# Patient Record
Sex: FEMALE | Race: WHITE | NOT HISPANIC OR LATINO | Employment: PART TIME | URBAN - METROPOLITAN AREA
[De-identification: names, ages, dates, MRNs, and addresses within clinical notes are randomized per-mention and may not be internally consistent; named-entity substitution may affect disease eponyms.]

---

## 2017-02-03 DIAGNOSIS — G47.00 INSOMNIA, UNSPECIFIED: Primary | ICD-10-CM

## 2017-02-03 DIAGNOSIS — E78.5 HYPERLIPIDEMIA LDL GOAL <100: ICD-10-CM

## 2017-02-03 NOTE — TELEPHONE ENCOUNTER
Simvastatin        New Pharmacy     Please transfer Synthroid Rx, also  Last Written Prescription Date: 06/13/16  Last Fill Quantity: 90, # refills: 2  [place] [facility] or [facility] prescribing provider: 10/20/16       CHOL      174   5/16/2016  HDL       96   5/16/2016  LDL       61   5/16/2016  TRIG       86   5/16/2016  CHOLHDLRATIO      2.0   3/10/2015  Trazodone      Last Written Prescription Date: 05/20/16  Last Fill Quantity: 90,  # refills: 2   Last Office Visit with [facility] or [facility] prescribing provider: 10/20/16

## 2017-02-07 ENCOUNTER — MYC MEDICAL ADVICE (OUTPATIENT)
Dept: ONCOLOGY | Facility: CLINIC | Age: 72
End: 2017-02-07

## 2017-02-09 RX ORDER — TRAZODONE HYDROCHLORIDE 100 MG/1
TABLET ORAL
Qty: 30 TABLET | Refills: 0 | Status: SHIPPED | OUTPATIENT
Start: 2017-02-09 | End: 2017-03-10

## 2017-02-09 RX ORDER — SIMVASTATIN 20 MG
20 TABLET ORAL AT BEDTIME
Qty: 30 TABLET | Refills: 0 | Status: SHIPPED | OUTPATIENT
Start: 2017-02-09 | End: 2017-03-10

## 2017-02-16 ENCOUNTER — OFFICE VISIT (OUTPATIENT)
Dept: PODIATRY | Facility: CLINIC | Age: 72
End: 2017-02-16
Payer: COMMERCIAL

## 2017-02-16 VITALS — BODY MASS INDEX: 32.78 KG/M2 | HEIGHT: 66 IN | WEIGHT: 204 LBS

## 2017-02-16 DIAGNOSIS — M76.61 ACHILLES TENDINITIS OF RIGHT LOWER EXTREMITY: Primary | ICD-10-CM

## 2017-02-16 PROCEDURE — 99213 OFFICE O/P EST LOW 20 MIN: CPT | Performed by: PODIATRIST

## 2017-02-16 NOTE — PATIENT INSTRUCTIONS
Initial musculoskeletal treatment recommendation:    1.  Stretch the calf muscles as instructed once an hour.  2.  Ice the injured area in the evening; 20 min on/off.  3.  Take antiinflammatory medication as directed.  4.  Massage the soft tissues around the injured area in the morning to loosen the tissue  5.  Use the knee walker at all times.    If no improvement in symptoms within four to six weeks, return to clinic for reevaluation.

## 2017-02-16 NOTE — PROGRESS NOTES
"Briana returns to the office for reevaluation of the right foot.  The patient relates following the instructions given at the last visit with noted more pain.  The patient relates overall less  improvement in pain and function of the right foot.  The patient relates no other problems.    PAST MEDICAL HISTORY:   Past Medical History   Diagnosis Date     Allergies      Breast cancer (H)      Esophageal reflux      Other and unspecified hyperlipidemia      Other chronic bronchitis      Personal history of pneumonia (recurrent)      Hx-Pneumonia, \" Chronic Bronchitis\"     Personal history of tobacco use, presenting hazards to health      Unspecified hypothyroidism        BMI= Body mass index is 32.93 kg/(m^2).    Weight management plan: Patient was referred to their PCP to discuss a diet and exercise plan.    Physical Exam:    General: The patient appears to have a pleasant mental affect.    Lower extremity physical exam:  Neurovascular status is intact with palpable pedal pulses and intact epicritic sensations.  Muscular exam is within normal limits to major muscle groups.  Integument is intact.      One notes decreased edema.  One notes pain on palpation at the insertion point of the achilles tendon on the right.  No surrounding erythema noted.    Radiograph review including weightbearing AP, lateral and medial oblique views of the right foot reveals small islands of ossification of the distal achilles tendon.    Assessment:      ICD-10-CM    1. Achilles tendinitis of right lower extremity M76.61        Plan:  I have explained to Briana about the conditions.  At this time, the patient was instructed on icing, stretching, tissue massage and support.  The patient was prescribed a CAM boot that will aid in offloading the tension forces to the soft tissues and prevent further inflammation.   The patient will return in four weeks for reevaluation if the symptoms do not resolve.        Disclaimer: This note consists " of symbols derived from keyboarding, dictation and/or voice recognition software. As a result, there may be errors in the script that have gone undetected. Please consider this when interpreting information found in this chart.       LUCAS Goldstein D.P.M., [email or web address].

## 2017-02-16 NOTE — MR AVS SNAPSHOT
After Visit Summary   2/16/2017    Briana Mcgee    MRN: [id number]           Patient Information     Date Of Birth          1945        Visit Information        Provider Department      2/16/2017 8:30 AM Antwan Goldstein DPM [facility] [place]        Today's Diagnoses     Achilles tendinitis of right lower extremity    -  1      Care Instructions    Initial musculoskeletal treatment recommendation:    1.  Stretch the calf muscles as instructed once an hour.  2.  Ice the injured area in the evening; 20 min on/off.  3.  Take antiinflammatory medication as directed.  4.  Massage the soft tissues around the injured area in the morning to loosen the tissue  5.  Use the knee walker at all times.    If no improvement in symptoms within four to six weeks, return to clinic for reevaluation.          Follow-ups after your visit        Follow-up notes from your care team     Return in about 4 weeks (around 3/16/2017).      Your next 10 appointments already scheduled     Mar 10, 2017 11:00 AM CST   [facility]    [address] [address]   [phone]            Oct 19, 2017 10:00 AM CDT   LAB with [facility] ([facility])    [address] [address]   [phone]           Patient must bring picture ID.  Patient should be prepared to give a urine specimen  Please do not eat 10-12 hours before your appointment if you are coming in fasting for labs on lipids, cholesterol, or glucose (sugar).  Pregnant women should follow their Care Team instructions. Water with medications is okay. Do not drink coffee or other fluids.   If you have concerns about taking  your medications, please ask at office or if scheduling via [employer], send a message by clicking on Secure Messaging, Message Your Care Team.            Oct 19, 2017  10:30 AM CDT   MA SCREENING DIGITAL BILATERAL with [facility]   [facility] ([facility])    [address]  [facility] [id number] [phone]           Do not use any powder, lotion or deodorant under your arms or on your breast. If you do, we will ask you to remove it before your exam.  Wear comfortable, two-piece clothing.  If you have any allergies, tell your care team.  Bring any previous mammograms from other facilities or have them mailed to the breast center.            Oct 23, 2017  9:30 AM CDT   Return Visit with Merari Duarte MD   [facility] ([facility])    [facility]  [address]  [facility] [id number] [phone]              Who to contact     If you have questions or need follow up information about today's clinic visit or your schedule please contact [facility] [place] directly at [phone].  Normal or non-critical lab and imaging results will be communicated to you by [employer], letter or phone within 4 business days after the clinic has received the results. If you do not hear from us within 7 days, please contact the clinic through [employer] or phone. If you have a critical or abnormal lab result, we will notify you by phone as soon as possible.  Submit refill requests through [employer] or call your pharmacy and they will forward the refill request to us. Please allow 3 business days for your refill to be completed.          Additional Information About Your Visit        [employer] Information     [employer] gives you secure access to your electronic health record. If you see a primary care provider, you can also send messages to your care team and make appointments. If you have questions, please call your primary care clinic.  If you do not have a primary care provider, please call [phone] and they will assist you.        Care EveryWhere ID     This is your Care EveryWhere ID. This  "could be used by other organizations to access your [place] medical records  [id number]        Your Vitals Were     Height BMI (Body Mass Index)                1.676 m (5' 6\") 32.93 kg/m2           Blood Pressure from Last 3 Encounters:   10/20/16 125/59   10/20/16 142/77   10/10/16 112/62    Weight from Last 3 Encounters:   02/16/17 92.5 kg (204 lb)   12/05/16 92.5 kg (204 lb)   10/20/16 91.7 kg (202 lb 1.6 oz)              Today, you had the following     No orders found for display         Today's Medication Changes          These changes are accurate as of: 2/16/17  9:10 AM.  If you have any questions, ask your nurse or doctor.               These medicines have changed or have updated prescriptions.        Dose/Directions    * order for DME   This may have changed:  Another medication with the same name was added. Make sure you understand how and when to take each.   Used for:  Achilles tendinitis of right lower extremity, Patellofemoral stress syndrome of left knee   Changed by:  Alma Dai PA-C        Equipment being ordered: Super feet   Quantity:  1 Units   Refills:  0       * order for DME   This may have changed:  Another medication with the same name was added. Make sure you understand how and when to take each.   Used for:  Peroneal tendinitis of right lower extremity   Changed by:  Antwan Goldstein DPM        Trilok Ankle Brace   Quantity:  1 Device   Refills:  0       * order for DME   This may have changed:  You were already taking a medication with the same name, and this prescription was added. Make sure you understand how and when to take each.   Used for:  Achilles tendinitis of right lower extremity   Changed by:  Antwan Goldstein DPM        Knee Walker Length of use: Three months   Quantity:  1 Units   Refills:  0       * Notice:  This list has 3 medication(s) that are the same as other medications prescribed for you. Read the directions carefully, and ask your doctor " or other care provider to review them with you.         Where to get your medicines      Some of these will need a paper prescription and others can be bought over the counter.  Ask your nurse if you have questions.     Bring a paper prescription for each of these medications     order for DME                Primary Care Provider Office Phone # Fax #    Xavier Vega [phone]       [facility] [address] [address]        Thank you!     Thank you for choosing [facility]  for your care. Our goal is always to provide you with excellent care. Hearing back from our patients is one way we can continue to improve our services. Please take a few minutes to complete the written survey that you may receive in the mail after your visit with us. Thank you!             Your Updated Medication List - Protect others around you: Learn how to safely use, store and throw away your medicines at www.disposemymeds.org.          This list is accurate as of: 2/16/17  9:10 AM.  Always use your most recent med list.                   Brand Name Dispense Instructions for use    ALEVE PO      Take 2 tablets by mouth 2 times daily (with meals)       aspirin 81 MG EC tablet     90 tablet    Take 1 tablet (81 mg) by mouth daily       azithromycin 250 MG tablet    ZITHROMAX Z-NADEGE    6 tablet    Take 1 tablet (250 mg) by mouth daily Two tablets first day, then one tablet daily for four days       calcium + D 600-200 MG-UNIT Tabs   Generic drug:  calcium carbonate-vitamin D      1 TABLET DAILY twice daily       CO Q 10 PO      Take  by mouth.       fluticasone 50 MCG/ACT spray    FLONASE    16 g    Spray 2 sprays into both nostrils daily Hold on file until needed       folic acid 20 MG Caps          levothyroxine 150 MCG tablet    SYNTHROID/LEVOTHROID    90 tablet    Take 1 tablet (150 mcg) by mouth daily BRAND NAME ONLY.  Hold on file until needed.        MULTIPLE VITAMIN PO      1 daily       * order for DME     1 Units    Equipment being ordered: Super feet       * order for DME     1 Device    Trilok Ankle Brace       * order for DME     1 Units    Knee Walker Length of use: Three months       * PROTONIX 20 MG EC tablet   Generic drug:  pantoprazole      Take 40 mg by mouth       * pantoprazole 20 MG EC tablet    PROTONIX    180 tablet    Take 2 tablets (40 mg) by mouth daily Hold on file until needed       predniSONE 5 MG tablet    DELTASONE    90 tablet    Take 1 tablet (5 mg) by mouth daily       pyridoxine 100 MG tablet    VITAMIN B-6     Take 100 mg by mouth daily.       simvastatin 20 MG tablet    ZOCOR    30 tablet    Take 1 tablet (20 mg) by mouth At Bedtime (Needs follow-up appointment for this medication)       traZODone 100 MG tablet    DESYREL    30 tablet    take 1/2 to 1 tablets at bedtime as needed. (Needs follow-up appointment for this medication)       * Notice:  This list has 5 medication(s) that are the same as other medications prescribed for you. Read the directions carefully, and ask your doctor or other care provider to review them with you.

## 2017-02-16 NOTE — NURSING NOTE
"Chief Complaint   Patient presents with     RECHECK     right ankle pain       Initial Ht 1.676 m (5' 6\")  Wt 92.5 kg (204 lb)  BMI 32.93 kg/m2 Estimated body mass index is 32.93 kg/(m^2) as calculated from the following:    Height as of this encounter: 1.676 m (5' 6\").    Weight as of this encounter: 92.5 kg (204 lb).  Medication Reconciliation: complete    "

## 2017-03-10 ENCOUNTER — OFFICE VISIT (OUTPATIENT)
Dept: FAMILY MEDICINE | Facility: CLINIC | Age: 72
End: 2017-03-10
Payer: COMMERCIAL

## 2017-03-10 ENCOUNTER — TELEPHONE (OUTPATIENT)
Dept: FAMILY MEDICINE | Facility: CLINIC | Age: 72
End: 2017-03-10

## 2017-03-10 VITALS
TEMPERATURE: 97 F | BODY MASS INDEX: 34.07 KG/M2 | RESPIRATION RATE: 14 BRPM | DIASTOLIC BLOOD PRESSURE: 72 MMHG | WEIGHT: 212 LBS | HEIGHT: 66 IN | OXYGEN SATURATION: 95 % | SYSTOLIC BLOOD PRESSURE: 142 MMHG | HEART RATE: 71 BPM

## 2017-03-10 DIAGNOSIS — R42 VERTIGO: ICD-10-CM

## 2017-03-10 DIAGNOSIS — E78.5 HYPERLIPIDEMIA LDL GOAL <100: Primary | ICD-10-CM

## 2017-03-10 DIAGNOSIS — J30.2 OTHER SEASONAL ALLERGIC RHINITIS: ICD-10-CM

## 2017-03-10 DIAGNOSIS — K21.9 GASTROESOPHAGEAL REFLUX DISEASE WITHOUT ESOPHAGITIS: ICD-10-CM

## 2017-03-10 DIAGNOSIS — R39.15 URINARY URGENCY: ICD-10-CM

## 2017-03-10 DIAGNOSIS — C50.919 MALIGNANT NEOPLASM OF FEMALE BREAST, UNSPECIFIED LATERALITY, UNSPECIFIED SITE OF BREAST: ICD-10-CM

## 2017-03-10 DIAGNOSIS — E03.9 HYPOTHYROIDISM, UNSPECIFIED TYPE: ICD-10-CM

## 2017-03-10 DIAGNOSIS — G47.00 INSOMNIA, UNSPECIFIED: ICD-10-CM

## 2017-03-10 DIAGNOSIS — M06.9 RHEUMATOID ARTHRITIS, INVOLVING UNSPECIFIED SITE, UNSPECIFIED RHEUMATOID FACTOR PRESENCE: ICD-10-CM

## 2017-03-10 DIAGNOSIS — D68.51 HETEROZYGOUS FACTOR V LEIDEN MUTATION (H): ICD-10-CM

## 2017-03-10 DIAGNOSIS — E66.09 NON MORBID OBESITY DUE TO EXCESS CALORIES: ICD-10-CM

## 2017-03-10 DIAGNOSIS — I10 ESSENTIAL HYPERTENSION: ICD-10-CM

## 2017-03-10 PROCEDURE — 99214 OFFICE O/P EST MOD 30 MIN: CPT | Performed by: FAMILY MEDICINE

## 2017-03-10 RX ORDER — ORLISTAT 120 MG/1
120 CAPSULE ORAL
Qty: 90 CAPSULE | Refills: 3 | Status: SHIPPED | OUTPATIENT
Start: 2017-03-10 | End: 2017-04-12

## 2017-03-10 RX ORDER — PANTOPRAZOLE SODIUM 20 MG/1
40 TABLET, DELAYED RELEASE ORAL DAILY
Qty: 180 TABLET | Refills: 3 | Status: SHIPPED | OUTPATIENT
Start: 2017-03-10 | End: 2018-04-09

## 2017-03-10 RX ORDER — TOLTERODINE 4 MG/1
4 CAPSULE, EXTENDED RELEASE ORAL DAILY
Qty: 30 CAPSULE | Refills: 11 | Status: SHIPPED | OUTPATIENT
Start: 2017-03-10 | End: 2017-05-25

## 2017-03-10 RX ORDER — ORLISTAT 120 MG/1
120 CAPSULE ORAL
Qty: 30 CAPSULE | Refills: 3 | Status: SHIPPED | OUTPATIENT
Start: 2017-03-10 | End: 2017-03-10

## 2017-03-10 RX ORDER — LEVOTHYROXINE SODIUM 150 UG/1
150 TABLET ORAL DAILY
Qty: 90 TABLET | Refills: 3 | Status: SHIPPED | OUTPATIENT
Start: 2017-03-10 | End: 2018-05-01

## 2017-03-10 RX ORDER — SIMVASTATIN 20 MG
20 TABLET ORAL AT BEDTIME
Qty: 90 TABLET | Refills: 3 | Status: SHIPPED | OUTPATIENT
Start: 2017-03-10 | End: 2018-04-24

## 2017-03-10 RX ORDER — FLUTICASONE PROPIONATE 50 MCG
2 SPRAY, SUSPENSION (ML) NASAL DAILY
Qty: 16 G | Refills: 11 | Status: SHIPPED | OUTPATIENT
Start: 2017-03-10 | End: 2018-05-01

## 2017-03-10 RX ORDER — TRAZODONE HYDROCHLORIDE 100 MG/1
TABLET ORAL
Qty: 90 TABLET | Refills: 3 | Status: SHIPPED | OUTPATIENT
Start: 2017-03-10 | End: 2018-05-01

## 2017-03-10 NOTE — PROGRESS NOTES
SUBJECTIVE:                                                    Briana Mcgee is a 71 year old female who presents to clinic today for the following health issues:      Hyperlipidemia Follow-Up      Rate your low fat/cholesterol diet?: good    Taking statin?  Yes, no muscle aches from statin    Other lipid medications/supplements?:  none     Hypertension Follow-up      Outpatient blood pressures are not being checked.    Low Salt Diet: no added salt     Hypothyroidism Follow-up      Since last visit, patient describes the following symptoms: Weight stable, no hair loss, no skin changes, no constipation, no loose stools       Amount of exercise or physical activity: None    Problems taking medications regularly: No    Medication side effects: none  Diet: 1200 indu diet     She was having some vertigo and started some magnesium supplement and is feeling better.  Recommend to check her level since she is taking it twice a day.        Problem list and histories reviewed & adjusted, as indicated.  Additional history: as documented        Reviewed and updated as needed this visit by clinical staff  Tobacco  Med Hx  Surg Hx  Fam Hx  Soc Hx      Reviewed and updated as needed this visit by Provider         ROS:  CONSTITUTIONAL:weigth has increased due to no activity with right ankle, would like a medication for weight loss, discussed otc orlistat  INTEGUMENTARY/SKIN: NEGATIVE for worrisome rashes, moles or lesions  ENT/MOUTH: NEGATIVE for ear, mouth and throat problems  RESP:NEGATIVE for significant cough or SOB  CV: NEGATIVE for chest pain, palpitations or peripheral edema  GI: NEGATIVE for nausea, abdominal pain, heartburn, or change in bowel habits  MUSCULOSKELETAL: as above, right ankle issues and seeing Dr. Goldstein.  Wants to see another rheumatologist.  Wrote for two options on avs  NEURO: NEGATIVE for weakness, dizziness or paresthesias  PSYCHIATRIC: NEGATIVE for changes in mood or affect    OBJECTIVE:         "                                            /72  Pulse 71  Temp 97  F (36.1  C) (Tympanic)  Resp 14  Ht 5' 6\" (1.676 m)  Wt 212 lb (96.2 kg)  SpO2 95%  BMI 34.22 kg/m2  Body mass index is 34.22 kg/(m^2).  GENERAL APPEARANCE: alert, no distress and cooperative  RESP: lungs clear to auscultation - no rales, rhonchi or wheezes  CV: regular rates and rhythm, normal S1 S2, no S3 or S4 and no murmur, click or rub  ABDOMEN: soft, nontender, without hepatosplenomegaly or masses and bowel sounds normal  MS: right cam walker on her foot  SKIN: no suspicious lesions or rashes  NEURO: Normal strength and tone, mentation intact and speech normal  PSYCH: mentation appears normal and affect normal/bright         ASSESSMENT/PLAN:                                                    1. Hyperlipidemia LDL goal <100  Refilled and check labs  - simvastatin (ZOCOR) 20 MG tablet; Take 1 tablet (20 mg) by mouth At Bedtime Hold on file until needed  Dispense: 90 tablet; Refill: 3  - Lipid panel reflex to direct LDL; Future    2. Heterozygous factor V Leiden mutation (H)  No blood clots    3. Malignant neoplasm of female breast, unspecified laterality, unspecified site of breast (H)  Seeing oncology and following liver function tests    4. Rheumatoid arthritis, involving unspecified site, unspecified rheumatoid factor presence (H)  Wants a second opinion and referrals done for Adam  - Basic metabolic panel; Future  - RHEUMATOLOGY REFERRAL    5. INSOMNIA  Refilled med  - traZODone (DESYREL) 100 MG tablet; 1 tablets at bedtime as needed.  Hold on file until needed  Dispense: 90 tablet; Refill: 3    6. Gastroesophageal reflux disease without esophagitis    - pantoprazole (PROTONIX) 20 MG EC tablet; Take 2 tablets (40 mg) by mouth daily Hold on file until needed  Dispense: 180 tablet; Refill: 3    7. Other seasonal allergic rhinitis    - fluticasone (FLONASE) 50 MCG/ACT spray; Spray 2 sprays into both nostrils daily Hold on file until " needed  Dispense: 16 g; Refill: 11    8. Hypothyroidism, unspecified type  Check level  - levothyroxine (SYNTHROID/LEVOTHROID) 150 MCG tablet; Take 1 tablet (150 mcg) by mouth daily BRAND NAME ONLY.  Hold on file until needed.  Dispense: 90 tablet; Refill: 3  - Basic metabolic panel; Future  - TSH; Future  - T4 FREE; Future    9. Vertigo  Check level due to supplement  - Magnesium; Future    10. Essential hypertension  controlled    11. Urinary urgency  She wants to go back on med, and sent medication  - tolterodine (DETROL LA) 4 MG 24 hr capsule; Take 1 capsule (4 mg) by mouth daily  Dispense: 30 capsule; Refill: 11    12. Non morbid obesity due to excess calories  Trial of med  - orlistat (XENICAL) 120 MG capsule; Take 1 capsule (120 mg) by mouth 3 times daily (with meals)  Dispense: 90 capsule; Refill: 3      See Patient Instructions    Xavier Vega MD  [facility]

## 2017-03-10 NOTE — PATIENT INSTRUCTIONS
Please make a fasting lab visit at the [facility].    I will be checking your thyroid function, kidney function, screen for diabetes and cholesterol level.    I refilled your medications so please hold off on getting the refilled until I get your lab results back.      Thank you for choosing [facility].  You may be receiving a survey in the mail from [employer] regarding your visit today.  Please take a few minutes to complete and return the survey to let us know how we are doing.      If you have questions or concerns, please contact us via [employer] or you can contact your care team at [phone].    Our Clinic hours are:  Monday 6:40 am  to 7:00 pm  Tuesday -Friday 6:40 am to 5:00 pm    The [place] outpatient lab hours are:  Monday - Friday 6:10 am to 4:45 pm  Saturdays 7:00 am to 11:00 am  Appointments are required, call [phone]    If you have clinical questions after hours or would like to schedule an appointment,  call the clinic at [phone].

## 2017-03-10 NOTE — NURSING NOTE
"Chief Complaint   Patient presents with     Refill Request       Initial /73  Pulse 71  Temp 97  F (36.1  C) (Tympanic)  Resp 14  Ht 5' 6\" (1.676 m)  Wt 212 lb (96.2 kg)  SpO2 95%  BMI 34.22 kg/m2 Estimated body mass index is 34.22 kg/(m^2) as calculated from the following:    Height as of this encounter: 5' 6\" (1.676 m).    Weight as of this encounter: 212 lb (96.2 kg).  Medication Reconciliation: complete\  "

## 2017-03-10 NOTE — MR AVS SNAPSHOT
After Visit Summary   3/10/2017    Briana Mcgee    MRN: [id number]           Patient Information     Date Of Birth          1945        Visit Information        Provider Department      3/10/2017 11:00 AM Xavier Vega MD [facility]        Today's Diagnoses     Hyperlipidemia LDL goal <100    -  1    Heterozygous factor V Leiden mutation (H)        Malignant neoplasm of female breast, unspecified laterality, unspecified site of breast (H)        Rheumatoid arthritis, involving unspecified site, unspecified rheumatoid factor presence (H)        INSOMNIA        Gastroesophageal reflux disease without esophagitis        Other seasonal allergic rhinitis        Hypothyroidism, unspecified type        Vertigo        Essential hypertension        Urinary urgency        Non morbid obesity due to excess calories          Care Instructions    Please make a fasting lab visit at the [facility].    I will be checking your thyroid function, kidney function, screen for diabetes and cholesterol level.    I refilled your medications so please hold off on getting the refilled until I get your lab results back.      Thank you for choosing [facility].  You may be receiving a survey in the mail from [employer] regarding your visit today.  Please take a few minutes to complete and return the survey to let us know how we are doing.      If you have questions or concerns, please contact us via [employer] or you can contact your care team at [phone].    Our Clinic hours are:  Monday 6:40 am  to 7:00 pm  Tuesday -Friday 6:40 am to 5:00 pm    The [place] outpatient lab hours are:  Monday - Friday 6:10 am to 4:45 pm  Saturdays 7:00 am to 11:00 am  Appointments are required, call [phone]    If you have clinical questions after hours or would like to schedule an appointment,  call the clinic at [phone].          Follow-ups after your visit        Additional Services      RHEUMATOLOGY REFERRAL       Your provider has referred you to:   [facility]: [facility] [phone]   [email or web address]  [facility]: [facility] [phone] Fax [phone] [email or web address]    Please be aware that coverage of these services is subject to the terms and limitations of your health insurance plan.  Call member services at your health plan with any benefit or coverage questions.      Please bring the following with you to your appointment:    (1) Any X-Rays, CTs or MRIs which have been performed.  Contact the facility where they were done to arrange for  prior to your scheduled appointment.    (2) List of current medications   (3) This referral request   (4) Any documents/labs given to you for this referral    Second opinion regarding arthritis.    On chronic steroid use of prednisone 5 mg a day.                  Your next 10 appointments already scheduled     Mar 23, 2017  8:00 AM CDT   Return Visit with Antwan Goldstein DPM   [facility] ([facility])    [address]  [address]  [place] [place] [address]   [phone]            Oct 19, 2017 10:00 AM CDT   LAB with [facility] ([facility])    [address] [address]   [phone]           Patient must bring picture ID.  Patient should be prepared to give a urine specimen  Please do not eat 10-12 hours before your appointment if you are coming in fasting for labs on lipids, cholesterol, or glucose (sugar).  Pregnant women should follow their Care Team instructions. Water with medications is okay. Do not drink coffee or other fluids.   If you have concerns about taking  your medications, please ask at office or if scheduling via [employer], send a message by clicking on Secure Messaging, Message Your Care Team.            Oct 19, 2017 10:30 AM  CDT   MA SCREENING DIGITAL BILATERAL with [facility]   [facility] ([facility])    [address]  [facility] [address] [phone]           Do not use any powder, lotion or deodorant under your arms or on your breast. If you do, we will ask you to remove it before your exam.  Wear comfortable, two-piece clothing.  If you have any allergies, tell your care team.  Bring any previous mammograms from other facilities or have them mailed to the breast center.            Oct 23, 2017  9:30 AM CDT   Return Visit with Merari Duarte MD   [facility] ([facility])    [facility]  [address]  [facility] [address] [phone]              Future tests that were ordered for you today     Open Future Orders        Priority Expected Expires Ordered    Magnesium Routine  3/10/2018 3/10/2017    Basic metabolic panel Routine  4/10/2017 3/10/2017    Lipid panel reflex to direct LDL Routine  4/10/2017 3/10/2017    TSH Routine  4/10/2017 3/10/2017    T4 FREE Routine  4/10/2017 3/10/2017            Who to contact     If you have questions or need follow up information about today's clinic visit or your schedule please contact [facility] directly at [phone].  Normal or non-critical lab and imaging results will be communicated to you by [employer], letter or phone within 4 business days after the clinic has received the results. If you do not hear from us within 7 days, please contact the clinic through [employer] or phone. If you have a critical or abnormal lab result, we will notify you by phone as soon as possible.  Submit refill requests through [employer] or call your pharmacy and they will forward the refill request to us. Please allow 3 business days for your refill to be completed.          Additional Information About Your Visit        [employer] Information     [employer] gives you secure access to your electronic health record. If  "you see a primary care provider, you can also send messages to your care team and make appointments. If you have questions, please call your primary care clinic.  If you do not have a primary care provider, please call [phone] and they will assist you.        Care EveryWhere ID     This is your Care EveryWhere ID. This could be used by other organizations to access your [facility] medical records  [id number]        Your Vitals Were     Pulse Temperature Respirations Height Pulse Oximetry BMI (Body Mass Index)    71 97  F (36.1  C) (Tympanic) 14 5' 6\" (1.676 m) 95% 34.22 kg/m2       Blood Pressure from Last 3 Encounters:   03/10/17 142/72   10/20/16 125/59   10/20/16 142/77    Weight from Last 3 Encounters:   03/10/17 212 lb (96.2 kg)   02/16/17 204 lb (92.5 kg)   12/05/16 204 lb (92.5 kg)              We Performed the Following     RHEUMATOLOGY REFERRAL          Today's Medication Changes          These changes are accurate as of: 3/10/17 11:22 AM.  If you have any questions, ask your nurse or doctor.               Start taking these medicines.        Dose/Directions    orlistat 120 MG capsule   Commonly known as:  XENICAL   Used for:  Non morbid obesity due to excess calories   Started by:  Xavier Vega MD        Dose:  120 mg   Take 1 capsule (120 mg) by mouth 3 times daily (with meals)   Quantity:  90 capsule   Refills:  3       tolterodine 4 MG 24 hr capsule   Commonly known as:  DETROL LA   Used for:  Urinary urgency   Started by:  Xavier Vega MD        Dose:  4 mg   Take 1 capsule (4 mg) by mouth daily   Quantity:  30 capsule   Refills:  11         These medicines have changed or have updated prescriptions.        Dose/Directions    simvastatin 20 MG tablet   Commonly known as:  ZOCOR   This may have changed:  additional instructions   Used for:  Hyperlipidemia LDL goal <100   Changed by:  Xavier Vega MD        Dose:  20 mg   Take 1 tablet (20 mg) by mouth At Bedtime Hold on file " until needed   Quantity:  90 tablet   Refills:  3       traZODone 100 MG tablet   Commonly known as:  DESYREL   This may have changed:  additional instructions   Used for:  Insomnia, unspecified   Changed by:  Xavier Vega MD        1 tablets at bedtime as needed.  Hold on file until needed   Quantity:  90 tablet   Refills:  3            Where to get your medicines      These medications were sent to [facility]- - [address]  [address] [place] [address]     Phone:  [phone]     fluticasone 50 MCG/ACT spray    levothyroxine 150 MCG tablet    orlistat 120 MG capsule    pantoprazole 20 MG EC tablet    simvastatin 20 MG tablet    tolterodine 4 MG 24 hr capsule    traZODone 100 MG tablet                Primary Care Provider Office Phone # Fax #    Xavier Vega [phone]       [facility] [address] [address]        Thank you!     Thank you for choosing [facility]  for your care. Our goal is always to provide you with excellent care. Hearing back from our patients is one way we can continue to improve our services. Please take a few minutes to complete the written survey that you may receive in the mail after your visit with us. Thank you!             Your Updated Medication List - Protect others around you: Learn how to safely use, store and throw away your medicines at www.disposemymeds.org.          This list is accurate as of: 3/10/17 11:22 AM.  Always use your most recent med list.                   Brand Name Dispense Instructions for use    ALEVE PO      Take 2 tablets by mouth 2 times daily (with meals)       aspirin 81 MG EC tablet     90 tablet    Take 1 tablet (81 mg) by mouth daily       calcium + D 600-200 MG-UNIT Tabs   Generic drug:  calcium carbonate-vitamin D      1 TABLET DAILY twice daily       CO Q 10 PO      Take  by mouth.       fluticasone 50 MCG/ACT spray     FLONASE    16 g    Spray 2 sprays into both nostrils daily Hold on file until needed       folic acid 20 MG Caps          levothyroxine 150 MCG tablet    SYNTHROID/LEVOTHROID    90 tablet    Take 1 tablet (150 mcg) by mouth daily BRAND NAME ONLY.  Hold on file until needed.       magnesium hydroxide 400 MG/5ML suspension    MILK OF MAGNESIA     Take 400 mg/kg/day by mouth daily as needed for constipation or heartburn       MULTIPLE VITAMIN PO      1 daily       * order for DME     1 Units    Equipment being ordered: Super feet       * order for DME     1 Device    Trilok Ankle Brace       * order for DME     1 Units    Knee Walker Length of use: Three months       * order for DME     1 Device    CAM walker regular       orlistat 120 MG capsule    XENICAL    90 capsule    Take 1 capsule (120 mg) by mouth 3 times daily (with meals)       predniSONE 5 MG tablet    DELTASONE    90 tablet    Take 1 tablet (5 mg) by mouth daily       * PROTONIX 20 MG EC tablet   Generic drug:  pantoprazole      Take 40 mg by mouth       * pantoprazole 20 MG EC tablet    PROTONIX    180 tablet    Take 2 tablets (40 mg) by mouth daily Hold on file until needed       pyridoxine 100 MG tablet    VITAMIN B-6     Take 100 mg by mouth daily.       simvastatin 20 MG tablet    ZOCOR    90 tablet    Take 1 tablet (20 mg) by mouth At Bedtime Hold on file until needed       tolterodine 4 MG 24 hr capsule    DETROL LA    30 capsule    Take 1 capsule (4 mg) by mouth daily       traZODone 100 MG tablet    DESYREL    90 tablet    1 tablets at bedtime as needed.  Hold on file until needed       * Notice:  This list has 6 medication(s) that are the same as other medications prescribed for you. Read the directions carefully, and ask your doctor or other care provider to review them with you.

## 2017-03-13 DIAGNOSIS — R42 VERTIGO: ICD-10-CM

## 2017-03-13 DIAGNOSIS — E03.9 HYPOTHYROIDISM, UNSPECIFIED TYPE: ICD-10-CM

## 2017-03-13 DIAGNOSIS — M06.9 RHEUMATOID ARTHRITIS, INVOLVING UNSPECIFIED SITE, UNSPECIFIED RHEUMATOID FACTOR PRESENCE: ICD-10-CM

## 2017-03-13 DIAGNOSIS — E78.5 HYPERLIPIDEMIA LDL GOAL <100: ICD-10-CM

## 2017-03-13 PROBLEM — I10 ESSENTIAL HYPERTENSION: Status: ACTIVE | Noted: 2017-03-13

## 2017-03-13 LAB
ANION GAP SERPL CALCULATED.3IONS-SCNC: 5 MMOL/L (ref 3–14)
BUN SERPL-MCNC: 20 MG/DL (ref 7–30)
CALCIUM SERPL-MCNC: 9 MG/DL (ref 8.5–10.1)
CHLORIDE SERPL-SCNC: 103 MMOL/L (ref 94–109)
CHOLEST SERPL-MCNC: 215 MG/DL
CO2 SERPL-SCNC: 33 MMOL/L (ref 20–32)
CREAT SERPL-MCNC: 0.84 MG/DL (ref 0.52–1.04)
GFR SERPL CREATININE-BSD FRML MDRD: 66 ML/MIN/1.7M2
GLUCOSE SERPL-MCNC: 105 MG/DL (ref 70–99)
HDLC SERPL-MCNC: 88 MG/DL
LDLC SERPL CALC-MCNC: 96 MG/DL
MAGNESIUM SERPL-MCNC: 2.3 MG/DL (ref 1.6–2.3)
NONHDLC SERPL-MCNC: 127 MG/DL
POTASSIUM SERPL-SCNC: 4.2 MMOL/L (ref 3.4–5.3)
SODIUM SERPL-SCNC: 141 MMOL/L (ref 133–144)
T4 FREE SERPL-MCNC: 1.29 NG/DL (ref 0.76–1.46)
TRIGL SERPL-MCNC: 154 MG/DL
TSH SERPL DL<=0.05 MIU/L-ACNC: 1.87 MU/L (ref 0.4–4)

## 2017-03-13 PROCEDURE — 80048 BASIC METABOLIC PNL TOTAL CA: CPT | Performed by: FAMILY MEDICINE

## 2017-03-13 PROCEDURE — 83735 ASSAY OF MAGNESIUM: CPT | Performed by: FAMILY MEDICINE

## 2017-03-13 PROCEDURE — 36415 COLL VENOUS BLD VENIPUNCTURE: CPT | Performed by: FAMILY MEDICINE

## 2017-03-13 PROCEDURE — 84439 ASSAY OF FREE THYROXINE: CPT | Performed by: FAMILY MEDICINE

## 2017-03-13 PROCEDURE — 84443 ASSAY THYROID STIM HORMONE: CPT | Performed by: FAMILY MEDICINE

## 2017-03-13 PROCEDURE — 80061 LIPID PANEL: CPT | Performed by: FAMILY MEDICINE

## 2017-03-13 NOTE — TELEPHONE ENCOUNTER
PA for xenical has been denied.  This drug is considered a supplemental benefit and therefore is not covered. Pharmacy notified.

## 2017-03-23 ENCOUNTER — OFFICE VISIT (OUTPATIENT)
Dept: PODIATRY | Facility: CLINIC | Age: 72
End: 2017-03-23
Payer: COMMERCIAL

## 2017-03-23 VITALS — WEIGHT: 212 LBS | BODY MASS INDEX: 34.07 KG/M2 | HEART RATE: 70 BPM | HEIGHT: 66 IN

## 2017-03-23 DIAGNOSIS — M77.31 HEEL SPUR, RIGHT: ICD-10-CM

## 2017-03-23 DIAGNOSIS — M76.61 ACHILLES TENDINITIS OF RIGHT LOWER EXTREMITY: Primary | ICD-10-CM

## 2017-03-23 PROCEDURE — 99213 OFFICE O/P EST LOW 20 MIN: CPT | Performed by: PODIATRIST

## 2017-03-23 NOTE — LETTER
SURGERYPLANNING/SCHEDULING WORKSHEET                              [facility] [place]  [address]  [address]  [place] [place] [address] [phone]                          Briana Mcgee                :  1945  MRN:  [id number]  Phone: [phone] (home)     Same Day Surgery   Surgeon: Antwan Goldstein DPM  Diagnosis:   Retrocalcaneal exostosis right heel  Allergies:  Erythromycin; Erythromycin; Hydrocodone; and Oxycodone   A preoperative evaluation by the primary care provider has been requested to summarize and modify, where possible, medical risks to the proposed surgery.  Specific risks requiring evaluation include   Patient Active Problem List    Diagnosis     Malignant neoplasm of female breast (H)     Essential hypertension     Hepatitis     Obesity     Rheumatoid arthritis (H)     CKD (chronic kidney disease) stage 2, GFR 60-89 ml/min     Advanced directives, counseling/discussion     Spinal stenosis     Lumbar radiculopathy     Varicose veins of lower extremities with complications     GERD (gastroesophageal reflux disease)     Heterozygous factor V Leiden mutation (H)     HYPERLIPIDEMIA LDL GOAL <100     OA (osteoarthritis) of knee     Hot flashes     Rhinitis, allergic seasonal     ischemic stroke 3/06     Impaired fasting glucose     Insomnia     Hypothyroidism     ====================================================  Surgical Procedure:  Orthopedics:  Retrocalcaneal exostectomy right  Length of Procedure:  60 min  Type of anesthesia:  General and a popliteal block  The proposed surgical procedure is considered INTERMEDIATE risk.  Date of Procedure:____17_________   Time: ____TBD_______________       Special Equipment: Arthrex speed bridge  Informed Consent Obtained and Signed:  NO  ====================================================  Instructions to Same Day Surgery Staff  none  Preop  Antibiotic:  Clindamycin 600 mg IV  plus Gentamycin 1.5mg/kg IV (if penicillin allergic), pre-op within 1 hr prior to incision Infuse over 20 mins.  Preop Pain Meds:  None  Preop Orders:  Routine Standing Orders.  ====================================================  Instructions to the patient:  Preop physical exam scheduled (within 30 days or 7 days prior) with:   ____________________  Clinic:  ____________________                                         Date______________Time_________________________  Come to the hospital at: ________________________________  HOME PREPARATION:   Shower with Hibiclens the night before or the morning of surgery, gently cleaning skin from neck to feet  Bathe and brush teeth the morning of surgery.  Take medications with a sip of water the morning of surgery:   Check with  if taking insulin.  May have  a light meal, toast and clear liquids, up to 8 hrs before surgery  May have clear liquids (liquids one can read through) up to 4 hrs before surgery  NOTHING after 4 hrs before surgery  Stop aspirin 7-10 days before surgery  Stop NSAIDS (Ibuproven, Naproxen, etc) 5 days before surgery  Stop Plavix 7-10 days before surgery      Antwan Goldstein DPM    3/23/2017  This form was electronically signed at chart closure                                                                        Chart Copy

## 2017-03-23 NOTE — PATIENT INSTRUCTIONS
You have elected to proceed with Surgery for retrocalcaneal exostosis with repair of the achilles tendon right.  Surgeries are performed on Tuesdays at [facility].   To schedule your surgery date please call [phone]. Please leave a message with a good time for our staff to call you back.    - Please have a date in mind for your surgery, you can feel free to leave that date on the message, and we will schedule and call back to confirm.     You can expect receive a call back the same day or on the next business day from Dr. Goldstein s team to assist in the scheduling.   - We will schedule the date of your surgery.  The time will be determined a few days ahead of time.  You can expect a call from Same Day Surgery 2-3 days ahead of time with specific instructions for what time to arrive at the hospital as well as any other preparations you should take prior to surgery.    - You may need to obtain a pre-operative physical from your primary medical provider. This must be done within 30 days of your surgery date.    - We will also schedule your first post-operative appointment for a bandage and wound check for the Monday following your surgery at the [facility].    - You may be non-weight bearing for a period of up to 6 weeks.  Options for this include: (Please indicate which you would prefer so we can provide you with an order and instructions)  o Crutches  o Walker  o Roll-a-bout knee walker.    - If you will need paperwork filled out for your employer you may drop those off at the clinic directly or you may have those faxed to us at [phone].  Please indicate on the form the date you would like the LA to begin if it will not be your surgery date.    The forms are typically filled out for up to 12 weeks, however you may be cleared to return prior to that time depending on your individual healing and job requirements.

## 2017-03-23 NOTE — MR AVS SNAPSHOT
After Visit Summary   3/23/2017    Briana Mcgee    MRN: [id number]           Patient Information     Date Of Birth          1945        Visit Information        Provider Department      3/23/2017 8:00 AM Antwan Goldstein DPM [facility]        Today's Diagnoses     Achilles tendinitis of right lower extremity    -  1    Heel spur, right          Care Instructions    You have elected to proceed with Surgery for retrocalcaneal exostosis with repair of the achilles tendon right.  Surgeries are performed on Tuesdays at [facility].   To schedule your surgery date please call [phone]. Please leave a message with a good time for our staff to call you back.    - Please have a date in mind for your surgery, you can feel free to leave that date on the message, and we will schedule and call back to confirm.     You can expect receive a call back the same day or on the next business day from Dr. Goldstein s team to assist in the scheduling.   - We will schedule the date of your surgery.  The time will be determined a few days ahead of time.  You can expect a call from Same Day Surgery 2-3 days ahead of time with specific instructions for what time to arrive at the hospital as well as any other preparations you should take prior to surgery.    - You may need to obtain a pre-operative physical from your primary medical provider. This must be done within 30 days of your surgery date.    - We will also schedule your first post-operative appointment for a bandage and wound check for the Monday following your surgery at the [place] location.    - You may be non-weight bearing for a period of up to 6 weeks.  Options for this include: (Please indicate which you would prefer so we can provide you with an order and instructions)  o Crutches  o Walker  o Roll-a-bout knee walker.    - If you will need paperwork filled out for your employer you may drop those  off at the clinic directly or you may have those faxed to us at [phone].  Please indicate on the form the date you would like the FMLA to begin if it will not be your surgery date.    The forms are typically filled out for up to 12 weeks, however you may be cleared to return prior to that time depending on your individual healing and job requirements.          Follow-ups after your visit        Your next 10 appointments already scheduled     Oct 19, 2017 10:00 AM CDT   LAB with [facility] ([facility])    [address] [address] [phone]           Patient must bring picture ID.  Patient should be prepared to give a urine specimen  Please do not eat 10-12 hours before your appointment if you are coming in fasting for labs on lipids, cholesterol, or glucose (sugar).  Pregnant women should follow their Care Team instructions. Water with medications is okay. Do not drink coffee or other fluids.   If you have concerns about taking  your medications, please ask at office or if scheduling via "[employer] send a message by clicking on Secure Messaging, Message Your Care Team.            Oct 19, 2017 10:30 AM CDT   MA SCREENING DIGITAL BILATERAL with [address] [facility] ([facility])    [address] [address] [phone]           Do not use any powder, lotion or deodorant under your arms or on your breast. If you do, we will ask you to remove it before your exam.  Wear comfortable, two-piece clothing.  If you have any allergies, tell your care team.  Bring any previous mammograms from other facilities or have them mailed to the breast center.            Oct 23, 2017  9:30 AM CDT   Return Visit with Merari Duarte MD   [facility] ([facility])    [facility]  [address]  [facility] [address] [phone]              Who to contact     If you  "have questions or need follow up information about today's clinic visit or your schedule please contact [facility] directly at [phone].  Normal or non-critical lab and imaging results will be communicated to you by MyChart, letter or phone within 4 business days after the clinic has received the results. If you do not hear from us within 7 days, please contact the clinic through [employer] or phone. If you have a critical or abnormal lab result, we will notify you by phone as soon as possible.  Submit refill requests through [employer] or call your pharmacy and they will forward the refill request to us. Please allow 3 business days for your refill to be completed.          Additional Information About Your Visit        IRI Information     IRI gives you secure access to your electronic health record. If you see a primary care provider, you can also send messages to your care team and make appointments. If you have questions, please call your primary care clinic.  If you do not have a primary care provider, please call [phone] and they will assist you.        Care EveryWhere ID     This is your Care EveryWhere ID. This could be used by other organizations to access your [facility] medical records  [id number]        Your Vitals Were     Pulse Height BMI (Body Mass Index)             70 1.676 m (5' 6\") 34.22 kg/m2          Blood Pressure from Last 3 Encounters:   03/10/17 142/72   10/20/16 125/59   10/20/16 142/77    Weight from Last 3 Encounters:   03/23/17 96.2 kg (212 lb)   03/10/17 96.2 kg (212 lb)   02/16/17 92.5 kg (204 lb)              Today, you had the following     No orders found for display       Primary Care Provider Office Phone # Fax #    Xavier Vega [phone]       [facility] [address] [address]        Thank you!     Thank you for choosing [facility]  for your care. Our " goal is always to provide you with excellent care. Hearing back from our patients is one way we can continue to improve our services. Please take a few minutes to complete the written survey that you may receive in the mail after your visit with us. Thank you!             Your Updated Medication List - Protect others around you: Learn how to safely use, store and throw away your medicines at www.disposemymeds.org.          This list is accurate as of: 3/23/17  8:41 AM.  Always use your most recent med list.                   Brand Name Dispense Instructions for use    ALEVE PO      Take 2 tablets by mouth 2 times daily (with meals)       aspirin 81 MG EC tablet     90 tablet    Take 1 tablet (81 mg) by mouth daily       calcium + D 600-200 MG-UNIT Tabs   Generic drug:  calcium carbonate-vitamin D      1 TABLET DAILY twice daily       CO Q 10 PO      Take  by mouth.       fluticasone 50 MCG/ACT spray    FLONASE    16 g    Spray 2 sprays into both nostrils daily Hold on file until needed       folic acid 20 MG Caps          levothyroxine 150 MCG tablet    SYNTHROID/LEVOTHROID    90 tablet    Take 1 tablet (150 mcg) by mouth daily BRAND NAME ONLY.  Hold on file until needed.       magnesium hydroxide 400 MG/5ML suspension    MILK OF MAGNESIA     Take 400 mg/kg/day by mouth daily as needed for constipation or heartburn       MULTIPLE VITAMIN PO      1 daily       * order for DME     1 Units    Equipment being ordered: Super feet       * order for DME     1 Device    Trilok Ankle Brace       * order for DME     1 Units    Knee Walker Length of use: Three months       * order for DME     1 Device    CAM walker regular       orlistat 120 MG capsule    XENICAL    90 capsule    Take 1 capsule (120 mg) by mouth 3 times daily (with meals)       predniSONE 5 MG tablet    DELTASONE    90 tablet    Take 1 tablet (5 mg) by mouth daily       * PROTONIX 20 MG EC tablet   Generic drug:  pantoprazole      Take 40 mg by mouth       *  pantoprazole 20 MG EC tablet    PROTONIX    180 tablet    Take 2 tablets (40 mg) by mouth daily Hold on file until needed       pyridoxine 100 MG tablet    VITAMIN B-6     Take 100 mg by mouth daily.       simvastatin 20 MG tablet    ZOCOR    90 tablet    Take 1 tablet (20 mg) by mouth At Bedtime Hold on file until needed       tolterodine 4 MG 24 hr capsule    DETROL LA    30 capsule    Take 1 capsule (4 mg) by mouth daily       traZODone 100 MG tablet    DESYREL    90 tablet    1 tablets at bedtime as needed.  Hold on file until needed       * Notice:  This list has 6 medication(s) that are the same as other medications prescribed for you. Read the directions carefully, and ask your doctor or other care provider to review them with you.

## 2017-03-23 NOTE — PROGRESS NOTES
"Briana returns to the office for reevaluation of the right foot.  The patient relates following the instructions given at the last visit with noted more pain.  The patient relates overall less  improvement in pain and function of the right foot.  The patient relates no other problems.    PAST MEDICAL HISTORY:   Past Medical History:   Diagnosis Date     Allergies      Breast cancer (H)      Esophageal reflux      Other and unspecified hyperlipidemia      Other chronic bronchitis      Personal history of pneumonia (recurrent)     Hx-Pneumonia, \" Chronic Bronchitis\"     Personal history of tobacco use, presenting hazards to health      Unspecified hypothyroidism        BMI= Body mass index is 34.22 kg/(m^2).    Weight management plan: Patient was referred to their PCP to discuss a diet and exercise plan.    Physical Exam:    General: The patient appears to have a pleasant mental affect.    Lower extremity physical exam:  Neurovascular status is intact with palpable pedal pulses and intact epicritic sensations.  Muscular exam is within normal limits to major muscle groups.  Integument is intact.      One notes decreased edema.  One notes pain on palpation on the posterior aspect of the right heel. No surrounding erythema noted.    Radiograph evaluation including weightbearing AP, lateral and medial oblique views of the right foot reveals moderate posterior calcaneal spur.    Assessment:      ICD-10-CM    1. Achilles tendinitis of right lower extremity M76.61    2. Heel spur, right M77.31        Plan:  I have explained to Briana about the conditions.  At this time, the patient wishes to proceed with surgery on the right foot.  At this point, I am recommending surgical treatment of the condition involving retrocalcaneal exostectomy on the right foot.  I informed the patient in risks and benefits of the procedure including but not limited to infection, wound complications, swelling, pain, diminished range of " motion and function, DVT and reoccurrence of condition.  The procedure will be performed under general with popliteal block anesthesia.  The patient will obtain a preoperative history and physical by the primary care provider.  Consents will be reviewed and signed on the day of surgery.      Disclaimer: This note consists of symbols derived from keyboarding, dictation and/or voice recognition software. As a result, there may be errors in the script that have gone undetected. Please consider this when interpreting information found in this chart.       LUCAS Goldstein D.P.M., F.YASMIN.C.F.A.S.

## 2017-03-23 NOTE — LETTER
"  3/23/2017       RE: Briana Mcgee  [address] [address] [address]           Dear Colleague,    Thank you for referring your patient, Briana Mcgee, to the [facility]. Please see a copy of my visit note below.    Briana returns to the office for reevaluation of the right foot.  The patient relates following the instructions given at the last visit with noted more pain.  The patient relates overall less  improvement in pain and function of the right foot.  The patient relates no other problems.    PAST MEDICAL HISTORY:   Past Medical History:   Diagnosis Date     Allergies      Breast cancer (H)      Esophageal reflux      Other and unspecified hyperlipidemia      Other chronic bronchitis      Personal history of pneumonia (recurrent)     Hx-Pneumonia, \" Chronic Bronchitis\"     Personal history of tobacco use, presenting hazards to health      Unspecified hypothyroidism        BMI= Body mass index is 34.22 kg/(m^2).    Weight management plan: Patient was referred to their PCP to discuss a diet and exercise plan.    Physical Exam:    General: The patient appears to have a pleasant mental affect.    Lower extremity physical exam:  Neurovascular status is intact with palpable pedal pulses and intact epicritic sensations.  Muscular exam is within normal limits to major muscle groups.  Integument is intact.      One notes decreased edema.  One notes pain on palpation on the posterior aspect of the right heel. No surrounding erythema noted.    Radiograph evaluation including weightbearing AP, lateral and medial oblique views of the right foot reveals moderate posterior calcaneal spur.    Assessment:      ICD-10-CM    1. Achilles tendinitis of right lower extremity M76.61    2. Heel spur, right M77.31        Plan:  I have explained to Briana about the conditions.  At this time, the patient wishes to proceed with surgery on the right foot.  At this point, I am " recommending surgical treatment of the condition involving retrocalcaneal exostectomy on the right foot.  I informed the patient in risks and benefits of the procedure including but not limited to infection, wound complications, swelling, pain, diminished range of motion and function, DVT and reoccurrence of condition.  The procedure will be performed under general with popliteal block anesthesia.  The patient will obtain a preoperative history and physical by the primary care provider.  Consents will be reviewed and signed on the day of surgery.      Disclaimer: This note consists of symbols derived from keyboarding, dictation and/or voice recognition software. As a result, there may be errors in the script that have gone undetected. Please consider this when interpreting information found in this chart.       MAKENZIE Witt.P.MIKEY., F.A.C.F.A.S.      Again, thank you for allowing me to participate in the care of your patient.        Sincerely,              Antwan Goldstein DPM

## 2017-04-03 ENCOUNTER — TELEPHONE (OUTPATIENT)
Dept: PODIATRY | Facility: CLINIC | Age: 72
End: 2017-04-03

## 2017-04-03 NOTE — TELEPHONE ENCOUNTER
Patient called requesting to schedule surgery.   Please place surgical letter for retrocalcaneal exostectomy.   Elzbieta Ibarra LPN   4/3/2017

## 2017-04-11 ENCOUNTER — OFFICE VISIT (OUTPATIENT)
Dept: FAMILY MEDICINE | Facility: CLINIC | Age: 72
End: 2017-04-11
Payer: COMMERCIAL

## 2017-04-11 ENCOUNTER — TELEPHONE (OUTPATIENT)
Dept: FAMILY MEDICINE | Facility: CLINIC | Age: 72
End: 2017-04-11

## 2017-04-11 DIAGNOSIS — E66.09 NON MORBID OBESITY DUE TO EXCESS CALORIES: ICD-10-CM

## 2017-04-11 DIAGNOSIS — Z01.818 PREOP GENERAL PHYSICAL EXAM: Primary | ICD-10-CM

## 2017-04-11 DIAGNOSIS — N18.2 CKD (CHRONIC KIDNEY DISEASE) STAGE 2, GFR 60-89 ML/MIN: ICD-10-CM

## 2017-04-11 DIAGNOSIS — D68.51 HETEROZYGOUS FACTOR V LEIDEN MUTATION (H): ICD-10-CM

## 2017-04-11 DIAGNOSIS — M79.671 RIGHT FOOT PAIN: ICD-10-CM

## 2017-04-11 DIAGNOSIS — I10 ESSENTIAL HYPERTENSION: ICD-10-CM

## 2017-04-11 LAB
CREAT UR-MCNC: 50 MG/DL
ERYTHROCYTE [DISTWIDTH] IN BLOOD BY AUTOMATED COUNT: 13.3 % (ref 10–15)
HCT VFR BLD AUTO: 38.5 % (ref 35–47)
HGB BLD-MCNC: 12.6 G/DL (ref 11.7–15.7)
MCH RBC QN AUTO: 31.8 PG (ref 26.5–33)
MCHC RBC AUTO-ENTMCNC: 32.7 G/DL (ref 31.5–36.5)
MCV RBC AUTO: 97 FL (ref 78–100)
MICROALBUMIN UR-MCNC: 9 MG/L
MICROALBUMIN/CREAT UR: 18.17 MG/G CR (ref 0–25)
PLATELET # BLD AUTO: 245 10E9/L (ref 150–450)
RBC # BLD AUTO: 3.96 10E12/L (ref 3.8–5.2)
WBC # BLD AUTO: 8.3 10E9/L (ref 4–11)

## 2017-04-11 PROCEDURE — 85027 COMPLETE CBC AUTOMATED: CPT | Performed by: NURSE PRACTITIONER

## 2017-04-11 PROCEDURE — 93000 ELECTROCARDIOGRAM COMPLETE: CPT | Performed by: NURSE PRACTITIONER

## 2017-04-11 PROCEDURE — 36415 COLL VENOUS BLD VENIPUNCTURE: CPT | Performed by: NURSE PRACTITIONER

## 2017-04-11 PROCEDURE — 99215 OFFICE O/P EST HI 40 MIN: CPT | Performed by: NURSE PRACTITIONER

## 2017-04-11 PROCEDURE — 82043 UR ALBUMIN QUANTITATIVE: CPT | Performed by: NURSE PRACTITIONER

## 2017-04-11 RX ORDER — HYDROCHLOROTHIAZIDE 12.5 MG/1
12.5 TABLET ORAL DAILY
Qty: 30 TABLET | Refills: 1 | Status: SHIPPED | OUTPATIENT
Start: 2017-04-11 | End: 2017-06-09

## 2017-04-11 RX ORDER — LISINOPRIL 10 MG/1
10 TABLET ORAL DAILY
Qty: 30 TABLET | Refills: 1 | Status: CANCELLED | OUTPATIENT
Start: 2017-04-11

## 2017-04-11 NOTE — MR AVS SNAPSHOT
After Visit Summary   4/11/2017    Briana Mcgee    MRN: [id number]           Patient Information     Date Of Birth          1945        Visit Information        Provider Department      4/11/2017 8:00 AM Deanna Becerra NP [facility]        Today's Diagnoses     Preop general physical exam    -  1    Essential hypertension          Care Instructions    Recommend continue baby aspirin due to blood clot risk   Hold the day of surgery     Recommend starting low dose blood pressure medication   hydrochlorathiazide 12.5 mg daily  No added salt diet   Goal is for blood pressure to be less than 140/90   See me back before surgery for a recheck     The morning of surgery recommend taking hydrochlorothiazide only hold the rest    Hold naproxen, ibuprofen 1 week before surgery   Tylenol is ok     Risk of blood clots discussed  Signs of symptoms reviewed   Early movement, ankle pumps   Before Your Surgery      Call your surgeon if there is any change in your health. This includes signs of a cold or flu (such as a sore throat, runny nose, cough, rash or fever).    Do not smoke, drink alcohol or take over the counter medicine (unless your surgeon or primary care doctor tells you to) for the 24 hours before and after surgery.    If you take prescribed drugs: Follow your doctor s orders about which medicines to take and which to stop until after surgery.    Eating and drinking prior to surgery: follow the instructions from your surgeon    Take a shower or bath the night before surgery. Use the soap your surgeon gave you to gently clean your skin. If you do not have soap from your surgeon, use your regular soap. Do not shave or scrub the surgery site.  Wear clean pajamas and have clean sheets on your bed.     What Is High Blood Pressure?  High blood pressure (also called hypertension) is known as the  silent killer.  This is because most of the time it doesn t cause symptoms. In fact, many  people don t know they have it until other problems develop. In most cases, high blood pressure can t be cured. It s a disease that requires lifelong treatment. The good news is that it CAN be managed.  Understanding blood pressure  The circulatory system is made up of the heart and blood vessels that carry blood through the body. Your heart is the pump for this system. With each heartbeat (contraction), the heart sends blood out through large blood vessels called arteries. Blood pressure is a measure of how hard the moving blood pushes against the walls of the arteries.          High blood pressure can harm your health  High blood pressure makes the heart work harder to pump blood. Frequent high blood pressure can also cause changes in the artery walls. The walls thicken and become rough, which leads to a buildup of plaque (a fatty material). This can damage the arteries. It can also reduce blood flow through the artery. If blood pressure is not controlled, all these effects can lead to serious health problems. These include heart disease, heart attack (also known as acute myocardial infarction, or AMI), stroke, kidney disease, and blindness.  Measuring blood pressure  An example of a blood pressure measurement is 120/70 (120 over 70). The top number is the pressure of blood against the artery walls during a heartbeat (systolic). The bottom number is the pressure of blood against artery walls between heartbeats (diastolic). Talk with your health care provider to find out what your blood pressure goals should be.   Controlling blood pressure  If your blood pressure is too high, work with your doctor on a plan for lowering it. Below are steps you can take that will help lower your blood pressure.  Choose heart-healthy foods. Eating healthier meals helps you control your blood pressure. Ask your doctor about the DASH eating plan. This plan helps reduce blood pressure by limiting the amount of sodium (salt) you have in  your diet.   Maintain a healthy weight. Being overweight makes you more likely to have high blood pressure. Losing excess weight helps lower blood pressure.  Exercise regularly. Daily exercise helps your heart and blood vessels work better and stay healthier. It can help lower your blood pressure.  Stop smoking. Smoking increases blood pressure and damages blood vessels.  Limit alcohol. Drinking too much alcohol can raise blood pressure. Men should have no more than 2 drinks a day. Women should have no more than 1. (A drink is equal to 1 beer, or a small glass of wine, or a shot of liquor.)  Control stress. Stress makes your heart work harder and beat faster. Controlling stress helps you control your blood pressure.  Facts about high blood pressure  Feeling OK does not mean that blood pressure is under control. Likewise, feeling bad doesn t mean it s out of control. The only way to know for sure is to check your pressure regularly.  Medication is only one part of controlling high blood pressure. You also need to manage your weight, get regular exercise, and adjust your eating habits.  High blood pressure is usually a lifelong problem. But it can be controlled with healthy lifestyle changes and medication.  Hypertension is not the same as stress. Although stress may be a factor in high blood pressure, it s only one part of the story.  Blood pressure medications need to be taken every day. Stopping suddenly may cause a dangerous increase in pressure.     7048-6392 [employer]. [address] [place] [address], [place] [address]. All rights reserved. This information is not intended as a substitute for professional medical care. Always follow your healthcare professional's instructions.            Out of Control High Blood Pressure (Established)    Your blood pressure was unusually high today. This can occur if you ve missed doses of your blood pressure medicine. Or it can happen if you are taking other medicines.  These include some asthma inhalers, decongestants, diet pills, and street drugs like cocaine and amphetamine.  Other causes include:  Weight gain  More salt in your diet  Smoking  Caffeine  Your blood pressure can also rise if you are emotionally upset or in intense pain. It may go back to normal after a period of rest.  A blood pressure reading is made up of 2 numbers. There is a top number over a bottom number. The top number is the systolic pressure. The bottom number is the diastolic pressure. A normal blood pressure is less than 120 over less than 80. High blood pressure (hypertension) is when the top number is 140 or higher. Or it is when the bottom number is 90 or higher. To be high blood pressure, the numbers must be higher when tested over a period of time. The blood pressures between normal and hypertension are called prehypertension.  Home care  It s important to take steps to lower your blood pressure. If you are taking blood pressure medicine, the guidelines below may help you need less or no medicines in the future.  Begin a weight-loss program if you are overweight.  Cut back on the amount of salt in your diet:  Avoid high-salt foods like olives, pickles, smoked meats, and salted potato chips.  Don t add salt to your food at the table.  Use only small amounts of salt when cooking.  Begin an exercise program. Talk with your health care provider about what exercise program is best for you. It doesn t have to be difficult. Even brisk walking for 20 minutes 3 times a week is a good form of exercise.  Avoid medicines that have heart stimulants in them. This includes many cold and sinus decongestant pills and sprays, as well as diet pills. Check the warnings about hypertension on the label. Stimulants such as amphetamine or cocaine could be lethal for someone with hypertension. Never take these.  Limit how much caffeine you drink. Or switch to noncaffeinated beverages.  Stop smoking. If you are a long-time  smoker, this can be hard. Enroll in a stop-smoking program to make it more likely that you will succeed. Talk with your provider about ways to quit.  Learn how to handle stress better. This is an important part of any program to lower blood pressure. Learn ways to relax. These include meditation, yoga, and biofeedback.  If medicines were prescribed, take them exactly as directed. Missing doses may cause your blood pressure to get out of control.  Consider buying an automatic blood pressure machine. These are available at many drugstores. Use this to monitor your blood pressure. Report the results to your provider.  Follow-up care  Regular visits to your own health care provider for blood pressure and medicine checks are an important part of your care. Make a follow-up appointment as directed.  When to seek medical advice  Call your health care provider right away if any of these occur:  Chest, arm, shoulder, neck, or upper back pain  Shortness of breath  Severe headache  Throbbing or rushing sound in the ears  Nosebleed  Extreme drowsiness, confusion, or fainting  Dizziness or dizziness with spinning sensation (vertigo)  Weakness in an arm or leg or on one side of the face  Difficulty speaking or seeing     6298-7704 [employer]. [address] [place] [address]. All rights reserved. This information is not intended as a substitute for professional medical care. Always follow your healthcare professional's instructions.                Follow-ups after your visit        Your next 10 appointments already scheduled     Apr 12, 2017  1:45 PM CDT   [facility]    [address] [address]   [phone]            Apr 25, 2017   Procedure with Antwan Goldstein DPM   [facility] (--)    [address] [address]   [phone]           [facility]  is located at [address] (between [address] in [place], [place] miles north of [place]).            May 01, 2017  9:00 AM CDT   Return Visit with Antwan Goldstein DPM   [facility] [place] ([facility])    [address]  [address]  [facility] [place] [address]   [phone]            May 08, 2017  9:00 AM CDT   Return Visit with Antwan Goldstein DPM   [facility] [place] ([facility])    [address]  [address]  [facility] [place] [address]   [phone]            Oct 19, 2017 10:00 AM CDT   LAB with [facility] ([facility])    [address] [address]   [phone]           Patient must bring picture ID.  Patient should be prepared to give a urine specimen  Please do not eat 10-12 hours before your appointment if you are coming in fasting for labs on lipids, cholesterol, or glucose (sugar).  Pregnant women should follow their Care Team instructions. Water with medications is okay. Do not drink coffee or other fluids.   If you have concerns about taking  your medications, please ask at office or if scheduling via [facility], send a message by clicking on Secure Messaging, Message Your Care Team.            Oct 19, 2017 10:30 AM CDT   MA SCREENING DIGITAL BILATERAL with [facility] ([facility])    [address] [address]   [phone]           Do not use any powder, lotion or deodorant under your arms or on your breast. If you do, we will ask you to remove it before your exam.  Wear comfortable, two-piece clothing.  If you have any allergies, tell your care team.  Bring any previous mammograms from other facilities or have them mailed to the breast center.            Oct 23, 2017  9:30 AM CDT   Return Visit with Merari Duarte MD   [facility] ([facility])    [facility]  [address]  [facility] [place] [address]   [phone]              Who to contact     If you have questions or need follow up information about today's clinic visit or your schedule please contact [facility] directly at [phone].  Normal or non-critical lab and imaging results will be communicated to you by MyChart, letter or phone within 4 business days after the clinic has received the results. If you do not hear from us within 7 days, please contact the clinic through MyChart or phone. If you have a critical or abnormal lab result, we will notify you by phone as soon as possible.  Submit refill requests through [employer] or call your pharmacy and they will forward the refill request to us. Please allow 3 business days for your refill to be completed.          Additional Information About Your Visit        MyChart Information     [employer] gives you secure access to your electronic health record. If you see a primary care provider, you can also send messages to your care team and make appointments. If you have questions, please call your primary care clinic.  If you do not have a primary care provider, please call [phone] and they will assist you.        Care EveryWhere ID     This is your Care EveryWhere ID. This could be used by other organizations to access your [place] medical records  [id number]        Your Vitals Were     Pulse Temperature Respirations Pulse Oximetry Breastfeeding? BMI (Body Mass Index)    68 97.7  F (36.5  C) (Tympanic) 16 97% No 33.73 kg/m2       Blood Pressure from Last 3 Encounters:   04/11/17 150/88   03/10/17 142/72   10/20/16 125/59    Weight from Last 3 Encounters:   04/11/17 209 lb (94.8 kg)   03/23/17 212 lb (96.2 kg)   03/10/17 212 lb (96.2 kg)              We Performed the Following     Albumin Random Urine Quantitative     CBC with platelets     EKG 12-lead complete w/read - Clinics          Today's Medication Changes           These changes are accurate as of: 4/11/17  9:11 AM.  If you have any questions, ask your nurse or doctor.               Start taking these medicines.        Dose/Directions    hydrochlorothiazide 12.5 MG Tabs tablet   Used for:  Essential hypertension   Started by:  Deanna Becerra NP        Dose:  12.5 mg   Take 1 tablet (12.5 mg) by mouth daily   Quantity:  30 tablet   Refills:  1            Where to get your medicines      These medications were sent to [facility]-[address] [address]     Phone:  [phone]     hydrochlorothiazide 12.5 MG Tabs tablet                Primary Care Provider Office Phone # Fax #    Xavier Vega [phone]       [facility] [address] [address]        Thank you!     Thank you for choosing [facility]  for your care. Our goal is always to provide you with excellent care. Hearing back from our patients is one way we can continue to improve our services. Please take a few minutes to complete the written survey that you may receive in the mail after your visit with us. Thank you!             Your Updated Medication List - Protect others around you: Learn how to safely use, store and throw away your medicines at www.disposemymeds.org.          This list is accurate as of: 4/11/17  9:11 AM.  Always use your most recent med list.                   Brand Name Dispense Instructions for use    ALEVE PO      Take 2 tablets by mouth 2 times daily (with meals)       aspirin 81 MG EC tablet     90 tablet    Take 1 tablet (81 mg) by mouth daily       calcium + D 600-200 MG-UNIT Tabs   Generic drug:  calcium carbonate-vitamin D      1 TABLET DAILY twice daily       CO Q 10 PO      Take  by mouth.       fluticasone 50 MCG/ACT spray    FLONASE    16 g    Spray 2 sprays into both nostrils daily Hold on file until needed       folic acid 20 MG Caps           hydrochlorothiazide 12.5 MG Tabs tablet     30 tablet    Take 1 tablet (12.5 mg) by mouth daily       levothyroxine 150 MCG tablet    SYNTHROID/LEVOTHROID    90 tablet    Take 1 tablet (150 mcg) by mouth daily BRAND NAME ONLY.  Hold on file until needed.       magnesium hydroxide 400 MG/5ML suspension    MILK OF MAGNESIA     Take 400 mg/kg/day by mouth daily as needed for constipation or heartburn       MULTIPLE VITAMIN PO      1 daily       * order for DME     1 Units    Equipment being ordered: Super feet       * order for DME     1 Device    Trilok Ankle Brace       * order for DME     1 Units    Knee Walker Length of use: Three months       * order for DME     1 Device    CAM walker regular       orlistat 120 MG capsule    XENICAL    90 capsule    Take 1 capsule (120 mg) by mouth 3 times daily (with meals)       predniSONE 5 MG tablet    DELTASONE    90 tablet    Take 1 tablet (5 mg) by mouth daily       * PROTONIX 20 MG EC tablet   Generic drug:  pantoprazole      Take 40 mg by mouth       * pantoprazole 20 MG EC tablet    PROTONIX    180 tablet    Take 2 tablets (40 mg) by mouth daily Hold on file until needed       pyridoxine 100 MG tablet    VITAMIN B-6     Take 100 mg by mouth daily.       simvastatin 20 MG tablet    ZOCOR    90 tablet    Take 1 tablet (20 mg) by mouth At Bedtime Hold on file until needed       tolterodine 4 MG 24 hr capsule    DETROL LA    30 capsule    Take 1 capsule (4 mg) by mouth daily       traZODone 100 MG tablet    DESYREL    90 tablet    1 tablets at bedtime as needed.  Hold on file until needed       * Notice:  This list has 6 medication(s) that are the same as other medications prescribed for you. Read the directions carefully, and ask your doctor or other care provider to review them with you.

## 2017-04-11 NOTE — PATIENT INSTRUCTIONS
Recommend continue baby aspirin due to blood clot risk   Hold the day of surgery     Recommend starting low dose blood pressure medication   hydrochlorathiazide 12.5 mg daily  No added salt diet   Goal is for blood pressure to be less than 140/90   See me back before surgery for a recheck     The morning of surgery recommend taking hydrochlorothiazide only hold the rest    Hold naproxen, ibuprofen 1 week before surgery   Tylenol is ok     Risk of blood clots discussed  Signs of symptoms reviewed   Early movement, ankle pumps   Before Your Surgery      Call your surgeon if there is any change in your health. This includes signs of a cold or flu (such as a sore throat, runny nose, cough, rash or fever).    Do not smoke, drink alcohol or take over the counter medicine (unless your surgeon or primary care doctor tells you to) for the 24 hours before and after surgery.    If you take prescribed drugs: Follow your doctor s orders about which medicines to take and which to stop until after surgery.    Eating and drinking prior to surgery: follow the instructions from your surgeon    Take a shower or bath the night before surgery. Use the soap your surgeon gave you to gently clean your skin. If you do not have soap from your surgeon, use your regular soap. Do not shave or scrub the surgery site.  Wear clean pajamas and have clean sheets on your bed.     What Is High Blood Pressure?  High blood pressure (also called hypertension) is known as the  silent killer.  This is because most of the time it doesn t cause symptoms. In fact, many people don t know they have it until other problems develop. In most cases, high blood pressure can t be cured. It s a disease that requires lifelong treatment. The good news is that it CAN be managed.  Understanding blood pressure  The circulatory system is made up of the heart and blood vessels that carry blood through the body. Your heart is the pump for this system. With each heartbeat  (contraction), the heart sends blood out through large blood vessels called arteries. Blood pressure is a measure of how hard the moving blood pushes against the walls of the arteries.          High blood pressure can harm your health  High blood pressure makes the heart work harder to pump blood. Frequent high blood pressure can also cause changes in the artery walls. The walls thicken and become rough, which leads to a buildup of plaque (a fatty material). This can damage the arteries. It can also reduce blood flow through the artery. If blood pressure is not controlled, all these effects can lead to serious health problems. These include heart disease, heart attack (also known as acute myocardial infarction, or AMI), stroke, kidney disease, and blindness.  Measuring blood pressure  An example of a blood pressure measurement is 120/70 (120 over 70). The top number is the pressure of blood against the artery walls during a heartbeat (systolic). The bottom number is the pressure of blood against artery walls between heartbeats (diastolic). Talk with your health care provider to find out what your blood pressure goals should be.   Controlling blood pressure  If your blood pressure is too high, work with your doctor on a plan for lowering it. Below are steps you can take that will help lower your blood pressure.  Choose heart-healthy foods. Eating healthier meals helps you control your blood pressure. Ask your doctor about the DASH eating plan. This plan helps reduce blood pressure by limiting the amount of sodium (salt) you have in your diet.   Maintain a healthy weight. Being overweight makes you more likely to have high blood pressure. Losing excess weight helps lower blood pressure.  Exercise regularly. Daily exercise helps your heart and blood vessels work better and stay healthier. It can help lower your blood pressure.  Stop smoking. Smoking increases blood pressure and damages blood vessels.  Limit alcohol.  Drinking too much alcohol can raise blood pressure. Men should have no more than 2 drinks a day. Women should have no more than 1. (A drink is equal to 1 beer, or a small glass of wine, or a shot of liquor.)  Control stress. Stress makes your heart work harder and beat faster. Controlling stress helps you control your blood pressure.  Facts about high blood pressure  Feeling OK does not mean that blood pressure is under control. Likewise, feeling bad doesn t mean it s out of control. The only way to know for sure is to check your pressure regularly.  Medication is only one part of controlling high blood pressure. You also need to manage your weight, get regular exercise, and adjust your eating habits.  High blood pressure is usually a lifelong problem. But it can be controlled with healthy lifestyle changes and medication.  Hypertension is not the same as stress. Although stress may be a factor in high blood pressure, it s only one part of the story.  Blood pressure medications need to be taken every day. Stopping suddenly may cause a dangerous increase in pressure.     7441-6007 [email or web address]. [address] [place] [address]. All rights reserved. This information is not intended as a substitute for professional medical care. Always follow your healthcare professional's instructions.            Out of Control High Blood Pressure (Established)    Your blood pressure was unusually high today. This can occur if you ve missed doses of your blood pressure medicine. Or it can happen if you are taking other medicines. These include some asthma inhalers, decongestants, diet pills, and street drugs like cocaine and amphetamine.  Other causes include:  Weight gain  More salt in your diet  Smoking  Caffeine  Your blood pressure can also rise if you are emotionally upset or in intense pain. It may go back to normal after a period of rest.  A blood pressure reading is made up of 2 numbers. There is a top  number over a bottom number. The top number is the systolic pressure. The bottom number is the diastolic pressure. A normal blood pressure is less than 120 over less than 80. High blood pressure (hypertension) is when the top number is 140 or higher. Or it is when the bottom number is 90 or higher. To be high blood pressure, the numbers must be higher when tested over a period of time. The blood pressures between normal and hypertension are called prehypertension.  Home care  It s important to take steps to lower your blood pressure. If you are taking blood pressure medicine, the guidelines below may help you need less or no medicines in the future.  Begin a weight-loss program if you are overweight.  Cut back on the amount of salt in your diet:  Avoid high-salt foods like olives, pickles, smoked meats, and salted potato chips.  Don t add salt to your food at the table.  Use only small amounts of salt when cooking.  Begin an exercise program. Talk with your health care provider about what exercise program is best for you. It doesn t have to be difficult. Even brisk walking for 20 minutes 3 times a week is a good form of exercise.  Avoid medicines that have heart stimulants in them. This includes many cold and sinus decongestant pills and sprays, as well as diet pills. Check the warnings about hypertension on the label. Stimulants such as amphetamine or cocaine could be lethal for someone with hypertension. Never take these.  Limit how much caffeine you drink. Or switch to noncaffeinated beverages.  Stop smoking. If you are a long-time smoker, this can be hard. Enroll in a stop-smoking program to make it more likely that you will succeed. Talk with your provider about ways to quit.  Learn how to handle stress better. This is an important part of any program to lower blood pressure. Learn ways to relax. These include meditation, yoga, and biofeedback.  If medicines were prescribed, take them exactly as directed.  Missing doses may cause your blood pressure to get out of control.  Consider buying an automatic blood pressure machine. These are available at many drugstores. Use this to monitor your blood pressure. Report the results to your provider.  Follow-up care  Regular visits to your own health care provider for blood pressure and medicine checks are an important part of your care. Make a follow-up appointment as directed.  When to seek medical advice  Call your health care provider right away if any of these occur:  Chest, arm, shoulder, neck, or upper back pain  Shortness of breath  Severe headache  Throbbing or rushing sound in the ears  Nosebleed  Extreme drowsiness, confusion, or fainting  Dizziness or dizziness with spinning sensation (vertigo)  Weakness in an arm or leg or on one side of the face  Difficulty speaking or seeing     5015-7536 [employer]. [address] [place] [address], [place] [address]. All rights reserved. This information is not intended as a substitute for professional medical care. Always follow your healthcare professional's instructions.

## 2017-04-11 NOTE — TELEPHONE ENCOUNTER
Reason for Call:  Other prescription    Detailed comments: pt calling stating she was seen in [place] for her pre-op. She said her BP was up when she got in there and was prescribed Hydrochlorothiazide. She is wondering if this is something you would have prescribed for her. She is just wanting confirmation from you.     Phone Number Patient can be reached at: Home number on file [phone] (home)    Best Time: any    Can we leave a detailed message on this number? YES    Call taken on 4/11/2017 at 2:38 PM by Valorie Snell

## 2017-04-11 NOTE — PROGRESS NOTES
[address] [address]  [phone]  Dept: [phone]    PRE-OP EVALUATION:  Today's date: 2017    Briana Mcgee (: 1945) presents for pre-operative evaluation assessment as requested by Dr. Goldstein.  She requires evaluation and anesthesia risk assessment prior to undergoing surgery/procedure for treatment of a heel spur and achilles tendonitis - right.  Proposed procedure: Retrocalcaneal exostectomy - right    Date of Surgery/ Procedure: 17  Time of Surgery/ Procedure: 830  Hospital/Surgical Facility: [facility]  Fax number for surgical facility:   Primary Physician: Xavier Vega  Type of Anesthesia Anticipated: Combined General with Popliteal Block    Patient has a Health Care Directive or Living Will:  YES Advanced Directive     1. YES - DO YOU HAVE A HISTORY OF HEART ATTACK, STROKE, STENT, BYPASS OR SURGERY ON AN ARTERY IN THE HEAD, NECK, HEART OR LEG? Stroke in , history of factor V heterogenous mutation - no further history of blood clots, on a baby ASA  2. NO - Do you ever have any pain or discomfort in your chest?  3. NO - Do you have a history of  Heart Failure?  4. NO - Are you troubled by shortness of breath when: walking on the level, up a slight hill or at night?  5. NO - Do you currently have a cold, bronchitis or other respiratory infection?  6. NO - Do you have a cough, shortness of breath or wheezing?  7. NO - Do you sometimes get pains in the calves of your legs when you walk?  8. NO - Do you or anyone in your family have previous history of blood clots?  9. NO - Do you or does anyone in your family have a serious bleeding problem such as prolonged bleeding following surgeries or cuts?  10. NO - Have you ever had problems with anemia or been told to take iron pills?  11. NO - Have you had any abnormal blood loss such as black, tarry or bloody stools, or abnormal vaginal bleeding?  12. NO - Have you ever had a  blood transfusion?  13. NO - Have you or any of your relatives ever had problems with anesthesia?  14. NO - Do you have sleep apnea, excessive snoring or daytime drowsiness?  15. NO - Do you have any prosthetic heart valves?  16. NO - Do you have prosthetic joints?  17. NO - Is there any chance that you may be pregnant?      HPI:                                                      Brief HPI related to upcoming procedure: patient has having significant pain in right foot for several months. She has seen Rheumatology for this and has been treated with prednisone for seronegative RA. She has been seen by Dr. Goldstein who is recommend surgical repair of her achilles tendon and bone spurs. She has an appointment with rheumatology tomorrow to discuss prednisone and RA further,.     BP Readings from Last 6 Encounters:   04/12/17 151/80   04/11/17 135/86   03/10/17 142/72   10/20/16 125/59   10/20/16 142/77   10/10/16 112/62     Reports follow low sodium diet- occasional use of salt shaker       See problem list for active medical problems.  Problems all longstanding and stable, except as noted/documented.  See ROS for pertinent symptoms related to these conditions.                                                                                                  .    MEDICAL HISTORY:                                                      Patient Active Problem List    Diagnosis Date Noted     Malignant neoplasm of female breast (H) 02/18/2005     Priority: High     11/05: breast surgeon=Dr. Dimitrios Bell, oncology=Dr. Tino Gordillo s/p lumpectomy and axillary node dissection followed by chemotherapy at [facility]. Radiation oncology=Dr. Jessica Edmonds for 6 weeks.   12/05: Briana is clinically asymptomatic and no clinical evidence of cancer recurrence; port-a-cath removal.  9-12-05 NM [facility] [employer] radionuclide angiography at rest findings:1. Left ventricular ejectin fraction is normal at 64%  2. Compared to previous  study perfomed 3-17-05, there has been no significant interval change.  10/06: appointment with Dr. Gordillo:continue amrimidex and  follow up in 6 months.  1/16/07: follow up with Dr. Bell: 'doing well with no signs of recurrence', follow up 7/07 with mammogram at that time/  2/12/09: now followed by Dr. Peres.  3/10 seen by Dr. Levi Bear with [facility] yearly visits, now can have yearly mammos, next due 8/10  10/2013 follow up with Dr. Duarte, followed every 6 months due to density of right breast and concerns, now followed yearly  Problem list name updated by automated process. Provider to review       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) 04/12/2017     Priority: Medium     Essential hypertension 03/13/2017     Priority: Medium     Hepatitis 11/25/2015     Priority: Medium     Obesity 10/20/2015     Priority: Medium     CKD (chronic kidney disease) stage 2, GFR 60-89 ml/min 06/04/2014     Priority: Medium     Advanced directives, counseling/discussion 01/29/2014     Priority: Medium     Was given the information to fill out.       Spinal stenosis 01/07/2014     Priority: Medium     Pain controlled with epidural injections       Lumbar radiculopathy 05/07/2012     Priority: Medium     Varicose veins of lower extremities with complications 03/06/2012     Priority: Medium     Problem list name updated by automated process. Provider to review       GERD (gastroesophageal reflux disease) 04/12/2011     Priority: Medium     Heterozygous factor V Leiden mutation (H) 04/07/2011     Priority: Medium     HYPERLIPIDEMIA LDL GOAL <100 10/31/2010     Priority: Medium     OA (osteoarthritis) of knee 03/01/2009     Priority: Medium     Followed by [facility]. Briana has had 3 Synvisc injections (1/3/08, 2/12/09,  3/2/09, 3/9/09)       Hot flashes 03/01/2009     Priority: Medium     2/12/09: seen by Dr. Peres, started Effexor. If this is not helpful will start  "Neurontin.       Rhinitis, allergic seasonal 07/02/2008     Priority: Medium     ischemic stroke 3/06 03/27/2006     Priority: Catarino Warren has been seen by Dr. Hernandez. Given her history of CVA and Factor V Leiden heterozygote he recommends that she stay on \"antiplatelet drug use for secondary prevention of strokes\". Briana has been on plavix and asa.       Impaired fasting glucose 03/05/2006     Priority: Catarino Warren would like to continue diet changes (she has already lost 16 pounds on the [employer]' program), regular exercise and weight loss.  She agrees to recheck fasting blood sugar and lipids in 3 months.       Insomnia 03/05/2006     Priority: Medium     Stable on Trazadone.  Problem list name updated by automated process. Provider to review       Hypothyroidism 02/18/2005     Priority: Medium     Stable on current dose of synthroid.  Problem list name updated by automated process. Provider to review        Past Medical History:   Diagnosis Date     Allergies      Breast cancer (H)      Esophageal reflux      Other and unspecified hyperlipidemia      Other chronic bronchitis      Personal history of pneumonia (recurrent)     Hx-Pneumonia, \" Chronic Bronchitis\"     Personal history of tobacco use, presenting hazards to health      Unspecified hypothyroidism      Past Surgical History:   Procedure Laterality Date     BIOPSY BREAST       COLONOSCOPY N/A 9/2/2016    Procedure: COLONOSCOPY;  Surgeon: Dean Sanchez MD;  Location: [facility]     HC EXCISION BREAST LESION, OPEN >=1      2/05     HYSTERECTOMY, RYAN  1982     for non cancerous reasons and no abnormal pap     INJECT EPIDURAL LUMBAR  1/12/2011    INJECT EPIDURAL LUMBAR performed by GENERIC ANESTHESIA PROVIDER at [facility]     INJECT EPIDURAL LUMBAR  5/5/2011    Procedure:INJECT EPIDURAL LUMBAR; LESLIE -Dr. Sánchez  ; Surgeon:GENERIC ANESTHESIA PROVIDER; Location:[facility]     INJECT EPIDURAL LUMBAR  10/6/2011    Procedure:INJECT EPIDURAL " LUMBAR; LESLIE--; Surgeon:GENERIC ANESTHESIA PROVIDER; Location:[facility]     INJECT EPIDURAL LUMBAR  1/25/2012    Procedure:INJECT EPIDURAL LUMBAR; LESLIE-Dr. Sánchez  ; Surgeon:GENERIC ANESTHESIA PROVIDER; Location:[facility]     INJECT EPIDURAL LUMBAR  7/9/2012    Procedure: INJECT EPIDURAL LUMBAR;  LESLIE-Dr. Pacheco;  Surgeon: Provider, Generic Anesthesia;  Location: [facility]     LUMPECTOMY BREAST       SURGICAL HISTORY OF -       lumpectomy with sentinal node biopsy     SURGICAL HISTORY OF -       left knee arthoscopic repair medial meniscus     Current Outpatient Prescriptions   Medication Sig Dispense Refill     hydrochlorothiazide 12.5 MG TABS tablet Take 1 tablet (12.5 mg) by mouth daily 30 tablet 1     simvastatin (ZOCOR) 20 MG tablet Take 1 tablet (20 mg) by mouth At Bedtime Hold on file until needed 90 tablet 3     traZODone (DESYREL) 100 MG tablet 1 tablets at bedtime as needed.  Hold on file until needed 90 tablet 3     levothyroxine (SYNTHROID/LEVOTHROID) 150 MCG tablet Take 1 tablet (150 mcg) by mouth daily BRAND NAME ONLY.  Hold on file until needed. 90 tablet 3     pantoprazole (PROTONIX) 20 MG EC tablet Take 2 tablets (40 mg) by mouth daily Hold on file until needed 180 tablet 3     fluticasone (FLONASE) 50 MCG/ACT spray Spray 2 sprays into both nostrils daily Hold on file until needed 16 g 11     magnesium hydroxide (MILK OF MAGNESIA) 400 MG/5ML suspension Take 400 mg/kg/day by mouth daily as needed for constipation or heartburn       tolterodine (DETROL LA) 4 MG 24 hr capsule Take 1 capsule (4 mg) by mouth daily 30 capsule 11     order for DME Knee Walker  Length of use: Three months 1 Units 0     predniSONE (DELTASONE) 5 MG tablet Take 1 tablet (5 mg) by mouth daily 90 tablet 1     order for DME Trilok Ankle Brace 1 Device 0     order for DME Equipment being ordered: Super feet 1 Units 0     folic acid 20 MG CAPS        Naproxen Sodium (ALEVE PO) Take 2 tablets by mouth 2 times daily (with meals)       aspirin 81  MG EC tablet Take 1 tablet (81 mg) by mouth daily 90 tablet 3     Coenzyme Q10 (CO Q 10 PO) Take  by mouth.       pyridoxine (VITAMIN B-6) 100 MG tablet Take 100 mg by mouth daily.       CALCIUM + D 600-200 MG-UNIT OR TABS 1 TABLET DAILY twice daily       MULTIPLE VITAMIN OR 1 daily       predniSONE (DELTASONE) 1 MG tablet 4 mg daily for 1 week, taper by 1 mg weekly 70 tablet 0     OTC products: None, except as noted above    Allergies   Allergen Reactions     Erythromycin Swelling     Erythromycin      Other reaction(s): Edema     Hydrocodone      Other reaction(s): GI Upset  Tolerates dilaudid     Oxycodone      Other reaction(s): GI Upset      Latex Allergy: NO    Social History   Substance Use Topics     Smoking status: Former Smoker     Types: Cigarettes     Quit date: 7/19/1996     Smokeless tobacco: Never Used      Comment: quit 10-12 years ago, 1997.     Alcohol use Yes      Comment: glass of wine at night     History   Drug Use No       REVIEW OF SYSTEMS:                                                    C: NEGATIVE for fever, chills, change in weight  I: NEGATIVE for worrisome rashes, moles or lesions  E: NEGATIVE for vision changes or irritation  E/M: NEGATIVE for ear, mouth and throat problems  R: NEGATIVE for significant cough or SOB  B: NEGATIVE for masses, tenderness or discharge  CV: NEGATIVE for chest pain, palpitations or peripheral edema  GI: NEGATIVE for nausea, abdominal pain, heartburn, or change in bowel habits  : NEGATIVE for frequency, dysuria, or hematuria  M: NEGATIVE for significant arthralgias or myalgia  N: NEGATIVE for weakness, dizziness or paresthesias  E: NEGATIVE for temperature intolerance, skin/hair changes  H: NEGATIVE for bleeding problems  P: NEGATIVE for changes in mood or affect    EXAM:                                                    /86  Pulse 68  Temp 97.7  F (36.5  C) (Tympanic)  Resp 16  Wt 209 lb (94.8 kg)  SpO2 97%  Breastfeeding? No  BMI 33.73  kg/m2    GENERAL APPEARANCE: healthy, alert and no distress     EYES: EOMI, PERRL     HENT: ear canals and TM's normal and nose and mouth without ulcers or lesions     NECK: no adenopathy, no asymmetry, masses, or scars and thyroid normal to palpation     RESP: lungs clear to auscultation - no rales, rhonchi or wheezes     CV: regular rates and rhythm, normal S1 S2, no S3 or S4 and no murmur, click or rub     ABDOMEN:  soft, nontender, no HSM or masses and bowel sounds normal     MS: extremities normal- no gross deformities noted, no evidence of inflammation in joints, FROM in all extremities.     SKIN: no suspicious lesions or rashes     NEURO: Normal strength and tone, sensory exam grossly normal, mentation intact and speech normal     PSYCH: mentation appears normal. and affect normal/bright     LYMPHATICS: No axillary, cervical, or supraclavicular nodes    DIAGNOSTICS:                                                    EKG: appears normal, NSR, Right Bundle Branch Block, unchanged from previous tracings  Lab Results   Component Value Date    WBC 8.3 04/11/2017     Lab Results   Component Value Date    RBC 3.96 04/11/2017     Lab Results   Component Value Date    HGB 12.6 04/11/2017     Lab Results   Component Value Date    HCT 38.5 04/11/2017     No components found for: MCT  Lab Results   Component Value Date    MCV 97 04/11/2017     Lab Results   Component Value Date    MCH 31.8 04/11/2017     Lab Results   Component Value Date    MCHC 32.7 04/11/2017     Lab Results   Component Value Date    RDW 13.3 04/11/2017     Lab Results   Component Value Date     04/11/2017       Recent Labs   Lab Test  03/13/17   0804  10/10/16   1530  05/16/16   0755   03/17/15   1104   01/07/14   1031   HGB   --   12.7  11.4*   < >   --    < >  12.4   PLT   --   261  218   < >   --    < >  253   NA  141   --   138   < >   --    < >   --    POTASSIUM  4.2   --   3.9   < >   --    < >   --    CR  0.84   --   0.87   < >   --     < >   --    A1C   --    --    --    --   5.6   --   5.4    < > = values in this interval not displayed.        IMPRESSION:                                                    Reason for surgery/procedure: right foot pain  Diagnosis/reason for consult: pre op clearance    The proposed surgical procedure is considered INTERMEDIATE risk.    REVISED CARDIAC RISK INDEX  The patient has the following serious cardiovascular risks for perioperative complications such as (MI, PE, VFib and 3  AV Block):  No serious cardiac risks  INTERPRETATION: 0 risks: Class I (very low risk - 0.4% complication rate)    The patient has the following additional risks for perioperative complications:  No identified additional risks  Positive heterogenous factor v- no past history of DVT, takes ASA 81 mg. Recommend resuming this except holding day of surgery. Also discussed in length symptoms of DVT/PE and preventative measures.     Elevated blood pressure today and at previous visits  Recommend start low dose of HCTZ   Will recheck in 2 weeks. Prior to surgery  BMP at that visit   Also discussed dietary changes     BP Readings from Last 6 Encounters:   04/12/17 151/80   04/11/17 135/86   03/10/17 142/72   10/20/16 125/59   10/20/16 142/77   10/10/16 112/62           ICD-10-CM    1. Preop general physical exam Z01.818 CBC with platelets   2. Right foot pain M79.671    3. Essential hypertension I10 EKG 12-lead complete w/read - Clinics     hydrochlorothiazide 12.5 MG TABS tablet     Albumin Random Urine Quantitative   4. Heterozygous factor V Leiden mutation (H) D68.51    5. Non morbid obesity due to excess calories E66.09    6. CKD (chronic kidney disease) stage 2, GFR 60-89 ml/min N18.2        RECOMMENDATIONS:                                                        --Patient is to take all scheduled medications on the day of surgery EXCEPT for modifications listed below.    Anticoagulant or Antiplatelet Medication Use  ASPIRIN: Patient is at  increased risk of thrombosis (e.g. MI, CVA) and aspirin 81 mg daily should be continued in the perioperative period        Chronic Corticosteroid Use  Stress dose steroids are indicated due to chronic steroid use in last 3 months (e.g. >3 weeks of predisone 20 mg or daily prednisone 5 mg)  Moderate procedure:   Hydrocortisone 50-75 mg IV on day of surgery.  Taper to baseline preoperative dose over the subsequent 1-2 days.      APPROVAL GIVEN to proceed with proposed procedure, without further diagnostic evaluation       Signed Electronically by: Deanna Becerra NP    Copy of this evaluation report is provided to requesting physician.    [place] Preop Guidelines

## 2017-04-11 NOTE — NURSING NOTE
"Chief Complaint   Patient presents with     Pre-Op Exam       Initial /88 (BP Location: Right arm, Cuff Size: Adult Regular)  Pulse 68  Temp 97.7  F (36.5  C) (Tympanic)  Resp 16  Wt 209 lb (94.8 kg)  SpO2 97%  Breastfeeding? No  BMI 33.73 kg/m2 Estimated body mass index is 33.73 kg/(m^2) as calculated from the following:    Height as of 3/23/17: 5' 6\" (1.676 m).    Weight as of this encounter: 209 lb (94.8 kg).  Medication Reconciliation: complete    Health Maintenance that is potentially due pending provider review:  Microalbumin     Possibly completing today per provider review.    Yanely Snyder, CMA    "

## 2017-04-12 ENCOUNTER — OFFICE VISIT (OUTPATIENT)
Dept: RHEUMATOLOGY | Facility: CLINIC | Age: 72
End: 2017-04-12
Payer: COMMERCIAL

## 2017-04-12 ENCOUNTER — ALLIED HEALTH/NURSE VISIT (OUTPATIENT)
Dept: FAMILY MEDICINE | Facility: CLINIC | Age: 72
End: 2017-04-12
Payer: COMMERCIAL

## 2017-04-12 VITALS
OXYGEN SATURATION: 99 % | HEART RATE: 86 BPM | TEMPERATURE: 98.1 F | WEIGHT: 216 LBS | BODY MASS INDEX: 34.86 KG/M2 | RESPIRATION RATE: 18 BRPM | SYSTOLIC BLOOD PRESSURE: 151 MMHG | DIASTOLIC BLOOD PRESSURE: 80 MMHG

## 2017-04-12 DIAGNOSIS — I10 ESSENTIAL HYPERTENSION: Primary | ICD-10-CM

## 2017-04-12 DIAGNOSIS — M06.041 RHEUMATOID ARTHRITIS INVOLVING BOTH HANDS WITH NEGATIVE RHEUMATOID FACTOR (H): Primary | ICD-10-CM

## 2017-04-12 DIAGNOSIS — M06.042 RHEUMATOID ARTHRITIS INVOLVING BOTH HANDS WITH NEGATIVE RHEUMATOID FACTOR (H): Primary | ICD-10-CM

## 2017-04-12 PROCEDURE — 99207 ZZC NO CHARGE NURSE ONLY: CPT

## 2017-04-12 PROCEDURE — 99213 OFFICE O/P EST LOW 20 MIN: CPT | Performed by: INTERNAL MEDICINE

## 2017-04-12 RX ORDER — PREDNISONE 1 MG/1
TABLET ORAL
Qty: 70 TABLET | Refills: 0 | Status: SHIPPED | OUTPATIENT
Start: 2017-04-12 | End: 2017-06-07

## 2017-04-12 NOTE — TELEPHONE ENCOUNTER
We do use hydrochlorothiazide for blood pressure.  It is one of the first two we use to help with blood pressure control that I use.  Xavier Vega MD  Family Medicine

## 2017-04-12 NOTE — NURSING NOTE
"Chief Complaint   Patient presents with     RECHECK     RA       Initial /80 (BP Location: Right arm, Patient Position: Chair, Cuff Size: Adult Large)  Pulse 86  Temp 98.1  F (36.7  C) (Oral)  Resp 18  Wt 216 lb (98 kg)  BMI 34.86 kg/m2 Estimated body mass index is 34.86 kg/(m^2) as calculated from the following:    Height as of 3/23/17: 5' 6\" (1.676 m).    Weight as of this encounter: 216 lb (98 kg).  Medication Reconciliation: complete   Bethany Thomas LPN    "

## 2017-04-12 NOTE — TELEPHONE ENCOUNTER
"Patient walked into [facility] clinic stating she had received a call back, \"but was down here, so I thought I would just stop in and find out what Dr Vega said, since he's my regular Provider..\"      Dr Vega note below read to patient who verbalized understanding. Gisel Ramesh RN    "

## 2017-04-12 NOTE — MR AVS SNAPSHOT
After Visit Summary   4/12/2017    Briana Mcgee    MRN: [id number]           Patient Information     Date Of Birth          1945        Visit Information        Provider Department      4/12/2017 2:30 PM JONI ROQUE/CHICO RN [facility]        Today's Diagnoses     Essential hypertension    -  1       Follow-ups after your visit        Your next 10 appointments already scheduled     Apr 24, 2017  8:30 AM CDT   Nurse Only with [place] PI RN   [facility]    [address] [facility] [address]   [phone]            Apr 25, 2017   Procedure with Antwan Goldstein DPM   [facility] PeriOP Services (--)    [address] [facility] [address]   [phone]           The medical center is located at [address]. (between [address] in [place], [place] miles north of [place]).            May 01, 2017  9:00 AM CDT   Return Visit with Antwan Goldstein DPM   [facility] [place] ([facility])    [address]  [address]  [facility] [address]   [phone]            May 08, 2017  9:00 AM CDT   Return Visit with Antwan Goldstein DPM   [facility] [place] ([facility])    [address]  [address]  [facility] [address]   [phone]            Oct 19, 2017 10:00 AM CDT   LAB with [facility] ([facility])    [address] [facility] [address]   [phone]           Patient must bring picture ID.  Patient should be prepared to give a urine specimen  Please do not eat 10-12 hours before your appointment if you are coming in fasting for labs on lipids, cholesterol, or glucose (sugar).  Pregnant women should follow their Care Team instructions. Water with medications is okay. Do not drink coffee or other fluids.   If you have concerns about taking  your medications, please  ask at office or if scheduling via Brandpotion, send a message by clicking on Secure Messaging, Message Your Care Team.            Oct 19, 2017 10:30 AM CDT   MA SCREENING DIGITAL BILATERAL with WYMA2   [facility] ([facility])    [address]  [facility] [address]   [phone]           Do not use any powder, lotion or deodorant under your arms or on your breast. If you do, we will ask you to remove it before your exam.  Wear comfortable, two-piece clothing.  If you have any allergies, tell your care team.  Bring any previous mammograms from other facilities or have them mailed to the breast center.            Oct 23, 2017  9:30 AM CDT   Return Visit with Merari Duarte MD   [facility] ([facility])    [facility]  [address] [place] [address]  [facility] [address]   [phone]              Who to contact     If you have questions or need follow up information about today's clinic visit or your schedule please contact [facility] directly at [phone].  Normal or non-critical lab and imaging results will be communicated to you by Bosse Toolshart, letter or phone within 4 business days after the clinic has received the results. If you do not hear from us within 7 days, please contact the clinic through Bosse Toolshart or phone. If you have a critical or abnormal lab result, we will notify you by phone as soon as possible.  Submit refill requests through Brandpotion or call your pharmacy and they will forward the refill request to us. Please allow 3 business days for your refill to be completed.          Additional Information About Your Visit        Bosse ToolsharSmarterphone Information     Brandpotion gives you secure access to your electronic health record. If you see a primary care provider, you can also send messages to your care team and make appointments. If you have questions, please call your primary care clinic.  If you do not have a primary care provider,  please call [phone] and they will assist you.        Care EveryWhere ID     This is your Care EveryWhere ID. This could be used by other organizations to access your [facility] medical records  [id number]         Blood Pressure from Last 3 Encounters:   04/12/17 151/80   04/11/17 150/88   03/10/17 142/72    Weight from Last 3 Encounters:   04/12/17 216 lb (98 kg)   04/11/17 209 lb (94.8 kg)   03/23/17 212 lb (96.2 kg)              Today, you had the following     No orders found for display         Today's Medication Changes          These changes are accurate as of: 4/12/17  2:36 PM.  If you have any questions, ask your nurse or doctor.               These medicines have changed or have updated prescriptions.        Dose/Directions    * predniSONE 5 MG tablet   Commonly known as:  DELTASONE   This may have changed:  Another medication with the same name was added. Make sure you understand how and when to take each.   Used for:  Rheumatoid arthritis, rheumatoid factor status unknown (H)   Changed by:  Yuri Benavidez MD        Dose:  5 mg   Take 1 tablet (5 mg) by mouth daily   Quantity:  90 tablet   Refills:  1       * predniSONE 1 MG tablet   Commonly known as:  DELTASONE   This may have changed:  You were already taking a medication with the same name, and this prescription was added. Make sure you understand how and when to take each.   Used for:  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)   Changed by:  Yuri Benavidez MD        4 mg daily for 1 week, taper by 1 mg weekly   Quantity:  70 tablet   Refills:  0       * Notice:  This list has 2 medication(s) that are the same as other medications prescribed for you. Read the directions carefully, and ask your doctor or other care provider to review them with you.         Where to get your medicines      These medications were sent to [facility]- - [address]   [address] [facility] [address]     Phone:  [phone]     predniSONE 1 MG tablet                Primary Care Provider Office Phone # Fax #    Xavier Vega [phone]       [facility] [address] [facility] [address]        Thank you!     Thank you for choosing [facility]  for your care. Our goal is always to provide you with excellent care. Hearing back from our patients is one way we can continue to improve our services. Please take a few minutes to complete the written survey that you may receive in the mail after your visit with us. Thank you!             Your Updated Medication List - Protect others around you: Learn how to safely use, store and throw away your medicines at www.disposemymeds.org.          This list is accurate as of: 4/12/17  2:36 PM.  Always use your most recent med list.                   Brand Name Dispense Instructions for use    ALEVE PO      Take 2 tablets by mouth 2 times daily (with meals)       aspirin 81 MG EC tablet     90 tablet    Take 1 tablet (81 mg) by mouth daily       calcium + D 600-200 MG-UNIT Tabs   Generic drug:  calcium carbonate-vitamin D      1 TABLET DAILY twice daily       CO Q 10 PO      Take  by mouth.       fluticasone 50 MCG/ACT spray    FLONASE    16 g    Spray 2 sprays into both nostrils daily Hold on file until needed       folic acid 20 MG Caps          hydrochlorothiazide 12.5 MG Tabs tablet     30 tablet    Take 1 tablet (12.5 mg) by mouth daily       levothyroxine 150 MCG tablet    SYNTHROID/LEVOTHROID    90 tablet    Take 1 tablet (150 mcg) by mouth daily BRAND NAME ONLY.  Hold on file until needed.       magnesium hydroxide 400 MG/5ML suspension    MILK OF MAGNESIA     Take 400 mg/kg/day by mouth daily as needed for constipation or heartburn       MULTIPLE VITAMIN PO      1 daily       * order for DME     1 Units    Equipment being ordered: Super feet       * order for DME     1 Device     Trilok Ankle Brace       * order for DME     1 Units    Knee Walker Length of use: Three months       pantoprazole 20 MG EC tablet    PROTONIX    180 tablet    Take 2 tablets (40 mg) by mouth daily Hold on file until needed       * predniSONE 5 MG tablet    DELTASONE    90 tablet    Take 1 tablet (5 mg) by mouth daily       * predniSONE 1 MG tablet    DELTASONE    70 tablet    4 mg daily for 1 week, taper by 1 mg weekly       pyridoxine 100 MG tablet    VITAMIN B-6     Take 100 mg by mouth daily.       simvastatin 20 MG tablet    ZOCOR    90 tablet    Take 1 tablet (20 mg) by mouth At Bedtime Hold on file until needed       tolterodine 4 MG 24 hr capsule    DETROL LA    30 capsule    Take 1 capsule (4 mg) by mouth daily       traZODone 100 MG tablet    DESYREL    90 tablet    1 tablets at bedtime as needed.  Hold on file until needed       * Notice:  This list has 5 medication(s) that are the same as other medications prescribed for you. Read the directions carefully, and ask your doctor or other care provider to review them with you.

## 2017-04-12 NOTE — MR AVS SNAPSHOT
After Visit Summary   4/12/2017    Briana Mcgee    MRN: [id number]           Patient Information     Date Of Birth          1945        Visit Information        Provider Department      4/12/2017 1:45 PM Yuri Benavidez MD [facility]        Today's Diagnoses     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)    -  1       Follow-ups after your visit        Your next 10 appointments already scheduled     Apr 24, 2017  8:30 AM CDT   Nurse Only with [place] PI RN   [facility]    [address] [facility] [address]   [phone]            Apr 25, 2017   Procedure with Antwan Goldstein DPM   [facility] (--)    [address] [facility] [address]   [phone]           The medical center is located at [address]. (between [address] in [place], [place] miles north of [place]).            May 01, 2017  9:00 AM CDT   Return Visit with Antwan Goldstein DPM   [facility] [place] ([facility])    [address]  [address]  [facility] [address]   [phone]            May 08, 2017  9:00 AM CDT   Return Visit with Antwan Goldstein DPM   [facility] [place] ([facility])    [address] [address]  [address]  [facility] [address]   [phone]            Oct 19, 2017 10:00 AM CDT   LAB with [facility] ([facility])    [address] [facility] [address]   [phone]           Patient must bring picture ID.  Patient should be prepared to give a urine specimen  Please do not eat 10-12 hours before your appointment if you are coming in fasting for labs on lipids, cholesterol, or glucose (sugar).  Pregnant women should follow their Care Team instructions. Water with medications is okay. Do not drink coffee or other  fluids.   If you have concerns about taking  your medications, please ask at office or if scheduling via ViaCLIX, send a message by clicking on Secure Messaging, Message Your Care Team.            Oct 19, 2017 10:30 AM CDT   MA SCREENING DIGITAL BILATERAL with WYMA2   [facility] ([facility])    [address]  [facility] [address]   [phone]           Do not use any powder, lotion or deodorant under your arms or on your breast. If you do, we will ask you to remove it before your exam.  Wear comfortable, two-piece clothing.  If you have any allergies, tell your care team.  Bring any previous mammograms from other facilities or have them mailed to the breast center.            Oct 23, 2017  9:30 AM CDT   Return Visit with Merari Duarte MD   [facility] ([facility])    [facility]  [address]  [facility] [address]   [phone]              Who to contact     If you have questions or need follow up information about today's clinic visit or your schedule please contact [facility] directly at [phone].  Normal or non-critical lab and imaging results will be communicated to you by MyChart, letter or phone within 4 business days after the clinic has received the results. If you do not hear from us within 7 days, please contact the clinic through MyChart or phone. If you have a critical or abnormal lab result, we will notify you by phone as soon as possible.  Submit refill requests through ViaCLIX or call your pharmacy and they will forward the refill request to us. Please allow 3 business days for your refill to be completed.          Additional Information About Your Visit        Alma JohnsharLinkpass Information     ViaCLIX gives you secure access to your electronic health record. If you see a primary care provider, you can also send messages to your care team and make appointments. If you have questions, please call  your primary care clinic.  If you do not have a primary care provider, please call [phone] and they will assist you.        Care EveryWhere ID     This is your Care EveryWhere ID. This could be used by other organizations to access your [facility] medical records  [id number]        Your Vitals Were     Pulse Temperature Respirations Pulse Oximetry BMI (Body Mass Index)       86 98.1  F (36.7  C) (Oral) 18 99% 34.86 kg/m2        Blood Pressure from Last 3 Encounters:   04/12/17 151/80   04/11/17 135/86   03/10/17 142/72    Weight from Last 3 Encounters:   04/12/17 98 kg (216 lb)   04/11/17 94.8 kg (209 lb)   03/23/17 96.2 kg (212 lb)              Today, you had the following     No orders found for display         Today's Medication Changes          These changes are accurate as of: 4/12/17 11:59 PM.  If you have any questions, ask your nurse or doctor.               These medicines have changed or have updated prescriptions.        Dose/Directions    * predniSONE 5 MG tablet   Commonly known as:  DELTASONE   This may have changed:  Another medication with the same name was added. Make sure you understand how and when to take each.   Used for:  Rheumatoid arthritis, rheumatoid factor status unknown (H)   Changed by:  Yuri Benavidez MD        Dose:  5 mg   Take 1 tablet (5 mg) by mouth daily   Quantity:  90 tablet   Refills:  1       * predniSONE 1 MG tablet   Commonly known as:  DELTASONE   This may have changed:  You were already taking a medication with the same name, and this prescription was added. Make sure you understand how and when to take each.   Used for:  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)   Changed by:  Yuri Benavidez MD        4 mg daily for 1 week, taper by 1 mg weekly   Quantity:  70 tablet   Refills:  0       * Notice:  This list has 2 medication(s) that are the same as other medications prescribed for you. Read the directions  carefully, and ask your doctor or other care provider to review them with you.         Where to get your medicines      These medications were sent to [facility] - [address]  [address] [place] [address]     Phone:  [phone]     predniSONE 1 MG tablet                Primary Care Provider Office Phone # Fax #    Xavier Vega [phone]       [facility] [address] [facility] [address]        Thank you!     Thank you for choosing [facility]  for your care. Our goal is always to provide you with excellent care. Hearing back from our patients is one way we can continue to improve our services. Please take a few minutes to complete the written survey that you may receive in the mail after your visit with us. Thank you!             Your Updated Medication List - Protect others around you: Learn how to safely use, store and throw away your medicines at www.disposemymeds.org.          This list is accurate as of: 4/12/17 11:59 PM.  Always use your most recent med list.                   Brand Name Dispense Instructions for use    ALEVE PO      Take 2 tablets by mouth 2 times daily (with meals)       aspirin 81 MG EC tablet     90 tablet    Take 1 tablet (81 mg) by mouth daily       calcium + D 600-200 MG-UNIT Tabs   Generic drug:  calcium carbonate-vitamin D      1 TABLET DAILY twice daily       CO Q 10 PO      Take  by mouth.       fluticasone 50 MCG/ACT spray    FLONASE    16 g    Spray 2 sprays into both nostrils daily Hold on file until needed       folic acid 20 MG Caps          hydrochlorothiazide 12.5 MG Tabs tablet     30 tablet    Take 1 tablet (12.5 mg) by mouth daily       levothyroxine 150 MCG tablet    SYNTHROID/LEVOTHROID    90 tablet    Take 1 tablet (150 mcg) by mouth daily BRAND NAME ONLY.  Hold on file until needed.       magnesium hydroxide 400 MG/5ML suspension    MILK OF MAGNESIA      Take 400 mg/kg/day by mouth daily as needed for constipation or heartburn       MULTIPLE VITAMIN PO      1 daily       * order for DME     1 Units    Equipment being ordered: Super feet       * order for DME     1 Device    Trilok Ankle Brace       * order for DME     1 Units    Knee Walker Length of use: Three months       pantoprazole 20 MG EC tablet    PROTONIX    180 tablet    Take 2 tablets (40 mg) by mouth daily Hold on file until needed       * predniSONE 5 MG tablet    DELTASONE    90 tablet    Take 1 tablet (5 mg) by mouth daily       * predniSONE 1 MG tablet    DELTASONE    70 tablet    4 mg daily for 1 week, taper by 1 mg weekly       pyridoxine 100 MG tablet    VITAMIN B-6     Take 100 mg by mouth daily.       simvastatin 20 MG tablet    ZOCOR    90 tablet    Take 1 tablet (20 mg) by mouth At Bedtime Hold on file until needed       tolterodine 4 MG 24 hr capsule    DETROL LA    30 capsule    Take 1 capsule (4 mg) by mouth daily       traZODone 100 MG tablet    DESYREL    90 tablet    1 tablets at bedtime as needed.  Hold on file until needed       * Notice:  This list has 5 medication(s) that are the same as other medications prescribed for you. Read the directions carefully, and ask your doctor or other care provider to review them with you.

## 2017-04-12 NOTE — NURSING NOTE
See telephone encounter with question from patient who walked into clinic looking for answer as she missed call back from Nurse. Gisel Ramesh RN

## 2017-04-13 VITALS
SYSTOLIC BLOOD PRESSURE: 135 MMHG | RESPIRATION RATE: 16 BRPM | HEART RATE: 68 BPM | BODY MASS INDEX: 33.73 KG/M2 | TEMPERATURE: 97.7 F | DIASTOLIC BLOOD PRESSURE: 86 MMHG | WEIGHT: 209 LBS | OXYGEN SATURATION: 97 %

## 2017-04-13 NOTE — PROGRESS NOTES
HISTORY OF PRESENT ILLNESS:  Briana Mcgee is seen back in followup for her seronegative rheumatoid arthritis.  Since last seen, she has weaned herself off both methotrexate and Plaquenil and is only on prednisone.  She is actually wondering if she can get off of this.  Her only problem is her right ankle for which she has seen a surgeon who is expecting to do some type of surgery on the Achilles tendon and spurs that are present.  She discussed getting off the prednisone to her primary and suggested that she needed to see me.  Besides the ankle there is no other joint pain, swelling, or stiffness.  It should be noted that historically her serologic status was negative.      PHYSICAL EXAMINATION:   VITAL SIGNS:  Blood pressure 150/80, pulse 86.     MUSCULOSKELETAL:  There is no synovitis, erythema, deformities of the wrists, MCPs, or PIPs.  There is no synovitis of the elbows, shoulders, knees, ankles.   SKIN:  Without lesions.      IMPRESSION:  Seronegative rheumatoid arthritis, apparently in remission.      RECOMMENDATIONS:  Start tapering prednisone by 1 mg weekly from a dose of 4 mg a day.  Discussed signs and symptoms of adrenal insufficiency and if these were to occur she should stop further tapering and let me know.  If at any point any of her symptoms of arthritis return she should also start tapering any further prednisone and let me know, otherwise her goal will be to get her off the prednisone and if she is able to do this she will only need to be seen back if she is having problems.         CYNTHIA GARCIA MD             D: 2017 15:42   T: 2017 08:29   MT: ARELIS#[id number]      Name:     BRIANA MCGEE   MRN:      [id number]        Account:      [id number]   :      1945           Visit Date:   2017      Document: [id number]

## 2017-04-24 ENCOUNTER — ALLIED HEALTH/NURSE VISIT (OUTPATIENT)
Dept: FAMILY MEDICINE | Facility: CLINIC | Age: 72
End: 2017-04-24
Payer: COMMERCIAL

## 2017-04-24 ENCOUNTER — ANESTHESIA EVENT (OUTPATIENT)
Dept: SURGERY | Facility: CLINIC | Age: 72
End: 2017-04-24
Payer: MEDICARE

## 2017-04-24 VITALS — SYSTOLIC BLOOD PRESSURE: 128 MMHG | DIASTOLIC BLOOD PRESSURE: 70 MMHG | HEART RATE: 76 BPM

## 2017-04-24 DIAGNOSIS — I10 ESSENTIAL HYPERTENSION: Primary | ICD-10-CM

## 2017-04-24 DIAGNOSIS — I10 ESSENTIAL HYPERTENSION: ICD-10-CM

## 2017-04-24 DIAGNOSIS — N18.2 CKD (CHRONIC KIDNEY DISEASE) STAGE 2, GFR 60-89 ML/MIN: ICD-10-CM

## 2017-04-24 LAB
ANION GAP SERPL CALCULATED.3IONS-SCNC: 7 MMOL/L (ref 3–14)
BUN SERPL-MCNC: 16 MG/DL (ref 7–30)
CALCIUM SERPL-MCNC: 9.1 MG/DL (ref 8.5–10.1)
CHLORIDE SERPL-SCNC: 103 MMOL/L (ref 94–109)
CO2 SERPL-SCNC: 28 MMOL/L (ref 20–32)
CREAT SERPL-MCNC: 0.96 MG/DL (ref 0.52–1.04)
GFR SERPL CREATININE-BSD FRML MDRD: 57 ML/MIN/1.7M2
GLUCOSE SERPL-MCNC: 155 MG/DL (ref 70–99)
POTASSIUM SERPL-SCNC: 3.9 MMOL/L (ref 3.4–5.3)
SODIUM SERPL-SCNC: 138 MMOL/L (ref 133–144)

## 2017-04-24 PROCEDURE — 99207 ZZC NO CHARGE NURSE ONLY: CPT

## 2017-04-24 PROCEDURE — 36415 COLL VENOUS BLD VENIPUNCTURE: CPT | Performed by: NURSE PRACTITIONER

## 2017-04-24 PROCEDURE — 80048 BASIC METABOLIC PNL TOTAL CA: CPT | Performed by: NURSE PRACTITIONER

## 2017-04-24 RX ORDER — CEFAZOLIN SODIUM 1 G/3ML
1 INJECTION, POWDER, FOR SOLUTION INTRAMUSCULAR; INTRAVENOUS SEE ADMIN INSTRUCTIONS
Status: CANCELLED | OUTPATIENT
Start: 2017-04-24

## 2017-04-24 RX ORDER — CEFAZOLIN SODIUM 2 G/100ML
2 INJECTION, SOLUTION INTRAVENOUS
Status: CANCELLED | OUTPATIENT
Start: 2017-04-24

## 2017-04-24 NOTE — MR AVS SNAPSHOT
After Visit Summary   4/24/2017    Briana Mcgee    MRN: [id number]           Patient Information     Date Of Birth          1945        Visit Information        Provider Department      4/24/2017 8:30 AM JONI GUIDO RN [facility]        Today's Diagnoses     Essential hypertension    -  1    CKD (chronic kidney disease) stage 2, GFR 60-89 ml/min           Follow-ups after your visit        Your next 10 appointments already scheduled     Apr 24, 2017  1:45 PM CDT   LAB with [facility]   [facility]    [address] [address]   [phone]           Patient must bring picture ID.  Patient should be prepared to give a urine specimen  Please do not eat 10-12 hours before your appointment if you are coming in fasting for labs on lipids, cholesterol, or glucose (sugar).  Pregnant women should follow their Care Team instructions. Water with medications is okay. Do not drink coffee or other fluids.   If you have concerns about taking  your medications, please ask at office or if scheduling via [employer], send a message by clicking on Secure Messaging, Message Your Care Team.            Apr 25, 2017   Procedure with Antwan Goldstein DPM   [facility] (--)    [address] [address]   [phone]           The medical center is located at [address] (between [address] in [place], [place] north of [place]).            May 01, 2017  9:00 AM CDT   Return Visit with Antwan Goldstein DPM   [facility] [place] ([facility])    [address]  [address]  [place] [facility] [place] [address]   [phone]            May 08, 2017  9:00 AM CDT   Return Visit with Antwan Goldstein DPM   [facility] [place] ([facility])    [address]  [address]  [place] [address]   [phone]            Oct  19, 2017 10:00 AM CDT   LAB with [facility] ([facility])    [address] [address]   [phone]           Patient must bring picture ID.  Patient should be prepared to give a urine specimen  Please do not eat 10-12 hours before your appointment if you are coming in fasting for labs on lipids, cholesterol, or glucose (sugar).  Pregnant women should follow their Care Team instructions. Water with medications is okay. Do not drink coffee or other fluids.   If you have concerns about taking  your medications, please ask at office or if scheduling via [employer], send a message by clicking on Secure Messaging, Message Your Care Team.            Oct 19, 2017 10:30 AM CDT   MA SCREENING DIGITAL BILATERAL with [address] [facility] ([facility])    [address] [address]   [phone]           Do not use any powder, lotion or deodorant under your arms or on your breast. If you do, we will ask you to remove it before your exam.  Wear comfortable, two-piece clothing.  If you have any allergies, tell your care team.  Bring any previous mammograms from other facilities or have them mailed to the breast center.            Oct 23, 2017  9:30 AM CDT   Return Visit with Merari Duarte MD   [facility] ([facility])    [facility]  [address]  [place] [facility] [place] [address]   [phone]              Future tests that were ordered for you today     Open Future Orders        Priority Expected Expires Ordered    Basic metabolic panel  (Ca, Cl, CO2, Creat, Gluc, K, Na, BUN) Routine 4/24/2017 4/24/2018 4/24/2017            Who to contact     If you have questions or need follow up information about today's clinic visit or your schedule please contact [facility] directly at [phone].  Normal or non-critical lab and imaging results will be communicated to you by  MyChart, letter or phone within 4 business days after the clinic has received the results. If you do not hear from us within 7 days, please contact the clinic through [employer] or phone. If you have a critical or abnormal lab result, we will notify you by phone as soon as possible.  Submit refill requests through [employer] or call your pharmacy and they will forward the refill request to us. Please allow 3 business days for your refill to be completed.          Additional Information About Your Visit        [employer] Information     [employer] gives you secure access to your electronic health record. If you see a primary care provider, you can also send messages to your care team and make appointments. If you have questions, please call your primary care clinic.  If you do not have a primary care provider, please call [phone] and they will assist you.        Care EveryWhere ID     This is your Care EveryWhere ID. This could be used by other organizations to access your [place] medical records  [id number]        Your Vitals Were     Pulse                   76            Blood Pressure from Last 3 Encounters:   04/24/17 128/70   04/12/17 151/80   04/11/17 135/86    Weight from Last 3 Encounters:   04/12/17 216 lb (98 kg)   04/11/17 209 lb (94.8 kg)   03/23/17 212 lb (96.2 kg)               Primary Care Provider Office Phone # Fax #    Xavier Vega [phone]       [facility] [address] [address]        Thank you!     Thank you for choosing [facility]  for your care. Our goal is always to provide you with excellent care. Hearing back from our patients is one way we can continue to improve our services. Please take a few minutes to complete the written survey that you may receive in the mail after your visit with us. Thank you!             Your Updated Medication List - Protect others around you: Learn how to safely use, store and throw away your  medicines at www.disposemymeds.org.          This list is accurate as of: 4/24/17  9:48 AM.  Always use your most recent med list.                   Brand Name Dispense Instructions for use    ALEVE PO      Take 2 tablets by mouth 2 times daily (with meals)       aspirin 81 MG EC tablet     90 tablet    Take 1 tablet (81 mg) by mouth daily       calcium + D 600-200 MG-UNIT Tabs   Generic drug:  calcium carbonate-vitamin D      1 TABLET DAILY twice daily       CO Q 10 PO      Take  by mouth.       fluticasone 50 MCG/ACT spray    FLONASE    16 g    Spray 2 sprays into both nostrils daily Hold on file until needed       folic acid 20 MG Caps          hydrochlorothiazide 12.5 MG Tabs tablet     30 tablet    Take 1 tablet (12.5 mg) by mouth daily       levothyroxine 150 MCG tablet    SYNTHROID/LEVOTHROID    90 tablet    Take 1 tablet (150 mcg) by mouth daily BRAND NAME ONLY.  Hold on file until needed.       magnesium hydroxide 400 MG/5ML suspension    MILK OF MAGNESIA     Take 400 mg/kg/day by mouth daily as needed for constipation or heartburn       MULTIPLE VITAMIN PO      1 daily       * order for DME     1 Units    Equipment being ordered: Super feet       * order for DME     1 Device    Trilok Ankle Brace       * order for DME     1 Units    Knee Walker Length of use: Three months       pantoprazole 20 MG EC tablet    PROTONIX    180 tablet    Take 2 tablets (40 mg) by mouth daily Hold on file until needed       * predniSONE 5 MG tablet    DELTASONE    90 tablet    Take 1 tablet (5 mg) by mouth daily       * predniSONE 1 MG tablet    DELTASONE    70 tablet    4 mg daily for 1 week, taper by 1 mg weekly       pyridoxine 100 MG tablet    VITAMIN B-6     Take 100 mg by mouth daily.       simvastatin 20 MG tablet    ZOCOR    90 tablet    Take 1 tablet (20 mg) by mouth At Bedtime Hold on file until needed       tolterodine 4 MG 24 hr capsule    DETROL LA    30 capsule    Take 1 capsule (4 mg) by mouth daily        traZODone 100 MG tablet    DESYREL    90 tablet    1 tablets at bedtime as needed.  Hold on file until needed       * Notice:  This list has 5 medication(s) that are the same as other medications prescribed for you. Read the directions carefully, and ask your doctor or other care provider to review them with you.

## 2017-04-24 NOTE — NURSING NOTE
S-(situation): RN visit for BP check in F/U to 04-11-17 pre- op-    Elevated blood pressure today and at previous visits  Recommend start low dose of HCTZ   Will recheck in 2 weeks. Prior to surgery  BMP at that visit   Also discussed dietary changes      Scheduled tomorrow for retrocalcaneal exostectomy - right, Dr. Goldstein.    B-(background): Taking HCTZ 12.5 mg daily- AM. Denies s/e. Home readings checked several days last week range 126-145/64-76 with one reading 161/77.     A-(assessment):   BP Readings from Last 6 Encounters:   04/24/17 128/70   04/12/17 151/80   04/11/17 135/86   03/10/17 142/72   10/20/16 125/59   10/20/16 142/77     HR-76.     R-(recommendations): Await lab results. Forwarded to Krystyna.  MARION Kline RN

## 2017-04-25 ENCOUNTER — ANESTHESIA (OUTPATIENT)
Dept: SURGERY | Facility: CLINIC | Age: 72
End: 2017-04-25
Payer: MEDICARE

## 2017-04-25 ENCOUNTER — HOSPITAL ENCOUNTER (OUTPATIENT)
Facility: CLINIC | Age: 72
Discharge: HOME | End: 2017-04-25
Attending: PODIATRIST | Admitting: PODIATRIST
Payer: MEDICARE

## 2017-04-25 ENCOUNTER — SURGERY (OUTPATIENT)
Age: 72
End: 2017-04-25

## 2017-04-25 VITALS
WEIGHT: 206 LBS | SYSTOLIC BLOOD PRESSURE: 131 MMHG | RESPIRATION RATE: 16 BRPM | BODY MASS INDEX: 33.11 KG/M2 | HEIGHT: 66 IN | TEMPERATURE: 97.5 F | OXYGEN SATURATION: 95 % | DIASTOLIC BLOOD PRESSURE: 59 MMHG

## 2017-04-25 DIAGNOSIS — M77.31 HEEL SPUR, RIGHT: Primary | ICD-10-CM

## 2017-04-25 PROCEDURE — 25000125 ZZHC RX 250: Performed by: PODIATRIST

## 2017-04-25 PROCEDURE — 25000128 H RX IP 250 OP 636: Performed by: PODIATRIST

## 2017-04-25 PROCEDURE — 25000125 ZZHC RX 250: Performed by: NURSE ANESTHETIST, CERTIFIED REGISTERED

## 2017-04-25 PROCEDURE — 88311 DECALCIFY TISSUE: CPT | Mod: 26 | Performed by: PODIATRIST

## 2017-04-25 PROCEDURE — 88304 TISSUE EXAM BY PATHOLOGIST: CPT | Mod: 26 | Performed by: PODIATRIST

## 2017-04-25 PROCEDURE — 27210794 ZZH OR GENERAL SUPPLY STERILE: Performed by: PODIATRIST

## 2017-04-25 PROCEDURE — 25800025 ZZH RX 258: Performed by: NURSE ANESTHETIST, CERTIFIED REGISTERED

## 2017-04-25 PROCEDURE — 25000566 ZZH SEVOFLURANE, EA 15 MIN: Performed by: PODIATRIST

## 2017-04-25 PROCEDURE — 36000050 ZZH SURGERY LEVEL 2 1ST 30 MIN: Performed by: PODIATRIST

## 2017-04-25 PROCEDURE — 71000012 ZZH RECOVERY PHASE 1 LEVEL 1 FIRST HR: Performed by: PODIATRIST

## 2017-04-25 PROCEDURE — 37000008 ZZH ANESTHESIA TECHNICAL FEE, 1ST 30 MIN: Performed by: PODIATRIST

## 2017-04-25 PROCEDURE — 36000052 ZZH SURGERY LEVEL 2 EA 15 ADDTL MIN: Performed by: PODIATRIST

## 2017-04-25 PROCEDURE — 25000128 H RX IP 250 OP 636: Performed by: NURSE ANESTHETIST, CERTIFIED REGISTERED

## 2017-04-25 PROCEDURE — 88311 DECALCIFY TISSUE: CPT | Performed by: PODIATRIST

## 2017-04-25 PROCEDURE — 40000306 ZZH STATISTIC PRE PROC ASSESS II: Performed by: PODIATRIST

## 2017-04-25 PROCEDURE — 28119 REMOVAL OF HEEL SPUR: CPT | Mod: RT | Performed by: PODIATRIST

## 2017-04-25 PROCEDURE — 71000027 ZZH RECOVERY PHASE 2 EACH 15 MINS: Performed by: PODIATRIST

## 2017-04-25 PROCEDURE — 88304 TISSUE EXAM BY PATHOLOGIST: CPT | Performed by: PODIATRIST

## 2017-04-25 PROCEDURE — S0077 INJECTION, CLINDAMYCIN PHOSP: HCPCS | Performed by: PODIATRIST

## 2017-04-25 PROCEDURE — 37000009 ZZH ANESTHESIA TECHNICAL FEE, EACH ADDTL 15 MIN: Performed by: PODIATRIST

## 2017-04-25 PROCEDURE — C1713 ANCHOR/SCREW BN/BN,TIS/BN: HCPCS | Performed by: PODIATRIST

## 2017-04-25 DEVICE — IMP ANCHOR ARTHREX ACHILLIES SPEEDBRDG BIOCOMP AR-8928BC-CP: Type: IMPLANTABLE DEVICE | Site: FOOT | Status: FUNCTIONAL

## 2017-04-25 RX ORDER — FENTANYL CITRATE 50 UG/ML
25-50 INJECTION, SOLUTION INTRAMUSCULAR; INTRAVENOUS
Status: DISCONTINUED | OUTPATIENT
Start: 2017-04-25 | End: 2017-04-25 | Stop reason: HOSPADM

## 2017-04-25 RX ORDER — BACITRACIN ZINC 500 [USP'U]/G
OINTMENT TOPICAL PRN
Status: DISCONTINUED | OUTPATIENT
Start: 2017-04-25 | End: 2017-04-25 | Stop reason: HOSPADM

## 2017-04-25 RX ORDER — AMOXICILLIN 250 MG
1-2 CAPSULE ORAL 2 TIMES DAILY
Qty: 30 TABLET | Refills: 0 | Status: SHIPPED | OUTPATIENT
Start: 2017-04-25 | End: 2017-10-23

## 2017-04-25 RX ORDER — METOCLOPRAMIDE HYDROCHLORIDE 5 MG/ML
5 INJECTION INTRAMUSCULAR; INTRAVENOUS EVERY 6 HOURS PRN
Status: DISCONTINUED | OUTPATIENT
Start: 2017-04-25 | End: 2017-04-25 | Stop reason: HOSPADM

## 2017-04-25 RX ORDER — HYDROXYZINE HYDROCHLORIDE 10 MG/1
10 TABLET, FILM COATED ORAL EVERY 6 HOURS PRN
Qty: 30 TABLET | Refills: 0 | Status: SHIPPED | OUTPATIENT
Start: 2017-04-25 | End: 2017-10-23

## 2017-04-25 RX ORDER — SODIUM CHLORIDE, SODIUM LACTATE, POTASSIUM CHLORIDE, CALCIUM CHLORIDE 600; 310; 30; 20 MG/100ML; MG/100ML; MG/100ML; MG/100ML
INJECTION, SOLUTION INTRAVENOUS CONTINUOUS
Status: DISCONTINUED | OUTPATIENT
Start: 2017-04-25 | End: 2017-04-25 | Stop reason: HOSPADM

## 2017-04-25 RX ORDER — CLINDAMYCIN PHOSPHATE 900 MG/50ML
900 INJECTION, SOLUTION INTRAVENOUS ONCE
Status: COMPLETED | OUTPATIENT
Start: 2017-04-25 | End: 2017-04-25

## 2017-04-25 RX ORDER — ALBUTEROL SULFATE 0.83 MG/ML
2.5 SOLUTION RESPIRATORY (INHALATION) EVERY 4 HOURS PRN
Status: DISCONTINUED | OUTPATIENT
Start: 2017-04-25 | End: 2017-04-25 | Stop reason: HOSPADM

## 2017-04-25 RX ORDER — ONDANSETRON 2 MG/ML
4 INJECTION INTRAMUSCULAR; INTRAVENOUS EVERY 30 MIN PRN
Status: DISCONTINUED | OUTPATIENT
Start: 2017-04-25 | End: 2017-04-25 | Stop reason: HOSPADM

## 2017-04-25 RX ORDER — DEXAMETHASONE SODIUM PHOSPHATE 4 MG/ML
4 INJECTION, SOLUTION INTRA-ARTICULAR; INTRALESIONAL; INTRAMUSCULAR; INTRAVENOUS; SOFT TISSUE EVERY 10 MIN PRN
Status: DISCONTINUED | OUTPATIENT
Start: 2017-04-25 | End: 2017-04-25 | Stop reason: HOSPADM

## 2017-04-25 RX ORDER — LIDOCAINE 40 MG/G
CREAM TOPICAL
Status: DISCONTINUED | OUTPATIENT
Start: 2017-04-25 | End: 2017-04-25 | Stop reason: HOSPADM

## 2017-04-25 RX ORDER — HYDROMORPHONE HYDROCHLORIDE 1 MG/ML
.3-.5 INJECTION, SOLUTION INTRAMUSCULAR; INTRAVENOUS; SUBCUTANEOUS EVERY 10 MIN PRN
Status: DISCONTINUED | OUTPATIENT
Start: 2017-04-25 | End: 2017-04-25 | Stop reason: HOSPADM

## 2017-04-25 RX ORDER — HYDROMORPHONE HYDROCHLORIDE 2 MG/1
2 TABLET ORAL EVERY 4 HOURS PRN
Qty: 30 TABLET | Refills: 0 | Status: SHIPPED | OUTPATIENT
Start: 2017-04-25 | End: 2017-08-22

## 2017-04-25 RX ORDER — FENTANYL CITRATE 50 UG/ML
INJECTION, SOLUTION INTRAMUSCULAR; INTRAVENOUS PRN
Status: DISCONTINUED | OUTPATIENT
Start: 2017-04-25 | End: 2017-04-25

## 2017-04-25 RX ORDER — PHYSOSTIGMINE SALICYLATE 1 MG/ML
1.2 INJECTION INTRAVENOUS
Status: DISCONTINUED | OUTPATIENT
Start: 2017-04-25 | End: 2017-04-25 | Stop reason: HOSPADM

## 2017-04-25 RX ORDER — METOCLOPRAMIDE 5 MG/1
5 TABLET ORAL EVERY 6 HOURS PRN
Status: DISCONTINUED | OUTPATIENT
Start: 2017-04-25 | End: 2017-04-25 | Stop reason: HOSPADM

## 2017-04-25 RX ORDER — ONDANSETRON 4 MG/1
4 TABLET, ORALLY DISINTEGRATING ORAL EVERY 30 MIN PRN
Status: DISCONTINUED | OUTPATIENT
Start: 2017-04-25 | End: 2017-04-25 | Stop reason: HOSPADM

## 2017-04-25 RX ORDER — MEPERIDINE HYDROCHLORIDE 25 MG/ML
12.5 INJECTION INTRAMUSCULAR; INTRAVENOUS; SUBCUTANEOUS
Status: DISCONTINUED | OUTPATIENT
Start: 2017-04-25 | End: 2017-04-25 | Stop reason: HOSPADM

## 2017-04-25 RX ORDER — PROPOFOL 10 MG/ML
INJECTION, EMULSION INTRAVENOUS PRN
Status: DISCONTINUED | OUTPATIENT
Start: 2017-04-25 | End: 2017-04-25

## 2017-04-25 RX ORDER — GLYCOPYRROLATE 0.2 MG/ML
INJECTION, SOLUTION INTRAMUSCULAR; INTRAVENOUS PRN
Status: DISCONTINUED | OUTPATIENT
Start: 2017-04-25 | End: 2017-04-25

## 2017-04-25 RX ORDER — LIDOCAINE HCL/EPINEPHRINE/PF 2%-1:200K
VIAL (ML) INJECTION PRN
Status: DISCONTINUED | OUTPATIENT
Start: 2017-04-25 | End: 2017-04-25

## 2017-04-25 RX ORDER — ROPIVACAINE HYDROCHLORIDE 5 MG/ML
INJECTION, SOLUTION EPIDURAL; INFILTRATION; PERINEURAL PRN
Status: DISCONTINUED | OUTPATIENT
Start: 2017-04-25 | End: 2017-04-25

## 2017-04-25 RX ORDER — NALOXONE HYDROCHLORIDE 0.4 MG/ML
.1-.4 INJECTION, SOLUTION INTRAMUSCULAR; INTRAVENOUS; SUBCUTANEOUS
Status: DISCONTINUED | OUTPATIENT
Start: 2017-04-25 | End: 2017-04-25 | Stop reason: HOSPADM

## 2017-04-25 RX ORDER — PROMETHAZINE HYDROCHLORIDE 25 MG/ML
12.5 INJECTION, SOLUTION INTRAMUSCULAR; INTRAVENOUS
Status: DISCONTINUED | OUTPATIENT
Start: 2017-04-25 | End: 2017-04-25 | Stop reason: HOSPADM

## 2017-04-25 RX ORDER — LIDOCAINE HYDROCHLORIDE 10 MG/ML
INJECTION, SOLUTION INFILTRATION; PERINEURAL PRN
Status: DISCONTINUED | OUTPATIENT
Start: 2017-04-25 | End: 2017-04-25

## 2017-04-25 RX ORDER — ONDANSETRON 2 MG/ML
INJECTION INTRAMUSCULAR; INTRAVENOUS PRN
Status: DISCONTINUED | OUTPATIENT
Start: 2017-04-25 | End: 2017-04-25

## 2017-04-25 RX ORDER — LIDOCAINE HYDROCHLORIDE 10 MG/ML
INJECTION, SOLUTION EPIDURAL; INFILTRATION; INTRACAUDAL; PERINEURAL PRN
Status: DISCONTINUED | OUTPATIENT
Start: 2017-04-25 | End: 2017-04-25

## 2017-04-25 RX ADMIN — HYDROMORPHONE HYDROCHLORIDE 0.5 MG: 1 INJECTION, SOLUTION INTRAMUSCULAR; INTRAVENOUS; SUBCUTANEOUS at 10:03

## 2017-04-25 RX ADMIN — GLYCOPYRROLATE 0.1 MG: 0.2 INJECTION, SOLUTION INTRAMUSCULAR; INTRAVENOUS at 08:43

## 2017-04-25 RX ADMIN — LIDOCAINE HYDROCHLORIDE 5 ML: 10 INJECTION, SOLUTION EPIDURAL; INFILTRATION; INTRACAUDAL; PERINEURAL at 07:59

## 2017-04-25 RX ADMIN — ROPIVACAINE HYDROCHLORIDE 10 ML: 5 INJECTION, SOLUTION EPIDURAL; INFILTRATION; PERINEURAL at 08:03

## 2017-04-25 RX ADMIN — LIDOCAINE HYDROCHLORIDE,EPINEPHRINE BITARTRATE 5 ML: 20; .005 INJECTION, SOLUTION EPIDURAL; INFILTRATION; INTRACAUDAL; PERINEURAL at 08:00

## 2017-04-25 RX ADMIN — ROCURONIUM BROMIDE 10 MG: 10 INJECTION INTRAVENOUS at 08:35

## 2017-04-25 RX ADMIN — SODIUM CHLORIDE, POTASSIUM CHLORIDE, SODIUM LACTATE AND CALCIUM CHLORIDE: 600; 310; 30; 20 INJECTION, SOLUTION INTRAVENOUS at 07:11

## 2017-04-25 RX ADMIN — SUCCINYLCHOLINE CHLORIDE 100 MG: 20 INJECTION, SOLUTION INTRAMUSCULAR; INTRAVENOUS at 08:35

## 2017-04-25 RX ADMIN — MIDAZOLAM HYDROCHLORIDE 1 MG: 1 INJECTION, SOLUTION INTRAMUSCULAR; INTRAVENOUS at 07:59

## 2017-04-25 RX ADMIN — ROPIVACAINE HYDROCHLORIDE 30 ML: 5 INJECTION, SOLUTION EPIDURAL; INFILTRATION; PERINEURAL at 08:01

## 2017-04-25 RX ADMIN — ONDANSETRON 4 MG: 2 INJECTION INTRAMUSCULAR; INTRAVENOUS at 08:54

## 2017-04-25 RX ADMIN — FENTANYL CITRATE 100 MCG: 50 INJECTION, SOLUTION INTRAMUSCULAR; INTRAVENOUS at 07:59

## 2017-04-25 RX ADMIN — CEFAZOLIN 100 ML: 1 INJECTION, POWDER, FOR SOLUTION INTRAMUSCULAR; INTRAVENOUS at 09:20

## 2017-04-25 RX ADMIN — LIDOCAINE HYDROCHLORIDE 100 MG: 10 INJECTION, SOLUTION INFILTRATION; PERINEURAL at 08:35

## 2017-04-25 RX ADMIN — PROPOFOL 160 MG: 10 INJECTION, EMULSION INTRAVENOUS at 08:35

## 2017-04-25 RX ADMIN — MIDAZOLAM HYDROCHLORIDE 2 MG: 1 INJECTION, SOLUTION INTRAMUSCULAR; INTRAVENOUS at 07:57

## 2017-04-25 RX ADMIN — FENTANYL CITRATE 100 MCG: 50 INJECTION, SOLUTION INTRAMUSCULAR; INTRAVENOUS at 08:35

## 2017-04-25 RX ADMIN — CLINDAMYCIN PHOSPHATE 900 MG: 18 INJECTION, SOLUTION INTRAVENOUS at 08:31

## 2017-04-25 RX ADMIN — OSELTAMIVIR PHOSPHATE 1 G: 75 CAPSULE ORAL at 09:19

## 2017-04-25 RX ADMIN — GLYCOPYRROLATE 0.1 MG: 0.2 INJECTION, SOLUTION INTRAMUSCULAR; INTRAVENOUS at 08:39

## 2017-04-25 RX ADMIN — HYDROMORPHONE HYDROCHLORIDE 0.5 MG: 1 INJECTION, SOLUTION INTRAMUSCULAR; INTRAVENOUS; SUBCUTANEOUS at 10:14

## 2017-04-25 RX ADMIN — LIDOCAINE HYDROCHLORIDE 1 ML: 10 INJECTION, SOLUTION INFILTRATION; PERINEURAL at 07:10

## 2017-04-25 RX ADMIN — HYDROCORTISONE SODIUM SUCCINATE 75 MG: 100 INJECTION, POWDER, FOR SOLUTION INTRAMUSCULAR; INTRAVENOUS at 08:35

## 2017-04-25 RX ADMIN — MIDAZOLAM HYDROCHLORIDE 2 MG: 1 INJECTION, SOLUTION INTRAMUSCULAR; INTRAVENOUS at 08:35

## 2017-04-25 RX ADMIN — SODIUM CHLORIDE, POTASSIUM CHLORIDE, SODIUM LACTATE AND CALCIUM CHLORIDE: 600; 310; 30; 20 INJECTION, SOLUTION INTRAVENOUS at 09:09

## 2017-04-25 ASSESSMENT — LIFESTYLE VARIABLES: TOBACCO_USE: 1

## 2017-04-25 NOTE — ANESTHESIA POSTPROCEDURE EVALUATION
Patient: Briana Mcgee    Procedure(s):  Retrocalcaneal exostectomy right - Wound Class: I-Clean    Diagnosis:retrocalcaneal exostosis right heel  Diagnosis Additional Information: No value filed.    Anesthesia Type:  General, ETT, LMA, Peripheral Nerve Block    Note:  Anesthesia Post Evaluation    Patient location during evaluation: Phase 2  Patient participation: Able to fully participate in evaluation  Level of consciousness: awake and alert  Pain management: adequate  Airway patency: patent  Cardiovascular status: acceptable and hemodynamically stable  Respiratory status: acceptable, room air and spontaneous ventilation  Hydration status: acceptable  PONV: none     Anesthetic complications: None          Last vitals:  Vitals:    04/25/17 1015 04/25/17 1028 04/25/17 1030   BP: 152/81 164/83 (!) 157/93   Resp: 16 16 16   Temp:      SpO2: 97% 98% 98%         Electronically Signed By: BRAXTON Rivera CRNA  April 25, 2017  10:45 AM

## 2017-04-25 NOTE — ANESTHESIA PREPROCEDURE EVALUATION
Anesthesia Evaluation     . Pt has had prior anesthetic.            ROS/MED HX    ENT/Pulmonary:     (+)allergic rhinitis, tobacco use, Past use quit 24 years ago packs/day  , . .    Neurologic:     (+)CVA date: 2005 with deficits- word recognition,     Cardiovascular:     (+) Dyslipidemia, hypertension----. : . . . :. .       METS/Exercise Tolerance:     Hematologic:     (+) Other Hematologic Disorder-Leiden mutation      Musculoskeletal:   (+) arthritis, , , -       GI/Hepatic:     (+) GERD Asymptomatic on medication, hepatitis       Renal/Genitourinary:     (+) chronic renal disease, type: CRI,       Endo:     (+) thyroid problem hypothyroidism, Obesity, .      Psychiatric:  - neg psychiatric ROS       Infectious Disease:         Malignancy:   (+) Malignancy History of Breast  Breast CA Remission status post Chemo and Radiation.         Other:    - neg other ROS                 Physical Exam  Normal systems: cardiovascular and pulmonary    Airway   Mallampati: II  TM distance: >3 FB    Dental   (+) caps    Cardiovascular       Pulmonary                     Anesthesia Plan      History & Physical Review  History and physical reviewed and following examination; no interval change.    ASA Status:  3 .    NPO Status:  > 8 hours    Plan for General, ETT, LMA and Peripheral Nerve Block with Intravenous and Propofol induction. Maintenance will be Balanced.    PONV prophylaxis:  Ondansetron (or other 5HT-3) and Dexamethasone or Solumedrol  Additional equipment: Videolaryngoscope      Postoperative Care  Postoperative pain management:  Peripheral nerve block (Single Shot), IV analgesics and Oral pain medications.      Consents  Anesthetic plan, risks, benefits and alternatives discussed with:  Patient..                          .

## 2017-04-25 NOTE — IP AVS SNAPSHOT
[facility] PreOP/Phase II    [address] [address]    Phone:  [phone]    Fax:  [phone]                                       After Visit Summary   4/25/2017    Briana Mcgee    MRN: [id number]           After Visit Summary Signature Page     I have received my discharge instructions, and my questions have been answered. I have discussed any challenges I see with this plan with the nurse or doctor.    ..........................................................................................................................................  Patient/Patient Representative Signature      ..........................................................................................................................................  Patient Representative Print Name and Relationship to Patient    ..................................................               ................................................  Date                                            Time    ..........................................................................................................................................  Reviewed by Signature/Title    ...................................................              ..............................................  Date                                                            Time

## 2017-04-25 NOTE — BRIEF OP NOTE
SURGEON: LUCAS Goldstein DPM, FACFAS    ASSISTANT: none    PREOP DIAGNOSIS: 1. Retrocalcaneal exostosis, right foot    POSTOP DIAGNOSIS: same as above    PROCEDURE: 1. Retrocalcaneal exostectomy, right    PATHOLOGY: none    ANESTHESIA: General with popliteal block    HEMOSTASIS: Pneumatic thigh Tourniquet 285 psi    INJECTABLE: 0 cc Buffered 1% Lidocaine plain; 0 cc 0.5% Marcaine plain    BLOOD LOSS: 10 cc.    CONDITION: Vital signs are stable and vascular status intact the the lower extremities.

## 2017-04-25 NOTE — H&P (VIEW-ONLY)
[address] [address]  [phone]  Dept: [phone]    PRE-OP EVALUATION:  Today's date: 2017    Briana Mcgee (: 1945) presents for pre-operative evaluation assessment as requested by Dr. Goldstein.  She requires evaluation and anesthesia risk assessment prior to undergoing surgery/procedure for treatment of a heel spur and achilles tendonitis - right.  Proposed procedure: Retrocalcaneal exostectomy - right    Date of Surgery/ Procedure: 17  Time of Surgery/ Procedure: 830  Hospital/Surgical Facility: [facility]  Fax number for surgical facility:   Primary Physician: Xavier Vega  Type of Anesthesia Anticipated: Combined General with Popliteal Block    Patient has a Health Care Directive or Living Will:  YES Advanced Directive     1. YES - DO YOU HAVE A HISTORY OF HEART ATTACK, STROKE, STENT, BYPASS OR SURGERY ON AN ARTERY IN THE HEAD, NECK, HEART OR LEG? Stroke in , history of factor V heterogenous mutation - no further history of blood clots, on a baby ASA  2. NO - Do you ever have any pain or discomfort in your chest?  3. NO - Do you have a history of  Heart Failure?  4. NO - Are you troubled by shortness of breath when: walking on the level, up a slight hill or at night?  5. NO - Do you currently have a cold, bronchitis or other respiratory infection?  6. NO - Do you have a cough, shortness of breath or wheezing?  7. NO - Do you sometimes get pains in the calves of your legs when you walk?  8. NO - Do you or anyone in your family have previous history of blood clots?  9. NO - Do you or does anyone in your family have a serious bleeding problem such as prolonged bleeding following surgeries or cuts?  10. NO - Have you ever had problems with anemia or been told to take iron pills?  11. NO - Have you had any abnormal blood loss such as black, tarry or bloody stools, or abnormal vaginal bleeding?  12. NO - Have you ever had a  blood transfusion?  13. NO - Have you or any of your relatives ever had problems with anesthesia?  14. NO - Do you have sleep apnea, excessive snoring or daytime drowsiness?  15. NO - Do you have any prosthetic heart valves?  16. NO - Do you have prosthetic joints?  17. NO - Is there any chance that you may be pregnant?      HPI:                                                      Brief HPI related to upcoming procedure: patient has having significant pain in right foot for several months. She has seen Rheumatology for this and has been treated with prednisone for seronegative RA. She has been seen by Dr. Goldstein who is recommend surgical repair of her achilles tendon and bone spurs. She has an appointment with rheumatology tomorrow to discuss prednisone and RA further,.     BP Readings from Last 6 Encounters:   04/12/17 151/80   04/11/17 135/86   03/10/17 142/72   10/20/16 125/59   10/20/16 142/77   10/10/16 112/62     Reports follow low sodium diet- occasional use of salt shaker       See problem list for active medical problems.  Problems all longstanding and stable, except as noted/documented.  See ROS for pertinent symptoms related to these conditions.                                                                                                  .    MEDICAL HISTORY:                                                      Patient Active Problem List    Diagnosis Date Noted     Malignant neoplasm of female breast (H) 02/18/2005     Priority: High     11/05: breast surgeon=Dr. Dimitrios Bell, oncology=Dr. Tino Gordillo s/p lumpectomy and axillary node dissection followed by chemotherapy at [facility]. Radiation oncology=Dr. Jessica Edmonds for 6 weeks.   12/05: Briana is clinically asymptomatic and no clinical evidence of cancer recurrence; port-a-cath removal.  9-12-05 NM [facility] radionuclide angiography at rest findings:1. Left ventricular ejectin fraction is normal at 64%  2. Compared to previous  study perfomed 3-17-05, there has been no significant interval change.  10/06: appointment with Dr. Gordillo:continue amrimidex and  follow up in 6 months.  1/16/07: follow up with Dr. Bell: 'doing well with no signs of recurrence', follow up 7/07 with mammogram at that time/  2/12/09: now followed by Dr. Peres.  3/10 seen by Dr. Levi Bear with [facility] yearly visits, now can have yearly mammos, next due 8/10  10/2013 follow up with Dr. Duarte, followed every 6 months due to density of right breast and concerns, now followed yearly  Problem list name updated by automated process. Provider to review       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) 04/12/2017     Priority: Medium     Essential hypertension 03/13/2017     Priority: Medium     Hepatitis 11/25/2015     Priority: Medium     Obesity 10/20/2015     Priority: Medium     CKD (chronic kidney disease) stage 2, GFR 60-89 ml/min 06/04/2014     Priority: Medium     Advanced directives, counseling/discussion 01/29/2014     Priority: Medium     Was given the information to fill out.       Spinal stenosis 01/07/2014     Priority: Medium     Pain controlled with epidural injections       Lumbar radiculopathy 05/07/2012     Priority: Medium     Varicose veins of lower extremities with complications 03/06/2012     Priority: Medium     Problem list name updated by automated process. Provider to review       GERD (gastroesophageal reflux disease) 04/12/2011     Priority: Medium     Heterozygous factor V Leiden mutation (H) 04/07/2011     Priority: Medium     HYPERLIPIDEMIA LDL GOAL <100 10/31/2010     Priority: Medium     OA (osteoarthritis) of knee 03/01/2009     Priority: Medium     Followed by [facility]. Briana has had 3 Synvisc injections (1/3/08, 2/12/09,  3/2/09, 3/9/09)       Hot flashes 03/01/2009     Priority: Medium     2/12/09: seen by Dr. Peres, started Effexor. If this is not helpful will start  "Neurontin.       Rhinitis, allergic seasonal 07/02/2008     Priority: Medium     ischemic stroke 3/06 03/27/2006     Priority: Catarino Warren has been seen by Dr. Hernandez. Given her history of CVA and Factor V Leiden heterozygote he recommends that she stay on \"antiplatelet drug use for secondary prevention of strokes\". Briana has been on plavix and asa.       Impaired fasting glucose 03/05/2006     Priority: Catarino Warren would like to continue diet changes (she has already lost 16 pounds on the [employer]' program), regular exercise and weight loss.  She agrees to recheck fasting blood sugar and lipids in 3 months.       Insomnia 03/05/2006     Priority: Medium     Stable on Trazadone.  Problem list name updated by automated process. Provider to review       Hypothyroidism 02/18/2005     Priority: Medium     Stable on current dose of synthroid.  Problem list name updated by automated process. Provider to review        Past Medical History:   Diagnosis Date     Allergies      Breast cancer (H)      Esophageal reflux      Other and unspecified hyperlipidemia      Other chronic bronchitis      Personal history of pneumonia (recurrent)     Hx-Pneumonia, \" Chronic Bronchitis\"     Personal history of tobacco use, presenting hazards to health      Unspecified hypothyroidism      Past Surgical History:   Procedure Laterality Date     BIOPSY BREAST       COLONOSCOPY N/A 9/2/2016    Procedure: COLONOSCOPY;  Surgeon: Dean Sanchez MD;  Location: [facility]     HC EXCISION BREAST LESION, OPEN >=1      2/05     HYSTERECTOMY, RYAN  1982     for non cancerous reasons and no abnormal pap     INJECT EPIDURAL LUMBAR  1/12/2011    INJECT EPIDURAL LUMBAR performed by GENERIC ANESTHESIA PROVIDER at [facility]     INJECT EPIDURAL LUMBAR  5/5/2011    Procedure:INJECT EPIDURAL LUMBAR; LESLIE -Dr. Sánchez  ; Surgeon:GENERIC ANESTHESIA PROVIDER; Location:[facility]     INJECT EPIDURAL LUMBAR  10/6/2011    Procedure:INJECT EPIDURAL " LUMBAR; LESLIE--; Surgeon:GENERIC ANESTHESIA PROVIDER; Location:[facility]     INJECT EPIDURAL LUMBAR  1/25/2012    Procedure:INJECT EPIDURAL LUMBAR; LESLIE-Dr. Sánchez  ; Surgeon:GENERIC ANESTHESIA PROVIDER; Location:[facility]     INJECT EPIDURAL LUMBAR  7/9/2012    Procedure: INJECT EPIDURAL LUMBAR;  LESLIE-Dr. Pacheco;  Surgeon: Provider, Generic Anesthesia;  Location: [facility]     LUMPECTOMY BREAST       SURGICAL HISTORY OF -       lumpectomy with sentinal node biopsy     SURGICAL HISTORY OF -       left knee arthoscopic repair medial meniscus     Current Outpatient Prescriptions   Medication Sig Dispense Refill     hydrochlorothiazide 12.5 MG TABS tablet Take 1 tablet (12.5 mg) by mouth daily 30 tablet 1     simvastatin (ZOCOR) 20 MG tablet Take 1 tablet (20 mg) by mouth At Bedtime Hold on file until needed 90 tablet 3     traZODone (DESYREL) 100 MG tablet 1 tablets at bedtime as needed.  Hold on file until needed 90 tablet 3     levothyroxine (SYNTHROID/LEVOTHROID) 150 MCG tablet Take 1 tablet (150 mcg) by mouth daily BRAND NAME ONLY.  Hold on file until needed. 90 tablet 3     pantoprazole (PROTONIX) 20 MG EC tablet Take 2 tablets (40 mg) by mouth daily Hold on file until needed 180 tablet 3     fluticasone (FLONASE) 50 MCG/ACT spray Spray 2 sprays into both nostrils daily Hold on file until needed 16 g 11     magnesium hydroxide (MILK OF MAGNESIA) 400 MG/5ML suspension Take 400 mg/kg/day by mouth daily as needed for constipation or heartburn       tolterodine (DETROL LA) 4 MG 24 hr capsule Take 1 capsule (4 mg) by mouth daily 30 capsule 11     order for DME Knee Walker  Length of use: Three months 1 Units 0     predniSONE (DELTASONE) 5 MG tablet Take 1 tablet (5 mg) by mouth daily 90 tablet 1     order for DME Trilok Ankle Brace 1 Device 0     order for DME Equipment being ordered: Super feet 1 Units 0     folic acid 20 MG CAPS        Naproxen Sodium (ALEVE PO) Take 2 tablets by mouth 2 times daily (with meals)       aspirin 81  MG EC tablet Take 1 tablet (81 mg) by mouth daily 90 tablet 3     Coenzyme Q10 (CO Q 10 PO) Take  by mouth.       pyridoxine (VITAMIN B-6) 100 MG tablet Take 100 mg by mouth daily.       CALCIUM + D 600-200 MG-UNIT OR TABS 1 TABLET DAILY twice daily       MULTIPLE VITAMIN OR 1 daily       predniSONE (DELTASONE) 1 MG tablet 4 mg daily for 1 week, taper by 1 mg weekly 70 tablet 0     OTC products: None, except as noted above    Allergies   Allergen Reactions     Erythromycin Swelling     Erythromycin      Other reaction(s): Edema     Hydrocodone      Other reaction(s): GI Upset  Tolerates dilaudid     Oxycodone      Other reaction(s): GI Upset      Latex Allergy: NO    Social History   Substance Use Topics     Smoking status: Former Smoker     Types: Cigarettes     Quit date: 7/19/1996     Smokeless tobacco: Never Used      Comment: quit 10-12 years ago, 1997.     Alcohol use Yes      Comment: glass of wine at night     History   Drug Use No       REVIEW OF SYSTEMS:                                                    C: NEGATIVE for fever, chills, change in weight  I: NEGATIVE for worrisome rashes, moles or lesions  E: NEGATIVE for vision changes or irritation  E/M: NEGATIVE for ear, mouth and throat problems  R: NEGATIVE for significant cough or SOB  B: NEGATIVE for masses, tenderness or discharge  CV: NEGATIVE for chest pain, palpitations or peripheral edema  GI: NEGATIVE for nausea, abdominal pain, heartburn, or change in bowel habits  : NEGATIVE for frequency, dysuria, or hematuria  M: NEGATIVE for significant arthralgias or myalgia  N: NEGATIVE for weakness, dizziness or paresthesias  E: NEGATIVE for temperature intolerance, skin/hair changes  H: NEGATIVE for bleeding problems  P: NEGATIVE for changes in mood or affect    EXAM:                                                    /86  Pulse 68  Temp 97.7  F (36.5  C) (Tympanic)  Resp 16  Wt 209 lb (94.8 kg)  SpO2 97%  Breastfeeding? No  BMI 33.73  kg/m2    GENERAL APPEARANCE: healthy, alert and no distress     EYES: EOMI, PERRL     HENT: ear canals and TM's normal and nose and mouth without ulcers or lesions     NECK: no adenopathy, no asymmetry, masses, or scars and thyroid normal to palpation     RESP: lungs clear to auscultation - no rales, rhonchi or wheezes     CV: regular rates and rhythm, normal S1 S2, no S3 or S4 and no murmur, click or rub     ABDOMEN:  soft, nontender, no HSM or masses and bowel sounds normal     MS: extremities normal- no gross deformities noted, no evidence of inflammation in joints, FROM in all extremities.     SKIN: no suspicious lesions or rashes     NEURO: Normal strength and tone, sensory exam grossly normal, mentation intact and speech normal     PSYCH: mentation appears normal. and affect normal/bright     LYMPHATICS: No axillary, cervical, or supraclavicular nodes    DIAGNOSTICS:                                                    EKG: appears normal, NSR, Right Bundle Branch Block, unchanged from previous tracings  Lab Results   Component Value Date    WBC 8.3 04/11/2017     Lab Results   Component Value Date    RBC 3.96 04/11/2017     Lab Results   Component Value Date    HGB 12.6 04/11/2017     Lab Results   Component Value Date    HCT 38.5 04/11/2017     No components found for: MCT  Lab Results   Component Value Date    MCV 97 04/11/2017     Lab Results   Component Value Date    MCH 31.8 04/11/2017     Lab Results   Component Value Date    MCHC 32.7 04/11/2017     Lab Results   Component Value Date    RDW 13.3 04/11/2017     Lab Results   Component Value Date     04/11/2017       Recent Labs   Lab Test  03/13/17   0804  10/10/16   1530  05/16/16   0755   03/17/15   1104   01/07/14   1031   HGB   --   12.7  11.4*   < >   --    < >  12.4   PLT   --   261  218   < >   --    < >  253   NA  141   --   138   < >   --    < >   --    POTASSIUM  4.2   --   3.9   < >   --    < >   --    CR  0.84   --   0.87   < >   --     < >   --    A1C   --    --    --    --   5.6   --   5.4    < > = values in this interval not displayed.        IMPRESSION:                                                    Reason for surgery/procedure: right foot pain  Diagnosis/reason for consult: pre op clearance    The proposed surgical procedure is considered INTERMEDIATE risk.    REVISED CARDIAC RISK INDEX  The patient has the following serious cardiovascular risks for perioperative complications such as (MI, PE, VFib and 3  AV Block):  No serious cardiac risks  INTERPRETATION: 0 risks: Class I (very low risk - 0.4% complication rate)    The patient has the following additional risks for perioperative complications:  No identified additional risks  Positive heterogenous factor v- no past history of DVT, takes ASA 81 mg. Recommend resuming this except holding day of surgery. Also discussed in length symptoms of DVT/PE and preventative measures.     Elevated blood pressure today and at previous visits  Recommend start low dose of HCTZ   Will recheck in 2 weeks. Prior to surgery  BMP at that visit   Also discussed dietary changes     BP Readings from Last 6 Encounters:   04/12/17 151/80   04/11/17 135/86   03/10/17 142/72   10/20/16 125/59   10/20/16 142/77   10/10/16 112/62           ICD-10-CM    1. Preop general physical exam Z01.818 CBC with platelets   2. Right foot pain M79.671    3. Essential hypertension I10 EKG 12-lead complete w/read - Clinics     hydrochlorothiazide 12.5 MG TABS tablet     Albumin Random Urine Quantitative   4. Heterozygous factor V Leiden mutation (H) D68.51    5. Non morbid obesity due to excess calories E66.09    6. CKD (chronic kidney disease) stage 2, GFR 60-89 ml/min N18.2        RECOMMENDATIONS:                                                        --Patient is to take all scheduled medications on the day of surgery EXCEPT for modifications listed below.    Anticoagulant or Antiplatelet Medication Use  ASPIRIN: Patient is at  increased risk of thrombosis (e.g. MI, CVA) and aspirin 81 mg daily should be continued in the perioperative period        Chronic Corticosteroid Use  Stress dose steroids are indicated due to chronic steroid use in last 3 months (e.g. >3 weeks of predisone 20 mg or daily prednisone 5 mg)  Moderate procedure:   Hydrocortisone 50-75 mg IV on day of surgery.  Taper to baseline preoperative dose over the subsequent 1-2 days.      APPROVAL GIVEN to proceed with proposed procedure, without further diagnostic evaluation       Signed Electronically by: Deanna Becerra NP    Copy of this evaluation report is provided to requesting physician.    [facility] Preop Guidelines

## 2017-04-25 NOTE — OR NURSING
Pt given verbal and demo instructions re: crutch training. She did adequate return demo. Will include written instructions on discharge instructions.

## 2017-04-25 NOTE — ANESTHESIA CARE TRANSFER NOTE
Patient: Briana Mcgee    Procedure(s):  Retrocalcaneal exostectomy right - Wound Class: I-Clean    Diagnosis: retrocalcaneal exostosis right heel  Diagnosis Additional Information: No value filed.    Anesthesia Type:   General, ETT, LMA, Peripheral Nerve Block     Note:  Airway :Nasal Cannula  Patient transferred to:PACU        Vitals: (Last set prior to Anesthesia Care Transfer)    CRNA VITALS  4/25/2017 0908 - 4/25/2017 0944      4/25/2017             Resp Rate (observed): (!)  5                Electronically Signed By: BRAXTON Rivera CRNA  April 25, 2017  9:44 AM

## 2017-04-25 NOTE — DISCHARGE INSTRUCTIONS
Same Day Surgery Discharge Instructions  Special Precautions After Surgery - Adult    1. It is not unusual to feel lightheaded or faint, up to 24 hours after surgery or while taking pain medication.  If you have these symptoms; sit for a few minutes before standing and have someone assist you when getting up.  2. You should rest and relax for the next 24 hours and must have someone stay with you for at least 24 hours after your discharge.  3. DO NOT DRIVE any vehicle or operate mechanical equipment for 24 hours following the end of your surgery.  DO NOT DRIVE while taking narcotic pain medications that have been prescribed by your physician.  If you had a limb operated on, you must be able to use it fully to drive.  4. DO NOT drink alcoholic beverages for 24 hours following surgery or while taking prescription pain medication.  5. Drink clear liquids (apple juice, ginger ale, broth, 7-Up, etc.).  Progress to your regular diet as you feel able.  6. Any questions call your physician and do not make important decisions for 24 hours.                                                                             [facility]:  [phone] option 1    Same Day Surgery [phone], Monday thru Friday 6am-9pm.

## 2017-04-25 NOTE — ANESTHESIA PROCEDURE NOTES
Peripheral nerve/Neuraxial procedure note : sciatic, saphenous and popliteal  Pre-Procedure  Performed by  KAREEM ZACARIAS   Location: pre-op    Procedure Times:4/25/2017 7:55 AM and 4/25/2017 8:09 AM  Pre-Anesthestic Checklist: patient identified, IV checked, site marked, risks and benefits discussed, informed consent, monitors and equipment checked, pre-op evaluation, at physician/surgeon's request and post-op pain management    Timeout  Correct Patient: Yes   Correct Procedure: Yes   Correct Site: Yes   Correct Laterality: Yes   Correct Position: Yes   Site Marked: Yes   .   Procedure Documentation    .    Procedure:    Sciatic, saphenous and popliteal.  Local skin infiltrated with mL of 1% lidocaine.       Patient Prep;mask, sterile gloves, chlorhexidine gluconate and isopropyl alcohol, patient draped.  Nerve Stim: Initial Level 1 mA.  Lowest motor response 0.42 mA..  Needle: insulated, short bevel (20 G. 80 mm 4 in). .  Spinal Needle: . . Insertion Method: Single Shot.     Assessment/Narrative  Paresthesias: No.  Injection made incrementally with aspirations every 5 mL..  The placement was negative for: blood aspirated, painful injection and site bleeding.  Bolus given via needle. No blood aspirated via catheter.   Secured via.   Complications: none. Test dose of 5 mL lidocaine 2% w/ 1:200,000 epinephrine at. Test dose negative for signs of intravascular, subdural or intrathecal injection. Comments:  Total volume injected at Sciatic nerve:30    Total volume injected at Saphenous Nerve:10

## 2017-04-25 NOTE — OP NOTE
PREOPERATIVE DIAGNOSIS: 1.  Retrocalcaneal exostosis, right foot.   POSTOPERATIVE DIAGNOSIS: 1. Retrocalcaneal exostosis, right foot.   PROCEDURE: 1. Retrocalcaneal exostectomy, right foot.   PATHOLOGY: Bone, right calcaneus  2. Achilles tendon,right .   ANESTHESIA: General with popliteal block   ESTIMATED BLOOD LOSS: Less than 10 mL   INDICATIONS FOR PROCEDURE: Briana is a 72 year old female with a retrocalcaneal exostosis of the right foot. The patient was consented for a retrocalcaneal exostectomy, right foot.   PROCEDURE IN DETAIL: Under mild sedation, the patient in the operating room and placed on the operating table in the prone position. Pneumatic thigh tourniquet was placed around the patient's right thigh.  The foot was then scrubbed, prepped and draped in the usual aseptic manner. Esmarch bandage was utilized to exsanguinate the patient's right lower extremity, and the pneumatic ankle tourniquet was inflated to 280 PSI.   Following this, an operative time out was performed according to our institution's policy which confirmed the laterality of the procedure. Preoperative antibiotics were administered to the patient prior to arrival to the OR.     The procedure began with a linear longitudinal incision over the posterior aspect of the heel on the right.  The incision was made full thickness down to the achilles tendon with care taken to avoid all vital neurovascular structures.  Any active bleeders were cauterized and ligated as needed.  Further dissection was carried down thru the midline of the achilles tendon.  The intratendinous calcified tissue was ressected in toto and sent to the back table.  The retrocalcaneal spur was ressected away from the posterior calcaneus with a sagittal saw.  All rough edges were smoothed.  The achilles tendon was reattached to the calcaneus utilizing an Arthrex Speed Bridge set with four biocomposit bone anchors with excellent stability noted.  The wound was irrigated  with copious amounts of normal sterile saline. Tourniquet was deflated and prompt hyperemic response was noted to all five digits of the right foot. The central incision of the achilles tendon was coapted with 3-0 vicryl suture and the skin was reapproximated with 3-0 nylon in a simple interrupted technique.  The wound was dressed with an Arthrex Jumpstart bandage, 4 x 4s, cast padding a posterior splint and an Ace wrap. The patient tolerated the anesthesia and procedure well, and was transferred to the PACU with vital signs stable and vascular status intact to the right lower extremity.     SABRINA ERVIN DPM, FACFAS

## 2017-04-25 NOTE — IP AVS SNAPSHOT
MRN:[id number]                      After Visit Summary   4/25/2017    Briana Mcgee    MRN: [id number]           Thank you!     Thank you for choosing [facility] for your care. Our goal is always to provide you with excellent care. Hearing back from our patients is one way we can continue to improve our services. Please take a few minutes to complete the written survey that you may receive in the mail after you visit with us. Thank you!        Patient Information     Date Of Birth          1945        About your hospital stay     You were admitted on:  April 25, 2017 You last received care in the:  [facility] PreOP/Phase II    You were discharged on:  April 25, 2017       Who to Call     For medical emergencies, please call 911.  For non-urgent questions about your medical care, please call your primary care provider or clinic, [phone]  For questions related to your surgery, please call your surgery clinic        Attending Provider     Provider Antwan Posey DPM Podiatry       Primary Care Provider Office Phone # Fax #    Xavier Vega [phone]       [facility] [address] [address]        After Care Instructions      Diet as Tolerated       Return to diet before surgery, unless instructed otherwise.            Discharge Instructions       Review outpatient procedure discharge instructions with patient as directed by Provider            Ice to affected area       Ice pack to surgical site every 15 minutes per hour for 24 hours            No Alcohol       For 24 hours post procedure            No Dressing Change       No dressing change until follow up appointment.            No driving or operating machinery        until the day after procedure            No weight bearing           Return to clinic       Return to clinic in one week            Shower        Cover dressing if dressing is not going to be  changed today                  Your next 10 appointments already scheduled     May 01, 2017  9:00 AM CDT   Return Visit with Antwan Goldstein DPM   [facility] ([facility])    [address]  [address]  [facility] [facility] [place] [address]   [phone]            May 08, 2017  9:00 AM CDT   Return Visit with Antwan Goldstein DPM   [facility] ([facility])    [address]  [address]  [place] [facility] [place] [place] [address]   [phone]            Oct 19, 2017 10:00 AM CDT   LAB with [facility] ([facility])    [address] [address]   [phone]           Patient must bring picture ID.  Patient should be prepared to give a urine specimen  Please do not eat 10-12 hours before your appointment if you are coming in fasting for labs on lipids, cholesterol, or glucose (sugar).  Pregnant women should follow their Care Team instructions. Water with medications is okay. Do not drink coffee or other fluids.   If you have concerns about taking  your medications, please ask at office or if scheduling via [employer], send a message by clicking on Secure Messaging, Message Your Care Team.            Oct 19, 2017 10:30 AM CDT   MA SCREENING DIGITAL BILATERAL with [address] [facility] ([facility])    [address] [address]   [phone]           Do not use any powder, lotion or deodorant under your arms or on your breast. If you do, we will ask you to remove it before your exam.  Wear comfortable, two-piece clothing.  If you have any allergies, tell your care team.  Bring any previous mammograms from other facilities or have them mailed to the breast center.            Oct 23, 2017  9:30 AM CDT   Return Visit with Merari Duarte MD   [facility] ([facility])    [facility]  [address]  "[address] [place] [address]   [phone]              Further instructions from your care team                         Same Day Surgery Discharge Instructions  Special Precautions After Surgery - Adult    1. It is not unusual to feel lightheaded or faint, up to 24 hours after surgery or while taking pain medication.  If you have these symptoms; sit for a few minutes before standing and have someone assist you when getting up.  2. You should rest and relax for the next 24 hours and must have someone stay with you for at least 24 hours after your discharge.  3. DO NOT DRIVE any vehicle or operate mechanical equipment for 24 hours following the end of your surgery.  DO NOT DRIVE while taking narcotic pain medications that have been prescribed by your physician.  If you had a limb operated on, you must be able to use it fully to drive.  4. DO NOT drink alcoholic beverages for 24 hours following surgery or while taking prescription pain medication.  5. Drink clear liquids (apple juice, ginger ale, broth, 7-Up, etc.).  Progress to your regular diet as you feel able.  6. Any questions call your physician and do not make important decisions for 24 hours.                                                                             [facility]:  [phone] option 1    Same Day Surgery [phone], Monday thru Friday 6am-9pm.        Pending Results     No orders found from 4/23/2017 to 4/26/2017.            Admission Information     Date & Time Provider Department Dept. Phone    4/25/2017 Antwan Goldstein, PETTY [facility] Pre[facility]/Phase [phone]      Your Vitals Were     Blood Pressure Temperature Respirations Height Weight Pulse Oximetry    168/87 97.5  F (36.4  C) (Oral) 16 1.676 m (5' 6\") 93.4 kg (206 lb) 95%    BMI (Body Mass Index)                   33.25 kg/m2           MyChart Information     [employer] gives you secure access to your electronic health record. If you see a primary care " provider, you can also send messages to your care team and make appointments. If you have questions, please call your primary care clinic.  If you do not have a primary care provider, please call [phone] and they will assist you.        Care EveryWhere ID     This is your Care EveryWhere ID. This could be used by other organizations to access your [facility] medical records  [id number]           Review of your medicines      START taking        Dose / Directions    HYDROmorphone 2 MG tablet   Commonly known as:  DILAUDID   Used for:  Heel spur, right   Notes to Patient:  Start when needed.        Dose:  2 mg   Take 1 tablet (2 mg) by mouth every 4 hours as needed for pain maximum 6 tablet(s) per day   Quantity:  30 tablet   Refills:  0       hydrOXYzine 10 MG tablet   Commonly known as:  ATARAX   Used for:  Heel spur, right   Notes to Patient:  Start when needed.        Dose:  10 mg   Take 1 tablet (10 mg) by mouth every 6 hours as needed for itching (and nausea)   Quantity:  30 tablet   Refills:  0       order for DME   Used for:  Heel spur, right        Knee Walker Length of use: Three months   Quantity:  1 Units   Refills:  0       senna-docusate 8.6-50 MG per tablet   Commonly known as:  SENOKOT-S;PERICOLACE   Used for:  Heel spur, right        Dose:  1-2 tablet   Take 1-2 tablets by mouth 2 times daily Take while on oral narcotics to prevent or treat constipation.   Quantity:  30 tablet   Refills:  0         CONTINUE these medicines which have NOT CHANGED        Dose / Directions    ALEVE PO        Dose:  2 tablet   Take 2 tablets by mouth 2 times daily (with meals)   Refills:  0       aspirin 81 MG EC tablet   Used for:  Heterozygous factor V Leiden mutation (H), Personal history of other diseases of circulatory system        Dose:  81 mg   Take 1 tablet (81 mg) by mouth daily   Quantity:  90 tablet   Refills:  3       calcium + D 600-200 MG-UNIT Tabs   Generic drug:  calcium carbonate-vitamin D        1  TABLET DAILY twice daily   Refills:  0       CO Q 10 PO        Take  by mouth.   Refills:  0       fluticasone 50 MCG/ACT spray   Commonly known as:  FLONASE   Used for:  Other seasonal allergic rhinitis        Dose:  2 spray   Spray 2 sprays into both nostrils daily Hold on file until needed   Quantity:  16 g   Refills:  11       folic acid 20 MG Caps        Refills:  0       hydrochlorothiazide 12.5 MG Tabs tablet   Used for:  Essential hypertension        Dose:  12.5 mg   Take 1 tablet (12.5 mg) by mouth daily   Quantity:  30 tablet   Refills:  1       levothyroxine 150 MCG tablet   Commonly known as:  SYNTHROID/LEVOTHROID   Used for:  Hypothyroidism, unspecified type        Dose:  150 mcg   Take 1 tablet (150 mcg) by mouth daily BRAND NAME ONLY.  Hold on file until needed.   Quantity:  90 tablet   Refills:  3       magnesium hydroxide 400 MG/5ML suspension   Commonly known as:  MILK OF MAGNESIA        Dose:  400 mg/kg/day   Take 400 mg/kg/day by mouth daily as needed for constipation or heartburn   Refills:  0       MULTIPLE VITAMIN PO        1 daily   Refills:  0       pantoprazole 20 MG EC tablet   Commonly known as:  PROTONIX   Used for:  Gastroesophageal reflux disease without esophagitis        Dose:  40 mg   Take 2 tablets (40 mg) by mouth daily Hold on file until needed   Quantity:  180 tablet   Refills:  3       predniSONE 1 MG tablet   Commonly known as:  DELTASONE   Used for:  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)        4 mg daily for 1 week, taper by 1 mg weekly   Quantity:  70 tablet   Refills:  0       pyridoxine 100 MG tablet   Commonly known as:  VITAMIN B-6        Dose:  100 mg   Take 100 mg by mouth daily.   Refills:  0       simvastatin 20 MG tablet   Commonly known as:  ZOCOR   Used for:  Hyperlipidemia LDL goal <100        Dose:  20 mg   Take 1 tablet (20 mg) by mouth At Bedtime Hold on file until needed   Quantity:  90 tablet   Refills:  3        tolterodine 4 MG 24 hr capsule   Commonly known as:  DETROL LA   Used for:  Urinary urgency        Dose:  4 mg   Take 1 capsule (4 mg) by mouth daily   Quantity:  30 capsule   Refills:  11       traZODone 100 MG tablet   Commonly known as:  DESYREL   Used for:  Insomnia, unspecified        1 tablets at bedtime as needed.  Hold on file until needed   Quantity:  90 tablet   Refills:  3            Where to get your medicines      These medications were sent to [facility] [place], [place] - [address]  [address] [facility] [address]     Phone:  [phone]     hydrOXYzine 10 MG tablet         Some of these will need a paper prescription and others can be bought over the counter. Ask your nurse if you have questions.     Bring a paper prescription for each of these medications     HYDROmorphone 2 MG tablet    order for DME    senna-docusate 8.6-50 MG per tablet                Protect others around you: Learn how to safely use, store and throw away your medicines at www.disposemymeds.org.             Medication List: This is a list of all your medications and when to take them. Check marks below indicate your daily home schedule. Keep this list as a reference.      Medications           Morning Afternoon Evening Bedtime As Needed    ALEVE PO   Take 2 tablets by mouth 2 times daily (with meals)                                aspirin 81 MG EC tablet   Take 1 tablet (81 mg) by mouth daily                                calcium + D 600-200 MG-UNIT Tabs   1 TABLET DAILY twice daily   Generic drug:  calcium carbonate-vitamin D                                CO Q 10 PO   Take  by mouth.                                fluticasone 50 MCG/ACT spray   Commonly known as:  FLONASE   Spray 2 sprays into both nostrils daily Hold on file until needed                                folic acid 20 MG Caps                                hydrochlorothiazide 12.5 MG Tabs tablet   Take 1 tablet (12.5 mg) by mouth  daily                                HYDROmorphone 2 MG tablet   Commonly known as:  DILAUDID   Take 1 tablet (2 mg) by mouth every 4 hours as needed for pain maximum 6 tablet(s) per day   Notes to Patient:  Start when needed.                                hydrOXYzine 10 MG tablet   Commonly known as:  ATARAX   Take 1 tablet (10 mg) by mouth every 6 hours as needed for itching (and nausea)   Notes to Patient:  Start when needed.                                levothyroxine 150 MCG tablet   Commonly known as:  SYNTHROID/LEVOTHROID   Take 1 tablet (150 mcg) by mouth daily BRAND NAME ONLY.  Hold on file until needed.                                magnesium hydroxide 400 MG/5ML suspension   Commonly known as:  MILK OF MAGNESIA   Take 400 mg/kg/day by mouth daily as needed for constipation or heartburn                                MULTIPLE VITAMIN PO   1 daily                                order for DME   Knee Walker Length of use: Three months                                pantoprazole 20 MG EC tablet   Commonly known as:  PROTONIX   Take 2 tablets (40 mg) by mouth daily Hold on file until needed                                predniSONE 1 MG tablet   Commonly known as:  DELTASONE   4 mg daily for 1 week, taper by 1 mg weekly                                pyridoxine 100 MG tablet   Commonly known as:  VITAMIN B-6   Take 100 mg by mouth daily.                                senna-docusate 8.6-50 MG per tablet   Commonly known as:  SENOKOT-S;PERICOLACE   Take 1-2 tablets by mouth 2 times daily Take while on oral narcotics to prevent or treat constipation.                                simvastatin 20 MG tablet   Commonly known as:  ZOCOR   Take 1 tablet (20 mg) by mouth At Bedtime Hold on file until needed                                tolterodine 4 MG 24 hr capsule   Commonly known as:  DETROL LA   Take 1 capsule (4 mg) by mouth daily                                traZODone 100 MG tablet   Commonly known as:   DESYREL   1 tablets at bedtime as needed.  Hold on file until needed                                          More Information        Discharge Instructions: Using Crutches (Non-Weight-Bearing)  Your health care provider has prescribed crutches for you. A healthy leg can support your body weight, but when you have an injured leg or foot, you need to keep weight off it. The  swing to  method of walking , sometimes called gait, is easy to learn and takes less arm strength and balance. The  swing through  gait takes more practice, but it moves you farther with each step and is less tiring overall. Start with  swing to  and progress to  swing through  when instructed.      Before You Use Crutches    Remove throw rugs, electrical cords, and anything else that may cause you to fall.    Arrange your household to keep the items you need handy. Keep everything else out of the way.    Find a backpack, izabel pack, or apron, or use pockets to carry things. This will help you keep your hands free.  Standing with Crutches  Use the balanced standing (tripod) position when you start or end a movement. Also use it whenever you re standing for a length of time.    Move your crutches in front of you about 12 inches.    Hold the injured (or weaker) foot off the floor.    Find your balance.    Be sure not to rest your armpits on the pads of the crutches.    Hold the injured (weaker) foot off the floor.  Walking with Crutches    Start in a balanced standing (tripod) position.    Squeeze the crutch pads against the sides of your chest.    The bottom tips of the crutches should be wide enough apart for you to move easily between them.    Support your weight on your hands, not on your armpits.    Swing to    With your crutches in front of you, press down on the handgrips.    Lift your good (stronger) foot and swing your body up to the crutches.    Land on your good foot, between your crutches.    Keep the knee of your injured or weaker foot  slightly bent.    Reach forward and out with the crutches to begin the next step.    Swing through    With your crutches in front of you, press down on the handgrips.    Lift your good (stronger) foot and swing your body through the crutches.    Land on your good foot, about 12 inches in front of the crutches.    Keep the knee of your injured leg slightly bent.    Reach forward and out with the crutches to begin the next step.  Follow-Up  Make a follow-up appointment as directed by our staff.     When to Call Your Health Care Provider  Call your health care provider right away if you have any of the following:    Sudden or increased shortness of breath    Sudden chest pain    Fever above 100.4 F (38.0 C)    Increasing redness, tenderness, or swelling at the incision site or in the injured limb    Drainage from the incision or injured limb    Opening of the incision or injury    Localized chest pain with coughing     8507-0855 [employer]. [address] [place] [address]. All rights reserved. This information is not intended as a substitute for professional medical care. Always follow your healthcare professional's instructions.

## 2017-04-27 LAB — COPATH REPORT: NORMAL

## 2017-05-01 ENCOUNTER — RADIANT APPOINTMENT (OUTPATIENT)
Dept: GENERAL RADIOLOGY | Facility: CLINIC | Age: 72
End: 2017-05-01
Attending: PODIATRIST
Payer: COMMERCIAL

## 2017-05-01 ENCOUNTER — OFFICE VISIT (OUTPATIENT)
Dept: PODIATRY | Facility: CLINIC | Age: 72
End: 2017-05-01
Payer: COMMERCIAL

## 2017-05-01 VITALS — BODY MASS INDEX: 34.72 KG/M2 | HEIGHT: 66 IN | WEIGHT: 216 LBS

## 2017-05-01 DIAGNOSIS — Z98.890 S/P FOOT SURGERY, RIGHT: ICD-10-CM

## 2017-05-01 DIAGNOSIS — Z98.890 S/P FOOT SURGERY, RIGHT: Primary | ICD-10-CM

## 2017-05-01 PROCEDURE — 73650 X-RAY EXAM OF HEEL: CPT | Mod: RT

## 2017-05-01 PROCEDURE — 99024 POSTOP FOLLOW-UP VISIT: CPT | Performed by: PODIATRIST

## 2017-05-01 NOTE — PROGRESS NOTES
"Briana presents to the office s/p one week retrocalcaneal exostectomyof the right foot .  The patient relates keeping the bandages clean, dry and intact.  The patient relates good compliance with postoperative instructions.  The patient denies any severe pain, fevers, chills, nausea or vomiting.      Ht 1.676 m (5' 6\")  Wt 98 kg (216 lb)  BMI 34.86 kg/m2  Weight management plan: Patient was referred to their PCP to discuss a diet and exercise plan.    Physical Exam:    General: The patient appears to be in no distress and in good spirits.  The bandages appear clean, dry and intact with no strikethrough noted. Vascular exam: Neurovascular status unchanged with < 3 sec capillary refill to all digits.  Positive pedal pulses and epicritic sensations intact with no evidence of allodynia.  One notes moderate edema to the forefoot on the right.  Sutures are intact and the skin is well coapted with no erythema noted.      Radiograph:  AP, lateral and medial oblique evaluation of right foot reveals surgical correction of posterior heel.     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    Plan:  Sutures remain intact.  A sterile dressing was reapplied.  The patient was instructed to continue non-weightbearing to the right foot.  The patient is to keep the dressings clean, dry and intact and to continue with elevation of the right foot.  The patient will return to the office in one week for suture removal.    Disclaimer: This note consists of symbols derived from keyboarding, dictation and/or voice recognition software. As a result, there may be errors in the script that have gone undetected. Please consider this when interpreting information found in this chart.       LUCAS Goldstein D.P.M., [email or web address].    "

## 2017-05-01 NOTE — MR AVS SNAPSHOT
After Visit Summary   5/1/2017    Briana Mcgee    MRN: [id number]           Patient Information     Date Of Birth          1945        Visit Information        Provider Department      5/1/2017 9:00 AM Antwan Goldstein DPM [facility] [place]        Today's Diagnoses     S/P foot surgery, right    -  1      Care Instructions    Postoperative instructions    1.  Keep dressings clean, dry and intact; reinforce if any blood comes through.  2.  Keep the foot elevated above your heart level.  3.  Ice the foot or behind the knee 20 min per hour.    Pain management:  1.  Keep the foot elevated and cool  2.  Take the pain medication as directed; do not hold off on taking too long.  3.  If the pain is still high, unwrap the ace wrap and put back on looser.  4.  If this does not work, take Ibuprofen 600 mg TID.  5.  If this does not work, call the nurse line for additional help.          Follow-ups after your visit        Follow-up notes from your care team     Return in about 7 days (around 5/8/2017).      Your next 10 appointments already scheduled     May 08, 2017  9:00 AM CDT   Return Visit with Antwan Goldstein DPM   [facility] ([facility])    [address]  [address]  [place] [address]   [phone]            Oct 19, 2017 10:00 AM CDT   LAB with [facility] ([facility])    [address] [address]   [phone]           Patient must bring picture ID.  Patient should be prepared to give a urine specimen  Please do not eat 10-12 hours before your appointment if you are coming in fasting for labs on lipids, cholesterol, or glucose (sugar).  Pregnant women should follow their Care Team instructions. Water with medications is okay. Do not drink coffee or other fluids.   If you have concerns about taking  your medications, please ask at office or if  scheduling via GruvIt, send a message by clicking on Secure Messaging, Message Your Care Team.            Oct 19, 2017 10:30 AM CDT   MA SCREENING DIGITAL BILATERAL with [email or web address]   [facility] ([facility])    [address]  [facility] [address]   [phone]           Do not use any powder, lotion or deodorant under your arms or on your breast. If you do, we will ask you to remove it before your exam.  Wear comfortable, two-piece clothing.  If you have any allergies, tell your care team.  Bring any previous mammograms from other facilities or have them mailed to the breast center.            Oct 23, 2017  9:30 AM CDT   Return Visit with Merari Duarte MD   [facility] ([facility])    [facility]  [address]  [facility] [address]   [phone]              Who to contact     If you have questions or need follow up information about today's clinic visit or your schedule please contact [facility] directly at [phone].  Normal or non-critical lab and imaging results will be communicated to you by e-[employer]hart, letter or phone within 4 business days after the clinic has received the results. If you do not hear from us within 7 days, please contact the clinic through [email or web address] or phone. If you have a critical or abnormal lab result, we will notify you by phone as soon as possible.  Submit refill requests through GruvIt or call your pharmacy and they will forward the refill request to us. Please allow 3 business days for your refill to be completed.          Additional Information About Your Visit        GruvIt Information     Gru[employer]It gives you secure access to your electronic health record. If you see a primary care provider, you can also send messages to your care team and make appointments. If you have questions, please call your primary care clinic.  If you do not have a primary care provider,  "please call [phone] and they will assist you.        Care EveryWhere ID     This is your Care EveryWhere ID. This could be used by other organizations to access your [place] medical records  [id number]        Your Vitals Were     Height BMI (Body Mass Index)                1.676 m (5' 6\") 34.86 kg/m2           Blood Pressure from Last 3 Encounters:   04/25/17 131/59   04/24/17 128/70   04/12/17 151/80    Weight from Last 3 Encounters:   05/01/17 98 kg (216 lb)   04/25/17 93.4 kg (206 lb)   04/12/17 98 kg (216 lb)               Primary Care Provider Office Phone # Fax #    Xavier Vega [phone]       [facility] [address] [address]        Thank you!     Thank you for choosing [facility]  for your care. Our goal is always to provide you with excellent care. Hearing back from our patients is one way we can continue to improve our services. Please take a few minutes to complete the written survey that you may receive in the mail after your visit with us. Thank you!             Your Updated Medication List - Protect others around you: Learn how to safely use, store and throw away your medicines at www.disposemymeds.org.          This list is accurate as of: 5/1/17  9:49 AM.  Always use your most recent med list.                   Brand Name Dispense Instructions for use    ALEVE PO      Take 2 tablets by mouth 2 times daily (with meals)       aspirin 81 MG EC tablet     90 tablet    Take 1 tablet (81 mg) by mouth daily       calcium + D 600-200 MG-UNIT Tabs   Generic drug:  calcium carbonate-vitamin D      1 TABLET DAILY twice daily       CO Q 10 PO      Take  by mouth.       fluticasone 50 MCG/ACT spray    FLONASE    16 g    Spray 2 sprays into both nostrils daily Hold on file until needed       folic acid 20 MG Caps          hydrochlorothiazide 12.5 MG Tabs tablet     30 tablet    Take 1 tablet (12.5 mg) by mouth daily    "    HYDROmorphone 2 MG tablet    DILAUDID    30 tablet    Take 1 tablet (2 mg) by mouth every 4 hours as needed for pain maximum 6 tablet(s) per day       hydrOXYzine 10 MG tablet    ATARAX    30 tablet    Take 1 tablet (10 mg) by mouth every 6 hours as needed for itching (and nausea)       levothyroxine 150 MCG tablet    SYNTHROID/LEVOTHROID    90 tablet    Take 1 tablet (150 mcg) by mouth daily BRAND NAME ONLY.  Hold on file until needed.       magnesium hydroxide 400 MG/5ML suspension    MILK OF MAGNESIA     Take 400 mg/kg/day by mouth daily as needed for constipation or heartburn       MULTIPLE VITAMIN PO      1 daily       order for DME     1 Units    Knee Walker Length of use: Three months       pantoprazole 20 MG EC tablet    PROTONIX    180 tablet    Take 2 tablets (40 mg) by mouth daily Hold on file until needed       predniSONE 1 MG tablet    DELTASONE    70 tablet    4 mg daily for 1 week, taper by 1 mg weekly       pyridoxine 100 MG tablet    VITAMIN B-6     Take 100 mg by mouth daily.       senna-docusate 8.6-50 MG per tablet    SENOKOT-S;PERICOLACE    30 tablet    Take 1-2 tablets by mouth 2 times daily Take while on oral narcotics to prevent or treat constipation.       simvastatin 20 MG tablet    ZOCOR    90 tablet    Take 1 tablet (20 mg) by mouth At Bedtime Hold on file until needed       tolterodine 4 MG 24 hr capsule    DETROL LA    30 capsule    Take 1 capsule (4 mg) by mouth daily       traZODone 100 MG tablet    DESYREL    90 tablet    1 tablets at bedtime as needed.  Hold on file until needed

## 2017-05-01 NOTE — LETTER
"  5/1/2017       RE: Briana Mcgee  [address] [address] [address]           Dear Colleague,    Thank you for referring your patient, Briana Mcgee, to the [facility]. Please see a copy of my visit note below.    Briana presents to the office s/p one week retrocalcaneal exostectomyof the right foot .  The patient relates keeping the bandages clean, dry and intact.  The patient relates good compliance with postoperative instructions.  The patient denies any severe pain, fevers, chills, nausea or vomiting.      Ht 1.676 m (5' 6\")  Wt 98 kg (216 lb)  BMI 34.86 kg/m2  Weight management plan: Patient was referred to their PCP to discuss a diet and exercise plan.    Physical Exam:    General: The patient appears to be in no distress and in good spirits.  The bandages appear clean, dry and intact with no strikethrough noted. Vascular exam: Neurovascular status unchanged with < 3 sec capillary refill to all digits.  Positive pedal pulses and epicritic sensations intact with no evidence of allodynia.  One notes moderate edema to the forefoot on the right.  Sutures are intact and the skin is well coapted with no erythema noted.      Radiograph:  AP, lateral and medial oblique evaluation of right foot reveals surgical correction of posterior heel.     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    Plan:  Sutures remain intact.  A sterile dressing was reapplied.  The patient was instructed to continue non-weightbearing to the right foot.  The patient is to keep the dressings clean, dry and intact and to continue with elevation of the right foot.  The patient will return to the office in one week for suture removal.    Disclaimer: This note consists of symbols derived from keyboarding, dictation and/or voice recognition software. As a result, there may be errors in the script that have gone undetected. " Please consider this when interpreting information found in this chart.       LUCAS Goldstein D.P.M., F.YASMIN.C.F.A.S.      Again, thank you for allowing me to participate in the care of your patient.        Sincerely,              Antwan Goldstein DPM

## 2017-05-08 ENCOUNTER — OFFICE VISIT (OUTPATIENT)
Dept: PODIATRY | Facility: CLINIC | Age: 72
End: 2017-05-08
Payer: COMMERCIAL

## 2017-05-08 VITALS — WEIGHT: 216 LBS | BODY MASS INDEX: 34.72 KG/M2 | HEIGHT: 66 IN

## 2017-05-08 DIAGNOSIS — Z98.890 S/P FOOT SURGERY, RIGHT: Primary | ICD-10-CM

## 2017-05-08 PROCEDURE — 99024 POSTOP FOLLOW-UP VISIT: CPT | Performed by: PODIATRIST

## 2017-05-08 NOTE — PATIENT INSTRUCTIONS
Postoperative instructions    1.  Keep dressings clean, dry and intact; reinforce if any blood comes through.  2.  Keep the foot elevated above your heart level.  3.  Ice the foot or behind the knee 20 min per hour.    Pain management:  1.  Keep the foot elevated and cool  2.  Take the pain medication as directed; do not hold off on taking too long.  3.  If the pain is still high, unwrap the ace wrap and put back on looser.  4.  If this does not work, take Ibuprofen 600 mg TID.  5.  If this does not work, call the nurse line for additional help.

## 2017-05-08 NOTE — LETTER
"  5/8/2017       RE: Briana Mcgee  [address] [address]           Dear Colleague,    Thank you for referring your patient, Briana Mcgee, to the [facility]. Please see a copy of my visit note below.    Briana presents to the office s/p one week retrocalcaneal exostectomyof the right foot .  The patient relates keeping the bandages clean, dry and intact.  The patient relates good compliance with postoperative instructions.  The patient denies any severe pain, fevers, chills, nausea or vomiting.      Ht 1.676 m (5' 6\")  Wt 98 kg (216 lb)  BMI 34.86 kg/m2  Weight management plan: Patient was referred to their PCP to discuss a diet and exercise plan.    Physical Exam:    General: The patient appears to be in no distress and in good spirits.  The bandages appear clean, dry and intact with no strikethrough noted. Vascular exam: Neurovascular status unchanged with < 3 sec capillary refill to all digits.  Positive pedal pulses and epicritic sensations intact with no evidence of allodynia.  One notes moderate edema to the forefoot on the right.  Sutures are intact and the skin is well coapted with no erythema noted.      Radiograph:  AP, lateral and medial oblique evaluation of right foot reveals surgical correction of posterior heel.     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    Plan:  Sutures remain intact.  A sterile dressing was reapplied.  The patient was instructed to continue non-weightbearing to the right foot.  The patient is to keep the dressings clean, dry and intact and to continue with elevation of the right foot.  The patient will return to the office in one week for suture removal.    Disclaimer: This note consists of symbols derived from keyboarding, dictation and/or voice recognition software. As a result, there may be errors in the script that have gone undetected. " Please consider this when interpreting information found in this chart.       LUCAS Goldstein D.P.M., F.YASMIN.C.F.A.S.    Again, thank you for allowing me to participate in the care of your patient.        Sincerely,              Antwan Goldstein DPM

## 2017-05-08 NOTE — MR AVS SNAPSHOT
After Visit Summary   5/8/2017    Briana Mcgee    MRN: [id number]           Patient Information     Date Of Birth          1945        Visit Information        Provider Department      5/8/2017 9:00 AM Antwan Goldstein DPM [facility] [place]        Today's Diagnoses     S/P foot surgery, right    -  1      Care Instructions    Postoperative instructions    1.  Keep dressings clean, dry and intact; reinforce if any blood comes through.  2.  Keep the foot elevated above your heart level.  3.  Ice the foot or behind the knee 20 min per hour.    Pain management:  1.  Keep the foot elevated and cool  2.  Take the pain medication as directed; do not hold off on taking too long.  3.  If the pain is still high, unwrap the ace wrap and put back on looser.  4.  If this does not work, take Ibuprofen 600 mg TID.  5.  If this does not work, call the nurse line for additional help.            Follow-ups after your visit        Follow-up notes from your care team     Return in about 7 days (around 5/15/2017).      Your next 10 appointments already scheduled     Oct 19, 2017 10:00 AM CDT   LAB with [facility] ([facility])    [address] [address] [phone]           Patient must bring picture ID.  Patient should be prepared to give a urine specimen  Please do not eat 10-12 hours before your appointment if you are coming in fasting for labs on lipids, cholesterol, or glucose (sugar).  Pregnant women should follow their Care Team instructions. Water with medications is okay. Do not drink coffee or other fluids.   If you have concerns about taking  your medications, please ask at office or if scheduling via [employer], send a message by clicking on Secure Messaging, Message Your Care Team.            Oct 19, 2017 10:30 AM CDT   MA SCREENING DIGITAL BILATERAL with [address]  [facility])    [address]  [facility] [address]   [phone]           Do not use any powder, lotion or deodorant under your arms or on your breast. If you do, we will ask you to remove it before your exam.  Wear comfortable, two-piece clothing.  If you have any allergies, tell your care team.  Bring any previous mammograms from other facilities or have them mailed to the breast center.            Oct 23, 2017  9:30 AM CDT   Return Visit with Merari Duarte MD   [facility] ([facility])    [facility]  [address]  [facility] [address]   [phone]              Who to contact     If you have questions or need follow up information about today's clinic visit or your schedule please contact [place] [facility] [place] directly at [phone].  Normal or non-critical lab and imaging results will be communicated to you by MyChart, letter or phone within 4 business days after the clinic has received the results. If you do not hear from us within 7 days, please contact the clinic through "[employer]hart or phone. If you have a critical or abnormal lab result, we will notify you by phone as soon as possible.  Submit refill requests through Picture[employer] or call your pharmacy and they will forward the refill request to us. Please allow 3 business days for your refill to be completed.          Additional Information About Your Visit        "[employer]olo Information     Picture[employer] gives you secure access to your electronic health record. If you see a primary care provider, you can also send messages to your care team and make appointments. If you have questions, please call your primary care clinic.  If you do not have a primary care provider, please call [phone] and they will assist you.        Care EveryWhere ID     This is your Care EveryWhere ID. This could be used by other organizations to access your [place] medical records  [id number]        Your  "Vitals Were     Height BMI (Body Mass Index)                1.676 m (5' 6\") 34.86 kg/m2           Blood Pressure from Last 3 Encounters:   04/25/17 131/59   04/24/17 128/70   04/12/17 151/80    Weight from Last 3 Encounters:   05/08/17 98 kg (216 lb)   05/01/17 98 kg (216 lb)   04/25/17 93.4 kg (206 lb)              Today, you had the following     No orders found for display       Primary Care Provider Office Phone # Fax #    Xavier Vega [phone]       [facility] [address] [place] [address]        Thank you!     Thank you for choosing [place] [facility]  for your care. Our goal is always to provide you with excellent care. Hearing back from our patients is one way we can continue to improve our services. Please take a few minutes to complete the written survey that you may receive in the mail after your visit with us. Thank you!             Your Updated Medication List - Protect others around you: Learn how to safely use, store and throw away your medicines at www.disposemymeds.org.          This list is accurate as of: 5/8/17  9:21 AM.  Always use your most recent med list.                   Brand Name Dispense Instructions for use    ALEVE PO      Take 2 tablets by mouth 2 times daily (with meals)       aspirin 81 MG EC tablet     90 tablet    Take 1 tablet (81 mg) by mouth daily       calcium + D 600-200 MG-UNIT Tabs   Generic drug:  calcium carbonate-vitamin D      1 TABLET DAILY twice daily       CO Q 10 PO      Take  by mouth.       fluticasone 50 MCG/ACT spray    FLONASE    16 g    Spray 2 sprays into both nostrils daily Hold on file until needed       folic acid 20 MG Caps          hydrochlorothiazide 12.5 MG Tabs tablet     30 tablet    Take 1 tablet (12.5 mg) by mouth daily       HYDROmorphone 2 MG tablet    DILAUDID    30 tablet    Take 1 tablet (2 mg) by mouth every 4 hours as needed for pain maximum 6 tablet(s) per day       " hydrOXYzine 10 MG tablet    ATARAX    30 tablet    Take 1 tablet (10 mg) by mouth every 6 hours as needed for itching (and nausea)       levothyroxine 150 MCG tablet    SYNTHROID/LEVOTHROID    90 tablet    Take 1 tablet (150 mcg) by mouth daily BRAND NAME ONLY.  Hold on file until needed.       magnesium hydroxide 400 MG/5ML suspension    MILK OF MAGNESIA     Take 400 mg/kg/day by mouth daily as needed for constipation or heartburn       MULTIPLE VITAMIN PO      1 daily       order for DME     1 Units    Knee Walker Length of use: Three months       pantoprazole 20 MG EC tablet    PROTONIX    180 tablet    Take 2 tablets (40 mg) by mouth daily Hold on file until needed       predniSONE 1 MG tablet    DELTASONE    70 tablet    4 mg daily for 1 week, taper by 1 mg weekly       pyridoxine 100 MG tablet    VITAMIN B-6     Take 100 mg by mouth daily.       senna-docusate 8.6-50 MG per tablet    SENOKOT-S;PERICOLACE    30 tablet    Take 1-2 tablets by mouth 2 times daily Take while on oral narcotics to prevent or treat constipation.       simvastatin 20 MG tablet    ZOCOR    90 tablet    Take 1 tablet (20 mg) by mouth At Bedtime Hold on file until needed       tolterodine 4 MG 24 hr capsule    DETROL LA    30 capsule    Take 1 capsule (4 mg) by mouth daily       traZODone 100 MG tablet    DESYREL    90 tablet    1 tablets at bedtime as needed.  Hold on file until needed

## 2017-05-15 ENCOUNTER — OFFICE VISIT (OUTPATIENT)
Dept: PODIATRY | Facility: CLINIC | Age: 72
End: 2017-05-15
Payer: COMMERCIAL

## 2017-05-15 VITALS — BODY MASS INDEX: 34.72 KG/M2 | WEIGHT: 216 LBS | HEIGHT: 66 IN

## 2017-05-15 DIAGNOSIS — Z98.890 S/P FOOT SURGERY, RIGHT: Primary | ICD-10-CM

## 2017-05-15 PROCEDURE — 99024 POSTOP FOLLOW-UP VISIT: CPT | Performed by: PODIATRIST

## 2017-05-15 NOTE — LETTER
"  5/15/2017       RE: Briana Mcgee  [address] [address]           Dear Colleague,    Thank you for referring your patient, Briana Mcgee, to the [place] [facility]. Please see a copy of my visit note below.    Briana presents to the office s/p three weeks retrocalcaneal exostectomyof the right foot .  The patient relates keeping the bandages clean, dry and intact.  The patient relates good compliance with postoperative instructions.  The patient denies any severe pain, fevers, chills, nausea or vomiting.      Ht 1.676 m (5' 6\")  Wt 98 kg (216 lb)  BMI 34.86 kg/m2  Weight management plan: Patient was referred to their PCP to discuss a diet and exercise plan.    Physical Exam:    General: The patient appears to be in no distress and in good spirits.  The bandages appear clean, dry and intact with no strikethrough noted. Vascular exam: Neurovascular status unchanged with < 3 sec capillary refill to all digits.  Positive pedal pulses and epicritic sensations intact with no evidence of allodynia.  One notes moderate edema to the forefoot on the right.  Sutures are intact and the skin is well coapted with no erythema noted.           Assessment: Retrocalcaneal exostosis status post two weeks retrocalcaneal exostectomy of the right foot.    Plan:  Sutures was removed and steristrips applied.  A sterile dressing was reapplied.  The patient was instructed to continue non-weightbearing to the right foot.  The patient is to keep the dressings clean, dry and intact for three days.  The patient will return in one month for reevaluation and repeat x-rays.    Disclaimer: This note consists of symbols derived from keyboarding, dictation and/or voice recognition software. As a result, there may be errors in the script that have gone undetected. Please consider this when interpreting information found in this chart.       LUCAS Goldstein D.P.M., " [email or web address].    Again, thank you for allowing me to participate in the care of your patient.        Sincerely,              Antwan Goldstein DPM

## 2017-05-15 NOTE — MR AVS SNAPSHOT
After Visit Summary   5/15/2017    Briana Mcgee    MRN: [id number]           Patient Information     Date Of Birth          1945        Visit Information        Provider Department      5/15/2017 1:00 PM Antwan Goldstein DPM [facility]        Care Instructions    Your sutures were removed today. Steri strips were applied.  You may now take a shower but do not soak your foot for the next 3 days. The steri strips will fall off by themselves. Please do not pull them off, trim edges as needed.    After 3 days soak your foot in warm water with Epsom's Salt  For 20 minutes 1-2 times per day.     Remain non-weightbearing unless instructed otherwise     Start simple range of motion exercises      Return in 1 month              Follow-ups after your visit        Your next 10 appointments already scheduled     Oct 19, 2017 10:00 AM CDT   LAB with [facility] ([facility])    [address] [address]   [phone]           Patient must bring picture ID.  Patient should be prepared to give a urine specimen  Please do not eat 10-12 hours before your appointment if you are coming in fasting for labs on lipids, cholesterol, or glucose (sugar).  Pregnant women should follow their Care Team instructions. Water with medications is okay. Do not drink coffee or other fluids.   If you have concerns about taking  your medications, please ask at office or if scheduling via [facility], send a message by clicking on Secure Messaging, Message Your Care Team.            Oct 19, 2017 10:30 AM CDT   MA SCREENING DIGITAL BILATERAL with [address] [facility] ([facility])    [address] [address]   [phone]           Do not use any powder, lotion or deodorant under your arms or on your breast. If you do, we will ask you to remove it before your exam.  Wear comfortable,  "two-piece clothing.  If you have any allergies, tell your care team.  Bring any previous mammograms from other facilities or have them mailed to the breast center.            Oct 23, 2017  9:30 AM CDT   Return Visit with Merari Duarte MD   [facility] ([facility])    [facility]  [address]  [facility] [id number] [phone]              Who to contact     If you have questions or need follow up information about today's clinic visit or your schedule please contact [facility] directly at [phone].  Normal or non-critical lab and imaging results will be communicated to you by MyChart, letter or phone within 4 business days after the clinic has received the results. If you do not hear from us within 7 days, please contact the clinic through [employer] or phone. If you have a critical or abnormal lab result, we will notify you by phone as soon as possible.  Submit refill requests through [employer] or call your pharmacy and they will forward the refill request to us. Please allow 3 business days for your refill to be completed.          Additional Information About Your Visit        MyChart Information     [employer] gives you secure access to your electronic health record. If you see a primary care provider, you can also send messages to your care team and make appointments. If you have questions, please call your primary care clinic.  If you do not have a primary care provider, please call [phone] and they will assist you.        Care EveryWhere ID     This is your Care EveryWhere ID. This could be used by other organizations to access your [facility] medical records  [id number]        Your Vitals Were     Height BMI (Body Mass Index)                1.676 m (5' 6\") 34.86 kg/m2           Blood Pressure from Last 3 Encounters:   04/25/17 131/59   04/24/17 128/70   04/12/17 151/80    Weight from Last 3 Encounters:   05/15/17 98 " kg (216 lb)   05/08/17 98 kg (216 lb)   05/01/17 98 kg (216 lb)              Today, you had the following     No orders found for display       Primary Care Provider Office Phone # Fax #    Xavier Vega [phone]       [facility] [address] [address]        Thank you!     Thank you for choosing [facility]  for your care. Our goal is always to provide you with excellent care. Hearing back from our patients is one way we can continue to improve our services. Please take a few minutes to complete the written survey that you may receive in the mail after your visit with us. Thank you!             Your Updated Medication List - Protect others around you: Learn how to safely use, store and throw away your medicines at www.disposemymeds.org.          This list is accurate as of: 5/15/17  1:17 PM.  Always use your most recent med list.                   Brand Name Dispense Instructions for use    ALEVE PO      Take 2 tablets by mouth 2 times daily (with meals)       aspirin 81 MG EC tablet     90 tablet    Take 1 tablet (81 mg) by mouth daily       calcium + D 600-200 MG-UNIT Tabs   Generic drug:  calcium carbonate-vitamin D      1 TABLET DAILY twice daily       CO Q 10 PO      Take  by mouth.       fluticasone 50 MCG/ACT spray    FLONASE    16 g    Spray 2 sprays into both nostrils daily Hold on file until needed       folic acid 20 MG Caps          hydrochlorothiazide 12.5 MG Tabs tablet     30 tablet    Take 1 tablet (12.5 mg) by mouth daily       HYDROmorphone 2 MG tablet    DILAUDID    30 tablet    Take 1 tablet (2 mg) by mouth every 4 hours as needed for pain maximum 6 tablet(s) per day       hydrOXYzine 10 MG tablet    ATARAX    30 tablet    Take 1 tablet (10 mg) by mouth every 6 hours as needed for itching (and nausea)       levothyroxine 150 MCG tablet    SYNTHROID/LEVOTHROID    90 tablet    Take 1 tablet (150 mcg) by mouth  daily BRAND NAME ONLY.  Hold on file until needed.       magnesium hydroxide 400 MG/5ML suspension    MILK OF MAGNESIA     Take 400 mg/kg/day by mouth daily as needed for constipation or heartburn       MULTIPLE VITAMIN PO      1 daily       order for DME     1 Units    Knee Walker Length of use: Three months       pantoprazole 20 MG EC tablet    PROTONIX    180 tablet    Take 2 tablets (40 mg) by mouth daily Hold on file until needed       predniSONE 1 MG tablet    DELTASONE    70 tablet    4 mg daily for 1 week, taper by 1 mg weekly       pyridoxine 100 MG tablet    VITAMIN B-6     Take 100 mg by mouth daily.       senna-docusate 8.6-50 MG per tablet    SENOKOT-S;PERICOLACE    30 tablet    Take 1-2 tablets by mouth 2 times daily Take while on oral narcotics to prevent or treat constipation.       simvastatin 20 MG tablet    ZOCOR    90 tablet    Take 1 tablet (20 mg) by mouth At Bedtime Hold on file until needed       tolterodine 4 MG 24 hr capsule    DETROL LA    30 capsule    Take 1 capsule (4 mg) by mouth daily       traZODone 100 MG tablet    DESYREL    90 tablet    1 tablets at bedtime as needed.  Hold on file until needed

## 2017-05-15 NOTE — PROGRESS NOTES
"Briana presents to the office s/p three weeks retrocalcaneal exostectomyof the right foot .  The patient relates keeping the bandages clean, dry and intact.  The patient relates good compliance with postoperative instructions.  The patient denies any severe pain, fevers, chills, nausea or vomiting.      Ht 1.676 m (5' 6\")  Wt 98 kg (216 lb)  BMI 34.86 kg/m2  Weight management plan: Patient was referred to their PCP to discuss a diet and exercise plan.    Physical Exam:    General: The patient appears to be in no distress and in good spirits.  The bandages appear clean, dry and intact with no strikethrough noted. Vascular exam: Neurovascular status unchanged with < 3 sec capillary refill to all digits.  Positive pedal pulses and epicritic sensations intact with no evidence of allodynia.  One notes moderate edema to the forefoot on the right.  Sutures are intact and the skin is well coapted with no erythema noted.           Assessment: Retrocalcaneal exostosis status post two weeks retrocalcaneal exostectomy of the right foot.    Plan:  Sutures was removed and steristrips applied.  A sterile dressing was reapplied.  The patient was instructed to continue non-weightbearing to the right foot.  The patient is to keep the dressings clean, dry and intact for three days.  The patient will return in one month for reevaluation and repeat x-rays.    Disclaimer: This note consists of symbols derived from keyboarding, dictation and/or voice recognition software. As a result, there may be errors in the script that have gone undetected. Please consider this when interpreting information found in this chart.       LUCAS Goldstein D.P.M., FOLIVIA.F.A.S.  "

## 2017-05-25 DIAGNOSIS — R39.15 URINARY URGENCY: ICD-10-CM

## 2017-05-25 NOTE — TELEPHONE ENCOUNTER
Tolterodine   Too early  Last Written Prescription Date: 03/10/17  Last Fill Quantity: 30,  # refills: 11   Last Office Visit with G, P or [facility] prescribing provider: 04/11/17                                         Next 5 appointments (look out 90 days)     Jun 12, 2017  9:20 AM CDT   Return Visit with Antwan Goldstein DPM   [facility] ([facility])    [address]  [place] [address]   [phone]

## 2017-05-30 ENCOUNTER — MYC MEDICAL ADVICE (OUTPATIENT)
Dept: FAMILY MEDICINE | Facility: CLINIC | Age: 72
End: 2017-05-30

## 2017-05-30 DIAGNOSIS — C50.919 MALIGNANT NEOPLASM OF FEMALE BREAST (H): Primary | ICD-10-CM

## 2017-05-30 NOTE — TELEPHONE ENCOUNTER
Faxed DME order for prosthetic for left breast to fit into bra to [facility] in [place].  Last office visit 3/10/17.  Merlyn Mendes RN

## 2017-05-31 RX ORDER — TOLTERODINE 4 MG/1
CAPSULE, EXTENDED RELEASE ORAL
Qty: 90 CAPSULE | Refills: 1 | Status: SHIPPED | OUTPATIENT
Start: 2017-05-31 | End: 2017-12-01

## 2017-06-07 ENCOUNTER — MYC MEDICAL ADVICE (OUTPATIENT)
Dept: RHEUMATOLOGY | Facility: CLINIC | Age: 72
End: 2017-06-07

## 2017-06-07 DIAGNOSIS — M06.041 RHEUMATOID ARTHRITIS INVOLVING BOTH HANDS WITH NEGATIVE RHEUMATOID FACTOR (H): ICD-10-CM

## 2017-06-07 DIAGNOSIS — M06.042 RHEUMATOID ARTHRITIS INVOLVING BOTH HANDS WITH NEGATIVE RHEUMATOID FACTOR (H): ICD-10-CM

## 2017-06-08 RX ORDER — PREDNISONE 1 MG/1
TABLET ORAL
Qty: 70 TABLET | Refills: 0 | Status: ON HOLD | OUTPATIENT
Start: 2017-06-08 | End: 2017-09-05

## 2017-06-09 DIAGNOSIS — I10 ESSENTIAL HYPERTENSION: ICD-10-CM

## 2017-06-09 RX ORDER — HYDROCHLOROTHIAZIDE 12.5 MG/1
12.5 TABLET ORAL DAILY
Qty: 90 TABLET | Refills: 1 | Status: SHIPPED | OUTPATIENT
Start: 2017-06-09 | End: 2017-12-14

## 2017-06-12 ENCOUNTER — OFFICE VISIT (OUTPATIENT)
Dept: PODIATRY | Facility: CLINIC | Age: 72
End: 2017-06-12
Payer: COMMERCIAL

## 2017-06-12 ENCOUNTER — RADIANT APPOINTMENT (OUTPATIENT)
Dept: GENERAL RADIOLOGY | Facility: CLINIC | Age: 72
End: 2017-06-12
Attending: PODIATRIST
Payer: COMMERCIAL

## 2017-06-12 VITALS — WEIGHT: 216 LBS | HEIGHT: 66 IN | BODY MASS INDEX: 34.72 KG/M2

## 2017-06-12 DIAGNOSIS — M79.671 PAIN OF RIGHT HEEL: Primary | ICD-10-CM

## 2017-06-12 DIAGNOSIS — M79.671 PAIN OF RIGHT HEEL: ICD-10-CM

## 2017-06-12 DIAGNOSIS — Z98.890 S/P FOOT SURGERY, RIGHT: ICD-10-CM

## 2017-06-12 PROCEDURE — 99024 POSTOP FOLLOW-UP VISIT: CPT | Performed by: PODIATRIST

## 2017-06-12 PROCEDURE — 73650 X-RAY EXAM OF HEEL: CPT | Mod: RT

## 2017-06-12 NOTE — PROGRESS NOTES
"Briana returns to the office for reevaluation of the right foot.  The patient relates following the instructions given at the last visit with noted less pain.  The patient relates overall more  improvement in pain and function of the right foot.  The patient relates no other problems.    PAST MEDICAL HISTORY:   Past Medical History:   Diagnosis Date     Allergies      Breast cancer (H)      Esophageal reflux      Hypertension      Other and unspecified hyperlipidemia      Other chronic bronchitis      Personal history of pneumonia (recurrent)     Hx-Pneumonia, \" Chronic Bronchitis\"     Personal history of tobacco use, presenting hazards to health      RA (rheumatoid arthritis) (H)      Unspecified hypothyroidism        BMI= Body mass index is 34.86 kg/(m^2).    Weight management plan: Patient was referred to their PCP to discuss a diet and exercise plan.    Physical Exam:    General: The patient appears to have a pleasant mental affect.    Lower extremity physical exam:  Neurovascular status is intact with palpable pedal pulses and intact epicritic sensations.  Muscular exam is within normal limits to major muscle groups.  Integument is intact.      One notes decreased edema.  One notes no surrounding erythema. The incision appears to be well coapted on the posterior aspect of the right heel.    Radiograph evaluation including weightbearing AP, lateral and medial oblique views of the right foot reveals interval healing.     Assessment:      ICD-10-CM    1. Pain of right heel M79.671 XR Calcaneus Right G/E 2 Views   2. S/P foot surgery, right Z98.890 PHYSICAL THERAPY REFERRAL       Plan:  I have explained to Briana about the conditions.  At this time, the patient was referred to physical therapy for right ankle rehabilitation. The patient will remain nonweightbearing for another 2 weeks. The patient will then proceed with protected weightbearing cam boot. Patient will return in 1 month for " reevaluation.    Disclaimer: This note consists of symbols derived from keyboarding, dictation and/or voice recognition software. As a result, there may be errors in the script that have gone undetected. Please consider this when interpreting information found in this chart.       LUCAS Goldstein D.P.M., SILVERIO.SACHA.

## 2017-06-12 NOTE — LETTER
"  6/12/2017       RE: Briana Mcgee  [address] [address]           Dear Colleague,    Thank you for referring your patient, Briana Mcgee, to the [facility]. Please see a copy of my visit note below.    Briana returns to the office for reevaluation of the right foot.  The patient relates following the instructions given at the last visit with noted less pain.  The patient relates overall more  improvement in pain and function of the right foot.  The patient relates no other problems.    PAST MEDICAL HISTORY:   Past Medical History:   Diagnosis Date     Allergies      Breast cancer (H)      Esophageal reflux      Hypertension      Other and unspecified hyperlipidemia      Other chronic bronchitis      Personal history of pneumonia (recurrent)     Hx-Pneumonia, \" Chronic Bronchitis\"     Personal history of tobacco use, presenting hazards to health      RA (rheumatoid arthritis) (H)      Unspecified hypothyroidism        BMI= Body mass index is 34.86 kg/(m^2).    Weight management plan: Patient was referred to their PCP to discuss a diet and exercise plan.    Physical Exam:    General: The patient appears to have a pleasant mental affect.    Lower extremity physical exam:  Neurovascular status is intact with palpable pedal pulses and intact epicritic sensations.  Muscular exam is within normal limits to major muscle groups.  Integument is intact.      One notes decreased edema.  One notes no surrounding erythema. The incision appears to be well coapted on the posterior aspect of the right heel.    Radiograph evaluation including weightbearing AP, lateral and medial oblique views of the right foot reveals interval healing.     Assessment:      ICD-10-CM    1. Pain of right heel M79.671 XR Calcaneus Right G/E 2 Views   2. S/P foot surgery, right Z98.890 PHYSICAL THERAPY REFERRAL       Plan:  I have explained to Briana about the conditions.  At " this time, the patient was referred to physical therapy for right ankle rehabilitation. The patient will remain nonweightbearing for another 2 weeks. The patient will then proceed with protected weightbearing cam boot. Patient will return in 1 month for reevaluation.    Disclaimer: This note consists of symbols derived from keyboarding, dictation and/or voice recognition software. As a result, there may be errors in the script that have gone undetected. Please consider this when interpreting information found in this chart.       MAKENZIE Witt.P.M., F.A.C.F.A.S.      Again, thank you for allowing me to participate in the care of your patient.        Sincerely,              Antwan Goldstein DPM

## 2017-06-12 NOTE — PATIENT INSTRUCTIONS
SCAR CARE PROTOCOL  Scarring is an unfortunate but unavoidable part of surgery.  Every person scars differently and there is no way to predict how an individual's final scar will look.  Now that the sutures have been removed one can begin taking some steps to help minimize the appearance of scarring.    WOUND HEALING  As soon as the skin is incised during surgery, the body is taking steps to prepare for healing. After about 3 days, the body has sent cells to the incision to begin the healing process. These cells, called fibroblasts, make collagen, a protein in the skin that helps provide strength. Once the skin has been sufficiently strengthened, the sutures are removed. Over the next year, the body synthesizes new collagen and breaks down old collagen to help achieve a strong scar that allows the foot/ankle to function appropriately. This is where patients can help the appearance of the scar, as it will change over the next year.    STEPS  1. Do not expose the scar to the sun for 1 year.  2. Any sun exposure may permanently darken the appearance of the scar.  3. Wear shoes/socks or cover your scar with zinc oxide.  4. Massage the scar 2-3 times per day.  -Massage the entire length of the scar with gentle to moderate pressure.  -Pressure can help flatten the scar.  5. Lotion/Vitamin E helps keep the tissue soft.  6. Try over the counter scar products such as Mederma or Scar Zone.  -These are available at any pharmacy without a prescription.  -Patients must use these for extended periods of time (6-12 months) to see a difference.

## 2017-06-12 NOTE — MR AVS SNAPSHOT
After Visit Summary   6/12/2017    Briana Mcgee    MRN: [id number]           Patient Information     Date Of Birth          1945        Visit Information        Provider Department      6/12/2017 9:20 AM Antwan Goldstein DPM [facility]        Today's Diagnoses     Pain of right heel    -  1    S/P foot surgery, right          Care Instructions    SCAR CARE PROTOCOL  Scarring is an unfortunate but unavoidable part of surgery.  Every person scars differently and there is no way to predict how an individual's final scar will look.  Now that the sutures have been removed one can begin taking some steps to help minimize the appearance of scarring.    WOUND HEALING  As soon as the skin is incised during surgery, the body is taking steps to prepare for healing. After about 3 days, the body has sent cells to the incision to begin the healing process. These cells, called fibroblasts, make collagen, a protein in the skin that helps provide strength. Once the skin has been sufficiently strengthened, the sutures are removed. Over the next year, the body synthesizes new collagen and breaks down old collagen to help achieve a strong scar that allows the foot/ankle to function appropriately. This is where patients can help the appearance of the scar, as it will change over the next year.    STEPS  1. Do not expose the scar to the sun for 1 year.  2. Any sun exposure may permanently darken the appearance of the scar.  3. Wear shoes/socks or cover your scar with zinc oxide.  4. Massage the scar 2-3 times per day.  -Massage the entire length of the scar with gentle to moderate pressure.  -Pressure can help flatten the scar.  5. Lotion/Vitamin E helps keep the tissue soft.  6. Try over the counter scar products such as Mederma or Scar Zone.  -These are available at any pharmacy without a prescription.  -Patients must use these for extended periods of time (6-12 months) to  see a difference.            Follow-ups after your visit        Additional Services     PHYSICAL THERAPY REFERRAL       *This order will print in the [facility] Central Scheduling Office*    [facility] provides Physical Therapy evaluation and treatment and many specialty services across the [facility] system.  If requesting a specialty program, please choose from the list below.    Call one number to schedule at any [facility] location   [phone].    Treatment: Evaluation & Treatment  Special Instructions/Modalities: Right ankle rehabilitation: 4 weeks protected weight bearing with CAM boot, ankle range of motion work and calf strengthening, then 2 weeks full weightbearing with an ankle brace.   Special Programs: none    Please be aware that coverage of these services is subject to the terms and limitations of your health insurance plan.  Call member services at your health plan with any benefit or coverage questions.      **Note to Provider** To refer patients to therapy outside of the location list, change the order class to External Referral in the order composer.                  Follow-up notes from your care team     Return in about 4 weeks (around 7/10/2017).      Your next 10 appointments already scheduled     Oct 19, 2017 10:00 AM CDT   LAB with [facility] ([facility])    [address] [address]   [phone]           Patient must bring picture ID.  Patient should be prepared to give a urine specimen  Please do not eat 10-12 hours before your appointment if you are coming in fasting for labs on lipids, cholesterol, or glucose (sugar).  Pregnant women should follow their Care Team instructions. Water with medications is okay. Do not drink coffee or other fluids.   If you have concerns about taking  your medications, please ask at office or if scheduling via fflick, send a  message by clicking on Secure Messaging, Message Your Care Team.            Oct 19, 2017 10:30 AM CDT   MA SCREENING DIGITAL BILATERAL with [facility]   [facility] ([facility])    [address]  [facility] [address]   [phone]           Do not use any powder, lotion or deodorant under your arms or on your breast. If you do, we will ask you to remove it before your exam.  Wear comfortable, two-piece clothing.  If you have any allergies, tell your care team.  Bring any previous mammograms from other facilities or have them mailed to the breast center.            Oct 23, 2017  9:30 AM CDT   Return Visit with Merari Duarte MD   [facility] ([facility])    [facility]  [address]  [facility] [address]   [phone]              Who to contact     If you have questions or need follow up information about today's clinic visit or your schedule please contact [facility] directly at [phone].  Normal or non-critical lab and imaging results will be communicated to you by BearTailhart, letter or phone within 4 business days after the clinic has received the results. If you do not hear from us within 7 days, please contact the clinic through BearTailhart or phone. If you have a critical or abnormal lab result, we will notify you by phone as soon as possible.  Submit refill requests through [employer] Net or call your pharmacy and they will forward the refill request to us. Please allow 3 business days for your refill to be completed.          Additional Information About Your Visit        [employer] Vetted Net Information     [employer] gives you secure access to your electronic health record. If you see a primary care provider, you can also send messages to your care team and make appointments. If you have questions, please call your primary care clinic.  If you do not have a primary care provider, please call [phone] and  "they will assist you.        Care EveryWhere ID     This is your Care EveryWhere ID. This could be used by other organizations to access your [facility] medical records  [id number]        Your Vitals Were     Height BMI (Body Mass Index)                1.676 m (5' 6\") 34.86 kg/m2           Blood Pressure from Last 3 Encounters:   04/25/17 131/59   04/24/17 128/70   04/12/17 151/80    Weight from Last 3 Encounters:   06/12/17 98 kg (216 lb)   05/15/17 98 kg (216 lb)   05/08/17 98 kg (216 lb)              We Performed the Following     PHYSICAL THERAPY REFERRAL        Primary Care Provider Office Phone # Fax #    Xavier Vega [phone]       [facility] [address] [address]        Thank you!     Thank you for choosing [facility]  for your care. Our goal is always to provide you with excellent care. Hearing back from our patients is one way we can continue to improve our services. Please take a few minutes to complete the written survey that you may receive in the mail after your visit with us. Thank you!             Your Updated Medication List - Protect others around you: Learn how to safely use, store and throw away your medicines at www.disposemymeds.org.          This list is accurate as of: 6/12/17  9:51 AM.  Always use your most recent med list.                   Brand Name Dispense Instructions for use    ALEVE PO      Take 2 tablets by mouth 2 times daily (with meals)       aspirin 81 MG EC tablet     90 tablet    Take 1 tablet (81 mg) by mouth daily       calcium + D 600-200 MG-UNIT Tabs   Generic drug:  calcium carbonate-vitamin D      1 TABLET DAILY twice daily       CO Q 10 PO      Take  by mouth.       fluticasone 50 MCG/ACT spray    FLONASE    16 g    Spray 2 sprays into both nostrils daily Hold on file until needed       folic acid 20 MG Caps          hydrochlorothiazide 12.5 MG Tabs tablet     90 tablet    Take 1 " tablet (12.5 mg) by mouth daily       HYDROmorphone 2 MG tablet    DILAUDID    30 tablet    Take 1 tablet (2 mg) by mouth every 4 hours as needed for pain maximum 6 tablet(s) per day       hydrOXYzine 10 MG tablet    ATARAX    30 tablet    Take 1 tablet (10 mg) by mouth every 6 hours as needed for itching (and nausea)       levothyroxine 150 MCG tablet    SYNTHROID/LEVOTHROID    90 tablet    Take 1 tablet (150 mcg) by mouth daily BRAND NAME ONLY.  Hold on file until needed.       magnesium hydroxide 400 MG/5ML suspension    MILK OF MAGNESIA     Take 400 mg/kg/day by mouth daily as needed for constipation or heartburn       MULTIPLE VITAMIN PO      1 daily       * order for DME     1 Units    Knee Walker Length of use: Three months       * order for DME     1 Device    Left breast prosthesis for bra Fax to [phone], Attention:  [facility] at [facility] [employer]  Ph:  [phone]       pantoprazole 20 MG EC tablet    PROTONIX    180 tablet    Take 2 tablets (40 mg) by mouth daily Hold on file until needed       predniSONE 1 MG tablet    DELTASONE    70 tablet    4 mg daily for 1 week, taper by 1 mg weekly       pyridoxine 100 MG tablet    VITAMIN B-6     Take 100 mg by mouth daily.       senna-docusate 8.6-50 MG per tablet    SENOKOT-S;PERICOLACE    30 tablet    Take 1-2 tablets by mouth 2 times daily Take while on oral narcotics to prevent or treat constipation.       simvastatin 20 MG tablet    ZOCOR    90 tablet    Take 1 tablet (20 mg) by mouth At Bedtime Hold on file until needed       tolterodine 4 MG 24 hr capsule    DETROL LA    90 capsule    Take 1 capsule (4 mg) by mouth daily       traZODone 100 MG tablet    DESYREL    90 tablet    1 tablets at bedtime as needed.  Hold on file until needed       * Notice:  This list has 2 medication(s) that are the same as other medications prescribed for you. Read the directions carefully, and ask your doctor or other care provider to review them  with you.

## 2017-06-15 ENCOUNTER — MYC MEDICAL ADVICE (OUTPATIENT)
Dept: FAMILY MEDICINE | Facility: CLINIC | Age: 72
End: 2017-06-15

## 2017-06-15 ENCOUNTER — TELEPHONE (OUTPATIENT)
Dept: FAMILY MEDICINE | Facility: CLINIC | Age: 72
End: 2017-06-15

## 2017-06-15 ENCOUNTER — MYC MEDICAL ADVICE (OUTPATIENT)
Dept: RHEUMATOLOGY | Facility: CLINIC | Age: 72
End: 2017-06-15

## 2017-06-15 NOTE — PATIENT INSTRUCTIONS
Carpal Tunnel Syndrome    Carpal tunnel syndrome is a painful condition of the wrist and arm. It is caused by pressure on the median nerve.  The median nerve is one of the nerves that give feeling and movement to the hand. It passes through a tunnel in the wrist called the carpal tunnel. This tunnel is made up of bones and ligaments. Narrowing of this tunnel or swelling of the tissues inside the tunnel puts pressure on the median nerve. This causes numbness, pins and needles, or electric shooting pains in your hand and forearm. Often the pain is worse at night and may wake you when you are asleep.  Carpal tunnel syndrome may occur during pregnancy and with use of birth control pills. It is more common in workers who must often bend their wrists. It is also common in people who work with power tools that cause strong vibrations.  Home care    Rest the painful wrist. Avoid repeated bending of the wrist back and forth. This puts pressure on the median nerve. Avoid using power tools with strong vibrations.    If you were given a splint, wear it at night while you sleep. You may also wear it during the day for comfort.    Move your fingers and wrists often to avoid stiffness.    Elevate your arms on pillows when you lie down.    Try using the unaffected hand more.    Try not to hold your wrists in a bent, downward position.    Sometimes changes in the work place may ease symptoms. If you type most of the day, it may help to change the position of your keyboard or add a wrist support. Your wrist should be in a neutral position and not bent back when typing.    You may use over-the-counter pain medicine to treat pain and inflammation, unless another medicine was prescribed. Anti-inflammatory pain medicines, such as ibuprofen or naproxen may be more effective than acetaminophen, which treats pain, but not inflammation. If you have chronic liver or kidney disease or ever had a stomach ulcer or GI bleeding, talk with your  doctor before using these medicines.    Opioid pain medicine will only give temporary relief and does not treat the problem. If pain continues, you may need a shot of a steroid drug into your wrist.    If the above methods fail, you may need surgery. This will open the carpal tunnel and release the pressure on the trapped nerve.  Follow-up care  Follow up with your healthcare provider, or as advised, if the pain doesn t begin to improve within the next week.  If X-rays were taken, you will be notified of any new findings that may affect your care.  When to seek medical advice  Call your healthcare provider right away if any of these occur:    Pain not improving with the above treatment    Fingers or hand become cold, blue, numb, or tingly    Your whole arm becomes swollen or weak  Date Last Reviewed: 11/23/2015 2000-2017 [employer]. [address] [place] [address], [place] [address]. All rights reserved. This information is not intended as a substitute for professional medical care. Always follow your healthcare professional's instructions.

## 2017-06-15 NOTE — TELEPHONE ENCOUNTER
Reason for Call:  Other call back    Detailed comments: She is having numbness in her right thumb and next 2 fingers.  The pain is going into her shoulder.  Her pain scale is an 8 out of 10.  She is wondering what to do?  Please advise.    Phone Number Patient can be reached at: Home number on file [phone] (home)    Best Time: any    Can we leave a detailed message on this number? YES    Call taken on 6/15/2017 at 3:17 PM by Stephanie Wade

## 2017-06-15 NOTE — TELEPHONE ENCOUNTER
S-(situation): Pt called back.  She had MyCharted earlier today.  Pt describes severe pain intermittently in her right thumb and first to fingers and they are numb.  The pain radiates into her wrist, up to her elbow, and when really bad, up to her shoulder.  She describes that the pain is worse when she raises her arm to drive.  Pt denies known injury but admits that she has been using a foot walker for the past 8 weeks after her foot surgery.    Pt is right hand dominant.  Denies that the thumb and fingers are cool to sensation.  Pt is able to use her hand.  The pain comes and goes but worsens with use.  Denies color change of thumb and fingers such as turning blue.  Pt has been treating with Aspercream and Aleve with some relief but the pain returns.    B-(background): per above.    A-(assessment): right thumb and first two fingers numb and painful.    R-(recommendations): Advised appt for further assessement.  Sooner if fingers become cold or worsened symptoms.  Pt has appt tomorrow morning.  Home care instructions:  Minimize use.  Rest on a pillow.  Cool packs if helpful.  Aleve and Aspercream if helpful.  Be seen as planned.  Merlyn Mendes RN

## 2017-06-16 ENCOUNTER — OFFICE VISIT (OUTPATIENT)
Dept: FAMILY MEDICINE | Facility: CLINIC | Age: 72
End: 2017-06-16
Payer: COMMERCIAL

## 2017-06-16 VITALS
HEART RATE: 70 BPM | HEIGHT: 66 IN | WEIGHT: 210.4 LBS | DIASTOLIC BLOOD PRESSURE: 75 MMHG | SYSTOLIC BLOOD PRESSURE: 150 MMHG | TEMPERATURE: 98 F | BODY MASS INDEX: 33.82 KG/M2

## 2017-06-16 DIAGNOSIS — M06.042 RHEUMATOID ARTHRITIS INVOLVING BOTH HANDS WITH NEGATIVE RHEUMATOID FACTOR (H): ICD-10-CM

## 2017-06-16 DIAGNOSIS — G56.01 CARPAL TUNNEL SYNDROME OF RIGHT WRIST: Primary | ICD-10-CM

## 2017-06-16 DIAGNOSIS — M06.041 RHEUMATOID ARTHRITIS INVOLVING BOTH HANDS WITH NEGATIVE RHEUMATOID FACTOR (H): ICD-10-CM

## 2017-06-16 PROCEDURE — 99214 OFFICE O/P EST MOD 30 MIN: CPT | Performed by: INTERNAL MEDICINE

## 2017-06-16 RX ORDER — GABAPENTIN 300 MG/1
300 CAPSULE ORAL 3 TIMES DAILY PRN
Qty: 90 CAPSULE | Refills: 1 | Status: SHIPPED | OUTPATIENT
Start: 2017-06-16 | End: 2017-08-18

## 2017-06-16 NOTE — PATIENT INSTRUCTIONS
If not improving after a few weeks, please call and we can refer you to orthopedics.        Carpal Tunnel Syndrome    Carpal tunnel syndrome is a painful condition of the wrist and arm. It is caused by pressure on the median nerve.  The median nerve is one of the nerves that give feeling and movement to the hand. It passes through a tunnel in the wrist called the carpal tunnel. This tunnel is made up of bones and ligaments. Narrowing of this tunnel or swelling of the tissues inside the tunnel puts pressure on the median nerve. This causes numbness, pins and needles, or electric shooting pains in your hand and forearm. Often the pain is worse at night and may wake you when you are asleep.  Carpal tunnel syndrome may occur during pregnancy and with use of birth control pills. It is more common in workers who must often bend their wrists. It is also common in people who work with power tools that cause strong vibrations.  Home care    Rest the painful wrist. Avoid repeated bending of the wrist back and forth. This puts pressure on the median nerve. Avoid using power tools with strong vibrations.    If you were given a splint, wear it at night while you sleep. You may also wear it during the day for comfort.    Move your fingers and wrists often to avoid stiffness.    Elevate your arms on pillows when you lie down.    Try using the unaffected hand more.    Try not to hold your wrists in a bent, downward position.    Sometimes changes in the work place may ease symptoms. If you type most of the day, it may help to change the position of your keyboard or add a wrist support. Your wrist should be in a neutral position and not bent back when typing.    You may use over-the-counter pain medicine to treat pain and inflammation, unless another medicine was prescribed. Anti-inflammatory pain medicines, such as ibuprofen or naproxen may be more effective than acetaminophen, which treats pain, but not inflammation. If you have  chronic liver or kidney disease or ever had a stomach ulcer or GI bleeding, talk with your doctor before using these medicines.    Opioid pain medicine will only give temporary relief and does not treat the problem. If pain continues, you may need a shot of a steroid drug into your wrist.    If the above methods fail, you may need surgery. This will open the carpal tunnel and release the pressure on the trapped nerve.  Follow-up care  Follow up with your healthcare provider, or as advised, if the pain doesn t begin to improve within the next week.  If X-rays were taken, you will be notified of any new findings that may affect your care.  When to seek medical advice  Call your healthcare provider right away if any of these occur:    Pain not improving with the above treatment    Fingers or hand become cold, blue, numb, or tingly    Your whole arm becomes swollen or weak  Date Last Reviewed: 11/23/2015 2000-2017 [employer]. [address] [place] [address], [place] [address]. All rights reserved. This information is not intended as a substitute for professional medical care. Always follow your healthcare professional's instructions.

## 2017-06-16 NOTE — PROGRESS NOTES
SUBJECTIVE:                                                    Briana Mcgee is a 72 year old female who presents to clinic today for the following health issues:      Right Hand  Pain     Onset: 3 weeks     Description:   Location: Right Hand   Character: Dull ache, can go to a sharp pain, Swollen, hurts to bend     Intensity: 8/10    Progression of Symptoms: worse    Accompanying Signs & Symptoms:  Other symptoms: radiation of pain to Elbow   History:   Previous similar pain: Yes, 3 years ago when she had arthiritis       Precipitating factors:   Trauma or overuse: no     Alleviating factors:  Improved by: heat       Therapies Tried and outcome: Prednisone, Aleve, Dilaudid twice the last two evenings    Briana presents with worsening pain and numbness/tingling in the right hand.  This started in the thumb and has since progressed to all fingers.  It is worse with moving the hand and raising her arm.  She feels her hand is swollen.  Has noticed some weakness, couldn't close the hand well this AM.  No fevers or chills.  She had a history of RA and wean off all RA meds for awhile, but then resumed prednisone about a week ago thinking this could be a return of her RA, but the prednisone has not helped.      Problem list and histories reviewed & adjusted, as indicated.  Additional history: as documented    Current Outpatient Prescriptions   Medication Sig Dispense Refill     gabapentin (NEURONTIN) 300 MG capsule Take 1 capsule (300 mg) by mouth 3 times daily as needed 90 capsule 1     hydrochlorothiazide 12.5 MG TABS tablet Take 1 tablet (12.5 mg) by mouth daily 90 tablet 1     predniSONE (DELTASONE) 1 MG tablet 4 mg daily for 1 week, taper by 1 mg weekly 70 tablet 0     tolterodine (DETROL LA) 4 MG 24 hr capsule Take 1 capsule (4 mg) by mouth daily 90 capsule 1     senna-docusate (SENOKOT-S;PERICOLACE) 8.6-50 MG per tablet Take 1-2 tablets by mouth 2 times daily Take while on oral narcotics to prevent or  treat constipation. 30 tablet 0     simvastatin (ZOCOR) 20 MG tablet Take 1 tablet (20 mg) by mouth At Bedtime Hold on file until needed 90 tablet 3     traZODone (DESYREL) 100 MG tablet 1 tablets at bedtime as needed.  Hold on file until needed 90 tablet 3     levothyroxine (SYNTHROID/LEVOTHROID) 150 MCG tablet Take 1 tablet (150 mcg) by mouth daily BRAND NAME ONLY.  Hold on file until needed. 90 tablet 3     pantoprazole (PROTONIX) 20 MG EC tablet Take 2 tablets (40 mg) by mouth daily Hold on file until needed 180 tablet 3     magnesium hydroxide (MILK OF MAGNESIA) 400 MG/5ML suspension Take 400 mg/kg/day by mouth daily as needed for constipation or heartburn       folic acid 20 MG CAPS        Naproxen Sodium (ALEVE PO) Take 2 tablets by mouth 2 times daily (with meals)       aspirin 81 MG EC tablet Take 1 tablet (81 mg) by mouth daily 90 tablet 3     Coenzyme Q10 (CO Q 10 PO) Take  by mouth.       pyridoxine (VITAMIN B-6) 100 MG tablet Take 100 mg by mouth daily.       CALCIUM + D 600-200 MG-UNIT OR TABS 1 TABLET DAILY twice daily       MULTIPLE VITAMIN OR 1 daily       order for DME Left breast prosthesis for bra  Fax to [phone], Attention:  [facility] at [facility] [employer]  Ph:  [phone] 1 Device 0     hydrOXYzine (ATARAX) 10 MG tablet Take 1 tablet (10 mg) by mouth every 6 hours as needed for itching (and nausea) (Patient not taking: Reported on 6/16/2017) 30 tablet 0     HYDROmorphone (DILAUDID) 2 MG tablet Take 1 tablet (2 mg) by mouth every 4 hours as needed for pain maximum 6 tablet(s) per day (Patient not taking: Reported on 6/16/2017) 30 tablet 0     order for DME Knee Walker  Length of use: Three months 1 Units 0     fluticasone (FLONASE) 50 MCG/ACT spray Spray 2 sprays into both nostrils daily Hold on file until needed (Patient not taking: Reported on 6/16/2017) 16 g 11     Allergies   Allergen Reactions     Erythromycin Swelling     Erythromycin      Other reaction(s):  "Edema     Hydrocodone      Other reaction(s): GI Upset  Tolerates dilaudid     Oxycodone      Other reaction(s): GI Upset       Reviewed and updated as needed this visit by clinical staff  Tobacco  Allergies  Med Hx  Surg Hx  Fam Hx  Soc Hx      Reviewed and updated as needed this visit by Provider         ROS:  Constitutional, MSK systems are negative, except as otherwise noted.    OBJECTIVE:                                                    /75  Pulse 70  Temp 98  F (36.7  C) (Tympanic)  Ht 5' 6\" (1.676 m)  Wt 210 lb 6.4 oz (95.4 kg)  BMI 33.96 kg/m2  Body mass index is 33.96 kg/(m^2).  GENERAL: healthy, alert and no distress  MS: pain to palpation of the right 2nd-5th MCPs and PIPs, mild generalized edema in the right hand  NEURO: decreased right hand  strength, positive Tinel and Phalen on right       ASSESSMENT/PLAN:                                                        1. Carpal tunnel syndrome of right wrist  2. 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)    Briana's pain is likely a combination of carpal tunnel (positive Tinel and Phalen with numbness/tingling in 1st-4th fingers) as well as RA (pain to palpation of MCP and PIP joints).  She has already resumed prednisone for the RA, so will continue that.  Advised on home care for carpal tunnel and fitted her with a wrist brace in clinic, which she reports make her pain feel better.  Will also try gabapentin for nerve pain.  Advised her to follow-up if not improving in a couple of weeks since she is having some weakness already and would refer to orthopedics.     - gabapentin (NEURONTIN) 300 MG capsule; Take 1 capsule (300 mg) by mouth 3 times daily as needed  Dispense: 90 capsule; Refill: 1      James Mora MD  [facility]  "

## 2017-06-16 NOTE — NURSING NOTE
"No chief complaint on file.      Initial /75  Pulse 70  Temp 98  F (36.7  C) (Tympanic)  Ht 5' 6\" (1.676 m)  Wt 210 lb 6.4 oz (95.4 kg)  BMI 33.96 kg/m2 Estimated body mass index is 33.96 kg/(m^2) as calculated from the following:    Height as of this encounter: 5' 6\" (1.676 m).    Weight as of this encounter: 210 lb 6.4 oz (95.4 kg).  Medication Reconciliation: complete  "

## 2017-06-16 NOTE — MR AVS SNAPSHOT
After Visit Summary   6/16/2017    Briana Mcgee    MRN: [id number]           Patient Information     Date Of Birth          1945        Visit Information        Provider Department      6/16/2017 7:20 AM James Mora MD [facility]        Today's Diagnoses     Carpal tunnel syndrome of right wrist    -  1      Care Instructions    If not improving after a few weeks, please call and we can refer you to orthopedics.        Carpal Tunnel Syndrome    Carpal tunnel syndrome is a painful condition of the wrist and arm. It is caused by pressure on the median nerve.  The median nerve is one of the nerves that give feeling and movement to the hand. It passes through a tunnel in the wrist called the carpal tunnel. This tunnel is made up of bones and ligaments. Narrowing of this tunnel or swelling of the tissues inside the tunnel puts pressure on the median nerve. This causes numbness, pins and needles, or electric shooting pains in your hand and forearm. Often the pain is worse at night and may wake you when you are asleep.  Carpal tunnel syndrome may occur during pregnancy and with use of birth control pills. It is more common in workers who must often bend their wrists. It is also common in people who work with power tools that cause strong vibrations.  Home care    Rest the painful wrist. Avoid repeated bending of the wrist back and forth. This puts pressure on the median nerve. Avoid using power tools with strong vibrations.    If you were given a splint, wear it at night while you sleep. You may also wear it during the day for comfort.    Move your fingers and wrists often to avoid stiffness.    Elevate your arms on pillows when you lie down.    Try using the unaffected hand more.    Try not to hold your wrists in a bent, downward position.    Sometimes changes in the work place may ease symptoms. If you type most of the day, it may help to change the position of your keyboard or  add a wrist support. Your wrist should be in a neutral position and not bent back when typing.    You may use over-the-counter pain medicine to treat pain and inflammation, unless another medicine was prescribed. Anti-inflammatory pain medicines, such as ibuprofen or naproxen may be more effective than acetaminophen, which treats pain, but not inflammation. If you have chronic liver or kidney disease or ever had a stomach ulcer or GI bleeding, talk with your doctor before using these medicines.    Opioid pain medicine will only give temporary relief and does not treat the problem. If pain continues, you may need a shot of a steroid drug into your wrist.    If the above methods fail, you may need surgery. This will open the carpal tunnel and release the pressure on the trapped nerve.  Follow-up care  Follow up with your healthcare provider, or as advised, if the pain doesn t begin to improve within the next week.  If X-rays were taken, you will be notified of any new findings that may affect your care.  When to seek medical advice  Call your healthcare provider right away if any of these occur:    Pain not improving with the above treatment    Fingers or hand become cold, blue, numb, or tingly    Your whole arm becomes swollen or weak  Date Last Reviewed: 11/23/2015 2000-2017 [employer]. [address] [place] [address], [place] [address]. All rights reserved. This information is not intended as a substitute for professional medical care. Always follow your healthcare professional's instructions.                Follow-ups after your visit        Your next 10 appointments already scheduled     Jun 22, 2017  9:15 AM CDT   Ortho Eval with Sravan Castañeda PT   [facility] ([address] [address]   [phone]            Jul 10, 2017  9:20 AM CDT   Return Visit with Antwan Goldstein DPM   [facility]  ([facility])    [address]  [address]  [facility] [address]   [phone]            Oct 19, 2017 10:00 AM CDT   LAB with [facility] ([facility])    [address] [address]   [phone]           Patient must bring picture ID.  Patient should be prepared to give a urine specimen  Please do not eat 10-12 hours before your appointment if you are coming in fasting for labs on lipids, cholesterol, or glucose (sugar).  Pregnant women should follow their Care Team instructions. Water with medications is okay. Do not drink coffee or other fluids.   If you have concerns about taking  your medications, please ask at office or if scheduling via [employer], send a message by clicking on Secure Messaging, Message Your Care Team.            Oct 19, 2017 10:30 AM CDT   MA SCREENING DIGITAL BILATERAL with [facility] [address] [facility] ([facility])    [address] [address]   [phone]           Do not use any powder, lotion or deodorant under your arms or on your breast. If you do, we will ask you to remove it before your exam.  Wear comfortable, two-piece clothing.  If you have any allergies, tell your care team.  Bring any previous mammograms from other facilities or have them mailed to the breast center.            Oct 23, 2017  9:30 AM CDT   Return Visit with Merari Duarte MD   [facility] ([facility])    [facility]  [address]  [facility] [address]   [phone]              Who to contact     If you have questions or need follow up information about today's clinic visit or your schedule please contact [facility] directly at [phone].  Normal or non-critical lab and imaging results will be communicated to you by MyChart, letter or phone within 4 business days after the clinic has received the results. If you do not hear  "from us within 7 days, please contact the clinic through iDoneThis or phone. If you have a critical or abnormal lab result, we will notify you by phone as soon as possible.  Submit refill requests through iDoneThis or call your pharmacy and they will forward the refill request to us. Please allow 3 business days for your refill to be completed.          Additional Information About Your Visit        [employer] Information     [employer]DoneThis gives you secure access to your electronic health record. If you see a primary care provider, you can also send messages to your care team and make appointments. If you have questions, please call your primary care clinic.  If you do not have a primary care provider, please call [phone] and they will assist you.        Care EveryWhere ID     This is your Care EveryWhere ID. This could be used by other organizations to access your [place] medical records  [id number]        Your Vitals Were     Pulse Temperature Height BMI (Body Mass Index)          70 98  F (36.7  C) (Tympanic) 5' 6\" (1.676 m) 33.96 kg/m2         Blood Pressure from Last 3 Encounters:   06/16/17 150/75   04/25/17 131/59   04/24/17 128/70    Weight from Last 3 Encounters:   06/16/17 210 lb 6.4 oz (95.4 kg)   06/12/17 216 lb (98 kg)   05/15/17 216 lb (98 kg)              Today, you had the following     No orders found for display         Today's Medication Changes          These changes are accurate as of: 6/16/17  8:04 AM.  If you have any questions, ask your nurse or doctor.               Start taking these medicines.        Dose/Directions    gabapentin 300 MG capsule   Commonly known as:  NEURONTIN   Used for:  Carpal tunnel syndrome of right wrist   Started by:  James Mora MD        Dose:  300 mg   Take 1 capsule (300 mg) by mouth 3 times daily as needed   Quantity:  90 capsule   Refills:  1            Where to get your medicines      These medications were sent to [facility] [place] " [address] [address]     Phone:  [phone]     gabapentin 300 MG capsule                Primary Care Provider Office Phone # Fax #    Xavier Vega [phone]       [facility] [address] [address]        Thank you!     Thank you for choosing [facility]  for your care. Our goal is always to provide you with excellent care. Hearing back from our patients is one way we can continue to improve our services. Please take a few minutes to complete the written survey that you may receive in the mail after your visit with us. Thank you!             Your Updated Medication List - Protect others around you: Learn how to safely use, store and throw away your medicines at www.disposemymeds.org.          This list is accurate as of: 6/16/17  8:04 AM.  Always use your most recent med list.                   Brand Name Dispense Instructions for use    ALEVE PO      Take 2 tablets by mouth 2 times daily (with meals)       aspirin 81 MG EC tablet     90 tablet    Take 1 tablet (81 mg) by mouth daily       calcium + D 600-200 MG-UNIT Tabs   Generic drug:  calcium carbonate-vitamin D      1 TABLET DAILY twice daily       CO Q 10 PO      Take  by mouth.       fluticasone 50 MCG/ACT spray    FLONASE    16 g    Spray 2 sprays into both nostrils daily Hold on file until needed       folic acid 20 MG Caps          gabapentin 300 MG capsule    NEURONTIN    90 capsule    Take 1 capsule (300 mg) by mouth 3 times daily as needed       hydrochlorothiazide 12.5 MG Tabs tablet     90 tablet    Take 1 tablet (12.5 mg) by mouth daily       HYDROmorphone 2 MG tablet    DILAUDID    30 tablet    Take 1 tablet (2 mg) by mouth every 4 hours as needed for pain maximum 6 tablet(s) per day       hydrOXYzine 10 MG tablet    ATARAX    30 tablet    Take 1 tablet (10 mg) by mouth every 6 hours as needed for itching (and nausea)        levothyroxine 150 MCG tablet    SYNTHROID/LEVOTHROID    90 tablet    Take 1 tablet (150 mcg) by mouth daily BRAND NAME ONLY.  Hold on file until needed.       magnesium hydroxide 400 MG/5ML suspension    MILK OF MAGNESIA     Take 400 mg/kg/day by mouth daily as needed for constipation or heartburn       MULTIPLE VITAMIN PO      1 daily       * order for DME     1 Units    Knee Walker Length of use: Three months       * order for DME     1 Device    Left breast prosthesis for bra Fax to [phone], Attention:  [facility], [facility].  Ph:  [phone]       pantoprazole 20 MG EC tablet    PROTONIX    180 tablet    Take 2 tablets (40 mg) by mouth daily Hold on file until needed       predniSONE 1 MG tablet    DELTASONE    70 tablet    4 mg daily for 1 week, taper by 1 mg weekly       pyridoxine 100 MG tablet    VITAMIN B-6     Take 100 mg by mouth daily.       senna-docusate 8.6-50 MG per tablet    SENOKOT-S;PERICOLACE    30 tablet    Take 1-2 tablets by mouth 2 times daily Take while on oral narcotics to prevent or treat constipation.       simvastatin 20 MG tablet    ZOCOR    90 tablet    Take 1 tablet (20 mg) by mouth At Bedtime Hold on file until needed       tolterodine 4 MG 24 hr capsule    DETROL LA    90 capsule    Take 1 capsule (4 mg) by mouth daily       traZODone 100 MG tablet    DESYREL    90 tablet    1 tablets at bedtime as needed.  Hold on file until needed       * Notice:  This list has 2 medication(s) that are the same as other medications prescribed for you. Read the directions carefully, and ask your doctor or other care provider to review them with you.

## 2017-06-19 ENCOUNTER — MEDICAL CORRESPONDENCE (OUTPATIENT)
Dept: HEALTH INFORMATION MANAGEMENT | Facility: CLINIC | Age: 72
End: 2017-06-19

## 2017-06-19 ENCOUNTER — TELEPHONE (OUTPATIENT)
Dept: FAMILY MEDICINE | Facility: CLINIC | Age: 72
End: 2017-06-19

## 2017-06-19 NOTE — TELEPHONE ENCOUNTER
Form from [facility] [employer]  was faxed back to [phone]  This form or order was addressing breast orthotics   It was signed by provider and all questions were addressed  Copy was sent to scan to be placed in chart     Elo Brock  [facility]

## 2017-06-22 ENCOUNTER — HOSPITAL ENCOUNTER (OUTPATIENT)
Dept: PHYSICAL THERAPY | Facility: CLINIC | Age: 72
Setting detail: THERAPIES SERIES
End: 2017-06-22
Attending: PODIATRIST
Payer: MEDICARE

## 2017-06-22 PROCEDURE — 97161 PT EVAL LOW COMPLEX 20 MIN: CPT | Mod: GP | Performed by: PHYSICAL THERAPIST

## 2017-06-22 PROCEDURE — G8979 MOBILITY GOAL STATUS: HCPCS | Mod: GP,CI | Performed by: PHYSICAL THERAPIST

## 2017-06-22 PROCEDURE — 97110 THERAPEUTIC EXERCISES: CPT | Mod: GP | Performed by: PHYSICAL THERAPIST

## 2017-06-22 PROCEDURE — G8978 MOBILITY CURRENT STATUS: HCPCS | Mod: GP,CK | Performed by: PHYSICAL THERAPIST

## 2017-06-22 NOTE — PROGRESS NOTES
06/22/17 0900   General Information   Type of Visit Initial OP Ortho PT Evaluation   Start of Care Date 06/22/17   Referring Physician trev   Patient/Family Goals Statement function   Orders Evaluate and Treat   Date of Order 06/08/17   Insurance Type Medicare   Medical Diagnosis SP foot surgery, calcaneal spur   Surgical/Medical history reviewed Yes   Precautions/Limitations no known precautions/limitations   Body Part(s)   Body Part(s) Ankle/Foot   Presentation and Etiology   Pertinent history of current problem (include personal factors and/or comorbidities that impact the POC) WBAT in the CAM boot.  doing well now since surgery.  want to ride motorcycle.  not a lot of pain.   Impairments F. Decreased strength and endurance   Functional Limitations perform activities of daily living;perform desired leisure / sports activities   How/Where did it occur From insidious onset   Chronicity New   Pain rating (0-10 point scale) Best (/10);Worst (/10)   Best (/10) 0   Worst (/10) 5   Pain quality B. Dull;C. Aching   Frequency of pain/symptoms A. Constant   Pain/symptoms exacerbated by J. ADL;K. Home tasks;B. Walking   Pain/symptoms eased by H. Cold;C. Rest   Progression of symptoms since onset: Improved   Fall Risk Screen   Per patient - Fall 2 or more times in past year? No   Per patient - Fall with injury in past year? No   Is patient a fall risk? No   Ankle/Foot Objective Findings   Side (if bilateral, select both right and left) Right   Posture Forward head position;Protracted shoulders   Gait/Locomotion min limp with CAM boot.  not able to assess wihtout boot yet.   Palpation incisional tender.  min tight incision mid.   Right DF (Knee Ext) AROM 5   Right PF AROM full   Right Calcanceal Inversion AROM full   Right Calcaneal Eversion AROM full   Right DF/Inversion Strength 4+   Right DF/Eversion Strength 4   Planned Therapy Interventions   Planned Therapy Interventions joint  mobilization;ROM;strengthening;stretching;neuromuscular re-education;manual therapy;balance training   Clinical Impression   Criteria for Skilled Therapeutic Interventions Met yes, treatment indicated   PT Diagnosis ankle surgery, achilles pain   Influenced by the following impairments Pain, decreased ROM, decreased strength, decreased flexibility   Functional limitations due to impairments Difficulty walking, standing, stair negotiation   Clinical Presentation Stable/Uncomplicated   Clinical Presentation Rationale pain, flexibility, surgery healing   Clinical Decision Making (Complexity) Low complexity   Therapy Frequency 1 time/week   Predicted Duration of Therapy Intervention (days/wks) 8wk   Risk & Benefits of therapy have been explained Yes   Patient, Family & other staff in agreement with plan of care Yes   Education Assessment   Preferred Learning Style Demonstration   Barriers to Learning No barriers   Ortho Goal 1   Goal Identifier 1   Goal Description pt will be able to walk with shoes on min limp   Target Date 07/22/17   Ortho Goal 2   Goal Identifier 2   Goal Description pt will be able to descend stairs with 1 rail recip   Target Date 08/03/17   Ortho Goal 3   Goal Identifier 3   Goal Description pt will be able to squat to floor    Target Date 08/22/17   Total Evaluation Time   Total Evaluation Time 30   Therapy Certification   Certification date from 06/22/17   Certification date to 08/22/17   Medical Diagnosis Ankle surgery, achilles

## 2017-06-22 NOTE — PROGRESS NOTES
[facility]          OUTPATIENT PHYSICAL THERAPY ORTHOPEDIC EVALUATION  PLAN OF TREATMENT FOR OUTPATIENT REHABILITATION  (COMPLETE FOR INITIAL CLAIMS ONLY)  Patient's Last Name, First Name, M.I.  YOB: 1945  Briana Mcgee    Provider s Name:  [facility]   Medical Record No.  [id number]   Start of Care Date:  06/22/17   Onset Date:   5/4/17   Type:     _X__PT   ___OT   ___SLP Medical Diagnosis:  Ankle surgery, achilles     PT Diagnosis:  ankle surgery, achilles pain   Visits from SOC:  1      _________________________________________________________________________________  Plan of Treatment/Functional Goals:  joint mobilization, ROM, strengthening, stretching, neuromuscular re-education, manual therapy, balance training           Goals  Goal Identifier: 1  Goal Description: pt will be able to walk with shoes on min limp  Target Date: 07/22/17    Goal Identifier: 2  Goal Description: pt will be able to descend stairs with 1 rail recip  Target Date: 08/03/17    Goal Identifier: 3  Goal Description: pt will be able to squat to floor   Target Date: 08/22/17       Therapy Frequency:  1 time/week  Predicted Duration of Therapy Intervention:  8wk    Sravan Castañeda, PT                 I CERTIFY THE NEED FOR THESE SERVICES FURNISHED UNDER        THIS PLAN OF TREATMENT AND WHILE UNDER MY CARE     (Physician co-signature of this document indicates review and certification of the therapy plan).                       Certification Date From:  06/22/17   Certification Date To:  08/22/17    Referring Provider:  T[place]    Initial Assessment        See Epic Evaluation Start of Care Date: 06/22/17

## 2017-06-29 ENCOUNTER — OFFICE VISIT (OUTPATIENT)
Dept: ORTHOPEDICS | Facility: CLINIC | Age: 72
End: 2017-06-29
Payer: COMMERCIAL

## 2017-06-29 VITALS
DIASTOLIC BLOOD PRESSURE: 70 MMHG | SYSTOLIC BLOOD PRESSURE: 130 MMHG | WEIGHT: 210 LBS | BODY MASS INDEX: 33.75 KG/M2 | HEIGHT: 66 IN

## 2017-06-29 DIAGNOSIS — G56.03 BILATERAL CARPAL TUNNEL SYNDROME: Primary | ICD-10-CM

## 2017-06-29 DIAGNOSIS — R20.2 PARESTHESIA OF BOTH HANDS: ICD-10-CM

## 2017-06-29 DIAGNOSIS — M25.539 PAIN IN JOINT, FOREARM, UNSPECIFIED LATERALITY: ICD-10-CM

## 2017-06-29 PROCEDURE — 99204 OFFICE O/P NEW MOD 45 MIN: CPT | Mod: 25 | Performed by: FAMILY MEDICINE

## 2017-06-29 PROCEDURE — 76942 ECHO GUIDE FOR BIOPSY: CPT | Performed by: FAMILY MEDICINE

## 2017-06-29 PROCEDURE — 20526 THER INJECTION CARP TUNNEL: CPT | Mod: 50 | Performed by: FAMILY MEDICINE

## 2017-06-29 RX ORDER — TRIAMCINOLONE ACETONIDE 40 MG/ML
40 INJECTION, SUSPENSION INTRA-ARTICULAR; INTRAMUSCULAR ONCE
Qty: 2 ML | Refills: 0 | OUTPATIENT
Start: 2017-06-29 | End: 2017-06-29

## 2017-06-29 NOTE — NURSING NOTE
"Chief Complaint   Patient presents with     Musculoskeletal Problem     bilateral hand numbness/tingling > 1 month       Initial /70  Ht 5' 6\" (1.676 m)  Wt 210 lb (95.3 kg)  BMI 33.89 kg/m2 Estimated body mass index is 33.89 kg/(m^2) as calculated from the following:    Height as of this encounter: 5' 6\" (1.676 m).    Weight as of this encounter: 210 lb (95.3 kg).  Medication Reconciliation: complete     Daniel Bourgeois ATC  "

## 2017-06-29 NOTE — PROGRESS NOTES
"Briana Mcgee  :  1945  DOS: 2017  MRN: [id number]    Sports Medicine Clinic Visit    PCP: Xavier Vega    Briana Mcgee is a 72 year old Right hand dominant female who is seen in consultation at the request of  James Mora M.D. presenting with bilateral hand numbness/tingling.    Injury: Gradual onset of bilateral hand numbness/tingling over the last 4 weeks, right>>>left - started after using knee scooter for several weeks following right foot surgery.  Pain located over bilateral wrist/hand, thumb - ring fingers, nonradiating.  Additional Features:  Positive: numbness thumb - ring fingers and weakness.  Symptoms are better with Rest.  Symptoms are worse with: gripping/grasping objects, sleeping.  Other evaluation and/or treatments so far consists of: Other medications: Prednisone, Rest and right wrist brace, PCP consult.  Recent imaging completed: No recent imaging completed.  Prior History of related problems: H/o of RA.    Social History: retired     Review of Systems  Musculoskeletal: as above  Remainder of review of systems is negative including constitutional, CV, pulmonary, GI, Skin and Neurologic except as noted in HPI or medical history.    Past Medical History:   Diagnosis Date     Allergies      Breast cancer (H)      Esophageal reflux      Hypertension      Other and unspecified hyperlipidemia      Other chronic bronchitis      Personal history of pneumonia (recurrent)     Hx-Pneumonia, \" Chronic Bronchitis\"     Personal history of tobacco use, presenting hazards to health      RA (rheumatoid arthritis) (H)      Unspecified hypothyroidism      Past Surgical History:   Procedure Laterality Date     BIOPSY BREAST       COLONOSCOPY N/A 2016    Procedure: COLONOSCOPY;  Surgeon: Dean Sanchez MD;  Location: [facility]     EXCISE EXOSTOSIS FOOT Right 2017    Procedure: EXCISE EXOSTOSIS FOOT;  Retrocalcaneal exostectomy right;  Surgeon: Antwan Goldstein DPM;  " "Location: [facility]      EXCISION BREAST LESION, OPEN >=1      2/05     HYSTERECTOMY, RYAN  1982     for non cancerous reasons and no abnormal pap     INJECT EPIDURAL LUMBAR  1/12/2011    INJECT EPIDURAL LUMBAR performed by GENERIC ANESTHESIA PROVIDER at [facility]     INJECT EPIDURAL LUMBAR  5/5/2011    Procedure:INJECT EPIDURAL LUMBAR; LESLIE Sánchez  ; Surgeon:GENERIC ANESTHESIA PROVIDER; Location:[facility]     INJECT EPIDURAL LUMBAR  10/6/2011    Procedure:INJECT EPIDURAL LUMBAR; LESLIE--; Surgeon:GENERIC ANESTHESIA PROVIDER; Location:[facility]     INJECT EPIDURAL LUMBAR  1/25/2012    Procedure:INJECT EPIDURAL LUMBAR; Terrell Sánchez  ; Surgeon:GENERIC ANESTHESIA PROVIDER; Location:[facility]     INJECT EPIDURAL LUMBAR  7/9/2012    Procedure: INJECT EPIDURAL LUMBAR;  LESLIE-Dr. Pacheco;  Surgeon: Provider, Generic Anesthesia;  Location: [facility]     LUMPECTOMY BREAST       SURGICAL HISTORY OF -       lumpectomy with sentinal node biopsy     SURGICAL HISTORY OF -       left knee arthoscopic repair medial meniscus       Objective  /70  Ht 5' 6\" (1.676 m)  Wt 210 lb (95.3 kg)  BMI 33.89 kg/m2    General: healthy, alert and in no distress    HEENT: no scleral icterus or conjunctival erythema   Skin: no suspicious lesions or rash. No jaundice.   CV: regular rhythm by palpation, 2+ distal pulses, no pedal edema    Resp: normal respiratory effort without conversational dyspnea   Psych: normal mood and affect    Gait: nonantalgic, appropriate coordination and balance   Neuro: normal light touch sensory exam of the extremities. Motor strength as noted below     Bilateral Wrist and Hand exam    Inspection:       No swelling, bruising or deformity bilateral    Tender:       Mild over flexor retinaculum and flexor muscles of forearm    Non Tender:       Remainder of the Wrist and Hand b/l,      anatomic snuffbox b/l,      scapholunate interval b/land      TFCC b/l    ROM:       Full and symmetric active and passive range of motion of the " forearm, wrist and digits bilateral and      Pain with passive flexion and extension bilaterally    Strength:       5/5 strength in the muscles of the hand, wrist and forearm bilateral    Special Tests:        positive (+) Tinel's test bilateral,       positive (+) Phatlen's test bilateral,       neg (-) TFCC compression test bilateral and       neg (-) Finkelstein's maneuver bilateral    Neurovascular:       2+ radial pulses bilaterally with brisk capillary refill,      normal sensation to light touch in the radial, median and ulnar nerve distributions and      abnormal sensation with CTS testing as above    Bilateral Elbow exam:    Full strength and ROM without laxity or pain    Sports Medicine Clinic Procedure  Ultrasound Guided left and bilateral Carpal Tunnel Injection  Technique: The risks of the procedure were explained to the patient.  A consent was signed for the right carpal tunnel injection.  The patient was evaluated with a [employer] ultrasound machine using a 12 MHz linear probe.     The bilateral carpal tunnel was prepped and draped in a sterile manner.  Ultrasound identification of the carpal tunnel, median nerve, and regional structures in both long and short axis.  The location of adjacent neurovascular structures were noted.  The probe was placed in short axis to the bilateral carpal tunnel.  A 1.5 inch 25 gauge needle was placed under ultrasound guidance into the carpal tunnel.  A mixture of 1 ml's 1% lidocaine and 1 ml kenalog (40mg/ml) was injected without difficulty on short axis view, using an in plane approach around the median nerve.  The needle was removed and there was good hemostasis without complications.  There was ultrasound documentation of needle placement and injection.  Pre-procedure pain 8/10 on R, 7/10 on L. Post-procedure pain 2/10 b/l.    Radiology:  None performed today    Assessment:  1. Bilateral carpal tunnel syndrome    2. Pain in joint, forearm, unspecified laterality     3. Paresthesia of both hands        Plan:  Discussed the assessment with the patient.  Follow up: call in 2-3 weeks if no benefit  Likely related to 2 mo of roll-a-bout use after foot surgery  Wrist brace for left provided, already has for the right  Brace use strategies reviewed  Discussed role of MR vs EMG and assessing severity before intervention  She feels strongly about getting some relief sooner and not having add'l testing  Would likely offer ortho hand consult for labile or worsening sx  US guided b/l CSI and limited hydrodissection today  Based on presentation and exam doubtful systemic neuropathy, injections will have good diagnostic value either way  Expectations and goals of CSI reviewed  Often 2-3 days for steroid effect, and can take up to two weeks for maximum effect  We discussed modified progressive pain-free activity as tolerated  Do not overuse in first two weeks if feeling better due to concern for vulnerability while steroid is working  Supportive care reviewed  All questions were answered today  Contact us with additional questions or concerns  Signs and sx of concern reviewed\    Thanks very much for sending this nice lady to us, I will keep you updated with her progress      Emerson Rodarte DO, CAQ  Primary Care Sports Medicine  [facility]             Disclaimer: This note consists of symbols derived from keyboarding, dictation and/or voice recognition software. As a result, there may be errors in the script that have gone undetected. Please consider this when interpreting information found in this chart.

## 2017-06-29 NOTE — MR AVS SNAPSHOT
After Visit Summary   6/29/2017    Briana Mcgee    MRN: [id number]           Patient Information     Date Of Birth          1945        Visit Information        Provider Department      6/29/2017 3:40 PM Emerson Rodarte,  [facility]        Today's Diagnoses     Bilateral carpal tunnel syndrome    -  1    Pain in joint, forearm, unspecified laterality        Paresthesia of both hands           Follow-ups after your visit        Your next 10 appointments already scheduled     Jul 06, 2017  7:15 AM CDT   Ortho Treatment with Sravan Castañeda PT   [facility]    [address] [address]   [phone]            Jul 10, 2017  9:20 AM CDT   Return Visit with Antwan Goldstein DPM   [facility] ([facility])    [address]  [address]  [place] [address]   [phone]            Oct 19, 2017 10:00 AM CDT   LAB with [facility] ([facility])    [address] [place] [address]   [phone]           Patient must bring picture ID.  Patient should be prepared to give a urine specimen  Please do not eat 10-12 hours before your appointment if you are coming in fasting for labs on lipids, cholesterol, or glucose (sugar).  Pregnant women should follow their Care Team instructions. Water with medications is okay. Do not drink coffee or other fluids.   If you have concerns about taking  your medications, please ask at office or if scheduling via [facility], send a message by clicking on Secure Messaging, Message Your Care Team.            Oct 19, 2017 10:30 AM CDT   MA SCREENING DIGITAL BILATERAL with [address] [facility] ([facility])    [address] [place] [address]   [phone]           Do not use any powder, lotion or deodorant under your arms  or on your breast. If you do, we will ask you to remove it before your exam.  Wear comfortable, two-piece clothing.  If you have any allergies, tell your care team.  Bring any previous mammograms from other facilities or have them mailed to the breast center.            Oct 23, 2017  9:30 AM CDT   Return Visit with Merari Duarte MD   [facility] ([facility])    [facility]  [address]  [place] [address] [phone]              Future tests that were ordered for you today     Open Future Orders        Priority Expected Expires Ordered    US Guided Needle Placement Routine  6/29/2018 6/29/2017            Who to contact     If you have questions or need follow up information about today's clinic visit or your schedule please contact [facility] directly at [phone].  Normal or non-critical lab and imaging results will be communicated to you by MyChart, letter or phone within 4 business days after the clinic has received the results. If you do not hear from us within 7 days, please contact the clinic through MyChart or phone. If you have a critical or abnormal lab result, we will notify you by phone as soon as possible.  Submit refill requests through [facility] or call your pharmacy and they will forward the refill request to us. Please allow 3 business days for your refill to be completed.          Additional Information About Your Visit        MyChart Information     [facility] gives you secure access to your electronic health record. If you see a primary care provider, you can also send messages to your care team and make appointments. If you have questions, please call your primary care clinic.  If you do not have a primary care provider, please call [phone] and they will assist you.        Care EveryWhere ID     This is your Care EveryWhere ID. This could be used by other organizations to access your [facility]  "medical records  [id number]        Your Vitals Were     Height BMI (Body Mass Index)                5' 6\" (1.676 m) 33.89 kg/m2           Blood Pressure from Last 3 Encounters:   06/29/17 130/70   06/16/17 150/75   04/25/17 131/59    Weight from Last 3 Encounters:   06/29/17 210 lb (95.3 kg)   06/16/17 210 lb 6.4 oz (95.4 kg)   06/12/17 216 lb (98 kg)              We Performed the Following     INJECTION THERAPEUTIC, CARPAL TUNNEL     TRIAMCINOLONE ACET INJ NOS          Today's Medication Changes          These changes are accurate as of: 6/29/17  4:20 PM.  If you have any questions, ask your nurse or doctor.               Start taking these medicines.        Dose/Directions    triamcinolone acetonide 40 MG/ML injection   Commonly known as:  KENALOG   Used for:  Bilateral carpal tunnel syndrome, Pain in joint, forearm, unspecified laterality   Started by:  Emerson Rodarte DO        Dose:  40 mg   1 mL (40 mg) by INTRA-ARTICULAR route once for 1 dose   Quantity:  2 mL   Refills:  0            Where to get your medicines      Some of these will need a paper prescription and others can be bought over the counter.  Ask your nurse if you have questions.     You don't need a prescription for these medications     triamcinolone acetonide 40 MG/ML injection                Primary Care Provider Office Phone # Fax #    Xavier Vega [phone]       [facility] [address] [address]        Equal Access to Services     JENNIFER HODGES AH: Hadii aad ku hadasho Soomaali, waaxda luqadaha, qaybta kaalmada adeegyada, waxay essie wylie . So [facility] [phone].    ATENCIÓN: Si habla español, tiene a bright disposición servicios gratuitos de asistencia lingüística. Llame al [phone].    We comply with applicable federal civil rights laws and [place] laws. We do not discriminate on the basis of race, color, national origin, age, disability sex, sexual " orientation or gender identity.            Thank you!     Thank you for choosing [facility]  for your care. Our goal is always to provide you with excellent care. Hearing back from our patients is one way we can continue to improve our services. Please take a few minutes to complete the written survey that you may receive in the mail after your visit with us. Thank you!             Your Updated Medication List - Protect others around you: Learn how to safely use, store and throw away your medicines at www.disposemymeds.org.          This list is accurate as of: 6/29/17  4:20 PM.  Always use your most recent med list.                   Brand Name Dispense Instructions for use Diagnosis    ALEVE PO      Take 2 tablets by mouth 2 times daily (with meals)        aspirin 81 MG EC tablet     90 tablet    Take 1 tablet (81 mg) by mouth daily    Heterozygous factor V Leiden mutation (H), Personal history of other diseases of circulatory system       calcium + D 600-200 MG-UNIT Tabs   Generic drug:  calcium carbonate-vitamin D      1 TABLET DAILY twice daily        CO Q 10 PO      Take  by mouth.        fluticasone 50 MCG/ACT spray    FLONASE    16 g    Spray 2 sprays into both nostrils daily Hold on file until needed    Other seasonal allergic rhinitis       folic acid 20 MG Caps           gabapentin 300 MG capsule    NEURONTIN    90 capsule    Take 1 capsule (300 mg) by mouth 3 times daily as needed    Carpal tunnel syndrome of right wrist       hydrochlorothiazide 12.5 MG Tabs tablet     90 tablet    Take 1 tablet (12.5 mg) by mouth daily    Essential hypertension       HYDROmorphone 2 MG tablet    DILAUDID    30 tablet    Take 1 tablet (2 mg) by mouth every 4 hours as needed for pain maximum 6 tablet(s) per day    Heel spur, right       hydrOXYzine 10 MG tablet    ATARAX    30 tablet    Take 1 tablet (10 mg) by mouth every 6 hours as needed for itching (and nausea)    Heel spur, right        levothyroxine 150 MCG tablet    SYNTHROID/LEVOTHROID    90 tablet    Take 1 tablet (150 mcg) by mouth daily BRAND NAME ONLY.  Hold on file until needed.    Hypothyroidism, unspecified type       magnesium hydroxide 400 MG/5ML suspension    MILK OF MAGNESIA     Take 400 mg/kg/day by mouth daily as needed for constipation or heartburn        MULTIPLE VITAMIN PO      1 daily        * order for DME     1 Units    Knee Walker Length of use: Three months    Heel spur, right       * order for DME     1 Device    Left breast prosthesis for bra Fax to [phone], Attention:  [facility] at [facility] [employer]  Ph:  [phone]    Malignant neoplasm of female breast (H)       pantoprazole 20 MG EC tablet    PROTONIX    180 tablet    Take 2 tablets (40 mg) by mouth daily Hold on file until needed    Gastroesophageal reflux disease without esophagitis       predniSONE 1 MG tablet    DELTASONE    70 tablet    4 mg daily for 1 week, taper by 1 mg weekly    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)       pyridoxine 100 MG tablet    VITAMIN B-6     Take 100 mg by mouth daily.        senna-docusate 8.6-50 MG per tablet    SENOKOT-S;PERICOLACE    30 tablet    Take 1-2 tablets by mouth 2 times daily Take while on oral narcotics to prevent or treat constipation.    Heel spur, right       simvastatin 20 MG tablet    ZOCOR    90 tablet    Take 1 tablet (20 mg) by mouth At Bedtime Hold on file until needed    Hyperlipidemia LDL goal <100       tolterodine 4 MG 24 hr capsule    DETROL LA    90 capsule    Take 1 capsule (4 mg) by mouth daily    Urinary urgency       traZODone 100 MG tablet    DESYREL    90 tablet    1 tablets at bedtime as needed.  Hold on file until needed    Insomnia, unspecified       triamcinolone acetonide 40 MG/ML injection    KENALOG    2 mL    1 mL (40 mg) by INTRA-ARTICULAR route once for 1 dose    Bilateral carpal tunnel syndrome, Pain in joint, forearm,  unspecified laterality       * Notice:  This list has 2 medication(s) that are the same as other medications prescribed for you. Read the directions carefully, and ask your doctor or other care provider to review them with you.

## 2017-07-06 ENCOUNTER — HOSPITAL ENCOUNTER (OUTPATIENT)
Dept: PHYSICAL THERAPY | Facility: CLINIC | Age: 72
Setting detail: THERAPIES SERIES
End: 2017-07-06
Attending: PODIATRIST
Payer: MEDICARE

## 2017-07-06 PROCEDURE — 40000718 ZZHC STATISTIC PT DEPARTMENT ORTHO VISIT: Performed by: PHYSICAL THERAPIST

## 2017-07-06 PROCEDURE — 97110 THERAPEUTIC EXERCISES: CPT | Mod: GP | Performed by: PHYSICAL THERAPIST

## 2017-07-06 PROCEDURE — 97112 NEUROMUSCULAR REEDUCATION: CPT | Mod: GP | Performed by: PHYSICAL THERAPIST

## 2017-07-10 ENCOUNTER — OFFICE VISIT (OUTPATIENT)
Dept: PODIATRY | Facility: CLINIC | Age: 72
End: 2017-07-10
Payer: COMMERCIAL

## 2017-07-10 VITALS — BODY MASS INDEX: 33.75 KG/M2 | WEIGHT: 210 LBS | HEIGHT: 66 IN

## 2017-07-10 DIAGNOSIS — Z98.890 S/P FOOT SURGERY, RIGHT: Primary | ICD-10-CM

## 2017-07-10 PROCEDURE — 99024 POSTOP FOLLOW-UP VISIT: CPT | Performed by: PODIATRIST

## 2017-07-10 NOTE — PROGRESS NOTES
"Briana returns to the office for reevaluation of the right foot.  The patient relates following the instructions given at the last visit with noted less pain.  The patient relates overall more  improvement in pain and function of the right foot.  The patient relates no other problems.    PAST MEDICAL HISTORY:   Past Medical History:   Diagnosis Date     Allergies      Breast cancer (H)      Esophageal reflux      Hypertension      Other and unspecified hyperlipidemia      Other chronic bronchitis      Personal history of pneumonia (recurrent)     Hx-Pneumonia, \" Chronic Bronchitis\"     Personal history of tobacco use, presenting hazards to health      RA (rheumatoid arthritis) (H)      Unspecified hypothyroidism        BMI= Body mass index is 33.89 kg/(m^2).    Weight management plan: Patient was referred to their PCP to discuss a diet and exercise plan.    Physical Exam:    General: The patient appears to have a pleasant mental affect.    Lower extremity physical exam:  Neurovascular status is intact with palpable pedal pulses and intact epicritic sensations.  Muscular exam is within normal limits to major muscle groups.  Integument is intact.      One notes decreased edema.  One notes no pain on palpation along the posterior aspect of the right heel. No surrounding erythema noted       Assessment:      ICD-10-CM    1. S/P foot surgery, right Z98.890        Plan:  I have explained to Briana about the conditions.  At this time, the patient may ambulate with normal shoewear. The patient was instructed to continue stretching the calf muscles daily. The patient was instructed to return to the office if any problems arise.    Disclaimer: This note consists of symbols derived from keyboarding, dictation and/or voice recognition software. As a result, there may be errors in the script that have gone undetected. Please consider this when interpreting information found in this chart.       ROCCO WittPJovannaM., " [email or web address].SACHA.

## 2017-07-10 NOTE — MR AVS SNAPSHOT
After Visit Summary   7/10/2017    Briana Mcgee    MRN: [id number]           Patient Information     Date Of Birth          1945        Visit Information        Provider Department      7/10/2017 9:20 AM Antwan Goldstein DPM [facility] [place]        Today's Diagnoses     S/P foot surgery, right    -  1       Follow-ups after your visit        Follow-up notes from your care team     Return if symptoms worsen or fail to improve.      Your next 10 appointments already scheduled     Oct 19, 2017 10:00 AM CDT   [facility] [place] ([facility])    [address] [address]   [phone]           Patient must bring picture ID.  Patient should be prepared to give a urine specimen  Please do not eat 10-12 hours before your appointment if you are coming in fasting for labs on lipids, cholesterol, or glucose (sugar).  Pregnant women should follow their Care Team instructions. Water with medications is okay. Do not drink coffee or other fluids.   If you have concerns about taking  your medications, please ask at office or if scheduling via [employer], send a message by clicking on Secure Messaging, Message Your Care Team.            Oct 19, 2017 10:30 AM CDT   MA SCREENING DIGITAL BILATERAL with [facility]   [facility] ([facility])    [address] [address]   [phone]           Do not use any powder, lotion or deodorant under your arms or on your breast. If you do, we will ask you to remove it before your exam.  Wear comfortable, two-piece clothing.  If you have any allergies, tell your care team.  Bring any previous mammograms from other facilities or have them mailed to the breast center. Three-dimensional (3D) mammograms are available at [facility] locations in [facility] and [place]. Benefits of 3D mammograms include: - Improved rate of  "cancer detection - Decreases your chance of having to go back for more tests, which means fewer: - \"False-positive\" results (This means that there is an abnormal area but it isn't cancer.) - Invasive testing procedures, such as a biopsy or surgery - Can provide clearer images of the breast if you have dense breast tissue. 3D mammography is an optional exam that anyone can have with a 2D mammogram. It doesn't replace or take the place of a 2D mammogram. 2D mammograms remain an effective screening test for all women.  Not all insurance companies cover the cost of a 3D mammogram. Check with your insurance.            Oct 23, 2017  9:30 AM CDT   Return Visit with Merari Duarte MD   [facility] ([facility])    [facility]  [address]  [place] [address] [phone]              Who to contact     If you have questions or need follow up information about today's clinic visit or your schedule please contact [facility] directly at [phone].  Normal or non-critical lab and imaging results will be communicated to you by [employer], letter or phone within 4 business days after the clinic has received the results. If you do not hear from us within 7 days, please contact the clinic through [employer] or phone. If you have a critical or abnormal lab result, we will notify you by phone as soon as possible.  Submit refill requests through [employer] or call your pharmacy and they will forward the refill request to us. Please allow 3 business days for your refill to be completed.          Additional Information About Your Visit        [employer] Information     [employer] gives you secure access to your electronic health record. If you see a primary care provider, you can also send messages to your care team and make appointments. If you have questions, please call your primary care clinic.  If you do not have a primary care provider, please call " "[phone] and they will assist you.        Care EveryWhere ID     This is your Care EveryWhere ID. This could be used by other organizations to access your [facility] medical records  [id number]        Your Vitals Were     Height BMI (Body Mass Index)                1.676 m (5' 6\") 33.89 kg/m2           Blood Pressure from Last 3 Encounters:   06/29/17 130/70   06/16/17 150/75   04/25/17 131/59    Weight from Last 3 Encounters:   07/10/17 95.3 kg (210 lb)   06/29/17 95.3 kg (210 lb)   06/16/17 95.4 kg (210 lb 6.4 oz)              Today, you had the following     No orders found for display       Primary Care Provider Office Phone # Fax #    Xavier Vega [phone]       [facility] [address] [address]        Equal Access to Services     JENNIFER HODGES : Hadii aad ku hadasho Soomaali, waaxda luqadaha, qaybta kaalmada adeegyada, waxay idiin hayadamn neftali wylie . So [facility] [phone].    ATENCIÓN: Si flaquito mcfarland, tiene a bright disposición servicios gratuitos de asistencia lingüística. Llame al [phone].    We comply with applicable federal civil rights laws and [place] laws. We do not discriminate on the basis of race, color, national origin, age, disability sex, sexual orientation or gender identity.            Thank you!     Thank you for choosing [facility]  for your care. Our goal is always to provide you with excellent care. Hearing back from our patients is one way we can continue to improve our services. Please take a few minutes to complete the written survey that you may receive in the mail after your visit with us. Thank you!             Your Updated Medication List - Protect others around you: Learn how to safely use, store and throw away your medicines at www.disposemymeds.org.          This list is accurate as of: 7/10/17 11:59 PM.  Always use your most recent med list.                   Brand Name " Dispense Instructions for use Diagnosis    ALEVE PO      Take 2 tablets by mouth 2 times daily (with meals)        aspirin 81 MG EC tablet     90 tablet    Take 1 tablet (81 mg) by mouth daily    Heterozygous factor V Leiden mutation (H), Personal history of other diseases of circulatory system       calcium + D 600-200 MG-UNIT Tabs   Generic drug:  calcium carbonate-vitamin D      1 TABLET DAILY twice daily        CO Q 10 PO      Take  by mouth.        fluticasone 50 MCG/ACT spray    FLONASE    16 g    Spray 2 sprays into both nostrils daily Hold on file until needed    Other seasonal allergic rhinitis       folic acid 20 MG Caps           gabapentin 300 MG capsule    NEURONTIN    90 capsule    Take 1 capsule (300 mg) by mouth 3 times daily as needed    Carpal tunnel syndrome of right wrist       hydrochlorothiazide 12.5 MG Tabs tablet     90 tablet    Take 1 tablet (12.5 mg) by mouth daily    Essential hypertension       HYDROmorphone 2 MG tablet    DILAUDID    30 tablet    Take 1 tablet (2 mg) by mouth every 4 hours as needed for pain maximum 6 tablet(s) per day    Heel spur, right       hydrOXYzine 10 MG tablet    ATARAX    30 tablet    Take 1 tablet (10 mg) by mouth every 6 hours as needed for itching (and nausea)    Heel spur, right       levothyroxine 150 MCG tablet    SYNTHROID/LEVOTHROID    90 tablet    Take 1 tablet (150 mcg) by mouth daily BRAND NAME ONLY.  Hold on file until needed.    Hypothyroidism, unspecified type       magnesium hydroxide 400 MG/5ML suspension    MILK OF MAGNESIA     Take 400 mg/kg/day by mouth daily as needed for constipation or heartburn        MULTIPLE VITAMIN PO      1 daily        * order for DME     1 Units    Knee Walker Length of use: Three months    Heel spur, right       * order for DME     1 Device    Left breast prosthesis for bra Fax to [phone], Attention:  [facility] at [facility] [facility]  Ph:  [phone]    Malignant neoplasm of female  breast (H)       pantoprazole 20 MG EC tablet    PROTONIX    180 tablet    Take 2 tablets (40 mg) by mouth daily Hold on file until needed    Gastroesophageal reflux disease without esophagitis       predniSONE 1 MG tablet    DELTASONE    70 tablet    4 mg daily for 1 week, taper by 1 mg weekly    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)       pyridoxine 100 MG tablet    VITAMIN B-6     Take 100 mg by mouth daily.        senna-docusate 8.6-50 MG per tablet    SENOKOT-S;PERICOLACE    30 tablet    Take 1-2 tablets by mouth 2 times daily Take while on oral narcotics to prevent or treat constipation.    Heel spur, right       simvastatin 20 MG tablet    ZOCOR    90 tablet    Take 1 tablet (20 mg) by mouth At Bedtime Hold on file until needed    Hyperlipidemia LDL goal <100       tolterodine 4 MG 24 hr capsule    DETROL LA    90 capsule    Take 1 capsule (4 mg) by mouth daily    Urinary urgency       traZODone 100 MG tablet    DESYREL    90 tablet    1 tablets at bedtime as needed.  Hold on file until needed    Insomnia, unspecified       * Notice:  This list has 2 medication(s) that are the same as other medications prescribed for you. Read the directions carefully, and ask your doctor or other care provider to review them with you.

## 2017-07-10 NOTE — LETTER
"      7/10/2017         RE: Briana Mcgee  [address] [address]        Dear Colleague,    Thank you for referring your patient, Briana Mcgee, to the [facility]. Please see a copy of my visit note below.    Briana returns to the office for reevaluation of the right foot.  The patient relates following the instructions given at the last visit with noted less pain.  The patient relates overall more  improvement in pain and function of the right foot.  The patient relates no other problems.    PAST MEDICAL HISTORY:   Past Medical History:   Diagnosis Date     Allergies      Breast cancer (H)      Esophageal reflux      Hypertension      Other and unspecified hyperlipidemia      Other chronic bronchitis      Personal history of pneumonia (recurrent)     Hx-Pneumonia, \" Chronic Bronchitis\"     Personal history of tobacco use, presenting hazards to health      RA (rheumatoid arthritis) (H)      Unspecified hypothyroidism        BMI= Body mass index is 33.89 kg/(m^2).    Weight management plan: Patient was referred to their PCP to discuss a diet and exercise plan.    Physical Exam:    General: The patient appears to have a pleasant mental affect.    Lower extremity physical exam:  Neurovascular status is intact with palpable pedal pulses and intact epicritic sensations.  Muscular exam is within normal limits to major muscle groups.  Integument is intact.      One notes decreased edema.  One notes no pain on palpation along the posterior aspect of the right heel. No surrounding erythema noted       Assessment:      ICD-10-CM    1. S/P foot surgery, right Z98.890        Plan:  I have explained to Briana about the conditions.  At this time, the patient may ambulate with normal shoewear. The patient was instructed to continue stretching the calf muscles daily. The patient was instructed to return to the office if any problems arise.    Disclaimer: This " note consists of symbols derived from keyboarding, dictation and/or voice recognition software. As a result, there may be errors in the script that have gone undetected. Please consider this when interpreting information found in this chart.       LUCAS Goldstein D.P.M., F.YASMIN.C.F.A.S.      Again, thank you for allowing me to participate in the care of your patient.        Sincerely,        Antwan Goldstein DPM

## 2017-08-09 ENCOUNTER — TRANSFERRED RECORDS (OUTPATIENT)
Dept: HEALTH INFORMATION MANAGEMENT | Facility: CLINIC | Age: 72
End: 2017-08-09

## 2017-08-17 ENCOUNTER — TELEPHONE (OUTPATIENT)
Dept: RHEUMATOLOGY | Facility: CLINIC | Age: 72
End: 2017-08-17

## 2017-08-17 NOTE — TELEPHONE ENCOUNTER
Left message for pt to call back regarding messages below.  Jackie KELLOGG RN BSN PHN  Specialty Clinics

## 2017-08-17 NOTE — TELEPHONE ENCOUNTER
Reason for Call:  Other call back    Detailed comments: pt calling stating she has been having back spasms x 3 weeks. She has had to be off prednisone and cortisone due to having surgery for carpel tunnel on Sept 5th. She thinks her spasms might be related to her arthritis. Pain goes from lower back into hips and legs.     Phone Number Patient can be reached at: Home number on file [phone] (home)    Best Time: any     Can we leave a detailed message on this number? YES    Call taken on 8/17/2017 at 7:59 AM by Concha Quintanilla

## 2017-08-17 NOTE — TELEPHONE ENCOUNTER
Please see note below and advise. Pt is not currently on a muscle relaxer.   Jackie KELLOGG RN BSN PHN  Specialty Clinics

## 2017-08-17 NOTE — TELEPHONE ENCOUNTER
Spoke with pt and she stated that she did not think that she had hurt her back, but could not be sure since she was using a knee walker for about 10 weeks and just stopped using that. Advised pt to discuss with PCP about doing some physical therapy to help with her back.  Pt stated she would call and discuss with PCP.  Jackie KELLOGG RN BSN PHN  Specialty Clinics

## 2017-08-18 ENCOUNTER — OFFICE VISIT (OUTPATIENT)
Dept: FAMILY MEDICINE | Facility: CLINIC | Age: 72
End: 2017-08-18
Payer: COMMERCIAL

## 2017-08-18 VITALS
BODY MASS INDEX: 33.89 KG/M2 | SYSTOLIC BLOOD PRESSURE: 128 MMHG | TEMPERATURE: 98.4 F | RESPIRATION RATE: 20 BRPM | HEART RATE: 76 BPM | DIASTOLIC BLOOD PRESSURE: 72 MMHG | WEIGHT: 210 LBS

## 2017-08-18 DIAGNOSIS — M54.16 LUMBAR RADICULOPATHY: Primary | ICD-10-CM

## 2017-08-18 DIAGNOSIS — M48.061 SPINAL STENOSIS OF LUMBAR REGION: ICD-10-CM

## 2017-08-18 PROCEDURE — 99213 OFFICE O/P EST LOW 20 MIN: CPT | Performed by: NURSE PRACTITIONER

## 2017-08-18 RX ORDER — GABAPENTIN 300 MG/1
600 CAPSULE ORAL 2 TIMES DAILY
Qty: 120 CAPSULE | Refills: 0 | Status: SHIPPED | OUTPATIENT
Start: 2017-08-18 | End: 2017-09-19

## 2017-08-18 NOTE — PROGRESS NOTES
SUBJECTIVE:   Briana Mcgee is a 72 year old female who presents to clinic today for the following health issues:      Back Pain       Duration: 3 weeks         Specific cause: none    Description:   Location of pain: low back both and middle of back  Character of pain: sharp and intermittent  Pain radiation:radiates into the right buttocks and radiates into the right leg posteriorly down to knee  New numbness or weakness in legs, not attributed to pain:  no     Intensity: Currently 5/10, At its worst 10/10    History:   Pain interferes with job: Not applicable  History of back problems: previous herniated disc 03/03/2016 repair   Any previous MRI or X-rays: Yes- at [place].  Sees a specialist for back pain:  Yes, Dr. Garcia , no contact or appointment made at this time  Therapies tried without relief: Aleve and Robaxin     Alleviating factors:   Improved by: opioids  Dilaudid, she was told to stop Prednisone pre-operatively for up-coming carpal tunnel surgery    Precipitating factors:  Worsened by: Bending and Standing    Functional and Psychosocial Screen (St[place] STarT Back):      Not performed today    71 yo female arrives today for acute sciatica issue. She has a history of advanced spondylosis to L5-S1 and to a lesser extent to L3-L4, L5-S1. She is followed by Dr. Garcia at [facility]. She had her last visit with him 2/2016. She has had epidural injections in the past at [facility]. She notes that she is on Gabapentin 300 mg BID. She was told to stop Prednisone pre-operatively to her carpal tunnel surgery. She denies any change to back pain, but having pain down the sciatica. Denies bowel or bladder dysfunction.  She relays that she does some back exercises at home, she remains active of her ADL's.   HPI:   PCP:  Xavier Vega [phone]    Patient Active Problem List   Diagnosis     Malignant neoplasm of female breast (H)     Hypothyroidism     Impaired fasting glucose      Insomnia     ischemic stroke 3/06     Rhinitis, allergic seasonal     OA (osteoarthritis) of knee     Hot flashes     HYPERLIPIDEMIA LDL GOAL <100     Heterozygous factor V Leiden mutation (H)     GERD (gastroesophageal reflux disease)     Varicose veins of lower extremities with complications     Lumbar radiculopathy     Spinal stenosis     Advanced directives, counseling/discussion     CKD (chronic kidney disease) stage 2, GFR 60-89 ml/min     Obesity     Hepatitis     Essential hypertension     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)     Current Outpatient Prescriptions   Medication     gabapentin (NEURONTIN) 300 MG capsule     hydrochlorothiazide 12.5 MG TABS tablet     predniSONE (DELTASONE) 1 MG tablet     tolterodine (DETROL LA) 4 MG 24 hr capsule     senna-docusate (SENOKOT-S;PERICOLACE) 8.6-50 MG per tablet     hydrOXYzine (ATARAX) 10 MG tablet     HYDROmorphone (DILAUDID) 2 MG tablet     simvastatin (ZOCOR) 20 MG tablet     traZODone (DESYREL) 100 MG tablet     levothyroxine (SYNTHROID/LEVOTHROID) 150 MCG tablet     pantoprazole (PROTONIX) 20 MG EC tablet     fluticasone (FLONASE) 50 MCG/ACT spray     magnesium hydroxide (MILK OF MAGNESIA) 400 MG/5ML suspension     folic acid 20 MG CAPS     Naproxen Sodium (ALEVE PO)     aspirin 81 MG EC tablet     Coenzyme Q10 (CO Q 10 PO)     pyridoxine (VITAMIN B-6) 100 MG tablet     CALCIUM + D 600-200 MG-UNIT OR TABS     MULTIPLE VITAMIN OR     [DISCONTINUED] gabapentin (NEURONTIN) 300 MG capsule     No current facility-administered medications for this visit.        Reviewed and updated:  Tobacco  Allergies  Meds  Med Hx  Surg Hx  Fam Hx  Soc Hx     ROS:  Constitutional, HEENT, cardiovascular, pulmonary, gi and gu systems are negative, except as otherwise noted.  MUSCULOSKELETAL: History of lumbar stenosis  NEURO: left- sided sciatica      PHYSICAL EXAM:   /72 (BP Location: Right arm, Patient Position: Sitting, Cuff  Size: Adult Large)  Pulse 76  Temp 98.4  F (36.9  C) (Tympanic)  Resp 20  Wt 210 lb (95.3 kg)  BMI 33.89 kg/m2  Body mass index is 33.89 kg/(m^2).  GENERAL APPEARANCE: healthy, alert, no distress and over weight  RESP: lungs clear to auscultation - no rales, rhonchi or wheezes  CV: regular rates and rhythm, normal S1 S2, no S3 or S4 and no murmur, click or rub  MS: extremities normal- no gross deformities noted and peripheral pulses normal  ORTHO:  Low back exam:  Inspection:      no visible deformity in the low back       normal skin       normal vascular       normal lymphatic       no asymmetry  Posture:      normal       lumbar lordosis diminished  Tender :     paraspinal muscles  ROM:     (unknown what her regular ROM is)       limited flexion due to pain       limited extension due to pain       limited  lateral bending due to pain       limited  rotation due to pain  Strength:     hip flexion 4/5 bilateral       knee extension 5/5 bilateral       ankle dorsiflexion 5/5 bilateral       ankle plantarflexion 5/5 bilateral       dorsiflexion of the great toe 5/5 bilateral  Reflexes:      patellar (L3, L4) symmetric normal       achilles tendons (S1) symmetric normal  Sensation:     grossly intact throughout lower extremities    PSYCH: mentation appears normal and affect normal/bright    ASSESSMENT & PLAN:     (M54.16) Lumbar radiculopathy  (primary encounter diagnosis)  Comment: acute on chronic  Plan: gabapentin (NEURONTIN) 300 MG capsule        Increase Gabapentin from 300 mg BID to 600 mg BID    (M48.06) Spinal stenosis of lumbar region  Comment: chronic, stable  Plan: follows with Orthopedist DR. Garcia    Patient Instructions     Increase Gabapentin to 600 mg twice daily    Follow-up with DR. Vega    Continue stretching    Can use heat 20 minutes at a time    Robaxin as needed if you're having spasms    Keep an eye on sciatica pain--- you might need to follow-up with   Schwaniyah      Understanding Lumbar Radiculopathy    Lumbar radiculopathy is irritation or inflammation of a nerve root in the low back. It causes symptoms that spread out from the back down one or both legs. To understand this condition, it helps to understand the parts of the spine:    Vertebrae. These are bones that stack to form the spine. The lumbar spine contains the 5 bottom vertebrae.    Disks. These are soft pads of tissue between the vertebrae. They act as shock absorbers for the spine.    Spinal canal. This is a tunnel formed within the stacked vertebrae. In the lumbar spine, nerves run through this canal.    Nerves. These branch off and leave the spinal canal, traveling out to parts of the body. As they leave the spinal canal, nerves pass through openings between the vertebrae. The nerve root is the part of the nerve that is closest to the spinal canal.    Sciatic nerve. This is a large nerve formed from several nerve roots in the low back. This nerve extends down the back of the leg to the foot.  With lumbar radiculopathy, nerve roots in the low back become irritated. This leads to pain and symptoms. The sciatic nerve is commonly involved, so the condition is often called sciatica.  What causes lumbar radiculopathy?  Aging, injury, poor posture, extra body weight, and other issues can lead to problems in the low back. These problems may then irritate nerve roots. They include:    Damage to a disk in the lumbar spine. The damaged disk may then press on nearby nerve roots.    Degeneration from wear and tear, and aging. This can lead to narrowing (stenosis) of the openings between the vertebrae. The narrowed openings press on nerve roots as they leave the spinal canal.    Unstable spine. This is when a vertebra slips forward. It can then press on a nerve root.  Other, less common things can put pressure on nerves in the low back. These include diabetes, infection, or a tumor.  Symptoms of lumbar  radiculopathy  These include:    Pain in the low back    Pain, numbness, tingling, or weakness that travels into the buttocks, hip, groin, or leg    Muscle spasms  Treatment for lumbar radiculopathy  In most cases, your healthcare provider will first try treatments that help relieve symptoms. These may include:    Prescription and over-the-counter pain medicines. These help relieve pain, swelling, and irritation.    Limits on positions and activities that increase pain. But lying in bed or avoiding all movement is only recommended for a short period of time.    Physical therapy, including exercises and stretches. This helps decrease pain and increase movement and function.    Steroid shots into the lower back. This may help relieve symptoms for a time.    Weight-loss program. If you are overweight, losing extra pounds may help relieve symptoms.  In some cases, you may need surgery to fix the underlying problem. This depends on the cause, the symptoms, and how long the pain has lasted.  Possible complications  Over time, an irritated and inflamed nerve may become damaged. This may lead to long-lasting (permanent) numbness or weakness in your legs and feet. If symptoms change suddenly or get worse, be sure to let your healthcare provider know.  When to call your healthcare provider  Call your healthcare provider right away if you have any of these:    New pain or pain that gets worse    New or increasing weakness, tingling, or numbness in your leg or foot    Problems controlling your bladder or bowel   Date Last Reviewed: 3/10/2016    0641-3789 [employer]. [address] [place] [address], [place] [address]. All rights reserved. This information is not intended as a substitute for professional medical care. Always follow your healthcare professional's instructions.          Risks, benefits, side effects and rationale for treatment plan fully discussed with the patient and understanding expressed.    Jaci  [facility]

## 2017-08-18 NOTE — NURSING NOTE
"Chief Complaint   Patient presents with     Back Pain       Initial /72 (BP Location: Right arm, Patient Position: Sitting, Cuff Size: Adult Large)  Pulse 76  Temp 98.4  F (36.9  C) (Tympanic)  Resp 20  Wt 210 lb (95.3 kg)  BMI 33.89 kg/m2 Estimated body mass index is 33.89 kg/(m^2) as calculated from the following:    Height as of 7/10/17: 5' 6\" (1.676 m).    Weight as of this encounter: 210 lb (95.3 kg).  Medication Reconciliation: complete    Health Maintenance that is potentially due pending provider review:  NONE    n/a    Is there anyone who you would like to be able to receive your results? .  If yes have patient fill out JUSTINE    "

## 2017-08-18 NOTE — PATIENT INSTRUCTIONS
Increase Gabapentin to 600 mg twice daily    Follow-up with DR. Vega    Continue stretching    Can use heat 20 minutes at a time    Robaxin as needed if you're having spasms    Keep an eye on sciatica pain--- you might need to follow-up with Dr. Garcia      Understanding Lumbar Radiculopathy    Lumbar radiculopathy is irritation or inflammation of a nerve root in the low back. It causes symptoms that spread out from the back down one or both legs. To understand this condition, it helps to understand the parts of the spine:    Vertebrae. These are bones that stack to form the spine. The lumbar spine contains the 5 bottom vertebrae.    Disks. These are soft pads of tissue between the vertebrae. They act as shock absorbers for the spine.    Spinal canal. This is a tunnel formed within the stacked vertebrae. In the lumbar spine, nerves run through this canal.    Nerves. These branch off and leave the spinal canal, traveling out to parts of the body. As they leave the spinal canal, nerves pass through openings between the vertebrae. The nerve root is the part of the nerve that is closest to the spinal canal.    Sciatic nerve. This is a large nerve formed from several nerve roots in the low back. This nerve extends down the back of the leg to the foot.  With lumbar radiculopathy, nerve roots in the low back become irritated. This leads to pain and symptoms. The sciatic nerve is commonly involved, so the condition is often called sciatica.  What causes lumbar radiculopathy?  Aging, injury, poor posture, extra body weight, and other issues can lead to problems in the low back. These problems may then irritate nerve roots. They include:    Damage to a disk in the lumbar spine. The damaged disk may then press on nearby nerve roots.    Degeneration from wear and tear, and aging. This can lead to narrowing (stenosis) of the openings between the vertebrae. The narrowed openings press on nerve roots as they leave the  spinal canal.    Unstable spine. This is when a vertebra slips forward. It can then press on a nerve root.  Other, less common things can put pressure on nerves in the low back. These include diabetes, infection, or a tumor.  Symptoms of lumbar radiculopathy  These include:    Pain in the low back    Pain, numbness, tingling, or weakness that travels into the buttocks, hip, groin, or leg    Muscle spasms  Treatment for lumbar radiculopathy  In most cases, your healthcare provider will first try treatments that help relieve symptoms. These may include:    Prescription and over-the-counter pain medicines. These help relieve pain, swelling, and irritation.    Limits on positions and activities that increase pain. But lying in bed or avoiding all movement is only recommended for a short period of time.    Physical therapy, including exercises and stretches. This helps decrease pain and increase movement and function.    Steroid shots into the lower back. This may help relieve symptoms for a time.    Weight-loss program. If you are overweight, losing extra pounds may help relieve symptoms.  In some cases, you may need surgery to fix the underlying problem. This depends on the cause, the symptoms, and how long the pain has lasted.  Possible complications  Over time, an irritated and inflamed nerve may become damaged. This may lead to long-lasting (permanent) numbness or weakness in your legs and feet. If symptoms change suddenly or get worse, be sure to let your healthcare provider know.  When to call your healthcare provider  Call your healthcare provider right away if you have any of these:    New pain or pain that gets worse    New or increasing weakness, tingling, or numbness in your leg or foot    Problems controlling your bladder or bowel   Date Last Reviewed: 3/10/2016    5183-3650 [employer]. [address] [place] [address], [place] [address]. All rights reserved. This information is not intended as a  substitute for professional medical care. Always follow your healthcare professional's instructions.

## 2017-08-18 NOTE — MR AVS SNAPSHOT
After Visit Summary   8/18/2017    Briana Mcgee    MRN: [id number]           Patient Information     Date Of Birth          1945        Visit Information        Provider Department      8/18/2017 11:00 AM Jaci Singletary CNP [facility]        Today's Diagnoses     Lumbar radiculopathy    -  1    Carpal tunnel syndrome of right wrist          Care Instructions    Increase Gabapentin to 600 mg twice daily    Follow-up with DR. Vega    Continue stretching    Can use heat 20 minutes at a time    Robaxin as needed if you're having spasms    Keep an eye on sciatica pain--- you might need to follow-up with Dr. Garcia      Understanding Lumbar Radiculopathy    Lumbar radiculopathy is irritation or inflammation of a nerve root in the low back. It causes symptoms that spread out from the back down one or both legs. To understand this condition, it helps to understand the parts of the spine:    Vertebrae. These are bones that stack to form the spine. The lumbar spine contains the 5 bottom vertebrae.    Disks. These are soft pads of tissue between the vertebrae. They act as shock absorbers for the spine.    Spinal canal. This is a tunnel formed within the stacked vertebrae. In the lumbar spine, nerves run through this canal.    Nerves. These branch off and leave the spinal canal, traveling out to parts of the body. As they leave the spinal canal, nerves pass through openings between the vertebrae. The nerve root is the part of the nerve that is closest to the spinal canal.    Sciatic nerve. This is a large nerve formed from several nerve roots in the low back. This nerve extends down the back of the leg to the foot.  With lumbar radiculopathy, nerve roots in the low back become irritated. This leads to pain and symptoms. The sciatic nerve is commonly involved, so the condition is often called sciatica.  What causes lumbar radiculopathy?  Aging, injury, poor posture, extra  body weight, and other issues can lead to problems in the low back. These problems may then irritate nerve roots. They include:    Damage to a disk in the lumbar spine. The damaged disk may then press on nearby nerve roots.    Degeneration from wear and tear, and aging. This can lead to narrowing (stenosis) of the openings between the vertebrae. The narrowed openings press on nerve roots as they leave the spinal canal.    Unstable spine. This is when a vertebra slips forward. It can then press on a nerve root.  Other, less common things can put pressure on nerves in the low back. These include diabetes, infection, or a tumor.  Symptoms of lumbar radiculopathy  These include:    Pain in the low back    Pain, numbness, tingling, or weakness that travels into the buttocks, hip, groin, or leg    Muscle spasms  Treatment for lumbar radiculopathy  In most cases, your healthcare provider will first try treatments that help relieve symptoms. These may include:    Prescription and over-the-counter pain medicines. These help relieve pain, swelling, and irritation.    Limits on positions and activities that increase pain. But lying in bed or avoiding all movement is only recommended for a short period of time.    Physical therapy, including exercises and stretches. This helps decrease pain and increase movement and function.    Steroid shots into the lower back. This may help relieve symptoms for a time.    Weight-loss program. If you are overweight, losing extra pounds may help relieve symptoms.  In some cases, you may need surgery to fix the underlying problem. This depends on the cause, the symptoms, and how long the pain has lasted.  Possible complications  Over time, an irritated and inflamed nerve may become damaged. This may lead to long-lasting (permanent) numbness or weakness in your legs and feet. If symptoms change suddenly or get worse, be sure to let your healthcare provider know.  When to call your healthcare  provider  Call your healthcare provider right away if you have any of these:    New pain or pain that gets worse    New or increasing weakness, tingling, or numbness in your leg or foot    Problems controlling your bladder or bowel   Date Last Reviewed: 3/10/2016    1999-1045 [employer]. [address] [place] [address]. All rights reserved. This information is not intended as a substitute for professional medical care. Always follow your healthcare professional's instructions.                Follow-ups after your visit        Your next 10 appointments already scheduled     Aug 28, 2017  8:00 AM CDT   [facility]-Op physical with Xavier [facility]ega MD   [facility]    [address] [address]   [phone]            Sep 05, 2017   Procedure with Melba Yi MD   [facility] (--)    [address] [place] [address]   [phone]           The medical center is located at [address] [place] [address]. ([address] [place], [place] miles north of [place]).            Oct 19, 2017 10:00 AM CDT   LAB with [facility] ([facility])    [address] [address]   [phone]           Patient must bring picture ID. Patient should be prepared to give a urine specimen  Please do not eat 10-12 hours before your appointment if you are coming in fasting for labs on lipids, cholesterol, or glucose (sugar). Pregnant women should follow their Care Team instructions. Water with medications is okay. Do not drink coffee or other fluids. If you have concerns about taking  your medications, please ask at office or if scheduling via [employer], send a message by clicking on Secure Messaging, Message Your Care Team.            Oct 19, 2017 10:30 AM CDT   MA SCREENING DIGITAL BILATERAL with [address] [facility] ([facility])    [facility]  "[place]  [facility] [address]   [phone]           Do not use any powder, lotion or deodorant under your arms or on your breast. If you do, we will ask you to remove it before your exam.  Wear comfortable, two-piece clothing.  If you have any allergies, tell your care team.  Bring any previous mammograms from other facilities or have them mailed to the breast center. Three-dimensional (3D) mammograms are available at [place] locations in [facility], and [place]. Health locations include [place] and [facility] in [place]. Benefits of 3D mammograms include: - Improved rate of cancer detection - Decreases your chance of having to go back for more tests, which means fewer: - \"False-positive\" results (This means that there is an abnormal area but it isn't cancer.) - Invasive testing procedures, such as a biopsy or surgery - Can provide clearer images of the breast if you have dense breast tissue. 3D mammography is an optional exam that anyone can have with a 2D mammogram. It doesn't replace or take the place of a 2D mammogram. 2D mammograms remain an effective screening test for all women.  Not all insurance companies cover the cost of a 3D mammogram. Check with your insurance.            Oct 23, 2017  9:30 AM CDT   Return Visit with Merari Duarte MD   [facility] ([facility])    [facility]  [address]  [facility] [address]   [phone]              Who to contact     If you have questions or need follow up information about today's clinic visit or your schedule please contact [facility] directly at [phone].  Normal or non-critical lab and imaging results will be communicated to you by MyChart, letter or phone within 4 business days after the clinic has received the results. If you do not hear from us within 7 days, please contact the clinic through MyChart " or phone. If you have a critical or abnormal lab result, we will notify you by phone as soon as possible.  Submit refill requests through [employer] or call your pharmacy and they will forward the refill request to us. Please allow 3 business days for your refill to be completed.          Additional Information About Your Visit        Clin[facility]hart Information     [employer] gives you secure access to your electronic health record. If you see a primary care provider, you can also send messages to your care team and make appointments. If you have questions, please call your primary care clinic.  If you do not have a primary care provider, please call [phone] and they will assist you.        Care EveryWhere ID     This is your Care EveryWhere ID. This could be used by other organizations to access your [facility] medical records  [id number]        Your Vitals Were     Pulse Temperature Respirations BMI (Body Mass Index)          76 98.4  F (36.9  C) (Tympanic) 20 33.89 kg/m2         Blood Pressure from Last 3 Encounters:   08/18/17 128/72   06/29/17 130/70   06/16/17 150/75    Weight from Last 3 Encounters:   08/18/17 210 lb (95.3 kg)   07/10/17 210 lb (95.3 kg)   06/29/17 210 lb (95.3 kg)              Today, you had the following     No orders found for display         Today's Medication Changes          These changes are accurate as of: 8/18/17 12:02 PM.  If you have any questions, ask your nurse or doctor.               These medicines have changed or have updated prescriptions.        Dose/Directions    gabapentin 300 MG capsule   Commonly known as:  NEURONTIN   This may have changed:    - how much to take  - when to take this  - reasons to take this   Used for:  Carpal tunnel syndrome of right wrist   Changed by:  Jaci Singletary, CNP        Dose:  600 mg   Take 2 capsules (600 mg) by mouth 2 times daily   Quantity:  120 capsule   Refills:  0            Where to get your medicines      These medications were sent  to [facility] [address]  [address] [facility] [place] [address]     Phone:  [phone]     gabapentin 300 MG capsule                Primary Care Provider Office Phone # Fax #    Xavier Vega [phone] [address] [facility] [address] [address]        Equal Access to Services     [facility] Hospital[facility] : Hadii aad ku hadasho Soomaali, waaxda luqadaha, qaybta kaalmada adeegyada, waxay idiin hayaan adeeg kharash laeltonn ah. So [facility] [phone].    ATENCIÓN: Si habla espamee, tiene a bright disposición servicios gratuitos de asistencia lingüística. Liliane al [phone].    We comply with applicable federal civil rights laws and [place] laws. We do not discriminate on the basis of race, color, national origin, age, disability sex, sexual orientation or gender identity.            Thank you!     Thank you for choosing [facility]  for your care. Our goal is always to provide you with excellent care. Hearing back from our patients is one way we can continue to improve our services. Please take a few minutes to complete the written survey that you may receive in the mail after your visit with us. Thank you!             Your Updated Medication List - Protect others around you: Learn how to safely use, store and throw away your medicines at www.disposemymeds.org.          This list is accurate as of: 8/18/17 12:02 PM.  Always use your most recent med list.                   Brand Name Dispense Instructions for use Diagnosis    ALEVE PO      Take 2 tablets by mouth 2 times daily (with meals)        aspirin 81 MG EC tablet     90 tablet    Take 1 tablet (81 mg) by mouth daily    Heterozygous factor V Leiden mutation (H), Personal history of other diseases of circulatory system       calcium + D 600-200 MG-UNIT Tabs   Generic drug:  calcium carbonate-vitamin D      1 TABLET DAILY twice daily        CO Q 10 PO      Take  by mouth.        fluticasone  50 MCG/ACT spray    FLONASE    16 g    Spray 2 sprays into both nostrils daily Hold on file until needed    Other seasonal allergic rhinitis       folic acid 20 MG Caps           gabapentin 300 MG capsule    NEURONTIN    120 capsule    Take 2 capsules (600 mg) by mouth 2 times daily    Carpal tunnel syndrome of right wrist       hydrochlorothiazide 12.5 MG Tabs tablet     90 tablet    Take 1 tablet (12.5 mg) by mouth daily    Essential hypertension       HYDROmorphone 2 MG tablet    DILAUDID    30 tablet    Take 1 tablet (2 mg) by mouth every 4 hours as needed for pain maximum 6 tablet(s) per day    Heel spur, right       hydrOXYzine 10 MG tablet    ATARAX    30 tablet    Take 1 tablet (10 mg) by mouth every 6 hours as needed for itching (and nausea)    Heel spur, right       levothyroxine 150 MCG tablet    SYNTHROID/LEVOTHROID    90 tablet    Take 1 tablet (150 mcg) by mouth daily BRAND NAME ONLY.  Hold on file until needed.    Hypothyroidism, unspecified type       magnesium hydroxide 400 MG/5ML suspension    MILK OF MAGNESIA     Take 400 mg/kg/day by mouth daily as needed for constipation or heartburn        MULTIPLE VITAMIN PO      1 daily        pantoprazole 20 MG EC tablet    PROTONIX    180 tablet    Take 2 tablets (40 mg) by mouth daily Hold on file until needed    Gastroesophageal reflux disease without esophagitis       predniSONE 1 MG tablet    DELTASONE    70 tablet    4 mg daily for 1 week, taper by 1 mg weekly    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)       pyridoxine 100 MG tablet    VITAMIN B-6     Take 100 mg by mouth daily.        senna-docusate 8.6-50 MG per tablet    SENOKOT-S;PERICOLACE    30 tablet    Take 1-2 tablets by mouth 2 times daily Take while on oral narcotics to prevent or treat constipation.    Heel spur, right       simvastatin 20 MG tablet    ZOCOR    90 tablet    Take 1 tablet (20 mg) by mouth At Bedtime Hold on file until needed     Hyperlipidemia LDL goal <100       tolterodine 4 MG 24 hr capsule    DETROL LA    90 capsule    Take 1 capsule (4 mg) by mouth daily    Urinary urgency       traZODone 100 MG tablet    DESYREL    90 tablet    1 tablets at bedtime as needed.  Hold on file until needed    Insomnia, unspecified

## 2017-08-21 ENCOUNTER — MYC MEDICAL ADVICE (OUTPATIENT)
Dept: FAMILY MEDICINE | Facility: CLINIC | Age: 72
End: 2017-08-21

## 2017-08-22 ENCOUNTER — OFFICE VISIT (OUTPATIENT)
Dept: FAMILY MEDICINE | Facility: CLINIC | Age: 72
End: 2017-08-22
Payer: COMMERCIAL

## 2017-08-22 ENCOUNTER — TRANSFERRED RECORDS (OUTPATIENT)
Dept: HEALTH INFORMATION MANAGEMENT | Facility: CLINIC | Age: 72
End: 2017-08-22

## 2017-08-22 VITALS
SYSTOLIC BLOOD PRESSURE: 127 MMHG | HEIGHT: 66 IN | TEMPERATURE: 97.8 F | BODY MASS INDEX: 33.32 KG/M2 | HEART RATE: 69 BPM | WEIGHT: 207.3 LBS | OXYGEN SATURATION: 96 % | DIASTOLIC BLOOD PRESSURE: 71 MMHG

## 2017-08-22 DIAGNOSIS — M54.50 ACUTE BILATERAL LOW BACK PAIN WITHOUT SCIATICA: Primary | ICD-10-CM

## 2017-08-22 PROCEDURE — 99213 OFFICE O/P EST LOW 20 MIN: CPT | Performed by: INTERNAL MEDICINE

## 2017-08-22 RX ORDER — METHOCARBAMOL 500 MG/1
TABLET, FILM COATED ORAL 4 TIMES DAILY PRN
COMMUNITY
End: 2017-08-22

## 2017-08-22 RX ORDER — METHOCARBAMOL 500 MG/1
500-1000 TABLET, FILM COATED ORAL 3 TIMES DAILY PRN
Qty: 90 TABLET | Refills: 3 | Status: SHIPPED | OUTPATIENT
Start: 2017-08-22 | End: 2017-11-10

## 2017-08-22 RX ORDER — HYDROMORPHONE HYDROCHLORIDE 2 MG/1
2 TABLET ORAL EVERY 8 HOURS PRN
Qty: 30 TABLET | Refills: 0 | Status: ON HOLD | OUTPATIENT
Start: 2017-08-22 | End: 2017-11-21

## 2017-08-22 NOTE — NURSING NOTE
"Chief Complaint   Patient presents with     Back Pain     x 3 weeks, middle of back      Medication Reconciliation     methocarbamol and dilaudid        Initial /71 (BP Location: Right arm, Patient Position: Chair, Cuff Size: Adult Regular)  Pulse 69  Temp 97.8  F (36.6  C) (Tympanic)  Ht 5' 6\" (1.676 m)  Wt 207 lb 4.8 oz (94 kg)  SpO2 96%  BMI 33.46 kg/m2 Estimated body mass index is 33.46 kg/(m^2) as calculated from the following:    Height as of this encounter: 5' 6\" (1.676 m).    Weight as of this encounter: 207 lb 4.8 oz (94 kg).  Medication Reconciliation: complete   Stephanie KELLOGG CMA ([facility])    "
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
## 2017-08-22 NOTE — MR AVS SNAPSHOT
After Visit Summary   8/22/2017    Briana Mcgee    MRN: [id number]           Patient Information     Date Of Birth          1945        Visit Information        Provider Department      8/22/2017 7:40 AM James Mora MD [facility]        Today's Diagnoses     Acute bilateral low back pain with sciatica    -  1       Follow-ups after your visit        Additional Services     ORTHO  REFERRAL       [facility] is referring you to the Orthopedic  Services at [facility].       The  Representative will assist you in the coordination of your Orthopedic and Musculoskeletal Care as prescribed by your physician.    The  Representative will call you within 1 business day to help schedule your appointment, or you may contact the  Representative at:    All areas ~ [phone]     Type of Referral : Spine: Lumbar  **Choose Medical Spine Specialist (unless patient was seen by a Medical Spine Specialist within the past 6 months).**  Surgical Evaluation is advised if the patient presents with one or more of the following red flags: Evidence of Spinal Tumor, Infection or Fracture, Cauda Equina Syndrome, Sudden or Progressive Weakness, Loss of Bowel or Bladder Control, or any other documented emergent neurological condition resulting from a Lumbar Spinal Condition. Medical Spine Specialist        Timeframe requested: Routine    Coverage of these services is subject to the terms and limitations of your health insurance plan.  Please call member services at your health plan with any benefit or coverage questions.      If X-rays, CT or MRI's have been performed, please contact the facility where they were done to arrange for , prior to your scheduled appointment.  Please bring this referral request to your appointment and present it to your specialist.                  Your next 10 appointments already  scheduled     Aug 28, 2017  8:00 AM CDT   Pre-Op physical with Xavier Vega MD   [facility]    [address] [address]   [phone]            Sep 05, 2017   Procedure with Melba Yi MD   [facility] (--)    [address] [address]   [phone]           The medical center is located at [address] (between [address] in [place], [place] north of [place]).            Oct 19, 2017 10:00 AM CDT   LAB with [facility] ([facility])    [address] [address]   [phone]           Patient must bring picture ID. Patient should be prepared to give a urine specimen  Please do not eat 10-12 hours before your appointment if you are coming in fasting for labs on lipids, cholesterol, or glucose (sugar). Pregnant women should follow their Care Team instructions. Water with medications is okay. Do not drink coffee or other fluids. If you have concerns about taking  your medications, please ask at office or if scheduling via "[employer]t, send a message by clicking on Secure Messaging, Message Your Care Team.            Oct 19, 2017 10:30 AM CDT   MA SCREENING DIGITAL BILATERAL with [id number]   [facility] ([facility])    [address] [address]   [phone]           Do not use any powder, lotion or deodorant under your arms or on your breast. If you do, we will ask you to remove it before your exam.  Wear comfortable, two-piece clothing.  If you have any allergies, tell your care team.  Bring any previous mammograms from other facilities or have them mailed to the breast center. Three-dimensional (3D) mammograms are available at [facility] locations in [facility], and [place]. -Health locations include [facility]  "[place] in [place]. Benefits of 3D mammograms include: - Improved rate of cancer detection - Decreases your chance of having to go back for more tests, which means fewer: - \"False-positive\" results (This means that there is an abnormal area but it isn't cancer.) - Invasive testing procedures, such as a biopsy or surgery - Can provide clearer images of the breast if you have dense breast tissue. 3D mammography is an optional exam that anyone can have with a 2D mammogram. It doesn't replace or take the place of a 2D mammogram. 2D mammograms remain an effective screening test for all women.  Not all insurance companies cover the cost of a 3D mammogram. Check with your insurance.            Oct 23, 2017  9:30 AM CDT   Return Visit with Merari Duarte MD   [facility] ([facility])    [facility]  [address]  [facility] [address] [phone]              Who to contact     If you have questions or need follow up information about today's clinic visit or your schedule please contact [facility] directly at [phone].  Normal or non-critical lab and imaging results will be communicated to you by [employer], letter or phone within 4 business days after the clinic has received the results. If you do not hear from us within 7 days, please contact the clinic through [employer] or phone. If you have a critical or abnormal lab result, we will notify you by phone as soon as possible.  Submit refill requests through [employer] or call your pharmacy and they will forward the refill request to us. Please allow 3 business days for your refill to be completed.          Additional Information About Your Visit        [employer] Information     [employer] gives you secure access to your electronic health record. If you see a primary care provider, you can also send messages to your care team and make appointments. If you have questions, please call your primary care clinic.  " "If you do not have a primary care provider, please call [phone] and they will assist you.        Care EveryWhere ID     This is your Care EveryWhere ID. This could be used by other organizations to access your [place] medical records  [id number]        Your Vitals Were     Pulse Temperature Height Pulse Oximetry BMI (Body Mass Index)       69 97.8  F (36.6  C) (Tympanic) 5' 6\" (1.676 m) 96% 33.46 kg/m2        Blood Pressure from Last 3 Encounters:   08/22/17 127/71   08/18/17 128/72   06/29/17 130/70    Weight from Last 3 Encounters:   08/22/17 207 lb 4.8 oz (94 kg)   08/18/17 210 lb (95.3 kg)   07/10/17 210 lb (95.3 kg)              We Performed the Following     ORTHO  REFERRAL          Today's Medication Changes          These changes are accurate as of: 8/22/17  8:18 AM.  If you have any questions, ask your nurse or doctor.               These medicines have changed or have updated prescriptions.        Dose/Directions    HYDROmorphone 2 MG tablet   Commonly known as:  DILAUDID   This may have changed:    - when to take this  - reasons to take this  - additional instructions   Used for:  Acute bilateral low back pain without sciatica   Changed by:  James Mora MD        Dose:  2 mg   Take 1 tablet (2 mg) by mouth every 8 hours as needed for severe pain maximum 3 tablet(s) per day   Quantity:  30 tablet   Refills:  0       methocarbamol 500 MG tablet   Commonly known as:  ROBAXIN   This may have changed:    - how much to take  - when to take this   Used for:  Acute bilateral low back pain without sciatica   Changed by:  James Mora MD        Dose:  500-1000 mg   Take 1-2 tablets (500-1,000 mg) by mouth 3 times daily as needed for muscle spasms   Quantity:  90 tablet   Refills:  3            Where to get your medicines      These medications were sent to [facility] - [address] [address]     Phone:  [phone] "     methocarbamol 500 MG tablet         Some of these will need a paper prescription and others can be bought over the counter.  Ask your nurse if you have questions.     Bring a paper prescription for each of these medications     HYDROmorphone 2 MG tablet                Primary Care Provider Office Phone # Fax #    Xavier Vega [phone] [address] [address]        Equal Access to Services     JENNIFER HODGES : Hadii aad ku hadasho Soomaali, waaxda luqadaha, qaybta kaalmada adeegyada, waxay idiin hayaan adeeg khjohnathonsh laeltonn ah. So [facility] [phone].    ATENCIÓN: Si habla bartolo, tiene a bright disposición servicios gratuitos de asistencia lingüística. Llame al [phone].    We comply with applicable federal civil rights laws and [place] laws. We do not discriminate on the basis of race, color, national origin, age, disability sex, sexual orientation or gender identity.            Thank you!     Thank you for choosing [facility]  for your care. Our goal is always to provide you with excellent care. Hearing back from our patients is one way we can continue to improve our services. Please take a few minutes to complete the written survey that you may receive in the mail after your visit with us. Thank you!             Your Updated Medication List - Protect others around you: Learn how to safely use, store and throw away your medicines at www.disposemymeds.org.          This list is accurate as of: 8/22/17  8:18 AM.  Always use your most recent med list.                   Brand Name Dispense Instructions for use Diagnosis    ALEVE PO      Take 2 tablets by mouth 2 times daily (with meals)        aspirin 81 MG EC tablet     90 tablet    Take 1 tablet (81 mg) by mouth daily    Heterozygous factor V Leiden mutation (H), Personal history of other diseases of circulatory system       calcium + D 600-200 MG-UNIT Tabs   Generic drug:  calcium carbonate-vitamin D       1 TABLET DAILY twice daily        CO Q 10 PO      Take  by mouth.        fluticasone 50 MCG/ACT spray    FLONASE    16 g    Spray 2 sprays into both nostrils daily Hold on file until needed    Other seasonal allergic rhinitis       folic acid 20 MG Caps           gabapentin 300 MG capsule    NEURONTIN    120 capsule    Take 2 capsules (600 mg) by mouth 2 times daily    Lumbar radiculopathy       hydrochlorothiazide 12.5 MG Tabs tablet     90 tablet    Take 1 tablet (12.5 mg) by mouth daily    Essential hypertension       HYDROmorphone 2 MG tablet    DILAUDID    30 tablet    Take 1 tablet (2 mg) by mouth every 8 hours as needed for severe pain maximum 3 tablet(s) per day    Acute bilateral low back pain without sciatica       hydrOXYzine 10 MG tablet    ATARAX    30 tablet    Take 1 tablet (10 mg) by mouth every 6 hours as needed for itching (and nausea)    Heel spur, right       levothyroxine 150 MCG tablet    SYNTHROID/LEVOTHROID    90 tablet    Take 1 tablet (150 mcg) by mouth daily BRAND NAME ONLY.  Hold on file until needed.    Hypothyroidism, unspecified type       magnesium hydroxide 400 MG/5ML suspension    MILK OF MAGNESIA     Take 400 mg/kg/day by mouth daily as needed for constipation or heartburn        methocarbamol 500 MG tablet    ROBAXIN    90 tablet    Take 1-2 tablets (500-1,000 mg) by mouth 3 times daily as needed for muscle spasms    Acute bilateral low back pain without sciatica       MULTIPLE VITAMIN PO      1 daily        pantoprazole 20 MG EC tablet    PROTONIX    180 tablet    Take 2 tablets (40 mg) by mouth daily Hold on file until needed    Gastroesophageal reflux disease without esophagitis       predniSONE 1 MG tablet    DELTASONE    70 tablet    4 mg daily for 1 week, taper by 1 mg weekly    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)       pyridoxine 100 MG tablet    VITAMIN B-6     Take 100 mg by mouth daily.        senna-docusate 8.6-50 MG per tablet     SENOKOT-S;PERICOLACE    30 tablet    Take 1-2 tablets by mouth 2 times daily Take while on oral narcotics to prevent or treat constipation.    Heel spur, right       simvastatin 20 MG tablet    ZOCOR    90 tablet    Take 1 tablet (20 mg) by mouth At Bedtime Hold on file until needed    Hyperlipidemia LDL goal <100       tolterodine 4 MG 24 hr capsule    DETROL LA    90 capsule    Take 1 capsule (4 mg) by mouth daily    Urinary urgency       traZODone 100 MG tablet    DESYREL    90 tablet    1 tablets at bedtime as needed.  Hold on file until needed    Insomnia, unspecified

## 2017-08-28 ENCOUNTER — OFFICE VISIT (OUTPATIENT)
Dept: FAMILY MEDICINE | Facility: CLINIC | Age: 72
End: 2017-08-28
Payer: COMMERCIAL

## 2017-08-28 VITALS
TEMPERATURE: 97.8 F | HEIGHT: 66 IN | SYSTOLIC BLOOD PRESSURE: 138 MMHG | HEART RATE: 68 BPM | WEIGHT: 214 LBS | DIASTOLIC BLOOD PRESSURE: 68 MMHG | BODY MASS INDEX: 34.39 KG/M2

## 2017-08-28 DIAGNOSIS — Z01.818 PREOP GENERAL PHYSICAL EXAM: Primary | ICD-10-CM

## 2017-08-28 DIAGNOSIS — G56.00 CARPAL TUNNEL SYNDROME, UNSPECIFIED LATERALITY: ICD-10-CM

## 2017-08-28 DIAGNOSIS — I10 ESSENTIAL HYPERTENSION: ICD-10-CM

## 2017-08-28 LAB
ANION GAP SERPL CALCULATED.3IONS-SCNC: 6 MMOL/L (ref 3–14)
BUN SERPL-MCNC: 14 MG/DL (ref 7–30)
CALCIUM SERPL-MCNC: 8.9 MG/DL (ref 8.5–10.1)
CHLORIDE SERPL-SCNC: 96 MMOL/L (ref 94–109)
CO2 SERPL-SCNC: 31 MMOL/L (ref 20–32)
CREAT SERPL-MCNC: 0.8 MG/DL (ref 0.52–1.04)
GFR SERPL CREATININE-BSD FRML MDRD: 70 ML/MIN/1.7M2
GLUCOSE SERPL-MCNC: 89 MG/DL (ref 70–99)
POTASSIUM SERPL-SCNC: 3.8 MMOL/L (ref 3.4–5.3)
SODIUM SERPL-SCNC: 133 MMOL/L (ref 133–144)

## 2017-08-28 PROCEDURE — 36415 COLL VENOUS BLD VENIPUNCTURE: CPT | Performed by: FAMILY MEDICINE

## 2017-08-28 PROCEDURE — 80048 BASIC METABOLIC PNL TOTAL CA: CPT | Performed by: FAMILY MEDICINE

## 2017-08-28 PROCEDURE — 99214 OFFICE O/P EST MOD 30 MIN: CPT | Performed by: FAMILY MEDICINE

## 2017-08-28 NOTE — PATIENT INSTRUCTIONS
Please go to lab.      Before Your Surgery      Call your surgeon if there is any change in your health. This includes signs of a cold or flu (such as a sore throat, runny nose, cough, rash or fever).    Do not smoke, drink alcohol or take over the counter medicine (unless your surgeon or primary care doctor tells you to) for the 24 hours before and after surgery.    If you take prescribed drugs: Follow your doctor s orders about which medicines to take and which to stop until after surgery.    Eating and drinking prior to surgery: follow the instructions from your surgeon    Take a shower or bath the night before surgery. Use the soap your surgeon gave you to gently clean your skin. If you do not have soap from your surgeon, use your regular soap. Do not shave or scrub the surgery site.  Wear clean pajamas and have clean sheets on your bed.

## 2017-08-28 NOTE — PROGRESS NOTES
[facility]  [address] [place] [address]  [phone]  Dept: [phone]    PRE-OP EVALUATION:  Today's date: 2017    Briana Mcgee (: 1945) presents for pre-operative evaluation assessment as requested by Dr. iY.  She requires evaluation and anesthesia risk assessment prior to undergoing surgery/procedure for treatment of right carpal tunnel. Proposed procedure: Right Wrist Carpal Tunnel Release    Date of Surgery/ Procedure: 2017  Time of Surgery/ Procedure: 1:00  Hospital/Surgical Facility: [facility]    Primary Physician: Xavier Vega  Type of Anesthesia Anticipated: MAC    Patient has a Health Care Directive or Living Will:  YES attached to her will    1. NO - Do you have a history of heart attack, stroke, stent, bypass or surgery on an artery in the head, neck, heart or legs?  2. NO - Do you ever have any pain or discomfort in your chest?  3. NO - Do you have a history of  Heart Failure?  4. NO - Are you troubled by shortness of breath when: walking on the level, up a slight hill or at night?  5. NO - Do you currently have a cold, bronchitis or other respiratory infection?  6. NO - Do you have a cough, shortness of breath or wheezing?  7. NO - Do you sometimes get pains in the calves of your legs when you walk?  8. NO - Do you or anyone in your family have previous history of blood clots?  9. NO - Do you or does anyone in your family have a serious bleeding problem such as prolonged bleeding following surgeries or cuts?  10. NO - Have you ever had problems with anemia or been told to take iron pills?  11. NO - Have you had any abnormal blood loss such as black, tarry or bloody stools, or abnormal vaginal bleeding?  12. NO - Have you ever had a blood transfusion?  13. NO - Have you or any of your relatives ever had problems with anesthesia?  14. NO - Do you have sleep apnea, excessive snoring or daytime drowsiness?  15. NO - Do you have any  prosthetic heart valves?  16. NO - Do you have prosthetic joints?  17. NO - Is there any chance that you may be pregnant?        HPI:                                                      Brief HPI related to upcoming procedure: right wrist pain      HYPERTENSION - Patient has longstanding history of mod-severe HTN , currently denies any symptoms referable to elevated blood pressure. Specifically denies chest pain, palpitations, dyspnea, orthopnea, PND or peripheral edema. Blood pressure readings have been in normal range. Current medication regimen is as listed below. Patient denies any side effects of medication.                                                                                                                                                                                          .  HYPERLIPIDEMIA - Patient has a long history of significant Hyperlipidemia requiring medication for treatment with recent good control. Patient reports no problems or side effects with the medication.                                                                                                                                                       .  HYPOTHYROIDISM - Patient has a longstanding history of chronic Hypothyroidism. Patient has been doing well, noting no tremor, insomnia, hair loss or changes in skin texture. Last TSH value of 1.87 which was normal. Continues to take medications as directed, without adverse reactions or side effects.                                                                                                                                                                                                                        .    MEDICAL HISTORY:                                                    Patient Active Problem List    Diagnosis Date Noted     Malignant neoplasm of female breast (H) 02/18/2005     Priority: High     11/05: breast surgeon=Dr. Dimitrios Bell, oncology=Dr. Tino Gordillo  s/p lumpectomy and axillary node dissection followed by chemotherapy at [facility]. Radiation oncology=Dr. Jessica Edmonds for 6 weeks.   12/05: Briana is clinically asymptomatic and no clinical evidence of cancer recurrence; port-a-cath removal.  9-12-05 NM [facility] [employer] radionuclide angiography at rest findings:1. Left ventricular ejectin fraction is normal at 64%  2. Compared to previous study perfomed 3-17-05, there has been no significant interval change.  10/06: appointment with Dr. Gordillo:continue amrimidex and  follow up in 6 months.  1/16/07: follow up with Dr. Bell: 'doing well with no signs of recurrence', follow up 7/07 with mammogram at that time/  2/12/09: now followed by Dr. Peres.  3/10 seen by Dr. Levi Bear with [facility] yearly visits, now can have yearly mammos, next due 8/10  10/2013 follow up with Dr. Duarte, followed every 6 months due to density of right breast and concerns, now followed yearly  Problem list name updated by automated process. Provider to review       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) 04/12/2017     Priority: Medium     Essential hypertension 03/13/2017     Priority: Medium     Hepatitis 11/25/2015     Priority: Medium     Obesity 10/20/2015     Priority: Medium     CKD (chronic kidney disease) stage 2, GFR 60-89 ml/min 06/04/2014     Priority: Medium     Advanced directives, counseling/discussion 01/29/2014     Priority: Medium     Was given the information to fill out.       Spinal stenosis 01/07/2014     Priority: Medium     Pain controlled with epidural injections       Lumbar radiculopathy 05/07/2012     Priority: Medium     Varicose veins of lower extremities with complications 03/06/2012     Priority: Medium     Problem list name updated by automated process. Provider to review       GERD (gastroesophageal reflux disease) 04/12/2011     Priority: Medium     Heterozygous factor V Leiden mutation  "(H) 04/07/2011     Priority: Medium     HYPERLIPIDEMIA LDL GOAL <100 10/31/2010     Priority: Medium     OA (osteoarthritis) of knee 03/01/2009     Priority: Medium     Followed by [facility]. Briana has had 3 Synvisc injections (1/3/08, 2/12/09,  3/2/09, 3/9/09)       Hot flashes 03/01/2009     Priority: Medium     2/12/09: seen by Dr. Peres, started Effexor. If this is not helpful will start Neurontin.       Rhinitis, allergic seasonal 07/02/2008     Priority: Medium     ischemic stroke 3/06 03/27/2006     Priority: Catarino Warren has been seen by Dr. Hernandez. Given her history of CVA and Factor V Leiden heterozygote he recommends that she stay on \"antiplatelet drug use for secondary prevention of strokes\". Briana has been on plavix and asa.       Impaired fasting glucose 03/05/2006     Priority: Catarino Warren would like to continue diet changes (she has already lost 16 pounds on the [employer]' program), regular exercise and weight loss.  She agrees to recheck fasting blood sugar and lipids in 3 months.       Insomnia 03/05/2006     Priority: Medium     Stable on Trazadone.  Problem list name updated by automated process. Provider to review       Hypothyroidism 02/18/2005     Priority: Medium     Stable on current dose of synthroid.  Problem list name updated by automated process. Provider to review        Past Medical History:   Diagnosis Date     Allergies      Breast cancer (H)      Esophageal reflux      Hypertension      Other and unspecified hyperlipidemia      Other chronic bronchitis      Personal history of pneumonia (recurrent)     Hx-Pneumonia, \" Chronic Bronchitis\"     Personal history of tobacco use, presenting hazards to health      RA (rheumatoid arthritis) (H)      Unspecified hypothyroidism      Past Surgical History:   Procedure Laterality Date     BIOPSY BREAST       COLONOSCOPY N/A 9/2/2016    Procedure: COLONOSCOPY;  Surgeon: Dean Sanchez MD;  Location: [facility] " [facility]     EXCISE EXOSTOSIS FOOT Right 4/25/2017    Procedure: EXCISE EXOSTOSIS FOOT;  Retrocalcaneal exostectomy right;  Surgeon: Antwan Goldstein DPM;  Location: [facility] OR      EXCISION BREAST LESION, OPEN >=1      2/05     HYSTERECTOMY, RYAN  1982     for non cancerous reasons and no abnormal pap     INJECT EPIDURAL LUMBAR  1/12/2011    INJECT EPIDURAL LUMBAR performed by GENERIC ANESTHESIA PROVIDER at [facility] OR     INJECT EPIDURAL LUMBAR  5/5/2011    Procedure:INJECT EPIDURAL LUMBAR; LESLIE -Dr. Sánchez  ; Surgeon:GENERIC ANESTHESIA PROVIDER; Location:[facility] OR     INJECT EPIDURAL LUMBAR  10/6/2011    Procedure:INJECT EPIDURAL LUMBAR; LESLIE--; Surgeon:GENERIC ANESTHESIA PROVIDER; Location:[facility]     INJECT EPIDURAL LUMBAR  1/25/2012    Procedure:INJECT EPIDURAL LUMBAR; LESLIE-Dr. Sánchez  ; Surgeon:GENERIC ANESTHESIA PROVIDER; Location:[facility]     INJECT EPIDURAL LUMBAR  7/9/2012    Procedure: INJECT EPIDURAL LUMBAR;  LESLIE-Dr. Pacheco;  Surgeon: Provider, Generic Anesthesia;  Location: [facility] OR     LUMPECTOMY BREAST       SURGICAL HISTORY OF -       lumpectomy with sentinal node biopsy     SURGICAL HISTORY OF -       left knee arthoscopic repair medial meniscus     Current Outpatient Prescriptions   Medication Sig Dispense Refill     HYDROmorphone (DILAUDID) 2 MG tablet Take 1 tablet (2 mg) by mouth every 8 hours as needed for severe pain maximum 3 tablet(s) per day 30 tablet 0     methocarbamol (ROBAXIN) 500 MG tablet Take 1-2 tablets (500-1,000 mg) by mouth 3 times daily as needed for muscle spasms 90 tablet 3     gabapentin (NEURONTIN) 300 MG capsule Take 2 capsules (600 mg) by mouth 2 times daily 120 capsule 0     hydrochlorothiazide 12.5 MG TABS tablet Take 1 tablet (12.5 mg) by mouth daily 90 tablet 1     tolterodine (DETROL LA) 4 MG 24 hr capsule Take 1 capsule (4 mg) by mouth daily 90 capsule 1     senna-docusate (SENOKOT-S;PERICOLACE) 8.6-50 MG per tablet Take 1-2 tablets by mouth 2 times daily Take while on oral narcotics to  prevent or treat constipation. 30 tablet 0     hydrOXYzine (ATARAX) 10 MG tablet Take 1 tablet (10 mg) by mouth every 6 hours as needed for itching (and nausea) 30 tablet 0     simvastatin (ZOCOR) 20 MG tablet Take 1 tablet (20 mg) by mouth At Bedtime Hold on file until needed 90 tablet 3     traZODone (DESYREL) 100 MG tablet 1 tablets at bedtime as needed.  Hold on file until needed 90 tablet 3     levothyroxine (SYNTHROID/LEVOTHROID) 150 MCG tablet Take 1 tablet (150 mcg) by mouth daily BRAND NAME ONLY.  Hold on file until needed. 90 tablet 3     pantoprazole (PROTONIX) 20 MG EC tablet Take 2 tablets (40 mg) by mouth daily Hold on file until needed 180 tablet 3     fluticasone (FLONASE) 50 MCG/ACT spray Spray 2 sprays into both nostrils daily Hold on file until needed 16 g 11     magnesium hydroxide (MILK OF MAGNESIA) 400 MG/5ML suspension Take 400 mg/kg/day by mouth daily as needed for constipation or heartburn       folic acid 20 MG CAPS        Naproxen Sodium (ALEVE PO) Take 2 tablets by mouth 2 times daily (with meals)       aspirin 81 MG EC tablet Take 1 tablet (81 mg) by mouth daily 90 tablet 3     Coenzyme Q10 (CO Q 10 PO) Take  by mouth.       pyridoxine (VITAMIN B-6) 100 MG tablet Take 100 mg by mouth daily.       CALCIUM + D 600-200 MG-UNIT OR TABS 1 TABLET DAILY twice daily       MULTIPLE VITAMIN OR 1 daily       predniSONE (DELTASONE) 1 MG tablet 4 mg daily for 1 week, taper by 1 mg weekly (Patient not taking: Reported on 8/22/2017) 70 tablet 0     OTC products: None, except as noted above    Allergies   Allergen Reactions     Erythromycin Swelling     Erythromycin      Other reaction(s): Edema     Hydrocodone      Other reaction(s): GI Upset  Tolerates dilaudid     Oxycodone      Other reaction(s): GI Upset      Latex Allergy: NO    Social History   Substance Use Topics     Smoking status: Former Smoker     Types: Cigarettes     Quit date: 7/19/1996     Smokeless tobacco: Never Used      Comment: quit  "10-12 years ago, 1997.     Alcohol use Yes      Comment: glass of wine at night     History   Drug Use No       REVIEW OF SYSTEMS:                                                    Review Of Systems  Skin: negative  Eyes: negative  Ears/Nose/Throat: negative  Respiratory: No shortness of breath, dyspnea on exertion, cough, or hemoptysis  Cardiovascular: negative  Gastrointestinal: negative  Genitourinary: negative  Musculoskeletal: right wrist pain  Neurologic: negative  Psychiatric: negative  Hematologic/Lymphatic/Immunologic: negative  Endocrine: negative      EXAM:                                                    /68 (Cuff Size: Adult Large)  Pulse 68  Temp 97.8  F (36.6  C) (Tympanic)  Ht 5' 6\" (1.676 m)  Wt 214 lb (97.1 kg)  BMI 34.54 kg/m2    GENERAL APPEARANCE: healthy, alert and no distress     EYES: EOMI, PERRL     HENT: ear canals and TM's normal and nose and mouth without ulcers or lesions     NECK: no adenopathy, no asymmetry, masses, or scars and thyroid normal to palpation     RESP: lungs clear to auscultation - no rales, rhonchi or wheezes     CV: regular rates and rhythm, normal S1 S2, no S3 or S4 and no murmur, click or rub     ABDOMEN:  soft, nontender, no HSM or masses and bowel sounds normal     MS: extremities normal- no gross deformities noted, no evidence of inflammation in joints, FROM in all extremities.     SKIN: no suspicious lesions or rashes     NEURO: Normal strength and tone, sensory exam grossly normal, mentation intact and speech normal     PSYCH: mentation appears normal. and affect normal/bright     LYMPHATICS: anterior cervical: no adenopathy  posterior cervical: no adenopathy    DIAGNOSTICS:                                                    EKG: appears normal, NSR, Right Bundle Branch Block, unchanged from previous tracings, date was from 4/11/2017.    Recent Labs   Lab Test  04/24/17   1310  04/11/17   0900  03/13/17   0804  10/10/16   1530   03/17/15   1104   " 01/07/14   1031   HGB   --   12.6   --   12.7   < >   --    < >  12.4   PLT   --   245   --   261   < >   --    < >  253   NA  138   --   141   --    < >   --    < >   --    POTASSIUM  3.9   --   4.2   --    < >   --    < >   --    CR  0.96   --   0.84   --    < >   --    < >   --    A1C   --    --    --    --    --   5.6   --   5.4    < > = values in this interval not displayed.      Basic profile is pending.  IMPRESSION:                                                    Reason for surgery/procedure: right wrist weakness and having some pain    The proposed surgical procedure is considered INTERMEDIATE risk.    REVISED CARDIAC RISK INDEX  The patient has the following serious cardiovascular risks for perioperative complications such as (MI, PE, VFib and 3  AV Block):  No serious cardiac risks  INTERPRETATION: 0 risks: Class I (very low risk - 0.4% complication rate)    The patient has the following additional risks for perioperative complications:  No identified additional risks      ICD-10-CM    1. Preop general physical exam Z01.818 Basic metabolic panel   2. Carpal tunnel syndrome, unspecified laterality G56.00 Basic metabolic panel   3. Essential hypertension I10 Basic metabolic panel       RECOMMENDATIONS:                                                          --Patient is to take all scheduled medications on the day of surgery EXCEPT for modifications listed below.    APPROVAL GIVEN to proceed with proposed procedure, without further diagnostic evaluation       Signed Electronically by: Xavier Vega MD    Copy of this evaluation report is provided to requesting physician.    [facility] Preop Guidelines

## 2017-08-28 NOTE — NURSING NOTE
"Chief Complaint   Patient presents with     Pre-Op Exam       Initial /68 (Cuff Size: Adult Large)  Pulse 68  Temp 97.8  F (36.6  C) (Tympanic)  Ht 5' 6\" (1.676 m)  Wt 214 lb (97.1 kg)  BMI 34.54 kg/m2 Estimated body mass index is 34.54 kg/(m^2) as calculated from the following:    Height as of this encounter: 5' 6\" (1.676 m).    Weight as of this encounter: 214 lb (97.1 kg).  Medication Reconciliation: complete  "

## 2017-08-28 NOTE — MR AVS SNAPSHOT
After Visit Summary   8/28/2017    Briana Mcgee    MRN: [id number]           Patient Information     Date Of Birth          1945        Visit Information        Provider Department      8/28/2017 8:00 AM Xavier Vega MD [facility]        Today's Diagnoses     Preop general physical exam    -  1    Carpal tunnel syndrome, unspecified laterality        Essential hypertension          Care Instructions    Please go to lab.      Before Your Surgery      Call your surgeon if there is any change in your health. This includes signs of a cold or flu (such as a sore throat, runny nose, cough, rash or fever).    Do not smoke, drink alcohol or take over the counter medicine (unless your surgeon or primary care doctor tells you to) for the 24 hours before and after surgery.    If you take prescribed drugs: Follow your doctor s orders about which medicines to take and which to stop until after surgery.    Eating and drinking prior to surgery: follow the instructions from your surgeon    Take a shower or bath the night before surgery. Use the soap your surgeon gave you to gently clean your skin. If you do not have soap from your surgeon, use your regular soap. Do not shave or scrub the surgery site.  Wear clean pajamas and have clean sheets on your bed.           Follow-ups after your visit        Your next 10 appointments already scheduled     Sep 05, 2017   Procedure with Melba Yi MD   [facility] (--)    [facility] [address]   [phone]           The medical center is located at [address] [place] [address]. (between [address] in [place], [place] miles north of [place]).            Oct 19, 2017 10:00 AM CDT   LAB with [facility] ([facility])    [address] [place] [address]   [phone]           Patient must bring picture ID. Patient should be prepared to give a  "urine specimen  Please do not eat 10-12 hours before your appointment if you are coming in fasting for labs on lipids, cholesterol, or glucose (sugar). Pregnant women should follow their Care Team instructions. Water with medications is okay. Do not drink coffee or other fluids. If you have concerns about taking  your medications, please ask at office or if scheduling via [employer], send a message by clicking on Secure Messaging, Message Your Care Team.            Oct 19, 2017 10:30 AM CDT   MA SCREENING DIGITAL BILATERAL with [facility]   [facility] ([facility])    [address] [facility] [address]   [phone]           Do not use any powder, lotion or deodorant under your arms or on your breast. If you do, we will ask you to remove it before your exam.  Wear comfortable, two-piece clothing.  If you have any allergies, tell your care team.  Bring any previous mammograms from other facilities or have them mailed to the breast center. Three-dimensional (3D) mammograms are available at [place] locations in [facility], and [place]. [facility] locations include [facility] in [place]. Benefits of 3D mammograms include: - Improved rate of cancer detection - Decreases your chance of having to go back for more tests, which means fewer: - \"False-positive\" results (This means that there is an abnormal area but it isn't cancer.) - Invasive testing procedures, such as a biopsy or surgery - Can provide clearer images of the breast if you have dense breast tissue. 3D mammography is an optional exam that anyone can have with a 2D mammogram. It doesn't replace or take the place of a 2D mammogram. 2D mammograms remain an effective screening test for all women.  Not all insurance companies cover the cost of a 3D mammogram. Check with your insurance.            Oct 23, 2017  9:30 AM CDT   Return Visit with Merari Duarte MD " "  [facility] ([facility])    [facility]  [address]  [facility] [address] [phone]              Who to contact     If you have questions or need follow up information about today's clinic visit or your schedule please contact [facility] directly at [phone].  Normal or non-critical lab and imaging results will be communicated to you by MyChart, letter or phone within 4 business days after the clinic has received the results. If you do not hear from us within 7 days, please contact the clinic through MyChart or phone. If you have a critical or abnormal lab result, we will notify you by phone as soon as possible.  Submit refill requests through [employer]Pin or call your pharmacy and they will forward the refill request to us. Please allow 3 business days for your refill to be completed.          Additional Information About Your Visit        [employer] Information     [employer] gives you secure access to your electronic health record. If you see a primary care provider, you can also send messages to your care team and make appointments. If you have questions, please call your primary care clinic.  If you do not have a primary care provider, please call [phone] and they will assist you.        Care EveryWhere ID     This is your Care EveryWhere ID. This could be used by other organizations to access your [place] medical records  [id number]        Your Vitals Were     Pulse Temperature Height BMI (Body Mass Index)          68 97.8  F (36.6  C) (Tympanic) 5' 6\" (1.676 m) 34.54 kg/m2         Blood Pressure from Last 3 Encounters:   08/28/17 138/68   08/22/17 127/71   08/18/17 128/72    Weight from Last 3 Encounters:   08/28/17 214 lb (97.1 kg)   08/22/17 207 lb 4.8 oz (94 kg)   08/18/17 210 lb (95.3 kg)              We Performed the Following     Basic metabolic panel        Primary Care Provider Office Phone # Fax #    Xavier Vega, " [phone]       [address] [facility] [address]        Equal Access to Services     JENNIFER HODGES : Hadii aad ku hadcarolkvng Tiffanyrose, walucida washingtonsammyha, daeta kastephanie vo, hunter everettin hayaadavid patrickrio allen inessa florezJovanna Allen [facility] [phone].    ATENCIÓN: Si habla español, tiene a bright disposición servicios gratuitos de asistencia lingüística. Llame al [phone].    We comply with applicable federal civil rights laws and [place] laws. We do not discriminate on the basis of race, color, national origin, age, disability sex, sexual orientation or gender identity.            Thank you!     Thank you for choosing [facility]  for your care. Our goal is always to provide you with excellent care. Hearing back from our patients is one way we can continue to improve our services. Please take a few minutes to complete the written survey that you may receive in the mail after your visit with us. Thank you!             Your Updated Medication List - Protect others around you: Learn how to safely use, store and throw away your medicines at www.disposemymeds.org.          This list is accurate as of: 8/28/17  8:28 AM.  Always use your most recent med list.                   Brand Name Dispense Instructions for use Diagnosis    ALEVE PO      Take 2 tablets by mouth 2 times daily (with meals)        aspirin 81 MG EC tablet     90 tablet    Take 1 tablet (81 mg) by mouth daily    Heterozygous factor V Leiden mutation (H), Personal history of other diseases of circulatory system       calcium + D 600-200 MG-UNIT Tabs   Generic drug:  calcium carbonate-vitamin D      1 TABLET DAILY twice daily        CO Q 10 PO      Take  by mouth.        fluticasone 50 MCG/ACT spray    FLONASE    16 g    Spray 2 sprays into both nostrils daily Hold on file until needed    Other seasonal allergic rhinitis       folic acid 20 MG Caps           gabapentin 300 MG capsule    NEURONTIN    120 capsule    Take 2 capsules  (600 mg) by mouth 2 times daily    Lumbar radiculopathy       hydrochlorothiazide 12.5 MG Tabs tablet     90 tablet    Take 1 tablet (12.5 mg) by mouth daily    Essential hypertension       HYDROmorphone 2 MG tablet    DILAUDID    30 tablet    Take 1 tablet (2 mg) by mouth every 8 hours as needed for severe pain maximum 3 tablet(s) per day    Acute bilateral low back pain without sciatica       hydrOXYzine 10 MG tablet    ATARAX    30 tablet    Take 1 tablet (10 mg) by mouth every 6 hours as needed for itching (and nausea)    Heel spur, right       levothyroxine 150 MCG tablet    SYNTHROID/LEVOTHROID    90 tablet    Take 1 tablet (150 mcg) by mouth daily BRAND NAME ONLY.  Hold on file until needed.    Hypothyroidism, unspecified type       magnesium hydroxide 400 MG/5ML suspension    MILK OF MAGNESIA     Take 400 mg/kg/day by mouth daily as needed for constipation or heartburn        methocarbamol 500 MG tablet    ROBAXIN    90 tablet    Take 1-2 tablets (500-1,000 mg) by mouth 3 times daily as needed for muscle spasms    Acute bilateral low back pain without sciatica       MULTIPLE VITAMIN PO      1 daily        pantoprazole 20 MG EC tablet    PROTONIX    180 tablet    Take 2 tablets (40 mg) by mouth daily Hold on file until needed    Gastroesophageal reflux disease without esophagitis       predniSONE 1 MG tablet    DELTASONE    70 tablet    4 mg daily for 1 week, taper by 1 mg weekly    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)       pyridoxine 100 MG tablet    VITAMIN B-6     Take 100 mg by mouth daily.        senna-docusate 8.6-50 MG per tablet    SENOKOT-S;PERICOLACE    30 tablet    Take 1-2 tablets by mouth 2 times daily Take while on oral narcotics to prevent or treat constipation.    Heel spur, right       simvastatin 20 MG tablet    ZOCOR    90 tablet    Take 1 tablet (20 mg) by mouth At Bedtime Hold on file until needed    Hyperlipidemia LDL goal <100       tolterodine  4 MG 24 hr capsule    DETROL LA    90 capsule    Take 1 capsule (4 mg) by mouth daily    Urinary urgency       traZODone 100 MG tablet    DESYREL    90 tablet    1 tablets at bedtime as needed.  Hold on file until needed    Insomnia, unspecified

## 2017-08-29 ENCOUNTER — HOSPITAL ENCOUNTER (OUTPATIENT)
Dept: MRI IMAGING | Facility: CLINIC | Age: 72
Discharge: HOME | End: 2017-08-29
Attending: PHYSICAL MEDICINE & REHABILITATION | Admitting: PHYSICAL MEDICINE & REHABILITATION
Payer: MEDICARE

## 2017-08-29 ENCOUNTER — HOSPITAL ENCOUNTER (OUTPATIENT)
Dept: MRI IMAGING | Facility: CLINIC | Age: 72
End: 2017-08-29
Attending: PHYSICAL MEDICINE & REHABILITATION
Payer: MEDICARE

## 2017-08-29 DIAGNOSIS — M54.5 LOW BACK PAIN, UNSPECIFIED BACK PAIN LATERALITY, UNSPECIFIED CHRONICITY, WITH SCIATICA PRESENCE UNSPECIFIED: ICD-10-CM

## 2017-08-29 DIAGNOSIS — M54.41 ACUTE RIGHT-SIDED LOW BACK PAIN WITH RIGHT-SIDED SCIATICA: ICD-10-CM

## 2017-08-29 DIAGNOSIS — M53.3 SACRAL PAIN: ICD-10-CM

## 2017-08-29 PROCEDURE — A9585 GADOBUTROL INJECTION: HCPCS | Performed by: PHYSICAL MEDICINE & REHABILITATION

## 2017-08-29 PROCEDURE — 72195 MRI PELVIS W/O DYE: CPT

## 2017-08-29 PROCEDURE — 72158 MRI LUMBAR SPINE W/O & W/DYE: CPT

## 2017-08-29 PROCEDURE — 25000128 H RX IP 250 OP 636: Performed by: PHYSICAL MEDICINE & REHABILITATION

## 2017-08-29 RX ORDER — GADOBUTROL 604.72 MG/ML
8 INJECTION INTRAVENOUS ONCE
Status: COMPLETED | OUTPATIENT
Start: 2017-08-29 | End: 2017-08-29

## 2017-08-29 RX ORDER — AMOXICILLIN 500 MG/1
CAPSULE ORAL
Refills: 1 | COMMUNITY
Start: 2017-08-21 | End: 2017-10-26

## 2017-08-29 RX ADMIN — GADOBUTROL 8 ML: 604.72 INJECTION INTRAVENOUS at 07:39

## 2017-08-31 ENCOUNTER — ANESTHESIA EVENT (OUTPATIENT)
Dept: SURGERY | Facility: CLINIC | Age: 72
End: 2017-08-31
Payer: MEDICARE

## 2017-09-05 ENCOUNTER — SURGERY (OUTPATIENT)
Age: 72
End: 2017-09-05

## 2017-09-05 ENCOUNTER — ANESTHESIA (OUTPATIENT)
Dept: SURGERY | Facility: CLINIC | Age: 72
End: 2017-09-05
Payer: MEDICARE

## 2017-09-05 ENCOUNTER — HOSPITAL ENCOUNTER (OUTPATIENT)
Facility: CLINIC | Age: 72
Discharge: HOME | End: 2017-09-05
Attending: ORTHOPAEDIC SURGERY | Admitting: ORTHOPAEDIC SURGERY
Payer: MEDICARE

## 2017-09-05 VITALS
TEMPERATURE: 97.5 F | HEART RATE: 69 BPM | WEIGHT: 205 LBS | BODY MASS INDEX: 32.95 KG/M2 | SYSTOLIC BLOOD PRESSURE: 136 MMHG | OXYGEN SATURATION: 98 % | HEIGHT: 66 IN | DIASTOLIC BLOOD PRESSURE: 70 MMHG | RESPIRATION RATE: 16 BRPM

## 2017-09-05 PROCEDURE — 37000009 ZZH ANESTHESIA TECHNICAL FEE, EACH ADDTL 15 MIN: Performed by: ORTHOPAEDIC SURGERY

## 2017-09-05 PROCEDURE — 40000305 ZZH STATISTIC PRE PROC ASSESS I: Performed by: ORTHOPAEDIC SURGERY

## 2017-09-05 PROCEDURE — 36000050 ZZH SURGERY LEVEL 2 1ST 30 MIN: Performed by: ORTHOPAEDIC SURGERY

## 2017-09-05 PROCEDURE — 25000125 ZZHC RX 250: Performed by: ORTHOPAEDIC SURGERY

## 2017-09-05 PROCEDURE — 36000052 ZZH SURGERY LEVEL 2 EA 15 ADDTL MIN: Performed by: ORTHOPAEDIC SURGERY

## 2017-09-05 PROCEDURE — 25000128 H RX IP 250 OP 636: Performed by: PHYSICIAN ASSISTANT

## 2017-09-05 PROCEDURE — 71000027 ZZH RECOVERY PHASE 2 EACH 15 MINS: Performed by: ORTHOPAEDIC SURGERY

## 2017-09-05 PROCEDURE — 37000008 ZZH ANESTHESIA TECHNICAL FEE, 1ST 30 MIN: Performed by: ORTHOPAEDIC SURGERY

## 2017-09-05 PROCEDURE — 25000128 H RX IP 250 OP 636: Performed by: NURSE ANESTHETIST, CERTIFIED REGISTERED

## 2017-09-05 PROCEDURE — S0020 INJECTION, BUPIVICAINE HYDRO: HCPCS | Performed by: ORTHOPAEDIC SURGERY

## 2017-09-05 PROCEDURE — 27210794 ZZH OR GENERAL SUPPLY STERILE: Performed by: ORTHOPAEDIC SURGERY

## 2017-09-05 RX ORDER — NALOXONE HYDROCHLORIDE 0.4 MG/ML
.1-.4 INJECTION, SOLUTION INTRAMUSCULAR; INTRAVENOUS; SUBCUTANEOUS
Status: DISCONTINUED | OUTPATIENT
Start: 2017-09-05 | End: 2017-09-05 | Stop reason: HOSPADM

## 2017-09-05 RX ORDER — PROPOFOL 10 MG/ML
INJECTION, EMULSION INTRAVENOUS CONTINUOUS PRN
Status: DISCONTINUED | OUTPATIENT
Start: 2017-09-05 | End: 2017-09-05

## 2017-09-05 RX ORDER — LIDOCAINE 40 MG/G
CREAM TOPICAL
Status: DISCONTINUED | OUTPATIENT
Start: 2017-09-05 | End: 2017-09-05 | Stop reason: HOSPADM

## 2017-09-05 RX ORDER — FENTANYL CITRATE 50 UG/ML
INJECTION, SOLUTION INTRAMUSCULAR; INTRAVENOUS PRN
Status: DISCONTINUED | OUTPATIENT
Start: 2017-09-05 | End: 2017-09-05

## 2017-09-05 RX ORDER — BACITRACIN ZINC 500 [USP'U]/G
OINTMENT TOPICAL PRN
Status: DISCONTINUED | OUTPATIENT
Start: 2017-09-05 | End: 2017-09-05 | Stop reason: HOSPADM

## 2017-09-05 RX ORDER — SODIUM CHLORIDE, SODIUM LACTATE, POTASSIUM CHLORIDE, CALCIUM CHLORIDE 600; 310; 30; 20 MG/100ML; MG/100ML; MG/100ML; MG/100ML
INJECTION, SOLUTION INTRAVENOUS CONTINUOUS
Status: DISCONTINUED | OUTPATIENT
Start: 2017-09-05 | End: 2017-09-05 | Stop reason: HOSPADM

## 2017-09-05 RX ORDER — HYDROMORPHONE HYDROCHLORIDE 1 MG/ML
.3-.5 INJECTION, SOLUTION INTRAMUSCULAR; INTRAVENOUS; SUBCUTANEOUS EVERY 10 MIN PRN
Status: DISCONTINUED | OUTPATIENT
Start: 2017-09-05 | End: 2017-09-05 | Stop reason: HOSPADM

## 2017-09-05 RX ORDER — MEPERIDINE HYDROCHLORIDE 25 MG/ML
12.5 INJECTION INTRAMUSCULAR; INTRAVENOUS; SUBCUTANEOUS
Status: DISCONTINUED | OUTPATIENT
Start: 2017-09-05 | End: 2017-09-05 | Stop reason: HOSPADM

## 2017-09-05 RX ORDER — ONDANSETRON 2 MG/ML
4 INJECTION INTRAMUSCULAR; INTRAVENOUS EVERY 30 MIN PRN
Status: DISCONTINUED | OUTPATIENT
Start: 2017-09-05 | End: 2017-09-05 | Stop reason: HOSPADM

## 2017-09-05 RX ORDER — CEFAZOLIN SODIUM 2 G/100ML
2 INJECTION, SOLUTION INTRAVENOUS
Status: COMPLETED | OUTPATIENT
Start: 2017-09-05 | End: 2017-09-05

## 2017-09-05 RX ORDER — ONDANSETRON 4 MG/1
4 TABLET, ORALLY DISINTEGRATING ORAL EVERY 30 MIN PRN
Status: DISCONTINUED | OUTPATIENT
Start: 2017-09-05 | End: 2017-09-05 | Stop reason: HOSPADM

## 2017-09-05 RX ORDER — BUPIVACAINE HYDROCHLORIDE 5 MG/ML
INJECTION, SOLUTION PERINEURAL PRN
Status: DISCONTINUED | OUTPATIENT
Start: 2017-09-05 | End: 2017-09-05 | Stop reason: HOSPADM

## 2017-09-05 RX ORDER — CEFAZOLIN SODIUM 1 G/3ML
1 INJECTION, POWDER, FOR SOLUTION INTRAMUSCULAR; INTRAVENOUS SEE ADMIN INSTRUCTIONS
Status: DISCONTINUED | OUTPATIENT
Start: 2017-09-05 | End: 2017-09-05 | Stop reason: HOSPADM

## 2017-09-05 RX ORDER — LIDOCAINE HYDROCHLORIDE 10 MG/ML
INJECTION, SOLUTION INFILTRATION; PERINEURAL PRN
Status: DISCONTINUED | OUTPATIENT
Start: 2017-09-05 | End: 2017-09-05 | Stop reason: HOSPADM

## 2017-09-05 RX ADMIN — OSELTAMIVIR PHOSPHATE 5 G: 75 CAPSULE ORAL at 13:15

## 2017-09-05 RX ADMIN — BUPIVACAINE HYDROCHLORIDE 2.5 ML: 5 INJECTION, SOLUTION PERINEURAL at 13:15

## 2017-09-05 RX ADMIN — SODIUM CHLORIDE, POTASSIUM CHLORIDE, SODIUM LACTATE AND CALCIUM CHLORIDE: 600; 310; 30; 20 INJECTION, SOLUTION INTRAVENOUS at 12:10

## 2017-09-05 RX ADMIN — CEFAZOLIN SODIUM 2 G: 2 INJECTION, SOLUTION INTRAVENOUS at 12:49

## 2017-09-05 RX ADMIN — PROPOFOL 50 MCG/KG/MIN: 10 INJECTION, EMULSION INTRAVENOUS at 12:54

## 2017-09-05 RX ADMIN — MIDAZOLAM HYDROCHLORIDE 5 MG: 1 INJECTION, SOLUTION INTRAMUSCULAR; INTRAVENOUS at 12:51

## 2017-09-05 RX ADMIN — FENTANYL CITRATE 100 MCG: 50 INJECTION, SOLUTION INTRAMUSCULAR; INTRAVENOUS at 12:51

## 2017-09-05 RX ADMIN — LIDOCAINE HYDROCHLORIDE 2.5 ML: 10 INJECTION, SOLUTION INFILTRATION; PERINEURAL at 13:16

## 2017-09-05 ASSESSMENT — LIFESTYLE VARIABLES: TOBACCO_USE: 1

## 2017-09-05 NOTE — IP AVS SNAPSHOT
MRN:[id number]                      After Visit Summary   9/5/2017    Briana Mcgee    MRN: [id number]           Thank you!     Thank you for choosing [facility] for your care. Our goal is always to provide you with excellent care. Hearing back from our patients is one way we can continue to improve our services. Please take a few minutes to complete the written survey that you may receive in the mail after you visit with us. Thank you!        Patient Information     Date Of Birth          1945        About your hospital stay     You were admitted on:  September 5, 2017 You last received care in the:  [facility] PreOP/Phase II    You were discharged on:  September 5, 2017        Reason for your hospital stay       Carpal tunnel syndrome                  Who to Call     For medical emergencies, please call 911.  For non-urgent questions about your medical care, please call your primary care provider or clinic, [phone]  For questions related to your surgery, please call your surgery clinic        Attending Provider     Provider Melba Spann MD Orthopedics       Primary Care Provider Office Phone # Fax #    Xavier Vega [phone]       When to contact your care team       Call your primary doctor if you have any of the following: chest pain, troubles breathing, increased shortness of breath.  Call [facility] ([phone]) if you have any of the following: temperature greater than 100.5F, pain not controlled with elevation or pain medications, drainage that saturates the bandage.                  After Care Instructions     Activity       Keep your arm elevated above your heart for the next 2-3 days.  This will limit swelling and help with pain control.  It is ok to apply an ice bag to the area, but make sure there is no leak or chance for condensation to cause your dressing to get damp.  Wet dressings can lead to  infection.  Keep your sterile surgical dressing clean and dry.  Please do no remove.  Sponge bathing is recommended.  Otherwise, cover your arm with plastic bags secured around the upper arm if showering and hold your arm outside of the shower.  OK to move your fingers, wrist, and elbow.  No lifting, pushing, pulling more than 10 lbs with the surgical extremity.  No repetitive activities with the surgical hand/wrist.            Diet       Resume your pre-op diet                  Follow-up Appointments     Follow-up and recommended labs and tests        Follow up with Dr. Yi in 2 weeks at [facility] ([phone]).  You may need to call to schedule this appointment if you do not already have a follow-up appointment scheduled.                  Your next 10 appointments already scheduled     Oct 19, 2017 10:00 AM CDT   LAB with [facility] ([facility])    [address] [id number] [phone]           Patient must bring picture ID. Patient should be prepared to give a urine specimen  Please do not eat 10-12 hours before your appointment if you are coming in fasting for labs on lipids, cholesterol, or glucose (sugar). Pregnant women should follow their Care Team instructions. Water with medications is okay. Do not drink coffee or other fluids. If you have concerns about taking  your medications, please ask at office or if scheduling via [facility], send a message by clicking on Secure Messaging, Message Your Care Team.            Oct 19, 2017 10:30 AM CDT   MA SCREENING DIGITAL BILATERAL with [address] [facility] ([facility])    [address] [address] [phone]           Do not use any powder, lotion or deodorant under your arms or on your breast. If you do, we will ask you to remove it before your exam.  Wear comfortable, two-piece clothing.  If you have any allergies, tell your care  "team.  Bring any previous mammograms from other facilities or have them mailed to the breast center. Three-dimensional (3D) mammograms are available at [facility] locations in [facility], and [place]. [facility] locations include [facility] in [place]. Benefits of 3D mammograms include: - Improved rate of cancer detection - Decreases your chance of having to go back for more tests, which means fewer: - \"False-positive\" results (This means that there is an abnormal area but it isn't cancer.) - Invasive testing procedures, such as a biopsy or surgery - Can provide clearer images of the breast if you have dense breast tissue. 3D mammography is an optional exam that anyone can have with a 2D mammogram. It doesn't replace or take the place of a 2D mammogram. 2D mammograms remain an effective screening test for all women.  Not all insurance companies cover the cost of a 3D mammogram. Check with your insurance.            Oct 23, 2017  9:30 AM CDT   Return Visit with Merari Duarte MD   [facility] ([facility])    [address] [facility]  [address]  [facility] [address]   [phone]              Further instructions from your care team                           Same Day Surgery Discharge Instructions  Special Precautions After Surgery - Adult    1. It is not unusual to feel lightheaded or faint, up to 24 hours after surgery or while taking pain medication.  If you have these symptoms; sit for a few minutes before standing and have someone assist you when getting up.  2. You should rest and relax for the next 24 hours and must have someone stay with you for at least 24 hours after your discharge.  3. DO NOT DRIVE any vehicle or operate mechanical equipment for 24 hours following the end of your surgery.  DO NOT DRIVE while taking narcotic pain medications that have been prescribed by your physician.  If " "you had a limb operated on, you must be able to use it fully to drive.  4. DO NOT drink alcoholic beverages for 24 hours following surgery or while taking prescription pain medication.  5. Drink clear liquids (apple juice, ginger ale, broth, 7-Up, etc.).  Progress to your regular diet as you feel able.  6. Any questions call your physician and do not make important decisions for 24 hours.    ACTIVITY  ? Resume activity as tolerated     INCISIONAL CARE  ? Keep right hand elevated, may use ice for comfort  ? May bath starting tomorrow, cover bandage with plastic to keep clean and dry     Call for an appointment to return to the clinic    Call to make a post op appointment with dr. Yi for 2 weeks    Medications:  ? Dilaudid 2mg tab. 1 tablet every 4 hours as needed for pain  __________________________________________________________________________________________________________________________________  IMPORTANT NUMBERS:    [facility] Main Number:  [phone], [phone]  Pharmacy:  [phone]  Same Day Surgery:  [phone], Monday - Friday until 8:30 p.m.  Urgent Care:  [phone]  Emergency Room:  [phone]                                                                                [facility]:  [phone]     Home Medical Equipment: [phone]  [facility]:  [phone]        Pending Results     No orders found from 9/3/2017 to 9/6/2017.            Admission Information     Date & Time Provider Department Dept. Phone    9/5/2017 Melba Yi MD [facility] PreOP/Phase -[phone]      Your Vitals Were     Blood Pressure Pulse Temperature Respirations Height Weight    136/70 69 97.5  F (36.4  C) (Oral) 16 1.676 m (5' 6\") 93 kg (205 lb)    Pulse Oximetry BMI (Body Mass Index)                98% 33.09 kg/m2          [employer] Information     [employer] gives you secure access to your electronic health record. If you see a primary care provider, you can also " send messages to your care team and make appointments. If you have questions, please call your primary care clinic.  If you do not have a primary care provider, please call [phone] and they will assist you.        Care EveryWhere ID     This is your Care EveryWhere ID. This could be used by other organizations to access your [facility] [place] medical records  [id number]        Equal Access to Services     JENNIFER HODGES : Hadyefri Shafer, waaxda luqadaha, qaybta kaalmada neftalikaushikjose angel, hunter burdenjohnathonrandee wylie . So [facility] [phone].    ATENCIÓN: Si habla español, tiene a bright disposición servicios gratuitos de asistencia lingüística. Liliane al [phone].    We comply with applicable federal civil rights laws and [place] laws. We do not discriminate on the basis of race, color, national origin, age, disability sex, sexual orientation or gender identity.               Review of your medicines      CONTINUE these medicines which have NOT CHANGED        Dose / Directions    ALEVE PO        Dose:  2 tablet   Take 2 tablets by mouth 2 times daily (with meals)   Refills:  0       amoxicillin 500 MG capsule   Commonly known as:  AMOXIL        take 4 capsules (2000mg)  by mouth once 1 hour prior to dental appointment   Refills:  1       aspirin 81 MG EC tablet   Used for:  Heterozygous factor V Leiden mutation (H), Personal history of other diseases of circulatory system        Dose:  81 mg   Take 1 tablet (81 mg) by mouth daily   Quantity:  90 tablet   Refills:  3       calcium + D 600-200 MG-UNIT Tabs   Generic drug:  calcium carbonate-vitamin D        1 TABLET DAILY twice daily   Refills:  0       CO Q 10 PO        Take  by mouth.   Refills:  0       fluticasone 50 MCG/ACT spray   Commonly known as:  FLONASE   Used for:  Other seasonal allergic rhinitis        Dose:  2 spray   Spray 2 sprays into both nostrils daily Hold on file until needed   Quantity:  16 g   Refills:  11       folic acid 20 MG  Caps        Refills:  0       gabapentin 300 MG capsule   Commonly known as:  NEURONTIN   Used for:  Lumbar radiculopathy        Dose:  600 mg   Take 2 capsules (600 mg) by mouth 2 times daily   Quantity:  120 capsule   Refills:  0       hydrochlorothiazide 12.5 MG Tabs tablet   Used for:  Essential hypertension        Dose:  12.5 mg   Take 1 tablet (12.5 mg) by mouth daily   Quantity:  90 tablet   Refills:  1       HYDROmorphone 2 MG tablet   Commonly known as:  DILAUDID   Used for:  Acute bilateral low back pain without sciatica        Dose:  2 mg   Take 1 tablet (2 mg) by mouth every 8 hours as needed for severe pain maximum 3 tablet(s) per day   Quantity:  30 tablet   Refills:  0       hydrOXYzine 10 MG tablet   Commonly known as:  ATARAX   Used for:  Heel spur, right        Dose:  10 mg   Take 1 tablet (10 mg) by mouth every 6 hours as needed for itching (and nausea)   Quantity:  30 tablet   Refills:  0       levothyroxine 150 MCG tablet   Commonly known as:  SYNTHROID/LEVOTHROID   Used for:  Hypothyroidism, unspecified type        Dose:  150 mcg   Take 1 tablet (150 mcg) by mouth daily BRAND NAME ONLY.  Hold on file until needed.   Quantity:  90 tablet   Refills:  3       magnesium hydroxide 400 MG/5ML suspension   Commonly known as:  MILK OF MAGNESIA        Dose:  400 mg/kg/day   Take 400 mg/kg/day by mouth daily as needed for constipation or heartburn   Refills:  0       methocarbamol 500 MG tablet   Commonly known as:  ROBAXIN   Used for:  Acute bilateral low back pain without sciatica        Dose:  500-1000 mg   Take 1-2 tablets (500-1,000 mg) by mouth 3 times daily as needed for muscle spasms   Quantity:  90 tablet   Refills:  3       MULTIPLE VITAMIN PO        1 daily   Refills:  0       pantoprazole 20 MG EC tablet   Commonly known as:  PROTONIX   Used for:  Gastroesophageal reflux disease without esophagitis        Dose:  40 mg   Take 2 tablets (40 mg) by mouth daily Hold on file until needed    Quantity:  180 tablet   Refills:  3       pyridoxine 100 MG tablet   Commonly known as:  VITAMIN B-6        Dose:  100 mg   Take 100 mg by mouth daily.   Refills:  0       senna-docusate 8.6-50 MG per tablet   Commonly known as:  SENOKOT-S;PERICOLACE   Used for:  Heel spur, right        Dose:  1-2 tablet   Take 1-2 tablets by mouth 2 times daily Take while on oral narcotics to prevent or treat constipation.   Quantity:  30 tablet   Refills:  0       simvastatin 20 MG tablet   Commonly known as:  ZOCOR   Used for:  Hyperlipidemia LDL goal <100        Dose:  20 mg   Take 1 tablet (20 mg) by mouth At Bedtime Hold on file until needed   Quantity:  90 tablet   Refills:  3       tolterodine 4 MG 24 hr capsule   Commonly known as:  DETROL LA   Used for:  Urinary urgency        Take 1 capsule (4 mg) by mouth daily   Quantity:  90 capsule   Refills:  1       traZODone 100 MG tablet   Commonly known as:  DESYREL   Used for:  Insomnia, unspecified        1 tablets at bedtime as needed.  Hold on file until needed   Quantity:  90 tablet   Refills:  3                Protect others around you: Learn how to safely use, store and throw away your medicines at www.disposemymeds.org.             Medication List: This is a list of all your medications and when to take them. Check marks below indicate your daily home schedule. Keep this list as a reference.      Medications           Morning Afternoon Evening Bedtime As Needed    ALEVE PO   Take 2 tablets by mouth 2 times daily (with meals)                                amoxicillin 500 MG capsule   Commonly known as:  AMOXIL   take 4 capsules (2000mg)  by mouth once 1 hour prior to dental appointment                                aspirin 81 MG EC tablet   Take 1 tablet (81 mg) by mouth daily                                calcium + D 600-200 MG-UNIT Tabs   1 TABLET DAILY twice daily   Generic drug:  calcium carbonate-vitamin D                                CO Q 10 PO   Take  by  mouth.                                fluticasone 50 MCG/ACT spray   Commonly known as:  FLONASE   Spray 2 sprays into both nostrils daily Hold on file until needed                                folic acid 20 MG Caps                                gabapentin 300 MG capsule   Commonly known as:  NEURONTIN   Take 2 capsules (600 mg) by mouth 2 times daily                                hydrochlorothiazide 12.5 MG Tabs tablet   Take 1 tablet (12.5 mg) by mouth daily                                HYDROmorphone 2 MG tablet   Commonly known as:  DILAUDID   Take 1 tablet (2 mg) by mouth every 8 hours as needed for severe pain maximum 3 tablet(s) per day                                hydrOXYzine 10 MG tablet   Commonly known as:  ATARAX   Take 1 tablet (10 mg) by mouth every 6 hours as needed for itching (and nausea)                                levothyroxine 150 MCG tablet   Commonly known as:  SYNTHROID/LEVOTHROID   Take 1 tablet (150 mcg) by mouth daily BRAND NAME ONLY.  Hold on file until needed.                                magnesium hydroxide 400 MG/5ML suspension   Commonly known as:  MILK OF MAGNESIA   Take 400 mg/kg/day by mouth daily as needed for constipation or heartburn                                methocarbamol 500 MG tablet   Commonly known as:  ROBAXIN   Take 1-2 tablets (500-1,000 mg) by mouth 3 times daily as needed for muscle spasms                                MULTIPLE VITAMIN PO   1 daily                                pantoprazole 20 MG EC tablet   Commonly known as:  PROTONIX   Take 2 tablets (40 mg) by mouth daily Hold on file until needed                                pyridoxine 100 MG tablet   Commonly known as:  VITAMIN B-6   Take 100 mg by mouth daily.                                senna-docusate 8.6-50 MG per tablet   Commonly known as:  SENOKOT-S;PERICOLACE   Take 1-2 tablets by mouth 2 times daily Take while on oral narcotics to prevent or treat constipation.                                 simvastatin 20 MG tablet   Commonly known as:  ZOCOR   Take 1 tablet (20 mg) by mouth At Bedtime Hold on file until needed                                tolterodine 4 MG 24 hr capsule   Commonly known as:  DETROL LA   Take 1 capsule (4 mg) by mouth daily                                traZODone 100 MG tablet   Commonly known as:  DESYREL   1 tablets at bedtime as needed.  Hold on file until needed

## 2017-09-05 NOTE — IP AVS SNAPSHOT
[facility] PreOP/Phase II    [address] [address]    Phone:  [phone]    Fax:  [phone]                                       After Visit Summary   9/5/2017    Briana Mcgee    MRN: [id number]           After Visit Summary Signature Page     I have received my discharge instructions, and my questions have been answered. I have discussed any challenges I see with this plan with the nurse or doctor.    ..........................................................................................................................................  Patient/Patient Representative Signature      ..........................................................................................................................................  Patient Representative Print Name and Relationship to Patient    ..................................................               ................................................  Date                                            Time    ..........................................................................................................................................  Reviewed by Signature/Title    ...................................................              ..............................................  Date                                                            Time

## 2017-09-05 NOTE — H&P (VIEW-ONLY)
[facility]  [address] [address]  [phone]  Dept: [phone]    PRE-OP EVALUATION:  Today's date: 2017    Briana Mcgee (: 1945) presents for pre-operative evaluation assessment as requested by Dr. Yi.  She requires evaluation and anesthesia risk assessment prior to undergoing surgery/procedure for treatment of right carpal tunnel. Proposed procedure: Right Wrist Carpal Tunnel Release    Date of Surgery/ Procedure: 2017  Time of Surgery/ Procedure: 1:00  Hospital/Surgical Facility: [facility]    Primary Physician: Xavier Vega  Type of Anesthesia Anticipated: MAC    Patient has a Health Care Directive or Living Will:  YES attached to her will    1. NO - Do you have a history of heart attack, stroke, stent, bypass or surgery on an artery in the head, neck, heart or legs?  2. NO - Do you ever have any pain or discomfort in your chest?  3. NO - Do you have a history of  Heart Failure?  4. NO - Are you troubled by shortness of breath when: walking on the level, up a slight hill or at night?  5. NO - Do you currently have a cold, bronchitis or other respiratory infection?  6. NO - Do you have a cough, shortness of breath or wheezing?  7. NO - Do you sometimes get pains in the calves of your legs when you walk?  8. NO - Do you or anyone in your family have previous history of blood clots?  9. NO - Do you or does anyone in your family have a serious bleeding problem such as prolonged bleeding following surgeries or cuts?  10. NO - Have you ever had problems with anemia or been told to take iron pills?  11. NO - Have you had any abnormal blood loss such as black, tarry or bloody stools, or abnormal vaginal bleeding?  12. NO - Have you ever had a blood transfusion?  13. NO - Have you or any of your relatives ever had problems with anesthesia?  14. NO - Do you have sleep apnea, excessive snoring or daytime drowsiness?  15. NO - Do you have any  prosthetic heart valves?  16. NO - Do you have prosthetic joints?  17. NO - Is there any chance that you may be pregnant?        HPI:                                                      Brief HPI related to upcoming procedure: right wrist pain      HYPERTENSION - Patient has longstanding history of mod-severe HTN , currently denies any symptoms referable to elevated blood pressure. Specifically denies chest pain, palpitations, dyspnea, orthopnea, PND or peripheral edema. Blood pressure readings have been in normal range. Current medication regimen is as listed below. Patient denies any side effects of medication.                                                                                                                                                                                          .  HYPERLIPIDEMIA - Patient has a long history of significant Hyperlipidemia requiring medication for treatment with recent good control. Patient reports no problems or side effects with the medication.                                                                                                                                                       .  HYPOTHYROIDISM - Patient has a longstanding history of chronic Hypothyroidism. Patient has been doing well, noting no tremor, insomnia, hair loss or changes in skin texture. Last TSH value of 1.87 which was normal. Continues to take medications as directed, without adverse reactions or side effects.                                                                                                                                                                                                                        .    MEDICAL HISTORY:                                                    Patient Active Problem List    Diagnosis Date Noted     Malignant neoplasm of female breast (H) 02/18/2005     Priority: High     11/05: breast surgeon=Dr. Dimitrios Bell, oncology=Dr. Tino Gordillo  s/p lumpectomy and axillary node dissection followed by chemotherapy at [facility]. Radiation oncology=Dr. Jessica Edmonds for 6 weeks.   12/05: Briana is clinically asymptomatic and no clinical evidence of cancer recurrence; port-a-cath removal.  9-12-05 NM [facility] [employer] radionuclide angiography at rest findings:1. Left ventricular ejectin fraction is normal at 64%  2. Compared to previous study perfomed 3-17-05, there has been no significant interval change.  10/06: appointment with Dr. Gordillo:continue amrimidex and  follow up in 6 months.  1/16/07: follow up with Dr. Bell: 'doing well with no signs of recurrence', follow up 7/07 with mammogram at that time/  2/12/09: now followed by Dr. Peres.  3/10 seen by Dr. Levi Bear with [facility] yearly visits, now can have yearly mammos, next due 8/10  10/2013 follow up with Dr. Duarte, followed every 6 months due to density of right breast and concerns, now followed yearly  Problem list name updated by automated process. Provider to review       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) 04/12/2017     Priority: Medium     Essential hypertension 03/13/2017     Priority: Medium     Hepatitis 11/25/2015     Priority: Medium     Obesity 10/20/2015     Priority: Medium     CKD (chronic kidney disease) stage 2, GFR 60-89 ml/min 06/04/2014     Priority: Medium     Advanced directives, counseling/discussion 01/29/2014     Priority: Medium     Was given the information to fill out.       Spinal stenosis 01/07/2014     Priority: Medium     Pain controlled with epidural injections       Lumbar radiculopathy 05/07/2012     Priority: Medium     Varicose veins of lower extremities with complications 03/06/2012     Priority: Medium     Problem list name updated by automated process. Provider to review       GERD (gastroesophageal reflux disease) 04/12/2011     Priority: Medium     Heterozygous factor V Leiden mutation  "(H) 04/07/2011     Priority: Medium     HYPERLIPIDEMIA LDL GOAL <100 10/31/2010     Priority: Medium     OA (osteoarthritis) of knee 03/01/2009     Priority: Medium     Followed by [facility]. Briana has had 3 Synvisc injections (1/3/08, 2/12/09,  3/2/09, 3/9/09)       Hot flashes 03/01/2009     Priority: Medium     2/12/09: seen by Dr. Peres, started Effexor. If this is not helpful will start Neurontin.       Rhinitis, allergic seasonal 07/02/2008     Priority: Medium     ischemic stroke 3/06 03/27/2006     Priority: Catarino Warren has been seen by Dr. Hernandez. Given her history of CVA and Factor V Leiden heterozygote he recommends that she stay on \"antiplatelet drug use for secondary prevention of strokes\". Briana has been on plavix and asa.       Impaired fasting glucose 03/05/2006     Priority: Catarino Warren would like to continue diet changes (she has already lost 16 pounds on the [employer]' program), regular exercise and weight loss.  She agrees to recheck fasting blood sugar and lipids in 3 months.       Insomnia 03/05/2006     Priority: Medium     Stable on Trazadone.  Problem list name updated by automated process. Provider to review       Hypothyroidism 02/18/2005     Priority: Medium     Stable on current dose of synthroid.  Problem list name updated by automated process. Provider to review        Past Medical History:   Diagnosis Date     Allergies      Breast cancer (H)      Esophageal reflux      Hypertension      Other and unspecified hyperlipidemia      Other chronic bronchitis      Personal history of pneumonia (recurrent)     Hx-Pneumonia, \" Chronic Bronchitis\"     Personal history of tobacco use, presenting hazards to health      RA (rheumatoid arthritis) (H)      Unspecified hypothyroidism      Past Surgical History:   Procedure Laterality Date     BIOPSY BREAST       COLONOSCOPY N/A 9/2/2016    Procedure: COLONOSCOPY;  Surgeon: Dean Sanchez MD;  Location: [facility] " GI     EXCISE EXOSTOSIS FOOT Right 4/25/2017    Procedure: EXCISE EXOSTOSIS FOOT;  Retrocalcaneal exostectomy right;  Surgeon: Antwan Goldstein DPM;  Location: [facility] OR      EXCISION BREAST LESION, OPEN >=1      2/05     HYSTERECTOMY, RYAN  1982     for non cancerous reasons and no abnormal pap     INJECT EPIDURAL LUMBAR  1/12/2011    INJECT EPIDURAL LUMBAR performed by GENERIC ANESTHESIA PROVIDER at [facility] OR     INJECT EPIDURAL LUMBAR  5/5/2011    Procedure:INJECT EPIDURAL LUMBAR; LESLIE -Dr. Sánchez  ; Surgeon:GENERIC ANESTHESIA PROVIDER; Location:[facility] OR     INJECT EPIDURAL LUMBAR  10/6/2011    Procedure:INJECT EPIDURAL LUMBAR; LESLIE--; Surgeon:GENERIC ANESTHESIA PROVIDER; Location:[facility] OR     INJECT EPIDURAL LUMBAR  1/25/2012    Procedure:INJECT EPIDURAL LUMBAR; LESLIE-Dr. Sánchez  ; Surgeon:GENERIC ANESTHESIA PROVIDER; Location:[facility] OR     INJECT EPIDURAL LUMBAR  7/9/2012    Procedure: INJECT EPIDURAL LUMBAR;  LESLIE-Dr. Pacheco;  Surgeon: Provider, Generic Anesthesia;  Location: [facility] OR     LUMPECTOMY BREAST       SURGICAL HISTORY OF -       lumpectomy with sentinal node biopsy     SURGICAL HISTORY OF -       left knee arthoscopic repair medial meniscus     Current Outpatient Prescriptions   Medication Sig Dispense Refill     HYDROmorphone (DILAUDID) 2 MG tablet Take 1 tablet (2 mg) by mouth every 8 hours as needed for severe pain maximum 3 tablet(s) per day 30 tablet 0     methocarbamol (ROBAXIN) 500 MG tablet Take 1-2 tablets (500-1,000 mg) by mouth 3 times daily as needed for muscle spasms 90 tablet 3     gabapentin (NEURONTIN) 300 MG capsule Take 2 capsules (600 mg) by mouth 2 times daily 120 capsule 0     hydrochlorothiazide 12.5 MG TABS tablet Take 1 tablet (12.5 mg) by mouth daily 90 tablet 1     tolterodine (DETROL LA) 4 MG 24 hr capsule Take 1 capsule (4 mg) by mouth daily 90 capsule 1     senna-docusate (SENOKOT-S;PERICOLACE) 8.6-50 MG per tablet Take 1-2 tablets by mouth 2 times daily Take while on oral narcotics to  prevent or treat constipation. 30 tablet 0     hydrOXYzine (ATARAX) 10 MG tablet Take 1 tablet (10 mg) by mouth every 6 hours as needed for itching (and nausea) 30 tablet 0     simvastatin (ZOCOR) 20 MG tablet Take 1 tablet (20 mg) by mouth At Bedtime Hold on file until needed 90 tablet 3     traZODone (DESYREL) 100 MG tablet 1 tablets at bedtime as needed.  Hold on file until needed 90 tablet 3     levothyroxine (SYNTHROID/LEVOTHROID) 150 MCG tablet Take 1 tablet (150 mcg) by mouth daily BRAND NAME ONLY.  Hold on file until needed. 90 tablet 3     pantoprazole (PROTONIX) 20 MG EC tablet Take 2 tablets (40 mg) by mouth daily Hold on file until needed 180 tablet 3     fluticasone (FLONASE) 50 MCG/ACT spray Spray 2 sprays into both nostrils daily Hold on file until needed 16 g 11     magnesium hydroxide (MILK OF MAGNESIA) 400 MG/5ML suspension Take 400 mg/kg/day by mouth daily as needed for constipation or heartburn       folic acid 20 MG CAPS        Naproxen Sodium (ALEVE PO) Take 2 tablets by mouth 2 times daily (with meals)       aspirin 81 MG EC tablet Take 1 tablet (81 mg) by mouth daily 90 tablet 3     Coenzyme Q10 (CO Q 10 PO) Take  by mouth.       pyridoxine (VITAMIN B-6) 100 MG tablet Take 100 mg by mouth daily.       CALCIUM + D 600-200 MG-UNIT OR TABS 1 TABLET DAILY twice daily       MULTIPLE VITAMIN OR 1 daily       predniSONE (DELTASONE) 1 MG tablet 4 mg daily for 1 week, taper by 1 mg weekly (Patient not taking: Reported on 8/22/2017) 70 tablet 0     OTC products: None, except as noted above    Allergies   Allergen Reactions     Erythromycin Swelling     Erythromycin      Other reaction(s): Edema     Hydrocodone      Other reaction(s): GI Upset  Tolerates dilaudid     Oxycodone      Other reaction(s): GI Upset      Latex Allergy: NO    Social History   Substance Use Topics     Smoking status: Former Smoker     Types: Cigarettes     Quit date: 7/19/1996     Smokeless tobacco: Never Used      Comment: quit  "10-12 years ago, 1997.     Alcohol use Yes      Comment: glass of wine at night     History   Drug Use No       REVIEW OF SYSTEMS:                                                    Review Of Systems  Skin: negative  Eyes: negative  Ears/Nose/Throat: negative  Respiratory: No shortness of breath, dyspnea on exertion, cough, or hemoptysis  Cardiovascular: negative  Gastrointestinal: negative  Genitourinary: negative  Musculoskeletal: right wrist pain  Neurologic: negative  Psychiatric: negative  Hematologic/Lymphatic/Immunologic: negative  Endocrine: negative      EXAM:                                                    /68 (Cuff Size: Adult Large)  Pulse 68  Temp 97.8  F (36.6  C) (Tympanic)  Ht 5' 6\" (1.676 m)  Wt 214 lb (97.1 kg)  BMI 34.54 kg/m2    GENERAL APPEARANCE: healthy, alert and no distress     EYES: EOMI, PERRL     HENT: ear canals and TM's normal and nose and mouth without ulcers or lesions     NECK: no adenopathy, no asymmetry, masses, or scars and thyroid normal to palpation     RESP: lungs clear to auscultation - no rales, rhonchi or wheezes     CV: regular rates and rhythm, normal S1 S2, no S3 or S4 and no murmur, click or rub     ABDOMEN:  soft, nontender, no HSM or masses and bowel sounds normal     MS: extremities normal- no gross deformities noted, no evidence of inflammation in joints, FROM in all extremities.     SKIN: no suspicious lesions or rashes     NEURO: Normal strength and tone, sensory exam grossly normal, mentation intact and speech normal     PSYCH: mentation appears normal. and affect normal/bright     LYMPHATICS: anterior cervical: no adenopathy  posterior cervical: no adenopathy    DIAGNOSTICS:                                                    EKG: appears normal, NSR, Right Bundle Branch Block, unchanged from previous tracings, date was from 4/11/2017.    Recent Labs   Lab Test  04/24/17   1310  04/11/17   0900  03/13/17   0804  10/10/16   1530   03/17/15   1104   " 01/07/14   1031   HGB   --   12.6   --   12.7   < >   --    < >  12.4   PLT   --   245   --   261   < >   --    < >  253   NA  138   --   141   --    < >   --    < >   --    POTASSIUM  3.9   --   4.2   --    < >   --    < >   --    CR  0.96   --   0.84   --    < >   --    < >   --    A1C   --    --    --    --    --   5.6   --   5.4    < > = values in this interval not displayed.      Basic profile is pending.  IMPRESSION:                                                    Reason for surgery/procedure: right wrist weakness and having some pain    The proposed surgical procedure is considered INTERMEDIATE risk.    REVISED CARDIAC RISK INDEX  The patient has the following serious cardiovascular risks for perioperative complications such as (MI, PE, VFib and 3  AV Block):  No serious cardiac risks  INTERPRETATION: 0 risks: Class I (very low risk - 0.4% complication rate)    The patient has the following additional risks for perioperative complications:  No identified additional risks      ICD-10-CM    1. Preop general physical exam Z01.818 Basic metabolic panel   2. Carpal tunnel syndrome, unspecified laterality G56.00 Basic metabolic panel   3. Essential hypertension I10 Basic metabolic panel       RECOMMENDATIONS:                                                          --Patient is to take all scheduled medications on the day of surgery EXCEPT for modifications listed below.    APPROVAL GIVEN to proceed with proposed procedure, without further diagnostic evaluation       Signed Electronically by: Xavier Vega MD    Copy of this evaluation report is provided to requesting physician.    [place] Preop Guidelines

## 2017-09-05 NOTE — OP NOTE
Date of Procedure: 09/05/17     Pre-operative Diagnosis:  Right carpal tunnel syndrome   Post-operative Diagnosis: same     Procedure:   Right carpal tunnel release     Surgeon: Melba Yi M.D.   Assistant: None     Anesthesia:  MAC with local anesthetic for a median nerve block at the wrist  Specimens: none   EBL: minimal   Drains: none   Complications: none known  Findings: No residual compression after release of the transverse carpal ligament.     Indications for Surgery: Briana Mcgee is a 72 year old female with a history of right carpal tunnel syndrome that has failed to respond to treatment including splinting.  Electrodiagnostic studies were confirmatory for compression of the median nerve at the wrist and previous corticosteroid injection provided only a very short duration of symptom improvement corresponding to the activity of the local anesthetic.  I spoke to the patient about the risks, benefits, and alternatives of continued conservative treatment verses surgical intervention. I offered the patient a carpal tunnel release to try to relieve or improve the symptoms of numbness and tingling. We discussed that the risks of surgery include, but are not limited to the following: bleeding, infection, damage to the underlying nerve or its branches, numbness, weakness, chronic pain, wound healing problems, failure to relieve all of the symptoms, worsening symptoms, recurrence of symptoms, stiffness, need for further surgery, blood clots, stroke, heart attack, loss of limb or life. No guarantees were implied or made. It was also explained that sometimes the nerve is so far damaged that sensation and strength do not return. The patient voiced understanding and informed consent was obtained.     Procedure: The patient was identified in the holding area and the correct surgical site was identified and marked. The patient was then identified by Anesthesia and brought to the operating room and placed  supine on the operating room table where all pressure points were padded.  A multidisciplinary timeout was performed, confirming the correct patient, the correct extremity and the correct procedure.    The patient received gentle intravenous sedation and antibiotics.  The area over the base of the palm was anesthetized with a 1:1 combination of 1% lidocaine and 0.5% bupivacaine. A nonsterile tourniquet was applied to the right arm, which was prepped and draped in the usual sterile fashion. The limb was then elevated, exsanguinated and the tourniquet inflated to 250 mmHg.     A longitudinal incision was made over the base of the palm at the area of the transverse carpal ligament and continued down through the skin and subcutaneous tissue. The palmar fascia was visualized and sharply incised. The transverse carpal ligament was then visualized. It was sharply incised. The release was continued distally until perivascular fat could be seen in the wound and there was no residual compression at the distal portion of the median nerve. The proximal margin of the transverse carpal ligament along with a confluent flexor-pronator fascia was then bluntly dissected from the overlying soft tissues and the underlying nerve. The proximal margin of the transverse carpal ligament was then released with dissecting scissors into the forearm. Careful inspection of the nerve showed no evidence of residual compression.  No anomalous masses were seen.     The wound was thoroughly irrigated with copious amounts of sterile saline. Meticulous hemostasis was achieved with electrocautery.  The skin was closed with 4-0 nylon suture in interrupted fashion.  Sterile dressings were applied consisting of bacitracin, adaptic, 4 x 4's, sterile webril, and an Ace bandage.  The tourniquet was then deflated for a total tourniquet time of 17 minutes with brisk return of capillary refill to all digits. The patient was then awoken from sedation and taken to  the recovery room in stable condition, having tolerated the procedure well.  Sponge, needle and instrument counts were noted to be correct at the conclusion of the procedure, which was performed under loupe magnification.    Post-operative plan:  Return to the clinic in 10-14 days for wound check and suture removal as wound healing allows.

## 2017-09-05 NOTE — ANESTHESIA CARE TRANSFER NOTE
Patient: Briana Mcgee    Procedure(s):  Right Wrist Carpal Tunnel Release - Wound Class: I-Clean    Diagnosis: right carpal tunnel syndrome  Diagnosis Additional Information: No value filed.    Anesthesia Type:   MAC     Note:  Airway :Room Air  Patient transferred to:Phase II        Vitals: (Last set prior to Anesthesia Care Transfer)    CRNA VITALS  9/5/2017 1253 - 9/5/2017 1330      9/5/2017             Pulse: 69    SpO2: 97 %                Electronically Signed By: Channing Bearden CRNA, APRN CRNA  September 5, 2017  1:30 PM

## 2017-09-05 NOTE — DISCHARGE INSTRUCTIONS
Same Day Surgery Discharge Instructions  Special Precautions After Surgery - Adult    1. It is not unusual to feel lightheaded or faint, up to 24 hours after surgery or while taking pain medication.  If you have these symptoms; sit for a few minutes before standing and have someone assist you when getting up.  2. You should rest and relax for the next 24 hours and must have someone stay with you for at least 24 hours after your discharge.  3. DO NOT DRIVE any vehicle or operate mechanical equipment for 24 hours following the end of your surgery.  DO NOT DRIVE while taking narcotic pain medications that have been prescribed by your physician.  If you had a limb operated on, you must be able to use it fully to drive.  4. DO NOT drink alcoholic beverages for 24 hours following surgery or while taking prescription pain medication.  5. Drink clear liquids (apple juice, ginger ale, broth, 7-Up, etc.).  Progress to your regular diet as you feel able.  6. Any questions call your physician and do not make important decisions for 24 hours.    ACTIVITY  ? Resume activity as tolerated     INCISIONAL CARE  ? Keep right hand elevated, may use ice for comfort  ? May bath starting tomorrow, cover bandage with plastic to keep clean and dry    Call for an appointment to return to the clinic    Call to make a post op appointment with dr. Yi for 2 weeks    Medications:  ? Dilaudid 2mg tab. 1 tablet every 4 hours as needed for pain  __________________________________________________________________________________________________________________________________  IMPORTANT NUMBERS:    [facility] Main Number:  [phone], [phone]  Pharmacy:  [phone]  Same Day Surgery:  [phone], Monday - Friday until 8:30 p.m.  Urgent Care:  [phone]  Emergency Room:  [phone]                                                                                [facility]:  [phone]     [facility]: [phone]  [facility]:  [phone]

## 2017-09-05 NOTE — ANESTHESIA PREPROCEDURE EVALUATION
Anesthesia Evaluation     . Pt has had prior anesthetic. Type: General and MAC    No history of anesthetic complications          ROS/MED HX    ENT/Pulmonary:     (+)tobacco use, Past use , . .    Neurologic: Comment: ischemic stroke 3/06      Cardiovascular:     (+) Dyslipidemia, hypertension----. : . . . :. .       METS/Exercise Tolerance:  >4 METS   Hematologic: Comments: Heterozygous factor V Leiden mutation         Musculoskeletal: Comment: Lumbar radiculopathy     Spinal stenosis      (+) arthritis, , , -       GI/Hepatic:     (+) GERD hepatitis       Renal/Genitourinary:     (+) chronic renal disease, type: CRI,       Endo:     (+) thyroid problem hypothyroidism, Obesity, .      Psychiatric:  - neg psychiatric ROS       Infectious Disease:  - neg infectious disease ROS       Malignancy:   (+) Malignancy History of Breast          Other:    (+) H/O Chronic Pain,H/O chronic opiod use ,                    Physical Exam  Normal systems: cardiovascular, pulmonary and dental    Airway   Mallampati: II  TM distance: >3 FB  Neck ROM: full    Dental     Cardiovascular   Rhythm and rate: regular and normal      Pulmonary    breath sounds clear to auscultation                    Anesthesia Plan      History & Physical Review  History and physical reviewed and following examination; no interval change.    ASA Status:  3 .    NPO Status:  > 8 hours    Plan for MAC Reason for MAC:  Deep or markedly invasive procedure (G8)         Postoperative Care      Consents  Anesthetic plan, risks, benefits and alternatives discussed with:  Patient..                          .

## 2017-09-05 NOTE — ANESTHESIA POSTPROCEDURE EVALUATION
Patient: Briana Mcgee    Procedure(s):  Right Wrist Carpal Tunnel Release - Wound Class: I-Clean    Diagnosis:right carpal tunnel syndrome  Diagnosis Additional Information: No value filed.    Anesthesia Type:  MAC    Note:  Anesthesia Post Evaluation    Patient location during evaluation: Phase 2 and Bedside  Patient participation: Able to fully participate in evaluation  Level of consciousness: awake and alert  Pain management: adequate  Airway patency: patent  Cardiovascular status: acceptable  Respiratory status: acceptable  Hydration status: acceptable  PONV: none     Anesthetic complications: None          Last vitals:  Vitals:    09/05/17 1138   BP: 131/75   Pulse: 69   Resp: 16   Temp: 36.4  C (97.6  F)         Electronically Signed By: Channing Bearden CRNA, APRN CRNA  September 5, 2017  1:30 PM

## 2017-09-12 ENCOUNTER — MYC MEDICAL ADVICE (OUTPATIENT)
Dept: FAMILY MEDICINE | Facility: CLINIC | Age: 72
End: 2017-09-12

## 2017-09-19 ENCOUNTER — TELEPHONE (OUTPATIENT)
Dept: FAMILY MEDICINE | Facility: CLINIC | Age: 72
End: 2017-09-19

## 2017-09-19 DIAGNOSIS — M54.16 LUMBAR RADICULOPATHY: ICD-10-CM

## 2017-09-19 RX ORDER — GABAPENTIN 300 MG/1
600 CAPSULE ORAL 2 TIMES DAILY
Qty: 120 CAPSULE | Refills: 5 | Status: SHIPPED | OUTPATIENT
Start: 2017-09-19 | End: 2017-11-10

## 2017-09-19 NOTE — TELEPHONE ENCOUNTER
S-(situation): I called pt back regarding gabapentin.    Pt saw Dr in June and was prescribed gabapentin for carpal tunnel symptoms.  She was seen in August for back pain and her dose was increased to 600 mg twice daily.  Pt updates that she recently had right carpal tunnel surgery and stitches came out today.  She is happy with her current dose of gabapentin and would prefer to stay on it.    B-(background): per above.    A-(assessment): right wrist pain.    R-(recommendations): Routed to provider.  Ok to continue gabapentin?  Needs refilled.  Merlyn Mendes RN

## 2017-09-19 NOTE — TELEPHONE ENCOUNTER
Reason for call:  Patient reporting a symptom    Symptom or request: Pt wants to know if she should continue taking the Gabapentin Rx, that Dr. Mora prescribed her and if not, should she taper off of the med - She only have 10 tabs left.      Duration (how long have symptoms been present): ongoing    Have you been treated for this before? Yes    Additional comments:     Phone Number patient can be reached at:  Home number on file [phone] (home)    Best Time:  any    Can we leave a detailed message on this number:  YES    Call taken on 9/19/2017 at 12:30 PM by Verenice Rodriguez

## 2017-10-11 ENCOUNTER — ALLIED HEALTH/NURSE VISIT (OUTPATIENT)
Dept: FAMILY MEDICINE | Facility: CLINIC | Age: 72
End: 2017-10-11
Payer: COMMERCIAL

## 2017-10-11 DIAGNOSIS — Z23 NEED FOR PROPHYLACTIC VACCINATION AND INOCULATION AGAINST INFLUENZA: Primary | ICD-10-CM

## 2017-10-11 PROCEDURE — 99207 ZZC NO CHARGE NURSE ONLY: CPT

## 2017-10-11 PROCEDURE — 90471 IMMUNIZATION ADMIN: CPT

## 2017-10-11 PROCEDURE — 90662 IIV NO PRSV INCREASED AG IM: CPT

## 2017-10-11 NOTE — MR AVS SNAPSHOT
After Visit Summary   10/11/2017    Briana Mcgee    MRN: [id number]           Patient Information     Date Of Birth          1945        Visit Information        Provider Department      10/11/2017 9:00 AM FL PI NORBERT/LPN [facility]        Today's Diagnoses     Need for prophylactic vaccination and inoculation against influenza    -  1       Follow-ups after your visit        Your next 10 appointments already scheduled     Oct 19, 2017 10:00 AM CDT   LAB with [facility] ([facility])    [address] [address] [phone]           Patient must bring picture ID. Patient should be prepared to give a urine specimen  Please do not eat 10-12 hours before your appointment if you are coming in fasting for labs on lipids, cholesterol, or glucose (sugar). Pregnant women should follow their Care Team instructions. Water with medications is okay. Do not drink coffee or other fluids. If you have concerns about taking  your medications, please ask at office or if scheduling via [employer], send a message by clicking on Secure Messaging, Message Your Care Team.            Oct 19, 2017 10:30 AM CDT   MA SCREENING DIGITAL BILATERAL with [id number]   [facility] ([facility])    [address] [address] [phone]           Do not use any powder, lotion or deodorant under your arms or on your breast. If you do, we will ask you to remove it before your exam.  Wear comfortable, two-piece clothing.  If you have any allergies, tell your care team.  Bring any previous mammograms from other facilities or have them mailed to the breast center. Three-dimensional (3D) mammograms are available at [facility] locations in [facility], [place], [place], [facility], [place], [place], and [place]. -[facility] locations include [place] [facility] in [place]. Benefits  "of 3D mammograms include: - Improved rate of cancer detection - Decreases your chance of having to go back for more tests, which means fewer: - \"False-positive\" results (This means that there is an abnormal area but it isn't cancer.) - Invasive testing procedures, such as a biopsy or surgery - Can provide clearer images of the breast if you have dense breast tissue. 3D mammography is an optional exam that anyone can have with a 2D mammogram. It doesn't replace or take the place of a 2D mammogram. 2D mammograms remain an effective screening test for all women.  Not all insurance companies cover the cost of a 3D mammogram. Check with your insurance.            Oct 23, 2017  9:30 AM CDT   Return Visit with Merari Duarte MD   [facility] ([facility])    [facility]  [address]  [facility] [address] [phone]              Who to contact     If you have questions or need follow up information about today's clinic visit or your schedule please contact [facility] directly at [phone].  Normal or non-critical lab and imaging results will be communicated to you by [employer], letter or phone within 4 business days after the clinic has received the results. If you do not hear from us within 7 days, please contact the clinic through [employer] or phone. If you have a critical or abnormal lab result, we will notify you by phone as soon as possible.  Submit refill requests through [employer] or call your pharmacy and they will forward the refill request to us. Please allow 3 business days for your refill to be completed.          Additional Information About Your Visit        Precognate Information     [employer] gives you secure access to your electronic health record. If you see a primary care provider, you can also send messages to your care team and make appointments. If you have questions, please call your primary care clinic.  If you do not have a primary " care provider, please call [phone] and they will assist you.        Care EveryWhere ID     This is your Care EveryWhere ID. This could be used by other organizations to access your [facility] medical records  [id number]         Blood Pressure from Last 3 Encounters:   09/05/17 136/70   08/28/17 138/68   08/22/17 127/71    Weight from Last 3 Encounters:   09/05/17 205 lb (93 kg)   08/28/17 214 lb (97.1 kg)   08/22/17 207 lb 4.8 oz (94 kg)              We Performed the Following     FLU VACCINE, INCREASED ANTIGEN, PRESV FREE, AGE 65+ [42030]     Vaccine Administration, Initial [35650]        Primary Care Provider Office Phone # Fax #    Xavier Vega [phone] [address] [facility] [address]        Equal Access to Services     [facility]EL [facility] : Hadii aad ku hadasho Soomaali, waaxda luqadaha, qaybta kaalmada adeegyada, waxay everettin hayaan neftali wylie . So [facility] [phone].    ATENCIÓN: Si habla español, tiene a bright disposición servicios gratuitos de asistencia lingüística. Llame al [phone].    We comply with applicable federal civil rights laws and [place] laws. We do not discriminate on the basis of race, color, national origin, age, disability, sex, sexual orientation, or gender identity.            Thank you!     Thank you for choosing [facility]  for your care. Our goal is always to provide you with excellent care. Hearing back from our patients is one way we can continue to improve our services. Please take a few minutes to complete the written survey that you may receive in the mail after your visit with us. Thank you!             Your Updated Medication List - Protect others around you: Learn how to safely use, store and throw away your medicines at www.disposemymeds.org.          This list is accurate as of: 10/11/17  9:17 AM.  Always use your most recent med list.                   Brand Name Dispense Instructions for use Diagnosis    ALEVE  PO      Take 2 tablets by mouth 2 times daily (with meals)        amoxicillin 500 MG capsule    AMOXIL     take 4 capsules (2000mg)  by mouth once 1 hour prior to dental appointment        aspirin 81 MG EC tablet     90 tablet    Take 1 tablet (81 mg) by mouth daily    Heterozygous factor V Leiden mutation (H), Personal history of other diseases of circulatory system       calcium + D 600-200 MG-UNIT Tabs   Generic drug:  calcium carbonate-vitamin D      1 TABLET DAILY twice daily        CO Q 10 PO      Take  by mouth.        fluticasone 50 MCG/ACT spray    FLONASE    16 g    Spray 2 sprays into both nostrils daily Hold on file until needed    Other seasonal allergic rhinitis       folic acid 20 MG Caps           gabapentin 300 MG capsule    NEURONTIN    120 capsule    Take 2 capsules (600 mg) by mouth 2 times daily    Lumbar radiculopathy       hydrochlorothiazide 12.5 MG Tabs tablet     90 tablet    Take 1 tablet (12.5 mg) by mouth daily    Essential hypertension       HYDROmorphone 2 MG tablet    DILAUDID    30 tablet    Take 1 tablet (2 mg) by mouth every 8 hours as needed for severe pain maximum 3 tablet(s) per day    Acute bilateral low back pain without sciatica       hydrOXYzine 10 MG tablet    ATARAX    30 tablet    Take 1 tablet (10 mg) by mouth every 6 hours as needed for itching (and nausea)    Heel spur, right       levothyroxine 150 MCG tablet    SYNTHROID/LEVOTHROID    90 tablet    Take 1 tablet (150 mcg) by mouth daily BRAND NAME ONLY.  Hold on file until needed.    Hypothyroidism, unspecified type       magnesium hydroxide 400 MG/5ML suspension    MILK OF MAGNESIA     Take 400 mg/kg/day by mouth daily as needed for constipation or heartburn        methocarbamol 500 MG tablet    ROBAXIN    90 tablet    Take 1-2 tablets (500-1,000 mg) by mouth 3 times daily as needed for muscle spasms    Acute bilateral low back pain without sciatica       MULTIPLE VITAMIN PO      1 daily        pantoprazole 20 MG EC  tablet    PROTONIX    180 tablet    Take 2 tablets (40 mg) by mouth daily Hold on file until needed    Gastroesophageal reflux disease without esophagitis       pyridoxine 100 MG tablet    VITAMIN B-6     Take 100 mg by mouth daily.        senna-docusate 8.6-50 MG per tablet    SENOKOT-S;PERICOLACE    30 tablet    Take 1-2 tablets by mouth 2 times daily Take while on oral narcotics to prevent or treat constipation.    Heel spur, right       simvastatin 20 MG tablet    ZOCOR    90 tablet    Take 1 tablet (20 mg) by mouth At Bedtime Hold on file until needed    Hyperlipidemia LDL goal <100       tolterodine 4 MG 24 hr capsule    DETROL LA    90 capsule    Take 1 capsule (4 mg) by mouth daily    Urinary urgency       traZODone 100 MG tablet    DESYREL    90 tablet    1 tablets at bedtime as needed.  Hold on file until needed    Insomnia, unspecified

## 2017-10-11 NOTE — PROGRESS NOTES
Injectable Influenza Immunization Documentation    1.  Is the person to be vaccinated sick today?   No    2. Does the person to be vaccinated have an allergy to a component   of the vaccine?   No    3. Has the person to be vaccinated ever had a serious reaction   to influenza vaccine in the past?   No    4. Has the person to be vaccinated ever had Guillain-Barré syndrome?   No    Form completed by Pt.

## 2017-10-17 ENCOUNTER — TRANSFERRED RECORDS (OUTPATIENT)
Dept: HEALTH INFORMATION MANAGEMENT | Facility: CLINIC | Age: 72
End: 2017-10-17

## 2017-10-19 ENCOUNTER — HOSPITAL ENCOUNTER (OUTPATIENT)
Dept: MAMMOGRAPHY | Facility: CLINIC | Age: 72
Discharge: HOME | End: 2017-10-19
Attending: INTERNAL MEDICINE | Admitting: INTERNAL MEDICINE
Payer: MEDICARE

## 2017-10-19 ENCOUNTER — HOSPITAL ENCOUNTER (OUTPATIENT)
Dept: LAB | Facility: CLINIC | Age: 72
End: 2017-10-19
Attending: INTERNAL MEDICINE
Payer: MEDICARE

## 2017-10-19 DIAGNOSIS — Z85.3 PERSONAL HISTORY OF MALIGNANT NEOPLASM OF BREAST: ICD-10-CM

## 2017-10-19 DIAGNOSIS — Z12.31 SCREENING MAMMOGRAM, ENCOUNTER FOR: ICD-10-CM

## 2017-10-19 LAB
ALBUMIN SERPL-MCNC: 3.8 G/DL (ref 3.4–5)
ALP SERPL-CCNC: 74 U/L (ref 40–150)
ALT SERPL W P-5'-P-CCNC: 26 U/L (ref 0–50)
AST SERPL W P-5'-P-CCNC: 18 U/L (ref 0–45)
BASOPHILS # BLD AUTO: 0.1 10E9/L (ref 0–0.2)
BASOPHILS NFR BLD AUTO: 0.8 %
BILIRUB DIRECT SERPL-MCNC: 0.1 MG/DL (ref 0–0.2)
BILIRUB SERPL-MCNC: 0.4 MG/DL (ref 0.2–1.3)
CANCER AG27-29 SERPL-ACNC: 24 U/ML (ref 0–39)
DIFFERENTIAL METHOD BLD: NORMAL
EOSINOPHIL # BLD AUTO: 0.3 10E9/L (ref 0–0.7)
EOSINOPHIL NFR BLD AUTO: 3.5 %
ERYTHROCYTE [DISTWIDTH] IN BLOOD BY AUTOMATED COUNT: 13.1 % (ref 10–15)
HCT VFR BLD AUTO: 36.7 % (ref 35–47)
HGB BLD-MCNC: 12 G/DL (ref 11.7–15.7)
IMM GRANULOCYTES # BLD: 0.1 10E9/L (ref 0–0.4)
IMM GRANULOCYTES NFR BLD: 0.8 %
LYMPHOCYTES # BLD AUTO: 2.4 10E9/L (ref 0.8–5.3)
LYMPHOCYTES NFR BLD AUTO: 26 %
MCH RBC QN AUTO: 30.1 PG (ref 26.5–33)
MCHC RBC AUTO-ENTMCNC: 32.7 G/DL (ref 31.5–36.5)
MCV RBC AUTO: 92 FL (ref 78–100)
MONOCYTES # BLD AUTO: 0.7 10E9/L (ref 0–1.3)
MONOCYTES NFR BLD AUTO: 7 %
NEUTROPHILS # BLD AUTO: 5.8 10E9/L (ref 1.6–8.3)
NEUTROPHILS NFR BLD AUTO: 61.9 %
PLATELET # BLD AUTO: 254 10E9/L (ref 150–450)
PROT SERPL-MCNC: 7.1 G/DL (ref 6.8–8.8)
RBC # BLD AUTO: 3.99 10E12/L (ref 3.8–5.2)
WBC # BLD AUTO: 9.3 10E9/L (ref 4–11)

## 2017-10-19 PROCEDURE — 80076 HEPATIC FUNCTION PANEL: CPT | Performed by: INTERNAL MEDICINE

## 2017-10-19 PROCEDURE — G0202 SCR MAMMO BI INCL CAD: HCPCS

## 2017-10-19 PROCEDURE — 36415 COLL VENOUS BLD VENIPUNCTURE: CPT | Performed by: INTERNAL MEDICINE

## 2017-10-19 PROCEDURE — 85025 COMPLETE CBC W/AUTO DIFF WBC: CPT | Performed by: INTERNAL MEDICINE

## 2017-10-19 PROCEDURE — 86300 IMMUNOASSAY TUMOR CA 15-3: CPT | Performed by: INTERNAL MEDICINE

## 2017-10-23 ENCOUNTER — ONCOLOGY VISIT (OUTPATIENT)
Dept: ONCOLOGY | Facility: CLINIC | Age: 72
End: 2017-10-23
Attending: INTERNAL MEDICINE
Payer: COMMERCIAL

## 2017-10-23 VITALS
RESPIRATION RATE: 24 BRPM | SYSTOLIC BLOOD PRESSURE: 136 MMHG | BODY MASS INDEX: 34.77 KG/M2 | TEMPERATURE: 96.8 F | OXYGEN SATURATION: 93 % | HEART RATE: 82 BPM | HEIGHT: 65 IN | WEIGHT: 208.7 LBS | DIASTOLIC BLOOD PRESSURE: 69 MMHG

## 2017-10-23 DIAGNOSIS — Z85.3 PERSONAL HISTORY OF MALIGNANT NEOPLASM OF BREAST: Primary | ICD-10-CM

## 2017-10-23 DIAGNOSIS — Z12.31 SCREENING MAMMOGRAM, ENCOUNTER FOR: ICD-10-CM

## 2017-10-23 PROCEDURE — 99211 OFF/OP EST MAY X REQ PHY/QHP: CPT

## 2017-10-23 PROCEDURE — 99214 OFFICE O/P EST MOD 30 MIN: CPT | Performed by: INTERNAL MEDICINE

## 2017-10-23 RX ORDER — BACLOFEN 10 MG/1
TABLET ORAL
Refills: 2 | COMMUNITY
Start: 2017-10-02 | End: 2018-02-08

## 2017-10-23 ASSESSMENT — PAIN SCALES - GENERAL: PAINLEVEL: MILD PAIN (2)

## 2017-10-23 NOTE — MR AVS SNAPSHOT
After Visit Summary   10/23/2017    Briana Mcgee    MRN: [id number]           Patient Information     Date Of Birth          1945        Visit Information        Provider Department      10/23/2017 9:30 AM Merari Duarte MD [facility]        Today's Diagnoses     Personal history of malignant neoplasm of breast    -  1    Screening mammogram, encounter for          Care Instructions    We would like to see you back in 1 year for a follow up appointment with labs and Mammogram prior. When you are in need of a refill, please call your pharmacy and they will send us a request.  Copy of appointments, and after visit summary (AVS) given to patient.  If you have any questions please call Lotus Eng RN, BSN Oncology Hematology  [facility] [phone]. For questions after business hours, or on holidays/weekends, please call our after hours Nurse Triage line [phone]. Thank you.               1 yr f/u with labs and MA          Follow-ups after your visit        Your next 10 appointments already scheduled     Oct 25, 2017 11:20 AM CDT   Pre-Op physical with Xavier Vega MD   [facility]    [address] [place] [address] [phone]            Oct 31, 2017   Procedure with Melba Yi MD   [facility] (--)    [address] [place] [address] [phone]           The medical center is located at [address]. (between [address] in [place], [place] north of [place]).            Oct 22, 2018  9:15 AM CDT   MA SCREENING BILATERAL W/ JESSICA with [address] [facility] ([facility])    [address] [address] [phone]           Three-dimensional (3D) mammograms are available at [facility] in [place], [place], [place], [place], [facility], [place], [place], and  "[place]. [facility] locations include [place] and [facility] in [place]. Benefits of 3D mammograms include: - Improved rate of cancer detection - Decreases your chance of having to go back for more tests, which means fewer: - \"False-positive\" results (This means that there is an abnormal area but it isn't cancer.) - Invasive testing procedures, such as a biopsy or surgery - Can provide clearer images of the breast if you have dense breast tissue. 3D mammography is an optional exam that anyone can have with a 2D mammogram. It doesn't replace or take the place of a 2D mammogram. 2D mammograms remain an effective screening test for all women.  Not all insurance companies cover the cost of a 3D mammogram. Check with your insurance.            Oct 22, 2018  1:00 PM CDT   LAB with [facility]   [facility]    [address] [place] [id number] [phone]           Patient must bring picture ID. Patient should be prepared to give a urine specimen  Please do not eat 10-12 hours before your appointment if you are coming in fasting for labs on lipids, cholesterol, or glucose (sugar). Pregnant women should follow their Care Team instructions. Water with medications is okay. Do not drink coffee or other fluids. If you have concerns about taking  your medications, please ask at office or if scheduling via [employer], send a message by clicking on Secure Messaging, Message Your Care Team.              Future tests that were ordered for you today     Open Future Orders        Priority Expected Expires Ordered    CBC with platelets differential Routine 9/1/2018 10/23/2018 10/23/2017    Ca27.29  breast tumor marker Routine 9/1/2018 10/23/2018 10/23/2017    Comprehensive metabolic panel Routine 9/1/2018 10/23/2018 10/23/2017    MA Screen Bilateral w/Joseph Routine 9/1/2018 10/23/2018 10/23/2017            Who to contact     If you have questions or need follow up " "information about today's clinic visit or your schedule please contact [facility] directly at [phone].  Normal or non-critical lab and imaging results will be communicated to you by MyChart, letter or phone within 4 business days after the clinic has received the results. If you do not hear from us within 7 days, please contact the clinic through [employer] or phone. If you have a critical or abnormal lab result, we will notify you by phone as soon as possible.  Submit refill requests through [employer] or call your pharmacy and they will forward the refill request to us. Please allow 3 business days for your refill to be completed.          Additional Information About Your Visit        MyChart Information     [employer] gives you secure access to your electronic health record. If you see a primary care provider, you can also send messages to your care team and make appointments. If you have questions, please call your primary care clinic.  If you do not have a primary care provider, please call [phone] and they will assist you.        Care EveryWhere ID     This is your Care EveryWhere ID. This could be used by other organizations to access your [facility] medical records  [id number]        Your Vitals Were     Pulse Temperature Respirations Height Pulse Oximetry Breastfeeding?    82 96.8  F (36  C) (Tympanic) 24 1.657 m (5' 5.25\") 93% No    BMI (Body Mass Index)                   34.46 kg/m2            Blood Pressure from Last 3 Encounters:   10/23/17 136/69   09/05/17 136/70   08/28/17 138/68    Weight from Last 3 Encounters:   10/23/17 94.7 kg (208 lb 11.2 oz)   09/05/17 93 kg (205 lb)   08/28/17 97.1 kg (214 lb)               Primary Care Provider Office Phone # Fax #    Xavier Vega [phone] [address] [facility] [address]        Equal Access to Services     JENNIFER HODGES AH: Sung Shafer, walucida kathi, qaybta kaalcherie vo, [facility] " koffi celinario jenisestephanie florez. So [facility] [phone].    ATENCIÓN: Si lisala bartolo, tiene a bright disposición servicios gratuitos de asistencia lingüística. Liliane valdez [phone].    We comply with applicable federal civil rights laws and [place] laws. We do not discriminate on the basis of race, color, national origin, age, disability, sex, sexual orientation, or gender identity.            Thank you!     Thank you for choosing [facility]  for your care. Our goal is always to provide you with excellent care. Hearing back from our patients is one way we can continue to improve our services. Please take a few minutes to complete the written survey that you may receive in the mail after your visit with us. Thank you!             Your Updated Medication List - Protect others around you: Learn how to safely use, store and throw away your medicines at www.disposemymeds.org.          This list is accurate as of: 10/23/17 10:37 AM.  Always use your most recent med list.                   Brand Name Dispense Instructions for use Diagnosis    ALEVE PO      Take 2 tablets by mouth 2 times daily (with meals)        amoxicillin 500 MG capsule    AMOXIL     take 4 capsules (2000mg)  by mouth once 1 hour prior to dental appointment        aspirin 81 MG EC tablet     90 tablet    Take 1 tablet (81 mg) by mouth daily    Heterozygous factor V Leiden mutation (H), Personal history of other diseases of circulatory system       baclofen 10 MG tablet    LIORESAL     Take 1-2 tablets (10-20mg) by mouth in the evening as needed    Personal history of malignant neoplasm of breast       calcium + D 600-200 MG-UNIT Tabs   Generic drug:  calcium carbonate-vitamin D      1 TABLET DAILY twice daily        CO Q 10 PO      Take  by mouth.        fluticasone 50 MCG/ACT spray    FLONASE    16 g    Spray 2 sprays into both nostrils daily Hold on file until needed    Other seasonal allergic rhinitis       folic acid 20 MG Caps            gabapentin 300 MG capsule    NEURONTIN    120 capsule    Take 2 capsules (600 mg) by mouth 2 times daily    Lumbar radiculopathy       hydrochlorothiazide 12.5 MG Tabs tablet     90 tablet    Take 1 tablet (12.5 mg) by mouth daily    Essential hypertension       HYDROmorphone 2 MG tablet    DILAUDID    30 tablet    Take 1 tablet (2 mg) by mouth every 8 hours as needed for severe pain maximum 3 tablet(s) per day    Acute bilateral low back pain without sciatica       levothyroxine 150 MCG tablet    SYNTHROID/LEVOTHROID    90 tablet    Take 1 tablet (150 mcg) by mouth daily BRAND NAME ONLY.  Hold on file until needed.    Hypothyroidism, unspecified type       magnesium hydroxide 400 MG/5ML suspension    MILK OF MAGNESIA     Take 400 mg/kg/day by mouth daily as needed for constipation or heartburn        methocarbamol 500 MG tablet    ROBAXIN    90 tablet    Take 1-2 tablets (500-1,000 mg) by mouth 3 times daily as needed for muscle spasms    Acute bilateral low back pain without sciatica       MULTIPLE VITAMIN PO      1 daily        pantoprazole 20 MG EC tablet    PROTONIX    180 tablet    Take 2 tablets (40 mg) by mouth daily Hold on file until needed    Gastroesophageal reflux disease without esophagitis       pyridoxine 100 MG tablet    VITAMIN B-6     Take 100 mg by mouth daily.        simvastatin 20 MG tablet    ZOCOR    90 tablet    Take 1 tablet (20 mg) by mouth At Bedtime Hold on file until needed    Hyperlipidemia LDL goal <100       tolterodine 4 MG 24 hr capsule    DETROL LA    90 capsule    Take 1 capsule (4 mg) by mouth daily    Urinary urgency       traZODone 100 MG tablet    DESYREL    90 tablet    1 tablets at bedtime as needed.  Hold on file until needed    Insomnia, unspecified

## 2017-10-23 NOTE — PATIENT INSTRUCTIONS
We would like to see you back in 1 year for a follow up appointment with labs and Mammogram prior. When you are in need of a refill, please call your pharmacy and they will send us a request.  Copy of appointments, and after visit summary (AVS) given to patient.  If you have any questions please call Lotus Eng RN, BSN Oncology Hematology  [facility] [phone]. For questions after business hours, or on holidays/weekends, please call our after hours Nurse Triage line [phone]. Thank you.               1 yr f/u with labs and MA

## 2017-10-23 NOTE — PROGRESS NOTES
"CHIEF COMPLAINT AND REASON FOR VISIT: Breast cancer followup.   HISTORY OF PRESENT ILLNESS: Briana Mcgee was diagnosed with left breast cancer, stage IIB, T2N1M0, ER positive infiltrating ductal cancer in 2005, status post lumpectomy. Primary tumor was 2.4 cm, grade 3 lesion with angiolymphatic invasion, ER/MI positive, HER-2 negative. Margins were clear. Status post 4 cycles of AC followed by 4 cycles of Taxol, then radiation.   She was on antihormonal therapy from end of 2005 and then initially started with Arimidex and could not tolerate it, switched to Aromasin and then dropped it in the later part of 2009. She has been on followup since then.     PAST MEDICAL HISTORY: GERD, severe reflux disease, osteoarthritis, hot flashes, stroke in 2006, insomnia, hyperlipidemia, hypothyroidism. RA diagnosed in 2014 off all tx by herself.     MEDICATIONS: Reviewed in Epic system.     ALLERGIES: Erythromycin.     SOCIAL HISTORY: She lives in [place]. Denies smoking or alcohol abuse.     FAMILY HISTORY: Denied any family history of breast or ovarian cancer.       REVIEW OF SYSTEMS:   arthritis on her feet, she self d/c all her RA meds.   She has no breast complaint.   She had compression fracture summer 2017. She had local injection. She has severe LBP and also suffers from carpel tunnel.     PHYSICAL EXAMINATION:   VITAL SIGNS: Blood pressure 136/69, pulse 82, temperature 96.8  F (36  C), temperature source Tympanic, resp. rate 24, height 1.657 m (5' 5.25\"), weight 94.7 kg (208 lb 11.2 oz), SpO2 93 %, not currently breastfeeding.  GENERAL APPEARANCE: A middle-aged woman, looks much younger than her stated age, not in distress.   HEENT: The patient is normocephalic, atraumatic. Pupils are equal, react to light. Sclerae are anicteric. Moist oral mucosa. Negative pharynx. No oral thrush. + sinus congestion.   NECK: Supple. No jugular venous distention. Thyroid is not palpable.   LYMPH NODES: Superficial lymphadenopathy is " not appreciable in the bilateral cervical, supraclavicular, axillary or inguinal adenopathy.   CARDIOVASCULAR: S1, S2 regular, with no murmurs or gallops. No carotid or abdominal bruits.   PULMONARY: Lungs are clear to auscultation and percussion bilaterally. There is no wheezing or rhonchi.   GASTROINTESTINAL: Abdomen is soft, nontender. No hepatosplenomegaly. No signs of ascites. No mass appreciable.   MUSCULOSKELETAL AND EXTREMITIES: No edema. No cyanotic changes. No signs of joint deformity. No lymphedema.   NEUROLOGIC: Cranial nerves II through XII are grossly intact. Sensation intact. Muscle strength and muscle tone symmetrical all through, 5/5.   BACK: No spinal or paraspinal tenderness. No CVA tenderness.   SKIN: No petechiae. No rash. No signs of cellulitis.   BREASTS: Bilateral breast exam review: Left lumpectomy scar, well healed. Right nipple is flat and inverted as baseline. That is always the case, runs in her family, according to the patient. Otherwise, no palpable lesions.     CURRENT LAB DATA REVIEWED  Cbc diff/LFT/marker: fine    CURRENT IMAGE REVIEWED  MA 10/2017: negative    MRI L spine 8/2017  1. L4 inferior endplate compression fracture. Adjacent marrow edema suggests that this is either acute/subacute or associated with ongoing instability.  2. L4-L5 severe central stenosis.  3. Multilevel degenerative disc disease, most advanced at L5-S1 where there is mild discogenic endplate reactive marrow edema.  4. Multilevel foraminal stenosis as above.    dexa 6/2016: negative    OLD DATA REVIEW IN SUMMARY:   chest x-ray from 01/2009, question possible pulmonary nodules. Followup CT chest in 02/2009 was reassuring. The last screening mammogram from 09/2008 looks similar to this one, benign BI-RADS 2 reading.     ASSESSMENT AND PLAN:   1. Lymph nodes positive high-grade left breast cancer 2005, status post lumpectomy and chemoradiation therapy, finished about 4 years of antihormonal therapy, could not  tolerate it well, currently on surveillance visit.   We talked about the ER+ breast cancer recurrence pattern.   We talked about lifestyle modification issues, exercise, vitamin D intake. We talked about the dose of vitamin D she needs to be on.   She is on continuous followup with us because of the features of her breast cancer on yrly basis.     She is battling with severe back pain from DJD. She is seeing her old spine surgeon and open for surgery.     Total time is 15 minutes, more than 10 minutes spent in counseling regarding her disease status and follow up plan.

## 2017-10-23 NOTE — NURSING NOTE
"Oncology Rooming Note    October 23, 2017 9:50 AM   Briana Mcgee is a 72 year old female who presents for:    Chief Complaint   Patient presents with     Oncology Clinic Visit     1 year recheck Breast CA, review  Labs & Mammogram      Initial Vitals: /69 (BP Location: Right arm, Patient Position: Sitting, Cuff Size: Adult Large)  Pulse 82  Temp 96.8  F (36  C) (Tympanic)  Resp 24  Ht 1.657 m (5' 5.25\")  Wt 94.7 kg (208 lb 11.2 oz)  SpO2 93%  Breastfeeding? No  BMI 34.46 kg/m2 Estimated body mass index is 34.46 kg/(m^2) as calculated from the following:    Height as of this encounter: 1.657 m (5' 5.25\").    Weight as of this encounter: 94.7 kg (208 lb 11.2 oz). Body surface area is 2.09 meters squared.  Mild Pain (2) Comment: can get up to a 10   No LMP recorded. Patient has had a hysterectomy.  Allergies reviewed: Yes  Medications reviewed: Yes    Medications: Medication refills not needed today.  Pharmacy name entered into [employer]:    [facility] - [place], [place] - [address] [facility]- - [place], [place] - [address]    Clinical concerns: 1 year recheck Breast CA, review  Labs & Mammogram.      7  minutes for nursing intake (face to face time)     Yas Mandujano CMA              "

## 2017-10-25 ENCOUNTER — OFFICE VISIT (OUTPATIENT)
Dept: FAMILY MEDICINE | Facility: CLINIC | Age: 72
End: 2017-10-25
Payer: COMMERCIAL

## 2017-10-25 VITALS
HEIGHT: 65 IN | TEMPERATURE: 98.3 F | WEIGHT: 208 LBS | BODY MASS INDEX: 34.66 KG/M2 | SYSTOLIC BLOOD PRESSURE: 128 MMHG | DIASTOLIC BLOOD PRESSURE: 60 MMHG | OXYGEN SATURATION: 96 % | HEART RATE: 84 BPM

## 2017-10-25 DIAGNOSIS — G56.02 CARPAL TUNNEL SYNDROME OF LEFT WRIST: ICD-10-CM

## 2017-10-25 DIAGNOSIS — I10 ESSENTIAL HYPERTENSION: ICD-10-CM

## 2017-10-25 DIAGNOSIS — Z01.818 PRE-OPERATIVE GENERAL PHYSICAL EXAMINATION: Primary | ICD-10-CM

## 2017-10-25 LAB
ANION GAP SERPL CALCULATED.3IONS-SCNC: 6 MMOL/L (ref 3–14)
BUN SERPL-MCNC: 14 MG/DL (ref 7–30)
CALCIUM SERPL-MCNC: 9.5 MG/DL (ref 8.5–10.1)
CHLORIDE SERPL-SCNC: 97 MMOL/L (ref 94–109)
CO2 SERPL-SCNC: 31 MMOL/L (ref 20–32)
CREAT SERPL-MCNC: 0.94 MG/DL (ref 0.52–1.04)
GFR SERPL CREATININE-BSD FRML MDRD: 58 ML/MIN/1.7M2
GLUCOSE SERPL-MCNC: 103 MG/DL (ref 70–99)
POTASSIUM SERPL-SCNC: 4 MMOL/L (ref 3.4–5.3)
SODIUM SERPL-SCNC: 134 MMOL/L (ref 133–144)

## 2017-10-25 PROCEDURE — 80048 BASIC METABOLIC PNL TOTAL CA: CPT | Performed by: FAMILY MEDICINE

## 2017-10-25 PROCEDURE — 36415 COLL VENOUS BLD VENIPUNCTURE: CPT | Performed by: FAMILY MEDICINE

## 2017-10-25 PROCEDURE — 99215 OFFICE O/P EST HI 40 MIN: CPT | Performed by: FAMILY MEDICINE

## 2017-10-25 NOTE — PROGRESS NOTES
[facility]  [address] [address]  [phone]  Dept: [phone]    PRE-OP EVALUATION:  Today's date: 10/25/2017    Briana Mcgee (: 1945) presents for pre-operative evaluation assessment as requested by Dr. Yi.  She requires evaluation and anesthesia risk assessment prior to undergoing surgery/procedure for treatment of Carpal Tunnel .  Proposed procedure: Left Wrist Carpal Tunnel Release    Date of Surgery/ Procedure: 10/31/2017  Time of Surgery/ Procedure: 2:00pm  Hospital/Surgical Facility: [facility]    Primary Physician: Xavier Vega  Type of Anesthesia Anticipated:Monitor Anesthesia Care    Patient has a Health Care Directive or Living Will:  YES  has copy at home.( she states she is not bringing a copy to clinic)    Preop Questions 10/22/2017   1.  Do you have a history of heart attack, stroke, stent, bypass or surgery on an artery in the head, neck, heart or legs? YES - TIA in , work up was negative, no further issues.   2.  Do you ever have any pain or discomfort in your chest? No   3.  Do you have a history of  Heart Failure? No   4.   Are you troubled by shortness of breath when:  walking on a level surface, or up a slight hill, or at night? No   5.  Do you currently have a cold, bronchitis or other respiratory infection? No   6.  Do you have a cough, shortness of breath, or wheezing? No   7.  Do you sometimes get pains in the calves of your legs when you walk? No   8. Do you or anyone in your family have previous history of blood clots? No   9.  Do you or does anyone in your family have a serious bleeding problem such as prolonged bleeding following surgeries or cuts? No   10. Have you ever had problems with anemia or been told to take iron pills? No   11. Have you had any abnormal blood loss such as black, tarry or bloody stools, or abnormal vaginal bleeding? No   12. Have you ever had a blood transfusion? No   13.  Have you or any of your relatives ever had problems with anesthesia? No   14. Do you have sleep apnea, excessive snoring or daytime drowsiness? No   15. Do you have any prosthetic heart valves? No   16. Do you have prosthetic joints? No   17. Is there any chance that you may be pregnant? No           HPI:                                                      Brief HPI related to upcoming procedure: left CTS and having a release due to numbness      HYPERTENSION - Patient has longstanding history of mod-severe HTN , currently denies any symptoms referable to elevated blood pressure. Specifically denies chest pain, palpitations, dyspnea, orthopnea, PND or peripheral edema. Blood pressure readings have been in normal range. Current medication regimen is as listed below. Patient denies any side effects of medication.                                                                                                                                                                                          .  HYPERLIPIDEMIA - Patient has a long history of significant Hyperlipidemia requiring medication for treatment with recent good control. Patient reports no problems or side effects with the medication.                                                                                                                                                       .  HYPOTHYROIDISM - Patient has a longstanding history of chronic Hypothyroidism. Patient has been doing well, noting no tremor, insomnia, hair loss or changes in skin texture. Last TSH value of 1.87 3/2017. Continues to take medications as directed, without adverse reactions or side effects.                                                                                                                                                                                                                        .    MEDICAL HISTORY:                                                     Patient Active Problem List    Diagnosis Date Noted     Malignant neoplasm of female breast (H) 02/18/2005     Priority: High     11/05: breast surgeon=Dr. Dimitrios Bell, oncology=Dr. Tino Gordillo s/p lumpectomy and axillary node dissection followed by chemotherapy at [facility]. Radiation oncology=Dr. Jessica Edmonds for 6 weeks.   12/05: Briana is clinically asymptomatic and no clinical evidence of cancer recurrence; port-a-cath removal.  9-12-05 NM [facility] [employer] radionuclide angiography at rest findings:1. Left ventricular ejectin fraction is normal at 64%  2. Compared to previous study perfomed 3-17-05, there has been no significant interval change.  10/06: appointment with Dr. Gordillo:continue amrimidex and  follow up in 6 months.  1/16/07: follow up with Dr. Bell: 'doing well with no signs of recurrence', follow up 7/07 with mammogram at that time/  2/12/09: now followed by Dr. Peres.  3/10 seen by Dr. Levi Bear with [facility] yearly visits, now can have yearly mammos, next due 8/10  10/2013 follow up with Dr. Duarte, followed every 6 months due to density of right breast and concerns, now followed yearly  Problem list name updated by automated process. Provider to review       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) 04/12/2017     Priority: Medium     Essential hypertension 03/13/2017     Priority: Medium     Hepatitis 11/25/2015     Priority: Medium     Obesity 10/20/2015     Priority: Medium     CKD (chronic kidney disease) stage 2, GFR 60-89 ml/min 06/04/2014     Priority: Medium     Advanced directives, counseling/discussion 01/29/2014     Priority: Medium     Was given the information to fill out.       Spinal stenosis 01/07/2014     Priority: Medium     Pain controlled with epidural injections       Lumbar radiculopathy 05/07/2012     Priority: Medium     Varicose veins of lower extremities with complications 03/06/2012     Priority:  "Medium     Problem list name updated by automated process. Provider to review       GERD (gastroesophageal reflux disease) 04/12/2011     Priority: Medium     Heterozygous factor V Leiden mutation (H) 04/07/2011     Priority: Medium     HYPERLIPIDEMIA LDL GOAL <100 10/31/2010     Priority: Medium     OA (osteoarthritis) of knee 03/01/2009     Priority: Medium     Followed by [facility]. Briana has had 3 Synvisc injections (1/3/08, 2/12/09,  3/2/09, 3/9/09)       Hot flashes 03/01/2009     Priority: Medium     2/12/09: seen by Dr. Peres, started Effexor. If this is not helpful will start Neurontin.       Rhinitis, allergic seasonal 07/02/2008     Priority: Medium     ischemic stroke 3/06 03/27/2006     Priority: Catarino Warren has been seen by Dr. Hernandez. Given her history of CVA and Factor V Leiden heterozygote he recommends that she stay on \"antiplatelet drug use for secondary prevention of strokes\". Briana has been on plavix and asa.       Impaired fasting glucose 03/05/2006     Priority: Catarino Warren would like to continue diet changes (she has already lost 16 pounds on the [employer]' program), regular exercise and weight loss.  She agrees to recheck fasting blood sugar and lipids in 3 months.       Insomnia 03/05/2006     Priority: Medium     Stable on Trazadone.  Problem list name updated by automated process. Provider to review       Hypothyroidism 02/18/2005     Priority: Medium     Stable on current dose of synthroid.  Problem list name updated by automated process. Provider to review        Past Medical History:   Diagnosis Date     Allergies      Breast cancer (H)      Esophageal reflux      Hypertension      Other and unspecified hyperlipidemia      Other chronic bronchitis      Personal history of pneumonia (recurrent)     Hx-Pneumonia, \" Chronic Bronchitis\"     Personal history of tobacco use, presenting hazards to health      RA (rheumatoid arthritis) (H)      Unspecified " hypothyroidism      Past Surgical History:   Procedure Laterality Date     BIOPSY BREAST       COLONOSCOPY N/A 9/2/2016    Procedure: COLONOSCOPY;  Surgeon: Dean Sanchez MD;  Location: [facility]     EXCISE EXOSTOSIS FOOT Right 4/25/2017    Procedure: EXCISE EXOSTOSIS FOOT;  Retrocalcaneal exostectomy right;  Surgeon: Antwan Goldstein DPM;  Location: [facility]     HC EXCISION BREAST LESION, OPEN >=1      2/05     HYSTERECTOMY, RYAN  1982     for non cancerous reasons and no abnormal pap     INJECT EPIDURAL LUMBAR  1/12/2011    INJECT EPIDURAL LUMBAR performed by GENERIC ANESTHESIA PROVIDER at [facility]     INJECT EPIDURAL LUMBAR  5/5/2011    Procedure:INJECT EPIDURAL LUMBAR; LESLIE -Dr. Sánchez  ; Surgeon:GENERIC ANESTHESIA PROVIDER; Location:[facility]     INJECT EPIDURAL LUMBAR  10/6/2011    Procedure:INJECT EPIDURAL LUMBAR; LESLIE--; Surgeon:GENERIC ANESTHESIA PROVIDER; Location:[facility]     INJECT EPIDURAL LUMBAR  1/25/2012    Procedure:INJECT EPIDURAL LUMBAR; LESLIE-Dr. Sánchez  ; Surgeon:GENERIC ANESTHESIA PROVIDER; Location:[facility]     INJECT EPIDURAL LUMBAR  7/9/2012    Procedure: INJECT EPIDURAL LUMBAR;  LESLIE-Dr. Pacheco;  Surgeon: Provider, Generic Anesthesia;  Location: [facility]     LUMPECTOMY BREAST       RELEASE CARPAL TUNNEL Right 9/5/2017    Procedure: RELEASE CARPAL TUNNEL;  Right Wrist Carpal Tunnel Release;  Surgeon: Melba Yi MD;  Location: [facility]     SURGICAL HISTORY OF -       lumpectomy with sentinal node biopsy     SURGICAL HISTORY OF -       left knee arthoscopic repair medial meniscus     Current Outpatient Prescriptions   Medication Sig Dispense Refill     baclofen (LIORESAL) 10 MG tablet Take 1-2 tablets (10-20mg) by mouth in the evening as needed  2     gabapentin (NEURONTIN) 300 MG capsule Take 2 capsules (600 mg) by mouth 2 times daily 120 capsule 5     amoxicillin (AMOXIL) 500 MG capsule take 4 capsules (2000mg)  by mouth once 1 hour prior to dental appointment  1     HYDROmorphone (DILAUDID) 2 MG  tablet Take 1 tablet (2 mg) by mouth every 8 hours as needed for severe pain maximum 3 tablet(s) per day 30 tablet 0     methocarbamol (ROBAXIN) 500 MG tablet Take 1-2 tablets (500-1,000 mg) by mouth 3 times daily as needed for muscle spasms 90 tablet 3     hydrochlorothiazide 12.5 MG TABS tablet Take 1 tablet (12.5 mg) by mouth daily 90 tablet 1     tolterodine (DETROL LA) 4 MG 24 hr capsule Take 1 capsule (4 mg) by mouth daily 90 capsule 1     simvastatin (ZOCOR) 20 MG tablet Take 1 tablet (20 mg) by mouth At Bedtime Hold on file until needed 90 tablet 3     traZODone (DESYREL) 100 MG tablet 1 tablets at bedtime as needed.  Hold on file until needed 90 tablet 3     levothyroxine (SYNTHROID/LEVOTHROID) 150 MCG tablet Take 1 tablet (150 mcg) by mouth daily BRAND NAME ONLY.  Hold on file until needed. 90 tablet 3     pantoprazole (PROTONIX) 20 MG EC tablet Take 2 tablets (40 mg) by mouth daily Hold on file until needed 180 tablet 3     fluticasone (FLONASE) 50 MCG/ACT spray Spray 2 sprays into both nostrils daily Hold on file until needed (Patient taking differently: Spray 2 sprays into both nostrils daily as needed Hold on file until needed) 16 g 11     magnesium hydroxide (MILK OF MAGNESIA) 400 MG/5ML suspension Take 400 mg/kg/day by mouth daily as needed for constipation or heartburn       folic acid 20 MG CAPS Take 1 tablet by mouth daily        Naproxen Sodium (ALEVE PO) Take 2 tablets by mouth 2 times daily as needed        aspirin 81 MG EC tablet Take 1 tablet (81 mg) by mouth daily 90 tablet 3     Coenzyme Q10 (CO Q 10 PO) Take  by mouth.       pyridoxine (VITAMIN B-6) 100 MG tablet Take 100 mg by mouth daily.       CALCIUM + D 600-200 MG-UNIT OR TABS 1 TABLET DAILY twice daily       MULTIPLE VITAMIN OR 1 daily       OTC products: None, except as noted above    Allergies   Allergen Reactions     Erythromycin Swelling     Erythromycin      Other reaction(s): Edema     Hydrocodone      Other reaction(s): GI  "Upset  Tolerates dilaudid     Oxycodone      Other reaction(s): GI Upset      Latex Allergy: NO    Social History   Substance Use Topics     Smoking status: Former Smoker     Types: Cigarettes     Quit date: 7/19/1996     Smokeless tobacco: Never Used      Comment: quit 10-12 years ago, 1997.     Alcohol use Yes      Comment: glass of wine at night     History   Drug Use No       REVIEW OF SYSTEMS:                                                    Review Of Systems  Constitutional: negative  Skin: negative  Eyes: negative  Ears/Nose/Throat: negative  Respiratory: No shortness of breath, dyspnea on exertion, cough, or hemoptysis  Cardiovascular: negative  Gastrointestinal: negative  Genitourinary: negative  Musculoskeletal: has some known back pain  Neurologic: negative  Psychiatric: negative  Hematologic/Lymphatic/Immunologic: negative  Endocrine: negative      EXAM:                                                    /60 (BP Location: Right arm, Patient Position: Chair, Cuff Size: Adult Large)  Pulse 84  Temp 98.3  F (36.8  C) (Tympanic)  Ht 5' 5.25\" (1.657 m)  Wt 208 lb (94.3 kg)  SpO2 96%  Breastfeeding? No  BMI 34.35 kg/m2    GENERAL APPEARANCE: healthy, alert and no distress     EYES: EOMI, PERRL     HENT: ear canals and TM's normal and nose and mouth without ulcers or lesions     NECK: no adenopathy, no asymmetry, masses, or scars and thyroid normal to palpation     RESP: lungs clear to auscultation - no rales, rhonchi or wheezes     CV: regular rates and rhythm, normal S1 S2, no S3 or S4 and no murmur, click or rub     ABDOMEN:  soft, nontender, no HSM or masses and bowel sounds normal     MS: extremities normal- no gross deformities noted, no evidence of inflammation in joints, FROM in all extremities.     SKIN: no suspicious lesions or rashes     NEURO: Normal strength and tone, sensory exam grossly normal, mentation intact and speech normal     PSYCH: mentation appears normal. and affect " normal/bright     LYMPHATICS: anterior cervical: no adenopathy  posterior cervical: no adenopathy    DIAGNOSTICS:                                                    EKG: Normal Sinus Rhythm, Right Bundle Branch Block, done in 4/11/2017, unchanged from prior tracings.    Recent Labs   Lab Test  10/19/17   1015  08/28/17   0947  04/24/17   1310  04/11/17   0900   03/17/15   1104   01/07/14   1031   HGB  12.0   --    --   12.6   < >   --    < >  12.4   PLT  254   --    --   245   < >   --    < >  253   NA   --   133  138   --    < >   --    < >   --    POTASSIUM   --   3.8  3.9   --    < >   --    < >   --    CR   --   0.80  0.96   --    < >   --    < >   --    A1C   --    --    --    --    --   5.6   --   5.4    < > = values in this interval not displayed.    basic profile is pending    IMPRESSION:                                                    Reason for surgery/procedure: CTS on left side    The proposed surgical procedure is considered INTERMEDIATE risk.    REVISED CARDIAC RISK INDEX  The patient has the following serious cardiovascular risks for perioperative complications such as (MI, PE, VFib and 3  AV Block):  No serious cardiac risks  INTERPRETATION: 0 risks: Class I (very low risk - 0.4% complication rate)    The patient has the following additional risks for perioperative complications:  No identified additional risks      ICD-10-CM    1. Pre-operative general physical examination Z01.818 Basic metabolic panel   2. Carpal tunnel syndrome of left wrist G56.02 Basic metabolic panel   3. Essential hypertension I10 Basic metabolic panel       RECOMMENDATIONS:                                                          --Patient is to take all scheduled medications on the day of surgery EXCEPT for modifications listed below.    Anticoagulant or Antiplatelet Medication Use  ASPIRIN: Discontinue ASA 7-10 days prior to procedure to reduce bleeding risk.  It should be resumed post-operatively.  Also hold nsaids           APPROVAL GIVEN to proceed with proposed procedure, without further diagnostic evaluation       Signed Electronically by: Xavier Vega MD    Copy of this evaluation report is provided to requesting physician.    [facility] Preop Guidelines

## 2017-10-25 NOTE — NURSING NOTE
"Chief Complaint   Patient presents with     Pre-Op Exam       Initial /60 (BP Location: Right arm, Patient Position: Chair, Cuff Size: Adult Large)  Pulse 84  Temp 98.3  F (36.8  C) (Tympanic)  Ht 5' 5.25\" (1.657 m)  Wt 208 lb (94.3 kg)  SpO2 93%  Breastfeeding? No  BMI 34.35 kg/m2 Estimated body mass index is 34.35 kg/(m^2) as calculated from the following:    Height as of this encounter: 5' 5.25\" (1.657 m).    Weight as of this encounter: 208 lb (94.3 kg).  Medication Reconciliation: complete    "

## 2017-10-25 NOTE — MR AVS SNAPSHOT
After Visit Summary   10/25/2017    Birana Mcgee    MRN: [id number]           Patient Information     Date Of Birth          1945        Visit Information        Provider Department      10/25/2017 11:20 AM Xavier Vega MD [facility]        Today's Diagnoses     Pre-operative general physical examination    -  1    Carpal tunnel syndrome of left wrist        Essential hypertension          Care Instructions    Please stay healthy.    You can use the acetaminophen and or dilaudid.  Use acetaminophen 500 mg tabs, 1-2 tabs every 6 hours as needed, remember not to use more than 4,000mg in 24 hours and to include other medications that may have acetaminophen.    Stop the ibuprofen, naproxen, aleve and aspirin 7-10 days prior to your surgery.      Thank you for choosing [facility].  You may be receiving a survey in the mail from [facility] your [facility] today.  Please take a few minutes to complete and return the survey to let us know how we are doing.      If you have questions or concerns, please contact us via jellyfish or you can contact your care team at [phone].    Our Clinic hours are:  Monday 6:40 am  to 7:00 pm  Tuesday -Friday 6:40 am to 5:00 pm    The [place] outpatient lab hours are:  Monday - Friday 6:10 am to 4:45 pm  Saturdays 7:00 am to 11:00 am  Appointments are required, call [phone]    If you have clinical questions after hours or would like to schedule an appointment,  call the clinic at [phone].            Follow-ups after your visit        Your next 10 appointments already scheduled     Oct 31, 2017   Procedure with Melba Yi MD   [facility] (--)    [address] [id number] [phone]           The medical center is located at [address] (between [address] in [place], [place] north of [place]).            Oct 22, 2018  9:15 AM CDT   MA SCREENING  "BILATERAL W/ JESSICA with [facility]   [facility] ([facility])    [address]  [facility] [id number] [phone]           Three-dimensional (3D) mammograms are available at [place] locations in [facility], [place], [place], [facility], [place], [place], and [place]. -Health locations include [place] and [facility] in [place]. Benefits of 3D mammograms include: - Improved rate of cancer detection - Decreases your chance of having to go back for more tests, which means fewer: - \"False-positive\" results (This means that there is an abnormal area but it isn't cancer.) - Invasive testing procedures, such as a biopsy or surgery - Can provide clearer images of the breast if you have dense breast tissue. 3D mammography is an optional exam that anyone can have with a 2D mammogram. It doesn't replace or take the place of a 2D mammogram. 2D mammograms remain an effective screening test for all women.  Not all insurance companies cover the cost of a 3D mammogram. Check with your insurance.            Oct 22, 2018  1:00 PM CDT   [facility] with [facility]   [facility]    [address] [id number] [phone]           Patient must bring picture ID. Patient should be prepared to give a urine specimen  Please do not eat 10-12 hours before your appointment if you are coming in fasting for labs on lipids, cholesterol, or glucose (sugar). Pregnant women should follow their Care Team instructions. Water with medications is okay. Do not drink coffee or other fluids. If you have concerns about taking  your medications, please ask at office or if scheduling via Zanbato, send a message by clicking on Secure Messaging, Message Your Care Team.              Who to contact     If you have questions or need follow up information about today's clinic visit or your schedule please contact [facility] " "directly at [phone].  Normal or non-critical lab and imaging results will be communicated to you by FanDistrohart, letter or phone within 4 business days after the clinic has received the results. If you do not hear from us within 7 days, please contact the clinic through Assurzt or phone. If you have a critical or abnormal lab result, we will notify you by phone as soon as possible.  Submit refill requests through Check I'm Here or call your pharmacy and they will forward the refill request to us. Please allow 3 business days for your refill to be completed.          Additional Information About Your Visit        FanDistrohart Information     Check I'm Here gives you secure access to your electronic health record. If you see a primary care provider, you can also send messages to your care team and make appointments. If you have questions, please call your primary care clinic.  If you do not have a primary care provider, please call [phone] and they will assist you.        Care EveryWhere ID     This is your Care EveryWhere ID. This could be used by other organizations to access your [place] medical records  [id number]        Your Vitals Were     Pulse Temperature Height Pulse Oximetry Breastfeeding? BMI (Body Mass Index)    84 98.3  F (36.8  C) (Tympanic) 5' 5.25\" (1.657 m) 96% No 34.35 kg/m2       Blood Pressure from Last 3 Encounters:   10/25/17 128/60   10/23/17 136/69   09/05/17 136/70    Weight from Last 3 Encounters:   10/25/17 208 lb (94.3 kg)   10/23/17 208 lb 11.2 oz (94.7 kg)   09/05/17 205 lb (93 kg)              We Performed the Following     Basic metabolic panel          Today's Medication Changes          These changes are accurate as of: 10/25/17 11:49 AM.  If you have any questions, ask your nurse or doctor.               These medicines have changed or have updated prescriptions.        Dose/Directions    fluticasone 50 MCG/ACT spray   Commonly known as:  FLONASE   This may have changed:    - when to take " this  - reasons to take this  - additional instructions   Used for:  Other seasonal allergic rhinitis        Dose:  2 spray   Spray 2 sprays into both nostrils daily Hold on file until needed   Quantity:  16 g   Refills:  11                Primary Care Provider Office Phone # Fax #    Xavier Vega [phone] [address] [address]        Equal Access to Services     GABRIEL HODGES AH: Hadii aad ku hadasho Soomaali, waaxda luqadaha, qaybta kaalmada adeegyada, waxay idiin hayaan adeeg kharash la'aan ah. So [facility] [phone].    ATENCIÓN: Si habla español, tiene a bright disposición servicios gratuitos de asistencia lingüística. Llame al [phone].    We comply with applicable federal civil rights laws and [place] laws. We do not discriminate on the basis of race, color, national origin, age, disability, sex, sexual orientation, or gender identity.            Thank you!     Thank you for choosing [facility]  for your care. Our goal is always to provide you with excellent care. Hearing back from our patients is one way we can continue to improve our services. Please take a few minutes to complete the written survey that you may receive in the mail after your visit with us. Thank you!             Your Updated Medication List - Protect others around you: Learn how to safely use, store and throw away your medicines at www.disposemymeds.org.          This list is accurate as of: 10/25/17 11:49 AM.  Always use your most recent med list.                   Brand Name Dispense Instructions for use Diagnosis    ALEVE PO      Take 2 tablets by mouth 2 times daily as needed        amoxicillin 500 MG capsule    AMOXIL     take 4 capsules (2000mg)  by mouth once 1 hour prior to dental appointment        aspirin 81 MG EC tablet     90 tablet    Take 1 tablet (81 mg) by mouth daily    Heterozygous factor V Leiden mutation (H), Personal history of other diseases of circulatory  system       baclofen 10 MG tablet    LIORESAL     Take 1-2 tablets (10-20mg) by mouth in the evening as needed    Personal history of malignant neoplasm of breast       calcium + D 600-200 MG-UNIT Tabs   Generic drug:  calcium carbonate-vitamin D      1 TABLET DAILY twice daily        CO Q 10 PO      Take  by mouth.        fluticasone 50 MCG/ACT spray    FLONASE    16 g    Spray 2 sprays into both nostrils daily Hold on file until needed    Other seasonal allergic rhinitis       folic acid 20 MG Caps      Take 1 tablet by mouth daily        gabapentin 300 MG capsule    NEURONTIN    120 capsule    Take 2 capsules (600 mg) by mouth 2 times daily    Lumbar radiculopathy       hydrochlorothiazide 12.5 MG Tabs tablet     90 tablet    Take 1 tablet (12.5 mg) by mouth daily    Essential hypertension       HYDROmorphone 2 MG tablet    DILAUDID    30 tablet    Take 1 tablet (2 mg) by mouth every 8 hours as needed for severe pain maximum 3 tablet(s) per day    Acute bilateral low back pain without sciatica       levothyroxine 150 MCG tablet    SYNTHROID/LEVOTHROID    90 tablet    Take 1 tablet (150 mcg) by mouth daily BRAND NAME ONLY.  Hold on file until needed.    Hypothyroidism, unspecified type       magnesium hydroxide 400 MG/5ML suspension    MILK OF MAGNESIA     Take 400 mg/kg/day by mouth daily as needed for constipation or heartburn        methocarbamol 500 MG tablet    ROBAXIN    90 tablet    Take 1-2 tablets (500-1,000 mg) by mouth 3 times daily as needed for muscle spasms    Acute bilateral low back pain without sciatica       MULTIPLE VITAMIN PO      1 daily        pantoprazole 20 MG EC tablet    PROTONIX    180 tablet    Take 2 tablets (40 mg) by mouth daily Hold on file until needed    Gastroesophageal reflux disease without esophagitis       pyridoxine 100 MG tablet    VITAMIN B-6     Take 100 mg by mouth daily.        simvastatin 20 MG tablet    ZOCOR    90 tablet    Take 1 tablet (20 mg) by mouth At Bedtime  Hold on file until needed    Hyperlipidemia LDL goal <100       tolterodine 4 MG 24 hr capsule    DETROL LA    90 capsule    Take 1 capsule (4 mg) by mouth daily    Urinary urgency       traZODone 100 MG tablet    DESYREL    90 tablet    1 tablets at bedtime as needed.  Hold on file until needed    Insomnia, unspecified

## 2017-10-25 NOTE — PATIENT INSTRUCTIONS
Please stay healthy.    You can use the acetaminophen and or dilaudid.  Use acetaminophen 500 mg tabs, 1-2 tabs every 6 hours as needed, remember not to use more than 4,000mg in 24 hours and to include other medications that may have acetaminophen.    Stop the ibuprofen, naproxen, aleve and aspirin 7-10 days prior to your surgery.      Thank you for choosing [facility].  You may be receiving a survey in the mail from [facility] regarding your visit today.  Please take a few minutes to complete and return the survey to let us know how we are doing.      If you have questions or concerns, please contact us via GRAVIDI or you can contact your care team at [phone].    Our Clinic hours are:  Monday 6:40 am  to 7:00 pm  Tuesday -Friday 6:40 am to 5:00 pm    The [place] outpatient lab hours are:  Monday - Friday 6:10 am to 4:45 pm  Saturdays 7:00 am to 11:00 am  Appointments are required, call [phone]    If you have clinical questions after hours or would like to schedule an appointment,  call the clinic at [phone].

## 2017-10-26 ENCOUNTER — TRANSFERRED RECORDS (OUTPATIENT)
Dept: HEALTH INFORMATION MANAGEMENT | Facility: CLINIC | Age: 72
End: 2017-10-26

## 2017-10-26 ENCOUNTER — HOSPITAL ENCOUNTER (OUTPATIENT)
Dept: CT IMAGING | Facility: CLINIC | Age: 72
Discharge: HOME | End: 2017-10-26
Attending: PHYSICIAN ASSISTANT | Admitting: PHYSICIAN ASSISTANT
Payer: MEDICARE

## 2017-10-26 DIAGNOSIS — M48.061 SPINAL STENOSIS, LUMBAR REGION, WITHOUT NEUROGENIC CLAUDICATION: ICD-10-CM

## 2017-10-26 DIAGNOSIS — M48.062 SPINAL STENOSIS, LUMBAR REGION WITH NEUROGENIC CLAUDICATION: ICD-10-CM

## 2017-10-26 DIAGNOSIS — M54.16 LUMBAR RADICULOPATHY: ICD-10-CM

## 2017-10-26 PROCEDURE — 72131 CT LUMBAR SPINE W/O DYE: CPT

## 2017-10-30 ENCOUNTER — ANESTHESIA EVENT (OUTPATIENT)
Dept: SURGERY | Facility: CLINIC | Age: 72
End: 2017-10-30
Payer: MEDICARE

## 2017-10-31 ENCOUNTER — SURGERY (OUTPATIENT)
Age: 72
End: 2017-10-31

## 2017-10-31 ENCOUNTER — HOSPITAL ENCOUNTER (OUTPATIENT)
Facility: CLINIC | Age: 72
Discharge: HOME | End: 2017-10-31
Attending: ORTHOPAEDIC SURGERY | Admitting: ORTHOPAEDIC SURGERY
Payer: MEDICARE

## 2017-10-31 ENCOUNTER — ANESTHESIA (OUTPATIENT)
Dept: SURGERY | Facility: CLINIC | Age: 72
End: 2017-10-31
Payer: MEDICARE

## 2017-10-31 VITALS
OXYGEN SATURATION: 92 % | RESPIRATION RATE: 16 BRPM | TEMPERATURE: 97.9 F | DIASTOLIC BLOOD PRESSURE: 65 MMHG | SYSTOLIC BLOOD PRESSURE: 122 MMHG

## 2017-10-31 PROCEDURE — 25000125 ZZHC RX 250: Performed by: NURSE ANESTHETIST, CERTIFIED REGISTERED

## 2017-10-31 PROCEDURE — 40000305 ZZH STATISTIC PRE PROC ASSESS I: Performed by: ORTHOPAEDIC SURGERY

## 2017-10-31 PROCEDURE — A9270 NON-COVERED ITEM OR SERVICE: HCPCS | Mod: GY | Performed by: ORTHOPAEDIC SURGERY

## 2017-10-31 PROCEDURE — 71000027 ZZH RECOVERY PHASE 2 EACH 15 MINS: Performed by: ORTHOPAEDIC SURGERY

## 2017-10-31 PROCEDURE — 27210794 ZZH OR GENERAL SUPPLY STERILE: Performed by: ORTHOPAEDIC SURGERY

## 2017-10-31 PROCEDURE — 25000128 H RX IP 250 OP 636: Performed by: NURSE ANESTHETIST, CERTIFIED REGISTERED

## 2017-10-31 PROCEDURE — 25000128 H RX IP 250 OP 636: Performed by: PHYSICIAN ASSISTANT

## 2017-10-31 PROCEDURE — 36000052 ZZH SURGERY LEVEL 2 EA 15 ADDTL MIN: Performed by: ORTHOPAEDIC SURGERY

## 2017-10-31 PROCEDURE — 37000009 ZZH ANESTHESIA TECHNICAL FEE, EACH ADDTL 15 MIN: Performed by: ORTHOPAEDIC SURGERY

## 2017-10-31 PROCEDURE — S0020 INJECTION, BUPIVICAINE HYDRO: HCPCS | Performed by: ORTHOPAEDIC SURGERY

## 2017-10-31 PROCEDURE — 37000008 ZZH ANESTHESIA TECHNICAL FEE, 1ST 30 MIN: Performed by: ORTHOPAEDIC SURGERY

## 2017-10-31 PROCEDURE — 25000132 ZZH RX MED GY IP 250 OP 250 PS 637: Mod: GY | Performed by: ORTHOPAEDIC SURGERY

## 2017-10-31 PROCEDURE — 25000125 ZZHC RX 250: Performed by: ORTHOPAEDIC SURGERY

## 2017-10-31 PROCEDURE — 36000050 ZZH SURGERY LEVEL 2 1ST 30 MIN: Performed by: ORTHOPAEDIC SURGERY

## 2017-10-31 RX ORDER — SODIUM CHLORIDE, SODIUM LACTATE, POTASSIUM CHLORIDE, CALCIUM CHLORIDE 600; 310; 30; 20 MG/100ML; MG/100ML; MG/100ML; MG/100ML
INJECTION, SOLUTION INTRAVENOUS CONTINUOUS
Status: DISCONTINUED | OUTPATIENT
Start: 2017-10-31 | End: 2017-10-31 | Stop reason: HOSPADM

## 2017-10-31 RX ORDER — LIDOCAINE HYDROCHLORIDE 10 MG/ML
INJECTION, SOLUTION INFILTRATION; PERINEURAL PRN
Status: DISCONTINUED | OUTPATIENT
Start: 2017-10-31 | End: 2017-10-31

## 2017-10-31 RX ORDER — BUPIVACAINE HYDROCHLORIDE 5 MG/ML
INJECTION, SOLUTION PERINEURAL PRN
Status: DISCONTINUED | OUTPATIENT
Start: 2017-10-31 | End: 2017-10-31 | Stop reason: HOSPADM

## 2017-10-31 RX ORDER — ONDANSETRON 2 MG/ML
INJECTION INTRAMUSCULAR; INTRAVENOUS PRN
Status: DISCONTINUED | OUTPATIENT
Start: 2017-10-31 | End: 2017-10-31

## 2017-10-31 RX ORDER — PROPOFOL 10 MG/ML
INJECTION, EMULSION INTRAVENOUS PRN
Status: DISCONTINUED | OUTPATIENT
Start: 2017-10-31 | End: 2017-10-31

## 2017-10-31 RX ORDER — HYDROMORPHONE HYDROCHLORIDE 2 MG/1
2 TABLET ORAL EVERY 4 HOURS PRN
Status: DISCONTINUED | OUTPATIENT
Start: 2017-10-31 | End: 2017-10-31 | Stop reason: HOSPADM

## 2017-10-31 RX ORDER — LIDOCAINE HYDROCHLORIDE 10 MG/ML
INJECTION, SOLUTION INFILTRATION; PERINEURAL PRN
Status: DISCONTINUED | OUTPATIENT
Start: 2017-10-31 | End: 2017-10-31 | Stop reason: HOSPADM

## 2017-10-31 RX ORDER — CEFAZOLIN SODIUM 1 G/3ML
1 INJECTION, POWDER, FOR SOLUTION INTRAMUSCULAR; INTRAVENOUS SEE ADMIN INSTRUCTIONS
Status: DISCONTINUED | OUTPATIENT
Start: 2017-10-31 | End: 2017-10-31 | Stop reason: HOSPADM

## 2017-10-31 RX ORDER — FENTANYL CITRATE 50 UG/ML
INJECTION, SOLUTION INTRAMUSCULAR; INTRAVENOUS PRN
Status: DISCONTINUED | OUTPATIENT
Start: 2017-10-31 | End: 2017-10-31

## 2017-10-31 RX ORDER — CEFAZOLIN SODIUM 2 G/100ML
2 INJECTION, SOLUTION INTRAVENOUS
Status: COMPLETED | OUTPATIENT
Start: 2017-10-31 | End: 2017-10-31

## 2017-10-31 RX ORDER — DEXAMETHASONE SODIUM PHOSPHATE 4 MG/ML
INJECTION, SOLUTION INTRA-ARTICULAR; INTRALESIONAL; INTRAMUSCULAR; INTRAVENOUS; SOFT TISSUE PRN
Status: DISCONTINUED | OUTPATIENT
Start: 2017-10-31 | End: 2017-10-31

## 2017-10-31 RX ORDER — BACITRACIN ZINC 500 [USP'U]/G
OINTMENT TOPICAL PRN
Status: DISCONTINUED | OUTPATIENT
Start: 2017-10-31 | End: 2017-10-31 | Stop reason: HOSPADM

## 2017-10-31 RX ADMIN — CEFAZOLIN SODIUM 2 G: 2 INJECTION, SOLUTION INTRAVENOUS at 13:56

## 2017-10-31 RX ADMIN — ONDANSETRON 4 MG: 2 INJECTION INTRAMUSCULAR; INTRAVENOUS at 14:00

## 2017-10-31 RX ADMIN — FENTANYL CITRATE 50 MCG: 50 INJECTION, SOLUTION INTRAMUSCULAR; INTRAVENOUS at 14:00

## 2017-10-31 RX ADMIN — PROPOFOL 25 MG: 10 INJECTION, EMULSION INTRAVENOUS at 14:11

## 2017-10-31 RX ADMIN — LIDOCAINE HYDROCHLORIDE 4 ML: 10 INJECTION, SOLUTION INFILTRATION; PERINEURAL at 14:23

## 2017-10-31 RX ADMIN — PROPOFOL 50 MG: 10 INJECTION, EMULSION INTRAVENOUS at 14:09

## 2017-10-31 RX ADMIN — MIDAZOLAM HYDROCHLORIDE 2 MG: 1 INJECTION, SOLUTION INTRAMUSCULAR; INTRAVENOUS at 13:55

## 2017-10-31 RX ADMIN — OSELTAMIVIR PHOSPHATE 7 G: 75 CAPSULE ORAL at 14:23

## 2017-10-31 RX ADMIN — BUPIVACAINE HYDROCHLORIDE 4 ML: 5 INJECTION, SOLUTION PERINEURAL at 14:24

## 2017-10-31 RX ADMIN — FENTANYL CITRATE 100 MCG: 50 INJECTION, SOLUTION INTRAMUSCULAR; INTRAVENOUS at 14:10

## 2017-10-31 RX ADMIN — MIDAZOLAM HYDROCHLORIDE 2 MG: 1 INJECTION, SOLUTION INTRAMUSCULAR; INTRAVENOUS at 14:00

## 2017-10-31 RX ADMIN — DEXAMETHASONE SODIUM PHOSPHATE 4 MG: 4 INJECTION, SOLUTION INTRA-ARTICULAR; INTRALESIONAL; INTRAMUSCULAR; INTRAVENOUS; SOFT TISSUE at 14:00

## 2017-10-31 RX ADMIN — LIDOCAINE HYDROCHLORIDE 30 MG: 10 INJECTION, SOLUTION INFILTRATION; PERINEURAL at 14:00

## 2017-10-31 RX ADMIN — FENTANYL CITRATE 100 MCG: 50 INJECTION, SOLUTION INTRAMUSCULAR; INTRAVENOUS at 13:55

## 2017-10-31 RX ADMIN — HYDROMORPHONE HYDROCHLORIDE 2 MG: 2 TABLET ORAL at 15:51

## 2017-10-31 RX ADMIN — SODIUM CHLORIDE, POTASSIUM CHLORIDE, SODIUM LACTATE AND CALCIUM CHLORIDE: 600; 310; 30; 20 INJECTION, SOLUTION INTRAVENOUS at 13:16

## 2017-10-31 ASSESSMENT — LIFESTYLE VARIABLES: TOBACCO_USE: 1

## 2017-10-31 NOTE — IP AVS SNAPSHOT
MRN:[id number]                      After Visit Summary   10/31/2017    Briana Mcgee    MRN: [id number]           Thank you!     Thank you for choosing [facility] for your care. Our goal is always to provide you with excellent care. Hearing back from our patients is one way we can continue to improve our services. Please take a few minutes to complete the written survey that you may receive in the mail after you visit with us. Thank you!        Patient Information     Date Of Birth          1945        About your hospital stay     You were admitted on:  October 31, 2017 You last received care in the:  [facility] PreOP/Phase II    You were discharged on:  October 31, 2017        Reason for your hospital stay       Carpal tunnel release                  Who to Call     For medical emergencies, please call 911.  For non-urgent questions about your medical care, please call your primary care provider or clinic, [phone]  For questions related to your surgery, please call your surgery clinic        Attending Provider     Provider Specialty    Melba Yi MD Orthopedics       Primary Care Provider Office Phone # Fax #    Xavier Vega [phone]       When to contact your care team       Call your primary doctor if you have any of the following: chest pain, troubles breathing, increased shortness of breath.  Call [facility] ([phone]) if you have any of the following: temperature greater than 100.5F, pain not controlled with elevation or pain medications, drainage that saturates the bandage.                  After Care Instructions     Activity       Keep your arm elevated above your heart for the next 2-3 days.  This will limit swelling and help with pain control.  It is ok to apply an ice bag to the area, but make sure there is no leak or chance for condensation to cause your dressing to get damp.  Wet dressings can lead to  "infection.  Keep your sterile surgical dressing clean and dry.  Please do no remove.  Sponge bathing is recommended.  Otherwise, cover your arm with plastic bags secured around the upper arm if showering and hold your arm outside of the shower.  OK to move your fingers, wrist, and elbow.  No lifting, pushing, pulling more than 10 lbs with the surgical extremity.  No repetitive activities with the surgical hand/wrist.            Diet       Resume your pre-op diet                  Follow-up Appointments     Follow-up and recommended labs and tests        Follow up with Dr. Yi in 2 weeks at [facility] ([phone]).  You may need to call to schedule this appointment if you do not already have a follow-up appointment scheduled.                  Your next 10 appointments already scheduled     Oct 22, 2018  9:15 AM CDT   MA SCREENING BILATERAL W/ JESSICA with [facility]   [facility] ([facility])    [address] [address]   [phone]           Three-dimensional (3D) mammograms are available at [facility] locations in [facility], [place], [place], [facility], [place], [place], and [place]. -Health locations include [place] [facility] in [place]. Benefits of 3D mammograms include: - Improved rate of cancer detection - Decreases your chance of having to go back for more tests, which means fewer: - \"False-positive\" results (This means that there is an abnormal area but it isn't cancer.) - Invasive testing procedures, such as a biopsy or surgery - Can provide clearer images of the breast if you have dense breast tissue. 3D mammography is an optional exam that anyone can have with a 2D mammogram. It doesn't replace or take the place of a 2D mammogram. 2D mammograms remain an effective screening test for all women.  Not all insurance companies cover the cost of a 3D mammogram. Check with your insurance.            Oct 22, 2018  " 1:00 PM CDT   LAB with PI LAB   [facility] ([facility]    [address] [address]   [phone]           Please do not eat 10-12 hours before your appointment if you are coming in fasting for labs on lipids, cholesterol, or glucose (sugar). This does not apply to pregnant women. Water, hot tea and black coffee (with nothing added) are okay. Do not drink other fluids, diet soda or chew gum.            Oct 29, 2018  9:30 AM CDT   Return Visit with Merari Duarte MD   [facility] ([facility])    [facility]  [address]  [facility] [address] [phone]              Further instructions from your care team       Nausea and Vomiting  What are nausea and vomiting?   Nausea is the queasy feeling you usually have before you vomit. Vomiting is the forceful emptying (throwing up) of the stomach's contents through the mouth.   What causes nausea and vomiting?   Nausea and vomiting are symptoms that may occur with many conditions, such as:   Anesthesia medications   side effect of narcotic medicines  exposure to unpleasant odors or sights   stress and anxiety     How is it treated?   At first you should rest your stomach for a few hours by eating nothing solid and sipping only clear liquids. A little later you can eat soft bland foods that are easy to digest.   It is important to drink small amounts (1 to 4 ounces) often so that you do not become dehydrated. Gradually drink larger amounts of the clear fluids. If you vomit, wait an hour, then start over with a smaller amount of fluid.   Eat slowly and avoid foods that are acidic, spicy, fatty, or fibrous (such as meats, coarse grains, and raw vegetables). Also avoid extremely hot or cold food. In addition, avoid dairy products if you have diarrhea. You may start eating your normal diet again in 3 days or so, when all signs of illness have passed.   Rest as much as  possible. Sit or lie down with your head propped up. Do not lie flat for at least 2 hours after eating. Nausea and vomiting usually last only a short period of time. If you have cramping or pain in your belly you can try putting a heating pad set at low or a covered hot water bottle on your belly. Never set a heating pad on high because you could get burned.   If you have been vomiting for more than a day or have had diarrhea for over 3 days, you may need to have an exam by your provider, including a check for dehydration. If you are very dehydrated, you may need to be given fluids intravenously (IV). In children and older adults dehydration can quickly become life threatening.   When should I call my healthcare provider?   Talk with your provider if you are unable to keep fluids down for more than 12 hours or if you have any of the following symptoms with nausea and vomiting:   severe headache or neck ache, or stiff neck   severe abdominal pain   diarrhea and vomiting that last more than 24 hours   blood in the vomited material that may look red, brown, or black, or like coffee grounds   bloody diarrhea   very forceful vomiting   signs of dehydration such as dry mouth, excessive thirst, little or no urination, severe weakness, dizziness, or lightheadedness.   If you have nausea and pain in the jaw, arm, shoulder, chest, or back; sweating; shortness of breath; or lightheadedness; call 911 for emergency care.     Post-operative Infection  What is a wound infection?    watch for signs of infection. Signs that the wound/incision is infected include:   Pus or cloudy fluid draining from the incision.   A pimple or yellow crust forming on the incision   The scab is increasing in size.   Increasing redness occurs around the incisions  A red streak is spreading from the wound toward the heart.   The incision has become extremely tender and/or hot   The lymph node draining that area of skin may become large and tender.   You  may develop a fever over 100?F (37.8?C).     What is the cause?   Most skin infections follow breaks in the skin (for example, surgery,  from cuts, puncture wounds, animal bites, splinters, thorns, or burns). Bacteria (especially staphylococcus or streptococcus) then invade the wound and cause the infection.    Deeper wounds (like surgical incisions) are much more likely to become infected than superficial wounds (for example, scrapes).     What is the treatment?   Call your doctor's clinic if you feel you have the beginnings of an infection   Antibiotics You will probably need antibiotics prescribed by your healthcare provider. This medicine will kill the germs that are causing the wound infection. Try not to forget any of the doses.  Even if you feel better in a few days, take the antibiotic until it is completely gone to keep the infection from flaring up again.    Fever and pain relief Take acetaminophen or ibuprofen if you develop a fever over 102?F (39?C)    How can I help prevent infections?   Wash around all new incisions vigorously with soap and water for 5 to 10 minutes to remove dirt and bacteria.      When should I call my healthcare provider?   Call IMMEDIATELY if:   The redness keeps spreading.   The wound becomes extremely painful.   Call during office hours if:   The fever is not gone 48 hours after you start taking an antibiotic.   The wound infection does not look better 3 days after your start taking an antibiotic.   The wound isn't completely healed within 10 days.   You have other questions or concerns.     Breakthrough Bleeding    How/Why does it occur?   There are many causes of breakthrough bleeding onto your dressing. The two most common causes are increased activity and increased NSAID use.     How is it treated?   The treatment for breakthrough dressing bleeding depends on the cause. For simple problems such as a saturated dressing, you may need to reinforce the dressing with more gauze  and tape and put slight pressure on the site.  Call your healthcare provider if something else is causing bleeding.                     Same Day Surgery Discharge Instructions  Special Precautions After Surgery - Adult    1. It is not unusual to feel lightheaded or faint, up to 24 hours after surgery or while taking pain medication.  If you have these symptoms; sit for a few minutes before standing and have someone assist you when getting up.  2. You should rest and relax for the next 24 hours and must have someone stay with you for at least 24 hours after your discharge.  3. DO NOT DRIVE any vehicle or operate mechanical equipment for 24 hours following the end of your surgery.  DO NOT DRIVE while taking narcotic pain medications that have been prescribed by your physician.  If you had a limb operated on, you must be able to use it fully to drive.  4. DO NOT drink alcoholic beverages for 24 hours following surgery or while taking prescription pain medication.  5. Drink clear liquids (apple juice, ginger ale, broth, 7-Up, etc.).  Progress to your regular diet as you feel able.  6. Any questions call your physician and do not make important decisions for 24 hours.  __________________________________________________________________________________________________________________________________  IMPORTANT NUMBERS:    [facility] Main Number:  [phone], [phone]  Pharmacy:  [phone]  Same Day Surgery:  [phone], Monday - Friday until 8:30 p.m.  Urgent Care:  [phone]  Emergency Room:  [phone]      [facility]:  [phone]                                                                             [facility]:  [phone] option 1  [facility]:  [phone]     [facility]:  [phone]   [facility] Clinic:  [phone]   Home Medical Equipment: [phone]  [facility]:  [phone]        Pending Results     No orders found from  10/29/2017 to 11/1/2017.            Admission Information     Date & Time Provider Department Dept. Phone    10/31/2017 Melba Yi MD [facility] PreOP/Phase [phone]      Your Vitals Were     Blood Pressure Temperature Respirations Pulse Oximetry          131/69 (Cuff Size: Adult Large) 97.9  F (36.6  C) 16 91%        MyChart Information     [employer] gives you secure access to your electronic health record. If you see a primary care provider, you can also send messages to your care team and make appointments. If you have questions, please call your primary care clinic.  If you do not have a primary care provider, please call [phone] and they will assist you.        Care EveryWhere ID     This is your Care EveryWhere ID. This could be used by other organizations to access your [facility] medical records  [id number]        Equal Access to Services     JENNIFER HODGES : Sung Shafer, suzan armenta, david vo, hunter wylie . So [facility] [phone].    ATENCIÓN: Si habla español, tiene a bright disposición servicios gratuitos de asistencia lingüística. Shineame al [phone].    We comply with applicable federal civil rights laws and [place] laws. We do not discriminate on the basis of race, color, national origin, age, disability, sex, sexual orientation, or gender identity.               Review of your medicines      CONTINUE these medicines which may have CHANGED, or have new prescriptions. If we are uncertain of the size of tablets/capsules you have at home, strength may be listed as something that might have changed.        Dose / Directions    fluticasone 50 MCG/ACT spray   Commonly known as:  FLONASE   This may have changed:    - when to take this  - reasons to take this  - additional instructions   Used for:  Other seasonal allergic rhinitis        Dose:  2 spray   Spray 2 sprays into both nostrils daily Hold on file until needed    Quantity:  16 g   Refills:  11         CONTINUE these medicines which have NOT CHANGED        Dose / Directions    ALEVE PO        Dose:  2 tablet   Take 2 tablets by mouth 2 times daily as needed   Refills:  0       aspirin 81 MG EC tablet   Used for:  Heterozygous factor V Leiden mutation (H), Personal history of other diseases of circulatory system        Dose:  81 mg   Take 1 tablet (81 mg) by mouth daily   Quantity:  90 tablet   Refills:  3       baclofen 10 MG tablet   Commonly known as:  LIORESAL   Used for:  Personal history of malignant neoplasm of breast        Take 1-2 tablets (10-20mg) by mouth in the evening as needed   Refills:  2       calcium + D 600-200 MG-UNIT Tabs   Generic drug:  calcium carbonate-vitamin D        1 TABLET DAILY twice daily   Refills:  0       CO Q 10 PO        Take  by mouth.   Refills:  0       folic acid 20 MG Caps        Dose:  1 tablet   Take 1 tablet by mouth daily   Refills:  0       gabapentin 300 MG capsule   Commonly known as:  NEURONTIN   Used for:  Lumbar radiculopathy        Dose:  600 mg   Take 2 capsules (600 mg) by mouth 2 times daily   Quantity:  120 capsule   Refills:  5       hydrochlorothiazide 12.5 MG Tabs tablet   Used for:  Essential hypertension        Dose:  12.5 mg   Take 1 tablet (12.5 mg) by mouth daily   Quantity:  90 tablet   Refills:  1       HYDROmorphone 2 MG tablet   Commonly known as:  DILAUDID   Used for:  Acute bilateral low back pain without sciatica        Dose:  2 mg   Take 1 tablet (2 mg) by mouth every 8 hours as needed for severe pain maximum 3 tablet(s) per day   Quantity:  30 tablet   Refills:  0       levothyroxine 150 MCG tablet   Commonly known as:  SYNTHROID/LEVOTHROID   Used for:  Hypothyroidism, unspecified type        Dose:  150 mcg   Take 1 tablet (150 mcg) by mouth daily BRAND NAME ONLY.  Hold on file until needed.   Quantity:  90 tablet   Refills:  3       magnesium hydroxide 400 MG/5ML suspension   Commonly known as:  MILK  OF MAGNESIA        Dose:  400 mg/kg/day   Take 400 mg/kg/day by mouth daily as needed for constipation or heartburn   Refills:  0       methocarbamol 500 MG tablet   Commonly known as:  ROBAXIN   Used for:  Acute bilateral low back pain without sciatica        Dose:  500-1000 mg   Take 1-2 tablets (500-1,000 mg) by mouth 3 times daily as needed for muscle spasms   Quantity:  90 tablet   Refills:  3       MULTIPLE VITAMIN PO        1 daily   Refills:  0       pantoprazole 20 MG EC tablet   Commonly known as:  PROTONIX   Used for:  Gastroesophageal reflux disease without esophagitis        Dose:  40 mg   Take 2 tablets (40 mg) by mouth daily Hold on file until needed   Quantity:  180 tablet   Refills:  3       pyridoxine 100 MG tablet   Commonly known as:  VITAMIN B-6        Dose:  100 mg   Take 100 mg by mouth daily.   Refills:  0       simvastatin 20 MG tablet   Commonly known as:  ZOCOR   Used for:  Hyperlipidemia LDL goal <100        Dose:  20 mg   Take 1 tablet (20 mg) by mouth At Bedtime Hold on file until needed   Quantity:  90 tablet   Refills:  3       tolterodine 4 MG 24 hr capsule   Commonly known as:  DETROL LA   Used for:  Urinary urgency        Take 1 capsule (4 mg) by mouth daily   Quantity:  90 capsule   Refills:  1       traZODone 100 MG tablet   Commonly known as:  DESYREL   Used for:  Insomnia, unspecified        1 tablets at bedtime as needed.  Hold on file until needed   Quantity:  90 tablet   Refills:  3                Protect others around you: Learn how to safely use, store and throw away your medicines at www.disposemymeds.org.             Medication List: This is a list of all your medications and when to take them. Check marks below indicate your daily home schedule. Keep this list as a reference.      Medications           Morning Afternoon Evening Bedtime As Needed    ALEVE PO   Take 2 tablets by mouth 2 times daily as needed                                aspirin 81 MG EC tablet   Take 1  tablet (81 mg) by mouth daily                                baclofen 10 MG tablet   Commonly known as:  LIORESAL   Take 1-2 tablets (10-20mg) by mouth in the evening as needed                                calcium + D 600-200 MG-UNIT Tabs   1 TABLET DAILY twice daily   Generic drug:  calcium carbonate-vitamin D                                CO Q 10 PO   Take  by mouth.                                fluticasone 50 MCG/ACT spray   Commonly known as:  FLONASE   Spray 2 sprays into both nostrils daily Hold on file until needed                                folic acid 20 MG Caps   Take 1 tablet by mouth daily                                gabapentin 300 MG capsule   Commonly known as:  NEURONTIN   Take 2 capsules (600 mg) by mouth 2 times daily                                hydrochlorothiazide 12.5 MG Tabs tablet   Take 1 tablet (12.5 mg) by mouth daily                                HYDROmorphone 2 MG tablet   Commonly known as:  DILAUDID   Take 1 tablet (2 mg) by mouth every 8 hours as needed for severe pain maximum 3 tablet(s) per day                                levothyroxine 150 MCG tablet   Commonly known as:  SYNTHROID/LEVOTHROID   Take 1 tablet (150 mcg) by mouth daily BRAND NAME ONLY.  Hold on file until needed.                                magnesium hydroxide 400 MG/5ML suspension   Commonly known as:  MILK OF MAGNESIA   Take 400 mg/kg/day by mouth daily as needed for constipation or heartburn                                methocarbamol 500 MG tablet   Commonly known as:  ROBAXIN   Take 1-2 tablets (500-1,000 mg) by mouth 3 times daily as needed for muscle spasms                                MULTIPLE VITAMIN PO   1 daily                                pantoprazole 20 MG EC tablet   Commonly known as:  PROTONIX   Take 2 tablets (40 mg) by mouth daily Hold on file until needed                                pyridoxine 100 MG tablet   Commonly known as:  VITAMIN B-6   Take 100 mg by mouth daily.                                 simvastatin 20 MG tablet   Commonly known as:  ZOCOR   Take 1 tablet (20 mg) by mouth At Bedtime Hold on file until needed                                tolterodine 4 MG 24 hr capsule   Commonly known as:  DETROL LA   Take 1 capsule (4 mg) by mouth daily                                traZODone 100 MG tablet   Commonly known as:  DESYREL   1 tablets at bedtime as needed.  Hold on file until needed

## 2017-10-31 NOTE — IP AVS SNAPSHOT
[facility] PreOP/Phase II    [address] [address]    Phone:  [phone]    Fax:  [phone]                                       After Visit Summary   10/31/2017    Briana Mcgee    MRN: [id number]           After Visit Summary Signature Page     I have received my discharge instructions, and my questions have been answered. I have discussed any challenges I see with this plan with the nurse or doctor.    ..........................................................................................................................................  Patient/Patient Representative Signature      ..........................................................................................................................................  Patient Representative Print Name and Relationship to Patient    ..................................................               ................................................  Date                                            Time    ..........................................................................................................................................  Reviewed by Signature/Title    ...................................................              ..............................................  Date                                                            Time

## 2017-10-31 NOTE — OP NOTE
Date of Procedure: 10/31/17     Pre-operative Diagnosis:  Left carpal tunnel syndrome   Post-operative Diagnosis: same     Procedures performed:   Left carpal tunnel release     Surgeon: Melba Yi M.D.   Assistant: None   Anesthesia:  MAC with local anesthetic for a median nerve block at the wrist  Specimens: none   EBL: minimal   Drains: none   Complications: none known  Findings: No residual compression after release of the transverse carpal ligament.  Significant synovitis among the contents of the carpal tunnel.     Indications for Surgery: Briana Mcgee is a 72 year old female with a history of left carpal tunnel syndrome that has failed to respond to treatment.  She did have temporary relief of symptoms with a previous carpal tunnel corticosteroid injection.  Electrodiagnostic studies were confirmatory for compression of the median nerve at the wrist.  I spoke to the patient about the risks, benefits, and alternatives of continued conservative treatment verses surgical intervention. I offered the patient a carpal tunnel release to try to relieve or improve the symptoms of numbness and tingling. We discussed that the risks of surgery include, but are not limited to the following: bleeding, infection, damage to the underlying nerve or its branches, numbness, weakness, chronic pain, wound healing problems, failure to relieve all of the symptoms, worsening symptoms, recurrence of symptoms, stiffness, need for further surgery, blood clots, stroke, heart attack, loss of limb or life. No guarantees were implied or made. It was also explained that sometimes the nerve is so far damaged that sensation and strength do not return. The patient voiced understanding and informed consent was obtained.     Procedure: The patient was identified in the holding area and the correct surgical site was marked. The patient was then identified by Anesthesia and brought to the operating room and placed supine on the  operating room table where all pressure points were padded.  A multidisciplinary timeout was performed, confirming the correct patient, the correct extremity and the correct procedure.    The patient received gentle intravenous sedation and antibiotics.  The area over the base of the palm was anesthetized with a combination of 1% lidocaine and 0.5% bupivacaine. A nonsterile tourniquet was applied to the left arm, which was prepped and draped in the usual sterile fashion. The limb was then elevated, exsanguinated and the tourniquet inflated to 250 mmHg.   A longitudinal incision was made over the base of the palm at the area of the transverse carpal ligament and continued down through the skin and subcutaneous tissue. The palmar fascia was visualized and sharply incised. The transverse carpal ligament was then visualized. It was sharply incised. The release was continued distally until perivascular fat could be seen in the wound and there was no residual compression at the distal portion of the median nerve. The proximal margin of the transverse carpal ligament along with a confluent flexor-pronator fascia was then bluntly dissected from the overlying soft tissues and the underlying nerve. The proximal margin of the transverse carpal ligament was then released with dissecting scissors into the forearm. Careful inspection of the nerve showed no evidence of residual compression.  A considerable amount of synovitis was appreciated among the contents of the carpal tunnel.     The wound was thoroughly irrigated with copious amounts of sterile saline. Meticulous hemostasis was achieved with electrocautery.  The skin was closed with 4-0 nylon suture in interrupted fashion.  Sterile dressings were applied consisting of bacitracin, adaptic, 4 x 4's, sterile webril, and an Ace bandage.  The tourniquet was then deflated for a total tourniquet time of 19 minutes with brisk return of capillary refill to all digits. The patient  was then awoken from sedation and taken to the recovery room in stable condition, having tolerated the procedure well.  Sponge, needle and instrument counts were noted to be correct at the conclusion of the procedure, which was performed under loupe magnification.    Post-operative plan:  Return to the clinic in 14 days for wound check and suture removal as wound healing allows.

## 2017-10-31 NOTE — ANESTHESIA PREPROCEDURE EVALUATION
Anesthesia Evaluation     . Pt has had prior anesthetic. Type: MAC           ROS/MED HX    ENT/Pulmonary:     (+)tobacco use, Past use , . .    Neurologic:     (+)CVA date: 2006 with deficitsother neuro back injury august 2017 L4     Cardiovascular:     (+) Dyslipidemia, hypertension----. : . . . :. .       METS/Exercise Tolerance:  >4 METS   Hematologic:     (+) Other Hematologic Disorder-leiden factor 5      Musculoskeletal:  - neg musculoskeletal ROS       GI/Hepatic:     (+) GERD hepatitis       Renal/Genitourinary:     (+) chronic renal disease, type: CRI,       Endo:     (+) thyroid problem Obesity, .      Psychiatric:         Infectious Disease:         Malignancy:   (+) Malignancy History of Breast          Other:                     Physical Exam  Normal systems: cardiovascular, pulmonary and dental    Airway   Mallampati: II  TM distance: >3 FB  Neck ROM: full    Dental     Cardiovascular       Pulmonary                     Anesthesia Plan      History & Physical Review  History and physical reviewed and following examination; no interval change.    ASA Status:  3 .    NPO Status:  > 8 hours    Plan for MAC, General and ETT with Propofol and Intravenous induction.   PONV prophylaxis:  Ondansetron (or other 5HT-3) and Dexamethasone or Solumedrol       Postoperative Care  Postoperative pain management:  IV analgesics and Oral pain medications.      Consents  Anesthetic plan, risks, benefits and alternatives discussed with:  Patient..                          .

## 2017-10-31 NOTE — ANESTHESIA CARE TRANSFER NOTE
Patient: Briana Mcgee    Procedure(s):  Left Wrist Carpal Tunnel Release - Wound Class: I-Clean    Diagnosis: left carpal tunnel syndrome  Diagnosis Additional Information: No value filed.    Anesthesia Type:   MAC, General, ETT     Note:  Airway :Room Air  Patient transferred to:Phase II  Handoff Report: Identifed the Patient, Identified the Reponsible Provider, Reviewed the pertinent medical history, Discussed the surgical course, Reviewed Intra-OP anesthesia mangement and issues during anesthesia, Set expectations for post-procedure period and Allowed opportunity for questions and acknowledgement of understanding      Vitals: (Last set prior to Anesthesia Care Transfer)    CRNA VITALS  10/31/2017 1400 - 10/31/2017 1431      10/31/2017             Pulse: 89    SpO2: 95 %                Electronically Signed By: BRAXTON Foley CRNA  October 31, 2017  2:31 PM

## 2017-10-31 NOTE — H&P (VIEW-ONLY)
[facility]  [address] [address]  [phone]  Dept: [phone]    PRE-OP EVALUATION:  Today's date: 10/25/2017    Briana Mcgee (: 1945) presents for pre-operative evaluation assessment as requested by Dr. Yi.  She requires evaluation and anesthesia risk assessment prior to undergoing surgery/procedure for treatment of Carpal Tunnel .  Proposed procedure: Left Wrist Carpal Tunnel Release    Date of Surgery/ Procedure: 10/31/2017  Time of Surgery/ Procedure: 2:00pm  Hospital/Surgical Facility: [facility]    Primary Physician: Xavier Vega  Type of Anesthesia Anticipated:Monitor Anesthesia Care    Patient has a Health Care Directive or Living Will:  YES  has copy at home.( she states she is not bringing a copy to clinic)    Preop Questions 10/22/2017   1.  Do you have a history of heart attack, stroke, stent, bypass or surgery on an artery in the head, neck, heart or legs? YES - TIA in , work up was negative, no further issues.   2.  Do you ever have any pain or discomfort in your chest? No   3.  Do you have a history of  Heart Failure? No   4.   Are you troubled by shortness of breath when:  walking on a level surface, or up a slight hill, or at night? No   5.  Do you currently have a cold, bronchitis or other respiratory infection? No   6.  Do you have a cough, shortness of breath, or wheezing? No   7.  Do you sometimes get pains in the calves of your legs when you walk? No   8. Do you or anyone in your family have previous history of blood clots? No   9.  Do you or does anyone in your family have a serious bleeding problem such as prolonged bleeding following surgeries or cuts? No   10. Have you ever had problems with anemia or been told to take iron pills? No   11. Have you had any abnormal blood loss such as black, tarry or bloody stools, or abnormal vaginal bleeding? No   12. Have you ever had a blood transfusion? No   13.  Have you or any of your relatives ever had problems with anesthesia? No   14. Do you have sleep apnea, excessive snoring or daytime drowsiness? No   15. Do you have any prosthetic heart valves? No   16. Do you have prosthetic joints? No   17. Is there any chance that you may be pregnant? No           HPI:                                                      Brief HPI related to upcoming procedure: left CTS and having a release due to numbness      HYPERTENSION - Patient has longstanding history of mod-severe HTN , currently denies any symptoms referable to elevated blood pressure. Specifically denies chest pain, palpitations, dyspnea, orthopnea, PND or peripheral edema. Blood pressure readings have been in normal range. Current medication regimen is as listed below. Patient denies any side effects of medication.                                                                                                                                                                                          .  HYPERLIPIDEMIA - Patient has a long history of significant Hyperlipidemia requiring medication for treatment with recent good control. Patient reports no problems or side effects with the medication.                                                                                                                                                       .  HYPOTHYROIDISM - Patient has a longstanding history of chronic Hypothyroidism. Patient has been doing well, noting no tremor, insomnia, hair loss or changes in skin texture. Last TSH value of 1.87 3/2017. Continues to take medications as directed, without adverse reactions or side effects.                                                                                                                                                                                                                        .    MEDICAL HISTORY:                                                     Patient Active Problem List    Diagnosis Date Noted     Malignant neoplasm of female breast (H) 02/18/2005     Priority: High     11/05: breast surgeon=Dr. Dimitrios Bell, oncology=Dr. Tino Gordillo s/p lumpectomy and axillary node dissection followed by chemotherapy at [facility]. Radiation oncology=Dr. Jessica Edmonds for 6 weeks.   12/05: Briana is clinically asymptomatic and no clinical evidence of cancer recurrence; port-a-cath removal.  9-12-05 NM [facility] [employer] radionuclide angiography at rest findings:1. Left ventricular ejectin fraction is normal at 64%  2. Compared to previous study perfomed 3-17-05, there has been no significant interval change.  10/06: appointment with Dr. Gordillo:continue amrimidex and  follow up in 6 months.  1/16/07: follow up with Dr. Bell: 'doing well with no signs of recurrence', follow up 7/07 with mammogram at that time/  2/12/09: now followed by Dr. Peres.  3/10 seen by Dr. Levi Bear with [facility] yearly visits, now can have yearly mammos, next due 8/10  10/2013 follow up with Dr. Duarte, followed every 6 months due to density of right breast and concerns, now followed yearly  Problem list name updated by automated process. Provider to review       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) 04/12/2017     Priority: Medium     Essential hypertension 03/13/2017     Priority: Medium     Hepatitis 11/25/2015     Priority: Medium     Obesity 10/20/2015     Priority: Medium     CKD (chronic kidney disease) stage 2, GFR 60-89 ml/min 06/04/2014     Priority: Medium     Advanced directives, counseling/discussion 01/29/2014     Priority: Medium     Was given the information to fill out.       Spinal stenosis 01/07/2014     Priority: Medium     Pain controlled with epidural injections       Lumbar radiculopathy 05/07/2012     Priority: Medium     Varicose veins of lower extremities with complications 03/06/2012     Priority:  "Medium     Problem list name updated by automated process. Provider to review       GERD (gastroesophageal reflux disease) 04/12/2011     Priority: Medium     Heterozygous factor V Leiden mutation (H) 04/07/2011     Priority: Medium     HYPERLIPIDEMIA LDL GOAL <100 10/31/2010     Priority: Medium     OA (osteoarthritis) of knee 03/01/2009     Priority: Medium     Followed by [facility]. Briana has had 3 Synvisc injections (1/3/08, 2/12/09,  3/2/09, 3/9/09)       Hot flashes 03/01/2009     Priority: Medium     2/12/09: seen by Dr. Peres, started Effexor. If this is not helpful will start Neurontin.       Rhinitis, allergic seasonal 07/02/2008     Priority: Medium     ischemic stroke 3/06 03/27/2006     Priority: Catarino Warren has been seen by Dr. Hernandez. Given her history of CVA and Factor V Leiden heterozygote he recommends that she stay on \"antiplatelet drug use for secondary prevention of strokes\". Briana has been on plavix and asa.       Impaired fasting glucose 03/05/2006     Priority: Catarino Warren would like to continue diet changes (she has already lost 16 pounds on the [employer]' program), regular exercise and weight loss.  She agrees to recheck fasting blood sugar and lipids in 3 months.       Insomnia 03/05/2006     Priority: Medium     Stable on Trazadone.  Problem list name updated by automated process. Provider to review       Hypothyroidism 02/18/2005     Priority: Medium     Stable on current dose of synthroid.  Problem list name updated by automated process. Provider to review        Past Medical History:   Diagnosis Date     Allergies      Breast cancer (H)      Esophageal reflux      Hypertension      Other and unspecified hyperlipidemia      Other chronic bronchitis      Personal history of pneumonia (recurrent)     Hx-Pneumonia, \" Chronic Bronchitis\"     Personal history of tobacco use, presenting hazards to health      RA (rheumatoid arthritis) (H)      Unspecified " hypothyroidism      Past Surgical History:   Procedure Laterality Date     BIOPSY BREAST       COLONOSCOPY N/A 9/2/2016    Procedure: COLONOSCOPY;  Surgeon: Dean Sanchez MD;  Location: [facility]     EXCISE EXOSTOSIS FOOT Right 4/25/2017    Procedure: EXCISE EXOSTOSIS FOOT;  Retrocalcaneal exostectomy right;  Surgeon: Antwan Goldstein DPM;  Location: [facility]     HC EXCISION BREAST LESION, OPEN >=1      2/05     HYSTERECTOMY, RYAN  1982     for non cancerous reasons and no abnormal pap     INJECT EPIDURAL LUMBAR  1/12/2011    INJECT EPIDURAL LUMBAR performed by GENERIC ANESTHESIA PROVIDER at [facility]     INJECT EPIDURAL LUMBAR  5/5/2011    Procedure:INJECT EPIDURAL LUMBAR; LESLIE -Dr. Sánchez  ; Surgeon:GENERIC ANESTHESIA PROVIDER; Location:[facility]     INJECT EPIDURAL LUMBAR  10/6/2011    Procedure:INJECT EPIDURAL LUMBAR; LESLIE--; Surgeon:GENERIC ANESTHESIA PROVIDER; Location:[facility]     INJECT EPIDURAL LUMBAR  1/25/2012    Procedure:INJECT EPIDURAL LUMBAR; LESLIE-Dr. Sánchez  ; Surgeon:GENERIC ANESTHESIA PROVIDER; Location:[facility]     INJECT EPIDURAL LUMBAR  7/9/2012    Procedure: INJECT EPIDURAL LUMBAR;  LESLIE-Dr. Pacheco;  Surgeon: Provider, Generic Anesthesia;  Location: [facility]     LUMPECTOMY BREAST       RELEASE CARPAL TUNNEL Right 9/5/2017    Procedure: RELEASE CARPAL TUNNEL;  Right Wrist Carpal Tunnel Release;  Surgeon: Melba Yi MD;  Location: [facility]     SURGICAL HISTORY OF -       lumpectomy with sentinal node biopsy     SURGICAL HISTORY OF -       left knee arthoscopic repair medial meniscus     Current Outpatient Prescriptions   Medication Sig Dispense Refill     baclofen (LIORESAL) 10 MG tablet Take 1-2 tablets (10-20mg) by mouth in the evening as needed  2     gabapentin (NEURONTIN) 300 MG capsule Take 2 capsules (600 mg) by mouth 2 times daily 120 capsule 5     amoxicillin (AMOXIL) 500 MG capsule take 4 capsules (2000mg)  by mouth once 1 hour prior to dental appointment  1     HYDROmorphone (DILAUDID) 2 MG  tablet Take 1 tablet (2 mg) by mouth every 8 hours as needed for severe pain maximum 3 tablet(s) per day 30 tablet 0     methocarbamol (ROBAXIN) 500 MG tablet Take 1-2 tablets (500-1,000 mg) by mouth 3 times daily as needed for muscle spasms 90 tablet 3     hydrochlorothiazide 12.5 MG TABS tablet Take 1 tablet (12.5 mg) by mouth daily 90 tablet 1     tolterodine (DETROL LA) 4 MG 24 hr capsule Take 1 capsule (4 mg) by mouth daily 90 capsule 1     simvastatin (ZOCOR) 20 MG tablet Take 1 tablet (20 mg) by mouth At Bedtime Hold on file until needed 90 tablet 3     traZODone (DESYREL) 100 MG tablet 1 tablets at bedtime as needed.  Hold on file until needed 90 tablet 3     levothyroxine (SYNTHROID/LEVOTHROID) 150 MCG tablet Take 1 tablet (150 mcg) by mouth daily BRAND NAME ONLY.  Hold on file until needed. 90 tablet 3     pantoprazole (PROTONIX) 20 MG EC tablet Take 2 tablets (40 mg) by mouth daily Hold on file until needed 180 tablet 3     fluticasone (FLONASE) 50 MCG/ACT spray Spray 2 sprays into both nostrils daily Hold on file until needed (Patient taking differently: Spray 2 sprays into both nostrils daily as needed Hold on file until needed) 16 g 11     magnesium hydroxide (MILK OF MAGNESIA) 400 MG/5ML suspension Take 400 mg/kg/day by mouth daily as needed for constipation or heartburn       folic acid 20 MG CAPS Take 1 tablet by mouth daily        Naproxen Sodium (ALEVE PO) Take 2 tablets by mouth 2 times daily as needed        aspirin 81 MG EC tablet Take 1 tablet (81 mg) by mouth daily 90 tablet 3     Coenzyme Q10 (CO Q 10 PO) Take  by mouth.       pyridoxine (VITAMIN B-6) 100 MG tablet Take 100 mg by mouth daily.       CALCIUM + D 600-200 MG-UNIT OR TABS 1 TABLET DAILY twice daily       MULTIPLE VITAMIN OR 1 daily       OTC products: None, except as noted above    Allergies   Allergen Reactions     Erythromycin Swelling     Erythromycin      Other reaction(s): Edema     Hydrocodone      Other reaction(s): GI  "Upset  Tolerates dilaudid     Oxycodone      Other reaction(s): GI Upset      Latex Allergy: NO    Social History   Substance Use Topics     Smoking status: Former Smoker     Types: Cigarettes     Quit date: 7/19/1996     Smokeless tobacco: Never Used      Comment: quit 10-12 years ago, 1997.     Alcohol use Yes      Comment: glass of wine at night     History   Drug Use No       REVIEW OF SYSTEMS:                                                    Review Of Systems  Constitutional: negative  Skin: negative  Eyes: negative  Ears/Nose/Throat: negative  Respiratory: No shortness of breath, dyspnea on exertion, cough, or hemoptysis  Cardiovascular: negative  Gastrointestinal: negative  Genitourinary: negative  Musculoskeletal: has some known back pain  Neurologic: negative  Psychiatric: negative  Hematologic/Lymphatic/Immunologic: negative  Endocrine: negative      EXAM:                                                    /60 (BP Location: Right arm, Patient Position: Chair, Cuff Size: Adult Large)  Pulse 84  Temp 98.3  F (36.8  C) (Tympanic)  Ht 5' 5.25\" (1.657 m)  Wt 208 lb (94.3 kg)  SpO2 96%  Breastfeeding? No  BMI 34.35 kg/m2    GENERAL APPEARANCE: healthy, alert and no distress     EYES: EOMI, PERRL     HENT: ear canals and TM's normal and nose and mouth without ulcers or lesions     NECK: no adenopathy, no asymmetry, masses, or scars and thyroid normal to palpation     RESP: lungs clear to auscultation - no rales, rhonchi or wheezes     CV: regular rates and rhythm, normal S1 S2, no S3 or S4 and no murmur, click or rub     ABDOMEN:  soft, nontender, no HSM or masses and bowel sounds normal     MS: extremities normal- no gross deformities noted, no evidence of inflammation in joints, FROM in all extremities.     SKIN: no suspicious lesions or rashes     NEURO: Normal strength and tone, sensory exam grossly normal, mentation intact and speech normal     PSYCH: mentation appears normal. and affect " normal/bright     LYMPHATICS: anterior cervical: no adenopathy  posterior cervical: no adenopathy    DIAGNOSTICS:                                                    EKG: Normal Sinus Rhythm, Right Bundle Branch Block, done in 4/11/2017, unchanged from prior tracings.    Recent Labs   Lab Test  10/19/17   1015  08/28/17   0947  04/24/17   1310  04/11/17   0900   03/17/15   1104   01/07/14   1031   HGB  12.0   --    --   12.6   < >   --    < >  12.4   PLT  254   --    --   245   < >   --    < >  253   NA   --   133  138   --    < >   --    < >   --    POTASSIUM   --   3.8  3.9   --    < >   --    < >   --    CR   --   0.80  0.96   --    < >   --    < >   --    A1C   --    --    --    --    --   5.6   --   5.4    < > = values in this interval not displayed.    basic profile is pending    IMPRESSION:                                                    Reason for surgery/procedure: CTS on left side    The proposed surgical procedure is considered INTERMEDIATE risk.    REVISED CARDIAC RISK INDEX  The patient has the following serious cardiovascular risks for perioperative complications such as (MI, PE, VFib and 3  AV Block):  No serious cardiac risks  INTERPRETATION: 0 risks: Class I (very low risk - 0.4% complication rate)    The patient has the following additional risks for perioperative complications:  No identified additional risks      ICD-10-CM    1. Pre-operative general physical examination Z01.818 Basic metabolic panel   2. Carpal tunnel syndrome of left wrist G56.02 Basic metabolic panel   3. Essential hypertension I10 Basic metabolic panel       RECOMMENDATIONS:                                                          --Patient is to take all scheduled medications on the day of surgery EXCEPT for modifications listed below.    Anticoagulant or Antiplatelet Medication Use  ASPIRIN: Discontinue ASA 7-10 days prior to procedure to reduce bleeding risk.  It should be resumed post-operatively.  Also hold nsaids           APPROVAL GIVEN to proceed with proposed procedure, without further diagnostic evaluation       Signed Electronically by: Xavier Vega MD    Copy of this evaluation report is provided to requesting physician.    [facility] Preop Guidelines

## 2017-10-31 NOTE — ANESTHESIA POSTPROCEDURE EVALUATION
Patient: Briana Mcgee    Procedure(s):  Left Wrist Carpal Tunnel Release - Wound Class: I-Clean    Diagnosis:left carpal tunnel syndrome  Diagnosis Additional Information: No value filed.    Anesthesia Type:  MAC, General, ETT    Note:  Anesthesia Post Evaluation    Patient location during evaluation: Bedside  Patient participation: Able to fully participate in evaluation  Level of consciousness: awake and alert  Pain management: adequate  Airway patency: patent  Cardiovascular status: acceptable  Respiratory status: acceptable  Hydration status: acceptable  PONV: none     Anesthetic complications: None          Last vitals:  Vitals:    10/31/17 1250 10/31/17 1434   BP: 149/79 131/69   Resp: 16    Temp: 36.6  C (97.9  F)    SpO2: 92% 91%         Electronically Signed By: BRAXTON Foley CRNA  October 31, 2017  2:41 PM

## 2017-11-07 ENCOUNTER — TELEPHONE (OUTPATIENT)
Dept: FAMILY MEDICINE | Facility: CLINIC | Age: 72
End: 2017-11-07

## 2017-11-10 ENCOUNTER — MYC REFILL (OUTPATIENT)
Dept: FAMILY MEDICINE | Facility: CLINIC | Age: 72
End: 2017-11-10

## 2017-11-10 DIAGNOSIS — M54.50 ACUTE BILATERAL LOW BACK PAIN WITHOUT SCIATICA: ICD-10-CM

## 2017-11-10 DIAGNOSIS — M54.16 LUMBAR RADICULOPATHY: ICD-10-CM

## 2017-11-13 RX ORDER — GABAPENTIN 300 MG/1
600 CAPSULE ORAL 2 TIMES DAILY
Qty: 120 CAPSULE | Refills: 5 | Status: SHIPPED | OUTPATIENT
Start: 2017-11-13 | End: 2018-02-08

## 2017-11-13 RX ORDER — METHOCARBAMOL 500 MG/1
500-1000 TABLET, FILM COATED ORAL 3 TIMES DAILY PRN
Qty: 90 TABLET | Refills: 3 | Status: ON HOLD | OUTPATIENT
Start: 2017-11-13 | End: 2017-11-21

## 2017-11-13 NOTE — TELEPHONE ENCOUNTER
Message from MyChart:  Original authorizing provider: MD Briana Larsen would like a refill of the following medications:  methocarbamol (ROBAXIN) 500 MG tablet [Jmaes Mora MD]  gabapentin (NEURONTIN) 300 MG capsule [James Mora MD]    Preferred pharmacy: [facility]    Comment:   I need these two prescriptions refilled thank you

## 2017-11-14 ENCOUNTER — OFFICE VISIT (OUTPATIENT)
Dept: FAMILY MEDICINE | Facility: CLINIC | Age: 72
End: 2017-11-14
Payer: COMMERCIAL

## 2017-11-14 VITALS
SYSTOLIC BLOOD PRESSURE: 128 MMHG | WEIGHT: 210 LBS | HEART RATE: 68 BPM | DIASTOLIC BLOOD PRESSURE: 64 MMHG | TEMPERATURE: 97.1 F | HEIGHT: 65 IN | BODY MASS INDEX: 34.99 KG/M2

## 2017-11-14 DIAGNOSIS — E03.9 HYPOTHYROIDISM, UNSPECIFIED TYPE: ICD-10-CM

## 2017-11-14 DIAGNOSIS — I10 ESSENTIAL HYPERTENSION: ICD-10-CM

## 2017-11-14 DIAGNOSIS — Z01.818 PREOP GENERAL PHYSICAL EXAM: Primary | ICD-10-CM

## 2017-11-14 DIAGNOSIS — N18.2 CKD (CHRONIC KIDNEY DISEASE) STAGE 2, GFR 60-89 ML/MIN: ICD-10-CM

## 2017-11-14 DIAGNOSIS — M48.062 SPINAL STENOSIS, LUMBAR REGION, WITH NEUROGENIC CLAUDICATION: ICD-10-CM

## 2017-11-14 LAB
ALBUMIN SERPL-MCNC: 3.8 G/DL (ref 3.4–5)
ALBUMIN UR-MCNC: NEGATIVE MG/DL
ALP SERPL-CCNC: 78 U/L (ref 40–150)
ALT SERPL W P-5'-P-CCNC: 25 U/L (ref 0–50)
ANION GAP SERPL CALCULATED.3IONS-SCNC: 9 MMOL/L (ref 3–14)
APPEARANCE UR: CLEAR
AST SERPL W P-5'-P-CCNC: 16 U/L (ref 0–45)
BASOPHILS # BLD AUTO: 0.1 10E9/L (ref 0–0.2)
BASOPHILS NFR BLD AUTO: 0.6 %
BILIRUB SERPL-MCNC: 0.5 MG/DL (ref 0.2–1.3)
BILIRUB UR QL STRIP: NEGATIVE
BUN SERPL-MCNC: 12 MG/DL (ref 7–30)
CALCIUM SERPL-MCNC: 9.2 MG/DL (ref 8.5–10.1)
CHLORIDE SERPL-SCNC: 95 MMOL/L (ref 94–109)
CO2 SERPL-SCNC: 28 MMOL/L (ref 20–32)
COLOR UR AUTO: YELLOW
CREAT SERPL-MCNC: 0.81 MG/DL (ref 0.52–1.04)
DIFFERENTIAL METHOD BLD: NORMAL
EOSINOPHIL # BLD AUTO: 0.2 10E9/L (ref 0–0.7)
EOSINOPHIL NFR BLD AUTO: 2.3 %
ERYTHROCYTE [DISTWIDTH] IN BLOOD BY AUTOMATED COUNT: 13.3 % (ref 10–15)
GFR SERPL CREATININE-BSD FRML MDRD: 69 ML/MIN/1.7M2
GLUCOSE SERPL-MCNC: 90 MG/DL (ref 70–99)
GLUCOSE UR STRIP-MCNC: NEGATIVE MG/DL
HCT VFR BLD AUTO: 38.8 % (ref 35–47)
HGB BLD-MCNC: 12.9 G/DL (ref 11.7–15.7)
HGB UR QL STRIP: NEGATIVE
KETONES UR STRIP-MCNC: NEGATIVE MG/DL
LEUKOCYTE ESTERASE UR QL STRIP: NEGATIVE
LYMPHOCYTES # BLD AUTO: 2.7 10E9/L (ref 0.8–5.3)
LYMPHOCYTES NFR BLD AUTO: 26.7 %
MCH RBC QN AUTO: 31.1 PG (ref 26.5–33)
MCHC RBC AUTO-ENTMCNC: 33.2 G/DL (ref 31.5–36.5)
MCV RBC AUTO: 94 FL (ref 78–100)
MONOCYTES # BLD AUTO: 0.8 10E9/L (ref 0–1.3)
MONOCYTES NFR BLD AUTO: 8.1 %
NEUTROPHILS # BLD AUTO: 6.2 10E9/L (ref 1.6–8.3)
NEUTROPHILS NFR BLD AUTO: 62.3 %
NITRATE UR QL: NEGATIVE
PH UR STRIP: 6 PH (ref 5–7)
PLATELET # BLD AUTO: 263 10E9/L (ref 150–450)
POTASSIUM SERPL-SCNC: 3.9 MMOL/L (ref 3.4–5.3)
PROT SERPL-MCNC: 7.7 G/DL (ref 6.8–8.8)
RBC # BLD AUTO: 4.15 10E12/L (ref 3.8–5.2)
SODIUM SERPL-SCNC: 132 MMOL/L (ref 133–144)
SOURCE: NORMAL
SP GR UR STRIP: 1.01 (ref 1–1.03)
T4 FREE SERPL-MCNC: 1.13 NG/DL (ref 0.76–1.46)
TSH SERPL DL<=0.005 MIU/L-ACNC: 4.12 MU/L (ref 0.4–4)
UROBILINOGEN UR STRIP-ACNC: 0.2 EU/DL (ref 0.2–1)
WBC # BLD AUTO: 10 10E9/L (ref 4–11)

## 2017-11-14 PROCEDURE — 81003 URINALYSIS AUTO W/O SCOPE: CPT | Performed by: FAMILY MEDICINE

## 2017-11-14 PROCEDURE — 99215 OFFICE O/P EST HI 40 MIN: CPT | Performed by: FAMILY MEDICINE

## 2017-11-14 PROCEDURE — 80050 GENERAL HEALTH PANEL: CPT | Performed by: FAMILY MEDICINE

## 2017-11-14 PROCEDURE — 36415 COLL VENOUS BLD VENIPUNCTURE: CPT | Performed by: FAMILY MEDICINE

## 2017-11-14 PROCEDURE — 84439 ASSAY OF FREE THYROXINE: CPT | Performed by: FAMILY MEDICINE

## 2017-11-14 NOTE — PATIENT INSTRUCTIONS
Please go to lab.    Please check your preop form and let us know what the labs are do that we can make sure that we did not miss anything.      Thank you for choosing [facility].  You may be receiving a survey in the mail from [facility] regarding your visit today.  Please take a few minutes to complete and return the survey to let us know how we are doing.      If you have questions or concerns, please contact us via [employer] or you can contact your care team at [phone].    Our Clinic hours are:  Monday 6:40 am  to 7:00 pm  Tuesday -Friday 6:40 am to 5:00 pm    The [place] outpatient lab hours are:  Monday - Friday 6:10 am to 4:45 pm  Saturdays 7:00 am to 11:00 am  Appointments are required, call [phone]    If you have clinical questions after hours or would like to schedule an appointment,  call the clinic at [phone].    Before Your Surgery      Call your surgeon if there is any change in your health. This includes signs of a cold or flu (such as a sore throat, runny nose, cough, rash or fever).    Do not smoke, drink alcohol or take over the counter medicine (unless your surgeon or primary care doctor tells you to) for the 24 hours before and after surgery.    If you take prescribed drugs: Follow your doctor s orders about which medicines to take and which to stop until after surgery.    Eating and drinking prior to surgery: follow the instructions from your surgeon    Take a shower or bath the night before surgery. Use the soap your surgeon gave you to gently clean your skin. If you do not have soap from your surgeon, use your regular soap. Do not shave or scrub the surgery site.  Wear clean pajamas and have clean sheets on your bed.

## 2017-11-14 NOTE — PROGRESS NOTES
[facility]  [address] [address]  [phone]  Dept: [phone]    PRE-OP EVALUATION:  Today's date: 2017    Briana Mcgee (: 1945) presents for pre-operative evaluation assessment as requested by Dr. Brown.  She requires evaluation and anesthesia risk assessment prior to undergoing surgery/procedure for treatment of lumbar spine .  Proposed procedure: L4-5 decompression laminectomy, Left L5-S1 foraminal decompression    Date of Surgery/ Procedure: 2017  Time of Surgery/ Procedure: 7:30  Hospital/Surgical Facility: [facility]    Primary Physician: Xavier Vega  Type of Anesthesia Anticipated: General    Patient has a Health Care Directive or Living Will:  YES will bring in a copy    Preop Questions 11/10/2017   1.  Do you have a history of heart attack, stroke, stent, bypass or surgery on an artery in the head, neck, heart or legs?  YES - TIA in , work up was negative, no further issues YES    2.  Do you ever have any pain or discomfort in your chest? No   3.  Do you have a history of  Heart Failure? No   4.   Are you troubled by shortness of breath when:  walking on a level surface, or up a slight hill, or at night? No   5.  Do you currently have a cold, bronchitis or other respiratory infection? No   6.  Do you have a cough, shortness of breath, or wheezing? No   7.  Do you sometimes get pains in the calves of your legs when you walk? No   8. Do you or anyone in your family have previous history of blood clots? No   9.  Do you or does anyone in your family have a serious bleeding problem such as prolonged bleeding following surgeries or cuts? No   10. Have you ever had problems with anemia or been told to take iron pills? No   11. Have you had any abnormal blood loss such as black, tarry or bloody stools, or abnormal vaginal bleeding? No   12. Have you ever had a blood transfusion? No   13. Have you or any of your relatives ever had  problems with anesthesia? No   14. Do you have sleep apnea, excessive snoring or daytime drowsiness? No   15. Do you have any prosthetic heart valves? No   16. Do you have prosthetic joints? No   17. Is there any chance that you may be pregnant? No           HPI:                                                      Brief HPI related to upcoming procedure: back pain lumbar stenosis causing pain      HYPERTENSION - Patient has longstanding history of mod-severe HTN , currently denies any symptoms referable to elevated blood pressure. Specifically denies chest pain, palpitations, dyspnea, orthopnea, PND or peripheral edema. Blood pressure readings have been in normal range. Current medication regimen is as listed below. Patient denies any side effects of medication.                                                                                                                                                                                          .  HYPERLIPIDEMIA - Patient has a long history of significant Hyperlipidemia requiring medication for treatment with recent good control. Patient reports no problems or side effects with the medication.                                                                                                                                                       .  HYPOTHYROIDISM - Patient has a longstanding history of chronic Hypothyroidism. Patient has been doing well, noting no tremor, insomnia, hair loss or changes in skin texture. Last TSH value will check today, normal in 3/2017. Continues to take medications as directed, without adverse reactions or side effects.                                                                                                                                                                                                                        .    MEDICAL HISTORY:                                                    Patient Active Problem List     Diagnosis Date Noted     Malignant neoplasm of female breast (H) 02/18/2005     Priority: High     11/05: breast surgeon=Dr. Dimitrios Bell, oncology=Dr. Tino Gordillo s/p lumpectomy and axillary node dissection followed by chemotherapy at [facility]. Radiation oncology=Dr. Jessica Edmonds for 6 weeks.   12/05: Briana is clinically asymptomatic and no clinical evidence of cancer recurrence; port-a-cath removal.  9-12-05 NM [facility] radionuclide angiography at rest findings:1. Left ventricular ejectin fraction is normal at 64%  2. Compared to previous study perfomed 3-17-05, there has been no significant interval change.  10/06: appointment with Dr. Gordillo:continue amrimidex and  follow up in 6 months.  1/16/07: follow up with Dr. Bell: 'doing well with no signs of recurrence', follow up 7/07 with mammogram at that time/  2/12/09: now followed by Dr. Peres.  3/10 seen by Dr. Levi Bear with [facility] yearly visits, now can have yearly mammos, next due 8/10  10/2013 follow up with Dr. Duarte, followed every 6 months due to density of right breast and concerns, now followed yearly  Problem list name updated by automated process. Provider to review       Spinal stenosis, lumbar region, with neurogenic claudication 11/14/2017     Priority: Medium     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) 04/12/2017     Priority: Medium     Essential hypertension 03/13/2017     Priority: Medium     Hepatitis 11/25/2015     Priority: Medium     Obesity 10/20/2015     Priority: Medium     CKD (chronic kidney disease) stage 2, GFR 60-89 ml/min 06/04/2014     Priority: Medium     Advanced directives, counseling/discussion 01/29/2014     Priority: Medium     Was given the information to fill out.       Spinal stenosis 01/07/2014     Priority: Medium     Pain controlled with epidural injections       Lumbar radiculopathy 05/07/2012     Priority: Medium     Varicose veins  "of lower extremities with complications 03/06/2012     Priority: Medium     Problem list name updated by automated process. Provider to review       GERD (gastroesophageal reflux disease) 04/12/2011     Priority: Medium     Heterozygous factor V Leiden mutation (H) 04/07/2011     Priority: Medium     HYPERLIPIDEMIA LDL GOAL <100 10/31/2010     Priority: Medium     OA (osteoarthritis) of knee 03/01/2009     Priority: Medium     Followed by [facility]. Briana has had 3 Synvisc injections (1/3/08, 2/12/09,  3/2/09, 3/9/09)       Hot flashes 03/01/2009     Priority: Medium     2/12/09: seen by Dr. Peres, started Effexor. If this is not helpful will start Neurontin.       Rhinitis, allergic seasonal 07/02/2008     Priority: Medium     ischemic stroke 3/06 03/27/2006     Priority: Catarino Warren has been seen by Dr. Hernandez. Given her history of CVA and Factor V Leiden heterozygote he recommends that she stay on \"antiplatelet drug use for secondary prevention of strokes\". Briana has been on plavix and asa.       Impaired fasting glucose 03/05/2006     Priority: Catarino Warren would like to continue diet changes (she has already lost 16 pounds on the [employer]' program), regular exercise and weight loss.  She agrees to recheck fasting blood sugar and lipids in 3 months.       Insomnia 03/05/2006     Priority: Medium     Stable on Trazadone.  Problem list name updated by automated process. Provider to review       Hypothyroidism 02/18/2005     Priority: Medium     Stable on current dose of synthroid.  Problem list name updated by automated process. Provider to review        Past Medical History:   Diagnosis Date     Allergies      Breast cancer (H)      Esophageal reflux      Hypertension      Other and unspecified hyperlipidemia      Other chronic bronchitis      Personal history of pneumonia (recurrent)     Hx-Pneumonia, \" Chronic Bronchitis\"     Personal history of tobacco use, presenting " hazards to health      RA (rheumatoid arthritis) (H)      Unspecified hypothyroidism      Past Surgical History:   Procedure Laterality Date     BIOPSY BREAST       COLONOSCOPY N/A 9/2/2016    Procedure: COLONOSCOPY;  Surgeon: Dean Sanchez MD;  Location: [facility]     EXCISE EXOSTOSIS FOOT Right 4/25/2017    Procedure: EXCISE EXOSTOSIS FOOT;  Retrocalcaneal exostectomy right;  Surgeon: Antwan Goldstein DPM;  Location: [facility]     HC EXCISION BREAST LESION, OPEN >=1      2/05     HYSTERECTOMY, RYAN  1982     for non cancerous reasons and no abnormal pap     INJECT EPIDURAL LUMBAR  1/12/2011    INJECT EPIDURAL LUMBAR performed by GENERIC ANESTHESIA PROVIDER at [facility]     INJECT EPIDURAL LUMBAR  5/5/2011    Procedure:INJECT EPIDURAL LUMBAR; LESLIE -Dr. Sánchez  ; Surgeon:GENERIC ANESTHESIA PROVIDER; Location:[facility]     INJECT EPIDURAL LUMBAR  10/6/2011    Procedure:INJECT EPIDURAL LUMBAR; LESLIE--; Surgeon:GENERIC ANESTHESIA PROVIDER; Location:[facility]     INJECT EPIDURAL LUMBAR  1/25/2012    Procedure:INJECT EPIDURAL LUMBAR; LESLIE-Dr. Sánchez  ; Surgeon:GENERIC ANESTHESIA PROVIDER; Location:[facility]     INJECT EPIDURAL LUMBAR  7/9/2012    Procedure: INJECT EPIDURAL LUMBAR;  LESLIE-Dr. Pacheco;  Surgeon: Provider, Generic Anesthesia;  Location: [facility]     LUMPECTOMY BREAST       RELEASE CARPAL TUNNEL Right 9/5/2017    Procedure: RELEASE CARPAL TUNNEL;  Right Wrist Carpal Tunnel Release;  Surgeon: Melba Yi MD;  Location: [facility]     RELEASE CARPAL TUNNEL Left 10/31/2017    Procedure: RELEASE CARPAL TUNNEL;  Left Wrist Carpal Tunnel Release;  Surgeon: Melba Yi MD;  Location: [facility]     SURGICAL HISTORY OF -       lumpectomy with sentinal node biopsy     SURGICAL HISTORY OF -       left knee arthoscopic repair medial meniscus     Current Outpatient Prescriptions   Medication Sig Dispense Refill     methocarbamol (ROBAXIN) 500 MG tablet Take 1-2 tablets (500-1,000 mg) by mouth 3 times daily as needed for muscle  spasms 90 tablet 3     gabapentin (NEURONTIN) 300 MG capsule Take 2 capsules (600 mg) by mouth 2 times daily 120 capsule 5     baclofen (LIORESAL) 10 MG tablet Take 1-2 tablets (10-20mg) by mouth in the evening as needed  2     hydrochlorothiazide 12.5 MG TABS tablet Take 1 tablet (12.5 mg) by mouth daily 90 tablet 1     tolterodine (DETROL LA) 4 MG 24 hr capsule Take 1 capsule (4 mg) by mouth daily 90 capsule 1     simvastatin (ZOCOR) 20 MG tablet Take 1 tablet (20 mg) by mouth At Bedtime Hold on file until needed 90 tablet 3     traZODone (DESYREL) 100 MG tablet 1 tablets at bedtime as needed.  Hold on file until needed 90 tablet 3     levothyroxine (SYNTHROID/LEVOTHROID) 150 MCG tablet Take 1 tablet (150 mcg) by mouth daily BRAND NAME ONLY.  Hold on file until needed. 90 tablet 3     pantoprazole (PROTONIX) 20 MG EC tablet Take 2 tablets (40 mg) by mouth daily Hold on file until needed 180 tablet 3     fluticasone (FLONASE) 50 MCG/ACT spray Spray 2 sprays into both nostrils daily Hold on file until needed (Patient taking differently: Spray 2 sprays into both nostrils daily as needed Hold on file until needed) 16 g 11     folic acid 20 MG CAPS Take 1 tablet by mouth daily        Naproxen Sodium (ALEVE PO) Take 2 tablets by mouth 2 times daily as needed        Coenzyme Q10 (CO Q 10 PO) Take  by mouth.       pyridoxine (VITAMIN B-6) 100 MG tablet Take 100 mg by mouth daily.       CALCIUM + D 600-200 MG-UNIT OR TABS 1 TABLET DAILY twice daily       MULTIPLE VITAMIN OR 1 daily       HYDROmorphone (DILAUDID) 2 MG tablet Take 1 tablet (2 mg) by mouth every 8 hours as needed for severe pain maximum 3 tablet(s) per day (Patient not taking: Reported on 11/14/2017) 30 tablet 0     magnesium hydroxide (MILK OF MAGNESIA) 400 MG/5ML suspension Take 400 mg/kg/day by mouth daily as needed for constipation or heartburn       aspirin 81 MG EC tablet Take 1 tablet (81 mg) by mouth daily 90 tablet 3     OTC products: None, except as  "noted above    Allergies   Allergen Reactions     Erythromycin      Other reaction(s): Edema     Hydrocodone      Other reaction(s): GI Upset  Tolerates dilaudid     Oxycodone      Other reaction(s): GI Upset      Latex Allergy: NO    Social History   Substance Use Topics     Smoking status: Former Smoker     Types: Cigarettes     Quit date: 7/19/1996     Smokeless tobacco: Never Used      Comment: quit 10-12 years ago, 1997.     Alcohol use Yes      Comment: glass of wine at night     History   Drug Use No       REVIEW OF SYSTEMS:                                                    Review Of Systems  Constitutional: negative  Skin: negative  Eyes: negative  Ears/Nose/Throat: negative  Respiratory: No shortness of breath, dyspnea on exertion, cough, or hemoptysis  Cardiovascular: negative  Gastrointestinal: negative  Genitourinary: negative  Musculoskeletal: having back pain  Neurologic: pain is radiating down back  Psychiatric: negative  Hematologic/Lymphatic/Immunologic: negative  Endocrine: negative      EXAM:                                                    /64 (Cuff Size: Adult Large)  Pulse 68  Temp 97.1  F (36.2  C) (Tympanic)  Ht 5' 5.25\" (1.657 m)  Wt 210 lb (95.3 kg)  BMI 34.68 kg/m2    GENERAL APPEARANCE: healthy, alert and no distress     EYES: EOMI, PERRL     HENT: ear canals and TM's normal and nose and mouth without ulcers or lesions     NECK: no adenopathy, no asymmetry, masses, or scars and thyroid normal to palpation     RESP: lungs clear to auscultation - no rales, rhonchi or wheezes     CV: regular rates and rhythm, normal S1 S2, no S3 or S4 and no murmur, click or rub     ABDOMEN:  soft, nontender, no HSM or masses and bowel sounds normal     MS: extremities normal- no gross deformities noted, no evidence of inflammation in joints, FROM in all extremities.     MS: moves slowly due to back pain     SKIN: no suspicious lesions or rashes     NEURO: Normal strength and tone, sensory exam " grossly normal, mentation intact and speech normal     PSYCH: mentation appears normal. and affect normal/bright     LYMPHATICS: anterior cervical: no adenopathy  posterior cervical: no adenopathy    DIAGNOSTICS:                                                    EKG: Normal Sinus Rhythm, Right Bundle Branch Block, unchanged from previous tracings, EKG from 4/11/2017.    Recent Labs   Lab Test  10/25/17   1153  10/19/17   1015  08/28/17   0947   04/11/17   0900   03/17/15   1104   01/07/14   1031   HGB   --   12.0   --    --   12.6   < >   --    < >  12.4   PLT   --   254   --    --   245   < >   --    < >  253   NA  134   --   133   < >   --    < >   --    < >   --    POTASSIUM  4.0   --   3.8   < >   --    < >   --    < >   --    CR  0.94   --   0.80   < >   --    < >   --    < >   --    A1C   --    --    --    --    --    --   5.6   --   5.4    < > = values in this interval not displayed.        IMPRESSION:                                                    Reason for surgery/procedure: back pain    The proposed surgical procedure is considered INTERMEDIATE risk.    REVISED CARDIAC RISK INDEX  The patient has the following serious cardiovascular risks for perioperative complications such as (MI, PE, VFib and 3  AV Block):  Cerebrovascular Disease (TIA or CVA)  INTERPRETATION: 1 risks: Class II (low risk - 0.9% complication rate)    The patient has the following additional risks for perioperative complications:        ICD-10-CM    1. Preop general physical exam Z01.818 UA reflex to Microscopic and Culture     CBC with platelets differential     TSH     T4 FREE   2. Spinal stenosis, lumbar region, with neurogenic claudication M48.062 UA reflex to Microscopic and Culture     CBC with platelets differential     TSH     T4 FREE   3. Essential hypertension I10 Comprehensive metabolic panel   4. CKD (chronic kidney disease) stage 2, GFR 60-89 ml/min N18.2 Comprehensive metabolic panel   5. Hypothyroidism, unspecified  type E03.9 TSH     T4 FREE       RECOMMENDATIONS:                                                          --Patient is to take all scheduled medications on the day of surgery EXCEPT for modifications listed below.    APPROVAL GIVEN to proceed with proposed procedure, without further diagnostic evaluation     She has h/o TIA, has been on ASA, will hold prior to surgery.  She was instructed.    Signed Electronically by: Xavier Vega MD    Copy of this evaluation report is provided to requesting physician.    [place] Preop Guidelines

## 2017-11-14 NOTE — MR AVS SNAPSHOT
After Visit Summary   11/14/2017    Briana Mcgee    MRN: [id number]           Patient Information     Date Of Birth          1945        Visit Information        Provider Department      11/14/2017 9:20 AM Xavier Vega MD [facility]        Today's Diagnoses     Preop general physical exam    -  1    Spinal stenosis, lumbar region, with neurogenic claudication        Essential hypertension        CKD (chronic kidney disease) stage 2, GFR 60-89 ml/min        Hypothyroidism, unspecified type          Care Instructions    Please go to lab.    Please check your preop form and let us know what the labs are do that we can make sure that we did not miss anything.      Thank you for choosing [facility].  You may be receiving a survey in the mail from [employer] [facility] regarding your visit today.  Please take a few minutes to complete and return the survey to let us know how we are doing.      If you have questions or concerns, please contact us via [email or web address] or you can contact your care team at [phone].    Our Clinic hours are:  Monday 6:40 am  to 7:00 pm  Tuesday -Friday 6:40 am to 5:00 pm    The [place] outpatient lab hours are:  Monday - Friday 6:10 am to 4:45 pm  Saturdays 7:00 am to 11:00 am  Appointments are required, call [phone]    If you have clinical questions after hours or would like to schedule an appointment,  call the clinic at [phone].    Before Your Surgery      Call your surgeon if there is any change in your health. This includes signs of a cold or flu (such as a sore throat, runny nose, cough, rash or fever).    Do not smoke, drink alcohol or take over the counter medicine (unless your surgeon or primary care doctor tells you to) for the 24 hours before and after surgery.    If you take prescribed drugs: Follow your doctor s orders about which medicines to take and which to stop until after surgery.    Eating and drinking prior to surgery: follow  "the instructions from your surgeon    Take a shower or bath the night before surgery. Use the soap your surgeon gave you to gently clean your skin. If you do not have soap from your surgeon, use your regular soap. Do not shave or scrub the surgery site.  Wear clean pajamas and have clean sheets on your bed.           Follow-ups after your visit        Your next 10 appointments already scheduled     Nov 21, 2017   Procedure with Jesse Deuñas MD   [facility] (--)    [address] [address]   [phone]           The medical center is located at [address] (between [address] in [place], [place] north of [place]).            Oct 22, 2018  9:15 AM CDT   MA SCREENING BILATERAL W/ JESSICA with [address] [facility] ([facility])    [address] [address]   [phone]           Three-dimensional (3D) mammograms are available at [place] locations in [facility], [place], [place], [facility], [place], [place], and [place]. -Health locations include [facility] in [place]. Benefits of 3D mammograms include: - Improved rate of cancer detection - Decreases your chance of having to go back for more tests, which means fewer: - \"False-positive\" results (This means that there is an abnormal area but it isn't cancer.) - Invasive testing procedures, such as a biopsy or surgery - Can provide clearer images of the breast if you have dense breast tissue. 3D mammography is an optional exam that anyone can have with a 2D mammogram. It doesn't replace or take the place of a 2D mammogram. 2D mammograms remain an effective screening test for all women.  Not all insurance companies cover the cost of a 3D mammogram. Check with your insurance.            Oct 22, 2018  1:00 PM CDT   LAB with [facility]   [facility]    [address] [facility] [address]   [phone]           Please do not eat 10-12 hours before your appointment if you are coming in fasting for labs on lipids, cholesterol, or glucose (sugar). This does not apply to pregnant women. Water, hot tea and black coffee (with nothing added) are okay. Do not drink other fluids, diet soda or chew gum.            Oct 29, 2018  9:30 AM CDT   Return Visit with Merari Duarte MD   [facility] ([facility])    [facility]  [address]  [facility] [address] [phone]              Who to contact     If you have questions or need follow up information about today's clinic visit or your schedule please contact [facility] directly at [phone].  Normal or non-critical lab and imaging results will be communicated to you by MyChart, letter or phone within 4 business days after the clinic has received the results. If you do not hear from us within 7 days, please contact the clinic through MyChart or phone. If you have a critical or abnormal lab result, we will notify you by phone as soon as possible.  Submit refill requests through [employer] or call your pharmacy and they will forward the refill request to us. Please allow 3 business days for your refill to be completed.          Additional Information About Your Visit        [employer] Information     [employer] gives you secure access to your electronic health record. If you see a primary care provider, you can also send messages to your care team and make appointments. If you have questions, please call your primary care clinic.  If you do not have a primary care provider, please call [phone] and they will assist you.        Care EveryWhere ID     This is your Care EveryWhere ID. This could be used by other organizations to access your [place] medical records  [id number]        Your Vitals Were     Pulse Temperature Height BMI (Body Mass Index)          68  "97.1  F (36.2  C) (Tympanic) 5' 5.25\" (1.657 m) 34.68 kg/m2         Blood Pressure from Last 3 Encounters:   11/14/17 128/64   10/31/17 122/65   10/25/17 128/60    Weight from Last 3 Encounters:   11/14/17 210 lb (95.3 kg)   10/25/17 208 lb (94.3 kg)   10/23/17 208 lb 11.2 oz (94.7 kg)              We Performed the Following     CBC with platelets differential     Comprehensive metabolic panel     T4 FREE     TSH     UA reflex to Microscopic and Culture          Today's Medication Changes          These changes are accurate as of: 11/14/17 10:16 AM.  If you have any questions, ask your nurse or doctor.               These medicines have changed or have updated prescriptions.        Dose/Directions    fluticasone 50 MCG/ACT spray   Commonly known as:  FLONASE   This may have changed:    - when to take this  - reasons to take this  - additional instructions   Used for:  Other seasonal allergic rhinitis        Dose:  2 spray   Spray 2 sprays into both nostrils daily Hold on file until needed   Quantity:  16 g   Refills:  11                Primary Care Provider Office Phone # Fax #    Xavier Vega [phone] [address] [address]        Equal Access to Services     JENNIFER HODGES AH: Hadii aad ku hadasho Soomaali, waaxda luqadaha, qaybta kaalmada adeegyada, waxay essie florez. So [facility] [phone].    ATENCIÓN: Si habla español, tiene a bright disposición servicios gratuitos de asistencia lingüística. Llame al [phone].    We comply with applicable federal civil rights laws and [place] laws. We do not discriminate on the basis of race, color, national origin, age, disability, sex, sexual orientation, or gender identity.            Thank you!     Thank you for choosing [facility]  for your care. Our goal is always to provide you with excellent care. Hearing back from our patients is one way we can continue to improve our services. Please take " a few minutes to complete the written survey that you may receive in the mail after your visit with us. Thank you!             Your Updated Medication List - Protect others around you: Learn how to safely use, store and throw away your medicines at www.disposemymeds.org.          This list is accurate as of: 11/14/17 10:16 AM.  Always use your most recent med list.                   Brand Name Dispense Instructions for use Diagnosis    ALEVE PO      Take 2 tablets by mouth 2 times daily as needed        aspirin 81 MG EC tablet     90 tablet    Take 1 tablet (81 mg) by mouth daily    Heterozygous factor V Leiden mutation (H), Personal history of other diseases of circulatory system       baclofen 10 MG tablet    LIORESAL     Take 1-2 tablets (10-20mg) by mouth in the evening as needed    Personal history of malignant neoplasm of breast       calcium + D 600-200 MG-UNIT Tabs   Generic drug:  calcium carbonate-vitamin D      1 TABLET DAILY twice daily        CO Q 10 PO      Take  by mouth.        fluticasone 50 MCG/ACT spray    FLONASE    16 g    Spray 2 sprays into both nostrils daily Hold on file until needed    Other seasonal allergic rhinitis       folic acid 20 MG Caps      Take 1 tablet by mouth daily        gabapentin 300 MG capsule    NEURONTIN    120 capsule    Take 2 capsules (600 mg) by mouth 2 times daily    Lumbar radiculopathy       hydrochlorothiazide 12.5 MG Tabs tablet     90 tablet    Take 1 tablet (12.5 mg) by mouth daily    Essential hypertension       HYDROmorphone 2 MG tablet    DILAUDID    30 tablet    Take 1 tablet (2 mg) by mouth every 8 hours as needed for severe pain maximum 3 tablet(s) per day    Acute bilateral low back pain without sciatica       levothyroxine 150 MCG tablet    SYNTHROID/LEVOTHROID    90 tablet    Take 1 tablet (150 mcg) by mouth daily BRAND NAME ONLY.  Hold on file until needed.    Hypothyroidism, unspecified type       magnesium hydroxide 400 MG/5ML suspension    MILK OF  MAGNESIA     Take 400 mg/kg/day by mouth daily as needed for constipation or heartburn        methocarbamol 500 MG tablet    ROBAXIN    90 tablet    Take 1-2 tablets (500-1,000 mg) by mouth 3 times daily as needed for muscle spasms    Acute bilateral low back pain without sciatica       MULTIPLE VITAMIN PO      1 daily        pantoprazole 20 MG EC tablet    PROTONIX    180 tablet    Take 2 tablets (40 mg) by mouth daily Hold on file until needed    Gastroesophageal reflux disease without esophagitis       pyridoxine 100 MG tablet    VITAMIN B-6     Take 100 mg by mouth daily.        simvastatin 20 MG tablet    ZOCOR    90 tablet    Take 1 tablet (20 mg) by mouth At Bedtime Hold on file until needed    Hyperlipidemia LDL goal <100       tolterodine 4 MG 24 hr capsule    DETROL LA    90 capsule    Take 1 capsule (4 mg) by mouth daily    Urinary urgency       traZODone 100 MG tablet    DESYREL    90 tablet    1 tablets at bedtime as needed.  Hold on file until needed    Insomnia, unspecified

## 2017-11-14 NOTE — NURSING NOTE
"Chief Complaint   Patient presents with     Pre-Op Exam       Initial /76 (Cuff Size: Adult Large)  Pulse 68  Temp 97.1  F (36.2  C) (Tympanic)  Ht 5' 5.25\" (1.657 m)  Wt 210 lb (95.3 kg)  BMI 34.68 kg/m2 Estimated body mass index is 34.68 kg/(m^2) as calculated from the following:    Height as of this encounter: 5' 5.25\" (1.657 m).    Weight as of this encounter: 210 lb (95.3 kg).  Medication Reconciliation: complete   NORBERT Rodriguez-C (AAMA)  "

## 2017-11-16 RX ORDER — HYDROXYZINE PAMOATE 25 MG/1
CAPSULE ORAL
Refills: 1 | COMMUNITY
Start: 2017-11-01 | End: 2018-02-08

## 2017-11-20 ENCOUNTER — ANESTHESIA EVENT (OUTPATIENT)
Dept: SURGERY | Facility: CLINIC | Age: 72
End: 2017-11-20
Payer: MEDICARE

## 2017-11-20 NOTE — ANESTHESIA PREPROCEDURE EVALUATION
Anesthesia Evaluation     . Pt has had prior anesthetic. Type: MAC, General and Regional           ROS/MED HX    ENT/Pulmonary:       Neurologic:     (+)CVA     Cardiovascular:     (+) Dyslipidemia, hypertension----. : . . . :. .       METS/Exercise Tolerance:     Hematologic:         Musculoskeletal:   (+) arthritis, , , -       GI/Hepatic:     (+) GERD hepatitis       Renal/Genitourinary:     (+) chronic renal disease, type: CRI,       Endo:     (+) thyroid problem hypothyroidism, Obesity, .      Psychiatric:         Infectious Disease:         Malignancy:   (+) Malignancy History of Breast          Other:                     Physical Exam  Normal systems: cardiovascular, pulmonary and dental    Airway   Mallampati: II  TM distance: >3 FB  Neck ROM: full    Dental     Cardiovascular       Pulmonary                     Anesthesia Plan      History & Physical Review  History and physical reviewed and following examination; no interval change.    ASA Status:  3 .    NPO Status:  > 6 hours    Plan for General and ETT with Intravenous induction. Maintenance will be Balanced.    PONV prophylaxis:  Ondansetron (or other 5HT-3) and Dexamethasone or Solumedrol  Additional equipment: Videolaryngoscope      Postoperative Care  Postoperative pain management:  IV analgesics and Oral pain medications.      Consents  Anesthetic plan, risks, benefits and alternatives discussed with:  Patient..                          .

## 2017-11-21 ENCOUNTER — SURGERY (OUTPATIENT)
Age: 72
End: 2017-11-21

## 2017-11-21 ENCOUNTER — ANESTHESIA (OUTPATIENT)
Dept: SURGERY | Facility: CLINIC | Age: 72
End: 2017-11-21
Payer: MEDICARE

## 2017-11-21 ENCOUNTER — HOSPITAL ENCOUNTER (OUTPATIENT)
Facility: CLINIC | Age: 72
Discharge: HOME | End: 2017-11-21
Attending: ORTHOPAEDIC SURGERY | Admitting: ORTHOPAEDIC SURGERY
Payer: MEDICARE

## 2017-11-21 ENCOUNTER — APPOINTMENT (OUTPATIENT)
Dept: GENERAL RADIOLOGY | Facility: CLINIC | Age: 72
End: 2017-11-21
Attending: ORTHOPAEDIC SURGERY
Payer: MEDICARE

## 2017-11-21 VITALS
DIASTOLIC BLOOD PRESSURE: 48 MMHG | SYSTOLIC BLOOD PRESSURE: 134 MMHG | TEMPERATURE: 97.5 F | HEIGHT: 65 IN | BODY MASS INDEX: 34.99 KG/M2 | WEIGHT: 210 LBS | RESPIRATION RATE: 16 BRPM | OXYGEN SATURATION: 94 %

## 2017-11-21 DIAGNOSIS — Z79.2 PROPHYLACTIC ANTIBIOTIC: ICD-10-CM

## 2017-11-21 DIAGNOSIS — D68.51 HETEROZYGOUS FACTOR V LEIDEN MUTATION (H): ICD-10-CM

## 2017-11-21 DIAGNOSIS — M48.062 SPINAL STENOSIS, LUMBAR REGION, WITH NEUROGENIC CLAUDICATION: Primary | ICD-10-CM

## 2017-11-21 DIAGNOSIS — M54.50 ACUTE BILATERAL LOW BACK PAIN WITHOUT SCIATICA: ICD-10-CM

## 2017-11-21 PROCEDURE — 25000132 ZZH RX MED GY IP 250 OP 250 PS 637: Mod: GY | Performed by: NURSE ANESTHETIST, CERTIFIED REGISTERED

## 2017-11-21 PROCEDURE — 25000128 H RX IP 250 OP 636: Performed by: NURSE ANESTHETIST, CERTIFIED REGISTERED

## 2017-11-21 PROCEDURE — 25000125 ZZHC RX 250: Performed by: NURSE ANESTHETIST, CERTIFIED REGISTERED

## 2017-11-21 PROCEDURE — 71000013 ZZH RECOVERY PHASE 1 LEVEL 1 EA ADDTL HR: Performed by: ORTHOPAEDIC SURGERY

## 2017-11-21 PROCEDURE — 36000093 ZZH SURGERY LEVEL 4 1ST 30 MIN: Performed by: ORTHOPAEDIC SURGERY

## 2017-11-21 PROCEDURE — 36000063 ZZH SURGERY LEVEL 4 EA 15 ADDTL MIN: Performed by: ORTHOPAEDIC SURGERY

## 2017-11-21 PROCEDURE — 71000012 ZZH RECOVERY PHASE 1 LEVEL 1 FIRST HR: Performed by: ORTHOPAEDIC SURGERY

## 2017-11-21 PROCEDURE — 25000132 ZZH RX MED GY IP 250 OP 250 PS 637: Mod: GY | Performed by: ORTHOPAEDIC SURGERY

## 2017-11-21 PROCEDURE — 40000306 ZZH STATISTIC PRE PROC ASSESS II: Performed by: ORTHOPAEDIC SURGERY

## 2017-11-21 PROCEDURE — A9270 NON-COVERED ITEM OR SERVICE: HCPCS | Mod: GY | Performed by: NURSE ANESTHETIST, CERTIFIED REGISTERED

## 2017-11-21 PROCEDURE — 25000566 ZZH SEVOFLURANE, EA 15 MIN: Performed by: ORTHOPAEDIC SURGERY

## 2017-11-21 PROCEDURE — A9270 NON-COVERED ITEM OR SERVICE: HCPCS | Mod: GY | Performed by: ORTHOPAEDIC SURGERY

## 2017-11-21 PROCEDURE — 25000125 ZZHC RX 250: Performed by: ORTHOPAEDIC SURGERY

## 2017-11-21 PROCEDURE — 71000027 ZZH RECOVERY PHASE 2 EACH 15 MINS: Performed by: ORTHOPAEDIC SURGERY

## 2017-11-21 PROCEDURE — 25000128 H RX IP 250 OP 636: Performed by: ORTHOPAEDIC SURGERY

## 2017-11-21 PROCEDURE — 27110028 ZZH OR GENERAL SUPPLY NON-STERILE: Performed by: ORTHOPAEDIC SURGERY

## 2017-11-21 PROCEDURE — 37000009 ZZH ANESTHESIA TECHNICAL FEE, EACH ADDTL 15 MIN: Performed by: ORTHOPAEDIC SURGERY

## 2017-11-21 PROCEDURE — 27210794 ZZH OR GENERAL SUPPLY STERILE: Performed by: ORTHOPAEDIC SURGERY

## 2017-11-21 PROCEDURE — 37000008 ZZH ANESTHESIA TECHNICAL FEE, 1ST 30 MIN: Performed by: ORTHOPAEDIC SURGERY

## 2017-11-21 PROCEDURE — 40000985 XR LUMBAR SPINE PORT 1 VW

## 2017-11-21 RX ORDER — FENTANYL CITRATE 50 UG/ML
25-50 INJECTION, SOLUTION INTRAMUSCULAR; INTRAVENOUS
Status: DISCONTINUED | OUTPATIENT
Start: 2017-11-21 | End: 2017-11-21 | Stop reason: HOSPADM

## 2017-11-21 RX ORDER — CELECOXIB 200 MG/1
400 CAPSULE ORAL ONCE
Status: COMPLETED | OUTPATIENT
Start: 2017-11-21 | End: 2017-11-21

## 2017-11-21 RX ORDER — ACETAMINOPHEN 325 MG/1
975 TABLET ORAL ONCE
Status: DISCONTINUED | OUTPATIENT
Start: 2017-11-21 | End: 2017-11-21 | Stop reason: HOSPADM

## 2017-11-21 RX ORDER — MEPERIDINE HYDROCHLORIDE 25 MG/ML
12.5 INJECTION INTRAMUSCULAR; INTRAVENOUS; SUBCUTANEOUS
Status: DISCONTINUED | OUTPATIENT
Start: 2017-11-21 | End: 2017-11-21 | Stop reason: HOSPADM

## 2017-11-21 RX ORDER — DIAZEPAM 2 MG
2 TABLET ORAL EVERY 6 HOURS PRN
Qty: 30 TABLET | Refills: 0 | Status: SHIPPED | OUTPATIENT
Start: 2017-11-21 | End: 2018-02-08

## 2017-11-21 RX ORDER — LIDOCAINE 40 MG/G
CREAM TOPICAL
Status: DISCONTINUED | OUTPATIENT
Start: 2017-11-21 | End: 2017-11-21 | Stop reason: HOSPADM

## 2017-11-21 RX ORDER — DEXAMETHASONE SODIUM PHOSPHATE 4 MG/ML
INJECTION, SOLUTION INTRA-ARTICULAR; INTRALESIONAL; INTRAMUSCULAR; INTRAVENOUS; SOFT TISSUE PRN
Status: DISCONTINUED | OUTPATIENT
Start: 2017-11-21 | End: 2017-11-21

## 2017-11-21 RX ORDER — CEPHALEXIN 500 MG/1
500 CAPSULE ORAL 4 TIMES DAILY
Qty: 4 CAPSULE | Refills: 0 | Status: SHIPPED | OUTPATIENT
Start: 2017-11-21 | End: 2018-02-08

## 2017-11-21 RX ORDER — ACETAMINOPHEN 325 MG/1
975 TABLET ORAL ONCE
Status: COMPLETED | OUTPATIENT
Start: 2017-11-21 | End: 2017-11-21

## 2017-11-21 RX ORDER — EPHEDRINE SULFATE 50 MG/ML
INJECTION, SOLUTION INTRAVENOUS PRN
Status: DISCONTINUED | OUTPATIENT
Start: 2017-11-21 | End: 2017-11-21

## 2017-11-21 RX ORDER — METOCLOPRAMIDE HYDROCHLORIDE 5 MG/ML
5 INJECTION INTRAMUSCULAR; INTRAVENOUS EVERY 6 HOURS PRN
Status: DISCONTINUED | OUTPATIENT
Start: 2017-11-21 | End: 2017-11-21 | Stop reason: HOSPADM

## 2017-11-21 RX ORDER — NEOSTIGMINE METHYLSULFATE 1 MG/ML
VIAL (ML) INJECTION PRN
Status: DISCONTINUED | OUTPATIENT
Start: 2017-11-21 | End: 2017-11-21

## 2017-11-21 RX ORDER — ONDANSETRON 2 MG/ML
INJECTION INTRAMUSCULAR; INTRAVENOUS PRN
Status: DISCONTINUED | OUTPATIENT
Start: 2017-11-21 | End: 2017-11-21

## 2017-11-21 RX ORDER — ONDANSETRON 4 MG/1
4 TABLET, ORALLY DISINTEGRATING ORAL EVERY 30 MIN PRN
Status: DISCONTINUED | OUTPATIENT
Start: 2017-11-21 | End: 2017-11-21 | Stop reason: HOSPADM

## 2017-11-21 RX ORDER — HYDROMORPHONE HYDROCHLORIDE 1 MG/ML
.3-.5 INJECTION, SOLUTION INTRAMUSCULAR; INTRAVENOUS; SUBCUTANEOUS EVERY 10 MIN PRN
Status: DISCONTINUED | OUTPATIENT
Start: 2017-11-21 | End: 2017-11-21 | Stop reason: HOSPADM

## 2017-11-21 RX ORDER — BUPIVACAINE HYDROCHLORIDE AND EPINEPHRINE 2.5; 5 MG/ML; UG/ML
INJECTION, SOLUTION INFILTRATION; PERINEURAL PRN
Status: DISCONTINUED | OUTPATIENT
Start: 2017-11-21 | End: 2017-11-21 | Stop reason: HOSPADM

## 2017-11-21 RX ORDER — ALBUTEROL SULFATE 0.83 MG/ML
2.5 SOLUTION RESPIRATORY (INHALATION) EVERY 4 HOURS PRN
Status: DISCONTINUED | OUTPATIENT
Start: 2017-11-21 | End: 2017-11-21 | Stop reason: HOSPADM

## 2017-11-21 RX ORDER — GLYCOPYRROLATE 0.2 MG/ML
INJECTION, SOLUTION INTRAMUSCULAR; INTRAVENOUS PRN
Status: DISCONTINUED | OUTPATIENT
Start: 2017-11-21 | End: 2017-11-21

## 2017-11-21 RX ORDER — HYDROMORPHONE HYDROCHLORIDE 2 MG/1
2 TABLET ORAL EVERY 4 HOURS PRN
Qty: 40 TABLET | Refills: 0 | Status: SHIPPED | OUTPATIENT
Start: 2017-11-21 | End: 2018-02-08

## 2017-11-21 RX ORDER — SODIUM CHLORIDE, SODIUM LACTATE, POTASSIUM CHLORIDE, CALCIUM CHLORIDE 600; 310; 30; 20 MG/100ML; MG/100ML; MG/100ML; MG/100ML
INJECTION, SOLUTION INTRAVENOUS CONTINUOUS
Status: DISCONTINUED | OUTPATIENT
Start: 2017-11-21 | End: 2017-11-21 | Stop reason: HOSPADM

## 2017-11-21 RX ORDER — ONDANSETRON 2 MG/ML
4 INJECTION INTRAMUSCULAR; INTRAVENOUS EVERY 30 MIN PRN
Status: DISCONTINUED | OUTPATIENT
Start: 2017-11-21 | End: 2017-11-21 | Stop reason: HOSPADM

## 2017-11-21 RX ORDER — PROPOFOL 10 MG/ML
INJECTION, EMULSION INTRAVENOUS PRN
Status: DISCONTINUED | OUTPATIENT
Start: 2017-11-21 | End: 2017-11-21

## 2017-11-21 RX ORDER — NALOXONE HYDROCHLORIDE 0.4 MG/ML
.1-.4 INJECTION, SOLUTION INTRAMUSCULAR; INTRAVENOUS; SUBCUTANEOUS
Status: DISCONTINUED | OUTPATIENT
Start: 2017-11-21 | End: 2017-11-21 | Stop reason: HOSPADM

## 2017-11-21 RX ORDER — LIDOCAINE HYDROCHLORIDE 10 MG/ML
INJECTION, SOLUTION INFILTRATION; PERINEURAL PRN
Status: DISCONTINUED | OUTPATIENT
Start: 2017-11-21 | End: 2017-11-21

## 2017-11-21 RX ORDER — METOCLOPRAMIDE 5 MG/1
5 TABLET ORAL EVERY 6 HOURS PRN
Status: DISCONTINUED | OUTPATIENT
Start: 2017-11-21 | End: 2017-11-21 | Stop reason: HOSPADM

## 2017-11-21 RX ORDER — FENTANYL CITRATE 50 UG/ML
INJECTION, SOLUTION INTRAMUSCULAR; INTRAVENOUS PRN
Status: DISCONTINUED | OUTPATIENT
Start: 2017-11-21 | End: 2017-11-21

## 2017-11-21 RX ORDER — CEFAZOLIN SODIUM 2 G/100ML
2 INJECTION, SOLUTION INTRAVENOUS
Status: COMPLETED | OUTPATIENT
Start: 2017-11-21 | End: 2017-11-21

## 2017-11-21 RX ADMIN — PHENYLEPHRINE HYDROCHLORIDE 200 MCG: 10 INJECTION, SOLUTION INTRAMUSCULAR; INTRAVENOUS; SUBCUTANEOUS at 07:50

## 2017-11-21 RX ADMIN — ACETAMINOPHEN 975 MG: 325 TABLET, FILM COATED ORAL at 06:30

## 2017-11-21 RX ADMIN — FENTANYL CITRATE 150 MCG: 50 INJECTION, SOLUTION INTRAMUSCULAR; INTRAVENOUS at 07:58

## 2017-11-21 RX ADMIN — HYDROMORPHONE HYDROCHLORIDE 0.5 MG: 1 INJECTION, SOLUTION INTRAMUSCULAR; INTRAVENOUS; SUBCUTANEOUS at 11:14

## 2017-11-21 RX ADMIN — PROPOFOL 200 MG: 10 INJECTION, EMULSION INTRAVENOUS at 07:35

## 2017-11-21 RX ADMIN — CELECOXIB 400 MG: 200 CAPSULE ORAL at 06:32

## 2017-11-21 RX ADMIN — Medication 4 MG: at 09:56

## 2017-11-21 RX ADMIN — MIDAZOLAM HYDROCHLORIDE 2 MG: 1 INJECTION, SOLUTION INTRAMUSCULAR; INTRAVENOUS at 07:30

## 2017-11-21 RX ADMIN — FENTANYL CITRATE 100 MCG: 50 INJECTION, SOLUTION INTRAMUSCULAR; INTRAVENOUS at 07:32

## 2017-11-21 RX ADMIN — FENTANYL CITRATE 50 MCG: 50 INJECTION INTRAMUSCULAR; INTRAVENOUS at 11:08

## 2017-11-21 RX ADMIN — FENTANYL CITRATE 50 MCG: 50 INJECTION INTRAMUSCULAR; INTRAVENOUS at 10:57

## 2017-11-21 RX ADMIN — EPHEDRINE SULFATE 10 MG: 50 INJECTION, SOLUTION INTRAVENOUS at 08:39

## 2017-11-21 RX ADMIN — SODIUM CHLORIDE, POTASSIUM CHLORIDE, SODIUM LACTATE AND CALCIUM CHLORIDE: 600; 310; 30; 20 INJECTION, SOLUTION INTRAVENOUS at 06:26

## 2017-11-21 RX ADMIN — DEXAMETHASONE SODIUM PHOSPHATE 4 MG: 4 INJECTION, SOLUTION INTRA-ARTICULAR; INTRALESIONAL; INTRAMUSCULAR; INTRAVENOUS; SOFT TISSUE at 07:42

## 2017-11-21 RX ADMIN — LIDOCAINE HYDROCHLORIDE 1 ML: 10 INJECTION, SOLUTION EPIDURAL; INFILTRATION; INTRACAUDAL; PERINEURAL at 06:25

## 2017-11-21 RX ADMIN — LIDOCAINE HYDROCHLORIDE 100 MG: 10 INJECTION, SOLUTION INFILTRATION; PERINEURAL at 07:35

## 2017-11-21 RX ADMIN — PHENYLEPHRINE HYDROCHLORIDE 200 MCG: 10 INJECTION, SOLUTION INTRAMUSCULAR; INTRAVENOUS; SUBCUTANEOUS at 08:17

## 2017-11-21 RX ADMIN — CEFAZOLIN SODIUM 2 G: 2 INJECTION, SOLUTION INTRAVENOUS at 07:30

## 2017-11-21 RX ADMIN — PHENYLEPHRINE HYDROCHLORIDE 200 MCG: 10 INJECTION, SOLUTION INTRAMUSCULAR; INTRAVENOUS; SUBCUTANEOUS at 07:58

## 2017-11-21 RX ADMIN — PHENYLEPHRINE HYDROCHLORIDE 100 MCG: 10 INJECTION, SOLUTION INTRAMUSCULAR; INTRAVENOUS; SUBCUTANEOUS at 08:28

## 2017-11-21 RX ADMIN — ROCURONIUM BROMIDE 20 MG: 10 INJECTION INTRAVENOUS at 07:35

## 2017-11-21 RX ADMIN — PHENYLEPHRINE HYDROCHLORIDE 300 MCG: 10 INJECTION, SOLUTION INTRAMUSCULAR; INTRAVENOUS; SUBCUTANEOUS at 08:07

## 2017-11-21 RX ADMIN — BUPIVACAINE HYDROCHLORIDE AND EPINEPHRINE BITARTRATE 40 ML: 2.5; .005 INJECTION, SOLUTION INFILTRATION; PERINEURAL at 09:46

## 2017-11-21 RX ADMIN — GLYCOPYRROLATE 0.6 MG: 0.2 INJECTION, SOLUTION INTRAMUSCULAR; INTRAVENOUS at 09:56

## 2017-11-21 RX ADMIN — ONDANSETRON 4 MG: 2 INJECTION INTRAMUSCULAR; INTRAVENOUS at 07:42

## 2017-11-21 NOTE — ANESTHESIA POSTPROCEDURE EVALUATION
Patient: Briana Mcgee    Procedure(s):  L4-5 decompression laminectomy, Left L5-S1 foraminal decompression - Wound Class: I-Clean    Diagnosis:Lumbar spinal stenosis  Diagnosis Additional Information: No value filed.    Anesthesia Type:  General, ETT    Note:  Anesthesia Post Evaluation    Patient location during evaluation: Bedside  Patient participation: Able to fully participate in evaluation  Level of consciousness: awake and alert  Pain management: adequate  Airway patency: patent  Cardiovascular status: acceptable  Respiratory status: acceptable  Hydration status: acceptable  PONV: none     Anesthetic complications: None          Last vitals:  Vitals:    11/21/17 0602 11/21/17 1010 11/21/17 1015   BP: 145/65  147/66   Resp: 16 16 11   Temp: 36.8  C (98.2  F) 36.4  C (97.5  F)    SpO2: 93%  96%         Electronically Signed By: Manda Murcia CRNA, BRAXTON TOLLIVER  November 21, 2017  10:28 AM

## 2017-11-21 NOTE — BRIEF OP NOTE
[facility]  Brief Operative Note      Pre-operative diagnosis: Lumbar spinal stenosis   Post-operative diagnosis: Same   Procedure: L4-5 bilateral decompressive laminectomy, left L5-S1 foraminotomy   Surgeon: Jesse Dueñas MD     Assistant(s): none   Anesthesia: General endotracheal anesthesia   Estimated blood loss: Less than 50 ml               Drains: None   Specimens: None       Findings: See full dictated operative note for details   Complications: None                   Comments: See dictated operative report for full details     Condition: Stable   Weight bearing status: Weight bearing as tolerated. No excessive twisting or bending more than as needed to get in and out of bed or chair. No lifting >10lbs   Activity: Activity as tolerated  As tolerated   Anticoagulation plan:                 SCDs and early mobilization   Follow up plan                           Follow up in 2 week(s)

## 2017-11-21 NOTE — ANESTHESIA CARE TRANSFER NOTE
Patient: Briana Mcgee    Procedure(s):  L4-5 decompression laminectomy, Left L5-S1 foraminal decompression - Wound Class: I-Clean    Diagnosis: Lumbar spinal stenosis  Diagnosis Additional Information: No value filed.    Anesthesia Type:   General, ETT     Note:  Airway :Nasal Cannula  Patient transferred to:PACU  Handoff Report: Identifed the Patient, Identified the Reponsible Provider, Reviewed the pertinent medical history, Discussed the surgical course, Reviewed Intra-OP anesthesia mangement and issues during anesthesia, Set expectations for post-procedure period and Allowed opportunity for questions and acknowledgement of understanding      Vitals: (Last set prior to Anesthesia Care Transfer)    CRNA VITALS  11/21/2017 0936 - 11/21/2017 1028      11/21/2017             Pulse: 104    SpO2: 100 %    Resp Rate (observed): 19                Electronically Signed By: Manda Murcia CRNA, APRN CRNA  November 21, 2017  10:28 AM

## 2017-11-21 NOTE — H&P (VIEW-ONLY)
[facility]  [address], [place] [address]  [phone]  Dept: [phone]    PRE-OP EVALUATION:  Today's date: 2017    Briana Mcgee (: 1945) presents for pre-operative evaluation assessment as requested by Dr. Brown.  She requires evaluation and anesthesia risk assessment prior to undergoing surgery/procedure for treatment of lumbar spine .  Proposed procedure: L4-5 decompression laminectomy, Left L5-S1 foraminal decompression    Date of Surgery/ Procedure: 2017  Time of Surgery/ Procedure: 7:30  Hospital/Surgical Facility: [facility]    Primary Physician: Xavier Vega  Type of Anesthesia Anticipated: General    Patient has a Health Care Directive or Living Will:  YES will bring in a copy    Preop Questions 11/10/2017   1.  Do you have a history of heart attack, stroke, stent, bypass or surgery on an artery in the head, neck, heart or legs?  YES - TIA in , work up was negative, no further issues YES    2.  Do you ever have any pain or discomfort in your chest? No   3.  Do you have a history of  Heart Failure? No   4.   Are you troubled by shortness of breath when:  walking on a level surface, or up a slight hill, or at night? No   5.  Do you currently have a cold, bronchitis or other respiratory infection? No   6.  Do you have a cough, shortness of breath, or wheezing? No   7.  Do you sometimes get pains in the calves of your legs when you walk? No   8. Do you or anyone in your family have previous history of blood clots? No   9.  Do you or does anyone in your family have a serious bleeding problem such as prolonged bleeding following surgeries or cuts? No   10. Have you ever had problems with anemia or been told to take iron pills? No   11. Have you had any abnormal blood loss such as black, tarry or bloody stools, or abnormal vaginal bleeding? No   12. Have you ever had a blood transfusion? No   13. Have you or any of your relatives ever had  problems with anesthesia? No   14. Do you have sleep apnea, excessive snoring or daytime drowsiness? No   15. Do you have any prosthetic heart valves? No   16. Do you have prosthetic joints? No   17. Is there any chance that you may be pregnant? No           HPI:                                                      Brief HPI related to upcoming procedure: back pain lumbar stenosis causing pain      HYPERTENSION - Patient has longstanding history of mod-severe HTN , currently denies any symptoms referable to elevated blood pressure. Specifically denies chest pain, palpitations, dyspnea, orthopnea, PND or peripheral edema. Blood pressure readings have been in normal range. Current medication regimen is as listed below. Patient denies any side effects of medication.                                                                                                                                                                                          .  HYPERLIPIDEMIA - Patient has a long history of significant Hyperlipidemia requiring medication for treatment with recent good control. Patient reports no problems or side effects with the medication.                                                                                                                                                       .  HYPOTHYROIDISM - Patient has a longstanding history of chronic Hypothyroidism. Patient has been doing well, noting no tremor, insomnia, hair loss or changes in skin texture. Last TSH value will check today, normal in 3/2017. Continues to take medications as directed, without adverse reactions or side effects.                                                                                                                                                                                                                        .    MEDICAL HISTORY:                                                    Patient Active Problem List     Diagnosis Date Noted     Malignant neoplasm of female breast (H) 02/18/2005     Priority: High     11/05: breast surgeon=Dr. Dimitrios Bell, oncology=Dr. Tino Gordillo s/p lumpectomy and axillary node dissection followed by chemotherapy at [facility]. Radiation oncology=Dr. Jessica Edmonds for 6 weeks.   12/05: Briana is clinically asymptomatic and no clinical evidence of cancer recurrence; port-a-cath removal.  9-12-05 NM [facility] radionuclide angiography at rest findings:1. Left ventricular ejectin fraction is normal at 64%  2. Compared to previous study perfomed 3-17-05, there has been no significant interval change.  10/06: appointment with Dr. Gordillo:continue amrimidex and  follow up in 6 months.  1/16/07: follow up with Dr. Bell: 'doing well with no signs of recurrence', follow up 7/07 with mammogram at that time/  2/12/09: now followed by Dr. Peres.  3/10 seen by Dr. Levi Bear with [facility] yearly visits, now can have yearly mammos, next due 8/10  10/2013 follow up with Dr. Duarte, followed every 6 months due to density of right breast and concerns, now followed yearly  Problem list name updated by automated process. Provider to review       Spinal stenosis, lumbar region, with neurogenic claudication 11/14/2017     Priority: Medium     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) 04/12/2017     Priority: Medium     Essential hypertension 03/13/2017     Priority: Medium     Hepatitis 11/25/2015     Priority: Medium     Obesity 10/20/2015     Priority: Medium     CKD (chronic kidney disease) stage 2, GFR 60-89 ml/min 06/04/2014     Priority: Medium     Advanced directives, counseling/discussion 01/29/2014     Priority: Medium     Was given the information to fill out.       Spinal stenosis 01/07/2014     Priority: Medium     Pain controlled with epidural injections       Lumbar radiculopathy 05/07/2012     Priority: Medium     Varicose veins  "of lower extremities with complications 03/06/2012     Priority: Medium     Problem list name updated by automated process. Provider to review       GERD (gastroesophageal reflux disease) 04/12/2011     Priority: Medium     Heterozygous factor V Leiden mutation (H) 04/07/2011     Priority: Medium     HYPERLIPIDEMIA LDL GOAL <100 10/31/2010     Priority: Medium     OA (osteoarthritis) of knee 03/01/2009     Priority: Medium     Followed by [facility]. Briana has had 3 Synvisc injections (1/3/08, 2/12/09,  3/2/09, 3/9/09)       Hot flashes 03/01/2009     Priority: Medium     2/12/09: seen by Dr. Peres, started Effexor. If this is not helpful will start Neurontin.       Rhinitis, allergic seasonal 07/02/2008     Priority: Medium     ischemic stroke 3/06 03/27/2006     Priority: Catarino Warren has been seen by Dr. Hernandez. Given her history of CVA and Factor V Leiden heterozygote he recommends that she stay on \"antiplatelet drug use for secondary prevention of strokes\". Briana has been on plavix and asa.       Impaired fasting glucose 03/05/2006     Priority: Catarino Warren would like to continue diet changes (she has already lost 16 pounds on the [employer]' program), regular exercise and weight loss.  She agrees to recheck fasting blood sugar and lipids in 3 months.       Insomnia 03/05/2006     Priority: Medium     Stable on Trazadone.  Problem list name updated by automated process. Provider to review       Hypothyroidism 02/18/2005     Priority: Medium     Stable on current dose of synthroid.  Problem list name updated by automated process. Provider to review        Past Medical History:   Diagnosis Date     Allergies      Breast cancer (H)      Esophageal reflux      Hypertension      Other and unspecified hyperlipidemia      Other chronic bronchitis      Personal history of pneumonia (recurrent)     Hx-Pneumonia, \" Chronic Bronchitis\"     Personal history of tobacco use, presenting " hazards to health      RA (rheumatoid arthritis) (H)      Unspecified hypothyroidism      Past Surgical History:   Procedure Laterality Date     BIOPSY BREAST       COLONOSCOPY N/A 9/2/2016    Procedure: COLONOSCOPY;  Surgeon: Dean Sanchez MD;  Location: [facility]     EXCISE EXOSTOSIS FOOT Right 4/25/2017    Procedure: EXCISE EXOSTOSIS FOOT;  Retrocalcaneal exostectomy right;  Surgeon: Antwan Goldstein DPM;  Location: [facility]     HC EXCISION BREAST LESION, OPEN >=1      2/05     HYSTERECTOMY, RYAN  1982     for non cancerous reasons and no abnormal pap     INJECT EPIDURAL LUMBAR  1/12/2011    INJECT EPIDURAL LUMBAR performed by GENERIC ANESTHESIA PROVIDER at [facility]     INJECT EPIDURAL LUMBAR  5/5/2011    Procedure:INJECT EPIDURAL LUMBAR; LESLIE -Dr. Sánchez  ; Surgeon:GENERIC ANESTHESIA PROVIDER; Location:[facility]     INJECT EPIDURAL LUMBAR  10/6/2011    Procedure:INJECT EPIDURAL LUMBAR; LESLIE--; Surgeon:GENERIC ANESTHESIA PROVIDER; Location:[facility]     INJECT EPIDURAL LUMBAR  1/25/2012    Procedure:INJECT EPIDURAL LUMBAR; LESLIE-Dr. Sánchez  ; Surgeon:GENERIC ANESTHESIA PROVIDER; Location:[facility]     INJECT EPIDURAL LUMBAR  7/9/2012    Procedure: INJECT EPIDURAL LUMBAR;  LESLIE-Dr. Pacheco;  Surgeon: Provider, Generic Anesthesia;  Location: [facility]     LUMPECTOMY BREAST       RELEASE CARPAL TUNNEL Right 9/5/2017    Procedure: RELEASE CARPAL TUNNEL;  Right Wrist Carpal Tunnel Release;  Surgeon: Melba Yi MD;  Location: [facility]     RELEASE CARPAL TUNNEL Left 10/31/2017    Procedure: RELEASE CARPAL TUNNEL;  Left Wrist Carpal Tunnel Release;  Surgeon: Melba Yi MD;  Location: [facility]     SURGICAL HISTORY OF -       lumpectomy with sentinal node biopsy     SURGICAL HISTORY OF -       left knee arthoscopic repair medial meniscus     Current Outpatient Prescriptions   Medication Sig Dispense Refill     methocarbamol (ROBAXIN) 500 MG tablet Take 1-2 tablets (500-1,000 mg) by mouth 3 times daily as needed for muscle  spasms 90 tablet 3     gabapentin (NEURONTIN) 300 MG capsule Take 2 capsules (600 mg) by mouth 2 times daily 120 capsule 5     baclofen (LIORESAL) 10 MG tablet Take 1-2 tablets (10-20mg) by mouth in the evening as needed  2     hydrochlorothiazide 12.5 MG TABS tablet Take 1 tablet (12.5 mg) by mouth daily 90 tablet 1     tolterodine (DETROL LA) 4 MG 24 hr capsule Take 1 capsule (4 mg) by mouth daily 90 capsule 1     simvastatin (ZOCOR) 20 MG tablet Take 1 tablet (20 mg) by mouth At Bedtime Hold on file until needed 90 tablet 3     traZODone (DESYREL) 100 MG tablet 1 tablets at bedtime as needed.  Hold on file until needed 90 tablet 3     levothyroxine (SYNTHROID/LEVOTHROID) 150 MCG tablet Take 1 tablet (150 mcg) by mouth daily BRAND NAME ONLY.  Hold on file until needed. 90 tablet 3     pantoprazole (PROTONIX) 20 MG EC tablet Take 2 tablets (40 mg) by mouth daily Hold on file until needed 180 tablet 3     fluticasone (FLONASE) 50 MCG/ACT spray Spray 2 sprays into both nostrils daily Hold on file until needed (Patient taking differently: Spray 2 sprays into both nostrils daily as needed Hold on file until needed) 16 g 11     folic acid 20 MG CAPS Take 1 tablet by mouth daily        Naproxen Sodium (ALEVE PO) Take 2 tablets by mouth 2 times daily as needed        Coenzyme Q10 (CO Q 10 PO) Take  by mouth.       pyridoxine (VITAMIN B-6) 100 MG tablet Take 100 mg by mouth daily.       CALCIUM + D 600-200 MG-UNIT OR TABS 1 TABLET DAILY twice daily       MULTIPLE VITAMIN OR 1 daily       HYDROmorphone (DILAUDID) 2 MG tablet Take 1 tablet (2 mg) by mouth every 8 hours as needed for severe pain maximum 3 tablet(s) per day (Patient not taking: Reported on 11/14/2017) 30 tablet 0     magnesium hydroxide (MILK OF MAGNESIA) 400 MG/5ML suspension Take 400 mg/kg/day by mouth daily as needed for constipation or heartburn       aspirin 81 MG EC tablet Take 1 tablet (81 mg) by mouth daily 90 tablet 3     OTC products: None, except as  "noted above    Allergies   Allergen Reactions     Erythromycin      Other reaction(s): Edema     Hydrocodone      Other reaction(s): GI Upset  Tolerates dilaudid     Oxycodone      Other reaction(s): GI Upset      Latex Allergy: NO    Social History   Substance Use Topics     Smoking status: Former Smoker     Types: Cigarettes     Quit date: 7/19/1996     Smokeless tobacco: Never Used      Comment: quit 10-12 years ago, 1997.     Alcohol use Yes      Comment: glass of wine at night     History   Drug Use No       REVIEW OF SYSTEMS:                                                    Review Of Systems  Constitutional: negative  Skin: negative  Eyes: negative  Ears/Nose/Throat: negative  Respiratory: No shortness of breath, dyspnea on exertion, cough, or hemoptysis  Cardiovascular: negative  Gastrointestinal: negative  Genitourinary: negative  Musculoskeletal: having back pain  Neurologic: pain is radiating down back  Psychiatric: negative  Hematologic/Lymphatic/Immunologic: negative  Endocrine: negative      EXAM:                                                    /64 (Cuff Size: Adult Large)  Pulse 68  Temp 97.1  F (36.2  C) (Tympanic)  Ht 5' 5.25\" (1.657 m)  Wt 210 lb (95.3 kg)  BMI 34.68 kg/m2    GENERAL APPEARANCE: healthy, alert and no distress     EYES: EOMI, PERRL     HENT: ear canals and TM's normal and nose and mouth without ulcers or lesions     NECK: no adenopathy, no asymmetry, masses, or scars and thyroid normal to palpation     RESP: lungs clear to auscultation - no rales, rhonchi or wheezes     CV: regular rates and rhythm, normal S1 S2, no S3 or S4 and no murmur, click or rub     ABDOMEN:  soft, nontender, no HSM or masses and bowel sounds normal     MS: extremities normal- no gross deformities noted, no evidence of inflammation in joints, FROM in all extremities.     MS: moves slowly due to back pain     SKIN: no suspicious lesions or rashes     NEURO: Normal strength and tone, sensory exam " grossly normal, mentation intact and speech normal     PSYCH: mentation appears normal. and affect normal/bright     LYMPHATICS: anterior cervical: no adenopathy  posterior cervical: no adenopathy    DIAGNOSTICS:                                                    EKG: Normal Sinus Rhythm, Right Bundle Branch Block, unchanged from previous tracings, EKG from 4/11/2017.    Recent Labs   Lab Test  10/25/17   1153  10/19/17   1015  08/28/17   0947   04/11/17   0900   03/17/15   1104   01/07/14   1031   HGB   --   12.0   --    --   12.6   < >   --    < >  12.4   PLT   --   254   --    --   245   < >   --    < >  253   NA  134   --   133   < >   --    < >   --    < >   --    POTASSIUM  4.0   --   3.8   < >   --    < >   --    < >   --    CR  0.94   --   0.80   < >   --    < >   --    < >   --    A1C   --    --    --    --    --    --   5.6   --   5.4    < > = values in this interval not displayed.        IMPRESSION:                                                    Reason for surgery/procedure: back pain    The proposed surgical procedure is considered INTERMEDIATE risk.    REVISED CARDIAC RISK INDEX  The patient has the following serious cardiovascular risks for perioperative complications such as (MI, PE, VFib and 3  AV Block):  Cerebrovascular Disease (TIA or CVA)  INTERPRETATION: 1 risks: Class II (low risk - 0.9% complication rate)    The patient has the following additional risks for perioperative complications:        ICD-10-CM    1. Preop general physical exam Z01.818 UA reflex to Microscopic and Culture     CBC with platelets differential     TSH     T4 FREE   2. Spinal stenosis, lumbar region, with neurogenic claudication M48.062 UA reflex to Microscopic and Culture     CBC with platelets differential     TSH     T4 FREE   3. Essential hypertension I10 Comprehensive metabolic panel   4. CKD (chronic kidney disease) stage 2, GFR 60-89 ml/min N18.2 Comprehensive metabolic panel   5. Hypothyroidism, unspecified  type E03.9 TSH     T4 FREE       RECOMMENDATIONS:                                                          --Patient is to take all scheduled medications on the day of surgery EXCEPT for modifications listed below.    APPROVAL GIVEN to proceed with proposed procedure, without further diagnostic evaluation     She has h/o TIA, has been on ASA, will hold prior to surgery.  She was instructed.    Signed Electronically by: Xavier Vega MD    Copy of this evaluation report is provided to requesting physician.    [facility] Preop Guidelines

## 2017-11-21 NOTE — IP AVS SNAPSHOT
"                  MRN:[id number]                      After Visit Summary   11/21/2017    Briana Mcgee    MRN: [id number]           Thank you!     Thank you for choosing [facility] for your care. Our goal is always to provide you with excellent care. Hearing back from our patients is one way we can continue to improve our services. Please take a few minutes to complete the written survey that you may receive in the mail after you visit with us. Thank you!        Patient Information     Date Of Birth          1945        About your hospital stay     You were admitted on:  November 21, 2017 You last received care in the:  [facility] PreOP/Phase II    You were discharged on:  November 21, 2017       Who to Call     For medical emergencies, please call 911.  For non-urgent questions about your medical care, please call your primary care provider or clinic, [phone]  For questions related to your surgery, please call your surgery clinic        Attending Provider     Provider Specialty    Jesse Dueñas MD Orthopaedic Surgery       Primary Care Provider Office Phone # Fax #    Xavier Vega [phone]      Your next 10 appointments already scheduled     Oct 22, 2018  9:15 AM CDT   MA SCREENING BILATERAL W/ JESSICA with [address] [facility] ([facility])    [address] [facility] [address] [phone]           Three-dimensional (3D) mammograms are available at [facility] locations in [facility], [place], [place], [facility], [place], [place], and [place]. -Health locations include [place] and [facility] [place] [facility] in [place]. Benefits of 3D mammograms include: - Improved rate of cancer detection - Decreases your chance of having to go back for more tests, which means fewer: - \"False-positive\" results (This means that there is an abnormal area but it isn't cancer.) - Invasive testing procedures, such as a biopsy or " surgery - Can provide clearer images of the breast if you have dense breast tissue. 3D mammography is an optional exam that anyone can have with a 2D mammogram. It doesn't replace or take the place of a 2D mammogram. 2D mammograms remain an effective screening test for all women.  Not all insurance companies cover the cost of a 3D mammogram. Check with your insurance.            Oct 22, 2018  1:00 PM CDT   [facility]    [address] [facility] [address]   [phone]           Please do not eat 10-12 hours before your appointment if you are coming in fasting for labs on lipids, cholesterol, or glucose (sugar). This does not apply to pregnant women. Water, hot tea and black coffee (with nothing added) are okay. Do not drink other fluids, diet soda or chew gum.            Oct 29, 2018  9:30 AM CDT   Return Visit with Merari Duarte MD   [facility] ([facility])    [facility]  [address]  [facility] [address]   [phone]              Further instructions from your care team       Lumbar Spine Surgery Discharge Instructions    Your surgery was: L4-5 decompression bilateral, left L5-S1 foraminotomy    Your surgeon is: Jesse Dueñas MD    Restrictions after lumbar surgery:  - No bending or twisting beyond that needed to get in and out of bed/chair, a car, or other similar activities.    - No lifting greater than 10 lbs (approximately what 1 gallon of milk weighs) until you are instructed that it is okay in your clinic follow-up visits.    - You should not drive a car or operative machinery if you are taking prescribed narcotic pain medication    - If you take blood thinner medications such as Aspirin, Plavix, Coumadin/Warfarin, Lovenox, Xarelto, or other similar medications please discuss with your surgeon when these can be restarted.    Wound Care Instructions: Leave steri strips on  until they fall off on their own or until you return to clinic 2 weeks postop. It is okay to shower and get your wound wet, just do not let the water spray directly on your incision. Do not soak in a bathtub or swimming pool until you are told it is okay to do so when you return to clinic    - Walking is your main physical therapy for now - we generally will not order PT unless it is determined at a later date in clinic that this is necessary. You should generally try to do at least short walks several times daily.    - If you are prescribed narcotic pain medications (Oxycodone, Norco, Percocet, etc) then you should try to wean off of them as tolerated. These are an AS NEEDED medication, so if you are not having significant pain you should try to take fewer pills at a time or spread them out over a longer period of time than is prescribed.    - If your pain pill contains Tylenol (i.e. Percocet, Norco) and you are also taking additional Tylenol/acetaminophen as a pain medication, ensure that you are not taking too much tylenol. Prescription narcotic medications that contain Tylenol usually have 325mg of Tylenol per pill and over-the-counter medications have variable dose of Tylenol. You should not exceed 4,000mg of Tylenol in a 24-hour period.    Call the office if you have any questions/concerns or are experiencing the following:  - increasing drainage from the incision  - foul-smelling or malodorous drainage from the incision  - increasing pain not controlled by your prescribed medications  - inability to urinate  - new onset of weakness, numbness or severe pain in the extremities  - bowel/bladder incontinence  - Fever greater than 101.5 degrees  - Nausea/vomitting causing inability to eat food or medications    During normal business hours 7:30AM to 5:00PM on Monday-Friday you can reach my clinical assistant Josi at [phone] and after hours you can reach the call-center for on-call physician at [phone]                      Same Day Surgery Discharge Instructions  Special Precautions After Surgery - Adult    1. It is not unusual to feel lightheaded or faint, up to 24 hours after surgery or while taking pain medication.  If you have these symptoms; sit for a few minutes before standing and have someone assist you when getting up.  2. You should rest and relax for the next 24 hours and must have someone stay with you for at least 24 hours after your discharge.  3. DO NOT DRIVE any vehicle or operate mechanical equipment for 24 hours following the end of your surgery.  DO NOT DRIVE while taking narcotic pain medications that have been prescribed by your physician.  If you had a limb operated on, you must be able to use it fully to drive.  4. DO NOT drink alcoholic beverages for 24 hours following surgery or while taking prescription pain medication.  5. Drink clear liquids (apple juice, ginger ale, broth, 7-Up, etc.).  Progress to your regular diet as you feel able.  6. Any questions call your physician and do not make important decisions for 24 hours.    Start your pain pill and valium when needed.           Same Day Surgery [phone], Monday thru Friday 6am-9pm.    Break through Bleeding  As instructed per Surgeon or Nurse.    Post Op Infection  Be alert for signs of infection: redness, swelling, heat, drainage of pus, and/or elevated temperature.  Contact your surgeon if these occur.    Nausea   If post op nausea occurs, at first rest your stomach for a few hours by eating nothing solid and sipping only clear liquids.  Call your Surgeon if nausea does not resolve in 24 hours.      Pending Results     Date and Time Order Name Status Description    11/21/2017 0550 XR Lumbar Spine Port 1 View In process             Admission Information     Date & Time Provider Department Dept. Phone    11/21/2017 Jesse Dueñas MD [facility] PreOP/Phase [phone]      Your Vitals Were     Blood Pressure  "Temperature Respirations Height Weight Pulse Oximetry    137/69 97.5  F (36.4  C) (Oral) 16 1.657 m (5' 5.25\") 95.3 kg (210 lb) 98%    BMI (Body Mass Index)                   34.68 kg/m2           [employer] Information     [employer] gives you secure access to your electronic health record. If you see a primary care provider, you can also send messages to your care team and make appointments. If you have questions, please call your primary care clinic.  If you do not have a primary care provider, please call [phone] and they will assist you.        Care EveryWhere ID     This is your Care EveryWhere ID. This could be used by other organizations to access your [place] medical records  [id number]        Equal Access to Services     JENNIFER HODGES : Sung Shafer, suzan armenta, david vo, hunter florez. So [facility] [phone].    ATENCIÓN: Si habla español, tiene a bright disposición servicios gratuitos de asistencia lingüística. [facility] [phone].    We comply with applicable federal civil rights laws and [place] laws. We do not discriminate on the basis of race, color, national origin, age, disability, sex, sexual orientation, or gender identity.               Review of your medicines      START taking        Dose / Directions    cephALEXin 500 MG capsule   Commonly known as:  KEFLEX   Used for:  Prophylactic antibiotic        Dose:  500 mg   Take 1 capsule (500 mg) by mouth 4 times daily   Quantity:  4 capsule   Refills:  0       diazepam 2 MG tablet   Commonly known as:  VALIUM   Used for:  Spinal stenosis, lumbar region, with neurogenic claudication        Dose:  2 mg   Take 1 tablet (2 mg) by mouth every 6 hours as needed for anxiety or sleep   Quantity:  30 tablet   Refills:  0         CONTINUE these medicines which may have CHANGED, or have new prescriptions. If we are uncertain of the size of tablets/capsules you have at home, strength may be " listed as something that might have changed.        Dose / Directions    fluticasone 50 MCG/ACT spray   Commonly known as:  FLONASE   This may have changed:    - when to take this  - reasons to take this  - additional instructions   Used for:  Other seasonal allergic rhinitis        Dose:  2 spray   Spray 2 sprays into both nostrils daily Hold on file until needed   Quantity:  16 g   Refills:  11       HYDROmorphone 2 MG tablet   Commonly known as:  DILAUDID   This may have changed:    - when to take this  - additional instructions   Used for:  Acute bilateral low back pain without sciatica        Dose:  2 mg   Take 1 tablet (2 mg) by mouth every 4 hours as needed for severe pain   Quantity:  40 tablet   Refills:  0         CONTINUE these medicines which have NOT CHANGED        Dose / Directions    ALEVE PO        Dose:  2 tablet   Take 2 tablets by mouth 2 times daily as needed   Refills:  0       aspirin 81 MG EC tablet   Used for:  Heterozygous factor V Leiden mutation (H)        Dose:  81 mg   Start taking on:  11/23/2017   Take 1 tablet (81 mg) by mouth daily   Quantity:  90 tablet   Refills:  3       baclofen 10 MG tablet   Commonly known as:  LIORESAL   Used for:  Personal history of malignant neoplasm of breast        Take 1-2 tablets (10-20mg) by mouth in the evening as needed   Refills:  2       calcium + D 600-200 MG-UNIT Tabs   Generic drug:  calcium carbonate-vitamin D        1 TABLET DAILY twice daily   Refills:  0       CO Q 10 PO        Take  by mouth.   Refills:  0       folic acid 20 MG Caps        Dose:  1 tablet   Take 1 tablet by mouth daily   Refills:  0       gabapentin 300 MG capsule   Commonly known as:  NEURONTIN   Used for:  Lumbar radiculopathy        Dose:  600 mg   Take 2 capsules (600 mg) by mouth 2 times daily   Quantity:  120 capsule   Refills:  5       hydrochlorothiazide 12.5 MG Tabs tablet   Used for:  Essential hypertension        Dose:  12.5 mg   Take 1 tablet (12.5 mg) by mouth  daily   Quantity:  90 tablet   Refills:  1       hydrOXYzine 25 MG capsule   Commonly known as:  VISTARIL        TAKE 1 TO 2 CAPSULES (25MG TO 50MG) BY MOUTH EVERY 4 TO 6 HOURS WITH PAIN MED FOR PAIN, NAUSEA, ITCHING   Refills:  1       levothyroxine 150 MCG tablet   Commonly known as:  SYNTHROID/LEVOTHROID   Used for:  Hypothyroidism, unspecified type        Dose:  150 mcg   Take 1 tablet (150 mcg) by mouth daily BRAND NAME ONLY.  Hold on file until needed.   Quantity:  90 tablet   Refills:  3       magnesium hydroxide 400 MG/5ML suspension   Commonly known as:  MILK OF MAGNESIA        Dose:  400 mg/kg/day   Take 400 mg/kg/day by mouth daily as needed for constipation or heartburn   Refills:  0       MULTIPLE VITAMIN PO        1 daily   Refills:  0       pantoprazole 20 MG EC tablet   Commonly known as:  PROTONIX   Used for:  Gastroesophageal reflux disease without esophagitis        Dose:  40 mg   Take 2 tablets (40 mg) by mouth daily Hold on file until needed   Quantity:  180 tablet   Refills:  3       pyridoxine 100 MG tablet   Commonly known as:  VITAMIN B-6        Dose:  100 mg   Take 100 mg by mouth daily.   Refills:  0       simvastatin 20 MG tablet   Commonly known as:  ZOCOR   Used for:  Hyperlipidemia LDL goal <100        Dose:  20 mg   Take 1 tablet (20 mg) by mouth At Bedtime Hold on file until needed   Quantity:  90 tablet   Refills:  3       tolterodine 4 MG 24 hr capsule   Commonly known as:  DETROL LA   Used for:  Urinary urgency        Take 1 capsule (4 mg) by mouth daily   Quantity:  90 capsule   Refills:  1       traZODone 100 MG tablet   Commonly known as:  DESYREL   Used for:  Insomnia, unspecified        1 tablets at bedtime as needed.  Hold on file until needed   Quantity:  90 tablet   Refills:  3         STOP taking     methocarbamol 500 MG tablet   Commonly known as:  ROBAXIN                Where to get your medicines      These medications were sent to [facility] -  [place], [place] - [address]  [address] [place] [address]     Phone:  [phone]     cephALEXin 500 MG capsule         Some of these will need a paper prescription and others can be bought over the counter. Ask your nurse if you have questions.     Bring a paper prescription for each of these medications     diazepam 2 MG tablet    HYDROmorphone 2 MG tablet       You don't need a prescription for these medications     aspirin 81 MG EC tablet               ANTIBIOTIC INSTRUCTION     You've Been Prescribed an Antibiotic - Now What?  Your healthcare team thinks that you or your loved one might have an infection. Some infections can be treated with antibiotics, which are powerful, life-saving drugs. Like all medications, antibiotics have side effects and should only be used when necessary. There are some important things you should know about your antibiotic treatment.      Your healthcare team may run tests before you start taking an antibiotic.    Your team may take samples (e.g., from your blood, urine or other areas) to run tests to look for bacteria. These test can be important to determine if you need an antibiotic at all and, if you do, which antibiotic will work best.      Within a few days, your healthcare team might change or even stop your antibiotic.    Your team may start you on an antibiotic while they are working to find out what is making you sick.    Your team might change your antibiotic because test results show that a different antibiotic would be better to treat your infection.    In some cases, once your team has more information, they learn that you do not need an antibiotic at all. They may find out that you don't have an infection, or that the antibiotic you're taking won't work against your infection. For example, an infection caused by a virus can't be treated with antibiotics. Staying on an antibiotic when you don't need it is more likely to be harmful than helpful.       You may experience side effects from your antibiotic.    Like all medications, antibiotics have side effects. Some of these can be serious.    Let you healthcare team know if you have any known allergies when you are admitted to the hospital.    One significant side effect of nearly all antibiotics is the risk of severe and sometimes deadly diarrhea caused by Clostridium difficile (C. Difficile). This occurs when a person takes antibiotics because some good germs are destroyed. Antibiotic use allows C. diificile to take over, putting patients at high risk for this serious infection.    As a patient or caregiver, it is important to understand your or your loved one's antibiotic treatment. It is especially important for caregivers to speak up when patients can't speak for themselves. Here are some important questions to ask your healthcare team.    What infection is this antibiotic treating and how do you know I have that infection?    What side effects might occur from this antibiotic?    How long will I need to take this antibiotic?    Is it safe to take this antibiotic with other medications or supplements (e.g., vitamins) that I am taking?     Are there any special directions I need to know about taking this antibiotic? For example, should I take it with food?    How will I be monitored to know whether my infection is responding to the antibiotic?    What tests may help to make sure the right antibiotic is prescribed for me?      Information provided by:  www.cdc.gov/getsmart  U.S. Department of Health and Human Services  Centers for disease Control and Prevention  National Center for Emerging and Zoonotic Infectious Diseases  Division of Healthcare Quality Promotion         Protect others around you: Learn how to safely use, store and throw away your medicines at www.disposemymeds.org.             Medication List: This is a list of all your medications and when to take them. Check marks below indicate your daily  home schedule. Keep this list as a reference.      Medications           Morning Afternoon Evening Bedtime As Needed    ALEVE PO   Take 2 tablets by mouth 2 times daily as needed                                aspirin 81 MG EC tablet   Take 1 tablet (81 mg) by mouth daily   Start taking on:  11/23/2017                                baclofen 10 MG tablet   Commonly known as:  LIORESAL   Take 1-2 tablets (10-20mg) by mouth in the evening as needed                                calcium + D 600-200 MG-UNIT Tabs   1 TABLET DAILY twice daily   Generic drug:  calcium carbonate-vitamin D                                cephALEXin 500 MG capsule   Commonly known as:  KEFLEX   Take 1 capsule (500 mg) by mouth 4 times daily                                CO Q 10 PO   Take  by mouth.                                diazepam 2 MG tablet   Commonly known as:  VALIUM   Take 1 tablet (2 mg) by mouth every 6 hours as needed for anxiety or sleep                                fluticasone 50 MCG/ACT spray   Commonly known as:  FLONASE   Spray 2 sprays into both nostrils daily Hold on file until needed                                folic acid 20 MG Caps   Take 1 tablet by mouth daily                                gabapentin 300 MG capsule   Commonly known as:  NEURONTIN   Take 2 capsules (600 mg) by mouth 2 times daily                                hydrochlorothiazide 12.5 MG Tabs tablet   Take 1 tablet (12.5 mg) by mouth daily                                HYDROmorphone 2 MG tablet   Commonly known as:  DILAUDID   Take 1 tablet (2 mg) by mouth every 4 hours as needed for severe pain                                hydrOXYzine 25 MG capsule   Commonly known as:  VISTARIL   TAKE 1 TO 2 CAPSULES (25MG TO 50MG) BY MOUTH EVERY 4 TO 6 HOURS WITH PAIN MED FOR PAIN, NAUSEA, ITCHING                                levothyroxine 150 MCG tablet   Commonly known as:  SYNTHROID/LEVOTHROID   Take 1 tablet (150 mcg) by mouth daily BRAND NAME ONLY.   Hold on file until needed.                                magnesium hydroxide 400 MG/5ML suspension   Commonly known as:  MILK OF MAGNESIA   Take 400 mg/kg/day by mouth daily as needed for constipation or heartburn                                MULTIPLE VITAMIN PO   1 daily                                pantoprazole 20 MG EC tablet   Commonly known as:  PROTONIX   Take 2 tablets (40 mg) by mouth daily Hold on file until needed                                pyridoxine 100 MG tablet   Commonly known as:  VITAMIN B-6   Take 100 mg by mouth daily.                                simvastatin 20 MG tablet   Commonly known as:  ZOCOR   Take 1 tablet (20 mg) by mouth At Bedtime Hold on file until needed                                tolterodine 4 MG 24 hr capsule   Commonly known as:  DETROL LA   Take 1 capsule (4 mg) by mouth daily                                traZODone 100 MG tablet   Commonly known as:  DESYREL   1 tablets at bedtime as needed.  Hold on file until needed

## 2017-11-21 NOTE — DISCHARGE INSTRUCTIONS
Lumbar Spine Surgery Discharge Instructions    Your surgery was: L4-5 decompression bilateral, left L5-S1 foraminotomy    Your surgeon is: Jesse Dueñas MD    Restrictions after lumbar surgery:  - No bending or twisting beyond that needed to get in and out of bed/chair, a car, or other similar activities.    - No lifting greater than 10 lbs (approximately what 1 gallon of milk weighs) until you are instructed that it is okay in your clinic follow-up visits.    - You should not drive a car or operative machinery if you are taking prescribed narcotic pain medication    - If you take blood thinner medications such as Aspirin, Plavix, Coumadin/Warfarin, Lovenox, Xarelto, or other similar medications please discuss with your surgeon when these can be restarted.    Wound Care Instructions: Leave steri strips on until they fall off on their own or until you return to clinic 2 weeks postop. It is okay to shower and get your wound wet, just do not let the water spray directly on your incision. Do not soak in a bathtub or swimming pool until you are told it is okay to do so when you return to clinic    - Walking is your main physical therapy for now - we generally will not order PT unless it is determined at a later date in clinic that this is necessary. You should generally try to do at least short walks several times daily.    - If you are prescribed narcotic pain medications (Oxycodone, Norco, Percocet, etc) then you should try to wean off of them as tolerated. These are an AS NEEDED medication, so if you are not having significant pain you should try to take fewer pills at a time or spread them out over a longer period of time than is prescribed.    - If your pain pill contains Tylenol (i.e. Percocet, Norco) and you are also taking additional Tylenol/acetaminophen as a pain medication, ensure that you are not taking too much tylenol. Prescription narcotic medications that contain Tylenol usually have 325mg of Tylenol per  pill and over-the-counter medications have variable dose of Tylenol. You should not exceed 4,000mg of Tylenol in a 24-hour period.    Call the office if you have any questions/concerns or are experiencing the following:  - increasing drainage from the incision  - foul-smelling or malodorous drainage from the incision  - increasing pain not controlled by your prescribed medications  - inability to urinate  - new onset of weakness, numbness or severe pain in the extremities  - bowel/bladder incontinence  - Fever greater than 101.5 degrees  - Nausea/vomitting causing inability to eat food or medications    During normal business hours 7:30AM to 5:00PM on Monday-Friday you can reach my clinical assistant Josi at [phone] and after hours you can reach the call-center for on-call physician at [phone]                      Same Day Surgery Discharge Instructions  Special Precautions After Surgery - Adult    1. It is not unusual to feel lightheaded or faint, up to 24 hours after surgery or while taking pain medication.  If you have these symptoms; sit for a few minutes before standing and have someone assist you when getting up.  2. You should rest and relax for the next 24 hours and must have someone stay with you for at least 24 hours after your discharge.  3. DO NOT DRIVE any vehicle or operate mechanical equipment for 24 hours following the end of your surgery.  DO NOT DRIVE while taking narcotic pain medications that have been prescribed by your physician.  If you had a limb operated on, you must be able to use it fully to drive.  4. DO NOT drink alcoholic beverages for 24 hours following surgery or while taking prescription pain medication.  5. Drink clear liquids (apple juice, ginger ale, broth, 7-Up, etc.).  Progress to your regular diet as you feel able.  6. Any questions call your physician and do not make important decisions for 24 hours.    Start your pain pill and valium when needed.            Same Day Surgery [phone], Monday thru Friday 6am-9pm.    Break through Bleeding  As instructed per Surgeon or Nurse.    Post Op Infection  Be alert for signs of infection: redness, swelling, heat, drainage of pus, and/or elevated temperature.  Contact your surgeon if these occur.    Nausea   If post op nausea occurs, at first rest your stomach for a few hours by eating nothing solid and sipping only clear liquids.  Call your Surgeon if nausea does not resolve in 24 hours.

## 2017-11-21 NOTE — IP AVS SNAPSHOT
[facility] PreOP/Phase II    [address] [address]    Phone:  [phone]    Fax:  [phone]                                       After Visit Summary   11/21/2017    Briana Mcgee    MRN: [id number]           After Visit Summary Signature Page     I have received my discharge instructions, and my questions have been answered. I have discussed any challenges I see with this plan with the nurse or doctor.    ..........................................................................................................................................  Patient/Patient Representative Signature      ..........................................................................................................................................  Patient Representative Print Name and Relationship to Patient    ..................................................               ................................................  Date                                            Time    ..........................................................................................................................................  Reviewed by Signature/Title    ...................................................              ..............................................  Date                                                            Time
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
## 2017-12-01 ENCOUNTER — MYC REFILL (OUTPATIENT)
Dept: FAMILY MEDICINE | Facility: CLINIC | Age: 72
End: 2017-12-01

## 2017-12-01 DIAGNOSIS — R39.15 URINARY URGENCY: ICD-10-CM

## 2017-12-01 RX ORDER — TOLTERODINE 4 MG/1
4 CAPSULE, EXTENDED RELEASE ORAL DAILY
Qty: 90 CAPSULE | Refills: 3 | Status: SHIPPED | OUTPATIENT
Start: 2017-12-01 | End: 2018-05-01

## 2017-12-01 NOTE — TELEPHONE ENCOUNTER
Message from [email or web address]:  Original authorizing provider: MD Briana Villaseñor would like a refill of the following medications:  tolterodine (DETROL LA) 4 MG 24 hr capsule [Xavier Vega MD]    Preferred pharmacy: [facility]-[address]    Comment:

## 2017-12-01 NOTE — TELEPHONE ENCOUNTER
Prescription approved per [facility] Refill Protocol.  Blossom MARCH RN

## 2017-12-14 ENCOUNTER — MYC MEDICAL ADVICE (OUTPATIENT)
Dept: FAMILY MEDICINE | Facility: CLINIC | Age: 72
End: 2017-12-14

## 2017-12-14 ENCOUNTER — MYC REFILL (OUTPATIENT)
Dept: FAMILY MEDICINE | Facility: CLINIC | Age: 72
End: 2017-12-14

## 2017-12-14 DIAGNOSIS — I10 ESSENTIAL HYPERTENSION: ICD-10-CM

## 2017-12-14 RX ORDER — HYDROCHLOROTHIAZIDE 12.5 MG/1
12.5 TABLET ORAL DAILY
Qty: 90 TABLET | Refills: 1 | Status: CANCELLED | OUTPATIENT
Start: 2017-12-14

## 2017-12-14 NOTE — TELEPHONE ENCOUNTER
Message from [employer]:  Original authorizing provider: DAGO Card would like a refill of the following medications:  hydrochlorothiazide 12.5 MG TABS tablet [Deanna Becerra NP]    Preferred pharmacy: [facility]-[address]    Comment:  I need My prescription refilled

## 2017-12-15 RX ORDER — HYDROCHLOROTHIAZIDE 12.5 MG/1
TABLET ORAL
Qty: 90 TABLET | Refills: 0 | Status: SHIPPED | OUTPATIENT
Start: 2017-12-15 | End: 2018-03-12

## 2018-01-09 DIAGNOSIS — N18.2 CKD (CHRONIC KIDNEY DISEASE) STAGE 2, GFR 60-89 ML/MIN: ICD-10-CM

## 2018-01-09 DIAGNOSIS — E03.9 HYPOTHYROIDISM, UNSPECIFIED TYPE: ICD-10-CM

## 2018-01-09 DIAGNOSIS — I10 ESSENTIAL HYPERTENSION: ICD-10-CM

## 2018-01-09 LAB
ANION GAP SERPL CALCULATED.3IONS-SCNC: 6 MMOL/L (ref 3–14)
BUN SERPL-MCNC: 15 MG/DL (ref 7–30)
CALCIUM SERPL-MCNC: 9.1 MG/DL (ref 8.5–10.1)
CHLORIDE SERPL-SCNC: 99 MMOL/L (ref 94–109)
CO2 SERPL-SCNC: 30 MMOL/L (ref 20–32)
CREAT SERPL-MCNC: 0.83 MG/DL (ref 0.52–1.04)
GFR SERPL CREATININE-BSD FRML MDRD: 67 ML/MIN/1.7M2
GLUCOSE SERPL-MCNC: 112 MG/DL (ref 70–99)
POTASSIUM SERPL-SCNC: 3.7 MMOL/L (ref 3.4–5.3)
SODIUM SERPL-SCNC: 135 MMOL/L (ref 133–144)
T4 FREE SERPL-MCNC: 1.31 NG/DL (ref 0.76–1.46)
TSH SERPL DL<=0.005 MIU/L-ACNC: 1.84 MU/L (ref 0.4–4)

## 2018-01-09 PROCEDURE — 84439 ASSAY OF FREE THYROXINE: CPT | Performed by: FAMILY MEDICINE

## 2018-01-09 PROCEDURE — 84443 ASSAY THYROID STIM HORMONE: CPT | Performed by: FAMILY MEDICINE

## 2018-01-09 PROCEDURE — 36415 COLL VENOUS BLD VENIPUNCTURE: CPT | Performed by: FAMILY MEDICINE

## 2018-01-09 PROCEDURE — 80048 BASIC METABOLIC PNL TOTAL CA: CPT | Performed by: FAMILY MEDICINE

## 2018-02-08 ENCOUNTER — HOSPITAL ENCOUNTER (OUTPATIENT)
Dept: ULTRASOUND IMAGING | Facility: CLINIC | Age: 73
End: 2018-02-08
Attending: INTERNAL MEDICINE
Payer: MEDICARE

## 2018-02-08 ENCOUNTER — HOSPITAL ENCOUNTER (OUTPATIENT)
Dept: MAMMOGRAPHY | Facility: CLINIC | Age: 73
Discharge: HOME | End: 2018-02-08
Attending: INTERNAL MEDICINE | Admitting: INTERNAL MEDICINE
Payer: MEDICARE

## 2018-02-08 ENCOUNTER — ONCOLOGY VISIT (OUTPATIENT)
Dept: ONCOLOGY | Facility: CLINIC | Age: 73
End: 2018-02-08
Attending: INTERNAL MEDICINE
Payer: COMMERCIAL

## 2018-02-08 VITALS
SYSTOLIC BLOOD PRESSURE: 163 MMHG | HEIGHT: 65 IN | DIASTOLIC BLOOD PRESSURE: 78 MMHG | OXYGEN SATURATION: 96 % | HEART RATE: 54 BPM | RESPIRATION RATE: 16 BRPM | WEIGHT: 204.6 LBS | TEMPERATURE: 97.4 F | BODY MASS INDEX: 34.09 KG/M2

## 2018-02-08 DIAGNOSIS — N63.10 LUMP OF BREAST, RIGHT: ICD-10-CM

## 2018-02-08 DIAGNOSIS — N63.11 UNSPECIFIED LUMP IN THE RIGHT BREAST, UPPER OUTER QUADRANT: ICD-10-CM

## 2018-02-08 DIAGNOSIS — N63.10 LUMP OF BREAST, RIGHT: Primary | ICD-10-CM

## 2018-02-08 DIAGNOSIS — Z85.3 PERSONAL HISTORY OF MALIGNANT NEOPLASM OF BREAST: ICD-10-CM

## 2018-02-08 PROCEDURE — 76642 ULTRASOUND BREAST LIMITED: CPT | Mod: RT

## 2018-02-08 PROCEDURE — G0463 HOSPITAL OUTPT CLINIC VISIT: HCPCS

## 2018-02-08 PROCEDURE — G0279 TOMOSYNTHESIS, MAMMO: HCPCS

## 2018-02-08 PROCEDURE — 99214 OFFICE O/P EST MOD 30 MIN: CPT | Performed by: INTERNAL MEDICINE

## 2018-02-08 ASSESSMENT — PAIN SCALES - GENERAL: PAINLEVEL: MODERATE PAIN (5)

## 2018-02-08 NOTE — PROGRESS NOTES
"CHIEF COMPLAINT AND REASON FOR VISIT: Breast cancer followup.   HISTORY OF PRESENT ILLNESS: Briana Mcgee was diagnosed with left breast cancer, stage IIB, T2N1M0, ER positive infiltrating ductal cancer in 2005, status post lumpectomy. Primary tumor was 2.4 cm, grade 3 lesion with angiolymphatic invasion, ER/CT positive, HER-2 negative. Margins were clear. Status post 4 cycles of AC followed by 4 cycles of Taxol, then radiation.   She was on antihormonal therapy from end of 2005 and then initially started with Arimidex and could not tolerate it, switched to Aromasin and then dropped it in the later part of 2009. She has been on followup since then.     PAST MEDICAL HISTORY: GERD, severe reflux disease, osteoarthritis, hot flashes, stroke in 2006, insomnia, hyperlipidemia, hypothyroidism. RA diagnosed in 2014 off all tx by herself.     MEDICATIONS: Reviewed in Epic system.     ALLERGIES: Erythromycin.     SOCIAL HISTORY: She lives in [place]. Denies smoking or alcohol abuse.     FAMILY HISTORY: Denied any family history of breast or ovarian cancer.       REVIEW OF SYSTEMS:   arthritis on her feet, she self d/c all her RA meds.   She reports new right breast lump, painful.    She had compression fracture summer 2017. She had local injection. She has severe LBP and also suffers from carpel tunnel.     PHYSICAL EXAMINATION:   VITAL SIGNS: Blood pressure 163/78, pulse 54, temperature 97.4  F (36.3  C), temperature source Tympanic, resp. rate 16, height 1.657 m (5' 5.25\"), weight 92.8 kg (204 lb 9.6 oz), SpO2 96 %, not currently breastfeeding.  GENERAL APPEARANCE: A middle-aged woman, looks much younger than her stated age, not in distress.   HEENT: The patient is normocephalic, atraumatic. Pupils are equal, react to light. Sclerae are anicteric. Moist oral mucosa. Negative pharynx. No oral thrush. + sinus congestion.   NECK: Supple. No jugular venous distention. Thyroid is not palpable.   LYMPH NODES: Superficial " lymphadenopathy is not appreciable in the bilateral cervical, supraclavicular, axillary or inguinal adenopathy.   CARDIOVASCULAR: S1, S2 regular, with no murmurs or gallops. No carotid or abdominal bruits.   PULMONARY: Lungs are clear to auscultation and percussion bilaterally. There is no wheezing or rhonchi.   GASTROINTESTINAL: Abdomen is soft, nontender. No hepatosplenomegaly. No signs of ascites. No mass appreciable.   MUSCULOSKELETAL AND EXTREMITIES: No edema. No cyanotic changes. No signs of joint deformity. No lymphedema.   NEUROLOGIC: Cranial nerves II through XII are grossly intact. Sensation intact. Muscle strength and muscle tone symmetrical all through, 5/5.   BACK: No spinal or paraspinal tenderness. No CVA tenderness.   SKIN: No petechiae. No rash. No signs of cellulitis.   BREASTS: Bilateral breast exam review: Left lumpectomy scar, well healed. Right nipple is flat and inverted as baseline. That is always the case, runs in her family, according to the patient.   She has thickening area at 12 o'clock, 2-4 cm from the nipple.     CURRENT LAB DATA REVIEWED  Cbc diff/LFT/marker: fine    CURRENT IMAGE REVIEWED  MA 10/2017: negative    MRI L spine 8/2017  1. L4 inferior endplate compression fracture. Adjacent marrow edema suggests that this is either acute/subacute or associated with ongoing instability.  2. L4-L5 severe central stenosis.  3. Multilevel degenerative disc disease, most advanced at L5-S1 where there is mild discogenic endplate reactive marrow edema.  4. Multilevel foraminal stenosis as above.    dexa 6/2016: negative    OLD DATA REVIEW IN SUMMARY:   chest x-ray from 01/2009, question possible pulmonary nodules. Followup CT chest in 02/2009 was reassuring. The last screening mammogram from 09/2008 looks similar to this one, benign BI-RADS 2 reading.     ASSESSMENT AND PLAN:   1. Lymph nodes positive high-grade left breast cancer 2005, status post lumpectomy and chemoradiation therapy,  finished about 4 years of antihormonal therapy, could not tolerate it well, currently on surveillance visit.   We talked about the ER+ breast cancer recurrence pattern.   She is on continuous followup with us because of the features of her breast cancer on yrly basis.     2. She is battling with severe back pain from DJD. She is seeing her old spine surgeon and open for surgery.     3. New painful right breast lumpy area - could be fibrocystic disease. Recommend Dx right MA asap.

## 2018-02-08 NOTE — LETTER
"    2/8/2018         RE: Briana Mcgee  [address] [address]        Dear Colleague,    Thank you for referring your patient, Briana Mcgee, to the [facility]. Please see a copy of my visit note below.    CHIEF COMPLAINT AND REASON FOR VISIT: Breast cancer followup.   HISTORY OF PRESENT ILLNESS: Briana Mcgee was diagnosed with left breast cancer, stage IIB, T2N1M0, ER positive infiltrating ductal cancer in 2005, status post lumpectomy. Primary tumor was 2.4 cm, grade 3 lesion with angiolymphatic invasion, ER/TN positive, HER-2 negative. Margins were clear. Status post 4 cycles of AC followed by 4 cycles of Taxol, then radiation.   She was on antihormonal therapy from end of 2005 and then initially started with Arimidex and could not tolerate it, switched to Aromasin and then dropped it in the later part of 2009. She has been on followup since then.     PAST MEDICAL HISTORY: GERD, severe reflux disease, osteoarthritis, hot flashes, stroke in 2006, insomnia, hyperlipidemia, hypothyroidism. RA diagnosed in 2014 off all tx by herself.     MEDICATIONS: Reviewed in Epic system.     ALLERGIES: Erythromycin.     SOCIAL HISTORY: She lives in [place]. Denies smoking or alcohol abuse.     FAMILY HISTORY: Denied any family history of breast or ovarian cancer.       REVIEW OF SYSTEMS:   arthritis on her feet, she self d/c all her RA meds.   She reports new right breast lump, painful.    She had compression fracture summer 2017. She had local injection. She has severe LBP and also suffers from carpel tunnel.     PHYSICAL EXAMINATION:   VITAL SIGNS: Blood pressure 163/78, pulse 54, temperature 97.4  F (36.3  C), temperature source Tympanic, resp. rate 16, height 1.657 m (5' 5.25\"), weight 92.8 kg (204 lb 9.6 oz), SpO2 96 %, not currently breastfeeding.  GENERAL APPEARANCE: A middle-aged woman, looks much younger than her stated age, not in distress.   HEENT: The patient is " normocephalic, atraumatic. Pupils are equal, react to light. Sclerae are anicteric. Moist oral mucosa. Negative pharynx. No oral thrush. + sinus congestion.   NECK: Supple. No jugular venous distention. Thyroid is not palpable.   LYMPH NODES: Superficial lymphadenopathy is not appreciable in the bilateral cervical, supraclavicular, axillary or inguinal adenopathy.   CARDIOVASCULAR: S1, S2 regular, with no murmurs or gallops. No carotid or abdominal bruits.   PULMONARY: Lungs are clear to auscultation and percussion bilaterally. There is no wheezing or rhonchi.   GASTROINTESTINAL: Abdomen is soft, nontender. No hepatosplenomegaly. No signs of ascites. No mass appreciable.   MUSCULOSKELETAL AND EXTREMITIES: No edema. No cyanotic changes. No signs of joint deformity. No lymphedema.   NEUROLOGIC: Cranial nerves II through XII are grossly intact. Sensation intact. Muscle strength and muscle tone symmetrical all through, 5/5.   BACK: No spinal or paraspinal tenderness. No CVA tenderness.   SKIN: No petechiae. No rash. No signs of cellulitis.   BREASTS: Bilateral breast exam review: Left lumpectomy scar, well healed. Right nipple is flat and inverted as baseline. That is always the case, runs in her family, according to the patient.   She has thickening area at 12 o'clock, 2-4 cm from the nipple.     CURRENT LAB DATA REVIEWED  Cbc diff/LFT/marker: fine    CURRENT IMAGE REVIEWED  MA 10/2017: negative    MRI L spine 8/2017  1. L4 inferior endplate compression fracture. Adjacent marrow edema suggests that this is either acute/subacute or associated with ongoing instability.  2. L4-L5 severe central stenosis.  3. Multilevel degenerative disc disease, most advanced at L5-S1 where there is mild discogenic endplate reactive marrow edema.  4. Multilevel foraminal stenosis as above.    dexa 6/2016: negative    OLD DATA REVIEW IN SUMMARY:   chest x-ray from 01/2009, question possible pulmonary nodules. Followup CT chest in 02/2009  was reassuring. The last screening mammogram from 09/2008 looks similar to this one, benign BI-RADS 2 reading.     ASSESSMENT AND PLAN:   1. Lymph nodes positive high-grade left breast cancer 2005, status post lumpectomy and chemoradiation therapy, finished about 4 years of antihormonal therapy, could not tolerate it well, currently on surveillance visit.   We talked about the ER+ breast cancer recurrence pattern.   She is on continuous followup with us because of the features of her breast cancer on yrly basis.     2. She is battling with severe back pain from DJD. She is seeing her old spine surgeon and open for surgery.     3. New painful right breast lumpy area - could be fibrocystic disease. Recommend Dx right MA asap.           Again, thank you for allowing me to participate in the care of your patient.        Sincerely,        Merari Duarte MD, MD

## 2018-02-08 NOTE — PATIENT INSTRUCTIONS
Dr. Duarte would like you to have a Mammogram and Ultrasound. We will call you with results. When you are in need of a refill, please call your pharmacy and they will send us a request.  Copy of appointments, and after visit summary (AVS) given to patient.  If you have any questions please call Lotus Eng RN, BSN Oncology Hematology  [facility] [phone]. For questions after business hours, or on holidays/weekends, please call our after hours Nurse Triage line [phone]. Thank you.           Dx right MA asap.

## 2018-02-08 NOTE — MR AVS SNAPSHOT
"              After Visit Summary   2/8/2018    Briana Mcgee    MRN: [id number]           Patient Information     Date Of Birth          1945        Visit Information        Provider Department      2/8/2018 8:30 AM Merari Duarte MD [facility]        Today's Diagnoses     Lump of breast, right    -  1    Personal history of malignant neoplasm of breast          Care Instructions    Dr. Duarte would like you to have a Mammogram and Ultrasound. We will call you with results. When you are in need of a refill, please call your pharmacy and they will send us a request.  Copy of appointments, and after visit summary (AVS) given to patient.  If you have any questions please call Lotus Eng RN, BSN Oncology Hematology  [facility] [phone]. For questions after business hours, or on holidays/weekends, please call our after hours Nurse Triage line [phone]. Thank you.           Dx right MA asap.           Follow-ups after your visit        Your next 10 appointments already scheduled     Oct 22, 2018  9:15 AM CDT   MA SCREENING BILATERAL W/ JESSICA with [facility] [facility] ([facility])    [address] [facility] [address] [phone]           Three-dimensional (3D) mammograms are available at [place] locations in [facility], [place], [place], [facility], [place], [place], and [place]. -[facility] locations include [place] and [facility] in [place]. Benefits of 3D mammograms include: - Improved rate of cancer detection - Decreases your chance of having to go back for more tests, which means fewer: - \"False-positive\" results (This means that there is an abnormal area but it isn't cancer.) - Invasive testing procedures, such as a biopsy or surgery - Can provide clearer images of the breast if you have dense breast tissue. 3D mammography is an optional exam that anyone can have with a 2D " mammogram. It doesn't replace or take the place of a 2D mammogram. 2D mammograms remain an effective screening test for all women.  Not all insurance companies cover the cost of a 3D mammogram. Check with your insurance.            Oct 22, 2018  1:00 PM CDT   LAB with [facility]   [facility]    [address] [facility] [address]   [phone]           Please do not eat 10-12 hours before your appointment if you are coming in fasting for labs on lipids, cholesterol, or glucose (sugar). This does not apply to pregnant women. Water, hot tea and black coffee (with nothing added) are okay. Do not drink other fluids, diet soda or chew gum.            Oct 29, 2018  9:30 AM CDT   Return Visit with Merari Duarte MD   [facility] ([facility])    [facility]  [address]  [facility] [address]   [phone]              Future tests that were ordered for you today     Open Future Orders        Priority Expected Expires Ordered    US Breast Right Complete 4 Quadrants Routine 2/8/2018 2/8/2019 2/8/2018    MA Diagnostic Digital Right Routine  2/8/2019 2/8/2018            Who to contact     If you have questions or need follow up information about today's clinic visit or your schedule please contact [facility] directly at [phone].  Normal or non-critical lab and imaging results will be communicated to you by MyChart, letter or phone within 4 business days after the clinic has received the results. If you do not hear from us within 7 days, please contact the clinic through MyChart or phone. If you have a critical or abnormal lab result, we will notify you by phone as soon as possible.  Submit refill requests through [employer] or call your pharmacy and they will forward the refill request to us. Please allow 3 business days for your refill to be completed.          Additional Information About Your Visit       "  [employer] Information     [employer] gives you secure access to your electronic health record. If you see a primary care provider, you can also send messages to your care team and make appointments. If you have questions, please call your primary care clinic.  If you do not have a primary care provider, please call [phone] and they will assist you.        Care EveryWhere ID     This is your Care EveryWhere ID. This could be used by other organizations to access your [place] medical records  [id number]        Your Vitals Were     Pulse Temperature Respirations Height Pulse Oximetry Breastfeeding?    54 97.4  F (36.3  C) (Tympanic) 16 1.657 m (5' 5.25\") 96% No    BMI (Body Mass Index)                   33.79 kg/m2            Blood Pressure from Last 3 Encounters:   02/08/18 163/78   11/21/17 134/48   11/14/17 128/64    Weight from Last 3 Encounters:   02/08/18 92.8 kg (204 lb 9.6 oz)   11/21/17 95.3 kg (210 lb)   11/14/17 95.3 kg (210 lb)                 Today's Medication Changes          These changes are accurate as of 2/8/18  9:04 AM.  If you have any questions, ask your nurse or doctor.               These medicines have changed or have updated prescriptions.        Dose/Directions    fluticasone 50 MCG/ACT spray   Commonly known as:  FLONASE   This may have changed:    - when to take this  - reasons to take this  - additional instructions   Used for:  Other seasonal allergic rhinitis        Dose:  2 spray   Spray 2 sprays into both nostrils daily Hold on file until needed   Quantity:  16 g   Refills:  11                Primary Care Provider Office Phone # Fax #    Xavier Vega [phone] [address] [facility] [address]        Equal Access to Services     GABRIEL HODGES AH: Sung Shafer, suzan armenta, hunter luz. So [facility] [phone].    ATENCIÓN: Si habla español, tiene a bright disposición servicios " joan de asistencia lingüística. Liliane valdez [phone].    We comply with applicable federal civil rights laws and [place] laws. We do not discriminate on the basis of race, color, national origin, age, disability, sex, sexual orientation, or gender identity.            Thank you!     Thank you for choosing [facility]  for your care. Our goal is always to provide you with excellent care. Hearing back from our patients is one way we can continue to improve our services. Please take a few minutes to complete the written survey that you may receive in the mail after your visit with us. Thank you!             Your Updated Medication List - Protect others around you: Learn how to safely use, store and throw away your medicines at www.disposemymeds.org.          This list is accurate as of 2/8/18  9:04 AM.  Always use your most recent med list.                   Brand Name Dispense Instructions for use Diagnosis    ALEVE PO      Take 2 tablets by mouth 2 times daily as needed        aspirin 81 MG EC tablet     90 tablet    Take 1 tablet (81 mg) by mouth daily    Heterozygous factor V Leiden mutation (H)       calcium + D 600-200 MG-UNIT Tabs   Generic drug:  calcium carbonate-vitamin D      1 TABLET DAILY twice daily        CO Q 10 PO      Take  by mouth.        fluticasone 50 MCG/ACT spray    FLONASE    16 g    Spray 2 sprays into both nostrils daily Hold on file until needed    Other seasonal allergic rhinitis       folic acid 20 MG Caps      Take 1 tablet by mouth daily        hydrochlorothiazide 12.5 MG Tabs tablet     90 tablet    Take 1 tablet (12.5 mg) by mouth daily    Essential hypertension       levothyroxine 150 MCG tablet    SYNTHROID/LEVOTHROID    90 tablet    Take 1 tablet (150 mcg) by mouth daily BRAND NAME ONLY.  Hold on file until needed.    Hypothyroidism, unspecified type       magnesium hydroxide 400 MG/5ML suspension    MILK OF MAGNESIA     Take 400 mg/kg/day by mouth daily as needed  for constipation or heartburn        MULTIPLE VITAMIN PO      1 daily        pantoprazole 20 MG EC tablet    PROTONIX    180 tablet    Take 2 tablets (40 mg) by mouth daily Hold on file until needed    Gastroesophageal reflux disease without esophagitis       pyridoxine 100 MG tablet    VITAMIN B-6     Take 100 mg by mouth daily.        simvastatin 20 MG tablet    ZOCOR    90 tablet    Take 1 tablet (20 mg) by mouth At Bedtime Hold on file until needed    Hyperlipidemia LDL goal <100       tolterodine 4 MG 24 hr capsule    DETROL LA    90 capsule    Take 1 capsule (4 mg) by mouth daily    Urinary urgency       traZODone 100 MG tablet    DESYREL    90 tablet    1 tablets at bedtime as needed.  Hold on file until needed    Insomnia, unspecified

## 2018-02-08 NOTE — NURSING NOTE
"Oncology Rooming Note    February 8, 2018 8:32 AM   Briana Mcgee is a 72 year old female who presents for:    Chief Complaint   Patient presents with     Oncology Clinic Visit     Recheck Breast CA,      Initial Vitals: /78 (BP Location: Right arm, Patient Position: Sitting, Cuff Size: Adult Regular)  Pulse 54  Temp 97.4  F (36.3  C) (Tympanic)  Resp 16  Ht 1.657 m (5' 5.25\")  Wt 92.8 kg (204 lb 9.6 oz)  SpO2 96%  Breastfeeding? No  BMI 33.79 kg/m2 Estimated body mass index is 33.79 kg/(m^2) as calculated from the following:    Height as of this encounter: 1.657 m (5' 5.25\").    Weight as of this encounter: 92.8 kg (204 lb 9.6 oz). Body surface area is 2.07 meters squared.  Moderate Pain (5) Comment: Right    No LMP recorded. Patient has had a hysterectomy.  Allergies reviewed: Yes  Medications reviewed: Yes    Medications: Medication refills not needed today.  Pharmacy name entered into [employer]:      [facility] - [place], [place] - [address] [place] [address]    Clinical concerns: Recheck Breast CA, right Breat pain     Patient reports she has noticed about 2 weeks ago a tenderness in Right Breat.   7  minutes for nursing intake (face to face time)     Yas Mandujano CMA              "

## 2018-02-16 ENCOUNTER — HOSPITAL ENCOUNTER (OUTPATIENT)
Dept: PHYSICAL THERAPY | Facility: CLINIC | Age: 73
Setting detail: THERAPIES SERIES
End: 2018-02-16
Attending: PODIATRIST
Payer: MEDICARE

## 2018-02-16 PROCEDURE — 97161 PT EVAL LOW COMPLEX 20 MIN: CPT | Mod: GP | Performed by: PHYSICAL THERAPIST

## 2018-02-16 PROCEDURE — G8979 MOBILITY GOAL STATUS: HCPCS | Mod: GP,CI | Performed by: PHYSICAL THERAPIST

## 2018-02-16 PROCEDURE — G8978 MOBILITY CURRENT STATUS: HCPCS | Mod: GP,CJ | Performed by: PHYSICAL THERAPIST

## 2018-02-16 PROCEDURE — 97110 THERAPEUTIC EXERCISES: CPT | Mod: GP | Performed by: PHYSICAL THERAPIST

## 2018-02-16 PROCEDURE — 40000718 ZZHC STATISTIC PT DEPARTMENT ORTHO VISIT: Performed by: PHYSICAL THERAPIST

## 2018-02-16 NOTE — PROGRESS NOTES
[facility]          OUTPATIENT PHYSICAL THERAPY ORTHOPEDIC EVALUATION  PLAN OF TREATMENT FOR OUTPATIENT REHABILITATION  (COMPLETE FOR INITIAL CLAIMS ONLY)  Patient's Last Name, First Name, M.I.  YOB: 1945  Lula Mcgeeline  S    Provider s Name:  [facility]   Medical Record No.  [id number]   Start of Care Date:  02/16/18   Onset Date:  02/14/18   Type:     _X__PT   ___OT   ___SLP Medical Diagnosis:  lumbar pain     PT Diagnosis:  chronic low back pain   Visits from SOC:  1      _________________________________________________________________________________  Plan of Treatment/Functional Goals:  strengthening, stretching, ROM, neuromuscular re-education, manual therapy, balance training           Goals  Goal Identifier: 1  Goal Description: Patient will be able to return to senior yoga class without increases in pain  Target Date: 03/30/18    Goal Identifier: 2  Goal Description: Patient will be independant with HEP for long term self management  Target Date: 03/30/18    Goal Identifier: 3  Goal Description: Patient will be able to walk on treadmill 15 minutes without increase in pain  Target Date: 03/30/18       Therapy Frequency:  1 time/week  Predicted Duration of Therapy Intervention:  6 weeks    Deborah Carranza PT                 I CERTIFY THE NEED FOR THESE SERVICES FURNISHED UNDER        THIS PLAN OF TREATMENT AND WHILE UNDER MY CARE .             Physician Signature               Date    X_____________________________________________________                             Certification Date From:  02/16/18   Certification Date To:  04/13/18    Referring Provider:  Dr. Dueñas     Initial Assessment        See Epic Evaluation Start of Care Date: 02/16/18

## 2018-02-16 NOTE — PROGRESS NOTES
02/16/18 0800   General Information   Type of Visit Initial OP Ortho PT Evaluation   Start of Care Date 02/16/18   Referring Physician Spenser    Patient/Family Goals Statement return to doing yoga/working out   Orders Evaluate and Treat   Date of Order 02/14/18   Insurance Type Medicare   Medical Diagnosis lumbar pain   Surgical/Medical history reviewed Yes   Precautions/Limitations no known precautions/limitations   Body Part(s)   Body Part(s) Lumbar Spine/SI   Presentation and Etiology   Pertinent history of current problem (include personal factors and/or comorbidities that impact the POC) Pt had lumbar decompression surgery on November 22, 2017. Since surgery back pain has improved, however having pain in L hip and down L calf. Has been started on gabapentin. Pain gets worse throughout the day, and has some good days and some bad days. Pt reports PMH including history of breast cancer, high blood pressure, history of stroke and smoking.    Impairments A. Pain;G. Impaired balance;H. Impaired gait;J. Burning   Functional Limitations perform activities of daily living;perform desired leisure / sports activities   Symptom Location L hip, L calf   How/Where did it occur From Degenerative Joint Disease   Onset date of current episode/exacerbation 02/14/18   Chronicity Chronic   Pain rating (0-10 point scale) Best (/10);Worst (/10)   Best (/10) 1   Worst (/10) 5   Pain quality B. Dull;C. Aching;D. Burning   Frequency of pain/symptoms A. Constant   Pain/symptoms are: Worse during the night   Pain/symptoms exacerbated by B. Walking;C. Lifting;H. Overhead reach   Pain/symptoms eased by A. Sitting;C. Rest;I. OTC medication(s)   Progression of symptoms since onset: Unchanged   Current Level of Function   Patient role/employment history F. Retired   Fall Risk Screen   Have you fallen 2 or more times in the past year? No   Have you fallen and had an injury in the past year? No   Is patient a fall risk? No   Lumbar Spine/SI  Objective Findings   Integumentary Incision from November surgery normal scarring   Posture Forward head, protracted shoulders. Lacking TKE B   Balance/Proprioception (Single Leg Stance) unable B > 5 seconds   Flexion ROM Hands to knees, increased pain   Extension ROM Extension limited 50%   Right Side Bending ROM SB equivalent side to side   Hip Screen Hip ROM WFL, - SONYA   Hip Flexion (L2) Strength 4+/5   Hip Abduction Strength 4/5   Knee Flexion Strength 4+/5   Knee Extension (L3) Strength 4+/5   Ankle Dorsiflexion (L4) Strength 5/5   Ankle Plantar Flexion (S1) Strength 5/5   Hamstring Flexibility Minimal limitation   Hip Flexor Flexibility -   Quadricep Flexibility -   Piriformis Flexibility mild increase tightness L compared to R   SLR negative   Slump Test negative   Palpation ttp L upper glutes, lateral hip. Increased tone L paraspinals L3/4/5   Planned Therapy Interventions   Planned Therapy Interventions strengthening;stretching;ROM;neuromuscular re-education;manual therapy;balance training   Clinical Impression   Criteria for Skilled Therapeutic Interventions Met yes, treatment indicated   PT Diagnosis chronic low back pain   Influenced by the following impairments decreased ROM, pain, decreased strength   Functional limitations due to impairments decreased participation walking, stairs, lifting, standing, travel   Clinical Presentation Stable/Uncomplicated   Clinical Presentation Rationale motivated patient,    Clinical Decision Making (Complexity) Low complexity   Therapy Frequency 1 time/week   Predicted Duration of Therapy Intervention (days/wks) 6 weeks   Risk & Benefits of therapy have been explained Yes   Patient, Family & other staff in agreement with plan of care Yes   Clinical Impression Comments Pt presents 3 months s/p lumbar decompression surgery, continues to have chronic low back pain and pain consistant with Left L4/5 lumbar radiculopathy.    Education Assessment   Preferred Learning Style  Demonstration   Barriers to Learning No barriers   ORTHO GOALS   PT Ortho Eval Goals 1;2;3   Ortho Goal 1   Goal Identifier 1   Goal Description Patient will be able to return to senior yoga class without increases in pain   Target Date 03/30/18   Ortho Goal 2   Goal Identifier 2   Goal Description Patient will be independant with HEP for long term self management   Target Date 03/30/18   Ortho Goal 3   Goal Identifier 3   Goal Description Patient will be able to walk on treadmill 15 minutes without increase in pain   Target Date 03/30/18   Total Evaluation Time   Total Evaluation Time 25   Therapy Certification   Certification date from 02/16/18   Certification date to 04/13/18   Medical Diagnosis lumbar pain

## 2018-02-19 RX ORDER — LIDOCAINE HYDROCHLORIDE 10 MG/ML
15 INJECTION, SOLUTION EPIDURAL; INFILTRATION; INTRACAUDAL; PERINEURAL ONCE
Status: COMPLETED | OUTPATIENT
Start: 2018-02-20 | End: 2018-02-20

## 2018-02-19 NOTE — DISCHARGE INSTRUCTIONS
Breast Biopsy Care Instructions      Site Care:    You may have some discomfort in the breast and may see some bruising at the needle site.    You will be given an ice pack which should be kept in place over the dressing until bedtime tonight.Always keep a gauze pad between your skin and the ice pack.    Wearing your bra continuously for 24 hours post biopsy will give optimal support to the biopsy site.    Activity:       You may go back to your normal routine.     No heavy lifting for 24 hours.    Diet and Medications:       You may go back to your regular diet.     Tylenol is the best choice to relieve your discomfort, the first 24 hours. If you have no bleeding on the first day, Ibuprofen (such as Advil, or Motrin), may be taken on the second day after for pain.     Do not take aspirin for 72 hours following your biopsy.    Questions:     If you have any questions about your procedure performed in Ultrasound, you may contact us at [phone].If you have any questions about your procedure performed in Mammography, you may contact us at [phone].    Results:       The pathology report on your biopsy is usually available within 72 hours. The Radiologist or your personal physician will call you with the results.     You will receive information about any further follow up from your physician.    Call your doctor if you have:       More than slight bleeding or significant pain, or experience swelling of the breast.     If your physician is unavailable, please call the Nurse Advice Line at [phone], or [facility] Emergency Department at [phone].      Patient:______________________________  Time:_________  Date:_____________    Instructor:____________________________   Time:_________  Date:_____________

## 2018-02-20 ENCOUNTER — HOSPITAL ENCOUNTER (OUTPATIENT)
Dept: MAMMOGRAPHY | Facility: CLINIC | Age: 73
Discharge: HOME | End: 2018-02-20
Attending: INTERNAL MEDICINE | Admitting: INTERNAL MEDICINE
Payer: MEDICARE

## 2018-02-20 DIAGNOSIS — N63.11 MASS OF UPPER OUTER QUADRANT OF RIGHT BREAST: ICD-10-CM

## 2018-02-20 DIAGNOSIS — N63.0 BREAST NODULE: ICD-10-CM

## 2018-02-20 PROCEDURE — 19081 BX BREAST 1ST LESION STRTCTC: CPT | Mod: RT

## 2018-02-20 PROCEDURE — 25000125 ZZHC RX 250: Performed by: RADIOLOGY

## 2018-02-20 RX ADMIN — LIDOCAINE HYDROCHLORIDE 15 ML: 10 INJECTION, SOLUTION EPIDURAL; INFILTRATION; INTRACAUDAL; PERINEURAL at 09:18

## 2018-02-20 NOTE — IP AVS SNAPSHOT
[facility]    [address]    [facility] [address]    Phone:  [phone]    Fax:  [phone]                                       After Visit Summary   2/20/2018    Briana Mcgee    MRN: [id number]           After Visit Summary Signature Page     I have received my discharge instructions, and my questions have been answered. I have discussed any challenges I see with this plan with the nurse or doctor.    ..........................................................................................................................................  Patient/Patient Representative Signature      ..........................................................................................................................................  Patient Representative Print Name and Relationship to Patient    ..................................................               ................................................  Date                                            Time    ..........................................................................................................................................  Reviewed by Signature/Title    ...................................................              ..............................................  Date                                                            Time

## 2018-02-23 ENCOUNTER — HOSPITAL ENCOUNTER (OUTPATIENT)
Dept: PHYSICAL THERAPY | Facility: CLINIC | Age: 73
Setting detail: THERAPIES SERIES
End: 2018-02-23
Attending: ORTHOPAEDIC SURGERY
Payer: MEDICARE

## 2018-02-23 PROCEDURE — 97110 THERAPEUTIC EXERCISES: CPT | Mod: GP | Performed by: PHYSICAL THERAPIST

## 2018-02-23 PROCEDURE — 40000718 ZZHC STATISTIC PT DEPARTMENT ORTHO VISIT: Performed by: PHYSICAL THERAPIST

## 2018-02-26 DIAGNOSIS — C50.919 MALIGNANT NEOPLASM OF FEMALE BREAST (H): Primary | ICD-10-CM

## 2018-02-26 DIAGNOSIS — D24.1 BENIGN NEOPLASM OF RIGHT BREAST: ICD-10-CM

## 2018-02-26 NOTE — PROGRESS NOTES
Called and reviewed results with the patient per Dr. Duarte. Patient had no further questions or concerns at this time and also verbalized understanding. Direct line provided if any further questions/concerns arise.

## 2018-02-28 NOTE — PROGRESS NOTES
Outpatient Physical Therapy Discharge Note     Patient: Briana Mcgee  : 1945    Beginning/End Dates of Reporting Period:   to 17    Referring Provider: Triston Cruz Diagnosis: foot surgery     Client Self Report: sore when walking without boot.  otherwise doing very well.  HEP is good.  No real pain.    Objective Measurements:  Objective Measure: Ankle ROM  Details: Dorsi 10 deg, others WNL       Goals:  Goal Identifier 1   Goal Description pt will be able to walk with shoes on min limp   Target Date 17   Date Met      Progress:      Goal Identifier 2   Goal Description pt will be able to descend stairs with 1 rail recip   Target Date 17   Date Met      Progress:     Goal Identifier 3   Goal Description pt will be able to squat to floor    Target Date 17   Date Met      Progress:     Goal Identifier     Goal Description     Target Date     Date Met      Progress:     Goal Identifier     Goal Description     Target Date     Date Met      Progress:     Goal Identifier     Goal Description     Target Date     Date Met      Progress:     Goal Identifier     Goal Description     Target Date     Date Met      Progress:     Goal Identifier     Goal Description     Target Date     Date Met      Progress:     Progress Toward Goals:   Progress this reporting period: pt met all goals          Plan:  Continue therapy per current plan of care.    Discharge:    Reason for Discharge: Patient has met all goals.    Discharge Plan: Patient to continue home program.

## 2018-03-01 ENCOUNTER — OFFICE VISIT (OUTPATIENT)
Dept: FAMILY MEDICINE | Facility: CLINIC | Age: 73
End: 2018-03-01
Payer: COMMERCIAL

## 2018-03-01 VITALS
TEMPERATURE: 98.3 F | OXYGEN SATURATION: 94 % | HEIGHT: 65 IN | SYSTOLIC BLOOD PRESSURE: 132 MMHG | HEART RATE: 72 BPM | WEIGHT: 208 LBS | BODY MASS INDEX: 34.66 KG/M2 | RESPIRATION RATE: 20 BRPM | DIASTOLIC BLOOD PRESSURE: 76 MMHG

## 2018-03-01 DIAGNOSIS — J02.9 SORE THROAT: Primary | ICD-10-CM

## 2018-03-01 DIAGNOSIS — J02.9 ACUTE PHARYNGITIS, UNSPECIFIED ETIOLOGY: ICD-10-CM

## 2018-03-01 LAB
DEPRECATED S PYO AG THROAT QL EIA: NORMAL
SPECIMEN SOURCE: NORMAL

## 2018-03-01 PROCEDURE — 87880 STREP A ASSAY W/OPTIC: CPT | Performed by: NURSE PRACTITIONER

## 2018-03-01 PROCEDURE — 99213 OFFICE O/P EST LOW 20 MIN: CPT | Performed by: NURSE PRACTITIONER

## 2018-03-01 PROCEDURE — 87081 CULTURE SCREEN ONLY: CPT | Performed by: NURSE PRACTITIONER

## 2018-03-01 RX ORDER — GABAPENTIN 100 MG/1
300 CAPSULE ORAL 3 TIMES DAILY
Refills: 1 | COMMUNITY
Start: 2018-02-14 | End: 2018-05-01

## 2018-03-01 RX ORDER — AMOXICILLIN 875 MG
875 TABLET ORAL 2 TIMES DAILY
Qty: 20 TABLET | Refills: 0 | Status: SHIPPED | OUTPATIENT
Start: 2018-03-01 | End: 2018-05-01

## 2018-03-01 NOTE — MR AVS SNAPSHOT
After Visit Summary   3/1/2018    Briana Mcgee    MRN: [id number]           Patient Information     Date Of Birth          1945        Visit Information        Provider Department      3/1/2018 8:40 AM Jaci Singletary CNP [facility]        Today's Diagnoses     Sore throat    -  1    Acute pharyngitis, unspecified etiology          Care Instructions    1. Sore throat  Acute  - Rapid strep screen  - Beta strep group A culture    2. Acute pharyngitis, unspecified etiology  Acute  - amoxicillin (AMOXIL) 875 MG tablet; Take 1 tablet (875 mg) by mouth 2 times daily  Dispense: 20 tablet; Refill: 0  - Use a probiotic while on this antibiotic- Culturelle (tablet) form, or Kefir (yogurt) 1/4 cup daily [wait two hours pre/post antibiotic dose]    Results for orders placed or performed in visit on 03/01/18   Rapid strep screen   Result Value Ref Range    Specimen Description Throat     Rapid Strep A Screen       NEGATIVE: No Group A streptococcal antigen detected by immunoassay, await culture report.       Self-Care for Sore Throats    Sore throats happen for many reasons, such as colds, allergies, and infections caused by viruses or bacteria. In any case, your throat becomes red and sore. Your goal for self-care is to reduce your discomfort while giving your throat a chance to heal.  Moisten and soothe your throat  Tips include the following:    Try a sip of water first thing after waking up.    Keep your throat moist by drinking 6 or more glasses of clear liquids every day.    Run a cool-air humidifier in your room overnight.    Avoid cigarette smoke.     Suck on throat lozenges, cough drops, hard candy, ice chips, or frozen fruit-juice bars. Use the sugar-free versions if your diet or medical condition requires them.  Gargle to ease irritation  Gargling every hour or 2 can ease irritation. Try gargling with 1 of these solutions:    1/4 teaspoon of salt in 1/2 cup of warm  water    An over-the-counter anesthetic gargle  Use medicine for more relief  Over-the-counter medicine can reduce sore throat symptoms. Ask your pharmacist if you have questions about which medicine to use:    Ease pain with anesthetic sprays. Aspirin or an aspirin substitute also helps. Remember, never give aspirin to anyone 18 or younger, or if you are already taking blood thinners.     For sore throats caused by allergies, try antihistamines to block the allergic reaction.    Remember: unless a sore throat is caused by a bacterial infection, antibiotics won t help you.  Prevent future sore throats  Prevention tips include the following:    Stop smoking or reduce contact with secondhand smoke. Smoke irritates the tender throat lining.    Limit contact with pets and with allergy-causing substances, such as pollen and mold.    When you re around someone with a sore throat or cold, wash your hands often to keep viruses or bacteria from spreading.    Don t strain your vocal cords.  Call your healthcare provider  Contact your healthcare provider if you have:    A temperature over 101 F (38.3 C)    White spots on the throat    Great difficulty swallowing    Trouble breathing    A skin rash    Recent exposure to someone else with strep bacteria    Severe hoarseness and swollen glands in the neck or jaw   Date Last Reviewed: 8/1/2016 2000-2017 [employer]. [address] [place] [address], [place] [address]. All rights reserved. This information is not intended as a substitute for professional medical care. Always follow your healthcare professional's instructions.                Follow-ups after your visit        Your next 10 appointments already scheduled     Mar 02, 2018  8:15 AM CST   Ortho Treatment with Deborah Carranza PT   [facility] ([address] [address]   [phone]            Aug 09, 2018  9:30 AM CDT   (Arrive by 9:15 AM)   [place]  "DIAGNOSTIC DIGITAL RIGHT with [place]MA1   [facility] ([facility])    [address] [id number] [phone]           Do not use any powder, lotion or deodorant under your arms or on your breast. If you do, we will ask you to remove it before your exam.  Wear comfortable, two-piece clothing.  If you have any allergies, tell your care team.  Bring any previous mammograms from other facilities or have them mailed to the breast center.   Three-dimensional (3D) mammograms are available at [place] locations in [facility], [facility], [facility], and [place]. -Health locations include [facility] in [place]. Benefits of 3D mammograms include: - Improved rate of cancer detection - Decreases your chance of having to go back for more tests, which means fewer: - \"False-positive\" results (This means that there is an abnormal area but it isn't cancer.) - Invasive testing procedures, such as a biopsy or surgery - Can provide clearer images of the breast if you have dense breast tissue. 3D mammography is an optional exam that anyone can have with a 2D mammogram. It doesn't replace or take the place of a 2D mammogram. 2D mammograms remain an effective screening test for all women.  Not all insurance companies cover the cost of a 3D mammogram. Check with your insurance.            Oct 22, 2018  9:15 AM CDT   [place] SCREENING BILATERAL W/ JESSICA with W[place]MA2   [facility] ([facility])    [address] [id number] [phone]           Three-dimensional (3D) mammograms are available at [place] locations in [facility], [facility], [facility], and [place]. Health locations include [facility] in [place]. Benefits of 3D mammograms include: - Improved rate of cancer detection - Decreases your chance of " "having to go back for more tests, which means fewer: - \"False-positive\" results (This means that there is an abnormal area but it isn't cancer.) - Invasive testing procedures, such as a biopsy or surgery - Can provide clearer images of the breast if you have dense breast tissue. 3D mammography is an optional exam that anyone can have with a 2D mammogram. It doesn't replace or take the place of a 2D mammogram. 2D mammograms remain an effective screening test for all women.  Not all insurance companies cover the cost of a 3D mammogram. Check with your insurance.            Oct 22, 2018  1:00 PM CDT   LAB with [facility]   [facility]    [address] [facility] [id number] [phone]           Please do not eat 10-12 hours before your appointment if you are coming in fasting for labs on lipids, cholesterol, or glucose (sugar). This does not apply to pregnant women. Water, hot tea and black coffee (with nothing added) are okay. Do not drink other fluids, diet soda or chew gum.            Oct 29, 2018  9:30 AM CDT   Return Visit with Merari Duarte MD   [facility] ([facility])    [facility]  [address]  [facility] [id number] [phone]              Who to contact     If you have questions or need follow up information about today's clinic visit or your schedule please contact [facility] directly at [phone].  Normal or non-critical lab and imaging results will be communicated to you by MyChart, letter or phone within 4 business days after the clinic has received the results. If you do not hear from us within 7 days, please contact the clinic through MyChart or phone. If you have a critical or abnormal lab result, we will notify you by phone as soon as possible.  Submit refill requests through [facility] or call your pharmacy and they will forward the refill request to us. Please allow 3 " "business days for your refill to be completed.          Additional Information About Your Visit        MyChart Information     [facility] gives you secure access to your electronic health record. If you see a primary care provider, you can also send messages to your care team and make appointments. If you have questions, please call your primary care clinic.  If you do not have a primary care provider, please call [phone] and they will assist you.        Care EveryWhere ID     This is your Care EveryWhere ID. This could be used by other organizations to access your [facility] medical records  [id number]        Your Vitals Were     Pulse Temperature Respirations Height Pulse Oximetry BMI (Body Mass Index)    72 98.3  F (36.8  C) (Tympanic) 20 5' 5.25\" (1.657 m) 94% 34.35 kg/m2       Blood Pressure from Last 3 Encounters:   03/01/18 132/76   02/08/18 163/78   11/21/17 134/48    Weight from Last 3 Encounters:   03/01/18 208 lb (94.3 kg)   02/08/18 204 lb 9.6 oz (92.8 kg)   11/21/17 210 lb (95.3 kg)              We Performed the Following     Beta strep group A culture     Rapid strep screen          Today's Medication Changes          These changes are accurate as of 3/1/18  9:24 AM.  If you have any questions, ask your nurse or doctor.               Start taking these medicines.        Dose/Directions    amoxicillin 875 MG tablet   Commonly known as:  AMOXIL   Used for:  Acute pharyngitis, unspecified etiology   Started by:  Jaci Singletary CNP        Dose:  875 mg   Take 1 tablet (875 mg) by mouth 2 times daily   Quantity:  20 tablet   Refills:  0         These medicines have changed or have updated prescriptions.        Dose/Directions    fluticasone 50 MCG/ACT spray   Commonly known as:  FLONASE   This may have changed:    - when to take this  - reasons to take this  - additional instructions   Used for:  Other seasonal allergic rhinitis        Dose:  2 spray   Spray 2 sprays into both nostrils daily Hold " on file until needed   Quantity:  16 g   Refills:  11            Where to get your medicines      These medications were sent to [facility] - [address] [address]     Phone:  [phone]     amoxicillin 875 MG tablet                Primary Care Provider Office Phone # Fax #    Xavier Vega [phone] [address] [facility] [address]        Equal Access to Services     JENNIFER HODGES : Hadii aad ku hadasho Soomaali, waaxda luqadaha, qaybta kaalmada adeegyada, waxay idiin hayaan aderio burdenarash lavince . So [facility] [phone].    ATENCIÓN: Si habla español, tiene a bright disposición servicios gratuitos de asistencia lingüística. [facility] [phone].    We comply with applicable federal civil rights laws and [place] laws. We do not discriminate on the basis of race, color, national origin, age, disability, sex, sexual orientation, or gender identity.            Thank you!     Thank you for choosing [facility]  for your care. Our goal is always to provide you with excellent care. Hearing back from our patients is one way we can continue to improve our services. Please take a few minutes to complete the written survey that you may receive in the mail after your visit with us. Thank you!             Your Updated Medication List - Protect others around you: Learn how to safely use, store and throw away your medicines at www.disposemymeds.org.          This list is accurate as of 3/1/18  9:24 AM.  Always use your most recent med list.                   Brand Name Dispense Instructions for use Diagnosis    ALEVE PO      Take 2 tablets by mouth 2 times daily as needed        amoxicillin 875 MG tablet    AMOXIL    20 tablet    Take 1 tablet (875 mg) by mouth 2 times daily    Acute pharyngitis, unspecified etiology       aspirin 81 MG EC tablet     90 tablet    Take 1 tablet (81 mg) by mouth daily     Heterozygous factor V Leiden mutation (H)       calcium + D 600-200 MG-UNIT Tabs   Generic drug:  calcium carbonate-vitamin D      1 TABLET DAILY twice daily        CO Q 10 PO      Take  by mouth.        fluticasone 50 MCG/ACT spray    FLONASE    16 g    Spray 2 sprays into both nostrils daily Hold on file until needed    Other seasonal allergic rhinitis       folic acid 20 MG Caps      Take 1 tablet by mouth daily        gabapentin 100 MG capsule    NEURONTIN     Take 300 mg by mouth 3 times daily        hydrochlorothiazide 12.5 MG Tabs tablet     90 tablet    Take 1 tablet (12.5 mg) by mouth daily    Essential hypertension       levothyroxine 150 MCG tablet    SYNTHROID/LEVOTHROID    90 tablet    Take 1 tablet (150 mcg) by mouth daily BRAND NAME ONLY.  Hold on file until needed.    Hypothyroidism, unspecified type       magnesium hydroxide 400 MG/5ML suspension    MILK OF MAGNESIA     Take 400 mg/kg/day by mouth daily as needed for constipation or heartburn        MULTIPLE VITAMIN PO      1 daily        pantoprazole 20 MG EC tablet    PROTONIX    180 tablet    Take 2 tablets (40 mg) by mouth daily Hold on file until needed    Gastroesophageal reflux disease without esophagitis       pyridoxine 100 MG tablet    VITAMIN B-6     Take 100 mg by mouth daily.        simvastatin 20 MG tablet    ZOCOR    90 tablet    Take 1 tablet (20 mg) by mouth At Bedtime Hold on file until needed    Hyperlipidemia LDL goal <100       tolterodine 4 MG 24 hr capsule    DETROL LA    90 capsule    Take 1 capsule (4 mg) by mouth daily    Urinary urgency       traZODone 100 MG tablet    DESYREL    90 tablet    1 tablets at bedtime as needed.  Hold on file until needed    Insomnia, unspecified

## 2018-03-01 NOTE — NURSING NOTE
"Chief Complaint   Patient presents with     Pharyngitis       Initial /76 (BP Location: Right arm, Patient Position: Sitting, Cuff Size: Adult Large)  Pulse 72  Temp 98.3  F (36.8  C) (Tympanic)  Resp 20  Ht 5' 5.25\" (1.657 m)  Wt 208 lb (94.3 kg)  SpO2 94%  BMI 34.35 kg/m2 Estimated body mass index is 34.35 kg/(m^2) as calculated from the following:    Height as of this encounter: 5' 5.25\" (1.657 m).    Weight as of this encounter: 208 lb (94.3 kg).      Health Maintenance that is potentially due pending provider review:  NONE    n/a    Is there anyone who you would like to be able to receive your results? No  If yes have patient fill out JUSTINE    "

## 2018-03-01 NOTE — PATIENT INSTRUCTIONS
1. Sore throat  Acute  - Rapid strep screen  - Beta strep group A culture    2. Acute pharyngitis, unspecified etiology  Acute  - amoxicillin (AMOXIL) 875 MG tablet; Take 1 tablet (875 mg) by mouth 2 times daily  Dispense: 20 tablet; Refill: 0  - Use a probiotic while on this antibiotic- Culturelle (tablet) form, or Kefir (yogurt) 1/4 cup daily [wait two hours pre/post antibiotic dose]    Results for orders placed or performed in visit on 03/01/18   Rapid strep screen   Result Value Ref Range    Specimen Description Throat     Rapid Strep A Screen       NEGATIVE: No Group A streptococcal antigen detected by immunoassay, await culture report.       Self-Care for Sore Throats    Sore throats happen for many reasons, such as colds, allergies, and infections caused by viruses or bacteria. In any case, your throat becomes red and sore. Your goal for self-care is to reduce your discomfort while giving your throat a chance to heal.  Moisten and soothe your throat  Tips include the following:    Try a sip of water first thing after waking up.    Keep your throat moist by drinking 6 or more glasses of clear liquids every day.    Run a cool-air humidifier in your room overnight.    Avoid cigarette smoke.     Suck on throat lozenges, cough drops, hard candy, ice chips, or frozen fruit-juice bars. Use the sugar-free versions if your diet or medical condition requires them.  Gargle to ease irritation  Gargling every hour or 2 can ease irritation. Try gargling with 1 of these solutions:    1/4 teaspoon of salt in 1/2 cup of warm water    An over-the-counter anesthetic gargle  Use medicine for more relief  Over-the-counter medicine can reduce sore throat symptoms. Ask your pharmacist if you have questions about which medicine to use:    Ease pain with anesthetic sprays. Aspirin or an aspirin substitute also helps. Remember, never give aspirin to anyone 18 or younger, or if you are already taking blood thinners.     For sore  throats caused by allergies, try antihistamines to block the allergic reaction.    Remember: unless a sore throat is caused by a bacterial infection, antibiotics won t help you.  Prevent future sore throats  Prevention tips include the following:    Stop smoking or reduce contact with secondhand smoke. Smoke irritates the tender throat lining.    Limit contact with pets and with allergy-causing substances, such as pollen and mold.    When you re around someone with a sore throat or cold, wash your hands often to keep viruses or bacteria from spreading.    Don t strain your vocal cords.  Call your healthcare provider  Contact your healthcare provider if you have:    A temperature over 101 F (38.3 C)    White spots on the throat    Great difficulty swallowing    Trouble breathing    A skin rash    Recent exposure to someone else with strep bacteria    Severe hoarseness and swollen glands in the neck or jaw   Date Last Reviewed: 8/1/2016 2000-2017 [employer]. [address] [place] [address], [place] [address]. All rights reserved. This information is not intended as a substitute for professional medical care. Always follow your healthcare professional's instructions.

## 2018-03-01 NOTE — PROGRESS NOTES
SUBJECTIVE:   Briana Mcgee is a 72 year old female who presents to clinic today for the following health issues:      SYMPTOMS      Duration: 5 days     Description  sore throat    Severity: moderate to severe     Accompanying signs and symptoms: None    History (predisposing factors):  none    Precipitating or alleviating factors: None    Therapies tried and outcome:  Aleve and salt water gargles       71 yo female has sore throat. Denies nasal congestion, or mouth pain.  has throat cancer, he has thrush right now.    HPI:   PCP:  Xavier Vega [phone]    Patient Active Problem List   Diagnosis     Malignant neoplasm of female breast (H)     Hypothyroidism     Impaired fasting glucose     Insomnia     ischemic stroke 3/06     Rhinitis, allergic seasonal     OA (osteoarthritis) of knee     Hot flashes     HYPERLIPIDEMIA LDL GOAL <100     Heterozygous factor V Leiden mutation (H)     GERD (gastroesophageal reflux disease)     Varicose veins of lower extremities with complications     Lumbar radiculopathy     Spinal stenosis     Advanced directives, counseling/discussion     CKD (chronic kidney disease) stage 2, GFR 60-89 ml/min     Obesity     Hepatitis     Essential hypertension     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)     Spinal stenosis, lumbar region, with neurogenic claudication     Current Outpatient Prescriptions   Medication     amoxicillin (AMOXIL) 875 MG tablet     hydrochlorothiazide 12.5 MG TABS tablet     tolterodine (DETROL LA) 4 MG 24 hr capsule     aspirin 81 MG EC tablet     simvastatin (ZOCOR) 20 MG tablet     traZODone (DESYREL) 100 MG tablet     levothyroxine (SYNTHROID/LEVOTHROID) 150 MCG tablet     pantoprazole (PROTONIX) 20 MG EC tablet     fluticasone (FLONASE) 50 MCG/ACT spray     magnesium hydroxide (MILK OF MAGNESIA) 400 MG/5ML suspension     folic acid 20 MG CAPS     Naproxen Sodium (ALEVE PO)     Coenzyme Q10 (CO Q 10  "PO)     pyridoxine (VITAMIN B-6) 100 MG tablet     CALCIUM + D 600-200 MG-UNIT OR TABS     MULTIPLE VITAMIN OR     gabapentin (NEURONTIN) 100 MG capsule     No current facility-administered medications for this visit.        Health Maintenance Due   Topic Date Due     LIPID MONITORING Q1 YEAR  03/13/2018       Reviewed and updated:  Tobacco  Allergies  Meds  Med Hx  Surg Hx  Fam Hx  Soc Hx     ROS:  Constitutional, HEENT, cardiovascular, pulmonary, gi and gu systems are negative, except as otherwise noted.    PHYSICAL EXAM:   /76 (BP Location: Right arm, Patient Position: Sitting, Cuff Size: Adult Large)  Pulse 72  Temp 98.3  F (36.8  C) (Tympanic)  Resp 20  Ht 5' 5.25\" (1.657 m)  Wt 208 lb (94.3 kg)  SpO2 94%  BMI 34.35 kg/m2  Body mass index is 34.35 kg/(m^2).  GENERAL APPEARANCE: healthy, alert and no distress  HENT: ear canals and TM's normal, nose and mouth without ulcers or lesions and erythemic posterior oropharynx, no white patches or tonsillar hypertrophy  NECK: no adenopathy, no asymmetry, masses, or scars and thyroid normal to palpation  RESP: lungs clear to auscultation - no rales, rhonchi or wheezes  CV: regular rates and rhythm, normal S1 S2, no S3 or S4 and no murmur, click or rub  MS: extremities normal- no gross deformities noted  SKIN: no suspicious lesions or rashes  PSYCH: mentation appears normal and affect normal/bright    ASSESSMENT & PLAN:       1. Sore throat  Acute  - Rapid strep screen  - Beta strep group A culture    2. Acute pharyngitis, unspecified etiology  Acute  - amoxicillin (AMOXIL) 875 MG tablet; Take 1 tablet (875 mg) by mouth 2 times daily  Dispense: 20 tablet; Refill: 0  - Use a probiotic while on this antibiotic- Culturelle (tablet) form, or Kefir (yogurt) 1/4 cup daily [wait two hours pre/post antibiotic dose]    Results for orders placed or performed in visit on 03/01/18   Rapid strep screen   Result Value Ref Range    Specimen Description Throat     Rapid " Strep A Screen       NEGATIVE: No Group A streptococcal antigen detected by immunoassay, await culture report.       Self-Care for Sore Throats    Sore throats happen for many reasons, such as colds, allergies, and infections caused by viruses or bacteria. In any case, your throat becomes red and sore. Your goal for self-care is to reduce your discomfort while giving your throat a chance to heal.  Moisten and soothe your throat  Tips include the following:    Try a sip of water first thing after waking up.    Keep your throat moist by drinking 6 or more glasses of clear liquids every day.    Run a cool-air humidifier in your room overnight.    Avoid cigarette smoke.     Suck on throat lozenges, cough drops, hard candy, ice chips, or frozen fruit-juice bars. Use the sugar-free versions if your diet or medical condition requires them.  Gargle to ease irritation  Gargling every hour or 2 can ease irritation. Try gargling with 1 of these solutions:    1/4 teaspoon of salt in 1/2 cup of warm water    An over-the-counter anesthetic gargle  Use medicine for more relief  Over-the-counter medicine can reduce sore throat symptoms. Ask your pharmacist if you have questions about which medicine to use:    Ease pain with anesthetic sprays. Aspirin or an aspirin substitute also helps. Remember, never give aspirin to anyone 18 or younger, or if you are already taking blood thinners.     For sore throats caused by allergies, try antihistamines to block the allergic reaction.    Remember: unless a sore throat is caused by a bacterial infection, antibiotics won t help you.  Prevent future sore throats  Prevention tips include the following:    Stop smoking or reduce contact with secondhand smoke. Smoke irritates the tender throat lining.    Limit contact with pets and with allergy-causing substances, such as pollen and mold.    When you re around someone with a sore throat or cold, wash your hands often to keep viruses or bacteria  from spreading.    Don t strain your vocal cords.  Call your healthcare provider  Contact your healthcare provider if you have:    A temperature over 101 F (38.3 C)    White spots on the throat    Great difficulty swallowing    Trouble breathing    A skin rash    Recent exposure to someone else with strep bacteria    Severe hoarseness and swollen glands in the neck or jaw   Date Last Reviewed: 8/1/2016 2000-2017 [employer]. [address] [place] [address]. All rights reserved. This information is not intended as a substitute for professional medical care. Always follow your healthcare professional's instructions.          Risks, benefits, side effects and rationale for treatment plan fully discussed with the patient and understanding expressed.    BRIANNA [facility]

## 2018-03-02 ENCOUNTER — HOSPITAL ENCOUNTER (OUTPATIENT)
Dept: PHYSICAL THERAPY | Facility: CLINIC | Age: 73
Setting detail: THERAPIES SERIES
End: 2018-03-02
Attending: ORTHOPAEDIC SURGERY
Payer: MEDICARE

## 2018-03-02 LAB
BACTERIA SPEC CULT: NORMAL
SPECIMEN SOURCE: NORMAL

## 2018-03-02 PROCEDURE — 40000718 ZZHC STATISTIC PT DEPARTMENT ORTHO VISIT: Performed by: PHYSICAL THERAPIST

## 2018-03-02 PROCEDURE — 97110 THERAPEUTIC EXERCISES: CPT | Mod: GP | Performed by: PHYSICAL THERAPIST

## 2018-03-02 NOTE — PROGRESS NOTES
Dear Briana,  Final Beta strep group A r/o culture is NEGATIVE for Group A streptococcus.    No treatment or change in treatment per [facility] Strep protocol.    Please contact our clinic via phone or My Chart if you have any questions or concerns.  Thanks,  BRIANNA Marvin

## 2018-03-12 DIAGNOSIS — I10 ESSENTIAL HYPERTENSION: ICD-10-CM

## 2018-03-13 RX ORDER — HYDROCHLOROTHIAZIDE 12.5 MG/1
TABLET ORAL
Qty: 90 TABLET | Refills: 1 | Status: SHIPPED | OUTPATIENT
Start: 2018-03-13 | End: 2018-05-01

## 2018-03-13 NOTE — TELEPHONE ENCOUNTER
"Requested Prescriptions   Pending Prescriptions Disp Refills     hydrochlorothiazide 12.5 MG TABS tablet [Pharmacy Med Name: HYDROCHLOROTHIAZIDE 12.5 MG TABLET] 90 tablet      Sig: Take 1 tablet (12.5 mg) by mouth daily    Diuretics (Including Combos) Protocol Passed    3/12/2018  5:57 PM       Passed - Blood pressure under 140/90 in past 12 months    BP Readings from Last 3 Encounters:   03/01/18 132/76   02/08/18 163/78   11/21/17 134/48                Passed - Recent (12 mo) or future (30 days) visit within the authorizing provider's specialty    Patient had office visit in the last 12 months or has a visit in the next 30 days with authorizing provider or within the authorizing provider's specialty.  See \"Patient Info\" tab in inbasket, or \"Choose Columns\" in Meds & Orders section of the refill encounter.           Passed - Patient is age 18 or older       Passed - No active pregancy on record       Passed - Normal serum creatinine on file in past 12 months    Recent Labs   Lab Test  01/09/18   0950   CR  0.83             Passed - Normal serum potassium on file in past 12 months    Recent Labs   Lab Test  01/09/18   0950   POTASSIUM  3.7                   Passed - Normal serum sodium on file in past 12 months    Recent Labs   Lab Test  01/09/18   0950   NA  135             Passed - No positive pregnancy test in past 12 months        Last Written Prescription Date:  12/15/2017  Last Fill Quantity: 90,  # refills: 0   Last office visit: 3/1/2018 with prescribing provider:  Hernan   Future Office Visit:      "

## 2018-04-04 NOTE — PROGRESS NOTES
Outpatient Physical Therapy Discharge Note     Patient: Briana Mcgee  : 1945    Beginning/End Dates of Reporting Period:   to 3/2/18    Referring Provider: Dr Dueñas    Therapy Diagnosis: chronic low back pain, L4/5 radiculopathy     Client Self Report: Patient reports pain signfiicantly improved over last week. States that she feels ready to be done with PT. Reports being able to sleep through the night this past week for the first time      Outcome Measures (most recent score):  Stuart STarT  low  Stuart STarT    Oswestry Score (%): 14 %    Goals:  Goal Identifier 1   Goal Description Patient will be able to return to senior yoga class without increases in pain   Target Date 18   Date Met      Progress:going to try this week     Goal Identifier 2   Goal Description Patient will be independant with HEP for long term self management   Target Date 18   Date Met  18   Progress:     Goal Identifier 3   Goal Description Patient will be able to walk on treadmill 15 minutes without increase in pain   Target Date 18   Date Met      Progress:states is improving, has not tried treadmill     Goal Identifier     Goal Description     Target Date     Date Met      Progress:         Progress Toward Goals:   Progress this reporting period: Pt completed 2 sessions of PT to address chronic low back pain. Pt stated subjective improvement in pain and was independent in home exercise program. Patient did not return for follow up treatments as directed.  Goal status and current objective information is therefore unknown.        Plan:  Discharge from therapy.    Discharge:    Reason for Discharge: Patient chooses to discontinue therapy    Equipment Issued: na    Discharge Plan: Patient to continue home program.

## 2018-04-09 ENCOUNTER — MYC REFILL (OUTPATIENT)
Dept: FAMILY MEDICINE | Facility: CLINIC | Age: 73
End: 2018-04-09

## 2018-04-09 DIAGNOSIS — K21.9 GASTROESOPHAGEAL REFLUX DISEASE WITHOUT ESOPHAGITIS: ICD-10-CM

## 2018-04-09 DIAGNOSIS — N18.2 CKD (CHRONIC KIDNEY DISEASE) STAGE 2, GFR 60-89 ML/MIN: ICD-10-CM

## 2018-04-09 DIAGNOSIS — E78.5 HYPERLIPIDEMIA LDL GOAL <100: ICD-10-CM

## 2018-04-09 DIAGNOSIS — R73.01 IMPAIRED FASTING GLUCOSE: Primary | ICD-10-CM

## 2018-04-09 RX ORDER — PANTOPRAZOLE SODIUM 20 MG/1
40 TABLET, DELAYED RELEASE ORAL DAILY
Qty: 180 TABLET | Refills: 1 | Status: SHIPPED | OUTPATIENT
Start: 2018-04-09 | End: 2018-10-04

## 2018-04-09 NOTE — TELEPHONE ENCOUNTER
Message from MyChart:  Original authorizing provider: Xavier Vega MD    Jenna RUVALCABAJovanna Everardo would like a refill of the following medications:  pantoprazole (PROTONIX) 20 MG EC tablet [Xavier Vega MD]    Preferred pharmacy: [facility]    Comment:  I am out of this drug

## 2018-04-20 DIAGNOSIS — R73.01 IMPAIRED FASTING GLUCOSE: ICD-10-CM

## 2018-04-20 DIAGNOSIS — E78.5 HYPERLIPIDEMIA LDL GOAL <100: ICD-10-CM

## 2018-04-20 DIAGNOSIS — N18.2 CKD (CHRONIC KIDNEY DISEASE) STAGE 2, GFR 60-89 ML/MIN: ICD-10-CM

## 2018-04-20 LAB
CHOLEST SERPL-MCNC: 202 MG/DL
CREAT UR-MCNC: 121 MG/DL
HBA1C MFR BLD: 5.3 % (ref 0–5.6)
HDLC SERPL-MCNC: 68 MG/DL
LDLC SERPL CALC-MCNC: 94 MG/DL
MICROALBUMIN UR-MCNC: 45 MG/L
MICROALBUMIN/CREAT UR: 37.44 MG/G CR (ref 0–25)
NONHDLC SERPL-MCNC: 134 MG/DL
TRIGL SERPL-MCNC: 198 MG/DL

## 2018-04-20 PROCEDURE — 82043 UR ALBUMIN QUANTITATIVE: CPT | Performed by: FAMILY MEDICINE

## 2018-04-20 PROCEDURE — 83036 HEMOGLOBIN GLYCOSYLATED A1C: CPT | Performed by: FAMILY MEDICINE

## 2018-04-20 PROCEDURE — 80061 LIPID PANEL: CPT | Performed by: FAMILY MEDICINE

## 2018-04-20 PROCEDURE — 36415 COLL VENOUS BLD VENIPUNCTURE: CPT | Performed by: FAMILY MEDICINE

## 2018-04-23 ENCOUNTER — MYC REFILL (OUTPATIENT)
Dept: FAMILY MEDICINE | Facility: CLINIC | Age: 73
End: 2018-04-23

## 2018-04-23 DIAGNOSIS — E78.5 HYPERLIPIDEMIA LDL GOAL <100: ICD-10-CM

## 2018-04-24 DIAGNOSIS — E78.5 HYPERLIPIDEMIA LDL GOAL <100: ICD-10-CM

## 2018-04-24 NOTE — TELEPHONE ENCOUNTER
"Requested Prescriptions   Pending Prescriptions Disp Refills     simvastatin (ZOCOR) 20 MG tablet [Pharmacy Med Name: SIMVASTATIN 20 MG TABLET]  Last Written Prescription Date:  03/10/2017  Last Fill Quantity: 90,  # refills: 3   Last office visit: 3/1/2018 with prescribing provider:  Hayder Long Office Visit:   Next 5 appointments (look out 90 days)     May 01, 2018  8:20 AM CDT   PHYSICAL with Xavier Vega MD   [facility]    [address] [facility] [address] [facility] [address]   [phone]                  90 tablet      Sig: Take 1 tablet (20 mg) by mouth At Bedtime    Statins Protocol Passed    4/24/2018  7:55 AM       Passed - LDL on file in past 12 months    Recent Labs   Lab Test  04/20/18   0825   LDL  94            Passed - No abnormal creatine kinase in past 12 months    Recent Labs   Lab Test  11/25/15   1117   CKT  36               Passed - Recent (12 mo) or future (30 days) visit within the authorizing provider's specialty    Patient had office visit in the last 12 months or has a visit in the next 30 days with authorizing provider or within the authorizing provider's specialty.  See \"Patient Info\" tab in inbasket, or \"Choose Columns\" in Meds & Orders section of the refill encounter.           Passed - Patient is age 18 or older       Passed - No active pregnancy on record       Passed - No positive pregnancy test in past 12 months        Sravan Cronin RT (R)    "

## 2018-04-24 NOTE — TELEPHONE ENCOUNTER
Message from Receeptt:  Original authorizing provider: MD Jenna Villaseñor SJovanna Everardo would like a refill of the following medications:  simvastatin (ZOCOR) 20 MG tablet [Xavier Vega MD]    Preferred pharmacy: [facility]-[address]    Comment:  I am out

## 2018-04-25 RX ORDER — SIMVASTATIN 20 MG
20 TABLET ORAL AT BEDTIME
Qty: 90 TABLET | Refills: 3 | Status: SHIPPED | OUTPATIENT
Start: 2018-04-25 | End: 2018-05-01

## 2018-04-25 RX ORDER — SIMVASTATIN 20 MG
TABLET ORAL
Qty: 30 TABLET | Refills: 0 | Status: SHIPPED | OUTPATIENT
Start: 2018-04-25 | End: 2018-05-01

## 2018-04-25 NOTE — TELEPHONE ENCOUNTER
Routing refill request to provider for review/approval because:  Labs out of range:  Lipids    Thank you  Dina DELANEY RN

## 2018-05-01 ENCOUNTER — OFFICE VISIT (OUTPATIENT)
Dept: FAMILY MEDICINE | Facility: CLINIC | Age: 73
End: 2018-05-01
Payer: COMMERCIAL

## 2018-05-01 VITALS
TEMPERATURE: 97.8 F | OXYGEN SATURATION: 92 % | WEIGHT: 210 LBS | SYSTOLIC BLOOD PRESSURE: 136 MMHG | HEART RATE: 68 BPM | DIASTOLIC BLOOD PRESSURE: 80 MMHG | HEIGHT: 66 IN | BODY MASS INDEX: 33.75 KG/M2

## 2018-05-01 DIAGNOSIS — J30.2 OTHER SEASONAL ALLERGIC RHINITIS: ICD-10-CM

## 2018-05-01 DIAGNOSIS — G47.00 INSOMNIA, UNSPECIFIED TYPE: ICD-10-CM

## 2018-05-01 DIAGNOSIS — M06.042 RHEUMATOID ARTHRITIS INVOLVING BOTH HANDS WITH NEGATIVE RHEUMATOID FACTOR (H): ICD-10-CM

## 2018-05-01 DIAGNOSIS — E78.5 HYPERLIPIDEMIA LDL GOAL <100: ICD-10-CM

## 2018-05-01 DIAGNOSIS — M06.041 RHEUMATOID ARTHRITIS INVOLVING BOTH HANDS WITH NEGATIVE RHEUMATOID FACTOR (H): ICD-10-CM

## 2018-05-01 DIAGNOSIS — Z00.00 ENCOUNTER FOR ROUTINE ADULT HEALTH EXAMINATION WITHOUT ABNORMAL FINDINGS: Primary | ICD-10-CM

## 2018-05-01 DIAGNOSIS — E03.9 HYPOTHYROIDISM, UNSPECIFIED TYPE: ICD-10-CM

## 2018-05-01 DIAGNOSIS — I10 ESSENTIAL HYPERTENSION: ICD-10-CM

## 2018-05-01 DIAGNOSIS — R39.15 URINARY URGENCY: ICD-10-CM

## 2018-05-01 PROCEDURE — 99397 PER PM REEVAL EST PAT 65+ YR: CPT | Performed by: FAMILY MEDICINE

## 2018-05-01 RX ORDER — HYDROCHLOROTHIAZIDE 12.5 MG/1
TABLET ORAL
Qty: 90 TABLET | Refills: 3 | Status: SHIPPED | OUTPATIENT
Start: 2018-05-01 | End: 2019-06-04

## 2018-05-01 RX ORDER — FLUTICASONE PROPIONATE 50 MCG
2 SPRAY, SUSPENSION (ML) NASAL DAILY PRN
Qty: 16 G | Refills: 11 | Status: SHIPPED | OUTPATIENT
Start: 2018-05-01 | End: 2019-06-17

## 2018-05-01 RX ORDER — TOLTERODINE 4 MG/1
4 CAPSULE, EXTENDED RELEASE ORAL DAILY
Qty: 90 CAPSULE | Refills: 3 | Status: SHIPPED | OUTPATIENT
Start: 2018-05-01 | End: 2019-05-28

## 2018-05-01 RX ORDER — TRAZODONE HYDROCHLORIDE 100 MG/1
TABLET ORAL
Qty: 90 TABLET | Refills: 3 | Status: SHIPPED | OUTPATIENT
Start: 2018-05-01 | End: 2019-06-17

## 2018-05-01 RX ORDER — SIMVASTATIN 20 MG
20 TABLET ORAL AT BEDTIME
Qty: 90 TABLET | Refills: 3 | Status: SHIPPED | OUTPATIENT
Start: 2018-05-01 | End: 2019-05-28

## 2018-05-01 RX ORDER — LEVOTHYROXINE SODIUM 150 UG/1
150 TABLET ORAL DAILY
Qty: 90 TABLET | Refills: 3 | Status: SHIPPED | OUTPATIENT
Start: 2018-05-01 | End: 2018-12-06 | Stop reason: DRUGHIGH

## 2018-05-01 NOTE — MR AVS SNAPSHOT
After Visit Summary   5/1/2018    Briana Mcgee    MRN: [id number]           Patient Information     Date Of Birth          1945        Visit Information        Provider Department      5/1/2018 8:20 AM Xavier Vega MD [facility]        Today's Diagnoses     Encounter for routine adult health examination without abnormal findings    -  1    Hyperlipidemia LDL goal <100        Other seasonal allergic rhinitis        Essential hypertension        Hypothyroidism, unspecified type        Urinary urgency        Insomnia, unspecified type          Care Instructions    I refilled your medication for the year.    I did change your thyroid medication generic.     You will need to check your thyroid levels 3 months after being on the generic medication.    We will also check your cholesterol and urine microalbumin.          Thank you for choosing [facility].  You may be receiving a survey in the mail from [employer] regarding your visit today.  Please take a few minutes to complete and return the survey to let us know how we are doing.      If you have questions or concerns, please contact us via [email or web address] or you can contact your care team at [phone].    Our Clinic hours are:  Monday 6:40 am  to 7:00 pm  Tuesday -Friday 6:40 am to 5:00 pm    The [place] outpatient lab hours are:  Monday - Friday 6:10 am to 4:45 pm  Saturdays 7:00 am to 11:00 am  Appointments are required, call [phone]    If you have clinical questions after hours or would like to schedule an appointment,  call the clinic at [phone].    Preventive Health Recommendations    Female Ages 65 +    Yearly exam:     See your health care provider every year in order to  o Review health changes.   o Discuss preventive care.    o Review your medicines if your doctor has prescribed any.      You no longer need a yearly Pap test unless you've had an abnormal Pap test in the past 10 years. If you have vaginal  symptoms, such as bleeding or discharge, be sure to talk with your provider about a Pap test.      Every 1 to 2 years, have a mammogram.  If you are over 69, talk with your health care provider about whether or not you want to continue having screening mammograms.      Every 10 years, have a colonoscopy. Or, have a yearly FIT test (stool test). These exams will check for colon cancer.       Have a cholesterol test every 5 years, or more often if your doctor advises it.       Have a diabetes test (fasting glucose) every three years. If you are at risk for diabetes, you should have this test more often.       At age 65, have a bone density scan (DEXA) to check for osteoporosis (brittle bone disease).    Shots:    Get a flu shot each year.    Get a tetanus shot every 10 years.    Talk to your doctor about your pneumonia vaccines. There are now two you should receive - Pneumovax (PPSV 23) and Prevnar (PCV 13).    Talk to your doctor about the shingles vaccine.    Talk to your doctor about the hepatitis B vaccine.    Nutrition:     Eat at least 5 servings of fruits and vegetables each day.      Eat whole-grain bread, whole-wheat pasta and brown rice instead of white grains and rice.      Talk to your provider about Calcium and Vitamin D.     Lifestyle    Exercise at least 150 minutes a week (30 minutes a day, 5 days a week). This will help you control your weight and prevent disease.      Limit alcohol to one drink per day.      No smoking.       Wear sunscreen to prevent skin cancer.       See your dentist twice a year for an exam and cleaning.      See your eye doctor every 1 to 2 years to screen for conditions such as glaucoma, macular degeneration and cataracts.          Follow-ups after your visit        Your next 10 appointments already scheduled     Aug 09, 2018  9:30 AM CDT   [place] DIAGNOSTIC DIGITAL RIGHT with [facility]   [facility] ([facility])    [address]  "[address]   [phone]           Do not use any powder, lotion or deodorant under your arms or on your breast. If you do, we will ask you to remove it before your exam.  Wear comfortable, two-piece clothing.  If you have any allergies, tell your care team.  Bring any previous mammograms from other facilities or have them mailed to the breast center.   Three-dimensional (3D) mammograms are available at [place] locations in [facility], [facility], and [place]. -Health locations include [facility] in [place]. Benefits of 3D mammograms include: - Improved rate of cancer detection - Decreases your chance of having to go back for more tests, which means fewer: - \"False-positive\" results (This means that there is an abnormal area but it isn't cancer.) - Invasive testing procedures, such as a biopsy or surgery - Can provide clearer images of the breast if you have dense breast tissue. 3D mammography is an optional exam that anyone can have with a 2D mammogram. It doesn't replace or take the place of a 2D mammogram. 2D mammograms remain an effective screening test for all women.  Not all insurance companies cover the cost of a 3D mammogram. Check with your insurance.            Oct 22, 2018  9:15 AM CDT   [place] SCREENING BILATERAL W/ JESSICA with [facility]   [facility] ([facility])    [address] [address]   [phone]           Three-dimensional (3D) mammograms are available at [place] locations in [facility], and [place]. -Health locations include [facility] in [place]. Benefits of 3D mammograms include: - Improved rate of cancer detection - Decreases your chance of having to go back for more tests, which means fewer: - \"False-positive\" results (This means that there is an abnormal area but it " isn't cancer.) - Invasive testing procedures, such as a biopsy or surgery - Can provide clearer images of the breast if you have dense breast tissue. 3D mammography is an optional exam that anyone can have with a 2D mammogram. It doesn't replace or take the place of a 2D mammogram. 2D mammograms remain an effective screening test for all women.  Not all insurance companies cover the cost of a 3D mammogram. Check with your insurance.            Oct 22, 2018  1:00 PM CDT   LAB with [facility]   [facility]    [address] [facility] [address]   [phone]           Please do not eat 10-12 hours before your appointment if you are coming in fasting for labs on lipids, cholesterol, or glucose (sugar). This does not apply to pregnant women. Water, hot tea and black coffee (with nothing added) are okay. Do not drink other fluids, diet soda or chew gum.            Oct 29, 2018  9:30 AM CDT   Return Visit with Merari Duarte MD   [facility] ([facility])    [facility]  [address]  [facility] [facility] [address] [phone]              Future tests that were ordered for you today     Open Future Orders        Priority Expected Expires Ordered    Lipid panel reflex to direct LDL Fasting Routine 11/1/2018 2/1/2019 5/1/2018    TSH Routine 11/1/2018 2/1/2019 5/1/2018    T4 FREE Routine 11/1/2018 2/1/2019 5/1/2018    Albumin Random Urine Quantitative with Creat Ratio Routine 11/1/2018 2/1/2019 5/1/2018            Who to contact     If you have questions or need follow up information about today's clinic visit or your schedule please contact [facility] directly at [phone].  Normal or non-critical lab and imaging results will be communicated to you by MyChart, letter or phone within 4 business days after the clinic has received the results. If you do not hear from us within 7 days, please contact the clinic  "through "[employer] or phone. If you have a critical or abnormal lab result, we will notify you by phone as soon as possible.  Submit refill requests through "[employer] or call your pharmacy and they will forward the refill request to us. Please allow 3 business days for your refill to be completed.          Additional Information About Your Visit        [employer] Information     "[employer] gives you secure access to your electronic health record. If you see a primary care provider, you can also send messages to your care team and make appointments. If you have questions, please call your primary care clinic.  If you do not have a primary care provider, please call [phone] and they will assist you.        Care EveryWhere ID     This is your Care EveryWhere ID. This could be used by other organizations to access your [place] medical records  [id number]        Your Vitals Were     Pulse Temperature Height Pulse Oximetry BMI (Body Mass Index)       68 97.8  F (36.6  C) (Tympanic) 5' 5.5\" (1.664 m) 92% 34.41 kg/m2        Blood Pressure from Last 3 Encounters:   05/01/18 136/80   03/01/18 132/76   02/08/18 163/78    Weight from Last 3 Encounters:   05/01/18 210 lb (95.3 kg)   03/01/18 208 lb (94.3 kg)   02/08/18 204 lb 9.6 oz (92.8 kg)                 Today's Medication Changes          These changes are accurate as of 5/1/18  9:06 AM.  If you have any questions, ask your nurse or doctor.               These medicines have changed or have updated prescriptions.        Dose/Directions    fluticasone 50 MCG/ACT spray   Commonly known as:  FLONASE   This may have changed:    - when to take this  - reasons to take this   Used for:  Other seasonal allergic rhinitis   Changed by:  Xavier Vega MD        Dose:  2 spray   Spray 2 sprays into both nostrils daily as needed Hold on file until needed   Quantity:  16 g   Refills:  11       hydrochlorothiazide 12.5 MG Tabs tablet   This may have changed:  See the new instructions. "   Used for:  Essential hypertension   Changed by:  Xavier Vega MD        Take 1 tablet (12.5 mg) by mouth daily Hold on file until needed   Quantity:  90 tablet   Refills:  3       levothyroxine 150 MCG tablet   Commonly known as:  SYNTHROID/LEVOTHROID   This may have changed:  additional instructions   Used for:  Hypothyroidism, unspecified type   Changed by:  Xavier Vega MD        Dose:  150 mcg   Take 1 tablet (150 mcg) by mouth daily Hold on file until needed.   Quantity:  90 tablet   Refills:  3       simvastatin 20 MG tablet   Commonly known as:  ZOCOR   This may have changed:  Another medication with the same name was removed. Continue taking this medication, and follow the directions you see here.   Used for:  Hyperlipidemia LDL goal <100   Changed by:  Xavier Vega MD        Dose:  20 mg   Take 1 tablet (20 mg) by mouth At Bedtime Hold on file until needed   Quantity:  90 tablet   Refills:  3       tolterodine 4 MG 24 hr capsule   Commonly known as:  DETROL LA   This may have changed:  additional instructions   Used for:  Urinary urgency   Changed by:  Xavier Vega MD        Dose:  4 mg   Take 1 capsule (4 mg) by mouth daily Hold on file until needed   Quantity:  90 capsule   Refills:  3         Stop taking these medicines if you haven't already. Please contact your care team if you have questions.     amoxicillin 875 MG tablet   Commonly known as:  AMOXIL   Stopped by:  Xavier Vega MD           gabapentin 100 MG capsule   Commonly known as:  NEURONTIN   Stopped by:  Xavier Vega MD                Where to get your medicines      These medications were sent to [facility] [address]     Phone:  [phone]     fluticasone 50 MCG/ACT spray    hydrochlorothiazide 12.5 MG Tabs tablet    levothyroxine 150 MCG tablet    simvastatin 20 MG tablet    tolterodine 4 MG 24 hr capsule     traZODone 100 MG tablet                Primary Care Provider Office Phone # Fax #    Xavier Vega [phone] [address] [address]        Equal Access to Services     JENNIFER HODGES : Hadii aad ku hadaugustin Sorose, waaxda luqadaha, qaybta kaalmada adeegyada, hunter allen inessa florez. So [facility] [phone].    ATENCIÓN: Si habla español, tiene a bright disposición servicios gratuitos de asistencia lingüística. Llame al [phone].    We comply with applicable federal civil rights laws and [place] laws. We do not discriminate on the basis of race, color, national origin, age, disability, sex, sexual orientation, or gender identity.            Thank you!     Thank you for choosing [facility]  for your care. Our goal is always to provide you with excellent care. Hearing back from our patients is one way we can continue to improve our services. Please take a few minutes to complete the written survey that you may receive in the mail after your visit with us. Thank you!             Your Updated Medication List - Protect others around you: Learn how to safely use, store and throw away your medicines at www.disposemymeds.org.          This list is accurate as of 5/1/18  9:06 AM.  Always use your most recent med list.                   Brand Name Dispense Instructions for use Diagnosis    ALEVE PO      Take 2 tablets by mouth 2 times daily as needed        aspirin 81 MG EC tablet     90 tablet    Take 1 tablet (81 mg) by mouth daily    Heterozygous factor V Leiden mutation (H)       calcium + D 600-200 MG-UNIT Tabs   Generic drug:  calcium carbonate-vitamin D      1 TABLET DAILY twice daily        CO Q 10 PO      Take  by mouth.        fluticasone 50 MCG/ACT spray    FLONASE    16 g    Spray 2 sprays into both nostrils daily as needed Hold on file until needed    Other seasonal allergic rhinitis       folic acid 20 MG Caps      Take 1 tablet by mouth  daily        hydrochlorothiazide 12.5 MG Tabs tablet     90 tablet    Take 1 tablet (12.5 mg) by mouth daily Hold on file until needed    Essential hypertension       levothyroxine 150 MCG tablet    SYNTHROID/LEVOTHROID    90 tablet    Take 1 tablet (150 mcg) by mouth daily Hold on file until needed.    Hypothyroidism, unspecified type       magnesium hydroxide 400 MG/5ML suspension    MILK OF MAGNESIA     Take 400 mg/kg/day by mouth daily as needed for constipation or heartburn        MULTIPLE VITAMIN PO      1 daily        pantoprazole 20 MG EC tablet    PROTONIX    180 tablet    Take 2 tablets (40 mg) by mouth daily    Gastroesophageal reflux disease without esophagitis       pyridoxine 100 MG tablet    VITAMIN B-6     Take 100 mg by mouth daily.        simvastatin 20 MG tablet    ZOCOR    90 tablet    Take 1 tablet (20 mg) by mouth At Bedtime Hold on file until needed    Hyperlipidemia LDL goal <100       tolterodine 4 MG 24 hr capsule    DETROL LA    90 capsule    Take 1 capsule (4 mg) by mouth daily Hold on file until needed    Urinary urgency       traZODone 100 MG tablet    DESYREL    90 tablet    1 tablets at bedtime as needed.  Hold on file until needed    Insomnia, unspecified type

## 2018-05-01 NOTE — PATIENT INSTRUCTIONS
I refilled your medication for the year.    I did change your thyroid medication generic.     You will need to check your thyroid levels 3 months after being on the generic medication.    We will also check your cholesterol and urine microalbumin.          Thank you for choosing [facility].  You may be receiving a survey in the mail from [employer] regarding your visit today.  Please take a few minutes to complete and return the survey to let us know how we are doing.      If you have questions or concerns, please contact us via [email or web address] or you can contact your care team at [phone].    Our Clinic hours are:  Monday 6:40 am  to 7:00 pm  Tuesday -Friday 6:40 am to 5:00 pm    The [place] outpatient lab hours are:  Monday - Friday 6:10 am to 4:45 pm  Saturdays 7:00 am to 11:00 am  Appointments are required, call [phone]    If you have clinical questions after hours or would like to schedule an appointment,  call the clinic at [phone].    Preventive Health Recommendations    Female Ages 65 +    Yearly exam:     See your health care provider every year in order to  o Review health changes.   o Discuss preventive care.    o Review your medicines if your doctor has prescribed any.      You no longer need a yearly Pap test unless you've had an abnormal Pap test in the past 10 years. If you have vaginal symptoms, such as bleeding or discharge, be sure to talk with your provider about a Pap test.      Every 1 to 2 years, have a mammogram.  If you are over 69, talk with your health care provider about whether or not you want to continue having screening mammograms.      Every 10 years, have a colonoscopy. Or, have a yearly FIT test (stool test). These exams will check for colon cancer.       Have a cholesterol test every 5 years, or more often if your doctor advises it.       Have a diabetes test (fasting glucose) every three years. If you are at risk for diabetes, you should have this test more often.        At age 65, have a bone density scan (DEXA) to check for osteoporosis (brittle bone disease).    Shots:    Get a flu shot each year.    Get a tetanus shot every 10 years.    Talk to your doctor about your pneumonia vaccines. There are now two you should receive - Pneumovax (PPSV 23) and Prevnar (PCV 13).    Talk to your doctor about the shingles vaccine.    Talk to your doctor about the hepatitis B vaccine.    Nutrition:     Eat at least 5 servings of fruits and vegetables each day.      Eat whole-grain bread, whole-wheat pasta and brown rice instead of white grains and rice.      Talk to your provider about Calcium and Vitamin D.     Lifestyle    Exercise at least 150 minutes a week (30 minutes a day, 5 days a week). This will help you control your weight and prevent disease.      Limit alcohol to one drink per day.      No smoking.       Wear sunscreen to prevent skin cancer.       See your dentist twice a year for an exam and cleaning.      See your eye doctor every 1 to 2 years to screen for conditions such as glaucoma, macular degeneration and cataracts.

## 2018-05-01 NOTE — NURSING NOTE
"Chief Complaint   Patient presents with     Wellness Visit     review labs done 4/20/18       Initial /70 (Cuff Size: Adult Large)  Pulse 68  Temp 97.8  F (36.6  C) (Tympanic)  Ht 5' 5.5\" (1.664 m)  Wt 210 lb (95.3 kg)  SpO2 92%  BMI 34.41 kg/m2 Estimated body mass index is 34.41 kg/(m^2) as calculated from the following:    Height as of this encounter: 5' 5.5\" (1.664 m).    Weight as of this encounter: 210 lb (95.3 kg).  Medication Reconciliation: complete   NORBERT Rodriguez-C (AAMA)  "

## 2018-05-01 NOTE — PROGRESS NOTES
SUBJECTIVE:   Briana Mcgee is a 73 year old female who presents for Preventive Visit.    Are you in the first 12 months of your Medicare Part B coverage?  No    Healthy Habits:    Do you get at least three servings of calcium containing foods daily (dairy, green leafy vegetables, etc.)? Yes,, taking calcium and/or vitamin D supplement    Amount of exercise or daily activities, outside of work: 2 day(s) per week    Problems taking medications regularly No    Medication side effects: No    Have you had an eye exam in the past two years? yes    Do you see a dentist twice per year? yes    Do you have sleep apnea, excessive snoring or daytime drowsiness?trouble sleeping about 2 times a week      Ability to successfully perform activities of daily living: Yes, no assistance needed    Home safety:  throw rugs in the hallway and lack of grab bars in the bathroom     Hearing impairment: No    Fall risk:  Fallen 2 or more times in the past year?: No  Any fall with injury in the past year?: Yes    COGNITIVE SCREEN  1) Repeat 3 items (Banana, Sunrise, Chair)    2) Clock draw: NORMAL  3) 3 item recall: Recalls 3 objects  Results: 3 items recalled: normal clock COGNITIVE IMPAIRMENT LESS LIKELY    Mini-CogTM Copyright S Polly. Licensed by the author for use in [facility]; reprinted with permission ([email or web address]). All rights reserved.            Reviewed and updated as needed this visit by clinical staff  Tobacco  Allergies  Meds  Med Hx  Surg Hx  Fam Hx  Soc Hx        Reviewed and updated as needed this visit by Provider        Social History   Substance Use Topics     Smoking status: Former Smoker     Types: Cigarettes     Quit date: 7/19/1996     Smokeless tobacco: Never Used      Comment: quit 10-12 years ago, 1997.     Alcohol use Yes      Comment: glass of wine at night       If you drink alcohol do you typically have >3 drinks per day or >7 drinks per week? No                        Today's PHQ-2  "Score:   PHQ-2 ( 1999 Pf[employer]) 5/1/2018 4/28/2018   Q1: Little interest or pleasure in doing things 0 -   Q2: Feeling down, depressed or hopeless 0 -   PHQ-2 Score 0 -   PHQ-2 Score - Incomplete       Do you feel safe in your environment - Yes    Do you have a Health Care Directive?: No: Advance care planning reviewed with patient; information given to patient to review.    Current providers sharing in care for this patient include:   Patient Care Team:  Xavier Vega MD as PCP - General (Family Practice)  Merari Duarte MD as MD (Oncology)  Kam Montero MD as MD (Physical Medicine and Rehab)    The following health maintenance items are reviewed in Epic and correct as of today:  Health Maintenance   Topic Date Due     FALL RISK ASSESSMENT  04/11/2018     ALT Q1 YR  11/14/2018     HEMOGLOBIN Q1 YR  11/14/2018     TSH Q1 YEAR  01/09/2019     BMP Q1 YR  01/09/2019     ADVANCE DIRECTIVE PLANNING Q5 YRS  01/29/2019     LIPID MONITORING Q1 YEAR  04/20/2019     MICROALBUMIN Q1 YEAR  04/20/2019     DEXA Q3 YR  06/21/2019     MAMMO SCREEN Q2 YR (SYSTEM ASSIGNED)  02/08/2020     TETANUS IMMUNIZATION (SYSTEM ASSIGNED)  01/07/2024     COLONOSCOPY Q10 YR  09/02/2026     PNEUMOCOCCAL  Completed     INFLUENZA VACCINE  Completed             ROS:  Review Of Systems  Skin: negative  Eyes: negative  Ears/Nose/Throat: negative  Respiratory: No shortness of breath, dyspnea on exertion, cough, or hemoptysis  Cardiovascular: negative  Gastrointestinal: negative  Genitourinary: negative  Musculoskeletal: negative  Neurologic: negative  Psychiatric: negative  Hematologic/Lymphatic/Immunologic: negative  Endocrine: negative      OBJECTIVE:   /80  Pulse 68  Temp 97.8  F (36.6  C) (Tympanic)  Ht 5' 5.5\" (1.664 m)  Wt 210 lb (95.3 kg)  SpO2 92%  BMI 34.41 kg/m2 Estimated body mass index is 34.41 kg/(m^2) as calculated from the following:    Height as of this encounter: 5' 5.5\" (1.664 m).    Weight as of " this encounter: 210 lb (95.3 kg).  EXAM:   GENERAL: healthy, alert and no distress  EYES: Eyes grossly normal to inspection, PERRL and conjunctivae and sclerae normal  HENT: ear canals and TM's normal, nose and mouth without ulcers or lesions  NECK: no adenopathy, no asymmetry, masses, or scars and thyroid normal to palpation  RESP: lungs clear to auscultation - no rales, rhonchi or wheezes  BREAST: NE, seeing oncology and followed due to history of breast cancer  CV: regular rate and rhythm, normal S1 S2, no S3 or S4, no murmur, click or rub, no peripheral edema and peripheral pulses strong  ABDOMEN: soft, nontender, no hepatosplenomegaly, no masses and bowel sounds normal   (female): NE  MS: no gross musculoskeletal defects noted, no edema  SKIN: no suspicious lesions or rashes  NEURO: Normal strength and tone, mentation intact and speech normal  PSYCH: mentation appears normal, affect normal/bright  LYMPH: anterior cervical: no adenopathy  posterior cervical: no adenopathy    ASSESSMENT / PLAN:   (Z00.00) Encounter for routine adult health examination without abnormal findings  (primary encounter diagnosis)  Comment: Discussed healthy lifestyle and preventative cares.    Plan:     (E78.5) Hyperlipidemia LDL goal <100  Comment:   Plan: simvastatin (ZOCOR) 20 MG tablet, Lipid panel         reflex to direct LDL Fasting            (J30.2) Other seasonal allergic rhinitis  Comment:   Plan: fluticasone (FLONASE) 50 MCG/ACT spray            (I10) Essential hypertension  Comment: refilled all of his medications  Plan: hydrochlorothiazide 12.5 MG TABS tablet,         Albumin Random Urine Quantitative with Creat         Ratio            (E03.9) Hypothyroidism, unspecified type  Comment: refilled, changed to generic and recheck in 6 months, since she just picked up 3 months of the trade and it was expensive, wants to go back to generic   Plan: levothyroxine (SYNTHROID/LEVOTHROID) 150 MCG         tablet, TSH, T4 FREE        "     (R39.15) Urinary urgency  Comment: refilled med  Plan: tolterodine (DETROL LA) 4 MG 24 hr capsule            (G47.00) Insomnia, unspecified type  Comment: refilled med  Plan: traZODone (DESYREL) 100 MG tablet          For her RA, using otc meds.  H/O breast cancer and seeing oncology for ongoing surveillance.       End of Life Planning:  Patient currently has an advanced directive: No.  I have verified the patient's ablity to prepare an advanced directive/make health care decisions.  Literature was provided to assist patient in preparing an advanced directive.    COUNSELING:  Reviewed preventive health counseling, as reflected in patient instructions       Healthy diet/nutrition       Vision screening       Hearing screening       Dental care        Estimated body mass index is 34.41 kg/(m^2) as calculated from the following:    Height as of this encounter: 5' 5.5\" (1.664 m).    Weight as of this encounter: 210 lb (95.3 kg).  Weight management plan: diet and exercise were reviewed today     reports that she quit smoking about 21 years ago. Her smoking use included Cigarettes. She has never used smokeless tobacco.      Appropriate preventive services were discussed with this patient, including applicable screening as appropriate for cardiovascular disease, diabetes, osteopenia/osteoporosis, and glaucoma.  As appropriate for age/gender, discussed screening for colorectal cancer, prostate cancer, breast cancer, and cervical cancer. Checklist reviewing preventive services available has been given to the patient.    Reviewed patients plan of care and provided an AVS. The Basic Care Plan (routine screening as documented in Health Maintenance) for Briana meets the Care Plan requirement. This Care Plan has been established and reviewed with the Patient.    Counseling Resources:  ATP IV Guidelines  Pooled Cohorts Equation Calculator  Breast Cancer Risk Calculator  FRAX Risk Assessment  ICSI Preventive " Guidelines  Dietary Guidelines for Americans, 2010  USDA's MyPlate  ASA Prophylaxis  Lung CA Screening    Xavier Vega MD  [facility]  Answers for HPI/ROS submitted by the patient on 5/1/2018   Annual Exam:  Getting at least 3 servings of Calcium per day:: NO  Bi-annual eye exam:: NO  Dental care twice a year:: Yes  Sleep apnea or symptoms of sleep apnea:: Daytime drowsiness  Diet:: Regular (no restrictions)  Frequency of exercise:: 2-3 days/week  PHQ-2 Score: Incomplete  Taking medications regularly:: Yes  Medication side effects:: Lightheadedness  Duration of exercise:: Greater than 60 minutes

## 2018-07-17 DIAGNOSIS — E78.5 HYPERLIPIDEMIA LDL GOAL <100: ICD-10-CM

## 2018-07-17 DIAGNOSIS — E03.9 HYPOTHYROIDISM, UNSPECIFIED TYPE: ICD-10-CM

## 2018-07-17 DIAGNOSIS — I10 ESSENTIAL HYPERTENSION: ICD-10-CM

## 2018-07-17 LAB
CHOLEST SERPL-MCNC: 179 MG/DL
CREAT UR-MCNC: 100 MG/DL
HDLC SERPL-MCNC: 66 MG/DL
LDLC SERPL CALC-MCNC: 84 MG/DL
MICROALBUMIN UR-MCNC: 35 MG/L
MICROALBUMIN/CREAT UR: 35.44 MG/G CR (ref 0–25)
NONHDLC SERPL-MCNC: 113 MG/DL
T4 FREE SERPL-MCNC: 1.24 NG/DL (ref 0.76–1.46)
TRIGL SERPL-MCNC: 145 MG/DL
TSH SERPL DL<=0.005 MIU/L-ACNC: 4.13 MU/L (ref 0.4–4)

## 2018-07-17 PROCEDURE — 36415 COLL VENOUS BLD VENIPUNCTURE: CPT | Performed by: FAMILY MEDICINE

## 2018-07-17 PROCEDURE — 80061 LIPID PANEL: CPT | Performed by: FAMILY MEDICINE

## 2018-07-17 PROCEDURE — 84443 ASSAY THYROID STIM HORMONE: CPT | Performed by: FAMILY MEDICINE

## 2018-07-17 PROCEDURE — 84439 ASSAY OF FREE THYROXINE: CPT | Performed by: FAMILY MEDICINE

## 2018-07-17 PROCEDURE — 82043 UR ALBUMIN QUANTITATIVE: CPT | Performed by: FAMILY MEDICINE

## 2018-07-17 NOTE — LETTER
[address] [address]  [phone]        November 27, 2018    Briana Mcgee  [address] [address]              Dear Briana Mcgee    This is to remind you that your Thyroid Function is due.    You may call our office at [phone] to schedule an appointment.    Please disregard this notice if you have already had your labs drawn or made an appointment.        Sincerely,        Xavier Vega MD/ mar

## 2018-08-07 ENCOUNTER — HOSPITAL ENCOUNTER (OUTPATIENT)
Dept: MAMMOGRAPHY | Facility: CLINIC | Age: 73
Discharge: HOME | End: 2018-08-07
Attending: INTERNAL MEDICINE | Admitting: INTERNAL MEDICINE
Payer: MEDICARE

## 2018-08-07 DIAGNOSIS — C50.919 MALIGNANT NEOPLASM OF FEMALE BREAST (H): ICD-10-CM

## 2018-08-07 DIAGNOSIS — D24.1 BENIGN NEOPLASM OF RIGHT BREAST: ICD-10-CM

## 2018-08-07 PROCEDURE — G0279 TOMOSYNTHESIS, MAMMO: HCPCS

## 2018-09-24 ENCOUNTER — ALLIED HEALTH/NURSE VISIT (OUTPATIENT)
Dept: FAMILY MEDICINE | Facility: CLINIC | Age: 73
End: 2018-09-24
Payer: COMMERCIAL

## 2018-09-24 DIAGNOSIS — Z23 NEED FOR PROPHYLACTIC VACCINATION AND INOCULATION AGAINST INFLUENZA: Primary | ICD-10-CM

## 2018-09-24 PROCEDURE — 90662 IIV NO PRSV INCREASED AG IM: CPT

## 2018-09-24 PROCEDURE — G0008 ADMIN INFLUENZA VIRUS VAC: HCPCS

## 2018-09-24 NOTE — MR AVS SNAPSHOT
After Visit Summary   9/24/2018    Briana Mcgee    MRN: [id number]           Patient Information     Date Of Birth          1945        Visit Information        Provider Department      9/24/2018 10:30 AM FL PI [facility]ERT/LPN [facility]        Today's Diagnoses     Need for prophylactic vaccination and inoculation against influenza    -  1       Follow-ups after your visit        Your next 10 appointments already scheduled     Oct 22, 2018  9:15 AM CDT   MA SCREENING BILATERAL W/ JESSICA with [facility]   [facility] ([facility])    [address] [facility] [address]   [phone]           How do I prepare for my exam? (Food and drink instructions) No Food and Drink Restrictions.  How do I prepare for my exam? (Other instructions) Do not use any powder, lotion or deodorant under your arms or on your breast. If you do, we will ask you to remove it before your exam.  What should I wear: Wear comfortable, two-piece clothing.  How long does the exam take: Most scans will take 15 minutes.  What should I bring: Bring any previous mammograms from other facilities or have them mailed to the breast center.  Do I need a :  No  is needed.  What do I need to tell my doctor: If you have any allergies, tell your care team.  What should I do after the exam: No restrictions, You may resume normal activities.  What is this test: This test is an x-ray of the breast to look for breast disease. The breast is pressed between two plates to flatten and spread the tissue. An X-ray is taken of the breast from different angles.  Who should I call with questions: If you have any questions, please call the Imaging Department where you will have your exam. Directions, parking instructions, and other information is available on our website, [email or web address].  Other information about my exam Three-dimensional (3D) mammograms are available at [facility] locations in  "[place], [place], [place], [place], [facility], [place], and [place]. [facility] locations include [place] and the [facility] in [place].  Benefits of 3D mammograms include: * Improved rate of cancer detection * Decreases your chance of having to go back for more tests, which means fewer: * \"False-positive\" results (This means that there is an abnormal area but it isn't cancer.) * Invasive testing procedures, such as a biopsy or surgery * Can provide clearer images of the breast if you have dense breast tissue.  *3D mammography is an optional exam that anyone can have with a 2D mammogram. It doesn't replace or take the place of a 2D mammogram. 2D mammograms remain an effective screening test for all women.  Not all insurance companies cover the cost of a 3D mammogram. Check with your insurance.            Oct 22, 2018  1:00 PM CDT   LAB with [facility]   [facility]    [address] [id number] [phone]           Please do not eat 10-12 hours before your appointment if you are coming in fasting for labs on lipids, cholesterol, or glucose (sugar). This does not apply to pregnant women. Water, hot tea and black coffee (with nothing added) are okay. Do not drink other fluids, diet soda or chew gum.            Oct 29, 2018  9:30 AM CDT   Return Visit with Merari Duarte MD   [facility] ([facility])    [facility]  [address]  [place] [address]   [phone]            Nov 02, 2018  8:00 AM CDT   New Visit with Chaim Puente MD   [facility] ([facility])    [address] [address] [phone]              Who to contact     If you have questions or need follow up information about today's clinic visit or your schedule please contact [facility] directly at [phone].  Normal or non-critical lab " and imaging results will be communicated to you by MyChart, letter or phone within 4 business days after the clinic has received the results. If you do not hear from us within 7 days, please contact the clinic through Rev[employer] or phone. If you have a critical or abnormal lab result, we will notify you by phone as soon as possible.  Submit refill requests through [employer] or call your pharmacy and they will forward the refill request to us. Please allow 3 business days for your refill to be completed.          Additional Information About Your Visit        [employer] Information     [facility] gives you secure access to your electronic health record. If you see a primary care provider, you can also send messages to your care team and make appointments. If you have questions, please call your primary care clinic.  If you do not have a primary care provider, please call [phone] and they will assist you.        Care EveryWhere ID     This is your Care EveryWhere ID. This could be used by other organizations to access your [facility] medical records  [id number]         Blood Pressure from Last 3 Encounters:   05/01/18 136/80   03/01/18 132/76   02/08/18 163/78    Weight from Last 3 Encounters:   05/01/18 210 lb (95.3 kg)   03/01/18 208 lb (94.3 kg)   02/08/18 204 lb 9.6 oz (92.8 kg)              We Performed the Following     ADMIN INFLUENZA (For MEDICARE Patients ONLY) []     FLU VACCINE, INCREASED ANTIGEN, PRESV FREE, AGE 65+ [47339]        Primary Care Provider Office Phone # Fax #    Xavier Vega [phone] [address] [address]        Equal Access to Services     JENNIFER HODGES : Hadii aad ku hadasho Soomaali, waaxda luqadaha, qaybta kaalmada hunter vo. So [facility] [phone].    ATENCIÓN: Si habla español, tiene a bright disposición servicios gratuitos de asistencia lingüística. Llame al [phone].    We comply with applicable  federal civil rights laws and [place] laws. We do not discriminate on the basis of race, color, national origin, age, disability, sex, sexual orientation, or gender identity.            Thank you!     Thank you for choosing [facility]  for your care. Our goal is always to provide you with excellent care. Hearing back from our patients is one way we can continue to improve our services. Please take a few minutes to complete the written survey that you may receive in the mail after your visit with us. Thank you!             Your Updated Medication List - Protect others around you: Learn how to safely use, store and throw away your medicines at www.disposemymeds.org.          This list is accurate as of 9/24/18 10:41 AM.  Always use your most recent med list.                   Brand Name Dispense Instructions for use Diagnosis    ALEVE PO      Take 2 tablets by mouth 2 times daily as needed        aspirin 81 MG EC tablet     90 tablet    Take 1 tablet (81 mg) by mouth daily    Heterozygous factor V Leiden mutation (H)       calcium + D 600-200 MG-UNIT Tabs   Generic drug:  calcium carbonate-vitamin D      1 TABLET DAILY twice daily        CO Q 10 PO      Take  by mouth.        fluticasone 50 MCG/ACT spray    FLONASE    16 g    Spray 2 sprays into both nostrils daily as needed Hold on file until needed    Other seasonal allergic rhinitis       folic acid 20 MG Caps      Take 1 tablet by mouth daily        hydrochlorothiazide 12.5 MG Tabs tablet     90 tablet    Take 1 tablet (12.5 mg) by mouth daily Hold on file until needed    Essential hypertension       levothyroxine 150 MCG tablet    SYNTHROID/LEVOTHROID    90 tablet    Take 1 tablet (150 mcg) by mouth daily Hold on file until needed.    Hypothyroidism, unspecified type       magnesium hydroxide 400 MG/5ML suspension    MILK OF MAGNESIA     Take 400 mg/kg/day by mouth daily as needed for constipation or heartburn        MULTIPLE VITAMIN PO      1  daily        pantoprazole 20 MG EC tablet    PROTONIX    180 tablet    Take 2 tablets (40 mg) by mouth daily    Gastroesophageal reflux disease without esophagitis       pyridoxine 100 MG tablet    VITAMIN B-6     Take 100 mg by mouth daily.        simvastatin 20 MG tablet    ZOCOR    90 tablet    Take 1 tablet (20 mg) by mouth At Bedtime Hold on file until needed    Hyperlipidemia LDL goal <100       tolterodine 4 MG 24 hr capsule    DETROL LA    90 capsule    Take 1 capsule (4 mg) by mouth daily Hold on file until needed    Urinary urgency       traZODone 100 MG tablet    DESYREL    90 tablet    1 tablets at bedtime as needed.  Hold on file until needed    Insomnia, unspecified type

## 2018-10-04 DIAGNOSIS — K21.9 GASTROESOPHAGEAL REFLUX DISEASE WITHOUT ESOPHAGITIS: ICD-10-CM

## 2018-10-04 RX ORDER — PANTOPRAZOLE SODIUM 40 MG/1
TABLET, DELAYED RELEASE ORAL
Qty: 90 TABLET | Refills: 1 | Status: SHIPPED | OUTPATIENT
Start: 2018-10-04 | End: 2019-04-05

## 2018-10-04 NOTE — TELEPHONE ENCOUNTER
"Requested Prescriptions   Pending Prescriptions Disp Refills     pantoprazole (PROTONIX) 40 MG EC tablet [Pharmacy Med Name: PANTOPRAZOLE SODIUM 40 MG TABLET DR]  Last Written Prescription Date:  4/9/2018  Last Fill Quantity: 90,  # refills: 1   Last office visit: 5/1/2018 with prescribing provider:  Gary    Future Office Visit:   Next 5 appointments (look out 90 days)     Oct 29, 2018  9:30 AM CDT   Return Visit with Merari Duarte MD   [facility] ([facility])    [facility]  [address]  [facility] [address]   [phone]                  90 tablet      Sig: TAKE 1 TABLET (40mg) BY MOUTH ONCE DAILY    PPI Protocol Passed    10/4/2018  9:53 AM       Passed - Not on Clopidogrel (unless Pantoprazole ordered)       Passed - No diagnosis of osteoporosis on record       Passed - Recent (12 mo) or future (30 days) visit within the authorizing provider's specialty    Patient had office visit in the last 12 months or has a visit in the next 30 days with authorizing provider or within the authorizing provider's specialty.  See \"Patient Info\" tab in inbasket, or \"Choose Columns\" in Meds & Orders section of the refill encounter.           Passed - Patient is age 18 or older       Passed - No active pregnacy on record       Passed - No positive pregnancy test in past 12 months          "

## 2018-10-22 ENCOUNTER — HOSPITAL ENCOUNTER (OUTPATIENT)
Dept: MAMMOGRAPHY | Facility: CLINIC | Age: 73
Discharge: HOME | End: 2018-10-22
Attending: INTERNAL MEDICINE | Admitting: INTERNAL MEDICINE
Payer: MEDICARE

## 2018-10-22 DIAGNOSIS — Z12.31 SCREENING MAMMOGRAM, ENCOUNTER FOR: ICD-10-CM

## 2018-10-22 PROCEDURE — 77067 SCR MAMMO BI INCL CAD: CPT

## 2018-11-02 ENCOUNTER — RADIANT APPOINTMENT (OUTPATIENT)
Dept: GENERAL RADIOLOGY | Facility: CLINIC | Age: 73
End: 2018-11-02
Attending: INTERNAL MEDICINE
Payer: COMMERCIAL

## 2018-11-02 ENCOUNTER — OFFICE VISIT (OUTPATIENT)
Dept: RHEUMATOLOGY | Facility: CLINIC | Age: 73
End: 2018-11-02
Payer: COMMERCIAL

## 2018-11-02 VITALS
BODY MASS INDEX: 34.32 KG/M2 | HEART RATE: 92 BPM | OXYGEN SATURATION: 94 % | TEMPERATURE: 96.4 F | DIASTOLIC BLOOD PRESSURE: 80 MMHG | WEIGHT: 206 LBS | SYSTOLIC BLOOD PRESSURE: 148 MMHG | HEIGHT: 65 IN | RESPIRATION RATE: 14 BRPM

## 2018-11-02 DIAGNOSIS — Z85.3 PERSONAL HISTORY OF MALIGNANT NEOPLASM OF BREAST: ICD-10-CM

## 2018-11-02 DIAGNOSIS — M80.00XA AGE-RELATED OSTEOPOROSIS WITH CURRENT PATHOLOGICAL FRACTURE, INITIAL ENCOUNTER: ICD-10-CM

## 2018-11-02 DIAGNOSIS — M06.4 INFLAMMATORY POLYARTHROPATHY (H): ICD-10-CM

## 2018-11-02 DIAGNOSIS — M06.4 INFLAMMATORY POLYARTHROPATHY (H): Primary | ICD-10-CM

## 2018-11-02 DIAGNOSIS — Z79.899 HIGH RISK MEDICATION USE: ICD-10-CM

## 2018-11-02 LAB
ALBUMIN SERPL-MCNC: 3.8 G/DL (ref 3.4–5)
ALP SERPL-CCNC: 79 U/L (ref 40–150)
ALT SERPL W P-5'-P-CCNC: 25 U/L (ref 0–50)
ANION GAP SERPL CALCULATED.3IONS-SCNC: 5 MMOL/L (ref 3–14)
AST SERPL W P-5'-P-CCNC: 18 U/L (ref 0–45)
BASOPHILS # BLD AUTO: 0 10E9/L (ref 0–0.2)
BASOPHILS NFR BLD AUTO: 0.5 %
BILIRUB SERPL-MCNC: 0.5 MG/DL (ref 0.2–1.3)
BUN SERPL-MCNC: 14 MG/DL (ref 7–30)
CALCIUM SERPL-MCNC: 9.5 MG/DL (ref 8.5–10.1)
CANCER AG27-29 SERPL-ACNC: 27 U/ML (ref 0–39)
CHLORIDE SERPL-SCNC: 101 MMOL/L (ref 94–109)
CO2 SERPL-SCNC: 32 MMOL/L (ref 20–32)
CREAT SERPL-MCNC: 0.86 MG/DL (ref 0.52–1.04)
CRP SERPL-MCNC: 18.2 MG/L (ref 0–8)
DEPRECATED CALCIDIOL+CALCIFEROL SERPL-MC: 35 UG/L (ref 20–75)
DIFFERENTIAL METHOD BLD: NORMAL
EOSINOPHIL # BLD AUTO: 0.2 10E9/L (ref 0–0.7)
EOSINOPHIL NFR BLD AUTO: 2.6 %
ERYTHROCYTE [DISTWIDTH] IN BLOOD BY AUTOMATED COUNT: 13.3 % (ref 10–15)
ERYTHROCYTE [SEDIMENTATION RATE] IN BLOOD BY WESTERGREN METHOD: 31 MM/H (ref 0–30)
GFR SERPL CREATININE-BSD FRML MDRD: 65 ML/MIN/1.7M2
GLUCOSE SERPL-MCNC: 113 MG/DL (ref 70–99)
HCT VFR BLD AUTO: 39.9 % (ref 35–47)
HGB BLD-MCNC: 13.1 G/DL (ref 11.7–15.7)
LYMPHOCYTES # BLD AUTO: 2.2 10E9/L (ref 0.8–5.3)
LYMPHOCYTES NFR BLD AUTO: 26.7 %
MCH RBC QN AUTO: 31.4 PG (ref 26.5–33)
MCHC RBC AUTO-ENTMCNC: 32.8 G/DL (ref 31.5–36.5)
MCV RBC AUTO: 96 FL (ref 78–100)
MONOCYTES # BLD AUTO: 0.7 10E9/L (ref 0–1.3)
MONOCYTES NFR BLD AUTO: 8 %
NEUTROPHILS # BLD AUTO: 5.1 10E9/L (ref 1.6–8.3)
NEUTROPHILS NFR BLD AUTO: 62.2 %
PLATELET # BLD AUTO: 240 10E9/L (ref 150–450)
POTASSIUM SERPL-SCNC: 3.8 MMOL/L (ref 3.4–5.3)
PROT SERPL-MCNC: 8.1 G/DL (ref 6.8–8.8)
RBC # BLD AUTO: 4.17 10E12/L (ref 3.8–5.2)
SODIUM SERPL-SCNC: 138 MMOL/L (ref 133–144)
WBC # BLD AUTO: 8.1 10E9/L (ref 4–11)

## 2018-11-02 PROCEDURE — 73130 X-RAY EXAM OF HAND: CPT | Mod: LT

## 2018-11-02 PROCEDURE — 86200 CCP ANTIBODY: CPT | Performed by: INTERNAL MEDICINE

## 2018-11-02 PROCEDURE — 86060 ANTISTREPTOLYSIN O TITER: CPT | Performed by: INTERNAL MEDICINE

## 2018-11-02 PROCEDURE — 80053 COMPREHEN METABOLIC PANEL: CPT | Performed by: INTERNAL MEDICINE

## 2018-11-02 PROCEDURE — 85025 COMPLETE CBC W/AUTO DIFF WBC: CPT | Performed by: INTERNAL MEDICINE

## 2018-11-02 PROCEDURE — 99215 OFFICE O/P EST HI 40 MIN: CPT | Performed by: INTERNAL MEDICINE

## 2018-11-02 PROCEDURE — 36415 COLL VENOUS BLD VENIPUNCTURE: CPT | Performed by: INTERNAL MEDICINE

## 2018-11-02 PROCEDURE — 86431 RHEUMATOID FACTOR QUANT: CPT | Performed by: INTERNAL MEDICINE

## 2018-11-02 PROCEDURE — 86140 C-REACTIVE PROTEIN: CPT | Performed by: INTERNAL MEDICINE

## 2018-11-02 PROCEDURE — 82306 VITAMIN D 25 HYDROXY: CPT | Performed by: INTERNAL MEDICINE

## 2018-11-02 PROCEDURE — 86618 LYME DISEASE ANTIBODY: CPT | Performed by: INTERNAL MEDICINE

## 2018-11-02 PROCEDURE — 86300 IMMUNOASSAY TUMOR CA 15-3: CPT | Performed by: INTERNAL MEDICINE

## 2018-11-02 PROCEDURE — 85652 RBC SED RATE AUTOMATED: CPT | Performed by: INTERNAL MEDICINE

## 2018-11-02 PROCEDURE — 86038 ANTINUCLEAR ANTIBODIES: CPT | Performed by: INTERNAL MEDICINE

## 2018-11-02 PROCEDURE — 73630 X-RAY EXAM OF FOOT: CPT | Mod: LT

## 2018-11-02 RX ORDER — HYDROXYCHLOROQUINE SULFATE 200 MG/1
200 TABLET, FILM COATED ORAL 2 TIMES DAILY
Qty: 60 TABLET | Refills: 3 | Status: SHIPPED | OUTPATIENT
Start: 2018-11-02 | End: 2019-02-15

## 2018-11-02 ASSESSMENT — PAIN SCALES - GENERAL: PAINLEVEL: MODERATE PAIN (5)
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
## 2018-11-02 NOTE — PATIENT INSTRUCTIONS
Please call to schedule your bone density scan (DEXA):    [address] [place] [address] [phone]

## 2018-11-02 NOTE — MR AVS SNAPSHOT
After Visit Summary   11/2/2018    Briana Mcgee    MRN: [id number]           Patient Information     Date Of Birth          1945        Visit Information        Provider Department      11/2/2018 8:00 AM Chaim Puente MD [facility] [place]        Today's Diagnoses     Inflammatory polyarthropathy (H)    -  1    Personal history of malignant neoplasm of breast        Age-related osteoporosis with current pathological fracture, initial encounter        High risk medication use          Care Instructions    Please call to schedule your bone density scan (DEXA):    [facility]  [address] [place] [address]  [phone]          Follow-ups after your visit        Additional Services     OPHTHALMOLOGY ADULT REFERRAL       Your provider has referred you to:  [facility]: [facility] [place] [phone]   [email or web address]    Reason for referral: Hydroxychloroquine (plaquenil) toxicity monitoring.     Please be aware that coverage of these services is subject to the terms and limitations of your health insurance plan.  Call member services at your health plan with any benefit or coverage questions.      Please bring the following to your appointment:  >>   Any x-rays, CTs or MRIs which have been performed.  Contact the facility where they were done to arrange for  prior to your scheduled appointment.  Any new CT, MRI or other procedures ordered by your specialist must be performed at a [facility] facility or coordinated by your clinic's referral office.    >>   List of current medications   >>   This referral request   >>   Any documents/labs given to you for this referral                  Your next 10 appointments already scheduled     Nov 06, 2018  8:15 AM CST   LAB with PI LAB   [facility] ([facility])    [address] [id number] [phone]           Please do not eat 10-12 hours before your  appointment if you are coming in fasting for labs on lipids, cholesterol, or glucose (sugar). This does not apply to pregnant women. Water, hot tea and black coffee (with nothing added) are okay. Do not drink other fluids, diet soda or chew gum.            Nov 13, 2018  2:15 PM CST   Return Visit with Merari Duarte MD   [facility] ([facility])    [facility]  [address]  [facility] [address]   [phone]            Feb 11, 2019  8:15 AM CST   LAB with PI LAB   [facility] ([facility])    [address] [address]   [phone]           Please do not eat 10-12 hours before your appointment if you are coming in fasting for labs on lipids, cholesterol, or glucose (sugar). This does not apply to pregnant women. Water, hot tea and black coffee (with nothing added) are okay. Do not drink other fluids, diet soda or chew gum.            Feb 15, 2019  9:00 AM CST   Return Visit with Chaim Puente MD   [facility] ([facility])    [address] [address]   [phone]              Future tests that were ordered for you today     Open Future Orders        Priority Expected Expires Ordered    DX Hip/Pelvis/Spine Routine  11/3/2019 11/2/2018    XR Hand Bilateral G/E 3 Views Routine 11/2/2018 11/2/2019 11/2/2018    XR Foot Bilateral G/E 3 Views Routine 11/2/2018 11/2/2019 11/2/2018    CBC with platelets differential Routine 1/27/2019 3/2/2019 11/2/2018    Creatinine Routine 1/27/2019 3/2/2019 11/2/2018    Hepatic panel Routine 1/27/2019 3/2/2019 11/2/2018    CRP inflammation Routine 1/27/2019 3/2/2019 11/2/2018    Erythrocyte sedimentation rate auto Routine 1/27/2019 3/2/2019 11/2/2018            Who to contact     If you have questions or need follow up information about today's clinic visit or your schedule please contact [facility] directly at  "[phone].  Normal or non-critical lab and imaging results will be communicated to you by MyChart, letter or phone within 4 business days after the clinic has received the results. If you do not hear from us within 7 days, please contact the clinic through [employer] or phone. If you have a critical or abnormal lab result, we will notify you by phone as soon as possible.  Submit refill requests through [employer]Sheet or call your pharmacy and they will forward the refill request to us. Please allow 3 business days for your refill to be completed.          Additional Information About Your Visit        [employer] Information     StatSheet gives you secure access to your electronic health record. If you see a primary care provider, you can also send messages to your care team and make appointments. If you have questions, please call your primary care clinic.  If you do not have a primary care provider, please call [phone] and they will assist you.        Care EveryWhere ID     This is your Care EveryWhere ID. This could be used by other organizations to access your [facility] medical records  [id number]        Your Vitals Were     Pulse Temperature Respirations Height Pulse Oximetry BMI (Body Mass Index)    92 96.4  F (35.8  C) (Oral) 14 1.651 m (5' 5\") 94% 34.28 kg/m2       Blood Pressure from Last 3 Encounters:   11/02/18 148/80   05/01/18 136/80   03/01/18 132/76    Weight from Last 3 Encounters:   11/02/18 93.4 kg (206 lb)   05/01/18 95.3 kg (210 lb)   03/01/18 94.3 kg (208 lb)              We Performed the Following     Anti Nuclear Kamryn IgG by IFA with Reflex     Antistreptolysin O     Ca27.29  breast tumor marker     CBC with platelets differential     Comprehensive metabolic panel     CRP inflammation     Cyclic Citrullinated Peptide Antibody IgG     Erythrocyte sedimentation rate auto     Lyme Disease Kamryn with reflex to WB Serum     OPHTHALMOLOGY ADULT REFERRAL     Rheumatoid factor     Vitamin D Deficiency        "   Today's Medication Changes          These changes are accurate as of 11/2/18  9:06 AM.  If you have any questions, ask your nurse or doctor.               Start taking these medicines.        Dose/Directions    hydroxychloroquine 200 MG tablet   Commonly known as:  PLAQUENIL   Used for:  Inflammatory polyarthropathy (H)   Started by:  Chaim Puente MD        Dose:  200 mg   Take 1 tablet (200 mg) by mouth 2 times daily   Quantity:  60 tablet   Refills:  3            Where to get your medicines      These medications were sent to [facility]- - [address] [address]     Phone:  [phone]     hydroxychloroquine 200 MG tablet                Primary Care Provider Office Phone # Fax #    Xavier Vega [phone] [address] [address]        Equal Access to Services     [facility]EL [facility] Center[facility] : Hadii ez metz hadasho Sorose, waaxda luqadaha, qaybta kaalmada aderioyada, hunter wylie . So [facility] [phone].    ATENCIÓN: Si habla español, tiene a bright disposición servicios gratuitos de asistencia lingüística. Llame al [phone].    We comply with applicable federal civil rights laws and [place] laws. We do not discriminate on the basis of race, color, national origin, age, disability, sex, sexual orientation, or gender identity.            Thank you!     Thank you for choosing [facility]  for your care. Our goal is always to provide you with excellent care. Hearing back from our patients is one way we can continue to improve our services. Please take a few minutes to complete the written survey that you may receive in the mail after your visit with us. Thank you!             Your Updated Medication List - Protect others around you: Learn how to safely use, store and throw away your medicines at www.disposemymeds.org.          This list is accurate as of 11/2/18  9:06  AM.  Always use your most recent med list.                   Brand Name Dispense Instructions for use Diagnosis    ALEVE PO      Take 2 tablets by mouth 2 times daily as needed        aspirin 81 MG EC tablet     90 tablet    Take 1 tablet (81 mg) by mouth daily    Heterozygous factor V Leiden mutation (H)       calcium + D 600-200 MG-UNIT Tabs   Generic drug:  calcium carbonate-vitamin D      1 TABLET DAILY twice daily        CO Q 10 PO      Take  by mouth.        fluticasone 50 MCG/ACT spray    FLONASE    16 g    Spray 2 sprays into both nostrils daily as needed Hold on file until needed    Other seasonal allergic rhinitis       folic acid 20 MG Caps      Take 1 tablet by mouth daily        hydrochlorothiazide 12.5 MG Tabs tablet     90 tablet    Take 1 tablet (12.5 mg) by mouth daily Hold on file until needed    Essential hypertension       hydroxychloroquine 200 MG tablet    PLAQUENIL    60 tablet    Take 1 tablet (200 mg) by mouth 2 times daily    Inflammatory polyarthropathy (H)       levothyroxine 150 MCG tablet    SYNTHROID/LEVOTHROID    90 tablet    Take 1 tablet (150 mcg) by mouth daily Hold on file until needed.    Hypothyroidism, unspecified type       magnesium hydroxide 400 MG/5ML suspension    MILK OF MAGNESIA     Take 400 mg/kg/day by mouth daily as needed for constipation or heartburn        MULTIPLE VITAMIN PO      1 daily        pantoprazole 40 MG EC tablet    PROTONIX    90 tablet    TAKE 1 TABLET (40mg) BY MOUTH ONCE DAILY    Gastroesophageal reflux disease without esophagitis       pyridoxine 100 MG tablet    VITAMIN B-6     Take 100 mg by mouth daily.        simvastatin 20 MG tablet    ZOCOR    90 tablet    Take 1 tablet (20 mg) by mouth At Bedtime Hold on file until needed    Hyperlipidemia LDL goal <100       tolterodine 4 MG 24 hr capsule    DETROL LA    90 capsule    Take 1 capsule (4 mg) by mouth daily Hold on file until needed    Urinary urgency       traZODone 100 MG tablet    DESYREL     90 tablet    1 tablets at bedtime as needed.  Hold on file until needed    Insomnia, unspecified type

## 2018-11-05 LAB
ASO AB SERPL-ACNC: 72 IU/ML (ref 0–120)
B BURGDOR IGG+IGM SER QL: 0.11 (ref 0–0.89)
CCP AB SER IA-ACNC: 1 U/ML
RHEUMATOID FACT SER NEPH-ACNC: <20 IU/ML (ref 0–20)

## 2018-11-06 LAB — ANA SER QL IF: NEGATIVE

## 2018-11-09 NOTE — PROGRESS NOTES
"[employer] message sent:  \"Ms. Mcgee,    ESR and CRP, nonspecific markers for inflammation, were elevated.  X-rays show degenerative changes and changes consistent with rheumatoid arthritis.    Vitamin D level is at 35.  Because you are not treating the osteoporosis at this time with an osteoporosis specific medication, I recommend that you increase your daily vitamin D intake by 1000 IU so that the vitamin D level is a bit higher.    Sincerely,  Chaim Puente MD  11/9/2018 5:42 AM\""

## 2018-11-13 ENCOUNTER — ONCOLOGY VISIT (OUTPATIENT)
Dept: ONCOLOGY | Facility: CLINIC | Age: 73
End: 2018-11-13
Attending: INTERNAL MEDICINE
Payer: MEDICARE

## 2018-11-13 VITALS — WEIGHT: 204.4 LBS | BODY MASS INDEX: 32.85 KG/M2 | HEIGHT: 66 IN

## 2018-11-13 DIAGNOSIS — Z85.3 PERSONAL HISTORY OF MALIGNANT NEOPLASM OF BREAST: Primary | ICD-10-CM

## 2018-11-13 DIAGNOSIS — M19.90 ARTHRITIS: ICD-10-CM

## 2018-11-13 DIAGNOSIS — Z12.31 BREAST CANCER SCREENING BY MAMMOGRAM: ICD-10-CM

## 2018-11-13 PROCEDURE — G0463 HOSPITAL OUTPT CLINIC VISIT: HCPCS

## 2018-11-13 PROCEDURE — 99213 OFFICE O/P EST LOW 20 MIN: CPT | Performed by: INTERNAL MEDICINE

## 2018-11-13 RX ORDER — ELECTROLYTES/DEXTROSE
1 SOLUTION, ORAL ORAL EVERY EVENING
COMMUNITY
End: 2022-08-01

## 2018-11-13 ASSESSMENT — PAIN SCALES - GENERAL: PAINLEVEL: MILD PAIN (3)

## 2018-11-13 NOTE — MR AVS SNAPSHOT
After Visit Summary   11/13/2018    Briana Mcgee    MRN: [id number]           Patient Information     Date Of Birth          1945        Visit Information        Provider Department      11/13/2018 2:15 PM Merari Duarte MD [facility]        Today's Diagnoses     Personal history of malignant neoplasm of breast    -  1    Arthritis        Breast cancer screening by mammogram          Care Instructions    1 yr f/u with MA and labs.           Follow-ups after your visit        Your next 10 appointments already scheduled     Feb 11, 2019  8:15 AM CST   LAB with PI LAB   [facility] ([facility])    [address] [address]   [phone]           Please do not eat 10-12 hours before your appointment if you are coming in fasting for labs on lipids, cholesterol, or glucose (sugar). This does not apply to pregnant women. Water, hot tea and black coffee (with nothing added) are okay. Do not drink other fluids, diet soda or chew gum.            Feb 15, 2019  9:00 AM CST   Return Visit with Chaim Puente MD   [facility] ([facility])    [address] [place] [address]   [phone]            Oct 24, 2019  7:45 AM CDT   MA SCREENING BILATERAL W/ JESSICA with [address] ([facility])    [address] [address]   [phone]           How do I prepare for my exam? (Food and drink instructions) No Food and Drink Restrictions.  How do I prepare for my exam? (Other instructions) Do not use any powder, lotion or deodorant under your arms or on your breast. If you do, we will ask you to remove it before your exam.  What should I wear: Wear comfortable, two-piece clothing.  How long does the exam take: Most scans will take 15 minutes.  What should I bring: Bring any previous mammograms from other facilities or have them mailed to the breast center.  Do I  "need a :  No  is needed.  What do I need to tell my doctor: If you have any allergies, tell your care team.  What should I do after the exam: No restrictions, You may resume normal activities.  What is this test: This test is an x-ray of the breast to look for breast disease. The breast is pressed between two plates to flatten and spread the tissue. An X-ray is taken of the breast from different angles.  Who should I call with questions: If you have any questions, please call the Imaging Department where you will have your exam. Directions, parking instructions, and other information is available on our website, [email or web address].  Other information about my exam Three-dimensional (3D) mammograms are available at [facility] locations in [facility], [place], and [place]. [facility] locations include [place] and the [facility] in [place].  Benefits of 3D mammograms include: * Improved rate of cancer detection * Decreases your chance of having to go back for more tests, which means fewer: * \"False-positive\" results (This means that there is an abnormal area but it isn't cancer.) * Invasive testing procedures, such as a biopsy or surgery * Can provide clearer images of the breast if you have dense breast tissue.  *3D mammography is an optional exam that anyone can have with a 2D mammogram. It doesn't replace or take the place of a 2D mammogram. 2D mammograms remain an effective screening test for all women.  Not all insurance companies cover the cost of a 3D mammogram. Check with your insurance.            Nov 07, 2019  8:30 AM CST   LAB with PI LAB   [facility]    [address] [place] [address]   [phone]           Please do not eat 10-12 hours before your appointment if you are coming in fasting for labs on lipids, cholesterol, or glucose (sugar). This does not apply to " "pregnant women. Water, hot tea and black coffee (with nothing added) are okay. Do not drink other fluids, diet soda or chew gum.              Future tests that were ordered for you today     Open Future Orders        Priority Expected Expires Ordered    Ca27.29  breast tumor marker Routine 10/1/2019 10/31/2019 11/13/2018    CBC with platelets differential Routine 10/1/2019 10/31/2019 11/13/2018    MA Screen Bilateral w/Joseph Routine 10/1/2019 10/31/2019 11/13/2018            Who to contact     If you have questions or need follow up information about today's clinic visit or your schedule please contact [facility] directly at [phone].  Normal or non-critical lab and imaging results will be communicated to you by Cactushart, letter or phone within 4 business days after the clinic has received the results. If you do not hear from us within 7 days, please contact the clinic through [employer] or phone. If you have a critical or abnormal lab result, we will notify you by phone as soon as possible.  Submit refill requests through [employer] or call your pharmacy and they will forward the refill request to us. Please allow 3 business days for your refill to be completed.          Additional Information About Your Visit        [employer] Information     [employer] gives you secure access to your electronic health record. If you see a primary care provider, you can also send messages to your care team and make appointments. If you have questions, please call your primary care clinic.  If you do not have a primary care provider, please call [phone] and they will assist you.        Care EveryWhere ID     This is your Care EveryWhere ID. This could be used by other organizations to access your [facility] medical records  [id number]        Your Vitals Were     Height Breastfeeding? BMI (Body Mass Index)             1.664 m (5' 5.5\") No 33.5 kg/m2          Blood Pressure from Last 3 Encounters:   11/02/18 148/80   05/01/18 " 136/80   03/01/18 132/76    Weight from Last 3 Encounters:   11/13/18 92.7 kg (204 lb 6.4 oz)   11/02/18 93.4 kg (206 lb)   05/01/18 95.3 kg (210 lb)               Primary Care Provider Office Phone # Fax #    Xavier Vega [phone] [address] [address]        Equal Access to Services     JENNIFER HODGES : Hadii aad ku hadasho Soomaali, waaxda luqadaha, qaybta kaalmada adeegyada, waxay idiin hayaan adeeg jenisejohnathonrandee lavince . So [facility] [phone].    ATENCIÓN: Si flaquito mcfarland, tiene a bright disposición servicios gratuitos de asistencia lingüística. [facility] [phone].    We comply with applicable federal civil rights laws and [place] laws. We do not discriminate on the basis of race, color, national origin, age, disability, sex, sexual orientation, or gender identity.            Thank you!     Thank you for choosing [facility]  for your care. Our goal is always to provide you with excellent care. Hearing back from our patients is one way we can continue to improve our services. Please take a few minutes to complete the written survey that you may receive in the mail after your visit with us. Thank you!             Your Updated Medication List - Protect others around you: Learn how to safely use, store and throw away your medicines at www.disposemymeds.org.          This list is accurate as of 11/13/18  3:08 PM.  Always use your most recent med list.                   Brand Name Dispense Instructions for use Diagnosis    ALEVE PO      Take 2 tablets by mouth 2 times daily as needed        aspirin 81 MG EC tablet     90 tablet    Take 1 tablet (81 mg) by mouth daily    Heterozygous factor V Leiden mutation (H)       CO Q 10 PO      Take  by mouth.        fluticasone 50 MCG/ACT spray    FLONASE    16 g    Spray 2 sprays into both nostrils daily as needed Hold on file until needed    Other seasonal allergic rhinitis       hydrochlorothiazide 12.5 MG Tabs tablet     90  tablet    Take 1 tablet (12.5 mg) by mouth daily Hold on file until needed    Essential hypertension       hydroxychloroquine 200 MG tablet    PLAQUENIL    60 tablet    Take 1 tablet (200 mg) by mouth 2 times daily    Inflammatory polyarthropathy (H)       levothyroxine 150 MCG tablet    SYNTHROID/LEVOTHROID    90 tablet    Take 1 tablet (150 mcg) by mouth daily Hold on file until needed.    Hypothyroidism, unspecified type       magnesium hydroxide 400 MG/5ML suspension    MILK OF MAGNESIA     Take 400 mg/kg/day by mouth daily as needed for constipation or heartburn        MULTIPLE VITAMIN PO      1 daily        MULTIVITAMIN ADULT Tabs      Take 1 capsule by mouth        pantoprazole 40 MG EC tablet    PROTONIX    90 tablet    TAKE 1 TABLET (40mg) BY MOUTH ONCE DAILY    Gastroesophageal reflux disease without esophagitis       pyridoxine 100 MG tablet    VITAMIN B-6     Take 100 mg by mouth daily.        simvastatin 20 MG tablet    ZOCOR    90 tablet    Take 1 tablet (20 mg) by mouth At Bedtime Hold on file until needed    Hyperlipidemia LDL goal <100       tolterodine 4 MG 24 hr capsule    DETROL LA    90 capsule    Take 1 capsule (4 mg) by mouth daily Hold on file until needed    Urinary urgency       traZODone 100 MG tablet    DESYREL    90 tablet    1 tablets at bedtime as needed.  Hold on file until needed    Insomnia, unspecified type

## 2018-11-13 NOTE — NURSING NOTE
"Oncology Rooming Note    November 13, 2018 2:18 PM   Briana Mcgee is a 73 year old female who presents for:    Chief Complaint   Patient presents with     Oncology Clinic Visit     1 year follow up breast CA. Review labs and mammogram results.      Initial Vitals: Ht 1.664 m (5' 5.5\")  Wt 92.7 kg (204 lb 6.4 oz)  Breastfeeding? No  BMI 33.5 kg/m2 Estimated body mass index is 33.5 kg/(m^2) as calculated from the following:    Height as of this encounter: 1.664 m (5' 5.5\").    Weight as of this encounter: 92.7 kg (204 lb 6.4 oz). Body surface area is 2.07 meters squared.  Mild Pain (3) Comment: right breast.    No LMP recorded. Patient has had a hysterectomy.  Allergies reviewed: Yes  Medications reviewed: Yes    Medications: Medication refills not needed today.  Pharmacy name entered into BESOS:    [facility] - [place], [place] - [address] [facility] [place] - [address]    Clinical concerns: 1 year follow up breast cancer. Review lab and mammogram results. C/o 3/10 right breast discomfort.     8 minutes for nursing intake (face to face time)     Asmita Dash CMA            "

## 2018-11-13 NOTE — LETTER
"    11/13/2018         RE: Briana Mcgee  [address] [facility] [address]        Dear Colleague,    Thank you for referring your patient, Briana Mcgee, to the [facility], [place], operated by [facility]. Please see a copy of my visit note below.    CHIEF COMPLAINT AND REASON FOR VISIT: Breast cancer followup.     HISTORY OF PRESENT ILLNESS: Briana Mcgee was diagnosed with left breast cancer, stage IIB, T2N1M0, ER positive infiltrating ductal cancer in 2005, status post lumpectomy. Primary tumor was 2.4 cm, grade 3 lesion with angiolymphatic invasion, ER/WA positive, HER-2 negative. Margins were clear. Status post 4 cycles of AC followed by 4 cycles of Taxol, then radiation.   She was on antihormonal therapy from end of 2005 and then initially started with Arimidex and could not tolerate it, switched to Aromasin and then dropped it in the later part of 2009. She has been on followup since then.     PAST MEDICAL HISTORY: GERD, severe reflux disease, osteoarthritis, hot flashes, stroke in 2006, insomnia, hyperlipidemia, hypothyroidism. inflammatory arthritis diagnosed in 2014 off all tx by herself.     MEDICATIONS: Reviewed in Epic system.     ALLERGIES: Erythromycin.     SOCIAL HISTORY: She lives in [place]. Denies smoking or alcohol abuse.     FAMILY HISTORY: Denied any family history of breast or ovarian cancer.       REVIEW OF SYSTEMS:   arthritis on her feet, she started plaquenil.   She reports right lumpy breast.  She had compression fracture summer 2017. She had local injection. She has severe LBP and also suffers from carpel tunnel.     PHYSICAL EXAMINATION:   VITAL SIGNS: Height 1.664 m (5' 5.5\"), weight 92.7 kg (204 lb 6.4 oz), not currently breastfeeding.    ECOG 0    GENERAL APPEARANCE: A middle-aged woman, looks much younger than her stated age, not in distress.   HEENT: The patient is normocephalic, atraumatic. Pupils are equal, react to light. Sclerae are anicteric. Moist oral mucosa. Negative " pharynx. No oral thrush. + sinus congestion.   NECK: Supple. No jugular venous distention. Thyroid is not palpable.   LYMPH NODES: Superficial lymphadenopathy is not appreciable in the bilateral cervical, supraclavicular, axillary or inguinal adenopathy.   CARDIOVASCULAR: S1, S2 regular, with no murmurs or gallops. No carotid or abdominal bruits.   PULMONARY: Lungs are clear to auscultation and percussion bilaterally. There is no wheezing or rhonchi.   GASTROINTESTINAL: Abdomen is soft, nontender. No hepatosplenomegaly. No signs of ascites. No mass appreciable.   MUSCULOSKELETAL AND EXTREMITIES: No edema. No cyanotic changes. No signs of joint deformity. No lymphedema.   NEUROLOGIC: Cranial nerves II through XII are grossly intact. Sensation intact. Muscle strength and muscle tone symmetrical all through, 5/5.   BACK: No spinal or paraspinal tenderness. No CVA tenderness.   SKIN: No petechiae. No rash. No signs of cellulitis.   BREASTS: Bilateral breast exam review: Left lumpectomy scar, well healed. Right nipple is flat and inverted as baseline. That is always the case, runs in her family, according to the patient.   She has thickening area at 12 o'clock, 2-4 cm from the nipple.     CURRENT LAB DATA REVIEWED  Cbc diff/LFT/marker: fine    CURRENT IMAGE REVIEWED  MA 11/201: negative    OLD DATA REVIEW IN SUMMARY:  MRI L spine 8/2017  1. L4 inferior endplate compression fracture. Adjacent marrow edema suggests that this is either acute/subacute or associated with ongoing instability.  2. L4-L5 severe central stenosis.  3. Multilevel degenerative disc disease, most advanced at L5-S1 where there is mild discogenic endplate reactive marrow edema.  4. Multilevel foraminal stenosis as above.    dexa 6/2016: negative    Chest x-ray from 01/2009, question possible pulmonary nodules. Followup CT chest in 02/2009 was reassuring. The last screening mammogram from 09/2008 looks similar to this one, benign BI-RADS 2 reading.      ASSESSMENT AND PLAN:   1. Lymph nodes positive high-grade left breast cancer 2005, status post lumpectomy and chemoradiation therapy, finished about 4 years of antihormonal therapy, could not tolerate it well, currently on surveillance visit.   We talked about the ER+ breast cancer recurrence pattern.   She is on continuous followup with us because of the features of her breast cancer on yrly basis.     2. She is battling with severe back pain from DJD.   We discussed the yoga and steroid injection.            Again, thank you for allowing me to participate in the care of your patient.        Sincerely,        Merari Duarte MD, MD

## 2018-11-13 NOTE — PROGRESS NOTES
"CHIEF COMPLAINT AND REASON FOR VISIT: Breast cancer followup.     HISTORY OF PRESENT ILLNESS: Briana Mcgee was diagnosed with left breast cancer, stage IIB, T2N1M0, ER positive infiltrating ductal cancer in 2005, status post lumpectomy. Primary tumor was 2.4 cm, grade 3 lesion with angiolymphatic invasion, ER/MN positive, HER-2 negative. Margins were clear. Status post 4 cycles of AC followed by 4 cycles of Taxol, then radiation.   She was on antihormonal therapy from end of 2005 and then initially started with Arimidex and could not tolerate it, switched to Aromasin and then dropped it in the later part of 2009. She has been on followup since then.     PAST MEDICAL HISTORY: GERD, severe reflux disease, osteoarthritis, hot flashes, stroke in 2006, insomnia, hyperlipidemia, hypothyroidism. inflammatory arthritis diagnosed in 2014 off all tx by herself.     MEDICATIONS: Reviewed in Epic system.     ALLERGIES: Erythromycin.     SOCIAL HISTORY: She lives in [place]. Denies smoking or alcohol abuse.     FAMILY HISTORY: Denied any family history of breast or ovarian cancer.       REVIEW OF SYSTEMS:   arthritis on her feet, she started plaquenil.   She reports right lumpy breast.  She had compression fracture summer 2017. She had local injection. She has severe LBP and also suffers from carpel tunnel.     PHYSICAL EXAMINATION:   VITAL SIGNS: Height 1.664 m (5' 5.5\"), weight 92.7 kg (204 lb 6.4 oz), not currently breastfeeding.    ECOG 0    GENERAL APPEARANCE: A middle-aged woman, looks much younger than her stated age, not in distress.   HEENT: The patient is normocephalic, atraumatic. Pupils are equal, react to light. Sclerae are anicteric. Moist oral mucosa. Negative pharynx. No oral thrush. + sinus congestion.   NECK: Supple. No jugular venous distention. Thyroid is not palpable.   LYMPH NODES: Superficial lymphadenopathy is not appreciable in the bilateral cervical, supraclavicular, axillary or inguinal " adenopathy.   CARDIOVASCULAR: S1, S2 regular, with no murmurs or gallops. No carotid or abdominal bruits.   PULMONARY: Lungs are clear to auscultation and percussion bilaterally. There is no wheezing or rhonchi.   GASTROINTESTINAL: Abdomen is soft, nontender. No hepatosplenomegaly. No signs of ascites. No mass appreciable.   MUSCULOSKELETAL AND EXTREMITIES: No edema. No cyanotic changes. No signs of joint deformity. No lymphedema.   NEUROLOGIC: Cranial nerves II through XII are grossly intact. Sensation intact. Muscle strength and muscle tone symmetrical all through, 5/5.   BACK: No spinal or paraspinal tenderness. No CVA tenderness.   SKIN: No petechiae. No rash. No signs of cellulitis.   BREASTS: Bilateral breast exam review: Left lumpectomy scar, well healed. Right nipple is flat and inverted as baseline. That is always the case, runs in her family, according to the patient.   She has thickening area at 12 o'clock, 2-4 cm from the nipple.     CURRENT LAB DATA REVIEWED  Cbc diff/LFT/marker: fine    CURRENT IMAGE REVIEWED  MA 11/201: negative    OLD DATA REVIEW IN SUMMARY:  MRI L spine 8/2017  1. L4 inferior endplate compression fracture. Adjacent marrow edema suggests that this is either acute/subacute or associated with ongoing instability.  2. L4-L5 severe central stenosis.  3. Multilevel degenerative disc disease, most advanced at L5-S1 where there is mild discogenic endplate reactive marrow edema.  4. Multilevel foraminal stenosis as above.    dexa 6/2016: negative    Chest x-ray from 01/2009, question possible pulmonary nodules. Followup CT chest in 02/2009 was reassuring. The last screening mammogram from 09/2008 looks similar to this one, benign BI-RADS 2 reading.     ASSESSMENT AND PLAN:   1. Lymph nodes positive high-grade left breast cancer 2005, status post lumpectomy and chemoradiation therapy, finished about 4 years of antihormonal therapy, could not tolerate it well, currently on surveillance visit.    We talked about the ER+ breast cancer recurrence pattern.   She is on continuous followup with us because of the features of her breast cancer on yrly basis.     2. She is battling with severe back pain from DJD.   We discussed the yoga and steroid injection.

## 2018-11-13 NOTE — PATIENT INSTRUCTIONS
Dr. Duarte would like you to have a mammogram prior to your 1 year follow up.     We would like to see you back in 1 year  for a follow up appointment with labs prior.     When you are in need of a refill, please call your pharmacy and they will send us a request.      Copy of appointments, and after visit summary (AVS) given to patient.      If you have any questions please call Lotus Eng RN, BSN Oncology Hematology  [facility] [phone]. For questions after business hours, or on holidays/weekends, please call our after hours Nurse Triage line [phone]. Thank you.                 1 yr f/u with MA and labs.

## 2018-12-06 DIAGNOSIS — E03.9 HYPOTHYROIDISM, UNSPECIFIED TYPE: ICD-10-CM

## 2018-12-06 DIAGNOSIS — I10 ESSENTIAL HYPERTENSION: ICD-10-CM

## 2018-12-06 LAB
CREAT UR-MCNC: 72 MG/DL
MICROALBUMIN UR-MCNC: 14 MG/L
MICROALBUMIN/CREAT UR: 19.25 MG/G CR (ref 0–25)
T4 FREE SERPL-MCNC: 1.23 NG/DL (ref 0.76–1.46)
TSH SERPL DL<=0.005 MIU/L-ACNC: 4.77 MU/L (ref 0.4–4)

## 2018-12-06 PROCEDURE — 82043 UR ALBUMIN QUANTITATIVE: CPT | Performed by: FAMILY MEDICINE

## 2018-12-06 PROCEDURE — 36415 COLL VENOUS BLD VENIPUNCTURE: CPT | Performed by: FAMILY MEDICINE

## 2018-12-06 PROCEDURE — 84443 ASSAY THYROID STIM HORMONE: CPT | Performed by: FAMILY MEDICINE

## 2018-12-06 PROCEDURE — 84439 ASSAY OF FREE THYROXINE: CPT | Performed by: FAMILY MEDICINE

## 2018-12-06 RX ORDER — LEVOTHYROXINE SODIUM 175 UG/1
175 TABLET ORAL DAILY
Qty: 60 TABLET | Refills: 0 | Status: SHIPPED | OUTPATIENT
Start: 2018-12-06 | End: 2019-02-08

## 2018-12-06 NOTE — LETTER
[facility]  [address]  [facility] [address]  Phone: [phone]      12/7/2018     Briana Mcgee  [address] [facility] [address]      Dear Jenna:    Thank you for allowing me to participate in your care. Your recent test results were reviewed and listed below.      Your results are provided below for your review  Results for orders placed or performed in visit on 12/06/18   Albumin Random Urine Quantitative with Creat Ratio   Result Value Ref Range    Creatinine Urine 72 mg/dL    Albumin Urine mg/L 14 mg/L    Albumin Urine mg/g Cr 19.25 0 - 25 mg/g Cr   TSH   Result Value Ref Range    TSH 4.77 (H) 0.40 - 4.00 mU/L   T4 FREE   Result Value Ref Range    T4 Free 1.23 0.76 - 1.46 ng/dL         Your urine micro albumin was normal. Your thyroid hormone was high, continue with the plan of care as we discussed on the phone which was to increase your thyroid mediation to levothyroxine 175 mcg a day and we should recheck this blood test in 8 weeks. Please call the clinic at [phone] if you have any additional questions or concerns.     Thank you for choosing [facility].     Sincerely,      Dr. Vega/Alejandra Desai MA

## 2019-01-23 ENCOUNTER — OFFICE VISIT (OUTPATIENT)
Dept: FAMILY MEDICINE | Facility: CLINIC | Age: 74
End: 2019-01-23
Payer: COMMERCIAL

## 2019-01-23 VITALS
RESPIRATION RATE: 18 BRPM | SYSTOLIC BLOOD PRESSURE: 106 MMHG | HEART RATE: 98 BPM | WEIGHT: 203 LBS | TEMPERATURE: 98.4 F | HEIGHT: 65 IN | DIASTOLIC BLOOD PRESSURE: 68 MMHG | BODY MASS INDEX: 33.82 KG/M2 | OXYGEN SATURATION: 90 %

## 2019-01-23 DIAGNOSIS — J02.9 ACUTE PHARYNGITIS, UNSPECIFIED ETIOLOGY: Primary | ICD-10-CM

## 2019-01-23 DIAGNOSIS — J02.9 SORE THROAT: ICD-10-CM

## 2019-01-23 LAB
DEPRECATED S PYO AG THROAT QL EIA: NORMAL
SPECIMEN SOURCE: NORMAL

## 2019-01-23 PROCEDURE — 87880 STREP A ASSAY W/OPTIC: CPT | Performed by: FAMILY MEDICINE

## 2019-01-23 PROCEDURE — 87081 CULTURE SCREEN ONLY: CPT | Performed by: FAMILY MEDICINE

## 2019-01-23 PROCEDURE — 99213 OFFICE O/P EST LOW 20 MIN: CPT | Performed by: FAMILY MEDICINE

## 2019-01-23 ASSESSMENT — PAIN SCALES - GENERAL: PAINLEVEL: EXTREME PAIN (8)

## 2019-01-23 ASSESSMENT — MIFFLIN-ST. JEOR: SCORE: 1421.68

## 2019-01-23 NOTE — NURSING NOTE
"Chief Complaint   Patient presents with     Pharyngitis       Initial Breastfeeding? No  Estimated body mass index is 33.5 kg/m  as calculated from the following:    Height as of 11/13/18: 1.664 m (5' 5.5\").    Weight as of 11/13/18: 92.7 kg (204 lb 6.4 oz).    Patient presents to the clinic using No DME    Health Maintenance that is potentially due pending provider review:  NONE    n/a    Is there anyone who you would like to be able to receive your results? not asked  If yes have patient fill out JUSTINE    "

## 2019-01-23 NOTE — PROGRESS NOTES
SUBJECTIVE:   Briana Mcgee is a 73 year old female who presents to clinic today for the following health issues:      ENT Symptoms             Symptoms: cc Present Absent Comment   Fever/Chills   x    Fatigue  x     Muscle Aches   x    Eye Irritation   x    Sneezing   x    Nasal Judd/Drg   x    Sinus Pressure/Pain   x    Loss of smell   x    Dental pain   x    Sore Throat x x     Swollen Glands  x     Ear Pain/Fullness   x    Cough   x    Wheeze   x    Chest Pain   x    Shortness of breath   x    Rash   x    Other   x Negative GI sx     Symptom duration:  1/20/2019   Symptom severity:  severe sore throat   Treatments tried:  Aleve and Tylenol   Contacts:  unknown but spouse has thrush         Problem list and histories reviewed & adjusted, as indicated.  Additional history: as documented    Patient Active Problem List   Diagnosis     Malignant neoplasm of female breast (H)     Hypothyroidism     Impaired fasting glucose     Insomnia     ischemic stroke 3/06     Rhinitis, allergic seasonal     OA (osteoarthritis) of knee     Hot flashes     HYPERLIPIDEMIA LDL GOAL <100     Heterozygous factor V Leiden mutation (H)     GERD (gastroesophageal reflux disease)     Varicose veins of lower extremities with complications     Lumbar radiculopathy     Spinal stenosis     Advanced directives, counseling/discussion     CKD (chronic kidney disease) stage 2, GFR 60-89 ml/min     Obesity     Hepatitis     Essential hypertension     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)     Spinal stenosis, lumbar region, with neurogenic claudication     Past Surgical History:   Procedure Laterality Date     BIOPSY BREAST       COLONOSCOPY N/A 9/2/2016    Procedure: COLONOSCOPY;  Surgeon: Dean Sanchez MD;  Location: [facility]     EXCISE EXOSTOSIS FOOT Right 4/25/2017    Procedure: EXCISE EXOSTOSIS FOOT;  Retrocalcaneal exostectomy right;  Surgeon: Antwan Goldstein DPM;  Location: [facility]      HC EXCISION BREAST LESION, OPEN >=1           HYSTERECTOMY, RYAN  1982     for non cancerous reasons and no abnormal pap     INJECT EPIDURAL LUMBAR  2011    INJECT EPIDURAL LUMBAR performed by GENERIC ANESTHESIA PROVIDER at [facility]     INJECT EPIDURAL LUMBAR  2011    Procedure:INJECT EPIDURAL LUMBAR; LESLIE Sánchez  ; Surgeon:GENERIC ANESTHESIA PROVIDER; Location:[facility]     INJECT EPIDURAL LUMBAR  10/6/2011    Procedure:INJECT EPIDURAL LUMBAR; LESLIE--; Surgeon:GENERIC ANESTHESIA PROVIDER; Location:[facility]     INJECT EPIDURAL LUMBAR  2012    Procedure:INJECT EPIDURAL LUMBAR; Terrell Sánchez  ; Surgeon:GENERIC ANESTHESIA PROVIDER; Location:[facility]     INJECT EPIDURAL LUMBAR  2012    Procedure: INJECT EPIDURAL LUMBAR;  Terrell Pacheco;  Surgeon: Provider, Generic Anesthesia;  Location: [facility]     LAMINECTOMY LUMBAR MINIMALLY INVASIVE TWO LEVELS N/A 2017    Procedure: LAMINECTOMY LUMBAR MINIMALLY INVASIVE TWO LEVELS;  L4-5 decompression laminectomy, Left L5-S1 foraminal decompression;  Surgeon: Jesse Dueñas MD;  Location: [facility]     LUMPECTOMY BREAST       RELEASE CARPAL TUNNEL Right 2017    Procedure: RELEASE CARPAL TUNNEL;  Right Wrist Carpal Tunnel Release;  Surgeon: Melba Yi MD;  Location: [facility]     RELEASE CARPAL TUNNEL Left 10/31/2017    Procedure: RELEASE CARPAL TUNNEL;  Left Wrist Carpal Tunnel Release;  Surgeon: Melba Yi MD;  Location: [facility]     SURGICAL HISTORY OF -       lumpectomy with sentinal node biopsy     SURGICAL HISTORY OF -       left knee arthoscopic repair medial meniscus       Social History     Tobacco Use     Smoking status: Former Smoker     Types: Cigarettes     Last attempt to quit: 1996     Years since quittin.5     Smokeless tobacco: Never Used     Tobacco comment: quit 10-12 years ago, .   Substance Use Topics     Alcohol use: Yes     Comment: glass of wine at night     Family History   Problem Relation Age of Onset      Diabetes Mother      Hypertension Mother      Cancer Mother         breast     Heart Disease Mother         chf     Breast Cancer Mother      Blood Disease Father      Diabetes Son         Type 1     Psoriasis Son      Cerebrovascular Disease Son 48     Cerebrovascular Disease Sister      Breast Cancer Paternal Aunt          Current Outpatient Medications   Medication Sig Dispense Refill     aspirin 81 MG EC tablet Take 1 tablet (81 mg) by mouth daily 90 tablet 3     Coenzyme Q10 (CO Q 10 PO) Take  by mouth.       fluticasone (FLONASE) 50 MCG/ACT spray Spray 2 sprays into both nostrils daily as needed Hold on file until needed 16 g 11     hydrochlorothiazide 12.5 MG TABS tablet Take 1 tablet (12.5 mg) by mouth daily Hold on file until needed 90 tablet 3     hydroxychloroquine (PLAQUENIL) 200 MG tablet Take 1 tablet (200 mg) by mouth 2 times daily 60 tablet 3     levothyroxine (SYNTHROID/LEVOTHROID) 175 MCG tablet Take 1 tablet (175 mcg) by mouth daily 60 tablet 0     magnesium hydroxide (MILK OF MAGNESIA) 400 MG/5ML suspension Take 400 mg/kg/day by mouth daily as needed for constipation or heartburn       MULTIPLE VITAMIN OR 1 daily       Multiple Vitamins-Minerals (MULTIVITAMIN ADULT) TABS Take 1 capsule by mouth       Naproxen Sodium (ALEVE PO) Take 2 tablets by mouth 2 times daily as needed        pantoprazole (PROTONIX) 40 MG EC tablet TAKE 1 TABLET (40mg) BY MOUTH ONCE DAILY 90 tablet 1     pyridoxine (VITAMIN B-6) 100 MG tablet Take 100 mg by mouth daily.       simvastatin (ZOCOR) 20 MG tablet Take 1 tablet (20 mg) by mouth At Bedtime Hold on file until needed 90 tablet 3     tolterodine (DETROL LA) 4 MG 24 hr capsule Take 1 capsule (4 mg) by mouth daily Hold on file until needed 90 capsule 3     traZODone (DESYREL) 100 MG tablet 1 tablets at bedtime as needed.  Hold on file until needed 90 tablet 3     Allergies   Allergen Reactions     Erythromycin      Other reaction(s): Edema     Hydrocodone      Other  "reaction(s): GI Upset  Tolerates dilaudid     Oxycodone      Other reaction(s): GI Upset     Recent Labs   Lab Test 12/06/18  0800 11/02/18  0912 07/17/18  0815 04/20/18  0825 01/09/18  0950 11/14/17  1032  10/19/17  1015  03/13/17  0804  03/17/15  1104  01/07/14  1031   A1C  --   --   --  5.3  --   --   --   --   --   --   --  5.6  --  5.4   LDL  --   --  84 94  --   --   --   --   --  96   < >  --    < >  --    HDL  --   --  66 68  --   --   --   --   --  88   < >  --    < >  --    TRIG  --   --  145 198*  --   --   --   --   --  154*   < >  --    < >  --    ALT  --  25  --   --   --  25  --  26  --   --    < >  --    < >  --    CR  --  0.86  --   --  0.83 0.81   < >  --    < > 0.84   < >  --    < >  --    GFRESTIMATED  --  65  --   --  67 69   < >  --    < > 66   < >  --    < >  --    GFRESTBLACK  --  78  --   --  82 83   < >  --    < > 80   < >  --    < >  --    POTASSIUM  --  3.8  --   --  3.7 3.9   < >  --    < > 4.2   < >  --    < >  --    TSH 4.77*  --  4.13*  --  1.84 4.12*  --   --   --  1.87   < > 2.28   < >  --     < > = values in this interval not displayed.      BP Readings from Last 3 Encounters:   01/23/19 106/68   11/02/18 148/80   05/01/18 136/80    Wt Readings from Last 3 Encounters:   01/23/19 92.1 kg (203 lb)   11/13/18 92.7 kg (204 lb 6.4 oz)   11/02/18 93.4 kg (206 lb)                  Labs reviewed in EPIC    Reviewed and updated as needed this visit by clinical staff  Tobacco  Allergies  Meds       Reviewed and updated as needed this visit by Provider         ROS:  Constitutional, HEENT, cardiovascular, pulmonary, gi and gu systems are negative, except as otherwise noted.    OBJECTIVE:     /68   Pulse 98   Temp 98.4  F (36.9  C)   Resp 18   Ht 1.651 m (5' 5\")   Wt 92.1 kg (203 lb)   SpO2 90%   Breastfeeding? No   BMI 33.78 kg/m    Body mass index is 33.78 kg/m .  GENERAL: alert and no distress  EYES: Eyes grossly normal to inspection, PERRL and conjunctivae and sclerae " normal  HENT: normal cephalic/atraumatic, ear canals and TM's normal, oral mucous membranes moist, oropharxnx crowded and tonsillar erythema  NECK: no adenopathy, no asymmetry, masses, or scars and thyroid normal to palpation  RESP: lungs clear to auscultation - no rales, rhonchi or wheezes  CV: regular rates and rhythm, normal S1 S2, no S3 or S4 and no murmur, click or rub    Diagnostic Test Results:  Results for orders placed or performed in visit on 01/23/19 (from the past 24 hour(s))   Strep, Rapid Screen   Result Value Ref Range    Specimen Description Throat     Rapid Strep A Screen       NEGATIVE: No Group A streptococcal antigen detected by immunoassay, await culture report.       ASSESSMENT/PLAN:         ICD-10-CM    1. Acute pharyngitis, unspecified etiology J02.9    2. Sore throat J02.9 Strep, Rapid Screen     Beta strep group A culture       Discussed in detail differentials and further management. Symptoms are likely secondary to viral infection. Recommended well hydration, over-the-counter analgesia, warm fluids and saline gargles. Follow up if symptoms persist or worsen. Written instructions/information provided. Patient understood and in agreement with the above plan. All questions are answered.         Patient Instructions       Patient Education     When You Have a Sore Throat    A sore throat can be painful. There are many reasons why you may have a sore throat. Your healthcare provider will work with you to find the cause of your sore throat. He or she will also find the best treatment for you.  What causes a sore throat?  Sore throats can be caused or worsened by:    Cold or flu viruses    Bacteria    Irritants such as tobacco smoke or air pollution    Acid reflux  A healthy throat  The tonsils are on the sides of the throat near the base of the tongue. They collect viruses and bacteria and help fight infection. The throat (pharynx) is the passage for air. Mucus from the nasal cavity also moves  down the passage.  An inflamed throat  The tonsils and pharynx can become inflamed due to a cold or flu virus. Postnasal drip (excess mucus draining from the nasal cavity) can irritate the throat. It can also make the throat or tonsils more likely to be infected by bacteria. Severe, untreated tonsillitis in children or adults can cause a pocket of pus (abscess) to form near the tonsil.  Your evaluation  A medical evaluation can help find the cause of your sore throat. It can also help your healthcare provider choose the best treatment for you. The evaluation may include a health history, physical exam, and diagnostic tests.  Health history  Your healthcare provider may ask you the following:    How long has the sore throat lasted and how have you been treating it?    Do you have any other symptoms, such as body aches, fever, or cough?    Does your sore throat recur? If so, how often? How many days of school or work have you missed because of a sore throat?    Do you have trouble eating or swallowing?    Have you been told that you snore or have other sleep problems?    Do you have bad breath?    Do you cough up bad-tasting mucus?  Physical exam  During the exam, your healthcare provider checks your ears, nose, and throat for problems. He or she also checks for swelling in the neck, and may listen to your chest.  Possible tests  Other tests your healthcare provider may perform include:    A throat swab to check for bacteria such as streptococcus (the bacteria that causes strep throat)    A blood test to check for mononucleosis (a viral infection)    A chest X-ray to rule out pneumonia, especially if you have a cough  Treating a sore throat  Treatment depends on many factors. What is the likely cause? Is the problem recent? Does it keep coming back? In many cases, the best thing to do is to treat the symptoms, rest, and let the problem heal itself. Antibiotics may help clear up some bacterial infections. For cases of  "severe or recurring tonsillitis, the tonsils may need to be removed.  Relieving your symptoms    Don t smoke, and avoid secondhand smoke.    For children, try throat sprays or Popsicles. Adults and older children may try lozenges.    Drink warm liquids to soothe the throat and help thin mucus. Avoid alcohol, spicy foods, and acidic drinks such as orange juice. These can irritate the throat.    Gargle with warm saltwater (1 teaspoon of salt to 8 ounces of warm water).    Use a humidifier to keep air moist and relieve throat dryness.    Try over-the-counter pain relievers such as acetaminophen or ibuprofen. Use as directed, and don t exceed the recommended dose. Don t give aspirin to children.   Are antibiotics needed?  If your sore throat is due to a bacterial infection, antibiotics may speed healing and prevent complications. Although group A streptococcus (\"strep throat\" or GAS) is the major treatable infection for a sore throat, GAS causes only 5% to 15% of sore throats in adults who seek medical care. Most sore throats are caused by cold or flu viruses. And antibiotics don t treat viral illness. In fact, using antibiotics when they re not needed may produce bacteria that are harder to kill. Your healthcare provider will prescribe antibiotics only if he or she thinks they are likely to help.  If antibiotics are prescribed  Take the medicine exactly as directed. Be sure to finish your prescription even if you re feeling better. And be sure to ask your healthcare provider or pharmacist what side effects are common and what to do about them.  Is surgery needed?  In some cases, tonsils need to be removed. This is often done as outpatient (same-day) surgery. Your healthcare provider may advise removing the tonsils in cases of:    Several severe bouts of tonsillitis in a year.  Severe  episodes include those that lead to missed days of school or work, or that need to be treated with antibiotics.    Tonsillitis that " causes breathing problems during sleep    Tonsillitis caused by food particles collecting in pouches in the tonsils (cryptic tonsillitis)  Call your healthcare provider if any of the following occur:    Symptoms worsen, or new symptoms develop.    Swollen tonsils make breathing difficult.    The pain is severe enough to keep you from drinking liquids.    A skin rash, hives, or wheezing develops. Any of these could signal an allergic reaction to antibiotics.    Symptoms don t improve within a week.    Symptoms don t improve within 2 to 3 days of starting antibiotics.   Date Last Reviewed: 10/1/2016    7162-2579 [employer]. [address] [place] [address]. All rights reserved. This information is not intended as a substitute for professional medical care. Always follow your healthcare professional's instructions.               Will Portillo MD  [facility]

## 2019-01-23 NOTE — PATIENT INSTRUCTIONS
Patient Education     When You Have a Sore Throat    A sore throat can be painful. There are many reasons why you may have a sore throat. Your healthcare provider will work with you to find the cause of your sore throat. He or she will also find the best treatment for you.  What causes a sore throat?  Sore throats can be caused or worsened by:    Cold or flu viruses    Bacteria    Irritants such as tobacco smoke or air pollution    Acid reflux  A healthy throat  The tonsils are on the sides of the throat near the base of the tongue. They collect viruses and bacteria and help fight infection. The throat (pharynx) is the passage for air. Mucus from the nasal cavity also moves down the passage.  An inflamed throat  The tonsils and pharynx can become inflamed due to a cold or flu virus. Postnasal drip (excess mucus draining from the nasal cavity) can irritate the throat. It can also make the throat or tonsils more likely to be infected by bacteria. Severe, untreated tonsillitis in children or adults can cause a pocket of pus (abscess) to form near the tonsil.  Your evaluation  A medical evaluation can help find the cause of your sore throat. It can also help your healthcare provider choose the best treatment for you. The evaluation may include a health history, physical exam, and diagnostic tests.  Health history  Your healthcare provider may ask you the following:    How long has the sore throat lasted and how have you been treating it?    Do you have any other symptoms, such as body aches, fever, or cough?    Does your sore throat recur? If so, how often? How many days of school or work have you missed because of a sore throat?    Do you have trouble eating or swallowing?    Have you been told that you snore or have other sleep problems?    Do you have bad breath?    Do you cough up bad-tasting mucus?  Physical exam  During the exam, your healthcare provider checks your ears, nose, and throat for problems. He or she  "also checks for swelling in the neck, and may listen to your chest.  Possible tests  Other tests your healthcare provider may perform include:    A throat swab to check for bacteria such as streptococcus (the bacteria that causes strep throat)    A blood test to check for mononucleosis (a viral infection)    A chest X-ray to rule out pneumonia, especially if you have a cough  Treating a sore throat  Treatment depends on many factors. What is the likely cause? Is the problem recent? Does it keep coming back? In many cases, the best thing to do is to treat the symptoms, rest, and let the problem heal itself. Antibiotics may help clear up some bacterial infections. For cases of severe or recurring tonsillitis, the tonsils may need to be removed.  Relieving your symptoms    Don t smoke, and avoid secondhand smoke.    For children, try throat sprays or Popsicles. Adults and older children may try lozenges.    Drink warm liquids to soothe the throat and help thin mucus. Avoid alcohol, spicy foods, and acidic drinks such as orange juice. These can irritate the throat.    Gargle with warm saltwater (1 teaspoon of salt to 8 ounces of warm water).    Use a humidifier to keep air moist and relieve throat dryness.    Try over-the-counter pain relievers such as acetaminophen or ibuprofen. Use as directed, and don t exceed the recommended dose. Don t give aspirin to children.   Are antibiotics needed?  If your sore throat is due to a bacterial infection, antibiotics may speed healing and prevent complications. Although group A streptococcus (\"strep throat\" or GAS) is the major treatable infection for a sore throat, GAS causes only 5% to 15% of sore throats in adults who seek medical care. Most sore throats are caused by cold or flu viruses. And antibiotics don t treat viral illness. In fact, using antibiotics when they re not needed may produce bacteria that are harder to kill. Your healthcare provider will prescribe antibiotics " only if he or she thinks they are likely to help.  If antibiotics are prescribed  Take the medicine exactly as directed. Be sure to finish your prescription even if you re feeling better. And be sure to ask your healthcare provider or pharmacist what side effects are common and what to do about them.  Is surgery needed?  In some cases, tonsils need to be removed. This is often done as outpatient (same-day) surgery. Your healthcare provider may advise removing the tonsils in cases of:    Several severe bouts of tonsillitis in a year.  Severe  episodes include those that lead to missed days of school or work, or that need to be treated with antibiotics.    Tonsillitis that causes breathing problems during sleep    Tonsillitis caused by food particles collecting in pouches in the tonsils (cryptic tonsillitis)  Call your healthcare provider if any of the following occur:    Symptoms worsen, or new symptoms develop.    Swollen tonsils make breathing difficult.    The pain is severe enough to keep you from drinking liquids.    A skin rash, hives, or wheezing develops. Any of these could signal an allergic reaction to antibiotics.    Symptoms don t improve within a week.    Symptoms don t improve within 2 to 3 days of starting antibiotics.   Date Last Reviewed: 10/1/2016    4873-4238 [employer]. [address] [place] [address], [place] [address]. All rights reserved. This information is not intended as a substitute for professional medical care. Always follow your healthcare professional's instructions.

## 2019-01-24 LAB
BACTERIA SPEC CULT: NORMAL
SPECIMEN SOURCE: NORMAL

## 2019-02-08 DIAGNOSIS — E03.9 HYPOTHYROIDISM, UNSPECIFIED TYPE: ICD-10-CM

## 2019-02-08 RX ORDER — LEVOTHYROXINE SODIUM 175 UG/1
175 TABLET ORAL DAILY
Qty: 30 TABLET | Refills: 0 | Status: SHIPPED | OUTPATIENT
Start: 2019-02-08 | End: 2019-02-14 | Stop reason: DRUGHIGH

## 2019-02-08 NOTE — LETTER
February 8, 2019       Briana Mcgee  [address] [facility] [id number]        Dear Briana,       Your pharmacy has asked us for a refill of your medication: levothyroxine. Please note at this time we have only provided you with a 30 day supply because you are due for a recheck of your TSH level. Please make an lab only appointment before your medication is out by calling [phone].    Sincerely,        Xavier Vega MD/ lara

## 2019-02-08 NOTE — TELEPHONE ENCOUNTER
"Requested Prescriptions   Pending Prescriptions Disp Refills     levothyroxine (SYNTHROID/LEVOTHROID) 175 MCG tablet [Pharmacy Med Name: LEVOTHYROXINE SODIUM 175 MCG TABLET] 60 tablet 0     Sig: Take 1 tablet (175 mcg) by mouth daily    Thyroid Protocol Failed - 2/8/2019  8:22 AM       Failed - Normal TSH on file in past 12 months    Recent Labs   Lab Test 12/06/18  0800   TSH 4.77*             Passed - Patient is 12 years or older       Passed - Recent (12 mo) or future (30 days) visit within the authorizing provider's specialty    Patient had office visit in the last 12 months or has a visit in the next 30 days with authorizing provider or within the authorizing provider's specialty.  See \"Patient Info\" tab in inbasket, or \"Choose Columns\" in Meds & Orders section of the refill encounter.             Passed - Medication is active on med list       Passed - No active pregnancy on record    If patient is pregnant or has had a positive pregnancy test, please check TSH.         Passed - No positive pregnancy test in past 12 months    If patient is pregnant or has had a positive pregnancy test, please check TSH.          Ananya West,   Your TSH has increased even further today.  I recommend to increase your thyroid medication to levothyroxine 175 mcg a day and recheck in 8 weeks.   I will send the new prescription to your pharmacy and order the future lab.   Sincerely,   Xavier Vega MD       Reminder letter sent to patient to have lab redrawn. Short supply provided. Corinne BRENNER RN    "

## 2019-02-11 DIAGNOSIS — M06.4 INFLAMMATORY POLYARTHROPATHY (H): ICD-10-CM

## 2019-02-11 DIAGNOSIS — E03.9 HYPOTHYROIDISM, UNSPECIFIED TYPE: ICD-10-CM

## 2019-02-11 LAB
ALBUMIN SERPL-MCNC: 3.8 G/DL (ref 3.4–5)
ALP SERPL-CCNC: 66 U/L (ref 40–150)
ALT SERPL W P-5'-P-CCNC: 29 U/L (ref 0–50)
AST SERPL W P-5'-P-CCNC: 25 U/L (ref 0–45)
BASOPHILS # BLD AUTO: 0.1 10E9/L (ref 0–0.2)
BASOPHILS NFR BLD AUTO: 1.3 %
BILIRUB DIRECT SERPL-MCNC: 0.1 MG/DL (ref 0–0.2)
BILIRUB SERPL-MCNC: 0.6 MG/DL (ref 0.2–1.3)
CREAT SERPL-MCNC: 0.84 MG/DL (ref 0.52–1.04)
CRP SERPL-MCNC: 3.3 MG/L (ref 0–8)
DIFFERENTIAL METHOD BLD: NORMAL
EOSINOPHIL # BLD AUTO: 0.5 10E9/L (ref 0–0.7)
EOSINOPHIL NFR BLD AUTO: 6.4 %
ERYTHROCYTE [DISTWIDTH] IN BLOOD BY AUTOMATED COUNT: 12.8 % (ref 10–15)
ERYTHROCYTE [SEDIMENTATION RATE] IN BLOOD BY WESTERGREN METHOD: 21 MM/H (ref 0–30)
GFR SERPL CREATININE-BSD FRML MDRD: 68 ML/MIN/{1.73_M2}
HCT VFR BLD AUTO: 38.4 % (ref 35–47)
HGB BLD-MCNC: 12.8 G/DL (ref 11.7–15.7)
LYMPHOCYTES # BLD AUTO: 2.4 10E9/L (ref 0.8–5.3)
LYMPHOCYTES NFR BLD AUTO: 30.3 %
MCH RBC QN AUTO: 30.9 PG (ref 26.5–33)
MCHC RBC AUTO-ENTMCNC: 33.3 G/DL (ref 31.5–36.5)
MCV RBC AUTO: 93 FL (ref 78–100)
MONOCYTES # BLD AUTO: 0.7 10E9/L (ref 0–1.3)
MONOCYTES NFR BLD AUTO: 9 %
NEUTROPHILS # BLD AUTO: 4.1 10E9/L (ref 1.6–8.3)
NEUTROPHILS NFR BLD AUTO: 53 %
PLATELET # BLD AUTO: 311 10E9/L (ref 150–450)
PROT SERPL-MCNC: 7.4 G/DL (ref 6.8–8.8)
RBC # BLD AUTO: 4.14 10E12/L (ref 3.8–5.2)
T4 FREE SERPL-MCNC: 1.76 NG/DL (ref 0.76–1.46)
TSH SERPL DL<=0.005 MIU/L-ACNC: 0.62 MU/L (ref 0.4–4)
WBC # BLD AUTO: 7.8 10E9/L (ref 4–11)

## 2019-02-11 PROCEDURE — 85652 RBC SED RATE AUTOMATED: CPT | Performed by: INTERNAL MEDICINE

## 2019-02-11 PROCEDURE — 85025 COMPLETE CBC W/AUTO DIFF WBC: CPT | Performed by: INTERNAL MEDICINE

## 2019-02-11 PROCEDURE — 84439 ASSAY OF FREE THYROXINE: CPT | Performed by: FAMILY MEDICINE

## 2019-02-11 PROCEDURE — 86140 C-REACTIVE PROTEIN: CPT | Performed by: INTERNAL MEDICINE

## 2019-02-11 PROCEDURE — 84443 ASSAY THYROID STIM HORMONE: CPT | Performed by: FAMILY MEDICINE

## 2019-02-11 PROCEDURE — 36415 COLL VENOUS BLD VENIPUNCTURE: CPT | Performed by: INTERNAL MEDICINE

## 2019-02-11 PROCEDURE — 82565 ASSAY OF CREATININE: CPT | Performed by: INTERNAL MEDICINE

## 2019-02-11 PROCEDURE — 80076 HEPATIC FUNCTION PANEL: CPT | Performed by: INTERNAL MEDICINE

## 2019-02-13 ENCOUNTER — TELEPHONE (OUTPATIENT)
Dept: FAMILY MEDICINE | Facility: CLINIC | Age: 74
End: 2019-02-13

## 2019-02-13 NOTE — TELEPHONE ENCOUNTER
Reason for Call:  Lab result    Detailed comments: patient is calling us back for labs results. I gave her the results and she answered the 2 questions that Dr. Vega asked. No she is not feeling hot and no her heart is not beating different. She does mention that she gets winded going up the stairs. She will await the call on her medication adjustment after Dr. Vega gets her response to his questions. Unable to chart on the original lab, as it was written on the results under lab.    Phone Number Patient can be reached at: Home number on file [phone] (home)    Best Time: any    Can we leave a detailed message on this number? YES   Soledad Brandon  [facility]      Call taken on 2/13/2019 at 11:41 AM by Soledad Brandon

## 2019-02-14 DIAGNOSIS — E03.9 HYPOTHYROIDISM, UNSPECIFIED TYPE: Primary | ICD-10-CM

## 2019-02-14 RX ORDER — LEVOTHYROXINE SODIUM 150 UG/1
150 TABLET ORAL DAILY
Qty: 60 TABLET | Refills: 3 | Status: SHIPPED | OUTPATIENT
Start: 2019-02-14 | End: 2019-04-05

## 2019-02-14 NOTE — TELEPHONE ENCOUNTER
Please call patient,   due to her symptoms and her lab results, too much thyroid medication may contribute to some feelings of shortness of breath with activity.  I do want to change her medication and decrease it and recheck her thyroid level in 2 months.  I have changed the medication to levothyroxine 150 mcg a day and sent a prescription in and ordered labs.  Sincerely,  Xavier Vega MD

## 2019-02-15 ENCOUNTER — OFFICE VISIT (OUTPATIENT)
Dept: RHEUMATOLOGY | Facility: CLINIC | Age: 74
End: 2019-02-15
Payer: COMMERCIAL

## 2019-02-15 VITALS
OXYGEN SATURATION: 96 % | WEIGHT: 200 LBS | SYSTOLIC BLOOD PRESSURE: 128 MMHG | HEART RATE: 98 BPM | TEMPERATURE: 98.2 F | DIASTOLIC BLOOD PRESSURE: 75 MMHG | BODY MASS INDEX: 32.14 KG/M2 | HEIGHT: 66 IN | RESPIRATION RATE: 14 BRPM

## 2019-02-15 DIAGNOSIS — M06.4 INFLAMMATORY POLYARTHROPATHY (H): Primary | ICD-10-CM

## 2019-02-15 DIAGNOSIS — Z79.899 HIGH RISK MEDICATION USE: ICD-10-CM

## 2019-02-15 PROCEDURE — 99213 OFFICE O/P EST LOW 20 MIN: CPT | Performed by: INTERNAL MEDICINE

## 2019-02-15 RX ORDER — HYDROXYCHLOROQUINE SULFATE 200 MG/1
200 TABLET, FILM COATED ORAL 2 TIMES DAILY
Qty: 180 TABLET | Refills: 2 | Status: SHIPPED | OUTPATIENT
Start: 2019-02-15 | End: 2019-06-12

## 2019-02-15 ASSESSMENT — MIFFLIN-ST. JEOR: SCORE: 1416

## 2019-02-15 ASSESSMENT — PAIN SCALES - GENERAL: PAINLEVEL: MILD PAIN (2)
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
## 2019-02-15 NOTE — PATIENT INSTRUCTIONS
Please call to schedule a bone density scan (DEXA):    [facility]  [address]  [place] [address] [phone]    Dr. Puente s Rheumatology Clinics  Locations Clinic Hours Telephone Number     [facility]  [address] [phone]     Wednesday: 7:20AM - 4:00PM  Thursday:     7:20AM - 4:00PM     Friday:          7:20AM - 11:00AM       To schedule an appointment with  Dr. Puente,  please contact  Specialty [phone]       [facility] [place]  [address]  [place] [address]       Monday:       7:20AM - 4:00PM        [facility]  [address] [place] [address]       Tuesday:      7:20AM - 4:00PM

## 2019-04-05 DIAGNOSIS — E03.9 HYPOTHYROIDISM, UNSPECIFIED TYPE: ICD-10-CM

## 2019-04-05 DIAGNOSIS — K21.9 GASTROESOPHAGEAL REFLUX DISEASE WITHOUT ESOPHAGITIS: ICD-10-CM

## 2019-04-05 DIAGNOSIS — E03.9 HYPOTHYROIDISM, UNSPECIFIED TYPE: Primary | ICD-10-CM

## 2019-04-05 LAB
T4 FREE SERPL-MCNC: 1.04 NG/DL (ref 0.76–1.46)
TSH SERPL DL<=0.005 MIU/L-ACNC: 5.26 MU/L (ref 0.4–4)

## 2019-04-05 PROCEDURE — 36415 COLL VENOUS BLD VENIPUNCTURE: CPT | Performed by: FAMILY MEDICINE

## 2019-04-05 PROCEDURE — 84439 ASSAY OF FREE THYROXINE: CPT | Performed by: FAMILY MEDICINE

## 2019-04-05 PROCEDURE — 84443 ASSAY THYROID STIM HORMONE: CPT | Performed by: FAMILY MEDICINE

## 2019-04-05 RX ORDER — LEVOTHYROXINE SODIUM 25 UG/1
25 TABLET ORAL DAILY
Qty: 90 TABLET | Refills: 0 | Status: SHIPPED | OUTPATIENT
Start: 2019-04-05 | End: 2019-06-10

## 2019-04-05 RX ORDER — PANTOPRAZOLE SODIUM 40 MG/1
TABLET, DELAYED RELEASE ORAL
Qty: 30 TABLET | Refills: 0 | Status: SHIPPED | OUTPATIENT
Start: 2019-04-05 | End: 2019-05-02

## 2019-04-05 RX ORDER — LEVOTHYROXINE SODIUM 137 UG/1
137 TABLET ORAL DAILY
Qty: 90 TABLET | Refills: 0 | Status: SHIPPED | OUTPATIENT
Start: 2019-04-05 | End: 2019-06-10

## 2019-04-05 NOTE — TELEPHONE ENCOUNTER
Medication is being filled for 1 time refill only due to:  Patient needs to be seen because due for yearly medication review.   Reminder placed on pharmacy. notes.  SHELBY Chacon

## 2019-04-05 NOTE — TELEPHONE ENCOUNTER
Protonix     Last Written Prescription Date:  10/04/18  Last Fill Quantity: 90,   # refills: 1  Last Office Visit: 5/01/18  Future Office visit:    Next 5 appointments (look out 90 days)    Jun 12, 2019 10:20 AM CDT  Return Visit with Chaim Puente MD  [facility] ([facility]) [address]  [place] [address]  [phone]           Routing refill request to provider for review/approval because:

## 2019-04-09 DIAGNOSIS — E03.9 ACQUIRED HYPOTHYROIDISM: Primary | ICD-10-CM

## 2019-05-02 DIAGNOSIS — K21.9 GASTROESOPHAGEAL REFLUX DISEASE WITHOUT ESOPHAGITIS: ICD-10-CM

## 2019-05-02 RX ORDER — PANTOPRAZOLE SODIUM 40 MG/1
TABLET, DELAYED RELEASE ORAL
Qty: 30 TABLET | Refills: 0 | Status: SHIPPED | OUTPATIENT
Start: 2019-05-02 | End: 2019-06-04

## 2019-05-02 NOTE — TELEPHONE ENCOUNTER
"Requested Prescriptions   Pending Prescriptions Disp Refills     pantoprazole (PROTONIX) 40 MG EC tablet [Pharmacy Med Name: PANTOPRAZOLE SODIUM 40 MG TABLET DR] 30 tablet 0     Sig: TAKE 1 TABLET (40mg) BY MOUTH ONCE DAILY       PPI Protocol Passed - 5/2/2019  8:18 AM        Passed - Not on Clopidogrel (unless Pantoprazole ordered)        Passed - No diagnosis of osteoporosis on record        Passed - Recent (12 mo) or future (30 days) visit within the authorizing provider's specialty     Patient had office visit in the last 12 months or has a visit in the next 30 days with authorizing provider or within the authorizing provider's specialty.  See \"Patient Info\" tab in inbasket, or \"Choose Columns\" in Meds & Orders section of the refill encounter.              Passed - Medication is active on med list        Passed - Patient is age 18 or older        Passed - No active pregnacy on record        Passed - No positive pregnancy test in past 12 months        Last Written Prescription Date:  4/5/19  Last Fill Quantity: 30,  # refills: 0   Last office visit: 1/23/2019 with prescribing provider:  Gary   Future Office Visit:   Next 5 appointments (look out 90 days)    May 08, 2019 11:00 AM CDT  SHORT with Xavier Vega MD  [facility] ([facility]) [address] [address]  [phone]   Jun 12, 2019 10:20 AM CDT  Return Visit with Chaim Puente MD  [facility] ([facility]) [address]  [place] [address] [phone]           "

## 2019-05-02 NOTE — TELEPHONE ENCOUNTER
Medication is being filled for 1 time refill only due to:  Patient needs to be seen because it has been more than one year since last visit. apt scheduled with PCP 5/8/19

## 2019-05-17 ENCOUNTER — OFFICE VISIT (OUTPATIENT)
Dept: OPHTHALMOLOGY | Facility: CLINIC | Age: 74
End: 2019-05-17
Payer: COMMERCIAL

## 2019-05-17 DIAGNOSIS — M06.041 RHEUMATOID ARTHRITIS INVOLVING BOTH HANDS WITH NEGATIVE RHEUMATOID FACTOR (H): ICD-10-CM

## 2019-05-17 DIAGNOSIS — Z79.899 HIGH RISK MEDICATIONS (NOT ANTICOAGULANTS) LONG-TERM USE: Primary | ICD-10-CM

## 2019-05-17 DIAGNOSIS — M06.042 RHEUMATOID ARTHRITIS INVOLVING BOTH HANDS WITH NEGATIVE RHEUMATOID FACTOR (H): ICD-10-CM

## 2019-05-17 PROCEDURE — 92134 CPTRZ OPH DX IMG PST SGM RTA: CPT | Performed by: STUDENT IN AN ORGANIZED HEALTH CARE EDUCATION/TRAINING PROGRAM

## 2019-05-17 PROCEDURE — 92002 INTRM OPH EXAM NEW PATIENT: CPT | Performed by: STUDENT IN AN ORGANIZED HEALTH CARE EDUCATION/TRAINING PROGRAM

## 2019-05-17 PROCEDURE — 92083 EXTENDED VISUAL FIELD XM: CPT | Performed by: STUDENT IN AN ORGANIZED HEALTH CARE EDUCATION/TRAINING PROGRAM

## 2019-05-17 ASSESSMENT — REFRACTION_MANIFEST
OD_SPHERE: -1.25
OS_SPHERE: PLANO
OD_AXIS: 180
OD_CYLINDER: +0.50
OS_CYLINDER: +0.50
OD_ADD: +2.50
OS_AXIS: 093
OS_ADD: +2.50

## 2019-05-17 ASSESSMENT — SLIT LAMP EXAM - LIDS
COMMENTS: 1+ DERMATOCHALASIS
COMMENTS: 1+ DERMATOCHALASIS

## 2019-05-17 ASSESSMENT — REFRACTION_WEARINGRX
OD_ADD: +2.50
OD_SPHERE: -2.00
OD_AXIS: 180
OD_CYLINDER: +1.25
OS_ADD: +2.50
OS_CYLINDER: SPHERE
OS_SPHERE: +0.50

## 2019-05-17 ASSESSMENT — CUP TO DISC RATIO
OD_RATIO: 0.2 UD
OS_RATIO: 0.25 UD

## 2019-05-17 ASSESSMENT — TONOMETRY
IOP_METHOD: ICARE
OD_IOP_MMHG: 10
OS_IOP_MMHG: 09

## 2019-05-17 ASSESSMENT — VISUAL ACUITY
CORRECTION_TYPE: GLASSES
OD_CC: 20/25
OS_CC: 20/30
METHOD: SNELLEN - LINEAR
OS_CC+: -2

## 2019-05-17 NOTE — LETTER
5/17/2019       RE: Briana Mcgee  [address] [address]      Dear Dr. Puente,    Thank you for referring your patient, Briana Mcgee, to the [facility].     Her Plaquenil eye tests were normal today. Plan to repeat tests in 1 year.  Please see a copy of my visit note below.     Current Eye Medications:  Plaquenil 200 mg twice a day.     Subjective:  Plaquenil baseline test, started taking last August. Sees Dr. Puente. Vision is little blurred with glasses in distance both eyes, like while driving. Feels glasses are fine around the house and for reading. No eye pain or discomfort in either eye.     No previous eye injuries or surgeries.      Objective:  See Ophthalmology Exam.       Assessment:  Briana Mcgee is a 74 year old female who presents with:   Encounter Diagnoses   Name Primary?     High risk medications (not anticoagulants) long-term use Started Plaquenil 8/2018.    Stern visual field (Stern visual field (HVF) - 1st test): high false positives and fixation losses with scattered loss right eye; mild scattered loss left eye    Macular SD-OCT within normal limits both eyes.    No signs of Plaquenil retinal toxicity at present.        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)        Plan:  Normal Plaquenil eye tests today.    Rik Villavicencio MD  [phone]        Again, thank you for allowing me to participate in the care of your patient.        Sincerely,        Rik Villavicencio MD

## 2019-05-17 NOTE — PROGRESS NOTES
Current Eye Medications:  Plaquenil 200 mg twice a day.     Subjective:  Plaquenil baseline test, started taking last August. Sees Dr. Puente. Vision is little blurred with glasses in distance both eyes, like while driving. Feels glasses are fine around the house and for reading. No eye pain or discomfort in either eye.     No previous eye injuries or surgeries.      Objective:  See Ophthalmology Exam.       Assessment:  Briana Mcgee is a 74 year old female who presents with:   Encounter Diagnoses   Name Primary?     High risk medications (not anticoagulants) long-term use Started Plaquenil 8/2018.    Stern visual field (Stern visual field (HVF) - 1st test): high false positives and fixation losses with scattered loss right eye; mild scattered loss left eye    Macular SD-OCT within normal limits both eyes.    No signs of Plaquenil retinal toxicity at present.        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)        Plan:  Normal Plaquenil eye tests today.    Rik Villavicencio MD  [phone]

## 2019-05-28 ENCOUNTER — TELEPHONE (OUTPATIENT)
Dept: FAMILY MEDICINE | Facility: CLINIC | Age: 74
End: 2019-05-28

## 2019-05-28 DIAGNOSIS — E78.5 HYPERLIPIDEMIA LDL GOAL <100: ICD-10-CM

## 2019-05-28 DIAGNOSIS — R39.15 URINARY URGENCY: ICD-10-CM

## 2019-05-28 NOTE — TELEPHONE ENCOUNTER
"Requested Prescriptions   Pending Prescriptions Disp Refills     tolterodine ER (DETROL LA) 4 MG 24 hr capsule [Pharmacy Med Name: TOLTERODINE TARTRATE ER 4 MG CAP ER 24H] 90 capsule 3     Sig: Take 1 capsule (4 mg) by mouth daily   Last Written Prescription Date:  5/1/18  Last Fill Quantity: 90 cap,  # refills: 3   Last office visit: 1/23/2019 with prescribing provider:  GABY Portillo   Future Office Visit:   Next 5 appointments (look out 90 days)    Jun 12, 2019 10:20 AM CDT  Return Visit with Chaim Puente MD  [facility] ([address]  [place] [address]  [phone]             Muscarinic Antagonists (Urinary Incontinence Agents) Failed - 5/28/2019  9:57 AM        Failed - Recent (12 mo) or future (30 days) visit within the authorizing provider's specialty     Patient had office visit in the last 12 months or has a visit in the next 30 days with authorizing provider or within the authorizing provider's specialty.  See \"Patient Info\" tab in inbasket, or \"Choose Columns\" in Meds & Orders section of the refill encounter.              Passed - Patient does not have a diagnosis of glaucoma on the problem list     If glaucoma diagnosis is new, refer refill to physician.          Passed - Medication is active on med list        Passed - Patient is 18 years of age or older        simvastatin (ZOCOR) 20 MG tablet [Pharmacy Med Name: SIMVASTATIN 20 MG TABLET] 90 tablet 3     Sig: Take 1 tablet (20 mg) by mouth At Bedtime   Last Written Prescription Date:  5/1/18  Last Fill Quantity: 90 tab,  # refills: 3   Last office visit: 1/23/2019 with prescribing provider:  GABY Portillo   Future Office Visit:   Next 5 appointments (look out 90 days)    Jun 12, 2019 10:20 AM CDT  Return Visit with Chaim Puente MD  [facility] ([address]  [place] [address]  [phone]             Statins Protocol Failed - 5/28/2019  9:57 AM        " "Failed - Recent (12 mo) or future (30 days) visit within the authorizing provider's specialty     Patient had office visit in the last 12 months or has a visit in the next 30 days with authorizing provider or within the authorizing provider's specialty.  See \"Patient Info\" tab in inbasket, or \"Choose Columns\" in Meds & Orders section of the refill encounter.              Passed - LDL on file in past 12 months     Recent Labs   Lab Test 07/17/18  0815   LDL 84             Passed - No abnormal creatine kinase in past 12 months     Recent Labs   Lab Test 11/25/15  1117   CKT 36                Passed - Medication is active on med list        Passed - Patient is age 18 or older        Passed - No active pregnancy on record        Passed - No positive pregnancy test in past 12 months          "

## 2019-05-30 NOTE — TELEPHONE ENCOUNTER
Left message for patient to return call to clinic.  She is due for OV, LOV with PCP 5-1-18.  Does she have enough to make to an appt?  Mae CROWE RN

## 2019-05-31 RX ORDER — SIMVASTATIN 20 MG
TABLET ORAL
Qty: 30 TABLET | Refills: 0 | Status: SHIPPED | OUTPATIENT
Start: 2019-05-31 | End: 2019-06-17

## 2019-05-31 RX ORDER — TOLTERODINE 4 MG/1
CAPSULE, EXTENDED RELEASE ORAL
Qty: 30 CAPSULE | Refills: 0 | Status: SHIPPED | OUTPATIENT
Start: 2019-05-31 | End: 2019-06-17

## 2019-06-04 DIAGNOSIS — I10 ESSENTIAL HYPERTENSION: ICD-10-CM

## 2019-06-04 DIAGNOSIS — K21.9 GASTROESOPHAGEAL REFLUX DISEASE WITHOUT ESOPHAGITIS: ICD-10-CM

## 2019-06-04 NOTE — TELEPHONE ENCOUNTER
"Requested Prescriptions   Pending Prescriptions Disp Refills     pantoprazole (PROTONIX) 40 MG EC tablet [Pharmacy Med Name: PANTOPRAZOLE SODIUM 40 MG TABLET DR] 30 tablet 0     Sig: TAKE 1 TABLET (40mg) BY MOUTH ONCE DAILY  Last Written Prescription Date:  5/2/2019  Last Fill Quantity: 30,  # refills: 0   Last office visit: 5/1/2018 with prescribing provider:  Gary   1/23/2019 with Bandar   Next 5 appointments (look out 90 days)    Jun 12, 2019 10:20 AM CDT  Return Visit with Chaim Puente MD  [facility] ([facility]) [address] [address]  [phone]   Jun 17, 2019  9:20 AM CDT  SHORT with Xavier Vega MD  [facility] ([facility]) [address] [address]  [phone]                PPI Protocol Passed - 6/4/2019  8:07 AM        Passed - Not on Clopidogrel (unless Pantoprazole ordered)        Passed - No diagnosis of osteoporosis on record        Passed - Recent (12 mo) or future (30 days) visit within the authorizing provider's specialty     Patient had office visit in the last 12 months or has a visit in the next 30 days with authorizing provider or within the authorizing provider's specialty.  See \"Patient Info\" tab in inbasket, or \"Choose Columns\" in Meds & Orders section of the refill encounter.              Passed - Medication is active on med list        Passed - Patient is age 18 or older        Passed - No active pregnacy on record        Passed - No positive pregnancy test in past 12 months        hydrochlorothiazide (HYDRODIURIL) 12.5 MG tablet [Pharmacy Med Name: HYDROCHLOROTHIAZIDE 12.5 MG TABLET] 90 tablet 3     Sig: Take 1 tablet (12.5 mg) by mouth daily  Last Written Prescription Date:  5/1/2018  Last Fill Quantity: 90,  # refills: 3   Last office visit: 5/1/2018 with prescribing provider:  Gary   1/23/2019 with Bandar   Future Office Visit:   Next 5 appointments " "(look out 90 days)    Jun 12, 2019 10:20 AM CDT  Return Visit with Chaim Puente MD  [facility] ([facility]) [address]  [place] [address] [phone]   Jun 17, 2019  9:20 AM CDT  SHORT with Xavier Vega MD  [facility] ([facility]) [address] [address]  [phone]                Diuretics (Including Combos) Protocol Passed - 6/4/2019  8:07 AM        Passed - Blood pressure under 140/90 in past 12 months     BP Readings from Last 3 Encounters:   02/15/19 128/75   01/23/19 106/68   11/02/18 148/80                 Passed - Recent (12 mo) or future (30 days) visit within the authorizing provider's specialty     Patient had office visit in the last 12 months or has a visit in the next 30 days with authorizing provider or within the authorizing provider's specialty.  See \"Patient Info\" tab in inbasket, or \"Choose Columns\" in Meds & Orders section of the refill encounter.              Passed - Medication is active on med list        Passed - Patient is age 18 or older        Passed - No active pregancy on record        Passed - Normal serum creatinine on file in past 12 months     Recent Labs   Lab Test 02/11/19  0758   CR 0.84              Passed - Normal serum potassium on file in past 12 months     Recent Labs   Lab Test 11/02/18  0912   POTASSIUM 3.8                    Passed - Normal serum sodium on file in past 12 months     Recent Labs   Lab Test 11/02/18  0912                 Passed - No positive pregnancy test in past 12 months          "

## 2019-06-04 NOTE — LETTER
[facility]  [address] [address]  Phone: [phone]       June 5, 2019         Briana Mcgee  [address] [address]            Dear Briana:    We are concerned about your health care.  We recently provided you with medication refills.  Many medications require routine follow-up with your doctor.    Your prescription(s) have been refilled for 30 days so you may have time for the above noted follow-up. Please call to schedule soon so we can assure you have an appointment before your next refills are needed.    Thank you,      Xavier Vega MD / raffy

## 2019-06-05 RX ORDER — HYDROCHLOROTHIAZIDE 12.5 MG/1
TABLET ORAL
Qty: 30 TABLET | Refills: 0 | Status: SHIPPED | OUTPATIENT
Start: 2019-06-05 | End: 2019-06-17

## 2019-06-05 RX ORDER — PANTOPRAZOLE SODIUM 40 MG/1
TABLET, DELAYED RELEASE ORAL
Qty: 30 TABLET | Refills: 0 | Status: SHIPPED | OUTPATIENT
Start: 2019-06-05 | End: 2019-06-17

## 2019-06-05 NOTE — TELEPHONE ENCOUNTER
Due for clinic visit and labs. 30 day yenifer fill given. Reminder letter sent.      Briana MONTANA RN

## 2019-06-07 DIAGNOSIS — E03.9 ACQUIRED HYPOTHYROIDISM: ICD-10-CM

## 2019-06-07 LAB
T4 FREE SERPL-MCNC: 1.32 NG/DL (ref 0.76–1.46)
TSH SERPL DL<=0.005 MIU/L-ACNC: 0.9 MU/L (ref 0.4–4)

## 2019-06-07 PROCEDURE — 84443 ASSAY THYROID STIM HORMONE: CPT | Performed by: FAMILY MEDICINE

## 2019-06-07 PROCEDURE — 36415 COLL VENOUS BLD VENIPUNCTURE: CPT | Performed by: FAMILY MEDICINE

## 2019-06-07 PROCEDURE — 84439 ASSAY OF FREE THYROXINE: CPT | Performed by: FAMILY MEDICINE

## 2019-06-10 DIAGNOSIS — E03.9 HYPOTHYROIDISM, UNSPECIFIED TYPE: ICD-10-CM

## 2019-06-10 RX ORDER — LEVOTHYROXINE SODIUM 25 UG/1
25 TABLET ORAL DAILY
Qty: 90 TABLET | Refills: 3 | Status: SHIPPED | OUTPATIENT
Start: 2019-06-10 | End: 2020-06-12

## 2019-06-10 RX ORDER — LEVOTHYROXINE SODIUM 137 UG/1
137 TABLET ORAL DAILY
Qty: 90 TABLET | Refills: 3 | Status: SHIPPED | OUTPATIENT
Start: 2019-06-10 | End: 2020-06-12

## 2019-06-12 ENCOUNTER — OFFICE VISIT (OUTPATIENT)
Dept: RHEUMATOLOGY | Facility: CLINIC | Age: 74
End: 2019-06-12
Payer: COMMERCIAL

## 2019-06-12 VITALS
WEIGHT: 190.4 LBS | TEMPERATURE: 97.7 F | BODY MASS INDEX: 31.2 KG/M2 | SYSTOLIC BLOOD PRESSURE: 138 MMHG | OXYGEN SATURATION: 94 % | DIASTOLIC BLOOD PRESSURE: 80 MMHG | HEART RATE: 87 BPM

## 2019-06-12 DIAGNOSIS — Z85.3 HISTORY OF BREAST CANCER: ICD-10-CM

## 2019-06-12 DIAGNOSIS — Z79.899 HIGH RISK MEDICATION USE: ICD-10-CM

## 2019-06-12 DIAGNOSIS — M06.4 INFLAMMATORY POLYARTHROPATHY (H): Primary | ICD-10-CM

## 2019-06-12 DIAGNOSIS — Z78.0 POST-MENOPAUSAL: ICD-10-CM

## 2019-06-12 DIAGNOSIS — M80.00XA AGE-RELATED OSTEOPOROSIS WITH CURRENT PATHOLOGICAL FRACTURE, INITIAL ENCOUNTER: ICD-10-CM

## 2019-06-12 PROCEDURE — 99213 OFFICE O/P EST LOW 20 MIN: CPT | Performed by: INTERNAL MEDICINE

## 2019-06-12 RX ORDER — HYDROXYCHLOROQUINE SULFATE 200 MG/1
200 TABLET, FILM COATED ORAL 2 TIMES DAILY
Qty: 180 TABLET | Refills: 2 | Status: SHIPPED | OUTPATIENT
Start: 2019-06-12 | End: 2019-12-12

## 2019-06-12 NOTE — NURSING NOTE
RAPID3 (0-30) Cumulative Score  0.5          RAPID3 Weighted Score (divide #4 by 3 and that is the weighted score)  0.26
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
## 2019-06-12 NOTE — PATIENT INSTRUCTIONS
To schedule your DEXA please call  [facility]     [phone]      Rheumatology    Dr. Chaim [place] [facility]   (Monday)  [address]  [place] [address]       [facility]   (Tuesday)  [address] [place] [address]    [facility]   (Wed., Thurs., and Friday)  [address] [place] [address]    Phone number: [phone]  Thank you for choosing [facility].  Dina Walker YASMIN

## 2019-06-17 ENCOUNTER — OFFICE VISIT (OUTPATIENT)
Dept: FAMILY MEDICINE | Facility: CLINIC | Age: 74
End: 2019-06-17
Payer: COMMERCIAL

## 2019-06-17 VITALS
RESPIRATION RATE: 14 BRPM | SYSTOLIC BLOOD PRESSURE: 128 MMHG | TEMPERATURE: 97 F | BODY MASS INDEX: 29.89 KG/M2 | HEIGHT: 66 IN | WEIGHT: 186 LBS | DIASTOLIC BLOOD PRESSURE: 82 MMHG | OXYGEN SATURATION: 95 % | HEART RATE: 70 BPM

## 2019-06-17 DIAGNOSIS — K21.9 GASTROESOPHAGEAL REFLUX DISEASE WITHOUT ESOPHAGITIS: ICD-10-CM

## 2019-06-17 DIAGNOSIS — R39.15 URINARY URGENCY: ICD-10-CM

## 2019-06-17 DIAGNOSIS — N18.2 CKD (CHRONIC KIDNEY DISEASE) STAGE 2, GFR 60-89 ML/MIN: ICD-10-CM

## 2019-06-17 DIAGNOSIS — E78.5 HYPERLIPIDEMIA LDL GOAL <100: ICD-10-CM

## 2019-06-17 DIAGNOSIS — G47.00 INSOMNIA, UNSPECIFIED TYPE: ICD-10-CM

## 2019-06-17 DIAGNOSIS — I10 ESSENTIAL HYPERTENSION: Primary | ICD-10-CM

## 2019-06-17 DIAGNOSIS — J30.2 OTHER SEASONAL ALLERGIC RHINITIS: ICD-10-CM

## 2019-06-17 PROCEDURE — 99214 OFFICE O/P EST MOD 30 MIN: CPT | Performed by: FAMILY MEDICINE

## 2019-06-17 RX ORDER — TRAZODONE HYDROCHLORIDE 100 MG/1
TABLET ORAL
Qty: 30 TABLET | Refills: 3 | Status: SHIPPED | OUTPATIENT
Start: 2019-06-17 | End: 2020-10-13

## 2019-06-17 RX ORDER — SIMVASTATIN 20 MG
TABLET ORAL
Qty: 90 TABLET | Refills: 3 | Status: SHIPPED | OUTPATIENT
Start: 2019-06-17 | End: 2020-06-09

## 2019-06-17 RX ORDER — TOLTERODINE 4 MG/1
CAPSULE, EXTENDED RELEASE ORAL
Qty: 90 CAPSULE | Refills: 3 | Status: SHIPPED | OUTPATIENT
Start: 2019-06-17 | End: 2020-06-25

## 2019-06-17 RX ORDER — FLUTICASONE PROPIONATE 50 MCG
2 SPRAY, SUSPENSION (ML) NASAL DAILY PRN
Qty: 16 G | Refills: 11 | Status: SHIPPED | OUTPATIENT
Start: 2019-06-17 | End: 2021-05-25

## 2019-06-17 RX ORDER — PANTOPRAZOLE SODIUM 40 MG/1
40 TABLET, DELAYED RELEASE ORAL DAILY
Qty: 90 TABLET | Refills: 3 | Status: SHIPPED | OUTPATIENT
Start: 2019-06-17 | End: 2020-06-10

## 2019-06-17 RX ORDER — HYDROCHLOROTHIAZIDE 12.5 MG/1
TABLET ORAL
Qty: 90 TABLET | Refills: 3 | Status: SHIPPED | OUTPATIENT
Start: 2019-06-17 | End: 2020-06-09

## 2019-06-17 ASSESSMENT — MIFFLIN-ST. JEOR: SCORE: 1347.5

## 2019-06-17 NOTE — PROGRESS NOTES
Subjective     Briana Mcgee is a 74 year old female who presents to clinic today for the following health issues:    HPI   Hyperlipidemia Follow-Up      Are you having any of the following symptoms? (Select all that apply)  No complaints of shortness of breath, chest pain or pressure.  No increased sweating or nausea with activity.  No left-sided neck or arm pain.  No complaints of pain in calves when walking 1-2 blocks.    Are you regularly taking any medication or supplement to lower your cholesterol?   Yes- Simvastatin     Are you having muscle aches or other side effects that you think could be caused by your cholesterol lowering medication?  No      Hypertension Follow-up      Do you check your blood pressure regularly outside of the clinic? No     Are you following a low salt diet? No    Are your blood pressures ever more than 140 on the top number (systolic) OR more   than 90 on the bottom number (diastolic), for example 140/90? No  Chronic Kidney Disease Follow-up      Current NSAID use?  No    Hypothyroidism Follow-up      Since last visit, patient describes the following symptoms: dry skin and hair loss, finger nails flake off       Amount of exercise or physical activity: 2-3 days/week for an average of 15-30 minutes    Problems taking medications regularly: No    Medication side effects: none    Diet: regular (no restrictions)      She is having some bouts of vertigo.  She reports that she has to be careful not to turn too quick.            Reviewed and updated as needed this visit by Provider         Review of Systems   Review Of Systems  Skin: negative  Eyes: negative  Ears/Nose/Throat: negative  Respiratory: No shortness of breath, dyspnea on exertion, cough, or hemoptysis  Cardiovascular: negative  Gastrointestinal: negative  Genitourinary:   Musculoskeletal: negative  Neurologic: as above  Psychiatric: feeling down as above  Hematologic/Lymphatic/Immunologic: negative  Endocrine: negative      "   Objective    /82   Pulse 70   Temp 97  F (36.1  C) (Tympanic)   Resp 14   Ht 1.664 m (5' 5.5\")   Wt 84.4 kg (186 lb)   SpO2 95%   BMI 30.48 kg/m    Body mass index is 30.48 kg/m .  Physical Exam   GENERAL APPEARANCE: alert, no distress and cooperative  HENT: ear canals and TM's normal and nose and mouth without ulcers or lesions  NECK: no adenopathy, no asymmetry, masses, or scars and thyroid normal to palpation  RESP: lungs clear to auscultation - no rales, rhonchi or wheezes  CV: regular rates and rhythm, normal S1 S2, no S3 or S4 and no murmur, click or rub  ABDOMEN: soft, nontender, without hepatosplenomegaly or masses and bowel sounds normal  MS: extremities normal- no gross deformities noted  SKIN: no suspicious lesions or rashes  NEURO: Normal strength and tone, mentation intact and speech normal  PSYCH: flatter affect            Assessment & Plan     (N18.2) CKD (chronic kidney disease) stage 2, GFR 60-89 ml/min  (primary encounter diagnosis)  Comment: check lab and refilled med  Plan:     (I10) Essential hypertension  Comment: controlled  Plan: Basic metabolic panel, hydrochlorothiazide         (HYDRODIURIL) 12.5 MG tablet            (E78.5) Hyperlipidemia LDL goal <100  Comment: check lab and refilled med  Plan: Lipid panel reflex to direct LDL Fasting,         simvastatin (ZOCOR) 20 MG tablet            (K21.9) Gastroesophageal reflux disease without esophagitis  Comment: Plan: pantoprazole (PROTONIX) 40 MG EC tablet            (R39.15) Urinary urgency  Comment: refilled med  Plan: tolterodine ER (DETROL LA) 4 MG 24 hr capsule            (G47.00) Insomnia, unspecified type  Comment:   Plan: traZODone (DESYREL) 100 MG tablet            (J30.2) Other seasonal allergic rhinitis  Comment:   Plan: fluticasone (FLONASE) 50 MCG/ACT nasal spray               BMI:   Estimated body mass index is 30.48 kg/m  as calculated from the following:    Height as of this encounter: 1.664 m (5' 5.5\").    " Weight as of this encounter: 84.4 kg (186 lb).   Weight management plan: diet and activity    She has stress since  wants a divorce.  No other woman involved.  She is seeing a counselor.  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  She will RTC if medication is needed.    See Patient Instructions    Return in about 1 year (around 6/17/2020).    [place]avier Vega MD  [facility] [place]

## 2019-06-17 NOTE — NURSING NOTE
"Initial /82   Pulse 70   Temp 97  F (36.1  C) (Tympanic)   Resp 14   Ht 1.664 m (5' 5.5\")   Wt 84.4 kg (186 lb)   SpO2 95%   BMI 30.48 kg/m   Estimated body mass index is 30.48 kg/m  as calculated from the following:    Height as of this encounter: 1.664 m (5' 5.5\").    Weight as of this encounter: 84.4 kg (186 lb). .    Whit Kauffman CMA ([facility])  "

## 2019-06-17 NOTE — PATIENT INSTRUCTIONS
Please make a fasting lab visit.    I refilled your medications for the year.          Thank you for choosing [facility].  You may be receiving an email and/or telephone survey request from [facility] Customer [employer] regarding your visit today.  Please take a few minutes to respond to the survey to let us know how we are doing.      If you have questions or concerns, please contact us via [employer] or you can contact your care team at [phone].    Our Clinic hours are:  Monday 6:40 am  to 7:00 pm  Tuesday -Friday 6:40 am to 5:00 pm    The [place] outpatient lab hours are:  Monday - Friday 6:10 am to 4:45 pm  Saturdays 7:00 am to 11:00 am  Appointments are required, call [phone]    If you have clinical questions after hours or would like to schedule an appointment,  call the clinic at [phone].

## 2019-06-19 DIAGNOSIS — E78.5 HYPERLIPIDEMIA LDL GOAL <100: ICD-10-CM

## 2019-06-19 DIAGNOSIS — I10 ESSENTIAL HYPERTENSION: ICD-10-CM

## 2019-06-19 LAB
ANION GAP SERPL CALCULATED.3IONS-SCNC: 2 MMOL/L (ref 3–14)
BUN SERPL-MCNC: 15 MG/DL (ref 7–30)
CALCIUM SERPL-MCNC: 9.2 MG/DL (ref 8.5–10.1)
CHLORIDE SERPL-SCNC: 100 MMOL/L (ref 94–109)
CHOLEST SERPL-MCNC: 165 MG/DL
CO2 SERPL-SCNC: 33 MMOL/L (ref 20–32)
CREAT SERPL-MCNC: 0.81 MG/DL (ref 0.52–1.04)
GFR SERPL CREATININE-BSD FRML MDRD: 71 ML/MIN/{1.73_M2}
GLUCOSE SERPL-MCNC: 93 MG/DL (ref 70–99)
HDLC SERPL-MCNC: 94 MG/DL
LDLC SERPL CALC-MCNC: 53 MG/DL
NONHDLC SERPL-MCNC: 71 MG/DL
POTASSIUM SERPL-SCNC: 3.6 MMOL/L (ref 3.4–5.3)
SODIUM SERPL-SCNC: 135 MMOL/L (ref 133–144)
TRIGL SERPL-MCNC: 89 MG/DL

## 2019-06-19 PROCEDURE — 36415 COLL VENOUS BLD VENIPUNCTURE: CPT | Performed by: FAMILY MEDICINE

## 2019-06-19 PROCEDURE — 80048 BASIC METABOLIC PNL TOTAL CA: CPT | Performed by: FAMILY MEDICINE

## 2019-06-19 PROCEDURE — 80061 LIPID PANEL: CPT | Performed by: FAMILY MEDICINE

## 2019-09-10 ENCOUNTER — TRANSFERRED RECORDS (OUTPATIENT)
Dept: HEALTH INFORMATION MANAGEMENT | Facility: CLINIC | Age: 74
End: 2019-09-10

## 2019-10-16 ENCOUNTER — IMMUNIZATION (OUTPATIENT)
Dept: FAMILY MEDICINE | Facility: CLINIC | Age: 74
End: 2019-10-16
Payer: COMMERCIAL

## 2019-10-16 PROCEDURE — 90662 IIV NO PRSV INCREASED AG IM: CPT

## 2019-10-16 PROCEDURE — G0008 ADMIN INFLUENZA VIRUS VAC: HCPCS

## 2019-10-31 ENCOUNTER — HOSPITAL ENCOUNTER (OUTPATIENT)
Dept: BONE DENSITY | Facility: CLINIC | Age: 74
Discharge: HOME | End: 2019-10-31
Attending: INTERNAL MEDICINE | Admitting: INTERNAL MEDICINE
Payer: MEDICARE

## 2019-10-31 DIAGNOSIS — Z85.3 HISTORY OF BREAST CANCER: ICD-10-CM

## 2019-10-31 DIAGNOSIS — M80.00XA AGE-RELATED OSTEOPOROSIS WITH CURRENT PATHOLOGICAL FRACTURE, INITIAL ENCOUNTER: ICD-10-CM

## 2019-10-31 DIAGNOSIS — M06.4 INFLAMMATORY POLYARTHROPATHY (H): ICD-10-CM

## 2019-10-31 DIAGNOSIS — Z78.0 POST-MENOPAUSAL: ICD-10-CM

## 2019-10-31 PROCEDURE — 77080 DXA BONE DENSITY AXIAL: CPT

## 2019-11-01 NOTE — RESULT ENCOUNTER NOTE
"[employer] message sent:  \"Ms. Mcgee,    The bone density scan (DEXA) does not show evidence of osteopenia/osteoporosis.     Sincerely,  Chaim Puente MD  11/1/2019 12:31 PM\""

## 2019-11-02 ENCOUNTER — HEALTH MAINTENANCE LETTER (OUTPATIENT)
Age: 74
End: 2019-11-02

## 2019-11-20 ENCOUNTER — DOCUMENTATION ONLY (OUTPATIENT)
Dept: ONCOLOGY | Facility: CLINIC | Age: 74
End: 2019-11-20

## 2019-11-20 DIAGNOSIS — Z85.3 PERSONAL HISTORY OF MALIGNANT NEOPLASM OF BREAST: Primary | ICD-10-CM

## 2019-11-20 NOTE — PROGRESS NOTES
This patient has a lab only appointment on Monday, December 9, 2019. There are no lab orders in place. Please place any lab orders she may need. Thank you.

## 2019-12-05 ENCOUNTER — HOSPITAL ENCOUNTER (OUTPATIENT)
Dept: MAMMOGRAPHY | Facility: CLINIC | Age: 74
Discharge: HOME | End: 2019-12-05
Attending: INTERNAL MEDICINE | Admitting: INTERNAL MEDICINE
Payer: MEDICARE

## 2019-12-05 DIAGNOSIS — Z12.31 VISIT FOR SCREENING MAMMOGRAM: ICD-10-CM

## 2019-12-05 PROCEDURE — 77063 BREAST TOMOSYNTHESIS BI: CPT

## 2019-12-06 ENCOUNTER — TELEPHONE (OUTPATIENT)
Dept: FAMILY MEDICINE | Facility: CLINIC | Age: 74
End: 2019-12-06

## 2019-12-06 NOTE — TELEPHONE ENCOUNTER
When pt was seen on 6/10/19, she was advised to recheck in a year and Dr Vega sent refills for one year.    Pt notified.      She explains that she was asking because she is coming in for other lab work next week and would have this checked too if she was due.    Merlyn Mendes RN    TSH   Date Value Ref Range Status   06/07/2019 0.90 0.40 - 4.00 mU/L Final   04/05/2019 5.26 (H) 0.40 - 4.00 mU/L Final   02/11/2019 0.62 0.40 - 4.00 mU/L Final   12/06/2018 4.77 (H) 0.40 - 4.00 mU/L Final   07/17/2018 4.13 (H) 0.40 - 4.00 mU/L Final

## 2019-12-06 NOTE — TELEPHONE ENCOUNTER
Reason for Call:  Other orders    Detailed comments: Patient is wondering if she is due to have her thyroid labs done.    Phone Number Patient can be reached at: Cell number on file:    Telephone Information:   Mobile [phone]       Best Time: any    Can we leave a detailed message on this number? YES    Call taken on 12/6/2019 at 9:29 AM by Rakel Valencia

## 2019-12-09 DIAGNOSIS — Z79.899 HIGH RISK MEDICATION USE: ICD-10-CM

## 2019-12-09 DIAGNOSIS — Z85.3 PERSONAL HISTORY OF MALIGNANT NEOPLASM OF BREAST: ICD-10-CM

## 2019-12-09 DIAGNOSIS — M80.00XA AGE-RELATED OSTEOPOROSIS WITH CURRENT PATHOLOGICAL FRACTURE, INITIAL ENCOUNTER: ICD-10-CM

## 2019-12-09 DIAGNOSIS — M06.4 INFLAMMATORY POLYARTHROPATHY (H): ICD-10-CM

## 2019-12-09 LAB
ALBUMIN SERPL-MCNC: 3.9 G/DL (ref 3.4–5)
ALP SERPL-CCNC: 72 U/L (ref 40–150)
ALT SERPL W P-5'-P-CCNC: 25 U/L (ref 0–50)
ANION GAP SERPL CALCULATED.3IONS-SCNC: 4 MMOL/L (ref 3–14)
AST SERPL W P-5'-P-CCNC: 22 U/L (ref 0–45)
BASOPHILS # BLD AUTO: 0.1 10E9/L (ref 0–0.2)
BASOPHILS NFR BLD AUTO: 0.9 %
BILIRUB SERPL-MCNC: 0.8 MG/DL (ref 0.2–1.3)
BUN SERPL-MCNC: 12 MG/DL (ref 7–30)
CALCIUM SERPL-MCNC: 9 MG/DL (ref 8.5–10.1)
CANCER AG27-29 SERPL-ACNC: 16 U/ML (ref 0–39)
CHLORIDE SERPL-SCNC: 103 MMOL/L (ref 94–109)
CO2 SERPL-SCNC: 31 MMOL/L (ref 20–32)
CREAT SERPL-MCNC: 0.79 MG/DL (ref 0.52–1.04)
CRP SERPL-MCNC: <2.9 MG/L (ref 0–8)
DIFFERENTIAL METHOD BLD: NORMAL
EOSINOPHIL # BLD AUTO: 0.3 10E9/L (ref 0–0.7)
EOSINOPHIL NFR BLD AUTO: 4.1 %
ERYTHROCYTE [DISTWIDTH] IN BLOOD BY AUTOMATED COUNT: 13.6 % (ref 10–15)
ERYTHROCYTE [SEDIMENTATION RATE] IN BLOOD BY WESTERGREN METHOD: 10 MM/H (ref 0–30)
GFR SERPL CREATININE-BSD FRML MDRD: 73 ML/MIN/{1.73_M2}
GLUCOSE SERPL-MCNC: 104 MG/DL (ref 70–99)
HCT VFR BLD AUTO: 36.9 % (ref 35–47)
HGB BLD-MCNC: 11.9 G/DL (ref 11.7–15.7)
LYMPHOCYTES # BLD AUTO: 2.1 10E9/L (ref 0.8–5.3)
LYMPHOCYTES NFR BLD AUTO: 29.6 %
MCH RBC QN AUTO: 30.7 PG (ref 26.5–33)
MCHC RBC AUTO-ENTMCNC: 32.2 G/DL (ref 31.5–36.5)
MCV RBC AUTO: 95 FL (ref 78–100)
MONOCYTES # BLD AUTO: 0.7 10E9/L (ref 0–1.3)
MONOCYTES NFR BLD AUTO: 9.4 %
NEUTROPHILS # BLD AUTO: 3.9 10E9/L (ref 1.6–8.3)
NEUTROPHILS NFR BLD AUTO: 56 %
PLATELET # BLD AUTO: 232 10E9/L (ref 150–450)
POTASSIUM SERPL-SCNC: 4.2 MMOL/L (ref 3.4–5.3)
PROT SERPL-MCNC: 7.3 G/DL (ref 6.8–8.8)
RBC # BLD AUTO: 3.88 10E12/L (ref 3.8–5.2)
SODIUM SERPL-SCNC: 138 MMOL/L (ref 133–144)
WBC # BLD AUTO: 7 10E9/L (ref 4–11)

## 2019-12-09 PROCEDURE — 82306 VITAMIN D 25 HYDROXY: CPT | Performed by: INTERNAL MEDICINE

## 2019-12-09 PROCEDURE — 85025 COMPLETE CBC W/AUTO DIFF WBC: CPT | Performed by: INTERNAL MEDICINE

## 2019-12-09 PROCEDURE — 36415 COLL VENOUS BLD VENIPUNCTURE: CPT | Performed by: INTERNAL MEDICINE

## 2019-12-09 PROCEDURE — 80053 COMPREHEN METABOLIC PANEL: CPT | Performed by: INTERNAL MEDICINE

## 2019-12-09 PROCEDURE — 86300 IMMUNOASSAY TUMOR CA 15-3: CPT | Performed by: INTERNAL MEDICINE

## 2019-12-09 PROCEDURE — 85652 RBC SED RATE AUTOMATED: CPT | Performed by: INTERNAL MEDICINE

## 2019-12-09 PROCEDURE — 86140 C-REACTIVE PROTEIN: CPT | Performed by: INTERNAL MEDICINE

## 2019-12-10 LAB — DEPRECATED CALCIDIOL+CALCIFEROL SERPL-MC: 45 UG/L (ref 20–75)

## 2019-12-12 ENCOUNTER — ONCOLOGY VISIT (OUTPATIENT)
Dept: ONCOLOGY | Facility: CLINIC | Age: 74
End: 2019-12-12
Attending: INTERNAL MEDICINE
Payer: COMMERCIAL

## 2019-12-12 ENCOUNTER — OFFICE VISIT (OUTPATIENT)
Dept: RHEUMATOLOGY | Facility: CLINIC | Age: 74
End: 2019-12-12
Payer: COMMERCIAL

## 2019-12-12 VITALS
BODY MASS INDEX: 31.05 KG/M2 | TEMPERATURE: 97.5 F | OXYGEN SATURATION: 93 % | HEART RATE: 90 BPM | SYSTOLIC BLOOD PRESSURE: 120 MMHG | RESPIRATION RATE: 16 BRPM | WEIGHT: 189.5 LBS | DIASTOLIC BLOOD PRESSURE: 60 MMHG

## 2019-12-12 VITALS
BODY MASS INDEX: 30.48 KG/M2 | DIASTOLIC BLOOD PRESSURE: 86 MMHG | SYSTOLIC BLOOD PRESSURE: 132 MMHG | HEART RATE: 84 BPM | HEIGHT: 66 IN | OXYGEN SATURATION: 97 %

## 2019-12-12 DIAGNOSIS — M75.42 IMPINGEMENT SYNDROME OF LEFT SHOULDER: ICD-10-CM

## 2019-12-12 DIAGNOSIS — Z12.31 SCREENING MAMMOGRAM, ENCOUNTER FOR: ICD-10-CM

## 2019-12-12 DIAGNOSIS — M06.4 INFLAMMATORY POLYARTHROPATHY (H): Primary | ICD-10-CM

## 2019-12-12 DIAGNOSIS — Z79.899 HIGH RISK MEDICATIONS (NOT ANTICOAGULANTS) LONG-TERM USE: ICD-10-CM

## 2019-12-12 DIAGNOSIS — Z85.3 PERSONAL HISTORY OF MALIGNANT NEOPLASM OF BREAST: Primary | ICD-10-CM

## 2019-12-12 PROCEDURE — 99214 OFFICE O/P EST MOD 30 MIN: CPT | Performed by: INTERNAL MEDICINE

## 2019-12-12 PROCEDURE — G0463 HOSPITAL OUTPT CLINIC VISIT: HCPCS

## 2019-12-12 RX ORDER — LEFLUNOMIDE 20 MG/1
20 TABLET ORAL DAILY
Qty: 30 TABLET | Refills: 3 | Status: SHIPPED | OUTPATIENT
Start: 2019-12-12 | End: 2020-04-02

## 2019-12-12 RX ORDER — HYDROXYCHLOROQUINE SULFATE 200 MG/1
200 TABLET, FILM COATED ORAL 2 TIMES DAILY
Qty: 180 TABLET | Refills: 2 | Status: SHIPPED | OUTPATIENT
Start: 2019-12-12 | End: 2020-06-11

## 2019-12-12 ASSESSMENT — PAIN SCALES - GENERAL: PAINLEVEL: NO PAIN (0)
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
## 2019-12-12 NOTE — PATIENT INSTRUCTIONS
We would like to see you back in 1 year for a follow up appointment with labs and Mammogram prior.     When you are in need of a refill, please call your pharmacy and they will send us a request.      Copy of appointments, and after visit summary (AVS) given to patient.      If you have any questions please call Lotus Eng RN, BSN Oncology Hematology  [facility] [phone]. For questions after business hours, or on holidays/weekends, please call our after hours Nurse Triage line [phone]. Thank you.       1 yr follow-up with labs and MA.

## 2019-12-12 NOTE — PATIENT INSTRUCTIONS
Rheumatology    Dr. Chaim Puente       [facility] in [place]   (Monday)  [address]  [place] [address]       [facility] in [place]   (Tuesday)  [address] [place] [address]    [facility] in [place]   (Wed., Thurs., and Friday)  [address] [place] [address]    Phone number: [phone]  Thank you for choosing [facility].  Lala Magaña CMA

## 2019-12-12 NOTE — PROGRESS NOTES
"Oncology Rooming Note    December 12, 2019 2:56 PM   Briana Mcgee is a 74 year old female who presents for:    Chief Complaint   Patient presents with     Oncology Clinic Visit     1 year recheck Breast CA, review Labs & Mammogram      Initial Vitals: /60 (BP Location: Right arm, Patient Position: Sitting, Cuff Size: Adult Regular)   Pulse 90   Temp 97.5  F (36.4  C) (Tympanic)   Resp 16   Wt 86 kg (189 lb 8 oz)   SpO2 93%   BMI 31.05 kg/m   Estimated body mass index is 31.05 kg/m  as calculated from the following:    Height as of an earlier encounter on 12/12/19: 1.664 m (5' 5.5\").    Weight as of this encounter: 86 kg (189 lb 8 oz). Body surface area is 1.99 meters squared.  No Pain (0) Comment: Data Unavailable   No LMP recorded. Patient has had a hysterectomy.  Allergies reviewed: Yes  Medications reviewed: Yes    Medications: Medication refills not needed today.  Pharmacy name entered into [employer]:      [facility] - [address]    Clinical concerns: 1 year recheck Breast CA, review Labs & Mammogram.       Yas Mandujano CMA              "
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
## 2019-12-12 NOTE — LETTER
"    12/12/2019         RE: Briana Mcgee  [id number] [facility] [address]        Dear Colleague,    Thank you for referring your patient, Briana Mcgee, to the [facility]. Please see a copy of my visit note below.    Oncology Rooming Note    December 12, 2019 2:56 PM   Briana Mcgee is a 74 year old female who presents for:    Chief Complaint   Patient presents with     Oncology Clinic Visit     1 year recheck Breast CA, review Labs & Mammogram      Initial Vitals: /60 (BP Location: Right arm, Patient Position: Sitting, Cuff Size: Adult Regular)   Pulse 90   Temp 97.5  F (36.4  C) (Tympanic)   Resp 16   Wt 86 kg (189 lb 8 oz)   SpO2 93%   BMI 31.05 kg/m    Estimated body mass index is 31.05 kg/m  as calculated from the following:    Height as of an earlier encounter on 12/12/19: 1.664 m (5' 5.5\").    Weight as of this encounter: 86 kg (189 lb 8 oz). Body surface area is 1.99 meters squared.  No Pain (0) Comment: Data Unavailable   No LMP recorded. Patient has had a hysterectomy.  Allergies reviewed: Yes  Medications reviewed: Yes    Medications: Medication refills not needed today.  Pharmacy name entered into V[employer]:      [facility] - [address]    Clinical concerns: 1 year recheck Breast CA, review Labs & Mammogram.       Yas Mandujano CMA                CHIEF COMPLAINT AND REASON FOR VISIT:   Breast cancer followup.     HISTORY OF ONCOLOGY ILLNESS: Briana Mcgee was diagnosed with left breast cancer, stage IIB, T2N1M0, ER positive infiltrating ductal cancer in 2005, status post lumpectomy. Primary tumor was 2.4 cm, grade 3 lesion with angiolymphatic invasion, ER/MS positive, HER-2 negative. Margins were clear. Status post 4 cycles of AC followed by 4 cycles of Taxol, then radiation.   She was on antihormonal therapy from end of 2005 and then initially started with Arimidex and could not tolerate it, switched to " Aromasin and then dropped it in the later part of 2009. She has been on followup since then.     INTERVAL HISTORY:  Since the patient's last visit with us, there is no hospital stay/surgery/new diagnosis made.  She is on tx for inflammatory arthritis.     PAST MEDICAL HISTORY: GERD, severe reflux disease, osteoarthritis, hot flashes, stroke in 2006, insomnia, hyperlipidemia, hypothyroidism. inflammatory arthritis     MEDICATIONS: Reviewed in Epic system.     ALLERGIES: Erythromycin.     SOCIAL HISTORY: She lives in [place]. Denies smoking or alcohol abuse.     FAMILY HISTORY: Denied any family history of breast or ovarian cancer.       REVIEW OF SYSTEMS:   Her arthritic symptoms are better on tx with rheumatology.     PHYSICAL EXAMINATION:   VITAL SIGNS: Blood pressure 120/60, pulse 90, temperature 97.5  F (36.4  C), temperature source Tympanic, resp. rate 16, weight 86 kg (189 lb 8 oz), SpO2 93 %, not currently breastfeeding.    ECOG 0    GENERAL APPEARANCE: A middle-aged woman, looks much younger than her stated age, not in distress.   HEENT: The patient is normocephalic, atraumatic. Pupils are equal, react to light. Sclerae are anicteric. Moist oral mucosa. Negative pharynx. No oral thrush. + sinus congestion.   NECK: Supple. No jugular venous distention. Thyroid is not palpable.   LYMPH NODES: Superficial lymphadenopathy is not appreciable in the bilateral cervical, supraclavicular, axillary or inguinal adenopathy.   CARDIOVASCULAR: S1, S2 regular, with no murmurs or gallops. No carotid or abdominal bruits.   PULMONARY: Lungs are clear to auscultation and percussion bilaterally. There is no wheezing or rhonchi.   GASTROINTESTINAL: Abdomen is soft, nontender. No hepatosplenomegaly. No signs of ascites. No mass appreciable.   MUSCULOSKELETAL AND EXTREMITIES: No edema. No cyanotic changes. No signs of joint deformity. No lymphedema.   NEUROLOGIC: Cranial nerves II through XII are grossly intact. Sensation intact.  Muscle strength and muscle tone symmetrical all through, 5/5.   BACK: No spinal or paraspinal tenderness. No CVA tenderness.   SKIN: No petechiae. No rash. No signs of cellulitis.   BREASTS: Bilateral breast exam review: Left lumpectomy scar, well healed. Right nipple is flat and inverted as baseline. That is always the case, runs in her family, according to the patient.   She has thickening area at 12 o'clock, 2-4 cm from the nipple.       CURRENT LAB DATA REVIEWED  Cbc diff/LFT/marker: fine      CURRENT IMAGE REVIEWED  MA 12/2019: negative  DEXA 10/2019: NL    OLD DATA REVIEW IN SUMMARY:  MRI L spine 8/2017  1. L4 inferior endplate compression fracture. Adjacent marrow edema suggests that this is either acute/subacute or associated with ongoing instability.  2. L4-L5 severe central stenosis.  3. Multilevel degenerative disc disease, most advanced at L5-S1 where there is mild discogenic endplate reactive marrow edema.  4. Multilevel foraminal stenosis as above.    dexa 6/2016: negative    Chest x-ray from 01/2009, question possible pulmonary nodules. Followup CT chest in 02/2009 was reassuring. The last screening mammogram from 09/2008 looks similar to this one, benign BI-RADS 2 reading.       ASSESSMENT AND PLAN:   1. Lymph nodes positive high-grade left breast cancer 2005, status post lumpectomy and chemoradiation therapy, finished about 4 years of antihormonal therapy, could not tolerate it well, currently on surveillance visit.   We talked about the ER+ breast cancer recurrence pattern.   She is on continuous followup with us because of the features of her breast cancer on yrly basis.   She is very compliant to follow-up.   We also discussed the lifestyle modification issue she is on top of that.  Has lost 20 pounds since she saw me last.    2. She is battling with severe back pain from DJD.   We discussed the yoga and steroid injection.  She is seeing rheumatologist Dr. Puente at [place] for inflammatory  arthritis.    She is informed control internal inflammatory process is important for breast cancer survivor.    Total time spent 25 minutes, more than 15 minutes spent in counseling regarding her disease status, recurrence pattern associate was ER positive breast cancer, follow-up plan.        Again, thank you for allowing me to participate in the care of your patient.        Sincerely,        Merari Duarte MD, MD

## 2019-12-12 NOTE — PROGRESS NOTES
CHIEF COMPLAINT AND REASON FOR VISIT:   Breast cancer followup.     HISTORY OF ONCOLOGY ILLNESS: Briana Mcgee was diagnosed with left breast cancer, stage IIB, T2N1M0, ER positive infiltrating ductal cancer in 2005, status post lumpectomy. Primary tumor was 2.4 cm, grade 3 lesion with angiolymphatic invasion, ER/NE positive, HER-2 negative. Margins were clear. Status post 4 cycles of AC followed by 4 cycles of Taxol, then radiation.   She was on antihormonal therapy from end of 2005 and then initially started with Arimidex and could not tolerate it, switched to Aromasin and then dropped it in the later part of 2009. She has been on followup since then.     INTERVAL HISTORY:  Since the patient's last visit with us, there is no hospital stay/surgery/new diagnosis made.  She is on tx for inflammatory arthritis.     PAST MEDICAL HISTORY: GERD, severe reflux disease, osteoarthritis, hot flashes, stroke in 2006, insomnia, hyperlipidemia, hypothyroidism. inflammatory arthritis     MEDICATIONS: Reviewed in Epic system.     ALLERGIES: Erythromycin.     SOCIAL HISTORY: She lives in [place]. Denies smoking or alcohol abuse.     FAMILY HISTORY: Denied any family history of breast or ovarian cancer.       REVIEW OF SYSTEMS:   Her arthritic symptoms are better on tx with rheumatology.     PHYSICAL EXAMINATION:   VITAL SIGNS: Blood pressure 120/60, pulse 90, temperature 97.5  F (36.4  C), temperature source Tympanic, resp. rate 16, weight 86 kg (189 lb 8 oz), SpO2 93 %, not currently breastfeeding.    ECOG 0    GENERAL APPEARANCE: A middle-aged woman, looks much younger than her stated age, not in distress.   HEENT: The patient is normocephalic, atraumatic. Pupils are equal, react to light. Sclerae are anicteric. Moist oral mucosa. Negative pharynx. No oral thrush. + sinus congestion.   NECK: Supple. No jugular venous distention. Thyroid is not palpable.   LYMPH NODES: Superficial lymphadenopathy is not appreciable in the  bilateral cervical, supraclavicular, axillary or inguinal adenopathy.   CARDIOVASCULAR: S1, S2 regular, with no murmurs or gallops. No carotid or abdominal bruits.   PULMONARY: Lungs are clear to auscultation and percussion bilaterally. There is no wheezing or rhonchi.   GASTROINTESTINAL: Abdomen is soft, nontender. No hepatosplenomegaly. No signs of ascites. No mass appreciable.   MUSCULOSKELETAL AND EXTREMITIES: No edema. No cyanotic changes. No signs of joint deformity. No lymphedema.   NEUROLOGIC: Cranial nerves II through XII are grossly intact. Sensation intact. Muscle strength and muscle tone symmetrical all through, 5/5.   BACK: No spinal or paraspinal tenderness. No CVA tenderness.   SKIN: No petechiae. No rash. No signs of cellulitis.   BREASTS: Bilateral breast exam review: Left lumpectomy scar, well healed. Right nipple is flat and inverted as baseline. That is always the case, runs in her family, according to the patient.   She has thickening area at 12 o'clock, 2-4 cm from the nipple.       CURRENT LAB DATA REVIEWED  Cbc diff/LFT/marker: fine      CURRENT IMAGE REVIEWED  MA 12/2019: negative  DEXA 10/2019: NL    OLD DATA REVIEW IN SUMMARY:  MRI L spine 8/2017  1. L4 inferior endplate compression fracture. Adjacent marrow edema suggests that this is either acute/subacute or associated with ongoing instability.  2. L4-L5 severe central stenosis.  3. Multilevel degenerative disc disease, most advanced at L5-S1 where there is mild discogenic endplate reactive marrow edema.  4. Multilevel foraminal stenosis as above.    dexa 6/2016: negative    Chest x-ray from 01/2009, question possible pulmonary nodules. Followup CT chest in 02/2009 was reassuring. The last screening mammogram from 09/2008 looks similar to this one, benign BI-RADS 2 reading.       ASSESSMENT AND PLAN:   1. Lymph nodes positive high-grade left breast cancer 2005, status post lumpectomy and chemoradiation therapy, finished about 4 years of  antihormonal therapy, could not tolerate it well, currently on surveillance visit.   We talked about the ER+ breast cancer recurrence pattern.   She is on continuous followup with us because of the features of her breast cancer on yrly basis.   She is very compliant to follow-up.   We also discussed the lifestyle modification issue she is on top of that.  Has lost 20 pounds since she saw me last.    2. She is battling with severe back pain from DJD.   We discussed the yoga and steroid injection.  She is seeing rheumatologist Dr. Puente at [place] for inflammatory arthritis.    She is informed control internal inflammatory process is important for breast cancer survivor.    Total time spent 25 minutes, more than 15 minutes spent in counseling regarding her disease status, recurrence pattern associate was ER positive breast cancer, follow-up plan.

## 2019-12-17 ENCOUNTER — HOSPITAL ENCOUNTER (OUTPATIENT)
Dept: PHYSICAL THERAPY | Facility: CLINIC | Age: 74
Setting detail: THERAPIES SERIES
End: 2019-12-17
Attending: INTERNAL MEDICINE
Payer: MEDICARE

## 2019-12-17 DIAGNOSIS — M75.42 IMPINGEMENT SYNDROME OF LEFT SHOULDER: ICD-10-CM

## 2019-12-17 PROCEDURE — 97161 PT EVAL LOW COMPLEX 20 MIN: CPT | Mod: GP | Performed by: PHYSICAL MEDICINE & REHABILITATION

## 2019-12-17 PROCEDURE — 97110 THERAPEUTIC EXERCISES: CPT | Mod: GP | Performed by: PHYSICAL MEDICINE & REHABILITATION

## 2019-12-17 NOTE — PROGRESS NOTES
"   12/17/19 1500   General Information   Type of Visit Initial OP Ortho PT Evaluation   Start of Care Date 12/17/19   Referring Physician Chaim Puente MD   Patient/Family Goals Statement improve reaching into abd, improve pain with driving, laying on L side   Orders Evaluate and Treat   Date of Order 12/12/19   Certification Required? Yes  (Medicare/[employer])   Medical Diagnosis Impingement syndrome of left shoulder    Surgical/Medical history reviewed Yes   Precautions/Limitations no known precautions/limitations   General Information Comments PMHx per pt report: cancer, high BP, RA   Body Part(s)   Body Part(s) Shoulder   Presentation and Etiology   Pertinent history of current problem (include personal factors and/or comorbidities that impact the POC) Pt arrived to PT today for L shoulder pain that started about a year ago. Notes pain is primarly located in L lateral arm and reports only has pain with shoulder abd, otherwise shoulder movement unremarkable. Reports some tingling in L thumb but states this has been present for years and attributed to RA. Pt shared she brought up concern of sx radiating from arthritis in L shoulder but states rheumatologist says it is steaming from somewhere else.    Impairments E. Decreased flexibility;F. Decreased strength and endurance;L. Tingling   Functional Limitations perform activities of daily living;perform desired leisure / sports activities   Symptom Location L shoulder   How/Where did it occur From insidious onset;From Degenerative Joint Disease   Onset date of current episode/exacerbation 12/17/18  (\"started about a year ago\")   Chronicity Chronic   Pain rating (0-10 point scale) Best (/10);Worst (/10)   Best (/10) 2   Worst (/10) 4   Pain quality B. Dull;C. Aching;E. Shooting   Frequency of pain/symptoms C. With activity   Pain/symptoms are: Worse during the day   Pain/symptoms exacerbated by M. Other   Pain exacerbation comment raising arm into abd   Pain/symptoms " eased by C. Rest   Progression of symptoms since onset: Unchanged   Prior Level of Function   Prior Level of Function-Mobility independent   Prior Level of Function-ADLs independent   Current Level of Function   Current Community Support Family/friend caregiver   Patient role/employment history F. Retired  (pt shared she works in [employer])   Living environment House[facility]   Home/community accessibility pt reports 3 ERIS, 13 stairs to basement. Has railings   Current equipment-Gait/Locomotion None   Fall Risk Screen   Fall screen completed by PT   Have you fallen 2 or more times in the past year? No   Have you fallen and had an injury in the past year? No   Is patient a fall risk? No   Abuse Screen (yes response referral indicated)   Feels Unsafe at Home or Work/School no   Feels Threatened by Someone no   Does Anyone Try to Keep You From Having Contact with Others or Doing Things Outside Your Home? no   Physical Signs of Abuse Present no   Functional Scales   Functional Scales Other   Other Scales  SPADI: 6.92% disability   Shoulder Objective Findings   Side (if bilateral, select both right and left) Left   Posture forward head and rounded shoulders B   Cervical Screen (ROM, quadrant) all motions wfl, no increase in sx   Thoracic Mobility Screen all motions wnl, no increase in sx   Thoracic Outlet Syndrom (Miranda, Adson, Connie, Faulkner) Miranda: neg   Scapulothoracic Rhythm fair with scap set   Pec Minor (supine) Flexibility limited B   Neer's Test pos   Sanford-Brendan Test pos   Coracoid Test neg   Bursa Test noted min increase in sx with palpation of subacromal bursa   [place]'s Test neg   Load and Shift Test neg   Relocation Test pos   Sulcus Test neg   Crossover Test neg   Palpation noted increased sx with palpation of subacromial bursa otherwise L shoulder soft tissues and bony prominances unremarkable   Accessory Motion/Joint Mobility noted hypomobility in L GHJ in posterior and inferior directions, hypomobility of L  ACJ and SCJ   Left Shoulder Flexion AROM 160*   Left Shoulder Flexion PROM 170*   Left Shoulder Abduction AROM 68* (pain)   Left Shoulder Abduction PROM 145* (pain)   Left Shoulder ER AROM at 0* abd: 60*    Left Shoulder ER PROM at 90* abd: 75* (pain)   Left Shoulder IR AROM at 0* abd: to stomach   Left Shoulder IR PROM at 90* abd: 70*   Left Shoulder Flexion Strength 4+/5 B   Left Shoulder Abduction Strength 4/5 B (pain in L shoulder)   Left Shoulder ER Strength 4+/5 B   Left Shoulder IR Strength 4+/5 B   Left Shoulder Extension Strength 5-/5 B   Planned Therapy Interventions   Planned Therapy Interventions ADL retraining;IADL retraining;joint mobilization;manual therapy;motor coordination training;neuromuscular re-education;orthotic fitting/training;ROM;strengthening;stretching   Planned Modality Interventions   Planned Modality Interventions Biofeedback;Contrast bath immersion;Cryotherapy;Electrical stimulation;Hot packs;Hydrotherapy;Low level laser therapy;Shortwave diathermy;TENS;Traction;Ultrasound   Planned Modality Interventions Comments only as needed   Clinical Impression   Criteria for Skilled Therapeutic Interventions Met yes, treatment indicated   PT Diagnosis L shoulder pain   Influenced by the following impairments decreased ROM, decreased strength, impaired posture, pain   Functional limitations due to impairments driving, laying down, and reaching   Clinical Presentation Stable/Uncomplicated   Clinical Presentation Rationale ROM, strength, SPADI, comorbidities, clinical judgement   Clinical Decision Making (Complexity) Low complexity   Therapy Frequency 1 time/week   Predicted Duration of Therapy Intervention (days/wks) 8 weeks   Risk & Benefits of therapy have been explained Yes   Patient, Family & other staff in agreement with plan of care Yes   Clinical Impression Comments Pt presented to the PT clinic today with a rehab diagnosis of L shoulder pain as evidenced by decreased ROM, decreased  strength, impaired posture, and pain. More specifically, pt presents with signs/symptoms consistent with subacromial bursits and supraspinatus impingement as evidenced through special tests. Pt is appropriate for skilled PT to address previously listed impairments in order to decrease difficutly with driving, laying down and reaching.    Education Assessment   Preferred Learning Style Listening;Reading;Demonstration;Pictures/video   Barriers to Learning No barriers   ORTHO GOALS   PT Ortho Eval Goals 1;2;3;4   Ortho Goal 1   Goal Identifier 1   Goal Description Pt will be able to lay on L side with less than 1/10 pain in order to decrease difficulty with sleeping.    Target Date 01/14/20   Ortho Goal 2   Goal Identifier 2   Goal Description Pt will be able to abd L shoulder 120* in order to decrease difficulty with reaching.    Target Date 01/28/20   Ortho Goal 3   Goal Identifier 3   Goal Description Pt will be able to drive to/from clinic appointments without increase in sx.    Target Date 02/14/20   Ortho Goal 4   Goal Identifier 4   Goal Description Pt will be independent with HEP in order to self manage symptoms.    Target Date 02/14/20   Total Evaluation Time   PT Eval, Low Complexity Minutes (00353) 20   Therapy Certification   Certification date from 12/17/19   Certification date to 02/14/20   Medical Diagnosis Impingement syndrome of left shoulder        Please contact me with any questions or concerns.    Thank you for your referral,     Karey Becerra, PT, DPT  Physical Therapist  [address] [address] [phone]

## 2019-12-17 NOTE — PROGRESS NOTES
"[facility]          OUTPATIENT PHYSICAL THERAPY ORTHOPEDIC EVALUATION  PLAN OF TREATMENT FOR OUTPATIENT REHABILITATION  (COMPLETE FOR INITIAL CLAIMS ONLY)  Patient's Last Name, First Name, M.I.  YOB: 1945  Briana Mcgee    Provider s Name:  [facility]   Medical Record No.  [id number]   Start of Care Date:  12/17/19   Onset Date:  12/17/18(\"started about a year ago\")   Type:     _X__PT   ___OT   ___SLP Medical Diagnosis:  Impingement syndrome of left shoulder      PT Diagnosis:  L shoulder pain   Visits from SOC:  1      _________________________________________________________________________________  Plan of Treatment/Functional Goals:  ADL retraining, IADL retraining, joint mobilization, manual therapy, motor coordination training, neuromuscular re-education, orthotic fitting/training, ROM, strengthening, stretching     Biofeedback, Contrast bath immersion, Cryotherapy, Electrical stimulation, Hot packs, Hydrotherapy, Low level laser therapy, Shortwave diathermy, TENS, Traction, Ultrasound  only as needed  Goals  Goal Identifier: 1  Goal Description: Pt will be able to lay on L side with less than 1/10 pain in order to decrease difficulty with sleeping.   Target Date: 01/14/20    Goal Identifier: 2  Goal Description: Pt will be able to abd L shoulder 120* in order to decrease difficulty with reaching.   Target Date: 01/28/20    Goal Identifier: 3  Goal Description: Pt will be able to drive to/from clinic appointments without increase in sx.   Target Date: 02/14/20    Goal Identifier: 4  Goal Description: Pt will be independent with HEP in order to self manage symptoms.   Target Date: 02/14/20      Therapy Frequency:  1 time/week  Predicted Duration of Therapy Intervention:  8 weeks    Karey Becerra PT                 I CERTIFY THE NEED FOR THESE SERVICES FURNISHED UNDER        THIS PLAN OF TREATMENT AND WHILE UNDER MY CARE     (Physician " co-signature of this document indicates review and certification of the therapy plan).                       Certification Date From:  12/17/19   Certification Date To:  02/14/20    Referring Provider:  Chaim Puente MD    Initial Assessment        See Epic Evaluation Start of Care Date: 12/17/19

## 2020-01-09 DIAGNOSIS — M06.4 INFLAMMATORY POLYARTHROPATHY (H): ICD-10-CM

## 2020-01-09 LAB
ALBUMIN SERPL-MCNC: 3.6 G/DL (ref 3.4–5)
ALP SERPL-CCNC: 73 U/L (ref 40–150)
ALT SERPL W P-5'-P-CCNC: 28 U/L (ref 0–50)
AST SERPL W P-5'-P-CCNC: 23 U/L (ref 0–45)
BASOPHILS # BLD AUTO: 0.1 10E9/L (ref 0–0.2)
BASOPHILS NFR BLD AUTO: 1 %
BILIRUB DIRECT SERPL-MCNC: 0.2 MG/DL (ref 0–0.2)
BILIRUB SERPL-MCNC: 0.6 MG/DL (ref 0.2–1.3)
CREAT SERPL-MCNC: 0.83 MG/DL (ref 0.52–1.04)
DIFFERENTIAL METHOD BLD: NORMAL
EOSINOPHIL # BLD AUTO: 0.3 10E9/L (ref 0–0.7)
EOSINOPHIL NFR BLD AUTO: 6.6 %
ERYTHROCYTE [DISTWIDTH] IN BLOOD BY AUTOMATED COUNT: 12.9 % (ref 10–15)
GFR SERPL CREATININE-BSD FRML MDRD: 68 ML/MIN/{1.73_M2}
HCT VFR BLD AUTO: 38.2 % (ref 35–47)
HGB BLD-MCNC: 12.3 G/DL (ref 11.7–15.7)
LYMPHOCYTES # BLD AUTO: 1.9 10E9/L (ref 0.8–5.3)
LYMPHOCYTES NFR BLD AUTO: 36.4 %
MCH RBC QN AUTO: 30.6 PG (ref 26.5–33)
MCHC RBC AUTO-ENTMCNC: 32.2 G/DL (ref 31.5–36.5)
MCV RBC AUTO: 95 FL (ref 78–100)
MONOCYTES # BLD AUTO: 0.7 10E9/L (ref 0–1.3)
MONOCYTES NFR BLD AUTO: 13.3 %
NEUTROPHILS # BLD AUTO: 2.2 10E9/L (ref 1.6–8.3)
NEUTROPHILS NFR BLD AUTO: 42.7 %
PLATELET # BLD AUTO: 197 10E9/L (ref 150–450)
PROT SERPL-MCNC: 7.1 G/DL (ref 6.8–8.8)
RBC # BLD AUTO: 4.02 10E12/L (ref 3.8–5.2)
WBC # BLD AUTO: 5.2 10E9/L (ref 4–11)

## 2020-01-09 PROCEDURE — 36415 COLL VENOUS BLD VENIPUNCTURE: CPT | Performed by: INTERNAL MEDICINE

## 2020-01-09 PROCEDURE — 80076 HEPATIC FUNCTION PANEL: CPT | Performed by: INTERNAL MEDICINE

## 2020-01-09 PROCEDURE — 85025 COMPLETE CBC W/AUTO DIFF WBC: CPT | Performed by: INTERNAL MEDICINE

## 2020-01-09 PROCEDURE — 82565 ASSAY OF CREATININE: CPT | Performed by: INTERNAL MEDICINE

## 2020-01-23 NOTE — PROGRESS NOTES
Outpatient Physical Therapy Discharge Note     Patient: Briana Mcgee  : 1945    Beginning/End Dates of Reporting Period:  2019 to 2019    Referring Provider: Chaim Puente MD    Therapy Diagnosis: L shoulder pain    Patient did not return for follow up treatments as directed.  Goal status and current objective information is therefore unknown.  Discharge from PT services at this time for this episode of treatment. Please see attached documentation under this episode of care for further information including dates of service, start of care date, referring physician, Dx, treatment plan, treatments, etc.    Please contact me with any questions or concerns.    Thank you for your referral.    Karey Becerra, PT, DPT  Physical Therapist   [address] [address] [phone]

## 2020-02-04 NOTE — PROGRESS NOTES
SUBJECTIVE   Briana Mcgee is a  female who presents to clinic today for the following health issue(s):       Rash    Duration: 6-7 months    Description  Location: Both lower legs Lt.>Rt  Itching: mild    Intensity:  Spreading     Accompanying signs and symptoms: flat red with scaly skin     History (similar episodes/previous evaluation): None    Precipitating or alleviating factors:  New exposures:  None  Recent travel: no      Therapies tried and outcome: Gold bond healing lotion relieves the itching     Breast CA- follows annually with Dr. Gerald BOWIE, inflammatory arthritist- follows with Dr. Puente @ [place]        Health Maintenance        Health Maintenance Due   Topic Date Due     ZOSTER IMMUNIZATION (2 of 3) 06/07/2012     ADVANCE CARE PLANNING  01/29/2019     MEDICARE ANNUAL WELLNESS VISIT  05/01/2019     FALL RISK ASSESSMENT  05/01/2019     MICROALBUMIN  12/06/2019     PHQ-2  01/01/2020       PCP   Xavier Vega [phone]    PROBLEM LIST        Patient Active Problem List   Diagnosis     Malignant neoplasm of female breast (H)     Hypothyroidism     Impaired fasting glucose     Insomnia     ischemic stroke 3/06     Rhinitis, allergic seasonal     OA (osteoarthritis) of knee     Hot flashes     HYPERLIPIDEMIA LDL GOAL <100     Heterozygous factor V Leiden mutation (H)     GERD (gastroesophageal reflux disease)     Varicose veins of lower extremities with complications     Lumbar radiculopathy     Spinal stenosis     Advanced directives, counseling/discussion     CKD (chronic kidney disease) stage 2, GFR 60-89 ml/min     Obesity     Hepatitis     Essential hypertension     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)     Spinal stenosis, lumbar region, with neurogenic claudication       MEDICATIONS        Current Outpatient Medications   Medication     aspirin 81 MG EC tablet     fluticasone (FLONASE) 50 MCG/ACT nasal spray     hydrochlorothiazide  "(HYDRODIURIL) 12.5 MG tablet     hydroxychloroquine (PLAQUENIL) 200 MG tablet     leflunomide (ARAVA) 20 MG tablet     levothyroxine (SYNTHROID/LEVOTHROID) 137 MCG tablet     levothyroxine (SYNTHROID/LEVOTHROID) 25 MCG tablet     Multiple Vitamins-Minerals (MULTIVITAMIN ADULT) TABS     Naproxen Sodium (ALEVE PO)     pantoprazole (PROTONIX) 40 MG EC tablet     simvastatin (ZOCOR) 20 MG tablet     tolterodine ER (DETROL LA) 4 MG 24 hr capsule     triamcinolone (KENALOG) 0.1 % external ointment     traZODone (DESYREL) 100 MG tablet     No current facility-administered medications for this visit.        Reviewed and updated as needed this visit by Provider:  Tobacco  Allergies  Meds  Med Hx  Surg Hx  Fam Hx  Soc Hx     ROS      Constitutional, HEENT, cardiovascular, pulmonary, gi and gu systems are negative, except as otherwise noted.    PHYSICAL EXAM   /72   Pulse 70   Temp 97.7  F (36.5  C) (Tympanic)   Resp 20   Ht 1.664 m (5' 5.5\")   Wt 82.6 kg (182 lb)   SpO2 97%   BMI 29.83 kg/m    Body mass index is 29.83 kg/m .  GENERAL APPEARANCE: healthy, alert and no distress  RESP: lungs clear to auscultation - no rales, rhonchi or wheezes  CV: regular rates and rhythm, normal S1 S2, no S3 or S4 and no murmur, click or rub  MS: extremities normal- no gross deformities noted  SKIN: bilateral shins have an oblong rash that is red, scaly, and pruritic  PSYCH: mentation appears normal and affect normal/bright    ASSESSMENT & PLAN     1. Atopic dermatitis, unspecified type  Chronic, stable  - triamcinolone (KENALOG) 0.1 % external ointment; Apply ointment twice daily on lower legs for 2 weeks  Dispense: 30 g; Refill: 0    PROPER SKIN CARE REGIMEN:    Eliminate harsh soaps (Dial, Zest, [place] Spring,  or anything fragrant)    Use mild soaps  (Cetaphil, Eucerin, Dove Sensitive Skin)    Avoid hot or cold showers    Take quick showers (10 minutes)    After showering, lightly pat skin to dry off.     Within 10 " minutes of showering, apply a cream moisturizer (Vanicream, Cetaphil, Aquafor, or Cerave)     We recommend using a tub that needs to be scooped out, not a pump. This has more of an oil base. It will hold moisture in your skin much better than a water base moisturizer. The ones recommended are non- pore clogging.      Patient Education     What is Atopic Dermatitis?  Atopic dermatitis (also called eczema) causes chronic skin irritation. It is often found in infants, teens, and adults. This disease often runs in families (is genetic). It may also be linked to allergies, such as hay fever and sometimes asthma. Patches of skin become dry, red, itchy, and scaly. In older adults, abnormally dry skin is often called xerosis. Sometimes eczema is only on the hands or feet. It often improves when the skin is well hydrated. It gets worse when the skin is dry. You can help control symptoms by practicing good self-care. Avoid anything that causes flare-ups (such as sunburn or vigorous scratching).  Where do you have symptoms?  Atopic dermatitis symptoms can appear anywhere on the body. But in most cases they vary based on the person s age. In infants, irritation is often seen on the cheeks, chin, near the mouth, and under the eyelids. In children ages 2 through 10, skin folds, such as the backs of the knees, or in the arm crease, are most often affected. In children 11 and older and in adults, symptoms can affect many areas.  What triggers symptoms?  Symptoms flare because of many things. These include skin dryness, scratching, stress, harsh soaps, and irritants such as dust or wool. Try to avoid anything that causes flare-ups.  Recognizing what causes flare-ups  To figure out what causes atopic dermatitis to flare, keep a list of things that seem to affect your skin. Start by filling in the spaces below. Then keep writing them down in a notebook or diary. The things that affect each person vary. So keep your own list and try to  avoid your triggers.    Date Last Reviewed: 2/1/2017 2000-2019 [employer] [address] [place] [address], [place] [address]. All rights reserved. This information is not intended as a substitute for professional medical care. Always follow your healthcare professional's instructions.             Risks, benefits, side effects and rationale for treatment plan fully discussed with the patient and understanding expressed.  BRIANNA Cobb-BC  [facility]

## 2020-02-06 ENCOUNTER — OFFICE VISIT (OUTPATIENT)
Dept: FAMILY MEDICINE | Facility: CLINIC | Age: 75
End: 2020-02-06
Payer: COMMERCIAL

## 2020-02-06 VITALS
RESPIRATION RATE: 20 BRPM | SYSTOLIC BLOOD PRESSURE: 134 MMHG | BODY MASS INDEX: 29.25 KG/M2 | TEMPERATURE: 97.7 F | WEIGHT: 182 LBS | OXYGEN SATURATION: 97 % | HEART RATE: 70 BPM | DIASTOLIC BLOOD PRESSURE: 72 MMHG | HEIGHT: 66 IN

## 2020-02-06 DIAGNOSIS — M06.4 INFLAMMATORY POLYARTHROPATHY (H): ICD-10-CM

## 2020-02-06 DIAGNOSIS — L20.9 ATOPIC DERMATITIS, UNSPECIFIED TYPE: Primary | ICD-10-CM

## 2020-02-06 LAB
ALBUMIN SERPL-MCNC: 3.6 G/DL (ref 3.4–5)
ALP SERPL-CCNC: 71 U/L (ref 40–150)
ALT SERPL W P-5'-P-CCNC: 28 U/L (ref 0–50)
AST SERPL W P-5'-P-CCNC: 23 U/L (ref 0–45)
BASOPHILS # BLD AUTO: 0.1 10E9/L (ref 0–0.2)
BASOPHILS NFR BLD AUTO: 0.9 %
BILIRUB DIRECT SERPL-MCNC: 0.2 MG/DL (ref 0–0.2)
BILIRUB SERPL-MCNC: 0.7 MG/DL (ref 0.2–1.3)
CREAT SERPL-MCNC: 0.89 MG/DL (ref 0.52–1.04)
DIFFERENTIAL METHOD BLD: NORMAL
EOSINOPHIL # BLD AUTO: 0.3 10E9/L (ref 0–0.7)
EOSINOPHIL NFR BLD AUTO: 5.7 %
ERYTHROCYTE [DISTWIDTH] IN BLOOD BY AUTOMATED COUNT: 12.9 % (ref 10–15)
GFR SERPL CREATININE-BSD FRML MDRD: 63 ML/MIN/{1.73_M2}
HCT VFR BLD AUTO: 37.4 % (ref 35–47)
HGB BLD-MCNC: 12.1 G/DL (ref 11.7–15.7)
LYMPHOCYTES # BLD AUTO: 2 10E9/L (ref 0.8–5.3)
LYMPHOCYTES NFR BLD AUTO: 36 %
MCH RBC QN AUTO: 30.3 PG (ref 26.5–33)
MCHC RBC AUTO-ENTMCNC: 32.4 G/DL (ref 31.5–36.5)
MCV RBC AUTO: 94 FL (ref 78–100)
MONOCYTES # BLD AUTO: 0.6 10E9/L (ref 0–1.3)
MONOCYTES NFR BLD AUTO: 11.1 %
NEUTROPHILS # BLD AUTO: 2.6 10E9/L (ref 1.6–8.3)
NEUTROPHILS NFR BLD AUTO: 46.3 %
PLATELET # BLD AUTO: 207 10E9/L (ref 150–450)
PROT SERPL-MCNC: 7.1 G/DL (ref 6.8–8.8)
RBC # BLD AUTO: 3.99 10E12/L (ref 3.8–5.2)
WBC # BLD AUTO: 5.6 10E9/L (ref 4–11)

## 2020-02-06 PROCEDURE — 99213 OFFICE O/P EST LOW 20 MIN: CPT | Performed by: NURSE PRACTITIONER

## 2020-02-06 PROCEDURE — 85025 COMPLETE CBC W/AUTO DIFF WBC: CPT | Performed by: INTERNAL MEDICINE

## 2020-02-06 PROCEDURE — 80076 HEPATIC FUNCTION PANEL: CPT | Performed by: INTERNAL MEDICINE

## 2020-02-06 PROCEDURE — 36415 COLL VENOUS BLD VENIPUNCTURE: CPT | Performed by: INTERNAL MEDICINE

## 2020-02-06 PROCEDURE — 82565 ASSAY OF CREATININE: CPT | Performed by: INTERNAL MEDICINE

## 2020-02-06 RX ORDER — TRIAMCINOLONE ACETONIDE 1 MG/G
OINTMENT TOPICAL
Qty: 30 G | Refills: 0 | Status: SHIPPED | OUTPATIENT
Start: 2020-02-06 | End: 2021-06-14

## 2020-02-06 ASSESSMENT — MIFFLIN-ST. JEOR: SCORE: 1329.36

## 2020-02-06 NOTE — PATIENT INSTRUCTIONS
1. Atopic dermatitis, unspecified type  Chronic, stable  - triamcinolone (KENALOG) 0.1 % external ointment; Apply ointment twice daily on lower legs for 2 weeks  Dispense: 30 g; Refill: 0    PROPER SKIN CARE REGIMEN:    Eliminate harsh soaps (Dial, Zest, [place] Spring,  or anything fragrant)    Use mild soaps  (Cetaphil, Eucerin, Dove Sensitive Skin)    Avoid hot or cold showers    Take quick showers (10 minutes)    After showering, lightly pat skin to dry off.     Within 10 minutes of showering, apply a cream moisturizer (Vanicream, Cetaphil, Aquafor, or Cerave)     We recommend using a tub that needs to be scooped out, not a pump. This has more of an oil base. It will hold moisture in your skin much better than a water base moisturizer. The ones recommended are non- pore clogging.      Patient Education     What is Atopic Dermatitis?  Atopic dermatitis (also called eczema) causes chronic skin irritation. It is often found in infants, teens, and adults. This disease often runs in families (is genetic). It may also be linked to allergies, such as hay fever and sometimes asthma. Patches of skin become dry, red, itchy, and scaly. In older adults, abnormally dry skin is often called xerosis. Sometimes eczema is only on the hands or feet. It often improves when the skin is well hydrated. It gets worse when the skin is dry. You can help control symptoms by practicing good self-care. Avoid anything that causes flare-ups (such as sunburn or vigorous scratching).  Where do you have symptoms?  Atopic dermatitis symptoms can appear anywhere on the body. But in most cases they vary based on the person s age. In infants, irritation is often seen on the cheeks, chin, near the mouth, and under the eyelids. In children ages 2 through 10, skin folds, such as the backs of the knees, or in the arm crease, are most often affected. In children 11 and older and in adults, symptoms can affect many areas.  What triggers symptoms?  Symptoms  flare because of many things. These include skin dryness, scratching, stress, harsh soaps, and irritants such as dust or wool. Try to avoid anything that causes flare-ups.  Recognizing what causes flare-ups  To figure out what causes atopic dermatitis to flare, keep a list of things that seem to affect your skin. Start by filling in the spaces below. Then keep writing them down in a notebook or diary. The things that affect each person vary. So keep your own list and try to avoid your triggers.    Date Last Reviewed: 2/1/2017 2000-2019 [employer]. [address] [place] [id number]. All rights reserved. This information is not intended as a substitute for professional medical care. Always follow your healthcare professional's instructions.

## 2020-02-06 NOTE — NURSING NOTE
"Chief Complaint   Patient presents with     Derm Problem       Initial /72   Pulse 70   Temp 97.7  F (36.5  C) (Tympanic)   Resp 20   Ht 1.664 m (5' 5.5\")   Wt 82.6 kg (182 lb)   SpO2 97%   BMI 29.83 kg/m   Estimated body mass index is 29.83 kg/m  as calculated from the following:    Height as of this encounter: 1.664 m (5' 5.5\").    Weight as of this encounter: 82.6 kg (182 lb).    Patient presents to the clinic using No DME    Health Maintenance that is potentially due pending provider review:  NONE    n/a    Is there anyone who you would like to be able to receive your results? No  If yes have patient fill out JUSTINE    "

## 2020-02-10 ENCOUNTER — HEALTH MAINTENANCE LETTER (OUTPATIENT)
Age: 75
End: 2020-02-10

## 2020-02-26 ENCOUNTER — OFFICE VISIT (OUTPATIENT)
Dept: FAMILY MEDICINE | Facility: CLINIC | Age: 75
End: 2020-02-26
Payer: COMMERCIAL

## 2020-02-26 VITALS
SYSTOLIC BLOOD PRESSURE: 118 MMHG | HEART RATE: 71 BPM | TEMPERATURE: 98 F | WEIGHT: 183 LBS | OXYGEN SATURATION: 98 % | BODY MASS INDEX: 29.41 KG/M2 | HEIGHT: 66 IN | DIASTOLIC BLOOD PRESSURE: 70 MMHG

## 2020-02-26 DIAGNOSIS — R25.1 TREMOR: ICD-10-CM

## 2020-02-26 DIAGNOSIS — M06.4 INFLAMMATORY POLYARTHROPATHY (H): ICD-10-CM

## 2020-02-26 DIAGNOSIS — G44.52 NEW DAILY PERSISTENT HEADACHE: Primary | ICD-10-CM

## 2020-02-26 DIAGNOSIS — R39.15 URINARY URGENCY: ICD-10-CM

## 2020-02-26 DIAGNOSIS — G89.29 CHRONIC NONINTRACTABLE HEADACHE, UNSPECIFIED HEADACHE TYPE: ICD-10-CM

## 2020-02-26 DIAGNOSIS — L30.9 DERMATITIS: ICD-10-CM

## 2020-02-26 DIAGNOSIS — D68.51 HETEROZYGOUS FACTOR V LEIDEN MUTATION (H): ICD-10-CM

## 2020-02-26 DIAGNOSIS — R51.9 CHRONIC NONINTRACTABLE HEADACHE, UNSPECIFIED HEADACHE TYPE: ICD-10-CM

## 2020-02-26 DIAGNOSIS — C50.919 MALIGNANT NEOPLASM OF FEMALE BREAST, UNSPECIFIED ESTROGEN RECEPTOR STATUS, UNSPECIFIED LATERALITY, UNSPECIFIED SITE OF BREAST (H): ICD-10-CM

## 2020-02-26 PROCEDURE — 99214 OFFICE O/P EST MOD 30 MIN: CPT | Performed by: FAMILY MEDICINE

## 2020-02-26 RX ORDER — TRIAMCINOLONE ACETONIDE 5 MG/G
CREAM TOPICAL 2 TIMES DAILY PRN
Qty: 30 G | Refills: 3 | Status: SHIPPED | OUTPATIENT
Start: 2020-02-26 | End: 2020-10-13 | Stop reason: ALTCHOICE

## 2020-02-26 ASSESSMENT — MIFFLIN-ST. JEOR: SCORE: 1333.89

## 2020-02-26 NOTE — PATIENT INSTRUCTIONS
For your rash I increased the strength.  See how it responds over the next month or so.    For your headache and balance please get a head mri scan.    For your tremor please see if alcohol helps it.  This could be a sign of a benign tremor or familiar tremor.          Thank you for choosing [facility].  You may be receiving an email and/or telephone survey request from [facility] [employer] regarding your visit today.  Please take a few minutes to respond to the survey to let us know how we are doing.      If you have questions or concerns, please contact us via [employer] or you can contact your care team at [phone].    Our Clinic hours are:  Monday 6:40 am  to 7:00 pm  Tuesday -Friday 6:40 am to 5:00 pm    The [place] outpatient lab hours are:  Monday - Friday 6:10 am to 4:45 pm  Saturdays 7:00 am to 11:00 am  Appointments are required, call [phone]    If you have clinical questions after hours or would like to schedule an appointment,  call the clinic at [phone].

## 2020-02-26 NOTE — PROGRESS NOTES
"Subjective     Briana Mcgee is a 74 year old female who presents to clinic today for the following health issues:  Chief Complaint   Patient presents with     Tremors     right hand has been getting shakey/tremors. Sometimes will feel the shakiness internally     Balance/ Vestibular     Headache     has a headache left upper crown. It even hurts to touch the area. During the bad headaches will have blurred vision.      Cellulitis     has had some rash on lower legs. Saw a NP and was given a salve. Not improving. Started on the left leg and is now on the right leg.       HPI     She has rash on legs.  Has been using one steroid cream.    She is still seeing oncology for her breast cancer in remission.  Getting her mammograms    She has right hand tremor.  Noted more with using it.  No resting tremor.  No stiff muscle, flat facies or shuffling gait.  No known family h/o tremor.     She has an ongoing headache, left side.  Sometime it affects her vision, distorted.  She reports sometime bright light bothers her.  It has been 1 month.  New headache.        Mentions has had had long time balance issue too.  She thinks related to prior stroke.        Reviewed and updated as needed this visit by Provider         Review of Systems   Review Of Systems  Skin: as above, rash still present  Eyes: as above  Ears/Nose/Throat: negative  Respiratory: No shortness of breath, dyspnea on exertion, cough, or hemoptysis  Cardiovascular: negative  Gastrointestinal: negative  Genitourinary: also still has urinary issues and wants a referral back to urology, on [facility]  Musculoskeletal: as above  Neurologic: as above  Psychiatric: negative  Hematologic/Lymphatic/Immunologic: negative  Endocrine:         Objective    /70 (Cuff Size: Adult Large)   Pulse 71   Temp 98  F (36.7  C) (Tympanic)   Ht 1.664 m (5' 5.5\")   Wt 83 kg (183 lb)   SpO2 98%   BMI 29.99 kg/m    Body mass index is 29.99 kg/m .  Physical Exam "   GENERAL APPEARANCE: alert, no distress and cooperative  EYES: Eyes grossly normal to inspection, PERRL and conjunctivae and sclerae normal  HENT: ear canals and TM's normal and nose and mouth without ulcers or lesions  NECK: no adenopathy, no asymmetry, masses, or scars and thyroid normal to palpation  RESP: lungs clear to auscultation - no rales, rhonchi or wheezes  CV: regular rates and rhythm, normal S1 S2, no S3 or S4 and no murmur, click or rub  ABDOMEN: soft, nontender, without hepatosplenomegaly or masses and bowel sounds normal  MS: extremities normal- no gross deformities noted  SKIN: no suspicious lesions or rashes  NEURO: Normal strength and tone, mentation intact, speech normal, DTR symmetrically normal in lower extremities, cranial nerves 2-12 intact, Romberg abnormal - unable to tandem walk and minimal slight right tremor intermittent with activity, not at resting  PSYCH: mentation appears normal and affect normal/bright            Assessment & Plan     (G44.52) New daily persistent headache  (primary encounter diagnosis)  Comment: due to new headache and long standing balance issues will get head scan to rule out intracranial abnormality.  Depending on the finding may need to see neurology  Plan: MR Brain w/o & w Contrast            (M06.4) Inflammatory polyarthropathy (H)  Comment: seeing her specialist  Plan:     (D68.51) Heterozygous factor V Leiden mutation (H)  Comment: noted h/o from past  Plan:     (C50.919) Malignant neoplasm of female breast, unspecified estrogen receptor status, unspecified laterality, unspecified site of breast (H)  Comment: still seeing oncology  Plan:     (L30.9) Dermatitis  Comment: increase strength of steroid cream and monitor  Plan: triamcinolone (ARISTOCORT HP) 0.5 % external         cream            (R39.15) Urinary urgency  Comment: referral done due to still leaking urine and wants to go back to same urology and did referral to [facility]  Plan: UROLOGY  "ADULT REFERRAL               BMI:   Estimated body mass index is 29.99 kg/m  as calculated from the following:    Height as of this encounter: 1.664 m (5' 5.5\").    Weight as of this encounter: 83 kg (183 lb).           See Patient Instructions    Return in about 4 weeks (around 3/25/2020) for If not better.    Xavier Vega MD  [facility]      "

## 2020-03-05 DIAGNOSIS — M06.4 INFLAMMATORY POLYARTHROPATHY (H): ICD-10-CM

## 2020-03-05 LAB
ALBUMIN SERPL-MCNC: 3.6 G/DL (ref 3.4–5)
ALP SERPL-CCNC: 64 U/L (ref 40–150)
ALT SERPL W P-5'-P-CCNC: 23 U/L (ref 0–50)
AST SERPL W P-5'-P-CCNC: 22 U/L (ref 0–45)
BASOPHILS # BLD AUTO: 0 10E9/L (ref 0–0.2)
BASOPHILS NFR BLD AUTO: 0.7 %
BILIRUB DIRECT SERPL-MCNC: 0.2 MG/DL (ref 0–0.2)
BILIRUB SERPL-MCNC: 0.8 MG/DL (ref 0.2–1.3)
CREAT SERPL-MCNC: 0.88 MG/DL (ref 0.52–1.04)
CRP SERPL-MCNC: <2.9 MG/L (ref 0–8)
DIFFERENTIAL METHOD BLD: NORMAL
EOSINOPHIL # BLD AUTO: 0.4 10E9/L (ref 0–0.7)
EOSINOPHIL NFR BLD AUTO: 7.3 %
ERYTHROCYTE [DISTWIDTH] IN BLOOD BY AUTOMATED COUNT: 12.8 % (ref 10–15)
ERYTHROCYTE [SEDIMENTATION RATE] IN BLOOD BY WESTERGREN METHOD: 18 MM/H (ref 0–30)
GFR SERPL CREATININE-BSD FRML MDRD: 64 ML/MIN/{1.73_M2}
HCT VFR BLD AUTO: 37.2 % (ref 35–47)
HGB BLD-MCNC: 12 G/DL (ref 11.7–15.7)
LYMPHOCYTES # BLD AUTO: 2 10E9/L (ref 0.8–5.3)
LYMPHOCYTES NFR BLD AUTO: 35.2 %
MCH RBC QN AUTO: 29.5 PG (ref 26.5–33)
MCHC RBC AUTO-ENTMCNC: 32.3 G/DL (ref 31.5–36.5)
MCV RBC AUTO: 91 FL (ref 78–100)
MONOCYTES # BLD AUTO: 0.7 10E9/L (ref 0–1.3)
MONOCYTES NFR BLD AUTO: 12.6 %
NEUTROPHILS # BLD AUTO: 2.5 10E9/L (ref 1.6–8.3)
NEUTROPHILS NFR BLD AUTO: 44.2 %
PLATELET # BLD AUTO: 222 10E9/L (ref 150–450)
PROT SERPL-MCNC: 7.1 G/DL (ref 6.8–8.8)
RBC # BLD AUTO: 4.07 10E12/L (ref 3.8–5.2)
WBC # BLD AUTO: 5.7 10E9/L (ref 4–11)

## 2020-03-05 PROCEDURE — 36415 COLL VENOUS BLD VENIPUNCTURE: CPT | Performed by: INTERNAL MEDICINE

## 2020-03-05 PROCEDURE — 80076 HEPATIC FUNCTION PANEL: CPT | Performed by: INTERNAL MEDICINE

## 2020-03-05 PROCEDURE — 86140 C-REACTIVE PROTEIN: CPT | Performed by: INTERNAL MEDICINE

## 2020-03-05 PROCEDURE — 82565 ASSAY OF CREATININE: CPT | Performed by: INTERNAL MEDICINE

## 2020-03-05 PROCEDURE — 85025 COMPLETE CBC W/AUTO DIFF WBC: CPT | Performed by: INTERNAL MEDICINE

## 2020-03-05 PROCEDURE — 85652 RBC SED RATE AUTOMATED: CPT | Performed by: INTERNAL MEDICINE

## 2020-03-12 ENCOUNTER — HOSPITAL ENCOUNTER (OUTPATIENT)
Dept: MRI IMAGING | Facility: CLINIC | Age: 75
Discharge: HOME | End: 2020-03-12
Attending: FAMILY MEDICINE | Admitting: FAMILY MEDICINE
Payer: MEDICARE

## 2020-03-12 DIAGNOSIS — G44.52 NEW DAILY PERSISTENT HEADACHE: ICD-10-CM

## 2020-03-12 PROCEDURE — 25500064 ZZH RX 255 OP 636: Performed by: FAMILY MEDICINE

## 2020-03-12 PROCEDURE — A9585 GADOBUTROL INJECTION: HCPCS | Performed by: FAMILY MEDICINE

## 2020-03-12 PROCEDURE — 70553 MRI BRAIN STEM W/O & W/DYE: CPT

## 2020-03-12 RX ORDER — GADOBUTROL 604.72 MG/ML
8 INJECTION INTRAVENOUS ONCE
Status: COMPLETED | OUTPATIENT
Start: 2020-03-12 | End: 2020-03-12

## 2020-03-12 RX ADMIN — GADOBUTROL 8 ML: 604.72 INJECTION INTRAVENOUS at 07:20

## 2020-04-02 ENCOUNTER — VIRTUAL VISIT (OUTPATIENT)
Dept: RHEUMATOLOGY | Facility: CLINIC | Age: 75
End: 2020-04-02
Payer: COMMERCIAL

## 2020-04-02 DIAGNOSIS — Z79.899 HIGH RISK MEDICATIONS (NOT ANTICOAGULANTS) LONG-TERM USE: ICD-10-CM

## 2020-04-02 DIAGNOSIS — M06.4 INFLAMMATORY POLYARTHROPATHY (H): Primary | ICD-10-CM

## 2020-04-02 PROCEDURE — 99213 OFFICE O/P EST LOW 20 MIN: CPT | Mod: 95 | Performed by: INTERNAL MEDICINE

## 2020-04-02 RX ORDER — LEFLUNOMIDE 20 MG/1
20 TABLET ORAL DAILY
Qty: 30 TABLET | Refills: 3 | Status: SHIPPED | OUTPATIENT
Start: 2020-04-02 | End: 2020-07-09
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
## 2020-04-27 ENCOUNTER — TELEPHONE (OUTPATIENT)
Dept: RHEUMATOLOGY | Facility: CLINIC | Age: 75
End: 2020-04-27

## 2020-04-27 NOTE — TELEPHONE ENCOUNTER
Reason for call:  Other   Patient called regarding (reason for call): prescription  Additional comments: Patient calling regarding ARAVA and is having side affects. Please call to advise.     Phone number to reach patient:  Home number on file [phone] (home)    Best Time:  Any     Can we leave a detailed message on this number?  YES    Travel screening: Not Applicable

## 2020-04-27 NOTE — TELEPHONE ENCOUNTER
Patient calling reporting hair loss, diarrhea, stomach pain, and lightheadedness which started 2 weeks ago. Believes these are s/e of leflunomide. Notes diarrhea began shortly after she started the medication. Mentions she has had vertigo in the past. Reports the medication was working well for her arthritis up until 2 weeks ago. Patient is wondering what to do, if she should stop med or try taking it every other day. RN recommended increased fluid intake and avoiding driving when dizzy and avoiding cleaning positional head movements. Advised patient that she may need apt to discuss alternative therapies. Patient verbalized understanding. Message was sent to Dr. Puente to review and advise.    Adan Burgos RN....4/27/2020 11:43 AM

## 2020-04-27 NOTE — TELEPHONE ENCOUNTER
RN: Please call to notify Ms. Mcgee to hold leflunomide for 1 week, then resume leflunomide 20mg every other day. Notify us if symptoms do not resolve.    Chaim Puente MD  4/27/2020 4:39 PM

## 2020-04-28 NOTE — TELEPHONE ENCOUNTER
Called and left a detailed message relaying Dr. Puente's message below.    Adan Burgos RN....4/28/2020 8:51 AM

## 2020-05-18 ENCOUNTER — TELEPHONE (OUTPATIENT)
Dept: RHEUMATOLOGY | Facility: CLINIC | Age: 75
End: 2020-05-18

## 2020-05-18 NOTE — TELEPHONE ENCOUNTER
Reason for call:  Other   Patient called regarding (reason for call): call back  Additional comments: Patient is calling because she wants a substitute medication because she say she is having side effects from the medication leflunomide (ARAVA) 20 MG tablet. Please call back     Phone number to reach patient:  Home number on file [phone] (home)    Best Time:  any    Can we leave a detailed message on this number?  YES    Travel screening: Not Applicable

## 2020-05-18 NOTE — TELEPHONE ENCOUNTER
"Patient calling stating she does not want to take leflunomide anymore. Started it in December and developed diarrhea shortly afterwards. Reports \"diarrhea is almost explosive..I am afraid to leave my house sometimes.\" Also developed nausea and hair loss about a month ago. Patient stopped med for 8 days in past and states these symptoms improved. She would like to discuss alternative therapy. RN advised that arthritic symptoms may flare if she stops med. Also recommended she schedule an apt w/Dr. Puente. Patient verbalized understanding, agreed with this plan and was scheduled an apt on 6/11/20. Forwarded update to Dr. Puente.    Adan Burgos RN....5/18/2020 11:55 AM     "

## 2020-06-07 DIAGNOSIS — E78.5 HYPERLIPIDEMIA LDL GOAL <100: ICD-10-CM

## 2020-06-07 DIAGNOSIS — I10 ESSENTIAL HYPERTENSION: ICD-10-CM

## 2020-06-09 DIAGNOSIS — E03.9 HYPOTHYROIDISM, UNSPECIFIED TYPE: ICD-10-CM

## 2020-06-09 DIAGNOSIS — K21.9 GASTROESOPHAGEAL REFLUX DISEASE WITHOUT ESOPHAGITIS: ICD-10-CM

## 2020-06-09 RX ORDER — SIMVASTATIN 20 MG
TABLET ORAL
Qty: 90 TABLET | Refills: 0 | Status: SHIPPED | OUTPATIENT
Start: 2020-06-09 | End: 2020-10-14

## 2020-06-09 RX ORDER — HYDROCHLOROTHIAZIDE 12.5 MG/1
TABLET ORAL
Qty: 90 TABLET | Refills: 0 | Status: SHIPPED | OUTPATIENT
Start: 2020-06-09 | End: 2020-10-13

## 2020-06-09 NOTE — TELEPHONE ENCOUNTER
"Requested Prescriptions   Pending Prescriptions Disp Refills     hydrochlorothiazide (HYDRODIURIL) 12.5 MG tablet [Pharmacy Med Name: hydrochlorothiazide 12.5 mg tablet] 90 tablet 3     Sig: Take 1 tablet (12.5 mg) by mouth daily       Diuretics (Including Combos) Protocol Passed - 6/7/2020  8:00 AM        Passed - Blood pressure under 140/90 in past 12 months     BP Readings from Last 3 Encounters:   02/26/20 118/70   02/06/20 134/72   12/12/19 120/60                 Passed - Recent (12 mo) or future (30 days) visit within the authorizing provider's specialty     Patient has had an office visit with the authorizing provider or a provider within the authorizing providers department within the previous 12 mos or has a future within next 30 days. See \"Patient Info\" tab in inbasket, or \"Choose Columns\" in Meds & Orders section of the refill encounter.              Passed - Medication is active on med list        Passed - Patient is age 18 or older        Passed - No active pregancy on record        Passed - Normal serum creatinine on file in past 12 months     Recent Labs   Lab Test 03/05/20  0949   CR 0.88              Passed - Normal serum potassium on file in past 12 months     Recent Labs   Lab Test 12/09/19  0744   POTASSIUM 4.2                    Passed - Normal serum sodium on file in past 12 months     Recent Labs   Lab Test 12/09/19  0744                 Passed - No positive pregnancy test in past 12 months           simvastatin (ZOCOR) 20 MG tablet [Pharmacy Med Name: simvastatin 20 mg tablet] 90 tablet 3     Sig: Take 1 tablet (20 mg) by mouth At Bedtime       Statins Protocol Passed - 6/7/2020  8:00 AM        Passed - LDL on file in past 12 months     Recent Labs   Lab Test 06/19/19  0750   LDL 53             Passed - No abnormal creatine kinase in past 12 months     Recent Labs   Lab Test 11/25/15  1117   CKT 36                Passed - Recent (12 mo) or future (30 days) visit within the authorizing " "provider's specialty     Patient has had an office visit with the authorizing provider or a provider within the authorizing providers department within the previous 12 mos or has a future within next 30 days. See \"Patient Info\" tab in inbasket, or \"Choose Columns\" in Meds & Orders section of the refill encounter.              Passed - Medication is active on med list        Passed - Patient is age 18 or older        Passed - No active pregnancy on record        Passed - No positive pregnancy test in past 12 months           Both meds:  Last Written Prescription Date:  6/17/2019  Last Fill Quantity: 90,  # refills: 3   Last office visit: 2/26/2020 with prescribing provider:  Gary    Future Office Visit:   Next 5 appointments (look out 90 days)    Jul 30, 2020 11:00 AM CDT  Return Visit with Chaim Puente MD  [facility] [place] ([facility] [place]) [address] [place] [address]  [place] [address]  [phone]                 "

## 2020-06-10 RX ORDER — PANTOPRAZOLE SODIUM 40 MG/1
40 TABLET, DELAYED RELEASE ORAL DAILY
Qty: 90 TABLET | Refills: 2 | Status: SHIPPED | OUTPATIENT
Start: 2020-06-10 | End: 2021-06-14

## 2020-06-10 NOTE — TELEPHONE ENCOUNTER
Routing refill request to provider for review/approval because:  Labs not current:    TSH   Date Value Ref Range Status   06/07/2019 0.90 0.40 - 4.00 mU/L Final       Last office visit: 2/26/2020 with prescribing provider  Future Office Visit:   Next 5 appointments (look out 90 days)    Jul 30, 2020 11:00 AM CDT  Return Visit with Chaim Puente MD  [facility] ([facility]) [address] [facility] [address]  [phone]          Briana MONTANA BSN, RN

## 2020-06-10 NOTE — TELEPHONE ENCOUNTER
"Requested Prescriptions   Pending Prescriptions Disp Refills     pantoprazole (PROTONIX) 40 MG EC tablet [Pharmacy Med Name: pantoprazole 40 mg tablet,delayed release]  Last Written Prescription Date:  6/17/19  Last Fill Quantity: 90,  # refills: 3   Last Office Visit with [facility] or [facility] prescribing provider:  2/26/20   Future Office Visit:    Next 5 appointments (look out 90 days)    Jul 30, 2020 11:00 AM CDT  Return Visit with Chaim Puente MD  [address]  [place] [address]  [phone]          90 tablet 3     Sig: Take 1 tablet (40 mg) by mouth daily       PPI Protocol Passed - 6/9/2020  8:00 AM        Passed - Not on Clopidogrel (unless Pantoprazole ordered)        Passed - No diagnosis of osteoporosis on record        Passed - Recent (12 mo) or future (30 days) visit within the authorizing provider's specialty     Patient has had an office visit with the authorizing provider or a provider within the authorizing providers department within the previous 12 mos or has a future within next 30 days. See \"Patient Info\" tab in inbasket, or \"Choose Columns\" in Meds & Orders section of the refill encounter.              Passed - Medication is active on med list        Passed - Patient is age 18 or older        Passed - No active pregnacy on record        Passed - No positive pregnancy test in past 12 months           levothyroxine (SYNTHROID/LEVOTHROID) 137 MCG tablet [Pharmacy Med Name: levothyroxine 137 mcg tablet]  Last Written Prescription Date:  6/10/19  Last Fill Quantity: 90,  # refills: 3   Last Office Visit with [facility] or [facility] prescribing provider:  2/26/20   Future Office Visit:    Next 5 appointments (look out 90 days)    Jul 30, 2020 11:00 AM CDT  [facility]  [facility] ([address]  [place] [address]  [phone]          90 tablet 3     Sig: Take " "1 tablet (137 mcg) by mouth daily Along with 25mcg to equal 162mcg daily.       Thyroid Protocol Failed - 6/9/2020  8:00 AM        Failed - Normal TSH on file in past 12 months     Recent Labs   Lab Test 06/07/19  0818   TSH 0.90              Passed - Patient is 12 years or older        Passed - Recent (12 mo) or future (30 days) visit within the authorizing provider's specialty     Patient has had an office visit with the authorizing provider or a provider within the authorizing providers department within the previous 12 mos or has a future within next 30 days. See \"Patient Info\" tab in inbasket, or \"Choose Columns\" in Meds & Orders section of the refill encounter.              Passed - Medication is active on med list        Passed - No active pregnancy on record     If patient is pregnant or has had a positive pregnancy test, please check TSH.          Passed - No positive pregnancy test in past 12 months     If patient is pregnant or has had a positive pregnancy test, please check TSH.             levothyroxine (SYNTHROID/LEVOTHROID) 25 MCG tablet [Pharmacy Med Name: levothyroxine 25 mcg tablet]  Last Written Prescription Date:  6/10/19  Last Fill Quantity: 90,  # refills: 3   Last Office Visit with [facility] prescribing provider:  2/26/20   Future Office Visit:    Next 5 appointments (look out 90 days)    Jul 30, 2020 11:00 AM CDT  Return Visit with Chaim Puente MD  [facility] ([facility]) [address] [place] [id number]  [phone]          90 tablet 3     Sig: Take 1 tablet (25 mcg) by mouth daily Take along with the 137mcg to equal 162mcg total daily.       Thyroid Protocol Failed - 6/9/2020  8:00 AM        Failed - Normal TSH on file in past 12 months     Recent Labs   Lab Test 06/07/19  0818   TSH 0.90              Passed - Patient is 12 years or older        Passed - Recent (12 mo) or future (30 days) visit within the authorizing provider's " "specialty     Patient has had an office visit with the authorizing provider or a provider within the authorizing providers department within the previous 12 mos or has a future within next 30 days. See \"Patient Info\" tab in inbasket, or \"Choose Columns\" in Meds & Orders section of the refill encounter.              Passed - Medication is active on med list        Passed - No active pregnancy on record     If patient is pregnant or has had a positive pregnancy test, please check TSH.          Passed - No positive pregnancy test in past 12 months     If patient is pregnant or has had a positive pregnancy test, please check TSH.               "

## 2020-06-11 ENCOUNTER — VIRTUAL VISIT (OUTPATIENT)
Dept: RHEUMATOLOGY | Facility: CLINIC | Age: 75
End: 2020-06-11
Payer: COMMERCIAL

## 2020-06-11 DIAGNOSIS — Z79.899 HIGH RISK MEDICATIONS (NOT ANTICOAGULANTS) LONG-TERM USE: ICD-10-CM

## 2020-06-11 DIAGNOSIS — M06.4 INFLAMMATORY POLYARTHROPATHY (H): Primary | ICD-10-CM

## 2020-06-11 PROCEDURE — 99213 OFFICE O/P EST LOW 20 MIN: CPT | Mod: 95 | Performed by: INTERNAL MEDICINE

## 2020-06-11 RX ORDER — SULFASALAZINE 500 MG/1
TABLET, DELAYED RELEASE ORAL
Qty: 120 TABLET | Refills: 3 | Status: SHIPPED | OUTPATIENT
Start: 2020-06-11 | End: 2020-08-07

## 2020-06-11 RX ORDER — HYDROXYCHLOROQUINE SULFATE 200 MG/1
200 TABLET, FILM COATED ORAL 2 TIMES DAILY
Qty: 180 TABLET | Refills: 2 | Status: SHIPPED | OUTPATIENT
Start: 2020-06-11 | End: 2021-02-11
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
## 2020-06-12 RX ORDER — LEVOTHYROXINE SODIUM 137 UG/1
137 TABLET ORAL DAILY
Qty: 30 TABLET | Refills: 0 | Status: SHIPPED | OUTPATIENT
Start: 2020-06-12 | End: 2020-07-29

## 2020-06-12 RX ORDER — LEVOTHYROXINE SODIUM 25 UG/1
25 TABLET ORAL DAILY
Qty: 30 TABLET | Refills: 0 | Status: SHIPPED | OUTPATIENT
Start: 2020-06-12 | End: 2020-07-29

## 2020-06-12 NOTE — TELEPHONE ENCOUNTER
Refilled one month.  Please make a lab visit and then follow up with me with a virtual visit.  Xavier Vega MD  Family Medicine

## 2020-06-24 DIAGNOSIS — R39.15 URINARY URGENCY: ICD-10-CM

## 2020-06-25 RX ORDER — TOLTERODINE 4 MG/1
CAPSULE, EXTENDED RELEASE ORAL
Qty: 90 CAPSULE | Refills: 1 | Status: SHIPPED | OUTPATIENT
Start: 2020-06-25 | End: 2020-12-16

## 2020-07-03 DIAGNOSIS — M06.4 INFLAMMATORY POLYARTHROPATHY (H): ICD-10-CM

## 2020-07-03 DIAGNOSIS — Z79.899 HIGH RISK MEDICATIONS (NOT ANTICOAGULANTS) LONG-TERM USE: ICD-10-CM

## 2020-07-03 DIAGNOSIS — E03.9 HYPOTHYROIDISM, UNSPECIFIED TYPE: ICD-10-CM

## 2020-07-03 LAB
ALBUMIN SERPL-MCNC: 3.5 G/DL (ref 3.4–5)
ALP SERPL-CCNC: 70 U/L (ref 40–150)
ALT SERPL W P-5'-P-CCNC: 26 U/L (ref 0–50)
AST SERPL W P-5'-P-CCNC: 22 U/L (ref 0–45)
BASOPHILS # BLD AUTO: 0.1 10E9/L (ref 0–0.2)
BASOPHILS NFR BLD AUTO: 1.2 %
BILIRUB DIRECT SERPL-MCNC: 0.1 MG/DL (ref 0–0.2)
BILIRUB SERPL-MCNC: 0.5 MG/DL (ref 0.2–1.3)
CREAT SERPL-MCNC: 0.88 MG/DL (ref 0.52–1.04)
DIFFERENTIAL METHOD BLD: ABNORMAL
EOSINOPHIL # BLD AUTO: 0.3 10E9/L (ref 0–0.7)
EOSINOPHIL NFR BLD AUTO: 4.2 %
ERYTHROCYTE [DISTWIDTH] IN BLOOD BY AUTOMATED COUNT: 12.7 % (ref 10–15)
GFR SERPL CREATININE-BSD FRML MDRD: 64 ML/MIN/{1.73_M2}
HCT VFR BLD AUTO: 35.3 % (ref 35–47)
HGB BLD-MCNC: 11.6 G/DL (ref 11.7–15.7)
LYMPHOCYTES # BLD AUTO: 1.8 10E9/L (ref 0.8–5.3)
LYMPHOCYTES NFR BLD AUTO: 27.7 %
MCH RBC QN AUTO: 30.1 PG (ref 26.5–33)
MCHC RBC AUTO-ENTMCNC: 32.9 G/DL (ref 31.5–36.5)
MCV RBC AUTO: 92 FL (ref 78–100)
MONOCYTES # BLD AUTO: 0.8 10E9/L (ref 0–1.3)
MONOCYTES NFR BLD AUTO: 11.5 %
NEUTROPHILS # BLD AUTO: 3.7 10E9/L (ref 1.6–8.3)
NEUTROPHILS NFR BLD AUTO: 55.4 %
PLATELET # BLD AUTO: 236 10E9/L (ref 150–450)
PROT SERPL-MCNC: 6.8 G/DL (ref 6.8–8.8)
RBC # BLD AUTO: 3.86 10E12/L (ref 3.8–5.2)
T4 FREE SERPL-MCNC: 1.21 NG/DL (ref 0.76–1.46)
TSH SERPL DL<=0.005 MIU/L-ACNC: 0.64 MU/L (ref 0.4–4)
WBC # BLD AUTO: 6.6 10E9/L (ref 4–11)

## 2020-07-03 PROCEDURE — 85025 COMPLETE CBC W/AUTO DIFF WBC: CPT | Performed by: INTERNAL MEDICINE

## 2020-07-03 PROCEDURE — 82565 ASSAY OF CREATININE: CPT | Performed by: INTERNAL MEDICINE

## 2020-07-03 PROCEDURE — 84439 ASSAY OF FREE THYROXINE: CPT | Performed by: FAMILY MEDICINE

## 2020-07-03 PROCEDURE — 36415 COLL VENOUS BLD VENIPUNCTURE: CPT | Performed by: INTERNAL MEDICINE

## 2020-07-03 PROCEDURE — 84443 ASSAY THYROID STIM HORMONE: CPT | Performed by: FAMILY MEDICINE

## 2020-07-03 PROCEDURE — 80076 HEPATIC FUNCTION PANEL: CPT | Performed by: INTERNAL MEDICINE

## 2020-07-09 NOTE — RESULT ENCOUNTER NOTE
"Coresonict message sent:  \"Ms. Mcgee,    The hemoglobin is just below the normal range.  This will be reevaluated with your next labs.    Sincerely,  Chaim Puente MD  7/9/2020 6:35 AM\""

## 2020-07-27 DIAGNOSIS — E03.9 HYPOTHYROIDISM, UNSPECIFIED TYPE: ICD-10-CM

## 2020-07-29 ENCOUNTER — DOCUMENTATION ONLY (OUTPATIENT)
Dept: LAB | Facility: CLINIC | Age: 75
End: 2020-07-29

## 2020-07-29 DIAGNOSIS — E03.9 HYPOTHYROIDISM, UNSPECIFIED TYPE: Primary | ICD-10-CM

## 2020-07-29 DIAGNOSIS — E03.9 HYPOTHYROIDISM, UNSPECIFIED TYPE: ICD-10-CM

## 2020-07-29 DIAGNOSIS — Z79.899 HIGH RISK MEDICATIONS (NOT ANTICOAGULANTS) LONG-TERM USE: ICD-10-CM

## 2020-07-29 DIAGNOSIS — M06.4 INFLAMMATORY POLYARTHROPATHY (H): ICD-10-CM

## 2020-07-29 LAB
ALBUMIN SERPL-MCNC: 3.6 G/DL (ref 3.4–5)
ALP SERPL-CCNC: 68 U/L (ref 40–150)
ALT SERPL W P-5'-P-CCNC: 25 U/L (ref 0–50)
AST SERPL W P-5'-P-CCNC: 22 U/L (ref 0–45)
BASOPHILS # BLD AUTO: 0.1 10E9/L (ref 0–0.2)
BASOPHILS NFR BLD AUTO: 0.9 %
BILIRUB DIRECT SERPL-MCNC: 0.2 MG/DL (ref 0–0.2)
BILIRUB SERPL-MCNC: 0.7 MG/DL (ref 0.2–1.3)
CREAT SERPL-MCNC: 0.92 MG/DL (ref 0.52–1.04)
DIFFERENTIAL METHOD BLD: ABNORMAL
EOSINOPHIL # BLD AUTO: 0.3 10E9/L (ref 0–0.7)
EOSINOPHIL NFR BLD AUTO: 5.8 %
ERYTHROCYTE [DISTWIDTH] IN BLOOD BY AUTOMATED COUNT: 13.3 % (ref 10–15)
GFR SERPL CREATININE-BSD FRML MDRD: 60 ML/MIN/{1.73_M2}
HCT VFR BLD AUTO: 33 % (ref 35–47)
HGB BLD-MCNC: 10.6 G/DL (ref 11.7–15.7)
LYMPHOCYTES # BLD AUTO: 1.6 10E9/L (ref 0.8–5.3)
LYMPHOCYTES NFR BLD AUTO: 28 %
MCH RBC QN AUTO: 29.9 PG (ref 26.5–33)
MCHC RBC AUTO-ENTMCNC: 32.1 G/DL (ref 31.5–36.5)
MCV RBC AUTO: 93 FL (ref 78–100)
MONOCYTES # BLD AUTO: 0.7 10E9/L (ref 0–1.3)
MONOCYTES NFR BLD AUTO: 11.7 %
NEUTROPHILS # BLD AUTO: 3.1 10E9/L (ref 1.6–8.3)
NEUTROPHILS NFR BLD AUTO: 53.6 %
PLATELET # BLD AUTO: 196 10E9/L (ref 150–450)
PROT SERPL-MCNC: 6.7 G/DL (ref 6.8–8.8)
RBC # BLD AUTO: 3.55 10E12/L (ref 3.8–5.2)
T4 FREE SERPL-MCNC: 1.19 NG/DL (ref 0.76–1.46)
TSH SERPL DL<=0.005 MIU/L-ACNC: 0.72 MU/L (ref 0.4–4)
WBC # BLD AUTO: 5.8 10E9/L (ref 4–11)

## 2020-07-29 PROCEDURE — 80076 HEPATIC FUNCTION PANEL: CPT | Performed by: INTERNAL MEDICINE

## 2020-07-29 PROCEDURE — 85025 COMPLETE CBC W/AUTO DIFF WBC: CPT | Performed by: INTERNAL MEDICINE

## 2020-07-29 PROCEDURE — 82565 ASSAY OF CREATININE: CPT | Performed by: INTERNAL MEDICINE

## 2020-07-29 PROCEDURE — 36415 COLL VENOUS BLD VENIPUNCTURE: CPT | Performed by: INTERNAL MEDICINE

## 2020-07-29 RX ORDER — LEVOTHYROXINE SODIUM 137 UG/1
137 TABLET ORAL DAILY
Qty: 30 TABLET | Refills: 6 | Status: SHIPPED | OUTPATIENT
Start: 2020-07-29 | End: 2021-01-22

## 2020-07-29 RX ORDER — LEVOTHYROXINE SODIUM 25 UG/1
25 TABLET ORAL DAILY
Qty: 30 TABLET | Refills: 6 | Status: SHIPPED | OUTPATIENT
Start: 2020-07-29 | End: 2021-01-22

## 2020-07-29 NOTE — PROGRESS NOTES
Please place or confirm orders for upcoming lab appointment on 07.29.2020 Patient is coming in for thyroid labs please place orders    Thank You  Luz VALIENTE CMA.

## 2020-08-07 ENCOUNTER — MYC MEDICAL ADVICE (OUTPATIENT)
Dept: RHEUMATOLOGY | Facility: CLINIC | Age: 75
End: 2020-08-07

## 2020-08-07 DIAGNOSIS — M06.4 INFLAMMATORY POLYARTHROPATHY (H): ICD-10-CM

## 2020-08-07 DIAGNOSIS — Z79.899 HIGH RISK MEDICATIONS (NOT ANTICOAGULANTS) LONG-TERM USE: ICD-10-CM

## 2020-08-07 RX ORDER — SULFASALAZINE 500 MG/1
500 TABLET, DELAYED RELEASE ORAL 2 TIMES DAILY
Qty: 60 TABLET | Refills: 1 | Status: SHIPPED | OUTPATIENT
Start: 2020-08-07 | End: 2020-08-10

## 2020-08-07 NOTE — RESULT ENCOUNTER NOTE
"MyChart message sent:  \"Ms. Mcgee,    The hemoglobin is reduced and this could be due to sulfasalazine.  Therefore, please reduce sulfasalazine to now be 500mg twice daily.     Sincerely,  Chaim Puente MD  8/7/2020 7:53 AM\""

## 2020-08-10 NOTE — TELEPHONE ENCOUNTER
Adan: please call Briana Mcgee to see if she would like a sooner visit to discuss alternatives to sulfasalazine considering that it was stopped today; otherwise will see at her scheduled future visit.     Chaim Puente MD  8/10/2020 4:26 PM

## 2020-08-12 NOTE — TELEPHONE ENCOUNTER
Per Dr. Puente:   Schedule patient for 8/17/2020 at 2:00PM.    Lala Magaña CMA Rheumatology  8/12/2020 12:44 PM

## 2020-08-17 ENCOUNTER — VIRTUAL VISIT (OUTPATIENT)
Dept: RHEUMATOLOGY | Facility: CLINIC | Age: 75
End: 2020-08-17
Payer: COMMERCIAL

## 2020-08-17 DIAGNOSIS — Z79.899 HIGH RISK MEDICATIONS (NOT ANTICOAGULANTS) LONG-TERM USE: ICD-10-CM

## 2020-08-17 DIAGNOSIS — M06.00 SERONEGATIVE RHEUMATOID ARTHRITIS (H): Primary | ICD-10-CM

## 2020-08-17 DIAGNOSIS — Z11.59 ENCOUNTER FOR SCREENING FOR OTHER VIRAL DISEASES: ICD-10-CM

## 2020-08-17 PROCEDURE — 99214 OFFICE O/P EST MOD 30 MIN: CPT | Mod: 95 | Performed by: INTERNAL MEDICINE
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
## 2020-08-26 ENCOUNTER — ALLIED HEALTH/NURSE VISIT (OUTPATIENT)
Dept: FAMILY MEDICINE | Facility: CLINIC | Age: 75
End: 2020-08-26
Payer: COMMERCIAL

## 2020-08-26 ENCOUNTER — ANCILLARY PROCEDURE (OUTPATIENT)
Dept: GENERAL RADIOLOGY | Facility: CLINIC | Age: 75
End: 2020-08-26
Attending: INTERNAL MEDICINE
Payer: COMMERCIAL

## 2020-08-26 DIAGNOSIS — Z79.899 HIGH RISK MEDICATIONS (NOT ANTICOAGULANTS) LONG-TERM USE: ICD-10-CM

## 2020-08-26 DIAGNOSIS — M06.00 SERONEGATIVE RHEUMATOID ARTHRITIS (H): ICD-10-CM

## 2020-08-26 DIAGNOSIS — Z11.1 SCREENING FOR TUBERCULOSIS: Primary | ICD-10-CM

## 2020-08-26 DIAGNOSIS — Z11.59 ENCOUNTER FOR SCREENING FOR OTHER VIRAL DISEASES: ICD-10-CM

## 2020-08-26 PROCEDURE — 99207 ZZC NO CHARGE NURSE ONLY: CPT

## 2020-08-26 PROCEDURE — 86704 HEP B CORE ANTIBODY TOTAL: CPT | Performed by: INTERNAL MEDICINE

## 2020-08-26 PROCEDURE — 86580 TB INTRADERMAL TEST: CPT

## 2020-08-26 PROCEDURE — 71046 X-RAY EXAM CHEST 2 VIEWS: CPT

## 2020-08-26 PROCEDURE — 86803 HEPATITIS C AB TEST: CPT | Performed by: INTERNAL MEDICINE

## 2020-08-26 PROCEDURE — 87340 HEPATITIS B SURFACE AG IA: CPT | Performed by: INTERNAL MEDICINE

## 2020-08-26 PROCEDURE — 36415 COLL VENOUS BLD VENIPUNCTURE: CPT | Performed by: INTERNAL MEDICINE

## 2020-08-26 PROCEDURE — 86481 TB AG RESPONSE T-CELL SUSP: CPT | Performed by: INTERNAL MEDICINE

## 2020-08-27 LAB
HBV CORE AB SERPL QL IA: NONREACTIVE
HBV SURFACE AG SERPL QL IA: NONREACTIVE
HCV AB SERPL QL IA: NONREACTIVE

## 2020-08-28 LAB
GAMMA INTERFERON BACKGROUND BLD IA-ACNC: 0.18 IU/ML
M TB IFN-G CD4+ BCKGRND COR BLD-ACNC: 9.82 IU/ML
M TB TUBERC IFN-G BLD QL: NEGATIVE
MITOGEN IGNF BCKGRD COR BLD-ACNC: 0 IU/ML
MITOGEN IGNF BCKGRD COR BLD-ACNC: 0 IU/ML

## 2020-09-04 ENCOUNTER — OFFICE VISIT (OUTPATIENT)
Dept: OPHTHALMOLOGY | Facility: CLINIC | Age: 75
End: 2020-09-04
Payer: COMMERCIAL

## 2020-09-04 DIAGNOSIS — M06.042 RHEUMATOID ARTHRITIS INVOLVING BOTH HANDS WITH NEGATIVE RHEUMATOID FACTOR (H): ICD-10-CM

## 2020-09-04 DIAGNOSIS — M06.041 RHEUMATOID ARTHRITIS INVOLVING BOTH HANDS WITH NEGATIVE RHEUMATOID FACTOR (H): ICD-10-CM

## 2020-09-04 DIAGNOSIS — Z79.899 HIGH RISK MEDICATIONS (NOT ANTICOAGULANTS) LONG-TERM USE: Primary | ICD-10-CM

## 2020-09-04 PROCEDURE — 92012 INTRM OPH EXAM EST PATIENT: CPT | Performed by: STUDENT IN AN ORGANIZED HEALTH CARE EDUCATION/TRAINING PROGRAM

## 2020-09-04 PROCEDURE — 92134 CPTRZ OPH DX IMG PST SGM RTA: CPT | Performed by: STUDENT IN AN ORGANIZED HEALTH CARE EDUCATION/TRAINING PROGRAM

## 2020-09-04 PROCEDURE — 92083 EXTENDED VISUAL FIELD XM: CPT | Performed by: STUDENT IN AN ORGANIZED HEALTH CARE EDUCATION/TRAINING PROGRAM

## 2020-09-04 ASSESSMENT — VISUAL ACUITY
OD_CC: 20/25
OS_CC: 20/30
CORRECTION_TYPE: GLASSES
OS_CC+: +2
METHOD: SNELLEN - LINEAR
OD_CC+: +2

## 2020-09-04 ASSESSMENT — REFRACTION_WEARINGRX
OS_AXIS: 096
OD_SPHERE: -1.25
OS_CYLINDER: +0.50
OS_SPHERE: -0.25
OD_CYLINDER: +0.50
SPECS_TYPE: PAL
OS_ADD: +2.50
OD_ADD: +2.50
OD_AXIS: 180

## 2020-09-04 ASSESSMENT — CUP TO DISC RATIO
OS_RATIO: 0.25 UD
OD_RATIO: 0.2 UD

## 2020-09-04 ASSESSMENT — SLIT LAMP EXAM - LIDS
COMMENTS: 1+ DERMATOCHALASIS
COMMENTS: 1+ DERMATOCHALASIS

## 2020-09-04 NOTE — PROGRESS NOTES
Current Eye Medications:  No eye drops or eye vitamins.       Subjective:  Follow up Plaquenil.  Patient is here for a visual field and OCT.  She has new glasses from her eye exam in May of 2019 (at [facility]), which have improved her vision.  No changes or concerns.       Objective:  See Ophthalmology Exam.       Assessment:  Briana Mcgee is a 75 year old female who presents with:   Encounter Diagnoses   Name Primary?     High risk medications (not anticoagulants) long-term use Started Plaquenil 8/2018.    Macular SD-OCT within normal limits both eyes (within normal limits right eye; slightly irregular inner retinal surface, no vitreomacular traction, nl outer retinal architecture left eye).     Stern visual field (HVF) 10-2 within normal limits both eyes (v. mild scattered loss both eyes)    No signs of Plaquenil retinal toxicity at present.        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)        Plan:  Normal Plaquenil eye tests today.    Rik Villavicencio MD  [phone]

## 2020-09-04 NOTE — LETTER
9/4/2020       RE: Briana Mcgee  [address] [facility] [id number]      Dear Dr. Puente,    Thank you for referring your patient, Briana Mcgee, to the [facility].     Her Plaquenil eye tests were normal today. Plan to repeat tests in 1 year.  Please see a copy of my visit note below.     Current Eye Medications:  No eye drops or eye vitamins.       Subjective:  Follow up Plaquenil.  Patient is here for a visual field and OCT.  She has new glasses from her eye exam in May of 2019 (at [facility]), which have improved her vision.  No changes or concerns.       Objective:  See Ophthalmology Exam.       Assessment:  Briana Mcgee is a 75 year old female who presents with:   Encounter Diagnoses   Name Primary?     High risk medications (not anticoagulants) long-term use Started Plaquenil 8/2018.    Macular SD-OCT within normal limits both eyes (within normal limits right eye; slightly irregular inner retinal surface, no vitreomacular traction, nl outer retinal architecture left eye).     Stern visual field (HVF) 10-2 within normal limits both eyes (v. mild scattered loss both eyes)    No signs of Plaquenil retinal toxicity at present.        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)        Plan:  Normal Plaquenil eye tests today.    Rik Villavicencio MD  [phone]        Again, thank you for allowing me to participate in the care of your patient.        Sincerely,        Rik Villavicencio MD

## 2020-09-08 ENCOUNTER — TELEPHONE (OUTPATIENT)
Dept: RHEUMATOLOGY | Facility: CLINIC | Age: 75
End: 2020-09-08

## 2020-09-08 DIAGNOSIS — M06.00 SERONEGATIVE RHEUMATOID ARTHRITIS (H): Primary | ICD-10-CM

## 2020-09-08 NOTE — TELEPHONE ENCOUNTER
PA Initiation    Medication: humira  Insurance Company: [employer] - Phone [phone] Fax [phone]  Pharmacy Filling the Rx:    Filling Pharmacy Phone:    Filling Pharmacy Fax:    Start Date: 9/8/2020

## 2020-09-08 NOTE — TELEPHONE ENCOUNTER
Prior Authorization Approval    Authorization Effective Date: 6/10/2020  Authorization Expiration Date: 9/8/2021  Medication: humira  Approved Dose/Quantity: 2/28ds  Reference #: [id number]   Insurance Company: [employer] - Phone [phone] Fax [phone]  Expected CoPay: $1,427.40     CoPay Card Available: No    Foundation Assistance Needed: yes

## 2020-09-10 ENCOUNTER — MYC MEDICAL ADVICE (OUTPATIENT)
Dept: PHARMACY | Facility: CLINIC | Age: 75
End: 2020-09-10

## 2020-09-21 NOTE — TELEPHONE ENCOUNTER
Humira assistance form/Rx signed and sent back to Ailyn Bailey.    Chaim Puente MD  9/21/2020 7:23 AM

## 2020-09-22 NOTE — TELEPHONE ENCOUNTER
Free Drug Application Initiated  Medication-humira  Sponsor-[employer]  Additional Information-provider portion faxed 9/21. Awaiting pt portion as of 9/30

## 2020-09-29 NOTE — TELEPHONE ENCOUNTER
FUTURE VISIT INFORMATION:    Date: 10/13/20    Time:  1500    Location: [facility]   REFERRAL INFORMATION:    Referring provider:  Xavier Vega MD    Referring providers clinic: [facility]    Reason for visit/diagnosis:  Headache, Tremor       NOTES (FOR ALL VISITS) STATUS DETAILS   OFFICE NOTE from referring provider Printed 2/26/20   OFFICE NOTE from other specialist Printed     DISCHARGE SUMMARY from hospital       DISCHARGE REPORT from the ER     MEDICATION LIST In Epic     IMAGING  (FOR ALL VISITS)       EMG       EEG       MRI (HEAD, NECK, SPINE) Printed  Images in PACs MRI brain 9/12/20   CARDIAC STUDIES        FORMAL COGNITIVE TESTING       OTHER

## 2020-10-01 NOTE — TELEPHONE ENCOUNTER
Free Drug Application Denied  Denial Reason(s) unknown, likely financial but the program did not disclose   Patient notified? mychart msg sent advising to indu program to discuss  Additional Information

## 2020-10-13 ENCOUNTER — PRE VISIT (OUTPATIENT)
Dept: NEUROLOGY | Facility: CLINIC | Age: 75
End: 2020-10-13

## 2020-10-13 ENCOUNTER — OFFICE VISIT (OUTPATIENT)
Dept: NEUROLOGY | Facility: CLINIC | Age: 75
End: 2020-10-13
Attending: FAMILY MEDICINE
Payer: COMMERCIAL

## 2020-10-13 VITALS
SYSTOLIC BLOOD PRESSURE: 144 MMHG | RESPIRATION RATE: 16 BRPM | TEMPERATURE: 97.5 F | OXYGEN SATURATION: 96 % | HEART RATE: 83 BPM | DIASTOLIC BLOOD PRESSURE: 80 MMHG

## 2020-10-13 DIAGNOSIS — R42 VERTIGO: Primary | ICD-10-CM

## 2020-10-13 DIAGNOSIS — E78.5 HYPERLIPIDEMIA LDL GOAL <100: ICD-10-CM

## 2020-10-13 DIAGNOSIS — G25.0 ESSENTIAL TREMOR: ICD-10-CM

## 2020-10-13 DIAGNOSIS — G43.009 MIGRAINE WITHOUT AURA AND WITHOUT STATUS MIGRAINOSUS, NOT INTRACTABLE: ICD-10-CM

## 2020-10-13 PROCEDURE — 99204 OFFICE O/P NEW MOD 45 MIN: CPT | Performed by: PSYCHIATRY & NEUROLOGY

## 2020-10-13 RX ORDER — PROPRANOLOL HYDROCHLORIDE 40 MG/1
40 TABLET ORAL 2 TIMES DAILY
Qty: 60 TABLET | Refills: 3 | Status: ON HOLD | OUTPATIENT
Start: 2020-10-13 | End: 2020-11-18

## 2020-10-13 NOTE — PATIENT INSTRUCTIONS
Plan:  -- Referral to physical therapy for vertigo evaluation. If this is not helpful, we can also have you see an ENT specialist for recommendations.   -- Propranolol: 40mg twice daily for tremor/migraine prevention. *Information provided.  -- Let us know how you are feeling on the new medication after a few weeks. We may have to adjust the dose for effectiveness.   -- Return to Neurology in 3-4 months.       Patient Education     Essential Tremor (ET)  What is essential tremor?  Essential tremor (ET) is a neurological disorder that causes your hands, head, trunk, voice, and/or legs to shake rhythmically. It is often confused with Parkinson disease.  ET is the most common trembling disorder that people experience. Everyone has some ET, but the movements usually cannot be seen or felt. When tremors are noticeable, the condition is classified as ET.  ET is most common among people older than age 65, but it can affect people at any age.  What causes ET?  ET can occur in different people for different reasons:    Familial essential tremor. In most people, the condition seems to be passed down from a parent to a child. If your parent has ET, there is a 50% chance that you or your children will inherit the gene responsible for the condition.    Essential tremor related to another disorder. Sometimes, a tremor is a symptom of another neurological disorder, such as Parkinson disease or dystonia. Sometimes, ET is mistaken for these other diseases when they are not present. A health care provider s careful diagnosis is extremely important.  The cause of ET isn t known. However, one theory suggests that your cerebellum and other parts of your brain are not communicating correctly. The cerebellum is a part of the brain that controls muscle coordination.  What are the symptoms of ET?  If you have ET, you will experience shaking and trembling at different times and in different situations, but some characteristics are common to  all. Here is what you might typically experience:    Tremors occur when you move and are less noticeable when you rest.    Certain medications, caffeine, or stress can make your tremors worse.    Tremors get worse as you age.    Tremors don t affect both sides of your body in the same way.  Here are different signs of essential tremor:    Tremors that are most obvious in your hands    Difficulty performing tasks with your hands, such as writing or using tools    Shaking or quivering sound in your voice    Uncontrollable head-nodding    In rare instances, tremors in your legs or feet  How is ET diagnosed?  Your rapid, uncontrollable trembling, as well as questions about your medical and family history, can help your health care provider determine if you have familial ET. He or she will probably need to rule out other conditions that could cause shaking or trembling. For example, tremors could be symptoms of diseases, such as hyperthyroidism. Your health care provider might test you for those, as well.  In some cases, the tremors might be related to other factors. To find out for certain, your health care provider may have you try to:    Abstain from alcohol; if you re an alcoholic, trembling is a common symptom.    Cut out cigarette smoking.    Avoid caffeine.    Avoid certain medications  How is ET treated?   Propanolol and primidone are 2 medications often prescribed to treat ET. Propanolol blocks the stimulating action of neurotransmitters to calm your trembling. Primidone is a common antiseizure medication that also controls the actions of neurotransmitters.  Gabapentin and topiramate are 2 other antiseizure medications that are sometimes prescribed. In some cases, tranquilizers like alprazolam or clonazepam might be suggested.  For ET in your hands, botulinum toxin (Botox) injections have shown some promise in easing the trembling. They work by weakening the surrounding muscles around your hands. For severe  tremors, a stimulating device (deep brain stimulator) surgically implanted in your brain may help.  Can ET be prevented?   The specific cause of ET is not known, so scientists are not sure how the condition can be prevented.  Living with ET  ET is usually not dangerous, but it can certainly be frustrating if you have to deal with it. Certain factors can make tremors worse, so the following steps may help to decrease tremors:    Avoid alcohol, cigarettes, and caffeine    Avoid stressful situations as much as possible    Use relaxation techniques, such as yoga, deep-breathing exercises, or biofeedback    Check with your health care provider to see if any medications you re taking could be making your tremors worse.  Talk with your health care provider about other options, such as surgery, if ET starts to affect your quality of life.  When should I call my health care provider?  If you have been diagnosed with ET, talk with your health care provider about when you might need to call. He or she will likely advise you to call if your tremors become worse, or if you develop new neurologic symptoms, such as numbness or weakness.  Key points    ET is a neurological disorder that causes your hands, head, trunk, voice, or legs to shake rhythmically. The cause is not known, but it is often passed down from a parent to a child.    ET is sometimes confused with other types of tremor, so getting the right diagnosis is important.    Tremors tend to be worse during movement than when at rest. The tremors are usually not dangerous, but they can get worse over time.    Avoiding things that might make tremors worse, such as stress, caffeine, and certain medications, may be helpful. Medicines can also help control or limit tremors in some people. Severe tremors can sometimes be treated with surgery.  Next steps  Tips to help you get the most from a visit to your health care provider:    Before your visit, write down questions you want  answered.    Bring someone with you to help you ask questions and remember what your provider tells you.    At the visit, write down the names of new medicines, treatments, or tests, and any new instructions your provider gives you.    If you have a follow-up appointment, write down the date, time, and purpose for that visit.    Know how you can contact your provider if you have questions.  Essential Tremor (ET)  What is essential tremor?  Essential tremor (ET) is a neurological disorder that causes your hands, head, trunk, voice, and/or legs to shake rhythmically. It is often confused with Parkinson disease.  ET is the most common trembling disorder that people experience. Everyone has some ET, but the movements usually cannot be seen or felt. When tremors are noticeable, the condition is classified as ET.  ET is most common among people older than age 65, but it can affect people at any age.  What causes ET?  ET can occur in different people for different reasons:    Familial essential tremor. In most people, the condition seems to be passed down from a parent to a child. If your parent has ET, there is a 50% chance that you or your children will inherit the gene responsible for the condition.    Essential tremor related to another disorder. Sometimes, a tremor is a symptom of another neurological disorder, such as Parkinson disease or dystonia. Sometimes, ET is mistaken for these other diseases when they are not present. A health care provider s careful diagnosis is extremely important.  The cause of ET isn t known. However, one theory suggests that your cerebellum and other parts of your brain are not communicating correctly. The cerebellum is a part of the brain that controls muscle coordination.  What are the symptoms of ET?  If you have ET, you will experience shaking and trembling at different times and in different situations, but some characteristics are common to all. Here is what you might typically  experience:    Tremors occur when you move and are less noticeable when you rest.    Certain medications, caffeine, or stress can make your tremors worse.    Tremors get worse as you age.    Tremors don t affect both sides of your body in the same way.  Here are different signs of essential tremor:    Tremors that are most obvious in your hands    Difficulty performing tasks with your hands, such as writing or using tools    Shaking or quivering sound in your voice    Uncontrollable head-nodding    In rare instances, tremors in your legs or feet  How is ET diagnosed?  Your rapid, uncontrollable trembling, as well as questions about your medical and family history, can help your health care provider determine if you have familial ET. He or she will probably need to rule out other conditions that could cause shaking or trembling. For example, tremors could be symptoms of diseases, such as hyperthyroidism. Your health care provider might test you for those, as well.  In some cases, the tremors might be related to other factors. To find out for certain, your health care provider may have you try to:    Abstain from alcohol; if you re an alcoholic, trembling is a common symptom.    Cut out cigarette smoking.    Avoid caffeine.    Avoid certain medications  How is ET treated?   Propanolol and primidone are 2 medications often prescribed to treat ET. Propanolol blocks the stimulating action of neurotransmitters to calm your trembling. Primidone is a common antiseizure medication that also controls the actions of neurotransmitters.  Gabapentin and topiramate are 2 other antiseizure medications that are sometimes prescribed. In some cases, tranquilizers like alprazolam or clonazepam might be suggested.  For ET in your hands, botulinum toxin (Botox) injections have shown some promise in easing the trembling. They work by weakening the surrounding muscles around your hands. For severe tremors, a stimulating device (deep brain  stimulator) surgically implanted in your brain may help.  Can ET be prevented?   The specific cause of ET is not known, so scientists are not sure how the condition can be prevented.  Living with ET  ET is usually not dangerous, but it can certainly be frustrating if you have to deal with it. Certain factors can make tremors worse, so the following steps may help to decrease tremors:    Avoid alcohol, cigarettes, and caffeine    Avoid stressful situations as much as possible    Use relaxation techniques, such as yoga, deep-breathing exercises, or biofeedback    Check with your health care provider to see if any medications you re taking could be making your tremors worse.  Talk with your health care provider about other options, such as surgery, if ET starts to affect your quality of life.  When should I call my health care provider?  If you have been diagnosed with ET, talk with your health care provider about when you might need to call. He or she will likely advise you to call if your tremors become worse, or if you develop new neurologic symptoms, such as numbness or weakness.  Key points    ET is a neurological disorder that causes your hands, head, trunk, voice, or legs to shake rhythmically. The cause is not known, but it is often passed down from a parent to a child.    ET is sometimes confused with other types of tremor, so getting the right diagnosis is important.    Tremors tend to be worse during movement than when at rest. The tremors are usually not dangerous, but they can get worse over time.    Avoiding things that might make tremors worse, such as stress, caffeine, and certain medications, may be helpful. Medicines can also help control or limit tremors in some people. Severe tremors can sometimes be treated with surgery.  Next steps  Tips to help you get the most from a visit to your health care provider:    Before your visit, write down questions you want answered.    Bring someone with you to  help you ask questions and remember what your provider tells you.    At the visit, write down the names of new medicines, treatments, or tests, and any new instructions your provider gives you.    If you have a follow-up appointment, write down the date, time, and purpose for that visit.    Know how you can contact your provider if you have questions.    [id number] [employer]. [address] [place] [address]. All rights reserved. This information is not intended as a substitute for professional medical care. Always follow your healthcare professional's instructions.

## 2020-10-13 NOTE — TELEPHONE ENCOUNTER
"Requested Prescriptions   Pending Prescriptions Disp Refills     simvastatin (ZOCOR) 20 MG tablet [Pharmacy Med Name: simvastatin 20 mg tablet] 90 tablet 0     Sig: Take 1 tablet (20 mg) by mouth At Bedtime       Statins Protocol Failed - 10/13/2020  8:46 AM        Failed - LDL on file in past 12 months     Recent Labs   Lab Test 06/19/19  0750   LDL 53             Passed - No abnormal creatine kinase in past 12 months     Recent Labs   Lab Test 11/25/15  1117   CKT 36                Passed - Recent (12 mo) or future (30 days) visit within the authorizing provider's specialty     Patient has had an office visit with the authorizing provider or a provider within the authorizing providers department within the previous 12 mos or has a future within next 30 days. See \"Patient Info\" tab in inbasket, or \"Choose Columns\" in Meds & Orders section of the refill encounter.              Passed - Medication is active on med list        Passed - Patient is age 18 or older        Passed - No active pregnancy on record        Passed - No positive pregnancy test in past 12 months             "

## 2020-10-13 NOTE — LETTER
[facility]  [address] [facility] [address] [address]  Phone: [phone]        October 14, 2020      Briana Mcgee                                                                                                                                [id number] [address] [facility] [id number]            Dear Ms. Mcgee,    We recently provided you with a medication refill of simvastatin.  This medication requires routine follow-up with your care provider.    Please schedule an office visit and fasting lab work before this refill supply is out.      Thank you,        Xavier Vega MD / jack santiago

## 2020-10-13 NOTE — PROGRESS NOTES
INITIAL NEUROLOGY CONSULTATION    DATE OF VISIT: 10/13/2020  MRN: [id number]  PATIENT NAME: Briana Mcgee  YOB: 1945    REFERRING PROVIDER: Xavier Vega    Chief Complaint   Patient presents with     New Patient     Headache/ Tremor.  Referral by Xavier Vega MD       SUBJECTIVE:                                                      HPI:   Briana Mcgee is a 75 year old female whom I have been asked by Dr. Vega to see in consultation for headache and tremor. Per chart review, the patient reported Right hand and internal shakiness when she saw Dr. Vega in February. He noted mild Right hand action tremor on exam. The patient said then that she had an ongoing Left-sided headaches with some visual changes and light sensitivity. This had been going on for about a month then, and was new for her. No gait changes other than some long-term balance problems. Brain MRI showed an old cerebellar infarct (known, stable since 2006). Sed rate and CRP were normal in 3.2020. TSH was normal in 7.2020.     The patient has additional history of breast cancer, GERD, hypothyroidism, HTN, heterozygous factor V leiden mutation, HLD and inflammatory polyarthropathy.      The patient tells me that she has had the Right hand shakiness for years. It comes and goes. Her hand jumps when she is trying to write. She has not noticed leg or head tremor. No changes in voice. No problems with swallow, taste or smell. She has some residual problems with her Right leg going out on her since her stroke.     She has to be very careful with turning her head. She says that her biggest problem is vertigo. She describes episodic room-spinning. She has fallen because of this. She says these episodes started a few years ago. It has gotten worse in the past year or so. Sometimes she can catch herself. She says the episodes last up to 5 minutes. It does not happen when she is laying in bed and turning her head. She has  "tried meclizine with unclear results. No hearing changes.     She went to physical therapy four years ago for the vertigo. She does know how to do the Epley maneuvers but has not tried these recently. The spells seem too short to do so. These occur about once per week.     She feels a little lightheaded before the spells and if she sits still for a moment often the spell does not develop.     She says that she has not been having too many headaches lately. She has been taking Aleve for pain related to her arthropathy and with this the headaches seem less frequent.     She says she is off of all BP medications now that she lost 30 pounds.     No family history of tremor. Son and sister have CVD.    Past Medical History:   Diagnosis Date     Allergies      Breast cancer (H)      Esophageal reflux      Hypertension      Other and unspecified hyperlipidemia      Other chronic bronchitis      Personal history of pneumonia (recurrent)     Hx-Pneumonia, \" Chronic Bronchitis\"     Personal history of tobacco use, presenting hazards to health      RA (rheumatoid arthritis) (H)      Unspecified hypothyroidism      Past Surgical History:   Procedure Laterality Date     BIOPSY BREAST       COLONOSCOPY N/A 9/2/2016    Procedure: COLONOSCOPY;  Surgeon: Dean Sanchez MD;  Location: [facility]     EXCISE EXOSTOSIS FOOT Right 4/25/2017    Procedure: EXCISE EXOSTOSIS FOOT;  Retrocalcaneal exostectomy right;  Surgeon: Antwan Goldstein DPM;  Location: [facility] EXCISION BREAST LESION, OPEN >=1      2/05     HYSTERECTOMY, RYAN  1982     for non cancerous reasons and no abnormal pap     INJECT EPIDURAL LUMBAR  1/12/2011    INJECT EPIDURAL LUMBAR performed by GENERIC ANESTHESIA PROVIDER at [facility]     INJECT EPIDURAL LUMBAR  5/5/2011    Procedure:INJECT EPIDURAL LUMBAR; LESLIE -Dr. Sánchez  ; Surgeon:GENERIC ANESTHESIA PROVIDER; Location:[facility]     INJECT EPIDURAL LUMBAR  10/6/2011    Procedure:INJECT EPIDURAL LUMBAR; LESLIE--; " Surgeon:GENERIC ANESTHESIA PROVIDER; Location:[facility]     INJECT EPIDURAL LUMBAR  1/25/2012    Procedure:INJECT EPIDURAL LUMBAR; LESLIE-Dr. Sánchez  ; Surgeon:GENERIC ANESTHESIA PROVIDER; Location:[facility]     INJECT EPIDURAL LUMBAR  7/9/2012    Procedure: INJECT EPIDURAL LUMBAR;  LESLIE-Dr. Pacheco;  Surgeon: Provider, Generic Anesthesia;  Location: [facility]     LAMINECTOMY LUMBAR MINIMALLY INVASIVE TWO LEVELS N/A 11/21/2017    Procedure: LAMINECTOMY LUMBAR MINIMALLY INVASIVE TWO LEVELS;  L4-5 decompression laminectomy, Left L5-S1 foraminal decompression;  Surgeon: Jesse Dueñas MD;  Location: [facility]     LUMPECTOMY BREAST       RELEASE CARPAL TUNNEL Right 9/5/2017    Procedure: RELEASE CARPAL TUNNEL;  Right Wrist Carpal Tunnel Release;  Surgeon: Melba Yi MD;  Location: [facility]     RELEASE CARPAL TUNNEL Left 10/31/2017    Procedure: RELEASE CARPAL TUNNEL;  Left Wrist Carpal Tunnel Release;  Surgeon: Melba Yi MD;  Location: [facility]     SURGICAL HISTORY OF -       lumpectomy with sentinal node biopsy     SURGICAL HISTORY OF -       left knee arthoscopic repair medial meniscus            aspirin 81 MG EC tablet, Take 1 tablet (81 mg) by mouth daily       Coenzyme Q10 (COQ10 PO),        FOLIC ACID PO, Take 1 mg by mouth daily       hydroxychloroquine (PLAQUENIL) 200 MG tablet, Take 1 tablet (200 mg) by mouth 2 times daily       levothyroxine (SYNTHROID/LEVOTHROID) 137 MCG tablet, Take 1 tablet (137 mcg) by mouth daily Along with 25mcg to equal 162mcg daily.       levothyroxine (SYNTHROID/LEVOTHROID) 25 MCG tablet, Take 1 tablet (25 mcg) by mouth daily Take along with the 137mcg to equal 162mcg total daily.       Multiple Vitamins-Minerals (MULTIVITAMIN ADULT) TABS, Take 1 capsule by mouth       Naproxen Sodium (ALEVE PO), Take 2 tablets by mouth 2 times daily as needed        pantoprazole (PROTONIX) 40 MG EC tablet, Take 1 tablet (40 mg) by mouth daily       simvastatin (ZOCOR) 20 MG tablet, Take 1 tablet (20  mg) by mouth At Bedtime       tolterodine ER (DETROL LA) 4 MG 24 hr capsule, Take 1 capsule (4 mg) by mouth daily       triamcinolone (KENALOG) 0.1 % external ointment, Apply ointment twice daily on lower legs for 2 weeks (Patient taking differently: 2 times daily as needed Apply ointment twice daily on lower legs for 2 weeks)       adalimumab (HUMIRA PEN) 40 MG/0.8ML pen kit, Inject 0.8 mLs (40 mg) Subcutaneous every 14 days . Hold for signs of infection, then seek medical attention. (Patient not taking: Reported on 10/13/2020)       fluticasone (FLONASE) 50 MCG/ACT nasal spray, Spray 2 sprays into both nostrils daily as needed for rhinitis or allergies Hold on file until needed (Patient not taking: Reported on 2020)    No current facility-administered medications on file prior to visit.     Allergies   Allergen Reactions     Erythromycin      Other reaction(s): Edema     Hydrocodone      Other reaction(s): GI Upset  Tolerates dilaudid     Oxycodone      Other reaction(s): GI Upset        Problem (# of Occurrences) Relation (Name,Age of Onset)    Blood Disease (1) Father (Garth)    Breast Cancer (2) Mother (Niyah), Paternal Aunt    Cancer (1) Mother (Niyah): breast    Cerebrovascular Disease (2) Son (Rambo, 48), Sister (Chyna)    Diabetes (2) Mother (Niyah), Son (Rambo): Type 1    Heart Disease (1) Mother (Niyah): chf    Hypertension (1) Mother (Niyah)    Psoriasis (1) Son (Rambo)        Social History     Tobacco Use     Smoking status: Former Smoker     Types: Cigarettes     Quit date: 1996     Years since quittin.2     Smokeless tobacco: Never Used     Tobacco comment: quit 10-12 years ago, .   Substance Use Topics     Alcohol use: Yes     Comment: glass of wine at night     Drug use: No       REVIEW OF SYSTEMS:                                                      10-point review of systems is negative except as mentioned above in HPI.     EXAM:                                                       Physical Exam:   Vitals: BP (!) 144/80 (BP Location: Right arm, Patient Position: Sitting, Cuff Size: Adult Regular)   Pulse 83   Temp 97.5  F (36.4  C) (Tympanic)   Resp 16   SpO2 96%   BMI= There is no height or weight on file to calculate BMI.  GENERAL: NAD.   HEENT: NC/AT.  CV: RRR. S1, S2.   NECK: No bruits.  Neurologic:  MENTAL STATUS: Alert, attentive. Speech is fluent. Normal comprehension. Normal concentration. Adequate fund of knowledge.   CRANIAL NERVES: Discs technically difficult to visualize. Visual fields intact to confrontation. Pupils equally, round and reactive to light. Facial sensation and movement normal. EOM full. Hearing intact to conversation. Trapezius strength intact. Palate moves symmetrically. Tongue midline.  MOTOR: 5/5 in proximal and distal muscle groups of upper and lower extremities. Tone and bulk normal.   DTRs: Intact and symmetric in UEs. Cannot elicit Left patellar (prior surgery).  Babinski down-going bilaterally.   SENSATION: Normal light touch and pinprick. Intact proprioception. Vibration: Slightly decreased at both ankles (normal for age).   COORDINATION: Normal finger nose finger. Finger tapping normal. Knee heel shin normal.  STATION AND GAIT: Sway with Romberg. Good postural reflexes. Casual gait and tandem normal.  TREMOR: Slight postural tremor in Right hand, high frequency. Intermittent nodding head tremor. No tremor at rest. Spiral drawing c/w ET.   SPECIAL TESTS: Eric-Hallpike is negative.     Relevant Data:  MRI Brain (3.12.20):  IMPRESSION:    1. No acute pathology. No bleed, mass, or areas of acute infarction.  2. Small T2 hyperintensity in the right superior cerebellum compatible  with a chronic cerebellar infarct. This is unchanged since 2006.  3. There is diffuse parenchymal volume loss.  White matter changes are  present in the cerebral hemispheres that are consistent with small  vessel ischemic disease in this age patient.    Imaging reviewed by me.  Agree with Radiology read.     ASSESSMENT and PLAN:                                                      Assessment and Plan:     ICD-10-CM    1. Vertigo  R42 PHYSICAL THERAPY REFERRAL   2. Essential tremor  G25.0 propranolol (INDERAL) 40 MG tablet   3. Migraine without aura and without status migrainosus, not intractable  G43.009 propranolol (INDERAL) 40 MG tablet        Ms. Mcgee is a pleasant  76 yo woman with multiple medical comorbidities here for three separate reasons. The vertigo is the most bothersome. Differential includes BPPV, vestibular migraines. Eric-Hallpike was negative on exam today. I recommend re-evaluation by physical therapy to start. We will also start propranolol for the dual purpose of migraine prevention and tremor control. Side effects reviewed with the patient. I would like to see Jenna back in clinic in a few months. She agrees to keep us posted of her progress in the meantime.     Patient Instructions:  -- Referral to physical therapy for vertigo evaluation. If this is not helpful, we can also have you see an ENT specialist for recommendations.   -- Propranolol: 40mg twice daily for tremor/migraine prevention. *Information provided.  -- Let us know how you are feeling on the new medication after a few weeks. We may have to adjust the dose for effectiveness.   -- Return to Neurology in 3-4 months.     Total Time: 45 were spent with the patient and in chart review/documentation. More than 50% of the time spent on counseling (as described above in Assessment and Plan/Instructions) /coordinating the care.    Meseret Bryson MD  Neurology    CC: Xavier Vega MD

## 2020-10-13 NOTE — NURSING NOTE
"Initial BP (!) 144/80 (BP Location: Right arm, Patient Position: Sitting, Cuff Size: Adult Regular)   Pulse 83   Temp 97.5  F (36.4  C) (Tympanic)   Resp 16   SpO2 96%  Estimated body mass index is 29.99 kg/m  as calculated from the following:    Height as of 2/26/20: 1.664 m (5' 5.5\").    Weight as of 2/26/20: 83 kg (183 lb).     Elisabeth BARAHONAA      "

## 2020-10-13 NOTE — LETTER
10/13/2020         RE: Briana Mcgee  [address] [facility] [id number]        Dear Colleague,    Thank you for referring your patient, Briana Mcgee, to the [facility] [place]. Please see a copy of my visit note below.    INITIAL NEUROLOGY CONSULTATION    DATE OF VISIT: 10/13/2020  MRN: [id number]  PATIENT NAME: Briana Mcgee  YOB: 1945    REFERRING PROVIDER: Xavier Vega    Chief Complaint   Patient presents with     New Patient     Headache/ Tremor.  Referral by Xavier Vega MD       SUBJECTIVE:                                                      HPI:   Briana Mcgee is a 75 year old female whom I have been asked by Dr. Vega to see in consultation for headache and tremor. Per chart review, the patient reported Right hand and internal shakiness when she saw Dr. Vega in February. He noted mild Right hand action tremor on exam. The patient said then that she had an ongoing Left-sided headaches with some visual changes and light sensitivity. This had been going on for about a month then, and was new for her. No gait changes other than some long-term balance problems. Brain MRI showed an old cerebellar infarct (known, stable since 2006). Sed rate and CRP were normal in 3.2020. TSH was normal in 7.2020.     The patient has additional history of breast cancer, GERD, hypothyroidism, HTN, heterozygous factor V leiden mutation, HLD and inflammatory polyarthropathy.      The patient tells me that she has had the Right hand shakiness for years. It comes and goes. Her hand jumps when she is trying to write. She has not noticed leg or head tremor. No changes in voice. No problems with swallow, taste or smell. She has some residual problems with her Right leg going out on her since her stroke.     She has to be very careful with turning her head. She says that her biggest problem is vertigo. She describes episodic room-spinning. She has  "fallen because of this. She says these episodes started a few years ago. It has gotten worse in the past year or so. Sometimes she can catch herself. She says the episodes last up to 5 minutes. It does not happen when she is laying in bed and turning her head. She has tried meclizine with unclear results. No hearing changes.     She went to physical therapy four years ago for the vertigo. She does know how to do the Epley maneuvers but has not tried these recently. The spells seem too short to do so. These occur about once per week.     She feels a little lightheaded before the spells and if she sits still for a moment often the spell does not develop.     She says that she has not been having too many headaches lately. She has been taking Aleve for pain related to her arthropathy and with this the headaches seem less frequent.     She says she is off of all BP medications now that she lost 30 pounds.     No family history of tremor. Son and sister have CVD.    Past Medical History:   Diagnosis Date     Allergies      Breast cancer (H)      Esophageal reflux      Hypertension      Other and unspecified hyperlipidemia      Other chronic bronchitis      Personal history of pneumonia (recurrent)     Hx-Pneumonia, \" Chronic Bronchitis\"     Personal history of tobacco use, presenting hazards to health      RA (rheumatoid arthritis) (H)      Unspecified hypothyroidism      Past Surgical History:   Procedure Laterality Date     BIOPSY BREAST       COLONOSCOPY N/A 9/2/2016    Procedure: COLONOSCOPY;  Surgeon: Dean Sanchez MD;  Location: [facility]     EXCISE EXOSTOSIS FOOT Right 4/25/2017    Procedure: EXCISE EXOSTOSIS FOOT;  Retrocalcaneal exostectomy right;  Surgeon: Antwan Glodstein DPM;  Location: [facility]      EXCISION BREAST LESION, OPEN >=1      2/05     HYSTERECTOMY, RYAN  1982     for non cancerous reasons and no abnormal pap     INJECT EPIDURAL LUMBAR  1/12/2011    INJECT EPIDURAL LUMBAR performed by " GENERIC ANESTHESIA PROVIDER at [facility]     INJECT EPIDURAL LUMBAR  5/5/2011    Procedure:INJECT EPIDURAL LUMBAR; LESLIE -Dr. Sánchez  ; Surgeon:GENERIC ANESTHESIA PROVIDER; Location:[facility]     INJECT EPIDURAL LUMBAR  10/6/2011    Procedure:INJECT EPIDURAL LUMBAR; LESLIE--; Surgeon:GENERIC ANESTHESIA PROVIDER; Location:[facility]     INJECT EPIDURAL LUMBAR  1/25/2012    Procedure:INJECT EPIDURAL LUMBAR; LESLIE-Dr. Sánchez  ; Surgeon:GENERIC ANESTHESIA PROVIDER; Location:[facility]     INJECT EPIDURAL LUMBAR  7/9/2012    Procedure: INJECT EPIDURAL LUMBAR;  LESLIE-Dr. Pacheco;  Surgeon: Provider, Generic Anesthesia;  Location: [facility]     LAMINECTOMY LUMBAR MINIMALLY INVASIVE TWO LEVELS N/A 11/21/2017    Procedure: LAMINECTOMY LUMBAR MINIMALLY INVASIVE TWO LEVELS;  L4-5 decompression laminectomy, Left L5-S1 foraminal decompression;  Surgeon: Jesse Dueñas MD;  Location: [facility]     LUMPECTOMY BREAST       RELEASE CARPAL TUNNEL Right 9/5/2017    Procedure: RELEASE CARPAL TUNNEL;  Right Wrist Carpal Tunnel Release;  Surgeon: Melba Yi MD;  Location: [facility]     RELEASE CARPAL TUNNEL Left 10/31/2017    Procedure: RELEASE CARPAL TUNNEL;  Left Wrist Carpal Tunnel Release;  Surgeon: Melba Yi MD;  Location: [facility]     SURGICAL HISTORY OF -       lumpectomy with sentinal node biopsy     SURGICAL HISTORY OF -       left knee arthoscopic repair medial meniscus            aspirin 81 MG EC tablet, Take 1 tablet (81 mg) by mouth daily       Coenzyme Q10 (COQ10 PO),        FOLIC ACID PO, Take 1 mg by mouth daily       hydroxychloroquine (PLAQUENIL) 200 MG tablet, Take 1 tablet (200 mg) by mouth 2 times daily       levothyroxine (SYNTHROID/LEVOTHROID) 137 MCG tablet, Take 1 tablet (137 mcg) by mouth daily Along with 25mcg to equal 162mcg daily.       levothyroxine (SYNTHROID/LEVOTHROID) 25 MCG tablet, Take 1 tablet (25 mcg) by mouth daily Take along with the 137mcg to equal 162mcg total daily.       Multiple Vitamins-Minerals  (MULTIVITAMIN ADULT) TABS, Take 1 capsule by mouth       Naproxen Sodium (ALEVE PO), Take 2 tablets by mouth 2 times daily as needed        pantoprazole (PROTONIX) 40 MG EC tablet, Take 1 tablet (40 mg) by mouth daily       simvastatin (ZOCOR) 20 MG tablet, Take 1 tablet (20 mg) by mouth At Bedtime       tolterodine ER (DETROL LA) 4 MG 24 hr capsule, Take 1 capsule (4 mg) by mouth daily       triamcinolone (KENALOG) 0.1 % external ointment, Apply ointment twice daily on lower legs for 2 weeks (Patient taking differently: 2 times daily as needed Apply ointment twice daily on lower legs for 2 weeks)       adalimumab (HUMIRA PEN) 40 MG/0.8ML pen kit, Inject 0.8 mLs (40 mg) Subcutaneous every 14 days . Hold for signs of infection, then seek medical attention. (Patient not taking: Reported on 10/13/2020)       fluticasone (FLONASE) 50 MCG/ACT nasal spray, Spray 2 sprays into both nostrils daily as needed for rhinitis or allergies Hold on file until needed (Patient not taking: Reported on 2020)    No current facility-administered medications on file prior to visit.     Allergies   Allergen Reactions     Erythromycin      Other reaction(s): Edema     Hydrocodone      Other reaction(s): GI Upset  Tolerates dilaudid     Oxycodone      Other reaction(s): GI Upset        Problem (# of Occurrences) Relation (Name,Age of Onset)    Blood Disease (1) Father (Garth)    Breast Cancer (2) Mother (Niyah), Paternal Aunt    Cancer (1) Mother (Niyah): breast    Cerebrovascular Disease (2) Son (Rambo, 48), Sister (Chyna)    Diabetes (2) Mother (Niyah), Son (Rambo): Type 1    Heart Disease (1) Mother (Niyah): chf    Hypertension (1) Mother (Niyah)    Psoriasis (1) Son (Rambo)        Social History     Tobacco Use     Smoking status: Former Smoker     Types: Cigarettes     Quit date: 1996     Years since quittin.2     Smokeless tobacco: Never Used     Tobacco comment: quit 10-12 years ago, .   Substance Use Topics      Alcohol use: Yes     Comment: glass of wine at night     Drug use: No       REVIEW OF SYSTEMS:                                                      10-point review of systems is negative except as mentioned above in HPI.     EXAM:                                                      Physical Exam:   Vitals: BP (!) 144/80 (BP Location: Right arm, Patient Position: Sitting, Cuff Size: Adult Regular)   Pulse 83   Temp 97.5  F (36.4  C) (Tympanic)   Resp 16   SpO2 96%   BMI= There is no height or weight on file to calculate BMI.  GENERAL: NAD.   HEENT: NC/AT.  CV: RRR. S1, S2.   NECK: No bruits.  Neurologic:  MENTAL STATUS: Alert, attentive. Speech is fluent. Normal comprehension. Normal concentration. Adequate fund of knowledge.   CRANIAL NERVES: Discs technically difficult to visualize. Visual fields intact to confrontation. Pupils equally, round and reactive to light. Facial sensation and movement normal. EOM full. Hearing intact to conversation. Trapezius strength intact. Palate moves symmetrically. Tongue midline.  MOTOR: 5/5 in proximal and distal muscle groups of upper and lower extremities. Tone and bulk normal.   DTRs: Intact and symmetric in UEs. Cannot elicit Left patellar (prior surgery).  Babinski down-going bilaterally.   SENSATION: Normal light touch and pinprick. Intact proprioception. Vibration: Slightly decreased at both ankles (normal for age).   COORDINATION: Normal finger nose finger. Finger tapping normal. Knee heel shin normal.  STATION AND GAIT: Sway with Romberg. Good postural reflexes. Casual gait and tandem normal.  TREMOR: Slight postural tremor in Right hand, high frequency. Intermittent nodding head tremor. No tremor at rest. Spiral drawing c/w ET.   SPECIAL TESTS: Eric-Hallpike is negative.     Relevant Data:  MRI Brain (3.12.20):  IMPRESSION:    1. No acute pathology. No bleed, mass, or areas of acute infarction.  2. Small T2 hyperintensity in the right superior cerebellum  compatible  with a chronic cerebellar infarct. This is unchanged since 2006.  3. There is diffuse parenchymal volume loss.  White matter changes are  present in the cerebral hemispheres that are consistent with small  vessel ischemic disease in this age patient.    Imaging reviewed by me. Agree with Radiology read.     ASSESSMENT and PLAN:                                                      Assessment and Plan:     ICD-10-CM    1. Vertigo  R42 PHYSICAL THERAPY REFERRAL   2. Essential tremor  G25.0 propranolol (INDERAL) 40 MG tablet   3. Migraine without aura and without status migrainosus, not intractable  G43.009 propranolol (INDERAL) 40 MG tablet        Ms. Mcgee is a pleasant  76 yo woman with multiple medical comorbidities here for three separate reasons. The vertigo is the most bothersome. Differential includes BPPV, vestibular migraines. Eric-Hallpike was negative on exam today. I recommend re-evaluation by physical therapy to start. We will also start propranolol for the dual purpose of migraine prevention and tremor control. Side effects reviewed with the patient. I would like to see Jenna back in clinic in a few months. She agrees to keep us posted of her progress in the meantime.     Patient Instructions:  -- Referral to physical therapy for vertigo evaluation. If this is not helpful, we can also have you see an ENT specialist for recommendations.   -- Propranolol: 40mg twice daily for tremor/migraine prevention. *Information provided.  -- Let us know how you are feeling on the new medication after a few weeks. We may have to adjust the dose for effectiveness.   -- Return to Neurology in 3-4 months.     Total Time: 45 were spent with the patient and in chart review/documentation. More than 50% of the time spent on counseling (as described above in Assessment and Plan/Instructions) /coordinating the care.    Meseret Bryson MD  Neurology    CC: Xavier Vega MD        Again, thank you for  allowing me to participate in the care of your patient.        Sincerely,        Meseret Bryson MD

## 2020-10-14 RX ORDER — SIMVASTATIN 20 MG
TABLET ORAL
Qty: 90 TABLET | Refills: 0 | Status: ON HOLD | OUTPATIENT
Start: 2020-10-14 | End: 2020-11-18

## 2020-10-23 ENCOUNTER — TELEPHONE (OUTPATIENT)
Dept: RHEUMATOLOGY | Facility: CLINIC | Age: 75
End: 2020-10-23

## 2020-10-23 NOTE — TELEPHONE ENCOUNTER
Reason for call:  Other   Patient called regarding (reason for call): prescription  Additional comments: Patient states she cannot afford the Humira pen and wants to discuss another medication. Please call.    Phone number to reach patient:  Home number on file [phone] (home)    Best Time:  any    Can we leave a detailed message on this number?  YES    Travel screening: Not Applicable

## 2020-10-27 NOTE — TELEPHONE ENCOUNTER
Patient said she is unavailable at 9 AM tomorrow. Offered her an apt at 3:40 PM which worked for her.    Adan Burgos RN....10/27/2020 2:58 PM
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
## 2020-10-27 NOTE — TELEPHONE ENCOUNTER
RN: Please call to notify Briana Mcgee that I have scheduled an appointment to discuss other options: tomorrow, 10/28/2020 at 9:00 AM    Chaim Puente MD  10/27/2020 2:40 PM

## 2020-10-28 ENCOUNTER — VIRTUAL VISIT (OUTPATIENT)
Dept: RHEUMATOLOGY | Facility: CLINIC | Age: 75
End: 2020-10-28
Payer: COMMERCIAL

## 2020-10-28 DIAGNOSIS — Z11.59 SCREENING FOR VIRAL DISEASE: Primary | ICD-10-CM

## 2020-10-28 PROCEDURE — 99214 OFFICE O/P EST MOD 30 MIN: CPT | Mod: 95 | Performed by: INTERNAL MEDICINE

## 2020-10-28 RX ORDER — HEPARIN SODIUM,PORCINE 10 UNIT/ML
5 VIAL (ML) INTRAVENOUS
Status: CANCELLED | OUTPATIENT
Start: 2020-10-28

## 2020-10-28 RX ORDER — HEPARIN SODIUM (PORCINE) LOCK FLUSH IV SOLN 100 UNIT/ML 100 UNIT/ML
5 SOLUTION INTRAVENOUS
Status: CANCELLED | OUTPATIENT
Start: 2020-10-28

## 2020-11-15 ENCOUNTER — APPOINTMENT (OUTPATIENT)
Dept: GENERAL RADIOLOGY | Facility: CLINIC | Age: 75
DRG: 291 | End: 2020-11-15
Attending: PHYSICIAN ASSISTANT
Payer: MEDICARE

## 2020-11-15 ENCOUNTER — OFFICE VISIT (OUTPATIENT)
Dept: URGENT CARE | Facility: URGENT CARE | Age: 75
End: 2020-11-15
Payer: COMMERCIAL

## 2020-11-15 ENCOUNTER — HOSPITAL ENCOUNTER (INPATIENT)
Facility: CLINIC | Age: 75
LOS: 3 days | Discharge: ANOTHER HOSPITAL | DRG: 291 | End: 2020-11-18
Attending: PHYSICIAN ASSISTANT | Admitting: FAMILY MEDICINE
Payer: MEDICARE

## 2020-11-15 VITALS — RESPIRATION RATE: 18 BRPM | TEMPERATURE: 98.2 F | HEART RATE: 79 BPM | OXYGEN SATURATION: 95 %

## 2020-11-15 DIAGNOSIS — I50.9 HEART FAILURE, UNSPECIFIED HF CHRONICITY, UNSPECIFIED HEART FAILURE TYPE (H): ICD-10-CM

## 2020-11-15 DIAGNOSIS — Z87.891 PERSONAL HISTORY OF TOBACCO USE, PRESENTING HAZARDS TO HEALTH: ICD-10-CM

## 2020-11-15 DIAGNOSIS — Z20.828 EXPOSURE TO SARS-ASSOCIATED CORONAVIRUS: ICD-10-CM

## 2020-11-15 DIAGNOSIS — R05.9 COUGH: ICD-10-CM

## 2020-11-15 DIAGNOSIS — N18.2 CHRONIC KIDNEY DISEASE, STAGE II (MILD): ICD-10-CM

## 2020-11-15 DIAGNOSIS — I13.0 HYPERTENSIVE HEART AND RENAL DISEASE WITH CONGESTIVE HEART FAILURE (H): ICD-10-CM

## 2020-11-15 DIAGNOSIS — R06.09 DYSPNEA ON EXERTION: ICD-10-CM

## 2020-11-15 DIAGNOSIS — R07.89 CHEST HEAVINESS: ICD-10-CM

## 2020-11-15 DIAGNOSIS — R06.02 SOB (SHORTNESS OF BREATH): Primary | ICD-10-CM

## 2020-11-15 DIAGNOSIS — I50.9 CHF (CONGESTIVE HEART FAILURE) (H): ICD-10-CM

## 2020-11-15 LAB
ALBUMIN SERPL-MCNC: 3.9 G/DL (ref 3.4–5)
ALP SERPL-CCNC: 76 U/L (ref 40–150)
ALT SERPL W P-5'-P-CCNC: 41 U/L (ref 0–50)
ANION GAP SERPL CALCULATED.3IONS-SCNC: 6 MMOL/L (ref 3–14)
AST SERPL W P-5'-P-CCNC: 27 U/L (ref 0–45)
BASE EXCESS BLDV CALC-SCNC: 3.9 MMOL/L
BASOPHILS # BLD AUTO: 0.1 10E9/L (ref 0–0.2)
BASOPHILS NFR BLD AUTO: 0.9 %
BILIRUB SERPL-MCNC: 0.9 MG/DL (ref 0.2–1.3)
BUN SERPL-MCNC: 19 MG/DL (ref 7–30)
CALCIUM SERPL-MCNC: 8.5 MG/DL (ref 8.5–10.1)
CHLORIDE SERPL-SCNC: 107 MMOL/L (ref 94–109)
CO2 SERPL-SCNC: 28 MMOL/L (ref 20–32)
CREAT SERPL-MCNC: 0.93 MG/DL (ref 0.52–1.04)
CREAT SERPL-MCNC: 1.05 MG/DL (ref 0.52–1.04)
DIFFERENTIAL METHOD BLD: ABNORMAL
EOSINOPHIL # BLD AUTO: 0.2 10E9/L (ref 0–0.7)
EOSINOPHIL NFR BLD AUTO: 2.6 %
ERYTHROCYTE [DISTWIDTH] IN BLOOD BY AUTOMATED COUNT: 13.7 % (ref 10–15)
GFR SERPL CREATININE-BSD FRML MDRD: 51 ML/MIN/{1.73_M2}
GFR SERPL CREATININE-BSD FRML MDRD: 59 ML/MIN/{1.73_M2}
GLUCOSE SERPL-MCNC: 107 MG/DL (ref 70–99)
HCO3 BLDV-SCNC: 29 MMOL/L (ref 21–28)
HCT VFR BLD AUTO: 36.3 % (ref 35–47)
HGB BLD-MCNC: 11.5 G/DL (ref 11.7–15.7)
IMM GRANULOCYTES # BLD: 0 10E9/L (ref 0–0.4)
IMM GRANULOCYTES NFR BLD: 0.5 %
LACTATE BLD-SCNC: 1.2 MMOL/L (ref 0.7–2)
LYMPHOCYTES # BLD AUTO: 1.6 10E9/L (ref 0.8–5.3)
LYMPHOCYTES NFR BLD AUTO: 23.9 %
MCH RBC QN AUTO: 30.7 PG (ref 26.5–33)
MCHC RBC AUTO-ENTMCNC: 31.7 G/DL (ref 31.5–36.5)
MCV RBC AUTO: 97 FL (ref 78–100)
MONOCYTES # BLD AUTO: 0.6 10E9/L (ref 0–1.3)
MONOCYTES NFR BLD AUTO: 8.7 %
NEUTROPHILS # BLD AUTO: 4.2 10E9/L (ref 1.6–8.3)
NEUTROPHILS NFR BLD AUTO: 63.4 %
NRBC # BLD AUTO: 0 10*3/UL
NRBC BLD AUTO-RTO: 0 /100
NT-PROBNP SERPL-MCNC: 7456 PG/ML (ref 0–1800)
PCO2 BLDV: 48 MM HG (ref 40–50)
PH BLDV: 7.4 PH (ref 7.32–7.43)
PLATELET # BLD AUTO: 196 10E9/L (ref 150–450)
PO2 BLDV: 24 MM HG (ref 25–47)
POTASSIUM SERPL-SCNC: 3.4 MMOL/L (ref 3.4–5.3)
POTASSIUM SERPL-SCNC: 4.1 MMOL/L (ref 3.4–5.3)
PROT SERPL-MCNC: 6.8 G/DL (ref 6.8–8.8)
RBC # BLD AUTO: 3.75 10E12/L (ref 3.8–5.2)
SARS-COV-2 RNA SPEC QL NAA+PROBE: NORMAL
SODIUM SERPL-SCNC: 141 MMOL/L (ref 133–144)
SPECIMEN SOURCE: NORMAL
TROPONIN I SERPL-MCNC: <0.015 UG/L (ref 0–0.04)
WBC # BLD AUTO: 6.7 10E9/L (ref 4–11)

## 2020-11-15 PROCEDURE — 120N000001 HC R&B MED SURG/OB

## 2020-11-15 PROCEDURE — 99285 EMERGENCY DEPT VISIT HI MDM: CPT | Mod: 25 | Performed by: FAMILY MEDICINE

## 2020-11-15 PROCEDURE — 82803 BLOOD GASES ANY COMBINATION: CPT | Performed by: PHYSICIAN ASSISTANT

## 2020-11-15 PROCEDURE — 99223 1ST HOSP IP/OBS HIGH 75: CPT | Mod: AI | Performed by: FAMILY MEDICINE

## 2020-11-15 PROCEDURE — 250N000011 HC RX IP 250 OP 636: Performed by: FAMILY MEDICINE

## 2020-11-15 PROCEDURE — 71045 X-RAY EXAM CHEST 1 VIEW: CPT

## 2020-11-15 PROCEDURE — 250N000011 HC RX IP 250 OP 636: Performed by: PHYSICIAN ASSISTANT

## 2020-11-15 PROCEDURE — 83605 ASSAY OF LACTIC ACID: CPT | Performed by: PHYSICIAN ASSISTANT

## 2020-11-15 PROCEDURE — U0003 INFECTIOUS AGENT DETECTION BY NUCLEIC ACID (DNA OR RNA); SEVERE ACUTE RESPIRATORY SYNDROME CORONAVIRUS 2 (SARS-COV-2) (CORONAVIRUS DISEASE [COVID-19]), AMPLIFIED PROBE TECHNIQUE, MAKING USE OF HIGH THROUGHPUT TECHNOLOGIES AS DESCRIBED BY CMS-2020-01-R: HCPCS | Performed by: PHYSICIAN ASSISTANT

## 2020-11-15 PROCEDURE — 84484 ASSAY OF TROPONIN QUANT: CPT | Performed by: PHYSICIAN ASSISTANT

## 2020-11-15 PROCEDURE — 80053 COMPREHEN METABOLIC PANEL: CPT | Performed by: PHYSICIAN ASSISTANT

## 2020-11-15 PROCEDURE — C9803 HOPD COVID-19 SPEC COLLECT: HCPCS | Performed by: FAMILY MEDICINE

## 2020-11-15 PROCEDURE — 84132 ASSAY OF SERUM POTASSIUM: CPT | Performed by: FAMILY MEDICINE

## 2020-11-15 PROCEDURE — 250N000013 HC RX MED GY IP 250 OP 250 PS 637: Performed by: FAMILY MEDICINE

## 2020-11-15 PROCEDURE — 93010 ELECTROCARDIOGRAM REPORT: CPT | Performed by: FAMILY MEDICINE

## 2020-11-15 PROCEDURE — 82565 ASSAY OF CREATININE: CPT | Performed by: FAMILY MEDICINE

## 2020-11-15 PROCEDURE — 36415 COLL VENOUS BLD VENIPUNCTURE: CPT | Performed by: FAMILY MEDICINE

## 2020-11-15 PROCEDURE — 96374 THER/PROPH/DIAG INJ IV PUSH: CPT | Performed by: FAMILY MEDICINE

## 2020-11-15 PROCEDURE — 93005 ELECTROCARDIOGRAM TRACING: CPT | Performed by: FAMILY MEDICINE

## 2020-11-15 PROCEDURE — 85025 COMPLETE CBC W/AUTO DIFF WBC: CPT | Performed by: PHYSICIAN ASSISTANT

## 2020-11-15 PROCEDURE — 99207 PR MOONLIGHTING INDICATOR: CPT | Performed by: FAMILY MEDICINE

## 2020-11-15 PROCEDURE — 83880 ASSAY OF NATRIURETIC PEPTIDE: CPT | Performed by: PHYSICIAN ASSISTANT

## 2020-11-15 PROCEDURE — 99215 OFFICE O/P EST HI 40 MIN: CPT | Performed by: NURSE PRACTITIONER

## 2020-11-15 RX ORDER — PANTOPRAZOLE SODIUM 20 MG/1
40 TABLET, DELAYED RELEASE ORAL DAILY
Status: DISCONTINUED | OUTPATIENT
Start: 2020-11-16 | End: 2020-11-18 | Stop reason: HOSPADM

## 2020-11-15 RX ORDER — HYDROXYCHLOROQUINE SULFATE 200 MG/1
200 TABLET, FILM COATED ORAL 2 TIMES DAILY
Status: DISCONTINUED | OUTPATIENT
Start: 2020-11-15 | End: 2020-11-18 | Stop reason: HOSPADM

## 2020-11-15 RX ORDER — ACETAMINOPHEN 325 MG/1
650 TABLET ORAL EVERY 4 HOURS PRN
Status: DISCONTINUED | OUTPATIENT
Start: 2020-11-15 | End: 2020-11-18 | Stop reason: HOSPADM

## 2020-11-15 RX ORDER — MECLIZINE HYDROCHLORIDE 25 MG/1
25 TABLET ORAL DAILY PRN
Status: DISCONTINUED | OUTPATIENT
Start: 2020-11-15 | End: 2020-11-18 | Stop reason: HOSPADM

## 2020-11-15 RX ORDER — LISINOPRIL 5 MG/1
5 TABLET ORAL DAILY
Status: DISCONTINUED | OUTPATIENT
Start: 2020-11-15 | End: 2020-11-18 | Stop reason: HOSPADM

## 2020-11-15 RX ORDER — METOPROLOL SUCCINATE 25 MG/1
25 TABLET, EXTENDED RELEASE ORAL DAILY
Status: DISCONTINUED | OUTPATIENT
Start: 2020-11-15 | End: 2020-11-18 | Stop reason: HOSPADM

## 2020-11-15 RX ORDER — FUROSEMIDE 10 MG/ML
20 INJECTION INTRAMUSCULAR; INTRAVENOUS ONCE
Status: COMPLETED | OUTPATIENT
Start: 2020-11-15 | End: 2020-11-15

## 2020-11-15 RX ORDER — MECLIZINE HYDROCHLORIDE 25 MG/1
25 TABLET ORAL DAILY PRN
COMMUNITY

## 2020-11-15 RX ORDER — ONDANSETRON 2 MG/ML
4 INJECTION INTRAMUSCULAR; INTRAVENOUS EVERY 6 HOURS PRN
Status: DISCONTINUED | OUTPATIENT
Start: 2020-11-15 | End: 2020-11-18 | Stop reason: HOSPADM

## 2020-11-15 RX ORDER — LIDOCAINE 40 MG/G
CREAM TOPICAL
Status: DISCONTINUED | OUTPATIENT
Start: 2020-11-15 | End: 2020-11-18 | Stop reason: HOSPADM

## 2020-11-15 RX ORDER — MULTIPLE VITAMINS W/ MINERALS TAB 9MG-400MCG
1 TAB ORAL DAILY
Status: DISCONTINUED | OUTPATIENT
Start: 2020-11-16 | End: 2020-11-18 | Stop reason: HOSPADM

## 2020-11-15 RX ORDER — LORAZEPAM 0.5 MG/1
0.5 TABLET ORAL EVERY 4 HOURS PRN
Status: DISCONTINUED | OUTPATIENT
Start: 2020-11-15 | End: 2020-11-18 | Stop reason: HOSPADM

## 2020-11-15 RX ORDER — ONDANSETRON 4 MG/1
4 TABLET, ORALLY DISINTEGRATING ORAL EVERY 6 HOURS PRN
Status: DISCONTINUED | OUTPATIENT
Start: 2020-11-15 | End: 2020-11-18 | Stop reason: HOSPADM

## 2020-11-15 RX ORDER — CALCIUM CARBONATE 500(1250)
1 TABLET ORAL DAILY
COMMUNITY

## 2020-11-15 RX ORDER — TOLTERODINE 4 MG/1
4 CAPSULE, EXTENDED RELEASE ORAL DAILY
Status: DISCONTINUED | OUTPATIENT
Start: 2020-11-16 | End: 2020-11-18 | Stop reason: HOSPADM

## 2020-11-15 RX ORDER — LEVOTHYROXINE SODIUM 25 UG/1
25 TABLET ORAL DAILY
Status: DISCONTINUED | OUTPATIENT
Start: 2020-11-16 | End: 2020-11-18 | Stop reason: HOSPADM

## 2020-11-15 RX ORDER — FOLIC ACID 1 MG/1
1 TABLET ORAL DAILY
Status: DISCONTINUED | OUTPATIENT
Start: 2020-11-16 | End: 2020-11-18 | Stop reason: HOSPADM

## 2020-11-15 RX ORDER — ASPIRIN 81 MG/1
81 TABLET ORAL DAILY
Status: DISCONTINUED | OUTPATIENT
Start: 2020-11-15 | End: 2020-11-18 | Stop reason: HOSPADM

## 2020-11-15 RX ORDER — LORAZEPAM 2 MG/ML
0.5 INJECTION INTRAMUSCULAR EVERY 4 HOURS PRN
Status: DISCONTINUED | OUTPATIENT
Start: 2020-11-15 | End: 2020-11-18 | Stop reason: HOSPADM

## 2020-11-15 RX ORDER — FUROSEMIDE 10 MG/ML
40 INJECTION INTRAMUSCULAR; INTRAVENOUS EVERY 12 HOURS
Status: COMPLETED | OUTPATIENT
Start: 2020-11-15 | End: 2020-11-16

## 2020-11-15 RX ORDER — SIMVASTATIN 20 MG
20 TABLET ORAL AT BEDTIME
Status: DISCONTINUED | OUTPATIENT
Start: 2020-11-15 | End: 2020-11-18 | Stop reason: HOSPADM

## 2020-11-15 RX ADMIN — SIMVASTATIN 20 MG: 20 TABLET, FILM COATED ORAL at 21:14

## 2020-11-15 RX ADMIN — HYDROXYCHLOROQUINE SULFATE 200 MG: 200 TABLET, FILM COATED ORAL at 19:58

## 2020-11-15 RX ADMIN — LISINOPRIL 5 MG: 5 TABLET ORAL at 18:19

## 2020-11-15 RX ADMIN — ASPIRIN 81 MG: 81 TABLET ORAL at 16:42

## 2020-11-15 RX ADMIN — ENOXAPARIN SODIUM 40 MG: 40 INJECTION SUBCUTANEOUS at 16:42

## 2020-11-15 RX ADMIN — METOPROLOL SUCCINATE 25 MG: 25 TABLET, EXTENDED RELEASE ORAL at 18:19

## 2020-11-15 RX ADMIN — FUROSEMIDE 20 MG: 10 INJECTION, SOLUTION INTRAMUSCULAR; INTRAVENOUS at 13:18

## 2020-11-15 RX ADMIN — FUROSEMIDE 40 MG: 10 INJECTION, SOLUTION INTRAMUSCULAR; INTRAVENOUS at 18:19

## 2020-11-15 ASSESSMENT — ENCOUNTER SYMPTOMS
EYE PAIN: 0
HEADACHES: 0
ARTHRALGIAS: 1
DIZZINESS: 1
WEAKNESS: 0
WOUND: 0
EYE DISCHARGE: 0
CHILLS: 0
SORE THROAT: 0
PHOTOPHOBIA: 0
HEMATURIA: 0
PALPITATIONS: 0
DYSURIA: 0
DIARRHEA: 0
ABDOMINAL PAIN: 0
VOMITING: 0
FEVER: 0
NAUSEA: 0
NUMBNESS: 0
COLOR CHANGE: 0
COUGH: 0
SHORTNESS OF BREATH: 1
EYE ITCHING: 0
EYE REDNESS: 0
WHEEZING: 0

## 2020-11-15 ASSESSMENT — ACTIVITIES OF DAILY LIVING (ADL)
CONCENTRATING,_REMEMBERING_OR_MAKING_DECISIONS_DIFFICULTY: NO
DOING_ERRANDS_INDEPENDENTLY_DIFFICULTY: NO
DRESSING/BATHING_DIFFICULTY: NO
FALL_HISTORY_WITHIN_LAST_SIX_MONTHS: YES
ADLS_ACUITY_SCORE: 17
WEAR_GLASSES_OR_BLIND: YES
TOILETING_ISSUES: NO
ADLS_ACUITY_SCORE: 15
DIFFICULTY_EATING/SWALLOWING: NO
WALKING_OR_CLIMBING_STAIRS_DIFFICULTY: NO
HEARING_DIFFICULTY_OR_DEAF: NO
DIFFICULTY_COMMUNICATING: NO
NUMBER_OF_TIMES_PATIENT_HAS_FALLEN_WITHIN_LAST_SIX_MONTHS: 2

## 2020-11-15 ASSESSMENT — MIFFLIN-ST. JEOR: SCORE: 1329.81

## 2020-11-15 NOTE — ED TRIAGE NOTES
Increased SOB past two weeks getting worse past few days is unable to lay flat to sleep chest is heavy. Reports baseline SOB with  Walking on stairs prior. Dry cough, no fevers.

## 2020-11-15 NOTE — PLAN OF CARE
"[facility] [place]    Patient admitted to room 2315 at approximately 1500 via cart from emergency room. Patient was accompanied by transport tech.     Verbal SBAR report received from SHELBY Ruff prior to patient arrival.     Patient ambulated to bed independently. Patient alert and oriented X 3. The patient is not having any pain.  . Admission vital signs: Blood pressure 137/64, pulse 84, temperature 95.7  F (35.4  C), temperature source Oral, resp. rate 20, height 1.664 m (5' 5.5\"), weight 83.1 kg (183 lb 3.2 oz), SpO2 92 %, not currently breastfeeding. Patient was oriented to plan of care, call light, bed controls, tv, telephone, bathroom and visiting hours.     Risk Assessment    The following safety risks were identified during admission: none. Yellow risk band applied: NO.     Skin Initial Assessment    Skin assessment deferred to oncoming RN.        Anya Cerda RN    "

## 2020-11-15 NOTE — PROGRESS NOTES
Subjective     Briana Mcgee is a 75 year old female who presents to clinic today for the following health issues:    HPI       Chief Complaint   Patient presents with     Cough     7 days coughing, having shortness of breath getting worse, not able to sleep has a heaviness in her chest, unable to do anything has to sit to catch her breath. Taking nyquil. Wants covid test.     Shortness of Breath     She thinks it could be bronchitis. Has had no fever, URI, sore throat, GI or  complaints. Has some sort of procedure this coming week and she states she needs a covid test.            Review of Systems   Constitutional, HEENT, cardiovascular, pulmonary, GI, , musculoskeletal, neuro, skin, endocrine and psych systems are negative, except as otherwise noted.      Objective    Pulse 79   Temp 98.2  F (36.8  C) (Tympanic)   Resp 18   SpO2 95%   There is no height or weight on file to calculate BMI.  Physical Exam   GENERAL: healthy, alert and no distress, nontoxic in appearance, can talk in full sentences sitting in chair quietly  EYES: Eyes grossly normal to inspection, PERRL and conjunctivae and sclerae normal  HENT: ear canals and TM's normal, nose and mouth without ulcers or lesions  NECK: no adenopathy, supple with full ROM  RESP: lungs diminished throughout  CV: regular rate and rhythm, normal S1 S2, no S3 or S4, no murmur, click or rub, no peripheral edema   ABDOMEN: soft, nontender  MS: no gross musculoskeletal defects noted, no edema  No rash    No results found for this or any previous visit (from the past 24 hour(s)).        Assessment & Plan  Pt coughing for one week with increase SOB. SPO2 started at 91% and slowly went up to 95% on room air while sitting still. Lungs sounds diminished throughout. Needs further evaluation for possible covid, pneumonia, CHF or other pathology. She is not in respiratory distress and  will drive her to the ER for further evaluation. Andria, charge RN, took  "report.  Problem List Items Addressed This Visit     None      Visit Diagnoses     SOB (shortness of breath)    -  Primary    Cough        Chest heaviness                 BMI:   Estimated body mass index is 29.99 kg/m  as calculated from the following:    Height as of 2/26/20: 1.664 m (5' 5.5\").    Weight as of 2/26/20: 83 kg (183 lb).              Patient Instructions   Go directly to the [place] ER for further evaluation. They are expecting you.    No follow-ups on file.    BRAXTON Ordonez [facility] [place]    "

## 2020-11-15 NOTE — PLAN OF CARE
Patient declined full skin assessment. She does have a localized rash on left mid shin. LS fine crakles in bases. Sats 91-92% on 1L. She is alert and oriented. She denies pain. Steady on her feet, up independently.

## 2020-11-15 NOTE — ED PROVIDER NOTES
History     Chief Complaint   Patient presents with     Cough     cough X1 week, heaviness in chest. Has procedure upcoming, 91% sats on RA. HR 79, afebrile. Lungs diminished. R/o Covid, pneumonia. Sent from [facility]      HPI  Briana Mcgee is a 75 year old female with past medical history significant for hypothyroidism, ischemic stroke,  hypothyroidism, GERD, varicose veins, spinal stenosis, chronic kidney disease, hypertension, rheumatoid arthritis, spinal stenosis, heterozygous factor V Leiden mutation, history of breast cancer who reports that she has chronic dyspnea on exertion which has been present for a long period of time however has been worsening over the last 2 weeks and became significantly more severe over the last 2 to 3 days.  She states that baseline dyspnea will occur after she attempts to walk up a 14 steps from basement in her house,  however over the last 2-3 days noticed that even walking across a level floor within one room she becomes short of breath.  She does have associated chest heaviness, orhtopnea.  She also reports chronic dizziness previously diagnosed with vertigo treated with meclizine that has not changed in frequency or severity.  She has not had any recent fever, chills, new myalgias, significant sore throat, nasal congestion, wheezing, nausea, vomiting, diarrhea or abdominal complaints.  No urinary changes.  No lower extremity swelling.  She has not attempted any OTC treatments.  She initially presented to the clinic in [place] for testing for COVID-prior to beginning Remicade  infusions for arthritis, however provider noted that she was mildly hypoxic with sats 88-90% with ambulation on room air.  She had discontinued antihypertensive medications after 35 pound weight loss within the last year.  She is a former smoker who quit more than 20 years ago.     Patient denies any significant prior cardiac work-up or evaluation for her chronic dyspnea on exertion, epic  records reveal she had a AARTI on 5/2/2006 which showed normal global left ventricular systolic function, mild mitral regurgitation, no evidence of right to left shunt.      Allergies:  Allergies   Allergen Reactions     Erythromycin      Other reaction(s): Edema     Hydrocodone      Other reaction(s): GI Upset  Tolerates dilaudid     Oxycodone      Other reaction(s): GI Upset     Problem List:    Patient Active Problem List    Diagnosis Date Noted     Malignant neoplasm of female breast (H) 02/18/2005     Priority: High     11/05: breast surgeon=Dr. Dimitrios Bell, oncology=Dr. Tino Gordillo s/p lumpectomy and axillary node dissection followed by chemotherapy at [facility]. Radiation oncology=Dr. Jessica Edmonds for 6 weeks.   12/05: Briana is clinically asymptomatic and no clinical evidence of cancer recurrence; port-a-cath removal.  9-12-05 NM MUGA [employer] radionuclide angiography at rest findings:1. Left ventricular ejectin fraction is normal at 64%  2. Compared to previous study perfomed 3-17-05, there has been no significant interval change.  10/06: appointment with Dr. Gordillo:continue amrimidex and  follow up in 6 months.  1/16/07: follow up with Dr. Bell: 'doing well with no signs of recurrence', follow up 7/07 with mammogram at that time/  2/12/09: now followed by Dr. Peres.  3/10 seen by Dr. Levi Bear with [facility] yearly visits, now can have yearly mammos, next due 8/10  10/2013 follow up with Dr. Duarte, followed every 6 months due to density of right breast and concerns, now followed yearly  Problem list name updated by automated process. Provider to review       Spinal stenosis, lumbar region, with neurogenic claudication 11/14/2017     Priority: Medium     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) 04/12/2017     Priority: Medium     Essential hypertension 03/13/2017     Priority: Medium     Hepatitis 11/25/2015     Priority: Medium      "Obesity 10/20/2015     Priority: Medium     CKD (chronic kidney disease) stage 2, GFR 60-89 ml/min 06/04/2014     Priority: Medium     Advanced directives, counseling/discussion 01/29/2014     Priority: Medium     Was given the information to fill out.       Spinal stenosis 01/07/2014     Priority: Medium     Pain controlled with epidural injections       Lumbar radiculopathy 05/07/2012     Priority: Medium     Varicose veins of lower extremities with complications 03/06/2012     Priority: Medium     Problem list name updated by automated process. Provider to review       GERD (gastroesophageal reflux disease) 04/12/2011     Priority: Medium     Heterozygous factor V Leiden mutation (H) 04/07/2011     Priority: Medium     HYPERLIPIDEMIA LDL GOAL <100 10/31/2010     Priority: Medium     OA (osteoarthritis) of knee 03/01/2009     Priority: Medium     Followed by [facility]. Briana has had 3 Synvisc injections (1/3/08, 2/12/09,  3/2/09, 3/9/09)       Hot flashes 03/01/2009     Priority: Medium     2/12/09: seen by Dr. Peres, started Effexor. If this is not helpful will start Neurontin.       Rhinitis, allergic seasonal 07/02/2008     Priority: Medium     ischemic stroke 3/06 03/27/2006     Priority: Catarino Warren has been seen by Dr. Hernandez. Given her history of CVA and Factor V Leiden heterozygote he recommends that she stay on \"antiplatelet drug use for secondary prevention of strokes\". Briana has been on plavix and asa.       Impaired fasting glucose 03/05/2006     Priority: Catarino Warren would like to continue diet changes (she has already lost 16 pounds on the [employer]' program), regular exercise and weight loss.  She agrees to recheck fasting blood sugar and lipids in 3 months.       Insomnia 03/05/2006     Priority: Medium     Stable on Trazadone.  Problem list name updated by automated process. Provider to review       Hypothyroidism 02/18/2005     Priority: Medium     Stable on " current dose of synthroid.  Problem list name updated by automated process. Provider to review        Past Medical History:    Past Medical History:   Diagnosis Date     Allergies      Breast cancer (H)      Esophageal reflux      Hypertension      Other and unspecified hyperlipidemia      Other chronic bronchitis      Personal history of pneumonia (recurrent)      Personal history of tobacco use, presenting hazards to health      RA (rheumatoid arthritis) (H)      Unspecified hypothyroidism      Past Surgical History:    Past Surgical History:   Procedure Laterality Date     BIOPSY BREAST       COLONOSCOPY N/A 9/2/2016    Procedure: COLONOSCOPY;  Surgeon: Dean Sanchez MD;  Location: [facility]     EXCISE EXOSTOSIS FOOT Right 4/25/2017    Procedure: EXCISE EXOSTOSIS FOOT;  Retrocalcaneal exostectomy right;  Surgeon: Antwan Goldstein DPM;  Location: [facility]      EXCISION BREAST LESION, OPEN >=1      2/05     HYSTERECTOMY, RYAN  1982     for non cancerous reasons and no abnormal pap     INJECT EPIDURAL LUMBAR  1/12/2011    INJECT EPIDURAL LUMBAR performed by GENERIC ANESTHESIA PROVIDER at [facility] OR     INJECT EPIDURAL LUMBAR  5/5/2011    Procedure:INJECT EPIDURAL LUMBAR; LESLIE -Dr. Sánchez  ; Surgeon:GENERIC ANESTHESIA PROVIDER; Location:[facility]     INJECT EPIDURAL LUMBAR  10/6/2011    Procedure:INJECT EPIDURAL LUMBAR; LESLIE--; Surgeon:GENERIC ANESTHESIA PROVIDER; Location:[facility]     INJECT EPIDURAL LUMBAR  1/25/2012    Procedure:INJECT EPIDURAL LUMBAR; LESLIE-Dr. Sánchez  ; Surgeon:GENERIC ANESTHESIA PROVIDER; Location:[facility]     INJECT EPIDURAL LUMBAR  7/9/2012    Procedure: INJECT EPIDURAL LUMBAR;  LESLIE-Dr. Pacheco;  Surgeon: Provider, Generic Anesthesia;  Location: [facility]     LAMINECTOMY LUMBAR MINIMALLY INVASIVE TWO LEVELS N/A 11/21/2017    Procedure: LAMINECTOMY LUMBAR MINIMALLY INVASIVE TWO LEVELS;  L4-5 decompression laminectomy, Left L5-S1 foraminal decompression;  Surgeon: Jesse Dueñas MD;  Location: [facility]      LUMPECTOMY BREAST       RELEASE CARPAL TUNNEL Right 2017    Procedure: RELEASE CARPAL TUNNEL;  Right Wrist Carpal Tunnel Release;  Surgeon: Melba Yi MD;  Location: [facility]     RELEASE CARPAL TUNNEL Left 10/31/2017    Procedure: RELEASE CARPAL TUNNEL;  Left Wrist Carpal Tunnel Release;  Surgeon: Melba Yi MD;  Location: [facility]     SURGICAL HISTORY OF -       lumpectomy with sentinal node biopsy     SURGICAL HISTORY OF -       left knee arthoscopic repair medial meniscus     Family History:    Family History   Problem Relation Age of Onset     Diabetes Mother      Hypertension Mother      Cancer Mother         breast     Heart Disease Mother         chf     Breast Cancer Mother      Blood Disease Father      Diabetes Son         Type 1     Psoriasis Son      Cerebrovascular Disease Son 48     Cerebrovascular Disease Sister      Breast Cancer Paternal Aunt      Social History:  Marital Status:   [2]  Social History     Tobacco Use     Smoking status: Former Smoker     Types: Cigarettes     Quit date: 1996     Years since quittin.3     Smokeless tobacco: Never Used     Tobacco comment: quit 10-12 years ago, .   Substance Use Topics     Alcohol use: Yes     Comment: glass of wine at night     Drug use: No      Medications:         aspirin 81 MG EC tablet       Coenzyme Q10 (COQ10 PO)       fluticasone (FLONASE) 50 MCG/ACT nasal spray       FOLIC ACID PO       hydroxychloroquine (PLAQUENIL) 200 MG tablet       levothyroxine (SYNTHROID/LEVOTHROID) 137 MCG tablet       levothyroxine (SYNTHROID/LEVOTHROID) 25 MCG tablet       Multiple Vitamins-Minerals (MULTIVITAMIN ADULT) TABS       Naproxen Sodium (ALEVE PO)       pantoprazole (PROTONIX) 40 MG EC tablet       propranolol (INDERAL) 40 MG tablet       simvastatin (ZOCOR) 20 MG tablet       tolterodine ER (DETROL LA) 4 MG 24 hr capsule       triamcinolone (KENALOG) 0.1 % external ointment      Review of Systems   Constitutional:  Negative for chills and fever.   HENT: Negative for congestion, ear pain and sore throat.    Eyes: Negative for photophobia, pain, discharge, redness, itching and visual disturbance.   Respiratory: Positive for shortness of breath. Negative for cough and wheezing.    Cardiovascular: Positive for chest pain (heaviness). Negative for palpitations and leg swelling.   Gastrointestinal: Negative for abdominal pain, diarrhea, nausea and vomiting.   Genitourinary: Negative for dysuria and hematuria.   Musculoskeletal: Positive for arthralgias (chronic).   Skin: Negative for color change, rash and wound.   Neurological: Positive for dizziness (chronic). Negative for syncope, weakness, numbness and headaches.      Physical Exam   BP: (!) 178/106  Pulse: 99  Temp: 97.9  F (36.6  C)  Resp: 22  SpO2: (!) 88 %    Physical Exam  GENERAL APPEARANCE:, alert, cooperative and no acute distress, however does have some hypoxia with movement  EYES: EOMI,  PERRL, conjunctiva clear  NECK: supple, nontender, no lymphadenopathy  RESP: Diminished breath sounds in the left lower lobe, crackles in the bases bilaterally, no wheezing  CV: regular rates and rhythm, normal S1 S2, no murmur noted, no lower extremity edema  ABDOMEN:  soft, nontender, no HSM or masses and bowel sounds normal  NEURO: Normal strength and tone, sensory exam grossly normal,  normal speech and mentation  SKIN: no suspicious lesions or rashes on exposed areas of skin   ED Course        Procedures             EKG Interpretation:      Interpreted by Dr. Leonid Cortes   Time reviewed: 10:54  Symptoms at time of EKG: dyspnea on exertion    Rhythm: normal sinus   Rate: 92  Axis: Normal  Ectopy: none  Conduction: right bundle branch block  ST Segments/ T Waves: No acute ischemic changes  Q Waves: none  Comparison to prior: Unchanged from 4/11/17    Clinical Impression: no acute changes    Critical Care time:  none        Results for orders placed or performed during the hospital  encounter of 11/15/20 (from the past 24 hour(s))   CBC with platelets differential   Result Value Ref Range    WBC 6.7 4.0 - 11.0 10e9/L    RBC Count 3.75 (L) 3.8 - 5.2 10e12/L    Hemoglobin 11.5 (L) 11.7 - 15.7 g/dL    Hematocrit 36.3 35.0 - 47.0 %    MCV 97 78 - 100 fl    MCH 30.7 26.5 - 33.0 pg    MCHC 31.7 31.5 - 36.5 g/dL    RDW 13.7 10.0 - 15.0 %    Platelet Count 196 150 - 450 10e9/L    Diff Method Automated Method     % Neutrophils 63.4 %    % Lymphocytes 23.9 %    % Monocytes 8.7 %    % Eosinophils 2.6 %    % Basophils 0.9 %    % Immature Granulocytes 0.5 %    Nucleated RBCs 0 0 /100    Absolute Neutrophil 4.2 1.6 - 8.3 10e9/L    Absolute Lymphocytes 1.6 0.8 - 5.3 10e9/L    Absolute Monocytes 0.6 0.0 - 1.3 10e9/L    Absolute Eosinophils 0.2 0.0 - 0.7 10e9/L    Absolute Basophils 0.1 0.0 - 0.2 10e9/L    Abs Immature Granulocytes 0.0 0 - 0.4 10e9/L    Absolute Nucleated RBC 0.0    Comprehensive metabolic panel   Result Value Ref Range    Sodium 141 133 - 144 mmol/L    Potassium 4.1 3.4 - 5.3 mmol/L    Chloride 107 94 - 109 mmol/L    Carbon Dioxide 28 20 - 32 mmol/L    Anion Gap 6 3 - 14 mmol/L    Glucose 107 (H) 70 - 99 mg/dL    Urea Nitrogen 19 7 - 30 mg/dL    Creatinine 0.93 0.52 - 1.04 mg/dL    GFR Estimate 59 (L) >60 mL/min/[1.73_m2]    GFR Estimate If Black 69 >60 mL/min/[1.73_m2]    Calcium 8.5 8.5 - 10.1 mg/dL    Bilirubin Total 0.9 0.2 - 1.3 mg/dL    Albumin 3.9 3.4 - 5.0 g/dL    Protein Total 6.8 6.8 - 8.8 g/dL    Alkaline Phosphatase 76 40 - 150 U/L    ALT 41 0 - 50 U/L    AST 27 0 - 45 U/L   Lactic acid whole blood   Result Value Ref Range    Lactic Acid 1.2 0.7 - 2.0 mmol/L   Blood gas venous   Result Value Ref Range    Ph Venous 7.40 7.32 - 7.43 pH    PCO2 Venous 48 40 - 50 mm Hg    PO2 Venous 24 (L) 25 - 47 mm Hg    Bicarbonate Venous 29 (H) 21 - 28 mmol/L    Base Excess Venous 3.9 mmol/L   Nt probnp inpatient (BNP)   Result Value Ref Range    N-Terminal Pro BNP Inpatient 7,456 (H) 0 - 1,800  pg/mL   Troponin I   Result Value Ref Range    Troponin I ES <0.015 0.000 - 0.045 ug/L   XR Chest Port 1 View    Narrative    CHEST PORTABLE ONE VIEW  11/15/2020 12:34 PM     HISTORY: Hypoxia.    COMPARISON: 8/26/2020.      Impression    IMPRESSION: Increased bilateral vascular and interstitial prominence  suggesting edema. Trace left pleural fluid. Borderline cardiomegaly.  Mild left base atelectasis.     Medications   aspirin EC tablet 81 mg (81 mg Oral Given 11/15/20 1642)   folic acid (FOLVITE) tablet 1 mg (has no administration in time range)   hydroxychloroquine (PLAQUENIL) tablet 200 mg (has no administration in time range)   levothyroxine (SYNTHROID/LEVOTHROID) tablet 137 mcg (has no administration in time range)   levothyroxine (SYNTHROID/LEVOTHROID) tablet 25 mcg (has no administration in time range)   meclizine (ANTIVERT) tablet 25 mg (has no administration in time range)   multivitamin w/minerals (THERA-VIT-M) tablet 1 tablet (has no administration in time range)   pantoprazole (PROTONIX) EC tablet 40 mg (has no administration in time range)   simvastatin (ZOCOR) tablet 20 mg (has no administration in time range)   tolterodine ER (DETROL LA) 24 hr capsule 4 mg (has no administration in time range)   enoxaparin ANTICOAGULANT (LOVENOX) injection 40 mg (40 mg Subcutaneous Given 11/15/20 1642)   furosemide (LASIX) injection 20 mg (20 mg Intravenous Given 11/15/20 1318)      Assessments & Plan (with Medical Decision Making)     I have reviewed the nursing notes.  I have reviewed the findings, diagnosis, plan and need for follow up with the patient.      ED to Inpatient Handoff:    Discussed with Antwan Alexis at 1:13  Patient accepted for Inpatient Stay  Pending studies include covid testing   Code Status: Full Code       Current Discharge Medication List        Final diagnoses:   CHF (congestive heart failure) (H)   Dyspnea on exertion     75-year-old female presents to the emergency department with concern  over chronic dyspnea on exertion worsening over the last 2 weeks with significant worsening in the last 48 hours.  She was noted to have mild hypoxia with movement on room air upon arrival, hypertension,  remainder of vital signs stable.  She was placed on 1 L oxygen by nasal canula for comfort. Physical exam findings are significant for crackles at the lung bases bilaterally with diminished breath sounds on the left side.  EKG did not demonstrate any acute ischemia, arrhythmia.  Laboratory testing was read above significant for mild anemia which appears consistent with her baseline.  BNP was elevated at 7456, CMP, lactate, VBG, troponin were noncontributory.  Chest x-ray did show increased bilateral vascular and interstitial prominence suggesting edema which fits with CHF, trace left pleural fluid, borderline cardiomegaly, mild left base atelectasis.  Symptoms consistent with CHF.  Low concern for Covid however testing was pending at time of discharge. Was had first dose of lasix in the department.  Discussed case with Dr. Antwan Payton to did agree to accept patient for inpatient stay.  Case was also discussed with ED physician Dr. Leonid Cortes who did help guide patient care.      Disclaimer: This note consists of symbols derived from keyboarding, dictation, and/or voice recognition software. As a result, there may be errors in the script that have gone undetected.  Please consider this when interpreting information found in the chart.    11/15/2020   [facility] EMERGENCY DEPT     Symone Rodriguez PA-C  11/15/20 [id number]

## 2020-11-15 NOTE — H&P
[facility]     History and Physical - Hospitalist Service       Date of Admission:  11/15/2020    Assessment & Plan   Briana Mcgee is a 75 year old female admitted on 11/15/2020. She has a PMH significant for hypertension, GERD, ckd stage 2, hyperlipidemia, hypothyroidism, IMPAIRED FASTING GLUCOSE, CVA (2006), RA, spinal stenosis and breast cancer who presents initially to urgent care and ultimately to ER with 7 days of shortness of breath and SILVA.     Acute CHF, new onset  BNP 7,456  Troponin negative  Chest x ray shows increased pulmonary vasculature and cardiomegaly, ekg with RBBB otherwise normal sinus rhythm.   -admit for diuresis    -echocardiogram in am  -low sodium diet  -oxygen as needed to maintain sats >92%    Hypertension  Has been off of antihypertensives since a recent weight loss. Does not check blood pressure at home.   -diuresis  -monitor blood pressure   -start low dose ace and beta blocker    Normocytic anemia  Stable to improved from July.   Thought at the time to possibly be due to sulfasalazine  -appears to be close to baseline, monitor      Hypothyroidism  -continue levothyroxine     RA  -continue plaquenil    GERD  -continue pantoprazole    Hyperlipidemia  -continue simvastatin   -check am lipids, has been >1 year  Goal LDL <70    Overactive bladder  -continue detrol la    heterogzygous for Factor V Leiden mutation  -at higher risk for clotting, will use enoxaparin for dvt prophylaxis.        Diet: 2 Gram Sodium Diet    DVT Prophylaxis: Enoxaparin (Lovenox) SQ  Curran Catheter: not present  Code Status:   full  Rule Out COVID-19 Handoff:  Briana is a LOW SUSPICION PUI.  Follow these instructions:    If COVID test positive -> continue isolation precautions    If COVID test negative -> discontinue COVID-specific isolation precautions       Disposition Plan   Expected discharge: 1-2 days, recommended to prior living arrangement once work up complete and  "clinically improved.  Entered: Bethany Becerra MD 11/15/2020, 2:58 PM     The patient's care was discussed with the Bedside Nurse and Patient.    Bethany Becerra MD  [facility]   Contact information available via [facility] Paging/Directory      ______________________________________________________________________    Chief Complaint   Shortness of breath    History is obtained from the patient    History of Present Illness      Briana Mcgee is a 75 year old female admitted on 11/15/2020. She has a PMH significant for hypertension, GERD, ckd stage 2, hyperlipidemia, hypothyroidism, IMPAIRED FASTING GLUCOSE, CVA (2006), RA, spinal stenosis and breast cancer who presents initially to urgent care and ultimately to ER with 14 days of shortness of breath and SILVA that is worsened over the past 2-3 days.  She is unable to walk the length of a room without dyspnea. Also having orthopnea, unable to lay flat in bed the past 3 nights. Thought she had bronchitis so went to urgent care today.    No fever/chills/myalgias. No URI symptoms of sore throat, etc.    Found at urgent care to be hypoxic 88-90% on room air with ambulation.      In the ER work up indicates that she likely has a new CHF.  Last cardiac evaluation 2006 with normal LV EF 55% at that time.  Mild mitral regurgitation.          Review of Systems    The 10 point Review of Systems is negative other than noted in the HPI or here.      Past Medical History    I have reviewed this patient's medical history and updated it with pertinent information if needed.   Past Medical History:   Diagnosis Date     Allergies      Breast cancer (H)      Esophageal reflux      Hypertension      Other and unspecified hyperlipidemia      Other chronic bronchitis      Personal history of pneumonia (recurrent)     Hx-Pneumonia, \" Chronic Bronchitis\"     Personal history of tobacco use, presenting hazards to health      RA (rheumatoid arthritis) (H)      " Unspecified hypothyroidism        Past Surgical History   I have reviewed this patient's surgical history and updated it with pertinent information if needed.  Past Surgical History:   Procedure Laterality Date     BIOPSY BREAST       COLONOSCOPY N/A 9/2/2016    Procedure: COLONOSCOPY;  Surgeon: Dean Sanchez MD;  Location: [facility]     EXCISE EXOSTOSIS FOOT Right 4/25/2017    Procedure: EXCISE EXOSTOSIS FOOT;  Retrocalcaneal exostectomy right;  Surgeon: Antwan Goldstein DPM;  Location: [facility]     HC EXCISION BREAST LESION, OPEN >=1      2/05     HYSTERECTOMY, RYAN  1982     for non cancerous reasons and no abnormal pap     INJECT EPIDURAL LUMBAR  1/12/2011    INJECT EPIDURAL LUMBAR performed by GENERIC ANESTHESIA PROVIDER at [facility]     INJECT EPIDURAL LUMBAR  5/5/2011    Procedure:INJECT EPIDURAL LUMBAR; LESLIE -Dr. Sánchez  ; Surgeon:GENERIC ANESTHESIA PROVIDER; Location:[facility]     INJECT EPIDURAL LUMBAR  10/6/2011    Procedure:INJECT EPIDURAL LUMBAR; LESLIE--; Surgeon:GENERIC ANESTHESIA PROVIDER; Location:[facility]     INJECT EPIDURAL LUMBAR  1/25/2012    Procedure:INJECT EPIDURAL LUMBAR; LESLIE-Dr. Sánchez  ; Surgeon:GENERIC ANESTHESIA PROVIDER; Location:[facility]     INJECT EPIDURAL LUMBAR  7/9/2012    Procedure: INJECT EPIDURAL LUMBAR;  LESLIEGiovanni Pacheco;  Surgeon: Provider, Generic Anesthesia;  Location: [facility]     LAMINECTOMY LUMBAR MINIMALLY INVASIVE TWO LEVELS N/A 11/21/2017    Procedure: LAMINECTOMY LUMBAR MINIMALLY INVASIVE TWO LEVELS;  L4-5 decompression laminectomy, Left L5-S1 foraminal decompression;  Surgeon: Jesse Dueñas MD;  Location: [facility]     LUMPECTOMY BREAST       RELEASE CARPAL TUNNEL Right 9/5/2017    Procedure: RELEASE CARPAL TUNNEL;  Right Wrist Carpal Tunnel Release;  Surgeon: Melba Yi MD;  Location: [facility]     RELEASE CARPAL TUNNEL Left 10/31/2017    Procedure: RELEASE CARPAL TUNNEL;  Left Wrist Carpal Tunnel Release;  Surgeon: Melba Yi MD;  Location: [facility]     SURGICAL  HISTORY OF -       lumpectomy with sentinal node biopsy     SURGICAL HISTORY OF -       left knee arthoscopic repair medial meniscus       Social History   I have reviewed this patient's social history and updated it with pertinent information if needed.  Social History     Tobacco Use     Smoking status: Former Smoker     Types: Cigarettes     Quit date: 1996     Years since quittin.3     Smokeless tobacco: Never Used     Tobacco comment: quit 10-12 years ago, .   Substance Use Topics     Alcohol use: Yes     Comment: glass of wine at night     Drug use: No     , works part time in a [employer] 20 hours a week    Family History   I have reviewed this patient's family history and updated it with pertinent information if needed.  Family History   Problem Relation Age of Onset     Diabetes Mother      Hypertension Mother      Cancer Mother         breast     Heart Disease Mother         chf     Breast Cancer Mother      Blood Disease Father      Diabetes Son         Type 1     Psoriasis Son      Cerebrovascular Disease Son 48     Cerebrovascular Disease Sister      Breast Cancer Paternal Aunt        Prior to Admission Medications   Prior to Admission Medications   Prescriptions Last Dose Informant Patient Reported? Taking?   Coenzyme Q10 (COQ10 PO)   Yes No   FOLIC ACID PO   Yes No   Sig: Take 1 mg by mouth daily   Multiple Vitamins-Minerals (MULTIVITAMIN ADULT) TABS   Yes No   Sig: Take 1 capsule by mouth   Naproxen Sodium (ALEVE PO)   Yes No   Sig: Take 2 tablets by mouth 2 times daily as needed    aspirin 81 MG EC tablet   No No   Sig: Take 1 tablet (81 mg) by mouth daily   fluticasone (FLONASE) 50 MCG/ACT nasal spray   No No   Sig: Spray 2 sprays into both nostrils daily as needed for rhinitis or allergies Hold on file until needed   Patient not taking: Reported on 2020   hydroxychloroquine (PLAQUENIL) 200 MG tablet   No No   Sig: Take 1 tablet (200 mg) by mouth 2 times daily   levothyroxine  (SYNTHROID/LEVOTHROID) 137 MCG tablet   No No   Sig: Take 1 tablet (137 mcg) by mouth daily Along with 25mcg to equal 162mcg daily.   levothyroxine (SYNTHROID/LEVOTHROID) 25 MCG tablet   No No   Sig: Take 1 tablet (25 mcg) by mouth daily Take along with the 137mcg to equal 162mcg total daily.   pantoprazole (PROTONIX) 40 MG EC tablet   No No   Sig: Take 1 tablet (40 mg) by mouth daily   propranolol (INDERAL) 40 MG tablet   No No   Sig: Take 1 tablet (40 mg) by mouth 2 times daily   simvastatin (ZOCOR) 20 MG tablet   No No   Sig: Take 1 tablet (20 mg) by mouth At Bedtime.  Please make an office visit for further refills.   tolterodine ER (DETROL LA) 4 MG 24 hr capsule   No No   Sig: Take 1 capsule (4 mg) by mouth daily   triamcinolone (KENALOG) 0.1 % external ointment   No No   Sig: Apply ointment twice daily on lower legs for 2 weeks   Patient not taking: Reported on 11/15/2020      Facility-Administered Medications: None     Allergies   Allergies   Allergen Reactions     Erythromycin      Other reaction(s): Edema     Hydrocodone      Other reaction(s): GI Upset  Tolerates dilaudid     Oxycodone      Other reaction(s): GI Upset       Physical Exam   Vital Signs: Temp: 97.9  F (36.6  C) Temp src: Oral BP: (!) 149/91 Pulse: 89   Resp: 24 SpO2: 96 % O2 Device: Nasal cannula Oxygen Delivery: 1 LPM  Weight: 184 lbs 15.46 oz    EXAM: GENERAL APPEARANCE: Alert, no acute distress  EYES: PERRL, EOM normal, conjunctiva and lids normal  HENT: Ears and TMs normal, oral mucosa and posterior oropharynx normal  NECK: No adenopathy,masses or thyromegaly  RESP: crackles at bases bilaterally, able to speak in near full sentences  CV: regular rhythm, rate-normal, no murmur but +gallop  ABDOMEN: soft, no organomegaly, masses or tenderness  LYMPHATICS: No cervical, supraclavicular or inguinal adenopathy  MS: extremities normal, no peripheral edema  NEURO: Alert, oriented, speech and mentation normal  PSYCHE: mentation appears normal,  affect and mood normal      Data   Data reviewed today: I reviewed all medications, new labs and imaging results over the last 24 hours. I personally reviewed the chest x-ray image(s) showing increased pulmonary edema.    EKG with NSR and a right bundle branch block    Recent Labs   Lab 11/15/20  1055   WBC 6.7   HGB 11.5*   MCV 97         POTASSIUM 4.1   CHLORIDE 107   CO2 28   BUN 19   CR 0.93   ANIONGAP 6   MARIO 8.5   *   ALBUMIN 3.9   PROTTOTAL 6.8   BILITOTAL 0.9   ALKPHOS 76   ALT 41   AST 27   TROPI <0.015     Most Recent 3 CBC's:  Recent Labs   Lab Test 11/15/20  1055 07/29/20  1248 07/03/20  1511   WBC 6.7 5.8 6.6   HGB 11.5* 10.6* 11.6*   MCV 97 93 92    196 236     Most Recent 3 BMP's:  Recent Labs   Lab Test 11/15/20  1055 07/29/20  1248 07/03/20  1511 12/09/19  0744 12/09/19  0744 06/19/19  0750     --   --   --  138 135   POTASSIUM 4.1  --   --   --  4.2 3.6   CHLORIDE 107  --   --   --  103 100   CO2 28  --   --   --  31 33*   BUN 19  --   --   --  12 15   CR 0.93 0.92 0.88   < > 0.79 0.81   ANIONGAP 6  --   --   --  4 2*   MARIO 8.5  --   --   --  9.0 9.2   *  --   --   --  104* 93    < > = values in this interval not displayed.     Most Recent 2 LFT's:  Recent Labs   Lab Test 11/15/20  1055 07/29/20  1248   AST 27 22   ALT 41 25   ALKPHOS 76 68   BILITOTAL 0.9 0.7     Most Recent 3 Troponin's:  Recent Labs   Lab Test 11/15/20  1055   TROPI <0.015     Recent Results (from the past 24 hour(s))   XR Chest Port 1 View    Narrative    CHEST PORTABLE ONE VIEW  11/15/2020 12:34 PM     HISTORY: Hypoxia.    COMPARISON: 8/26/2020.      Impression    IMPRESSION: Increased bilateral vascular and interstitial prominence  suggesting edema. Trace left pleural fluid. Borderline cardiomegaly.  Mild left base atelectasis.

## 2020-11-16 ENCOUNTER — APPOINTMENT (OUTPATIENT)
Dept: CARDIOLOGY | Facility: CLINIC | Age: 75
DRG: 291 | End: 2020-11-16
Attending: PHYSICIAN ASSISTANT
Payer: MEDICARE

## 2020-11-16 LAB
ANION GAP SERPL CALCULATED.3IONS-SCNC: 7 MMOL/L (ref 3–14)
BUN SERPL-MCNC: 18 MG/DL (ref 7–30)
CALCIUM SERPL-MCNC: 8.9 MG/DL (ref 8.5–10.1)
CHLORIDE SERPL-SCNC: 100 MMOL/L (ref 94–109)
CHOLEST SERPL-MCNC: 155 MG/DL
CO2 SERPL-SCNC: 30 MMOL/L (ref 20–32)
CREAT SERPL-MCNC: 1.04 MG/DL (ref 0.52–1.04)
ERYTHROCYTE [DISTWIDTH] IN BLOOD BY AUTOMATED COUNT: 13.9 % (ref 10–15)
GFR SERPL CREATININE-BSD FRML MDRD: 52 ML/MIN/{1.73_M2}
GLUCOSE SERPL-MCNC: 116 MG/DL (ref 70–99)
HCT VFR BLD AUTO: 36 % (ref 35–47)
HDLC SERPL-MCNC: 92 MG/DL
HGB BLD-MCNC: 11.7 G/DL (ref 11.7–15.7)
LABORATORY COMMENT REPORT: NORMAL
LDLC SERPL CALC-MCNC: 33 MG/DL
MAGNESIUM SERPL-MCNC: 2.1 MG/DL (ref 1.6–2.3)
MCH RBC QN AUTO: 31 PG (ref 26.5–33)
MCHC RBC AUTO-ENTMCNC: 32.5 G/DL (ref 31.5–36.5)
MCV RBC AUTO: 95 FL (ref 78–100)
NONHDLC SERPL-MCNC: 63 MG/DL
PLATELET # BLD AUTO: 198 10E9/L (ref 150–450)
POTASSIUM SERPL-SCNC: 3.3 MMOL/L (ref 3.4–5.3)
POTASSIUM SERPL-SCNC: 3.6 MMOL/L (ref 3.4–5.3)
RBC # BLD AUTO: 3.78 10E12/L (ref 3.8–5.2)
SARS-COV-2 RNA SPEC QL NAA+PROBE: NEGATIVE
SODIUM SERPL-SCNC: 137 MMOL/L (ref 133–144)
SPECIMEN SOURCE: NORMAL
TRIGL SERPL-MCNC: 148 MG/DL
WBC # BLD AUTO: 6.7 10E9/L (ref 4–11)

## 2020-11-16 PROCEDURE — 36415 COLL VENOUS BLD VENIPUNCTURE: CPT | Performed by: INTERNAL MEDICINE

## 2020-11-16 PROCEDURE — 84132 ASSAY OF SERUM POTASSIUM: CPT | Performed by: INTERNAL MEDICINE

## 2020-11-16 PROCEDURE — 99232 SBSQ HOSP IP/OBS MODERATE 35: CPT | Performed by: INTERNAL MEDICINE

## 2020-11-16 PROCEDURE — 36415 COLL VENOUS BLD VENIPUNCTURE: CPT | Performed by: FAMILY MEDICINE

## 2020-11-16 PROCEDURE — 83735 ASSAY OF MAGNESIUM: CPT | Performed by: INTERNAL MEDICINE

## 2020-11-16 PROCEDURE — 93306 TTE W/DOPPLER COMPLETE: CPT | Mod: 26 | Performed by: INTERNAL MEDICINE

## 2020-11-16 PROCEDURE — 80061 LIPID PANEL: CPT | Performed by: FAMILY MEDICINE

## 2020-11-16 PROCEDURE — 255N000002 HC RX 255 OP 636: Performed by: INTERNAL MEDICINE

## 2020-11-16 PROCEDURE — 250N000011 HC RX IP 250 OP 636: Performed by: FAMILY MEDICINE

## 2020-11-16 PROCEDURE — 80048 BASIC METABOLIC PNL TOTAL CA: CPT | Performed by: FAMILY MEDICINE

## 2020-11-16 PROCEDURE — 250N000013 HC RX MED GY IP 250 OP 250 PS 637: Performed by: FAMILY MEDICINE

## 2020-11-16 PROCEDURE — 85027 COMPLETE CBC AUTOMATED: CPT | Performed by: FAMILY MEDICINE

## 2020-11-16 PROCEDURE — 93306 TTE W/DOPPLER COMPLETE: CPT

## 2020-11-16 PROCEDURE — 250N000011 HC RX IP 250 OP 636: Performed by: INTERNAL MEDICINE

## 2020-11-16 PROCEDURE — 120N000001 HC R&B MED SURG/OB

## 2020-11-16 RX ORDER — TRIAMCINOLONE ACETONIDE 5 MG/G
CREAM TOPICAL 2 TIMES DAILY PRN
COMMUNITY
Start: 2020-02-26 | End: 2021-06-14

## 2020-11-16 RX ORDER — FUROSEMIDE 40 MG
40 TABLET ORAL DAILY
Status: DISCONTINUED | OUTPATIENT
Start: 2020-11-17 | End: 2020-11-17

## 2020-11-16 RX ORDER — ZOSTER VACCINE RECOMBINANT, ADJUVANTED 50 MCG/0.5
KIT INTRAMUSCULAR
COMMUNITY
Start: 2020-10-22 | End: 2022-08-01

## 2020-11-16 RX ADMIN — MULTIPLE VITAMINS W/ MINERALS TAB 1 TABLET: TAB at 09:17

## 2020-11-16 RX ADMIN — FOLIC ACID 1 MG: 1 TABLET ORAL at 09:17

## 2020-11-16 RX ADMIN — LEVOTHYROXINE SODIUM 25 MCG: 25 TABLET ORAL at 09:17

## 2020-11-16 RX ADMIN — PANTOPRAZOLE SODIUM 40 MG: 20 TABLET, DELAYED RELEASE ORAL at 09:16

## 2020-11-16 RX ADMIN — HYDROXYCHLOROQUINE SULFATE 200 MG: 200 TABLET, FILM COATED ORAL at 21:02

## 2020-11-16 RX ADMIN — LEVOTHYROXINE SODIUM 137 MCG: 25 TABLET ORAL at 09:17

## 2020-11-16 RX ADMIN — HUMAN ALBUMIN MICROSPHERES AND PERFLUTREN 2 ML: 10; .22 INJECTION, SOLUTION INTRAVENOUS at 08:43

## 2020-11-16 RX ADMIN — TOLTERODINE 4 MG: 4 CAPSULE, EXTENDED RELEASE ORAL at 09:17

## 2020-11-16 RX ADMIN — FUROSEMIDE 40 MG: 10 INJECTION, SOLUTION INTRAMUSCULAR; INTRAVENOUS at 17:16

## 2020-11-16 RX ADMIN — SIMVASTATIN 20 MG: 20 TABLET, FILM COATED ORAL at 21:02

## 2020-11-16 RX ADMIN — HYDROXYCHLOROQUINE SULFATE 200 MG: 200 TABLET, FILM COATED ORAL at 09:18

## 2020-11-16 RX ADMIN — FUROSEMIDE 40 MG: 10 INJECTION, SOLUTION INTRAMUSCULAR; INTRAVENOUS at 05:23

## 2020-11-16 RX ADMIN — METOPROLOL SUCCINATE 25 MG: 25 TABLET, EXTENDED RELEASE ORAL at 09:17

## 2020-11-16 RX ADMIN — LISINOPRIL 5 MG: 5 TABLET ORAL at 09:17

## 2020-11-16 RX ADMIN — ASPIRIN 81 MG: 81 TABLET ORAL at 09:17

## 2020-11-16 RX ADMIN — ENOXAPARIN SODIUM 40 MG: 40 INJECTION SUBCUTANEOUS at 17:16

## 2020-11-16 ASSESSMENT — ACTIVITIES OF DAILY LIVING (ADL)
ADLS_ACUITY_SCORE: 15

## 2020-11-16 ASSESSMENT — MIFFLIN-ST. JEOR: SCORE: 1304.81

## 2020-11-16 NOTE — CONSULTS
Care Management:    Received a Consult for discharge planning/disposition.  The patient is not at risk for readmission (12%).  Spoke with the patient, introduced self and role.    The patient lives with her  independently in the community in a home. She works at [employer] part time and is very active.  There are no discharge needs identified.    Plan:  Home      Melissa Mckay RN, Care Coordinator [phone]

## 2020-11-16 NOTE — CONSULTS
"NUTRITION NOTE    Reason for assessment: CHF Consult for 2 gm NA Diet Education     NUTRITION HISTORY:  Information obtained from electronic medical record and patient:  -new onset acute CHF.  -PMH: hypertension, GERD, ckd stage 2, hyperlipidemia, hypothyroidism, impaired fasting glucose, CVA (2006), RA, spinal stenosis and breast cancer   -negative for COVID-19  -patient has been trying to lose weight over the last 1.5-2 years by watching carbs and starches, eating smaller portions, and eating </= 1000 kcal/day (logs her calories on an angle). She is down to 175 lbs and would like to get down to 150 lbs. She has lost 35 lbs so far.  ?  Living situation:   -lives with her  in a home  -works at [employer] part-time (20 hrs/week) and is very active when working.  ?  Grocery shopping:  -done mostly by patient, but sometimes  joins her  -she looks at the nutrition facts panel and ingredients lists.  ?  Meal preparation:  - does the grilling and patient prepares the rest of the meal.  - uses garlic salt, patient uses a little bit of salt, [employer], and Mrs. Simmons.  -eats out for breakfast on Sundays  ?  Breakfast:  -scrambled eggs with salsa and a slice of Italian bread toasted with some butter. If not hungry enough will only eat a banana.  ?  Lunch:  -half a sandwich (little bit of ventura and ham) and pre-packaged fruit cup (light syrup or artificially sweetened)  ?  Dinner:   -meat and vegetable (acorn squash with brown sugar and butter lately); had ribs the other night. Likes frozen vegetables (plain variety)    Snacks/Supplements:  -doesn't really snack    Fluids:  -decaf coffee w/ non-dairy creamer with breakfast  -likes 2% milk  -drinks 16 oz of water daily (aware that she needs to drink more).  -no soda  ?  Previous diet instructions:  -\"I took a class on carbohydrates.\"  ?  CURRENT DIET:  Orders Placed This Encounter      Low Saturated Fat Na <2400 mg    ?  NUTRITION DIAGNOSIS:  Food- and " nutrition-related knowledge deficit related to no previous diet education as evidenced by patient report.      INTERVENTIONS:    Provided and educated on the following handouts: Low Sodium Nutrition Therapy, Sodium-Free Flavoring Tips, and Heart-Healthy Label Reading Tips.     Provided instruction on above handouts, and discussed rational for limiting Na for CHF and stressed importance of following 2 gm Na guidelines.   Goals:    Patient verbalizes understanding of diet by stating foods that are low in sodium.    Follow Up:    Patient to ask any further nutrition-related questions before discharge.  In addition, pt may request outpatient RD appointment.     Bety Quinn RDN, LD  Clinical Dietitian  Office: [phone]  Weekend Pager: [phone]

## 2020-11-16 NOTE — PLAN OF CARE
Pt alert/oriented. Denies pain. States she is feeling better. Pt up independently in room, voiding in good amts. Gets lasix IV every 12 hrs with large output. 02 on RA 88%. Pt on 2 liters & sats mid-90's. Pt states no cough,

## 2020-11-16 NOTE — PROGRESS NOTES
Patient reports improvement in breathing.  Sats 95% on room air.  LS diminshed.  Denies pain.  Up in room independently.

## 2020-11-16 NOTE — PROGRESS NOTES
[facility]     Hospitalist Progress Note    Date of Service (when I saw the patient): 11/16/2020    Assessment & Plan   Briana Mcgee is a 75 year old female admitted on 11/15/2020. She has a PMH significant for hypertension, GERD, CKD stage 2, hyperlipidemia, hypothyroidism, impaired fasting glucose, CVA (2006), RA, spinal stenosis and breast cancer who presents initially to urgent care and ultimately to ER with 7 days of shortness of breath and SILVA.      Acute CHF, new onset  BNP 7,456  Troponin negative  Chest x ray shows increased pulmonary vasculature and cardiomegaly, ekg with RBBB otherwise normal sinus rhythm.   -admit for diuresis    -echocardiogram demonstrates mildly dilated LV with EF 35 to 40%, moderate global hypokinesis LV, severe hypokinesis inferior lateral and basal inferior wall.  Moderately reduced RV function, mild to moderately dilated LA, normal RA, moderate MR, mild to moderate TR, mild to moderate pulmonary hypertension  -Patient will need stress testing prior to discharge  -low sodium diet  -oxygen as needed to maintain sats >92%  -Lexiscan tomorrow     Hypertension  Has been off of antihypertensives since a recent weight loss. Does not check blood pressure at home.   -diuresis  -monitor blood pressure   -start low dose ace and beta blocker     Normocytic anemia  Stable to improved from July.   Thought at the time to possibly be due to sulfasalazine  -appears to be close to baseline, monitor     Hypothyroidism  -continue levothyroxine      RA  -continue plaquenil     GERD  -continue pantoprazole     Hyperlipidemia  -continue simvastatin   -check am lipids, has been >1 year  -Goal LDL <70     Overactive bladder  -continue detrol la     heterogzygous for Factor V Leiden mutation  -at higher risk for clotting, will use enoxaparin for dvt prophylaxis.      Diet: 2 Gram Sodium Diet    DVT Prophylaxis: Enoxaparin (Lovenox) SQ  Curran Catheter: not present  Code  Status:   full  Rule Out COVID-19: negative     Disposition Plan  Expected discharge: 1-2 days, recommended to prior living arrangement once work up complete and clinically improved.    Alvarez Webb    Interval History   The patient is resting comfortably in a chair.  She has no acute complaints.    -Data reviewed today: I reviewed all new labs and imaging results over the last 24 hours. I personally reviewed no images or EKG's today.    Physical Exam   Temp: 97.5  F (36.4  C) Temp src: Oral BP: 126/59 Pulse: 66   Resp: 18 SpO2: 95 % O2 Device: None (Room air) Oxygen Delivery: 1 LPM  Vitals:    11/15/20 1345 11/15/20 1515 11/16/20 0510   Weight: 83.9 kg (184 lb 15.5 oz) 83.1 kg (183 lb 3.2 oz) 80.6 kg (177 lb 11.1 oz)     Vital Signs with Ranges  Temp:  [95.7  F (35.4  C)-98.7  F (37.1  C)] 97.5  F (36.4  C)  Pulse:  [60-98] 66  Resp:  [17-24] 18  BP: (102-173)/() 126/59  SpO2:  [88 %-97 %] 95 %  I/O last 3 completed shifts:  In: 990 [P.O.:990]  Out: 3750 [Urine:3750]    Gen: Well nourished, well developed, alert and oriented x 3, no acute distressed  HEENT: Atraumatic, normocephalic; sclera non-injected, anicterric; oral mucosa moist, no lesion, no exudate  Lungs: Clear to ausculation, no wheezes, no rhonchi, no rales  Heart: Regular rate, regular rhythm, no gallops, no rubs, no murmurs  GI: Bowel sound normal, no hepatosplenomegaly, no masses, non-tender, non-distended, no guarding, no rebound tenderness  Lymph: No lymphadenopathy, no edema  Skin: No rashes, no chronic venous stasis     Medications       aspirin  81 mg Oral Daily     enoxaparin ANTICOAGULANT  40 mg Subcutaneous Q24H     folic acid  1 mg Oral Daily     furosemide  40 mg Intravenous Q12H     hydroxychloroquine  200 mg Oral BID     levothyroxine  137 mcg Oral Daily     levothyroxine  25 mcg Oral Daily     lisinopril  5 mg Oral Daily     metoprolol succinate  25 mg Oral Daily     multivitamin w/minerals  1 tablet Oral Daily      pantoprazole  40 mg Oral Daily     simvastatin  20 mg Oral At Bedtime     sodium chloride (PF)  10 mL Intracatheter Q8H     sodium chloride (PF)  3 mL Intracatheter Q8H     tolterodine ER  4 mg Oral Daily       Data   Recent Labs   Lab 20  1205 20  0611 11/15/20  1951 11/15/20  1055   WBC  --  6.7  --  6.7   HGB  --  11.7  --  11.5*   MCV  --  95  --  97   PLT  --  198  --  196   NA  --  137  --  141   POTASSIUM 3.6 3.3* 3.4 4.1   CHLORIDE  --  100  --  107   CO2  --  30  --  28   BUN  --  18  --  19   CR  --  1.04 1.05* 0.93   ANIONGAP  --  7  --  6   MARIO  --  8.9  --  8.5   GLC  --  116*  --  107*   ALBUMIN  --   --   --  3.9   PROTTOTAL  --   --   --  6.8   BILITOTAL  --   --   --  0.9   ALKPHOS  --   --   --  76   ALT  --   --   --  41   AST  --   --   --  27   TROPI  --   --   --  <0.015       Recent Results (from the past 24 hour(s))   Echocardiogram Complete    Narrative    [id number]  [id number]  [id number]  [id number]^BARBARA^PATRIC^N           [facility]  Echocardiography Laboratory  [address] [place] [address]        Name: DEMOND PLASCENCIA  MRN: [id number]  : 1945  Study Date: 2020 08:09 AM  Age: 75 yrs  Gender: Female  Patient Location: [facility]  Reason For Study: CHF  Ordering Physician: PATRIC JIMENEZ  Referring Physician: Xavier Vega  Performed By: Lexie Hodgson     BSA: 1.9 m2  Height: 65 in  Weight: 183 lb  HR: 66  BP: 137/64 mmHg  _____________________________________________________________________________  __        Procedure  Complete Portable Echo Adult. Optison (NDC #6320-6756) given intravenously.  _____________________________________________________________________________  __        Interpretation Summary     The visual ejection fraction is estimated at 35-40%.  Left ventricular systolic function is moderately reduced.  There is moderate global hypokinesia of the left ventricle.  The inferolateral wall and the basal inferior wall appear to  contract least  well and appear severely hypokinetic.  There is moderate (2+) mitral regurgitation.  There is mild to moderate (1-2+) tricuspid regurgitation.  Right ventricular systolic pressure is elevated, consistent with mild to  moderate pulmonary hypertension.  The study was technically difficult.  _____________________________________________________________________________  __        Left Ventricle  The left ventricle is mildly dilated. Diastolic Doppler findings (E/E' ratio  and/or other parameters) suggest left ventricular filling pressures are  increased. The visual ejection fraction is estimated at 35-40%. Left  ventricular systolic function is moderately reduced. There is moderate global  hypokinesia of the left ventricle. The inferolateral wall and the basal  inferior wall appear to contract least well and appear severely hypokinetic.     Right Ventricle  The right ventricle is normal size. The right ventricular systolic function is  moderately reduced.     Atria  The left atrium is mild to moderately dilated. Right atrial size is normal.  There is no color Doppler evidence of an atrial shunt.     Mitral Valve  There is moderate (2+) mitral regurgitation.        Tricuspid Valve  There is mild to moderate (1-2+) tricuspid regurgitation. The right  ventricular systolic pressure is approximated at 41mmHg plus the right atrial  pressure. Right ventricular systolic pressure is elevated, consistent with  mild to moderate pulmonary hypertension. IVC diameter <2.1 cm collapsing >50%  with sniff suggests a normal RA pressure of 3 mmHg.     Aortic Valve  The aortic valve is trileaflet. There is mild trileaflet aortic sclerosis. No  aortic regurgitation is present. No hemodynamically significant valvular  aortic stenosis.     Pulmonic Valve  There is trace pulmonic valvular regurgitation.     Vessels  The aortic root is normal size. Normal size ascending aorta.     Pericardium  There is no pericardial effusion.         Rhythm  Sinus rhythm was noted.  _____________________________________________________________________________  __  MMode/2D Measurements & Calculations  IVSd: 0.92 cm     LVIDd: 6.2 cm  LVIDs: 5.0 cm  LVPWd: 1.00 cm  FS: 19.0 %  LV mass(C)d: 245.2 grams  LV mass(C)dI: 128.7 grams/m2  Ao root diam: 3.5 cm  asc Aorta Diam: 3.4 cm  LA Volume (BP): 90.0 ml  LA Volume Index (BP): 47.4 ml/m2     LA Volume Indexed (AL/bp): 40.3 ml/m2  RWT: 0.32        Doppler Measurements & Calculations  MV E max willie: 87.9 cm/sec  MV A max willie: 43.3 cm/sec  MV E/A: 2.0  MV dec time: 0.16 sec  MR ERO: 0.29 cm2  MR volume: 51.9 ml  PA acc time: 0.11 sec  TR max willie: 306.2 cm/sec  TR max P.7 mmHg  E/E' av.5  Lateral E/e': 12.7  Medial E/e': 22.3              _____________________________________________________________________________  __           Report approved by: Nya Rebolledo 2020 11:28 AM

## 2020-11-17 ENCOUNTER — APPOINTMENT (OUTPATIENT)
Dept: NUCLEAR MEDICINE | Facility: CLINIC | Age: 75
DRG: 291 | End: 2020-11-17
Attending: INTERNAL MEDICINE
Payer: MEDICARE

## 2020-11-17 ENCOUNTER — APPOINTMENT (OUTPATIENT)
Dept: CARDIOLOGY | Facility: CLINIC | Age: 75
DRG: 291 | End: 2020-11-17
Attending: INTERNAL MEDICINE
Payer: MEDICARE

## 2020-11-17 VITALS
TEMPERATURE: 96.2 F | DIASTOLIC BLOOD PRESSURE: 46 MMHG | HEIGHT: 65 IN | OXYGEN SATURATION: 94 % | HEART RATE: 64 BPM | BODY MASS INDEX: 29.16 KG/M2 | RESPIRATION RATE: 16 BRPM | SYSTOLIC BLOOD PRESSURE: 100 MMHG | WEIGHT: 175 LBS

## 2020-11-17 LAB
ANION GAP SERPL CALCULATED.3IONS-SCNC: 7 MMOL/L (ref 3–14)
BUN SERPL-MCNC: 19 MG/DL (ref 7–30)
CALCIUM SERPL-MCNC: 8.9 MG/DL (ref 8.5–10.1)
CHLORIDE SERPL-SCNC: 101 MMOL/L (ref 94–109)
CO2 SERPL-SCNC: 30 MMOL/L (ref 20–32)
CREAT SERPL-MCNC: 1.15 MG/DL (ref 0.52–1.04)
CV BLOOD PRESSURE: 28 %
CV STRESS MAX HR HE: 92
GFR SERPL CREATININE-BSD FRML MDRD: 46 ML/MIN/{1.73_M2}
GLUCOSE SERPL-MCNC: 110 MG/DL (ref 70–99)
MAGNESIUM SERPL-MCNC: 1.9 MG/DL (ref 1.6–2.3)
NUC STRESS EJECTION FRACTION: 35 %
POTASSIUM SERPL-SCNC: 3.6 MMOL/L (ref 3.4–5.3)
RATE PRESSURE PRODUCT: NORMAL
SODIUM SERPL-SCNC: 138 MMOL/L (ref 133–144)
STRESS ECHO BASELINE DIASTOLIC HE: 66
STRESS ECHO BASELINE HR: 70
STRESS ECHO BASELINE SYSTOLIC BP: 114
STRESS ECHO CALCULATED PERCENT HR: 64 %
STRESS ECHO LAST STRESS DIASTOLIC BP: 64
STRESS ECHO LAST STRESS SYSTOLIC BP: 138
STRESS ECHO TARGET HR: 144
STRESS/REST PERFUSION RATIO: 1.01

## 2020-11-17 PROCEDURE — 83735 ASSAY OF MAGNESIUM: CPT | Performed by: INTERNAL MEDICINE

## 2020-11-17 PROCEDURE — 120N000001 HC R&B MED SURG/OB

## 2020-11-17 PROCEDURE — 250N000013 HC RX MED GY IP 250 OP 250 PS 637: Performed by: FAMILY MEDICINE

## 2020-11-17 PROCEDURE — 250N000013 HC RX MED GY IP 250 OP 250 PS 637: Performed by: INTERNAL MEDICINE

## 2020-11-17 PROCEDURE — 36415 COLL VENOUS BLD VENIPUNCTURE: CPT | Performed by: INTERNAL MEDICINE

## 2020-11-17 PROCEDURE — 250N000013 HC RX MED GY IP 250 OP 250 PS 637: Performed by: PHYSICIAN ASSISTANT

## 2020-11-17 PROCEDURE — 78452 HT MUSCLE IMAGE SPECT MULT: CPT

## 2020-11-17 PROCEDURE — A9502 TC99M TETROFOSMIN: HCPCS | Performed by: INTERNAL MEDICINE

## 2020-11-17 PROCEDURE — 78452 HT MUSCLE IMAGE SPECT MULT: CPT | Mod: 26 | Performed by: INTERNAL MEDICINE

## 2020-11-17 PROCEDURE — 93016 CV STRESS TEST SUPVJ ONLY: CPT | Performed by: FAMILY MEDICINE

## 2020-11-17 PROCEDURE — 250N000011 HC RX IP 250 OP 636: Performed by: FAMILY MEDICINE

## 2020-11-17 PROCEDURE — 343N000001 HC RX 343: Performed by: INTERNAL MEDICINE

## 2020-11-17 PROCEDURE — 99232 SBSQ HOSP IP/OBS MODERATE 35: CPT | Performed by: INTERNAL MEDICINE

## 2020-11-17 PROCEDURE — 93018 CV STRESS TEST I&R ONLY: CPT | Performed by: INTERNAL MEDICINE

## 2020-11-17 PROCEDURE — 80048 BASIC METABOLIC PNL TOTAL CA: CPT | Performed by: INTERNAL MEDICINE

## 2020-11-17 PROCEDURE — 93017 CV STRESS TEST TRACING ONLY: CPT

## 2020-11-17 RX ORDER — FUROSEMIDE 20 MG
20 TABLET ORAL DAILY
Status: DISCONTINUED | OUTPATIENT
Start: 2020-11-18 | End: 2020-11-18 | Stop reason: HOSPADM

## 2020-11-17 RX ORDER — REGADENOSON 0.08 MG/ML
0.4 INJECTION, SOLUTION INTRAVENOUS ONCE
Status: COMPLETED | OUTPATIENT
Start: 2020-11-17 | End: 2020-11-17

## 2020-11-17 RX ADMIN — TOLTERODINE 4 MG: 4 CAPSULE, EXTENDED RELEASE ORAL at 09:04

## 2020-11-17 RX ADMIN — ACETAMINOPHEN 650 MG: 325 TABLET, FILM COATED ORAL at 09:04

## 2020-11-17 RX ADMIN — HYDROXYCHLOROQUINE SULFATE 200 MG: 200 TABLET, FILM COATED ORAL at 09:06

## 2020-11-17 RX ADMIN — REGADENOSON 0.4 MG: 0.08 INJECTION, SOLUTION INTRAVENOUS at 12:47

## 2020-11-17 RX ADMIN — ENOXAPARIN SODIUM 40 MG: 40 INJECTION SUBCUTANEOUS at 16:06

## 2020-11-17 RX ADMIN — LISINOPRIL 5 MG: 5 TABLET ORAL at 09:05

## 2020-11-17 RX ADMIN — TETROFOSMIN 10.9 MCI.: 1.38 INJECTION, POWDER, LYOPHILIZED, FOR SOLUTION INTRAVENOUS at 10:45

## 2020-11-17 RX ADMIN — FOLIC ACID 1 MG: 1 TABLET ORAL at 09:06

## 2020-11-17 RX ADMIN — LEVOTHYROXINE SODIUM 25 MCG: 25 TABLET ORAL at 09:06

## 2020-11-17 RX ADMIN — LEVOTHYROXINE SODIUM 137 MCG: 25 TABLET ORAL at 09:06

## 2020-11-17 RX ADMIN — METOPROLOL SUCCINATE 25 MG: 25 TABLET, EXTENDED RELEASE ORAL at 14:12

## 2020-11-17 RX ADMIN — ASPIRIN 81 MG: 81 TABLET ORAL at 09:04

## 2020-11-17 RX ADMIN — FUROSEMIDE 40 MG: 40 TABLET ORAL at 09:04

## 2020-11-17 RX ADMIN — HYDROXYCHLOROQUINE SULFATE 200 MG: 200 TABLET, FILM COATED ORAL at 20:51

## 2020-11-17 RX ADMIN — MULTIPLE VITAMINS W/ MINERALS TAB 1 TABLET: TAB at 09:06

## 2020-11-17 RX ADMIN — SIMVASTATIN 20 MG: 20 TABLET, FILM COATED ORAL at 21:49

## 2020-11-17 RX ADMIN — TETROFOSMIN 32.7 MCI.: 1.38 INJECTION, POWDER, LYOPHILIZED, FOR SOLUTION INTRAVENOUS at 12:50

## 2020-11-17 RX ADMIN — PANTOPRAZOLE SODIUM 40 MG: 20 TABLET, DELAYED RELEASE ORAL at 09:05

## 2020-11-17 ASSESSMENT — MIFFLIN-ST. JEOR
SCORE: 1293.81
SCORE: 1284.67

## 2020-11-17 ASSESSMENT — ACTIVITIES OF DAILY LIVING (ADL)
ADLS_ACUITY_SCORE: 15

## 2020-11-17 NOTE — PROGRESS NOTES
[facility]     Hospitalist Progress Note    Date of Service (when I saw the patient): 11/17/2020    Assessment & Plan   Briana Mcgee is a 75 year old female admitted on 11/15/2020. She has a PMH significant for hypertension, GERD, CKD stage 2, hyperlipidemia, hypothyroidism, impaired fasting glucose, CVA (2006), RA, spinal stenosis and breast cancer who presents initially to urgent care and ultimately to ER with 7 days of shortness of breath and SILVA.      Acute CHF, new onset  BNP 7,456  Troponin negative  Chest x ray shows increased pulmonary vasculature and cardiomegaly, ekg with RBBB otherwise normal sinus rhythm.   -admit for diuresis    -echocardiogram demonstrates mildly dilated LV with EF 35 to 40%, moderate global hypokinesis LV, severe hypokinesis inferior lateral and basal inferior wall.  Moderately reduced RV function, mild to moderately dilated LA, normal RA, moderate MR, mild to moderate TR, mild to moderate pulmonary hypertension  -Patient will need stress testing prior to discharge  -low sodium diet  -oxygen as needed to maintain sats >92%  -Lexiscan demonstrates medium sized area of moderate infarction in the mid to distal apical, anterior, and anteroseptal segments with mild degree of mikal-infarct ischemia.  EF 28% at rest, 35% at stress.  -Transfer to [facility] for angiogram, Dr. Poole accepting physician     Hypertension  Has been off of antihypertensives since a recent weight loss. Does not check blood pressure at home.   -diuresis  -monitor blood pressure   -start low dose ace and beta blocker     Normocytic anemia  Stable to improved from July.   Thought at the time to possibly be due to sulfasalazine  -appears to be close to baseline, monitor     Hypothyroidism  -continue levothyroxine      RA  -continue plaquenil     GERD  -continue pantoprazole     Hyperlipidemia  -continue simvastatin   -check am lipids, has been >1 year  -Goal LDL  <70     Overactive bladder  -continue detrol la     heterogzygous for Factor V Leiden mutation  -at higher risk for clotting, will use enoxaparin for dvt prophylaxis.      Diet: 2 Gram Sodium Diet    DVT Prophylaxis: Enoxaparin (Lovenox) SQ  Curran Catheter: not present  Code Status:   full  Rule Out COVID-19: negative     Disposition Plan  Expected discharge: 1-2 days, recommended to prior living arrangement once work up complete and clinically improved.    Alvarez Webb    Interval History   The patient is resting comfortably in a chair.  She has no acute complaints.    -Data reviewed today: I reviewed all new labs and imaging results over the last 24 hours. I personally reviewed no images or EKG's today.    Physical Exam   Temp: 97.4  F (36.3  C) Temp src: Oral BP: 100/45 Pulse: 68   Resp: 16 SpO2: 90 % O2 Device: None (Room air)    Vitals:    11/16/20 0510 11/17/20 0548 11/17/20 1133   Weight: 80.6 kg (177 lb 11.1 oz) 79.5 kg (175 lb 4.3 oz) 79.4 kg (175 lb)     Vital Signs with Ranges  Temp:  [97  F (36.1  C)-98  F (36.7  C)] 97.4  F (36.3  C)  Pulse:  [61-68] 68  Resp:  [14-18] 16  BP: ()/(45-54) 100/45  SpO2:  [90 %-95 %] 90 %  I/O last 3 completed shifts:  In: 240 [P.O.:240]  Out: 1200 [Urine:1200]    Gen: Well nourished, well developed, alert and oriented x 3, no acute distressed  HEENT: Atraumatic, normocephalic; sclera non-injected, anicterric; oral mucosa moist, no lesion, no exudate  Lungs: Clear to ausculation, no wheezes, no rhonchi, no rales  Heart: Regular rate, regular rhythm, no gallops, no rubs, no murmurs  GI: Bowel sound normal, no hepatosplenomegaly, no masses, non-tender, non-distended, no guarding, no rebound tenderness  Lymph: No lymphadenopathy, no edema  Skin: No rashes, no chronic venous stasis     Medications       aspirin  81 mg Oral Daily     enoxaparin ANTICOAGULANT  40 mg Subcutaneous Q24H     folic acid  1 mg Oral Daily     [START ON 11/18/2020] furosemide  20 mg Oral  Daily     hydroxychloroquine  200 mg Oral BID     levothyroxine  137 mcg Oral Daily     levothyroxine  25 mcg Oral Daily     lisinopril  5 mg Oral Daily     metoprolol succinate  25 mg Oral Daily     multivitamin w/minerals  1 tablet Oral Daily     pantoprazole  40 mg Oral Daily     simvastatin  20 mg Oral At Bedtime     sodium chloride (PF)  3 mL Intracatheter Q8H     tolterodine ER  4 mg Oral Daily       Data   Recent Labs   Lab 11/17/20  0626 11/16/20  1205 11/16/20  0611 11/15/20  1951 11/15/20  1055   WBC  --   --  6.7  --  6.7   HGB  --   --  11.7  --  11.5*   MCV  --   --  95  --  97   PLT  --   --  198  --  196     --  137  --  141   POTASSIUM 3.6 3.6 3.3* 3.4 4.1   CHLORIDE 101  --  100  --  107   CO2 30  --  30  --  28   BUN 19  --  18  --  19   CR 1.15*  --  1.04 1.05* 0.93   ANIONGAP 7  --  7  --  6   MARIO 8.9  --  8.9  --  8.5   *  --  116*  --  107*   ALBUMIN  --   --   --   --  3.9   PROTTOTAL  --   --   --   --  6.8   BILITOTAL  --   --   --   --  0.9   ALKPHOS  --   --   --   --  76   ALT  --   --   --   --  41   AST  --   --   --   --  27   TROPI  --   --   --   --  <0.015       Recent Results (from the past 24 hour(s))   NM MPI w Lexiscan   Result Value    Target     Baseline Systolic     Baseline Diastolic BP 66    Last Stress Systolic     Last Stress Diastolic BP 64    Baseline HR 70    Max HR 92    Calculated Percent HR 64    Rate Pressure Product 12,696.0    Nuc Rest EF 28    Left Ventricular EF 35    Stress/rest perfusion ratio 1.01    Narrative       The nuclear stress test is abnormal.     There is a medium sized area of a moderate degree of infarction in the   mid to distal apical, anterior and anteroseptal segment(s) of the left   ventricle associated with a mild degree of mikal-infarct ischemia.  Summed   stress score 13, summed rest score 9.     The left ventricular ejection fraction at rest is 28%.  The left   ventricular ejection fraction at stress is 35%.   Global hypokinesis,   anteroseptal wall is slightly worse than other areas.     There is no prior study for comparison.

## 2020-11-17 NOTE — PLAN OF CARE
Pt denies CP or SOA.  O2 sats 94% on RA.  Pt has received heart failure education materials.  She verbalizes understanding, asking appropriate questions. At stress test now, she is hoping to discharge home later today.

## 2020-11-17 NOTE — PLAN OF CARE
"Pt here for CHF exacerbation. A/O, able to make needs known. Up independently in the room. Blood pressure 107/54, pulse 65, temperature 97  F (36.1  C), temperature source Oral, resp. rate 14, height 1.664 m (5' 5.5\"), weight 80.6 kg (177 lb 11.1 oz), SpO2 93 %, not currently breastfeeding. Stable on room air. Should be having stress test later today. Continue to assess per POC.    "

## 2020-11-18 ENCOUNTER — HOSPITAL ENCOUNTER (INPATIENT)
Facility: CLINIC | Age: 75
LOS: 1 days | Discharge: HOME | DRG: 286 | End: 2020-11-19
Attending: INTERNAL MEDICINE | Admitting: INTERNAL MEDICINE
Payer: MEDICARE

## 2020-11-18 DIAGNOSIS — I50.21 ACUTE SYSTOLIC HEART FAILURE (H): Primary | ICD-10-CM

## 2020-11-18 DIAGNOSIS — I50.9 ACUTE ON CHRONIC CONGESTIVE HEART FAILURE, UNSPECIFIED HEART FAILURE TYPE (H): ICD-10-CM

## 2020-11-18 DIAGNOSIS — Z79.899 ON POTASSIUM WASTING DIURETIC THERAPY: ICD-10-CM

## 2020-11-18 LAB
ALBUMIN SERPL-MCNC: 3.5 G/DL (ref 3.4–5)
ALP SERPL-CCNC: 66 U/L (ref 40–150)
ALT SERPL W P-5'-P-CCNC: 33 U/L (ref 0–50)
ANION GAP SERPL CALCULATED.3IONS-SCNC: 6 MMOL/L (ref 3–14)
AST SERPL W P-5'-P-CCNC: 16 U/L (ref 0–45)
BASOPHILS # BLD AUTO: 0.1 10E9/L (ref 0–0.2)
BASOPHILS NFR BLD AUTO: 1.1 %
BILIRUB SERPL-MCNC: 0.7 MG/DL (ref 0.2–1.3)
BUN SERPL-MCNC: 17 MG/DL (ref 7–30)
CALCIUM SERPL-MCNC: 9 MG/DL (ref 8.5–10.1)
CHLORIDE SERPL-SCNC: 96 MMOL/L (ref 94–109)
CO2 SERPL-SCNC: 30 MMOL/L (ref 20–32)
CREAT SERPL-MCNC: 0.95 MG/DL (ref 0.52–1.04)
DIFFERENTIAL METHOD BLD: NORMAL
EOSINOPHIL # BLD AUTO: 0.3 10E9/L (ref 0–0.7)
EOSINOPHIL NFR BLD AUTO: 3.8 %
ERYTHROCYTE [DISTWIDTH] IN BLOOD BY AUTOMATED COUNT: 13.7 % (ref 10–15)
GFR SERPL CREATININE-BSD FRML MDRD: 58 ML/MIN/{1.73_M2}
GLUCOSE SERPL-MCNC: 97 MG/DL (ref 70–99)
HBA1C MFR BLD: 5.4 % (ref 0–5.6)
HCT VFR BLD AUTO: 36 % (ref 35–47)
HGB BLD-MCNC: 11.8 G/DL (ref 11.7–15.7)
IMM GRANULOCYTES # BLD: 0 10E9/L (ref 0–0.4)
IMM GRANULOCYTES NFR BLD: 0.6 %
INR PPP: 1.1 (ref 0.86–1.14)
LYMPHOCYTES # BLD AUTO: 1.8 10E9/L (ref 0.8–5.3)
LYMPHOCYTES NFR BLD AUTO: 27.2 %
MCH RBC QN AUTO: 30.7 PG (ref 26.5–33)
MCHC RBC AUTO-ENTMCNC: 32.8 G/DL (ref 31.5–36.5)
MCV RBC AUTO: 94 FL (ref 78–100)
MONOCYTES # BLD AUTO: 0.6 10E9/L (ref 0–1.3)
MONOCYTES NFR BLD AUTO: 9.6 %
NEUTROPHILS # BLD AUTO: 3.8 10E9/L (ref 1.6–8.3)
NEUTROPHILS NFR BLD AUTO: 57.7 %
NRBC # BLD AUTO: 0 10*3/UL
NRBC BLD AUTO-RTO: 0 /100
PLATELET # BLD AUTO: 194 10E9/L (ref 150–450)
POTASSIUM SERPL-SCNC: 3.4 MMOL/L (ref 3.4–5.3)
POTASSIUM SERPL-SCNC: 4.3 MMOL/L (ref 3.4–5.3)
PROT SERPL-MCNC: 6.6 G/DL (ref 6.8–8.8)
RBC # BLD AUTO: 3.84 10E12/L (ref 3.8–5.2)
SODIUM SERPL-SCNC: 132 MMOL/L (ref 133–144)
WBC # BLD AUTO: 6.6 10E9/L (ref 4–11)

## 2020-11-18 PROCEDURE — 84132 ASSAY OF SERUM POTASSIUM: CPT | Mod: 91 | Performed by: INTERNAL MEDICINE

## 2020-11-18 PROCEDURE — C1894 INTRO/SHEATH, NON-LASER: HCPCS | Performed by: INTERNAL MEDICINE

## 2020-11-18 PROCEDURE — 36415 COLL VENOUS BLD VENIPUNCTURE: CPT | Performed by: STUDENT IN AN ORGANIZED HEALTH CARE EDUCATION/TRAINING PROGRAM

## 2020-11-18 PROCEDURE — 93454 CORONARY ARTERY ANGIO S&I: CPT | Performed by: INTERNAL MEDICINE

## 2020-11-18 PROCEDURE — 250N000009 HC RX 250: Performed by: INTERNAL MEDICINE

## 2020-11-18 PROCEDURE — 99152 MOD SED SAME PHYS/QHP 5/>YRS: CPT | Performed by: INTERNAL MEDICINE

## 2020-11-18 PROCEDURE — 250N000013 HC RX MED GY IP 250 OP 250 PS 637: Performed by: STUDENT IN AN ORGANIZED HEALTH CARE EDUCATION/TRAINING PROGRAM

## 2020-11-18 PROCEDURE — 85610 PROTHROMBIN TIME: CPT | Performed by: STUDENT IN AN ORGANIZED HEALTH CARE EDUCATION/TRAINING PROGRAM

## 2020-11-18 PROCEDURE — 99152 MOD SED SAME PHYS/QHP 5/>YRS: CPT | Mod: 59 | Performed by: INTERNAL MEDICINE

## 2020-11-18 PROCEDURE — B2111ZZ FLUOROSCOPY OF MULTIPLE CORONARY ARTERIES USING LOW OSMOLAR CONTRAST: ICD-10-PCS | Performed by: INTERNAL MEDICINE

## 2020-11-18 PROCEDURE — 83036 HEMOGLOBIN GLYCOSYLATED A1C: CPT | Performed by: STUDENT IN AN ORGANIZED HEALTH CARE EDUCATION/TRAINING PROGRAM

## 2020-11-18 PROCEDURE — 85025 COMPLETE CBC W/AUTO DIFF WBC: CPT | Performed by: STUDENT IN AN ORGANIZED HEALTH CARE EDUCATION/TRAINING PROGRAM

## 2020-11-18 PROCEDURE — 36415 COLL VENOUS BLD VENIPUNCTURE: CPT | Performed by: INTERNAL MEDICINE

## 2020-11-18 PROCEDURE — 214N000001 HC R&B CCU UMMC

## 2020-11-18 PROCEDURE — 80053 COMPREHEN METABOLIC PANEL: CPT | Performed by: STUDENT IN AN ORGANIZED HEALTH CARE EDUCATION/TRAINING PROGRAM

## 2020-11-18 PROCEDURE — 999N000127 HC STATISTIC PERIPHERAL IV START W US GUIDANCE

## 2020-11-18 PROCEDURE — 250N000013 HC RX MED GY IP 250 OP 250 PS 637: Performed by: INTERNAL MEDICINE

## 2020-11-18 PROCEDURE — 250N000011 HC RX IP 250 OP 636: Performed by: INTERNAL MEDICINE

## 2020-11-18 PROCEDURE — 250N000013 HC RX MED GY IP 250 OP 250 PS 637: Performed by: NURSE PRACTITIONER

## 2020-11-18 PROCEDURE — 250N000011 HC RX IP 250 OP 636: Performed by: NURSE PRACTITIONER

## 2020-11-18 PROCEDURE — 99222 1ST HOSP IP/OBS MODERATE 55: CPT | Mod: 25 | Performed by: INTERNAL MEDICINE

## 2020-11-18 PROCEDURE — 272N000001 HC OR GENERAL SUPPLY STERILE: Performed by: INTERNAL MEDICINE

## 2020-11-18 PROCEDURE — 93454 CORONARY ARTERY ANGIO S&I: CPT | Mod: 26 | Performed by: INTERNAL MEDICINE

## 2020-11-18 RX ORDER — NALOXONE HYDROCHLORIDE 0.4 MG/ML
.2-.4 INJECTION, SOLUTION INTRAMUSCULAR; INTRAVENOUS; SUBCUTANEOUS
Status: ACTIVE | OUTPATIENT
Start: 2020-11-18 | End: 2020-11-18

## 2020-11-18 RX ORDER — POTASSIUM CHLORIDE 750 MG/1
40 TABLET, EXTENDED RELEASE ORAL ONCE
Status: COMPLETED | OUTPATIENT
Start: 2020-11-18 | End: 2020-11-18

## 2020-11-18 RX ORDER — SIMVASTATIN 20 MG
20 TABLET ORAL AT BEDTIME
Status: DISCONTINUED | OUTPATIENT
Start: 2020-11-18 | End: 2020-11-18

## 2020-11-18 RX ORDER — FUROSEMIDE 40 MG
40 TABLET ORAL DAILY
Qty: 90 TABLET | Refills: 3 | Status: SHIPPED | OUTPATIENT
Start: 2020-11-19 | End: 2021-11-01

## 2020-11-18 RX ORDER — FENTANYL CITRATE 50 UG/ML
INJECTION, SOLUTION INTRAMUSCULAR; INTRAVENOUS
Status: DISCONTINUED | OUTPATIENT
Start: 2020-11-18 | End: 2020-11-18 | Stop reason: HOSPADM

## 2020-11-18 RX ORDER — MAGNESIUM HYDROXIDE/ALUMINUM HYDROXICE/SIMETHICONE 120; 1200; 1200 MG/30ML; MG/30ML; MG/30ML
30 SUSPENSION ORAL EVERY 4 HOURS PRN
Status: DISCONTINUED | OUTPATIENT
Start: 2020-11-18 | End: 2020-11-19 | Stop reason: HOSPADM

## 2020-11-18 RX ORDER — PANTOPRAZOLE SODIUM 40 MG/1
40 TABLET, DELAYED RELEASE ORAL DAILY
Status: DISCONTINUED | OUTPATIENT
Start: 2020-11-18 | End: 2020-11-19 | Stop reason: HOSPADM

## 2020-11-18 RX ORDER — FENTANYL CITRATE 50 UG/ML
25-50 INJECTION, SOLUTION INTRAMUSCULAR; INTRAVENOUS
Status: ACTIVE | OUTPATIENT
Start: 2020-11-18 | End: 2020-11-18

## 2020-11-18 RX ORDER — ATORVASTATIN CALCIUM 40 MG/1
40 TABLET, FILM COATED ORAL EVERY EVENING
Qty: 90 TABLET | Refills: 3 | Status: SHIPPED | OUTPATIENT
Start: 2020-11-18 | End: 2021-11-24

## 2020-11-18 RX ORDER — ASPIRIN 81 MG/1
81 TABLET, CHEWABLE ORAL DAILY
Status: DISCONTINUED | OUTPATIENT
Start: 2020-11-18 | End: 2020-11-19 | Stop reason: HOSPADM

## 2020-11-18 RX ORDER — ACETAMINOPHEN 650 MG/1
650 SUPPOSITORY RECTAL EVERY 4 HOURS PRN
Status: DISCONTINUED | OUTPATIENT
Start: 2020-11-18 | End: 2020-11-19 | Stop reason: HOSPADM

## 2020-11-18 RX ORDER — TOLTERODINE 4 MG/1
4 CAPSULE, EXTENDED RELEASE ORAL DAILY
Status: DISCONTINUED | OUTPATIENT
Start: 2020-11-18 | End: 2020-11-19 | Stop reason: HOSPADM

## 2020-11-18 RX ORDER — FUROSEMIDE 40 MG
40 TABLET ORAL DAILY
Status: DISCONTINUED | OUTPATIENT
Start: 2020-11-18 | End: 2020-11-18

## 2020-11-18 RX ORDER — ASPIRIN 81 MG/1
324 TABLET, CHEWABLE ORAL ONCE
Status: DISCONTINUED | OUTPATIENT
Start: 2020-11-18 | End: 2020-11-18

## 2020-11-18 RX ORDER — NICARDIPINE HYDROCHLORIDE 2.5 MG/ML
INJECTION INTRAVENOUS
Status: DISCONTINUED | OUTPATIENT
Start: 2020-11-18 | End: 2020-11-18 | Stop reason: HOSPADM

## 2020-11-18 RX ORDER — METOPROLOL SUCCINATE 25 MG/1
25 TABLET, EXTENDED RELEASE ORAL DAILY
Status: DISCONTINUED | OUTPATIENT
Start: 2020-11-18 | End: 2020-11-19 | Stop reason: HOSPADM

## 2020-11-18 RX ORDER — POLYETHYLENE GLYCOL 3350 17 G/17G
17 POWDER, FOR SOLUTION ORAL DAILY
Status: DISCONTINUED | OUTPATIENT
Start: 2020-11-18 | End: 2020-11-19 | Stop reason: HOSPADM

## 2020-11-18 RX ORDER — AMOXICILLIN 250 MG
1 CAPSULE ORAL 2 TIMES DAILY
Status: DISCONTINUED | OUTPATIENT
Start: 2020-11-18 | End: 2020-11-19 | Stop reason: HOSPADM

## 2020-11-18 RX ORDER — FLUMAZENIL 0.1 MG/ML
0.2 INJECTION, SOLUTION INTRAVENOUS
Status: ACTIVE | OUTPATIENT
Start: 2020-11-18 | End: 2020-11-18

## 2020-11-18 RX ORDER — FUROSEMIDE 10 MG/ML
20 INJECTION INTRAMUSCULAR; INTRAVENOUS ONCE
Status: COMPLETED | OUTPATIENT
Start: 2020-11-18 | End: 2020-11-18

## 2020-11-18 RX ORDER — LISINOPRIL 5 MG/1
5 TABLET ORAL DAILY
Status: DISCONTINUED | OUTPATIENT
Start: 2020-11-18 | End: 2020-11-19 | Stop reason: HOSPADM

## 2020-11-18 RX ORDER — ATORVASTATIN CALCIUM 40 MG/1
40 TABLET, FILM COATED ORAL EVERY EVENING
Status: DISCONTINUED | OUTPATIENT
Start: 2020-11-18 | End: 2020-11-19 | Stop reason: HOSPADM

## 2020-11-18 RX ORDER — CALCIUM CARBONATE 500(1250)
1 TABLET ORAL DAILY
Status: DISCONTINUED | OUTPATIENT
Start: 2020-11-18 | End: 2020-11-19 | Stop reason: HOSPADM

## 2020-11-18 RX ORDER — FOLIC ACID 1 MG/1
1 TABLET ORAL DAILY
Status: DISCONTINUED | OUTPATIENT
Start: 2020-11-18 | End: 2020-11-19 | Stop reason: HOSPADM

## 2020-11-18 RX ORDER — IOPAMIDOL 755 MG/ML
INJECTION, SOLUTION INTRAVASCULAR
Status: DISCONTINUED | OUTPATIENT
Start: 2020-11-18 | End: 2020-11-18 | Stop reason: HOSPADM

## 2020-11-18 RX ORDER — HYDROXYCHLOROQUINE SULFATE 200 MG/1
200 TABLET, FILM COATED ORAL 2 TIMES DAILY
Status: DISCONTINUED | OUTPATIENT
Start: 2020-11-18 | End: 2020-11-19 | Stop reason: HOSPADM

## 2020-11-18 RX ORDER — LISINOPRIL 5 MG/1
5 TABLET ORAL DAILY
Qty: 90 TABLET | Refills: 3 | Status: SHIPPED | OUTPATIENT
Start: 2020-11-19 | End: 2021-06-21

## 2020-11-18 RX ORDER — LEVOTHYROXINE SODIUM 75 UG/1
150 TABLET ORAL DAILY
Status: DISCONTINUED | OUTPATIENT
Start: 2020-11-18 | End: 2020-11-19 | Stop reason: HOSPADM

## 2020-11-18 RX ORDER — NITROGLYCERIN 0.4 MG/1
0.4 TABLET SUBLINGUAL EVERY 5 MIN PRN
Status: DISCONTINUED | OUTPATIENT
Start: 2020-11-18 | End: 2020-11-19 | Stop reason: HOSPADM

## 2020-11-18 RX ORDER — AMOXICILLIN 250 MG
2 CAPSULE ORAL 2 TIMES DAILY
Status: DISCONTINUED | OUTPATIENT
Start: 2020-11-18 | End: 2020-11-19 | Stop reason: HOSPADM

## 2020-11-18 RX ORDER — FUROSEMIDE 40 MG
40 TABLET ORAL DAILY
Status: DISCONTINUED | OUTPATIENT
Start: 2020-11-19 | End: 2020-11-19 | Stop reason: HOSPADM

## 2020-11-18 RX ORDER — LEVOTHYROXINE SODIUM 25 UG/1
25 TABLET ORAL DAILY
Status: DISCONTINUED | OUTPATIENT
Start: 2020-11-18 | End: 2020-11-18

## 2020-11-18 RX ORDER — ACETAMINOPHEN 325 MG/1
650 TABLET ORAL EVERY 4 HOURS PRN
Status: DISCONTINUED | OUTPATIENT
Start: 2020-11-18 | End: 2020-11-19 | Stop reason: HOSPADM

## 2020-11-18 RX ORDER — HEPARIN SODIUM 1000 [USP'U]/ML
INJECTION, SOLUTION INTRAVENOUS; SUBCUTANEOUS
Status: DISCONTINUED | OUTPATIENT
Start: 2020-11-18 | End: 2020-11-18 | Stop reason: HOSPADM

## 2020-11-18 RX ORDER — NITROGLYCERIN 5 MG/ML
VIAL (ML) INTRAVENOUS
Status: DISCONTINUED | OUTPATIENT
Start: 2020-11-18 | End: 2020-11-18 | Stop reason: HOSPADM

## 2020-11-18 RX ORDER — METOPROLOL SUCCINATE 25 MG/1
25 TABLET, EXTENDED RELEASE ORAL DAILY
Qty: 90 TABLET | Refills: 3 | Status: SHIPPED | OUTPATIENT
Start: 2020-11-19 | End: 2021-11-01

## 2020-11-18 RX ORDER — ATROPINE SULFATE 0.1 MG/ML
0.5 INJECTION INTRAVENOUS
Status: ACTIVE | OUTPATIENT
Start: 2020-11-18 | End: 2020-11-18

## 2020-11-18 RX ADMIN — POTASSIUM CHLORIDE 40 MEQ: 750 TABLET, EXTENDED RELEASE ORAL at 09:03

## 2020-11-18 RX ADMIN — FOLIC ACID 1 MG: 1 TABLET ORAL at 08:06

## 2020-11-18 RX ADMIN — ATORVASTATIN CALCIUM 40 MG: 40 TABLET, FILM COATED ORAL at 20:24

## 2020-11-18 RX ADMIN — LISINOPRIL 5 MG: 5 TABLET ORAL at 09:04

## 2020-11-18 RX ADMIN — HYDROXYCHLOROQUINE SULFATE 200 MG: 200 TABLET, FILM COATED ORAL at 20:24

## 2020-11-18 RX ADMIN — LEVOTHYROXINE SODIUM 150 MCG: 75 TABLET ORAL at 08:06

## 2020-11-18 RX ADMIN — METOPROLOL SUCCINATE 25 MG: 25 TABLET, EXTENDED RELEASE ORAL at 08:06

## 2020-11-18 RX ADMIN — FUROSEMIDE 20 MG: 10 INJECTION, SOLUTION INTRAVENOUS at 11:19

## 2020-11-18 RX ADMIN — PANTOPRAZOLE SODIUM 40 MG: 40 TABLET, DELAYED RELEASE ORAL at 08:06

## 2020-11-18 RX ADMIN — CALCIUM 500 MG: 500 TABLET ORAL at 08:06

## 2020-11-18 RX ADMIN — TOLTERODINE TARTRATE 4 MG: 4 CAPSULE, EXTENDED RELEASE ORAL at 08:06

## 2020-11-18 RX ADMIN — ACETAMINOPHEN 650 MG: 325 TABLET, FILM COATED ORAL at 08:42

## 2020-11-18 RX ADMIN — ASPIRIN 81 MG: 81 TABLET, DELAYED RELEASE ORAL at 09:04

## 2020-11-18 RX ADMIN — HYDROXYCHLOROQUINE SULFATE 200 MG: 200 TABLET, FILM COATED ORAL at 08:06

## 2020-11-18 ASSESSMENT — ACTIVITIES OF DAILY LIVING (ADL)
ADLS_ACUITY_SCORE: 15

## 2020-11-18 ASSESSMENT — MIFFLIN-ST. JEOR: SCORE: 1306.45

## 2020-11-18 NOTE — H&P
[facility]     Cardiology History and Physical - Cards 1         Date of Admission:  11/18/2020    Assessment & Plan: HVSL    Mrs. Mcgee is a 75 year old female transferred from [facility] on  11/18/2020. She has a PMHx significant for hypertension, GERD, CKD stage 2, hyperlipidemia, hypothyroidism, impaired fasting glucose, CVA (2006), RA, spinal stenosis and breast cancer who was transferred from [facility] to undergo angiogram after being recently  diagnosed with of acute HFrEF (LVEF 35-40%) and Lexican showing medium sized area of moderate infarction in the mid to distal apical, anterior, and anteroseptal segments with mild degree of mikal-infarct ischemia.     # New diagnosis of acute HFrEF (LVEF 35-40%), unclear etiology  # Moderate MR, history of rheumatic fever  # HTN  Presented with 2 week of progressive shortness of breath to more than ordinary activity, was found to be hypervolemic, CXR compatible with pulmonary edema,  had elevated BNP 7,456, neg troponin  -TTE showed mild dilated LV, EF 35-40%,  moderate global hypokinesis LV, severe hypokinesis inferior lateral and basal inferior wall.   Moderately reduced RV function, mild to moderately dilated LA, normal RA, moderate MR, mild to moderate TR, mild to moderate pulmonary hypertension  -Nuc stress test:  demonstrated medium sized area of moderate infarction in the mid to distal apical, anterior, and anteroseptal segments with mild degree of mikal-infarct ischemia.  EF 28% at rest, 35% at stress.    Vol status: euvolemic. Able to lay flat >15 min. No further diuresis was ordered.   BB: continue metoprolol succinate 25mg daily  Afterload: holding lisinopril 5mg daily pre procedure.   Daily weight, strict I/Os  Angiogram, order.   NPO for procedure  Holding pta aspirin    # Mild asymptomatic hyponatremia, CTM    Chronic problems:   -Hypothyroidism, continue pta levothyroxine   -RA, continue  pta plaquenil  -GERD, continue pta pantoprazole  -Hyperlipidemia, continue pta simvastatin, recent LDL 30.  -Overactive bladder, continue pta tolterodine  -H/o breast cancer.    -heterogzygous for Factor V Leiden mutation at higher risk for clotting, will use enoxaparin for dvt prophylaxis s/p procedure     Diet: NPO for possible procedure in the AM.   DVT Prophylaxis: Pneumatic Compression Devices. Will order Enoxaparin (Lovenox) subcutaneous after procedure.   Curran Catheter: not present  Code Status:   full code   Rule Out COVID-19: negative  Lines: PIV 11/15/20 RU arm     Disposition Plan   Expected discharge: Tomorrow, recommended to prior living arrangement once fluid volume status optimized on oral medication as well as completing diagnostic work up.     Entered: Aide Guy MD 11/18/2020, 5:17 AM     Patient will be staffed in the AM.     Aide Guy MD   Internal Medicine Resident, PGY-2  p       I have seen and examined the patient today as part of a shared visit with Deya LIMON/DANELLE. I reviewed the lab, imaging and ECG data, as well as pertinent PMH and procedures. On exam today I found mild congestion and evidence of volume overload. Decision(s) made by me today include diuresis.     Reid Poole MD          [facility]   Please see sign in/sign out for up to date coverage information  ______________________________________________________________________    Chief Complaint   Progressive shortness of breath     History is obtained from the patient and chart review.     History of Present Illness     Mrs. Mcgee is a 75 year old female transferred from [facility] on  11/18/2020. She has a PMHx significant for hypertension, GERD, CKD stage 2, hyperlipidemia, hypothyroidism, impaired fasting glucose, CVA (2006), RA, spinal stenosis and h/o breast cancer on remission who was transferred  "from OSH to undergo angiogram after being recently  diagnosed with of acute HFrEF (LVEF 35-40%) and Lexican showing medium sized area of moderate infarction in the mid to distal apical, anterior, and anteroseptal segments with mild degree of mikal-infarct ischemia.     Prior to admission to OSH, patient reported 2 week of progressive shortness of breath to more than ordinary activities. Reported she never experienced this before. Denied chest pain but reported orthopnea, and PND. She also noticed lower extremity swelling.   Denied lightheadedness, palpitations, abdominal pain, fever, chills, upper respiratory symptoms, cough, hemoptysis.     Today, patient denied sob at rest and orthopnea. Denies chest pain, palpitations, lightheadedness.     Review of Systems    The 10 point Review of Systems is negative other than noted in the HPI or here.   Denies GI bleeding, hemoptysis,     Past Medical History    I have reviewed this patient's medical history and updated it with pertinent information if needed.   Past Medical History:   Diagnosis Date     Allergies      Breast cancer (H)      Esophageal reflux      Hypertension      Other and unspecified hyperlipidemia      Other chronic bronchitis      Personal history of pneumonia (recurrent)     Hx-Pneumonia, \" Chronic Bronchitis\"     Personal history of tobacco use, presenting hazards to health      RA (rheumatoid arthritis) (H)      Unspecified hypothyroidism        Past Surgical History   I have reviewed this patient's surgical history and updated it with pertinent information if needed.  Past Surgical History:   Procedure Laterality Date     BIOPSY BREAST       COLONOSCOPY N/A 9/2/2016    Procedure: COLONOSCOPY;  Surgeon: Dean Sanchez MD;  Location: [facility]     EXCISE EXOSTOSIS FOOT Right 4/25/2017    Procedure: EXCISE EXOSTOSIS FOOT;  Retrocalcaneal exostectomy right;  Surgeon: Antwan Goldstein DPM;  Location: [facility]      EXCISION BREAST LESION, OPEN >=1  "          HYSTERECTOMY, RYAN  1982     for non cancerous reasons and no abnormal pap     INJECT EPIDURAL LUMBAR  2011    INJECT EPIDURAL LUMBAR performed by GENERIC ANESTHESIA PROVIDER at [facility]     INJECT EPIDURAL LUMBAR  2011    Procedure:INJECT EPIDURAL LUMBAR; LESLIE -Dr. Sánchez  ; Surgeon:GENERIC ANESTHESIA PROVIDER; Location:[facility]     INJECT EPIDURAL LUMBAR  10/6/2011    Procedure:INJECT EPIDURAL LUMBAR; LESLIE--; Surgeon:GENERIC ANESTHESIA PROVIDER; Location:[facility]     INJECT EPIDURAL LUMBAR  2012    Procedure:INJECT EPIDURAL LUMBAR; LESLIE-Dr. Sánchez  ; Surgeon:GENERIC ANESTHESIA PROVIDER; Location:[facility]     INJECT EPIDURAL LUMBAR  2012    Procedure: INJECT EPIDURAL LUMBAR;  LESLIE-Dr. Pacheco;  Surgeon: Provider, Generic Anesthesia;  Location: [facility]     LAMINECTOMY LUMBAR MINIMALLY INVASIVE TWO LEVELS N/A 2017    Procedure: LAMINECTOMY LUMBAR MINIMALLY INVASIVE TWO LEVELS;  L4-5 decompression laminectomy, Left L5-S1 foraminal decompression;  Surgeon: Jesse Dueñas MD;  Location: [facility]     LUMPECTOMY BREAST       RELEASE CARPAL TUNNEL Right 2017    Procedure: RELEASE CARPAL TUNNEL;  Right Wrist Carpal Tunnel Release;  Surgeon: Melba Yi MD;  Location: [facility]     RELEASE CARPAL TUNNEL Left 10/31/2017    Procedure: RELEASE CARPAL TUNNEL;  Left Wrist Carpal Tunnel Release;  Surgeon: Melba Yi MD;  Location: [facility]     SURGICAL HISTORY OF -       lumpectomy with sentinal node biopsy     SURGICAL HISTORY OF -       left knee arthoscopic repair medial meniscus       Social History   I have reviewed this patient's social history and updated it with pertinent information if needed.  Social History     Tobacco Use     Smoking status: Former Smoker     Types: Cigarettes     Quit date: 1996     Years since quittin.3     Smokeless tobacco: Never Used     Tobacco comment: quit 10-12 years ago, .   Substance Use Topics     Alcohol use: Yes     Comment: glass of  wine at night     Drug use: No     Family History   I have reviewed this patient's family history and updated it with pertinent information if needed.   I have reviewed this patient's family history and updated it with pertinent information if needed.  Family History   Problem Relation Age of Onset     Diabetes Mother      Hypertension Mother      Cancer Mother         breast     Heart Disease Mother         chf     Breast Cancer Mother      Blood Disease Father      Diabetes Son         Type 1     Psoriasis Son      Cerebrovascular Disease Son 48     Cerebrovascular Disease Sister      Breast Cancer Paternal Aunt        Prior to Admission Medications   Prior to Admission Medications   Prescriptions Last Dose Informant Patient Reported? Taking?   Coenzyme Q10 (COQ10 PO)  Self Yes No   Sig: Take 1 capsule by mouth every morning    FOLIC ACID PO  Self Yes No   Sig: Take 1 mg by mouth daily   Multiple Vitamins-Minerals (MULTIVITAMIN ADULT) TABS  Self Yes No   Sig: Take 1 tablet by mouth every evening    Naproxen Sodium (ALEVE PO)  Self Yes No   Sig: Take 2 tablets by mouth 2 times daily as needed    SHINGRIX injection  Self Yes No   aspirin 81 MG EC tablet  Self No No   Sig: Take 1 tablet (81 mg) by mouth daily   calcium carbonate (OS-MARIO) 500 MG tablet  Self Yes No   Sig: Take 1 tablet by mouth daily   fluticasone (FLONASE) 50 MCG/ACT nasal spray  Self No No   Sig: Spray 2 sprays into both nostrils daily as needed for rhinitis or allergies Hold on file until needed   Patient not taking: Reported on 4/2/2020   hydroxychloroquine (PLAQUENIL) 200 MG tablet  Self No No   Sig: Take 1 tablet (200 mg) by mouth 2 times daily   levothyroxine (SYNTHROID/LEVOTHROID) 137 MCG tablet  Self No No   Sig: Take 1 tablet (137 mcg) by mouth daily Along with 25mcg to equal 162mcg daily.   levothyroxine (SYNTHROID/LEVOTHROID) 25 MCG tablet  Self No No   Sig: Take 1 tablet (25 mcg) by mouth daily Take along with the 137mcg to equal 162mcg  total daily.   meclizine (ANTIVERT) 25 MG tablet  Self Yes No   Sig: Take 25 mg by mouth daily as needed for dizziness   pantoprazole (PROTONIX) 40 MG EC tablet  Self No No   Sig: Take 1 tablet (40 mg) by mouth daily   propranolol (INDERAL) 40 MG tablet  Self No No   Sig: Take 1 tablet (40 mg) by mouth 2 times daily   simvastatin (ZOCOR) 20 MG tablet  Self No No   Sig: Take 1 tablet (20 mg) by mouth At Bedtime.  Please make an office visit for further refills.   tolterodine ER (DETROL LA) 4 MG 24 hr capsule  Self No No   Sig: Take 1 capsule (4 mg) by mouth daily   triamcinolone (ARISTOCORT HP) 0.5 % external cream  Self Yes No   Sig: Apply topically 2 times daily as needed To shin for dry skin   triamcinolone (KENALOG) 0.1 % external ointment  Self No No   Sig: Apply ointment twice daily on lower legs for 2 weeks      Facility-Administered Medications: None     Allergies   Allergies   Allergen Reactions     Erythromycin Other (See Comments)     Edema     Hydrocodone GI Disturbance     GI Upset, Tolerates dilaudid     Oxycodone GI Disturbance       Physical Exam   Vital Signs:     BP: 139/85     Resp: 16   O2 Device: None (Room air)    Weight: 0 lbs 0 oz    General Appearance: alert, orientated, in not acute distress  Eyes: no jaundice.   HEENT: JVD mid neck at 45 degree.   Respiratory: Clear breath sounds bilaterally, no crackles.   Cardiovascular: RRR, normal S1, S2, systolic murmur best heard in apical foci.   GI: +BS, no tender non distended, no organomegaly.   Skin: no lesions. Warm and perfused skin.   Musculoskeletal: moving four extremities spontaneously.   Neurologic: Alert, orientated x4. Strengths 5/5 four extremities.   Psychiatric: normal affect.     Data   Data reviewed today: I reviewed all medications, new labs and imaging results over the last 24 hours.    Recent Labs   Lab 11/18/20  0344 11/17/20  0626 11/16/20  1205 11/16/20  0611 11/15/20  1055 11/15/20  1055   WBC 6.6  --   --  6.7  --  6.7   HGB  11.8  --   --  11.7  --  11.5*   MCV 94  --   --  95  --  97     --   --  198  --  196   INR 1.10  --   --   --   --   --    * 138  --  137  --  141   POTASSIUM 3.4 3.6 3.6 3.3*   < > 4.1   CHLORIDE 96 101  --  100  --  107   CO2 30 30  --  30  --  28   BUN 17 19  --  18  --  19   CR 0.95 1.15*  --  1.04   < > 0.93   ANIONGAP 6 7  --  7  --  6   MARIO 9.0 8.9  --  8.9  --  8.5   GLC 97 110*  --  116*  --  107*   ALBUMIN 3.5  --   --   --   --  3.9   PROTTOTAL 6.6*  --   --   --   --  6.8   BILITOTAL 0.7  --   --   --   --  0.9   ALKPHOS 66  --   --   --   --  76   ALT 33  --   --   --   --  41   AST 16  --   --   --   --  27   TROPI  --   --   --   --   --  <0.015    < > = values in this interval not displayed.

## 2020-11-18 NOTE — LETTER
November 18, 2020      TO: Briana Mcgee  [id number] [facility] [id number]         Dear Briana,    You were seen at the hospital from 11/15/2020-11/19/2020. Given nature of hospitalization and procedures, you will need to refrain from lifting more than 5-10 pounds for 3 days. You are okay to return to work 11/23/2020.    It was a pleasure to see you at your last clinic visit. Please do not hesitate to call me if you have any questions or concerns.    Sincerely,      APRN, CNP

## 2020-11-18 NOTE — PLAN OF CARE
[id number]:     Neuro:  A/O x 4. Call light appropriate.   Cardiac:  SR on telemetry. Other VSS.           Respiratory:  O2 saturation > 92% on RA. SILVA.   GI/:  Adequate UO. LBM 11/18.   Diet/appetite:  Good appetite on 2g Na diet.   Activity:  Independent   Pain:  Headache relieved with Tylenol x 1.   Skin:  Generalized bruising.   LDA's:  R PIV- SL  Plan:  Angiogram completed this afternoon. L TR band in place with 13 ml of air. Bed rest until 1600. Deflated TR band starting at 1530. K+ replaced. Will continue to monitor.

## 2020-11-18 NOTE — DISCHARGE SUMMARY
[address] [address]  p: [phone]    Discharge Summary: Cardiology Service    Briana Mcgee MRN# [id number]   YOB: 1945 Age: 75 year old       Admission Date: 11/18/2020  Discharge Date: 11/19/2020    Discharge Diagnoses:  # Acute Systolic Heart Failure (LVEF 35-40%)  # Moderate MR, history of rheumatic fever  # HTN  # Mild asymptomatic hyponatremia     Brief HPI:  Jenna Mcgee is a 75 year old female with PMHx significant for hypertension, GERD, CKD stage 2, hyperlipidemia, hypothyroidism, impaired fasting glucose, CVA (2006), RA, spinal stenosis and breast cancer who was transferred from OSH to undergo angiogram after being recently diagnosed with of acute HFrEF (LVEF 35-40%) and Lexican showing medium sized area of moderate infarction in the mid to distal apical, anterior, and anteroseptal segments with mild degree of mikal-infarct ischemia.     Hospital Course by Diagnosis:  # Acute Diastolic Heart Failure (LVEF 35-40%)  # Moderate MR, history of rheumatic fever  # HTN  Presented with 2 week of progressive shortness of breath to more than ordinary activity, as well as PND, orthopnea, and chest heaviness. Seen as OSH, found to be hypervolemic, CXR compatible with pulmonary edema,  had elevated BNP 7,456, neg troponin. Echocardiogram with newly reduced EF, 35-40%, global hypokinesis, severe hypokinesis inferior lateral and basal inferior wall, as well as Moderately reduced RV function, mild to moderately dilated LA, normal RA, moderate MR, mild to moderate TR, mild to moderate pulmonary hypertension. Lexiscan with mod area of infarct in mid to distal apical, anterior, and anteroseptal segments with mild degree of mikal-infarct ischemia. Coronary angiogram with mild CAD, no intervention.      - Vol status: Euvolemic, start Lasix 40 mg daily. 10meq potassium with this  - BB: continue metoprolol succinate 25mg daily  - ACEi:  Continue Lisinopril 5 mg daily, titrate as OP allows  - Continue PTA ASA  - Start Lipitor 40 mg daily given CAD risk factors, most recent LDL 33  - OP Cardiology 3-6 weeks establish care for HFrEF  - would repeat echo in 3 months after on GDMT to reassess EF     # Mild asymptomatic hyponatremia -resolved  Mildly hypervolemic on admission. Hyponatremia resolved with diuresis.     Chronic problems:   -Hypothyroidism, continue pta levothyroxine   -RA, continue pta plaquenil  -GERD, continue pta pantoprazole  -Overactive bladder, continue pta tolterodine  -H/o breast cancer    Pertinent Procedures:  1. Coronary Angiogram    Medication Changes:  START Lipitor 40 mg daily  START Lisinopril 5 mg daily  START Metoprolol 25 mg daily  START Lasix 40 mg daily  STOP Propranolol   STOP Zocor    Discharge medications:   Discharge Medication List as of 11/19/2020  7:42 AM      START taking these medications    Details   atorvastatin (LIPITOR) 40 MG tablet Take 1 tablet (40 mg) by mouth every evening, Disp-90 tablet, R-3, E-Prescribe      furosemide (LASIX) 40 MG tablet Take 1 tablet (40 mg) by mouth daily, Disp-90 tablet, R-3, E-Prescribe      lisinopril (ZESTRIL) 5 MG tablet Take 1 tablet (5 mg) by mouth daily, Disp-90 tablet, R-3, E-Prescribe      metoprolol succinate ER (TOPROL-XL) 25 MG 24 hr tablet Take 1 tablet (25 mg) by mouth daily, Disp-90 tablet, R-3, E-Prescribe      potassium chloride ER (KLOR-CON M) 10 MEQ CR tablet Take 1 tablet (10 mEq) by mouth daily Take with your daily dose of furosemide, Disp-30 tablet, R-3, E-Prescribe         CONTINUE these medications which have NOT CHANGED    Details   aspirin 81 MG EC tablet Take 1 tablet (81 mg) by mouth daily, Disp-90 tablet, R-3, No Print Out      calcium carbonate (OS-MARIO) 500 MG tablet Take 1 tablet by mouth daily, Historical      Coenzyme Q10 (COQ10 PO) Take 1 capsule by mouth every morning , Historical      fluticasone (FLONASE) 50 MCG/ACT nasal spray Spray 2 sprays  into both nostrils daily as needed for rhinitis or allergies Hold on file until needed, Disp-16 g, R-11, E-Prescribe      FOLIC ACID PO Take 1 mg by mouth daily, Historical      hydroxychloroquine (PLAQUENIL) 200 MG tablet Take 1 tablet (200 mg) by mouth 2 times daily, Disp-180 tablet, R-2, E-Prescribe      !! levothyroxine (SYNTHROID/LEVOTHROID) 137 MCG tablet Take 1 tablet (137 mcg) by mouth daily Along with 25mcg to equal 162mcg daily., Disp-30 tablet, R-6, E-Prescribe      !! levothyroxine (SYNTHROID/LEVOTHROID) 25 MCG tablet Take 1 tablet (25 mcg) by mouth daily Take along with the 137mcg to equal 162mcg total daily., Disp-30 tablet, R-6, E-Prescribe      meclizine (ANTIVERT) 25 MG tablet Take 25 mg by mouth daily as needed for dizziness, Historical      Multiple Vitamins-Minerals (MULTIVITAMIN ADULT) TABS Take 1 tablet by mouth every evening , Historical      Naproxen Sodium (ALEVE PO) Take 2 tablets by mouth 2 times daily as needed , Historical      pantoprazole (PROTONIX) 40 MG EC tablet Take 1 tablet (40 mg) by mouth daily, Disp-90 tablet, R-2, E-PrescribeThis prescription was filled on 3/18/2020. Any refills authorized will be placed on file.      SHINGRIX injection JACOBO, Historical      tolterodine ER (DETROL LA) 4 MG 24 hr capsule Take 1 capsule (4 mg) by mouth daily, Disp-90 capsule, R-1, E-PrescribeThis prescription was filled on 4/2/2020. Any refills authorized will be placed on file.      triamcinolone (ARISTOCORT HP) 0.5 % external cream Apply topically 2 times daily as needed To shin for dry skinHistorical      triamcinolone (KENALOG) 0.1 % external ointment Apply ointment twice daily on lower legs for 2 weeksDisp-30 g, [id number]       !! - Potential duplicate medications found. Please discuss with provider.      STOP taking these medications       propranolol (INDERAL) 40 MG tablet Comments:   Reason for Stopping:         simvastatin (ZOCOR) 20 MG tablet Comments:   Reason for Stopping:                Follow-up:  PCP 7-10 days for post hospitalization visit  Cardiology to establish care for HFrEF, 3-6 weeks    Labs or imaging requiring follow-up after discharge:  [facility] 1-2 weeks    Code status:  Full    Condition on discharge  Temp:  [96.2  F (35.7  C)-99.4  F (37.4  C)] 97.9  F (36.6  C)  Pulse:  [55-81] 67  Resp:  [16] 16  BP: ()/(46-85) 100/55  SpO2:  [90 %-98 %] 96 %  General: Alert, interactive, NAD  Eyes: sclera anicteric, EOMI  Neck: JVP not appreciably elevated, carotid 2+ bilaterally  Cardiovascular: regular rate and rhythm, normal S1 and S2, no murmurs, gallops, or rubs  Resp: clear to auscultation bilaterally, no rales, wheezes, or rhonchi  GI: Soft, nontender, nondistended. +BS.  No HSM or masses, no rebound or guarding.  Extremities: no edema, no cyanosis or clubbing, dorsalis pedis and posterior tibialis pulses 2+ bilaterally  Skin: Warm and dry, no jaundice or rash  Neuro: CN 2-12 intact, moves all extremities equally  Psych: Alert & oriented x 3    Imaging with results:  ECG 11/15/2020: NSR, HR 92, RBBB (baseline)       Echo 11/16/2020  Interpretation Summary     The visual ejection fraction is estimated at 35-40%.  Left ventricular systolic function is moderately reduced.  There is moderate global hypokinesia of the left ventricle.  The inferolateral wall and the basal inferior wall appear to contract least  well and appear severely hypokinetic.  There is moderate (2+) mitral regurgitation.  There is mild to moderate (1-2+) tricuspid regurgitation.  Right ventricular systolic pressure is elevated, consistent with mild to  moderate pulmonary hypertension.  The study was technically difficult.    Lexiscan (OSH) 11/17/2020:       Recent Results (from the past 24 hour(s))   NM MPI w Lexiscan   Result Value     Target      Baseline Systolic      Baseline Diastolic BP 66     Last Stress Systolic      Last Stress Diastolic BP 64     Baseline HR 70     Max HR 92      Calculated Percent HR 64     Rate Pressure Product 12,696.0     Nuc Rest EF 28     Left Ventricular EF 35     Stress/rest perfusion ratio 1.01     Narrative        The nuclear stress test is abnormal.     There is a medium sized area of a moderate degree of infarction in the   mid to distal apical, anterior and anteroseptal segment(s) of the left   ventricle associated with a mild degree of mikal-infarct ischemia.  Summed   stress score 13, summed rest score 9.     The left ventricular ejection fraction at rest is 28%.  The left   ventricular ejection fraction at stress is 35%.  Global hypokinesis,   anteroseptal wall is slightly worse than other areas.     There is no prior study for comparison.                  Coronary Angiogram 11/18/2020:  Pre Procedure Diagnosis    abnormal stress test    Post Procedure Diagnosis    New cardiomyopathy and anterior scar on lexiscan.  No significant CAD.      Conclusion    Mild coronary artery disease without any hemodynamically significant lesions.            Coronary Findings  DiagnosticDominance: Right  Left Main   The vessel is angiographically normal.   Left Anterior Descending   The vessel is moderate in size. Mild diffuse CAD   Prox LAD lesion is 30% stenosed.   First Diagonal Branch   The vessel is large and is angiographically normal.   Left Circumflex   The vessel is moderate in size. Minimal luminal irregularities   Third Obtuse Marginal Branch   The vessel is large and is angiographically normal.   Right Coronary Artery   The vessel is moderate in size and is angiographically normal.         Patient Care Team:  Xavier Vega MD as PCP - General (Family Practice)  Merari Duarte MD as MD (Oncology)  Kam Montero MD as MD (Physical Medicine and Rehabilitation)  Xavier Vega MD as Assigned PCP  Meseret Angel MD as Assigned Neuroscience Provider  Merari Duarte MD as Assigned Cancer Care Provider  Rik Villavicencio MD as  Assigned Surgical Provider      Nguyễn Magaña PA-C  [facility] Cardiology Team

## 2020-11-18 NOTE — PLAN OF CARE
This writer was notified patient has a room ready down at [facility].  Report called to RN Nikita LAM just infiltrated, discontinued. Notified receiving RN.  Reviewed paperwork with patient and all forms signed.  Awaiting to hear on EMS eta,

## 2020-11-18 NOTE — DISCHARGE SUMMARY
[facility]   Hospitalist Discharge Summary       Date of Admission:  11/15/2020  Date of Discharge:  11/18/2020  Discharging Provider: Alvarez Webb MD    Discharge Diagnoses     Acute systolic heart failure  Hypertension  Normocytic anemia  Hypothyroidism  RA  GERD  Hyperlipidemia  Overactive bladder  Heterogzygous for Factor V Leiden mutation    Follow-ups Needed After Discharge     Discharge Disposition   Discharged to  of [place]  Condition at discharge: Stable    Hospital Course   Briana Mcgee is a 75 year old female admitted on 11/15/2020. She has a PMH significant for hypertension, GERD, CKD stage 2, hyperlipidemia, hypothyroidism, impaired fasting glucose, CVA (2006), RA, spinal stenosis and breast cancer who presents initially to urgent care and ultimately to ER with 7 days of shortness of breath and SILVA.      Acute systolic heart failure  BNP 7,456  Troponin negative  Chest x ray shows increased pulmonary vasculature and cardiomegaly, ekg with RBBB otherwise normal sinus rhythm.   -admit for diuresis    -echocardiogram demonstrates mildly dilated LV with EF 35 to 40%, moderate global hypokinesis LV, severe hypokinesis inferior lateral and basal inferior wall.  Moderately reduced RV function, mild to moderately dilated LA, normal RA, moderate MR, mild to moderate TR, mild to moderate pulmonary hypertension  -Patient will need stress testing prior to discharge  -low sodium diet  -oxygen as needed to maintain sats >92%  -Lexiscan demonstrates medium sized area of moderate infarction in the mid to distal apical, anterior, and anteroseptal segments with mild degree of mikal-infarct ischemia.  EF 28% at rest, 35% at stress.  -Transfer to [facility] for angiogram, Dr. Poole accepting physician     Hypertension  Has been off of antihypertensives since a recent weight loss. Does not check blood pressure at home.   -diuresis  -monitor blood pressure   -start low  dose ace and beta blocker     Normocytic anemia  Stable to improved from July.   Thought at the time to possibly be due to sulfasalazine  -appears to be close to baseline, monitor     Hypothyroidism  -continue levothyroxine      RA  -continue plaquenil     GERD  -continue pantoprazole     Hyperlipidemia  -continue simvastatin   -check am lipids, has been >1 year  -Goal LDL <70     Overactive bladder  -continue detrol la     heterogzygous for Factor V Leiden mutation  -at higher risk for clotting, will use enoxaparin for dvt prophylaxis.     Consultations This Hospital Stay   None    Code Status   Full Code    Time Spent on this Encounter   I, Alvarez Webb MD, personally saw the patient today and spent greater than 30 minutes discharging this patient.       Alvarez Webb MD  [facility]   ______________________________________________________________________    Physical Exam   Vital Signs:     BP: 139/85     Resp: 16   O2 Device: None (Room air)    Weight: 0 lbs 0 oz       Gen: Well nourished, elderly female, alert and oriented x 3, no acute distressed  HEENT: Atraumatic, normocephalic; sclera non-injected, anicterric; oral mucosa moist, no lesion, no exudate  Lungs: Clear to ausculation, no wheezes, no rhonchi, no rales  Heart: Regular rate, regular rhythm, no gallops, no rubs, no murmurs  GI: Bowel sound normal, no hepatosplenomegaly, no masses, non-tender, non-distended, no guarding, no rebound tenderness  Lymph: No lymphadenopathy, no edema  Skin: No rashes, no chronic venous stasis     Primary Care Physician   Xavier Vega    Discharge Orders   No discharge procedures on file.    Significant Results and Procedures   Most Recent 3 CBC's:  Recent Labs   Lab Test 11/18/20  0344 11/16/20  0611 11/15/20  1055   WBC 6.6 6.7 6.7   HGB 11.8 11.7 11.5*   MCV 94 95 97    198 196     Most Recent 3 BMP's:  Recent Labs   Lab Test 11/18/20  0344  20  0626 20  1205 20  0611   * 138  --  137   POTASSIUM 3.4 3.6 3.6 3.3*   CHLORIDE 96 101  --  100   CO2 30 30  --  30   BUN 17 19  --  18   CR 0.95 1.15*  --  1.04   ANIONGAP 6 7  --  7   MARIO 9.0 8.9  --  8.9   GLC 97 110*  --  116*     Most Recent 3 Troponin's:  Recent Labs   Lab Test 11/15/20  1055   TROPI <0.015   ,   Results for orders placed or performed during the hospital encounter of 11/15/20   XR Chest Port 1 View    Narrative    CHEST PORTABLE ONE VIEW  11/15/2020 12:34 PM     HISTORY: Hypoxia.    COMPARISON: 2020.      Impression    IMPRESSION: Increased bilateral vascular and interstitial prominence  suggesting edema. Trace left pleural fluid. Borderline cardiomegaly.  Mild left base atelectasis.    HELEN PAULSON MD   Echocardiogram Complete    Narrative    [id number]  [id number]  [id number]  [id number]^BARBARA^PATRIC^N           [facility]  Echocardiography Laboratory  [address] [place] [address]        Name: DEMOND PLASCENCIA  MRN: [id number]  : 1945  Study Date: 2020 08:09 AM  Age: 75 yrs  Gender: Female  Patient Location: [facility]  Reason For Study: CHF  Ordering Physician: PATRIC JIMENEZ  Referring Physician: Xavier Vega  Performed By: Lexie Hodgson     BSA: 1.9 m2  Height: 65 in  Weight: 183 lb  HR: 66  BP: 137/64 mmHg  _____________________________________________________________________________  __        Procedure  Complete Portable Echo Adult. Optison (NDC #0540-4854) given intravenously.  _____________________________________________________________________________  __        Interpretation Summary     The visual ejection fraction is estimated at 35-40%.  Left ventricular systolic function is moderately reduced.  There is moderate global hypokinesia of the left ventricle.  The inferolateral wall and the basal inferior wall appear to contract least  well and appear severely hypokinetic.  There is moderate (2+) mitral  regurgitation.  There is mild to moderate (1-2+) tricuspid regurgitation.  Right ventricular systolic pressure is elevated, consistent with mild to  moderate pulmonary hypertension.  The study was technically difficult.  _____________________________________________________________________________  __        Left Ventricle  The left ventricle is mildly dilated. Diastolic Doppler findings (E/E' ratio  and/or other parameters) suggest left ventricular filling pressures are  increased. The visual ejection fraction is estimated at 35-40%. Left  ventricular systolic function is moderately reduced. There is moderate global  hypokinesia of the left ventricle. The inferolateral wall and the basal  inferior wall appear to contract least well and appear severely hypokinetic.     Right Ventricle  The right ventricle is normal size. The right ventricular systolic function is  moderately reduced.     Atria  The left atrium is mild to moderately dilated. Right atrial size is normal.  There is no color Doppler evidence of an atrial shunt.     Mitral Valve  There is moderate (2+) mitral regurgitation.        Tricuspid Valve  There is mild to moderate (1-2+) tricuspid regurgitation. The right  ventricular systolic pressure is approximated at 41mmHg plus the right atrial  pressure. Right ventricular systolic pressure is elevated, consistent with  mild to moderate pulmonary hypertension. IVC diameter <2.1 cm collapsing >50%  with sniff suggests a normal RA pressure of 3 mmHg.     Aortic Valve  The aortic valve is trileaflet. There is mild trileaflet aortic sclerosis. No  aortic regurgitation is present. No hemodynamically significant valvular  aortic stenosis.     Pulmonic Valve  There is trace pulmonic valvular regurgitation.     Vessels  The aortic root is normal size. Normal size ascending aorta.     Pericardium  There is no pericardial effusion.        Rhythm  Sinus rhythm was  noted.  _____________________________________________________________________________  __  MMode/2D Measurements & Calculations  IVSd: 0.92 cm     LVIDd: 6.2 cm  LVIDs: 5.0 cm  LVPWd: 1.00 cm  FS: 19.0 %  LV mass(C)d: 245.2 grams  LV mass(C)dI: 128.7 grams/m2  Ao root diam: 3.5 cm  asc Aorta Diam: 3.4 cm  LA Volume (BP): 90.0 ml  LA Volume Index (BP): 47.4 ml/m2     LA Volume Indexed (AL/bp): 40.3 ml/m2  RWT: 0.32        Doppler Measurements & Calculations  MV E max willie: 87.9 cm/sec  MV A max willie: 43.3 cm/sec  MV E/A: 2.0  MV dec time: 0.16 sec  MR ERO: 0.29 cm2  MR volume: 51.9 ml  PA acc time: 0.11 sec  TR max willie: 306.2 cm/sec  TR max P.7 mmHg  E/E' av.5  Lateral E/e': 12.7  Medial E/e': 22.3              _____________________________________________________________________________  __           Report approved by: Nya Rebolledo 2020 11:28 AM      NM MPI w Lexiscan     Value    Target     Baseline Systolic     Baseline Diastolic BP 66    Last Stress Systolic     Last Stress Diastolic BP 64    Baseline HR 70    Max HR 92    Calculated Percent HR 64    Rate Pressure Product 12,696.0    Nuc Rest EF 28    Left Ventricular EF 35    Stress/rest perfusion ratio 1.01    Narrative       The nuclear stress test is abnormal.     There is a medium sized area of a moderate degree of infarction in the   mid to distal apical, anterior and anteroseptal segment(s) of the left   ventricle associated with a mild degree of mikal-infarct ischemia.  Summed   stress score 13, summed rest score 9.     The left ventricular ejection fraction at rest is 28%.  The left   ventricular ejection fraction at stress is 35%.  Global hypokinesis,   anteroseptal wall is slightly worse than other areas.     There is no prior study for comparison.           Discharge Medications   Current Discharge Medication List      CONTINUE these medications which have NOT CHANGED    Details   aspirin 81 MG EC tablet Take  1 tablet (81 mg) by mouth daily  Qty: 90 tablet, Refills: 3    Associated Diagnoses: Heterozygous factor V Leiden mutation (H)      calcium carbonate (OS-MARIO) 500 MG tablet Take 1 tablet by mouth daily      Coenzyme Q10 (COQ10 PO) Take 1 capsule by mouth every morning       fluticasone (FLONASE) 50 MCG/ACT nasal spray Spray 2 sprays into both nostrils daily as needed for rhinitis or allergies Hold on file until needed  Qty: 16 g, Refills: 11    Associated Diagnoses: Other seasonal allergic rhinitis      FOLIC ACID PO Take 1 mg by mouth daily      hydroxychloroquine (PLAQUENIL) 200 MG tablet Take 1 tablet (200 mg) by mouth 2 times daily  Qty: 180 tablet, Refills: 2    Associated Diagnoses: Inflammatory polyarthropathy (H)      !! levothyroxine (SYNTHROID/LEVOTHROID) 137 MCG tablet Take 1 tablet (137 mcg) by mouth daily Along with 25mcg to equal 162mcg daily.  Qty: 30 tablet, Refills: 6    Associated Diagnoses: Hypothyroidism, unspecified type      !! levothyroxine (SYNTHROID/LEVOTHROID) 25 MCG tablet Take 1 tablet (25 mcg) by mouth daily Take along with the 137mcg to equal 162mcg total daily.  Qty: 30 tablet, Refills: 6    Associated Diagnoses: Hypothyroidism, unspecified type      meclizine (ANTIVERT) 25 MG tablet Take 25 mg by mouth daily as needed for dizziness      Multiple Vitamins-Minerals (MULTIVITAMIN ADULT) TABS Take 1 tablet by mouth every evening       Naproxen Sodium (ALEVE PO) Take 2 tablets by mouth 2 times daily as needed       pantoprazole (PROTONIX) 40 MG EC tablet Take 1 tablet (40 mg) by mouth daily  Qty: 90 tablet, Refills: 2    Comments: This prescription was filled on 3/18/2020. Any refills authorized will be placed on file.  Associated Diagnoses: Gastroesophageal reflux disease without esophagitis      propranolol (INDERAL) 40 MG tablet Take 1 tablet (40 mg) by mouth 2 times daily  Qty: 60 tablet, Refills: 3    Associated Diagnoses: Essential tremor; Migraine without aura and without status  migrainosus, not intractable      SHINGRIX injection       simvastatin (ZOCOR) 20 MG tablet Take 1 tablet (20 mg) by mouth At Bedtime.  Please make an office visit for further refills.  Qty: 90 tablet, Refills: 0    Associated Diagnoses: Hyperlipidemia LDL goal <100      tolterodine ER (DETROL LA) 4 MG 24 hr capsule Take 1 capsule (4 mg) by mouth daily  Qty: 90 capsule, Refills: 1    Comments: This prescription was filled on 4/2/2020. Any refills authorized will be placed on file.  Associated Diagnoses: Urinary urgency      triamcinolone (ARISTOCORT HP) 0.5 % external cream Apply topically 2 times daily as needed To shin for dry skin      triamcinolone (KENALOG) 0.1 % external ointment Apply ointment twice daily on lower legs for 2 weeks  Qty: 30 g, Refills: 0    Associated Diagnoses: Atopic dermatitis, unspecified type       !! - Potential duplicate medications found. Please discuss with provider.        Allergies   Allergies   Allergen Reactions     Erythromycin Other (See Comments)     Edema     Hydrocodone GI Disturbance     GI Upset, Tolerates dilaudid     Oxycodone GI Disturbance

## 2020-11-18 NOTE — PLAN OF CARE
D: Pt stable and comfortable. Slight headache pain present but no shortness of breath noted.   I: Monitored/assessed pt. Assisted with cares.  A: Pt stable and comfortable.  P: Continue to monitor/assess pt, contact provider with concerns.

## 2020-11-18 NOTE — DISCHARGE INSTRUCTIONS
Going home after a Coronary Angiogram    PROCEDURE SITE:   Radial (Wrist)  It is normal to have soreness and small bruising at the puncture site as well as mild tingling in your hand for up to 3 days. You may shower but please do not use a hot tub, bath tub or pool for 2 days. Do not apply any lotion or powder near the site for 3 days. For 3 days do not use your affected hand/arm to support your weight (like rising up out of a chair) or lifting >5 pounds.    MEDICATIONS:   1. If you are on Metformin (Glucophage) or any medications that contain this, you should not take it for at least 48 hours (2 days) from the time of your procedure. If you have baseline kidney problems, you may be instructed to also have a lab test drawn in 2-3 days before getting the okay to restart it.  2. If you have not been continued on other medications you were previously taking at home it is probably for reason though please discuss your concerns with any of your doctors.     DIET:  We recommend a diet low in saturated fat, trans fat and cholesterol. In addition it will be helpful to be cautious of sodium intake and carbohydrates. Try to increase the amount of lean meats you eat like fish and chicken, but avoid frying; and reduce the amount of red meat you eat. Eat more fresh fruits and vegetables and try to avoid canned and processed food. Please reference the handouts you received for more specific information.      OTHER INFORMATION:  1. If you are a smoker, quitting smoking will be one of the most important things you can do for yourself. There are nicotine replacement options or medications they might be able to be prescribed. Please discuss this with your doctors. Consider calling the [employer]uit[employer] at [phone] ([phone]) as they can offer ongoing support after discharge.    CALL YOUR DOCTOR IF:  -You have a large or growing lump/bump around the procedure site  -The site is red, swollen, hot, tender or has drainage  -You have hives, a  rash or unusual itching  -You have increasing or worsening shortness of breath or chest pain    FOLLOW UP:  We prefer you to follow up with your primary care provider within one week. You will be arranged to see the cardiologist (heart doctor) in clinic in about one month.     Should you need to contact us:  Cardiology clinic for scheduling or triage nurse questions/concerns:  [phone]   -------------------------------------------------------------------------------------------------------------------------------------------------------------------------        Low Sodium Nutrition Therapy   (Nutrition Care Manual)  Eating less sodium can help you if you have high blood pressure, heart failure, or kidney or liver disease.     Your body needs a little sodium, but too much sodium can cause your body to hold onto extra water. This extra water will raise your blood pressure and can cause damage to your heart, kidneys, or liver as they are forced to work harder.     Sometimes you can see how the extra fluid affects you because your hands, legs, or belly swell. You may also hold water around your heart and lungs, which makes it hard to breathe.      Even if you take medication for blood pressure or a water pill (diuretic) to remove fluid, it is still important to have less salt in your diet.     Check with your primary care provider before drinking alcohol since it may affect the amount of fluid in your body and how your heart, kidneys, or liver work.    If asked to follow a fluid restriction by your care provider, then do so.      Sodium in Food  A low-sodium meal plan limits the sodium that you get from food and beverages to 1,500-2,000 milligrams (mg) per day. Salt is the main source of sodium. Read the nutrition label on the package to find out how much sodium is in one serving of a food.    Select foods with 140 milligrams (mg) of sodium or less per serving.    You may be able to eat one or two servings of foods  with a little more than 140 milligrams (mg) of sodium if you are closely watching how much sodium you eat in a day.    Check the serving size on the label. The amount of sodium listed on the label shows the amount in one serving of the food. So, if you eat more than one serving, you will get more sodium than the amount listed.  Tips  Cutting Back on Sodium    Eat more fresh foods.     o Fresh fruits and vegetables are low in sodium, as well as frozen vegetables and fruits that have no added juices or sauces.  o Fresh meats are lower in sodium than processed meats, such as francois, sausage, and hotdogs.     Not all processed foods are unhealthy, but some processed foods may have too much sodium.    Eat less salt at the table and when cooking. One of the ingredients in salt is sodium.   o One teaspoon of table salt has 2,300 milligrams of sodium.  o Leave the salt out of recipes for pasta, casseroles, and soups.    Be a smart .   o Food packages that say  Salt-free , sodium-free ,  very low sodium,  and  low sodium  have less than 140 milligrams of sodium per serving.   o Beware of products identified as  Unsalted,   No Salt Added,   Reduced Sodium,  or  Lower Sodium.  These items may still be high in sodium. You should always check the nutrition label.     Add flavors to your food without adding sodium.   o Try lemon juice, lime juice, or vinegar.   o Dry or fresh herbs add flavor.   o Buy a sodium-free seasoning blend or make your own at home.    You can purchase salt-free or sodium-free condiments like barbeque sauce in stores and online. Ask your registered dietitian nutritionist for recommendations and where to find them.     Eating in Restaurants  Choose foods carefully when you eat outside your home. Restaurant foods can be very high in sodium. Many restaurants provide nutrition facts on their menus or their websites. If you cannot find that information, ask your . Let your  know that you want  your food to be cooked without salt and that you would like your salad dressing and sauces to be served on the side.      Foods Recommended  Food Group Foods Recommended   Grains Bread, bagels, rolls without salted tops  Homemade bread made with reduced-sodium baking powder  Cold cereals, especially shredded wheat and puffed rice  Oats, grits, or cream of wheat  Pastas, quinoa, and rice  Popcorn, pretzels or crackers without salt  Corn tortillas   Protein Foods Fresh meats and fish; turkey francois (check the nutrition labels - make sure they are not packaged in a sodium solution)  Canned or packed tuna (no more than 4 ounces at 1 serving)  Beans and peas  Soybeans) and tofu  Eggs  Nuts or nut butters without salt   Dairy Milk or milk powder  Plant milks, such as rice and soy  Yogurt, including Greek yogurt  Small amounts of natural cheese (blocks of cheese) or reduced-sodium cheese can be used in moderation. (Swiss, ricotta, and fresh mozzarella cheese are lower in sodium than the others)  Cream Cheese  Low sodium cottage cheese   Vegetables Fresh and frozen vegetables without added sauces or salt  Homemade soups (without salt)  Low-sodium, salt-free or sodium-free canned vegetables and soups   Fruit Fresh and canned fruits  Dried fruits, such as raisins, cranberries, and prunes   Oils Tub or liquid margarine, regular or without salt  Canola, corn, peanut, olive, safflower, or sunflower oils   Condiments Fresh or dried herbs such as basil, bay leaf, dill,  mustard (dry), nutmeg, paprika, parsley, rosemary, bairon, or thyme.              Low sodium ketchup  Vinegar                                   Lemon or lime juice  Pepper, red pepper flakes, and cayenne.  Hot sauce contains sodium, but if you use just a drop or two, it will not add up to much.                 Salt-free or sodium-free seasoning mixes and marinades  Simple salad dressings: vinegar and oil        Foods Not Recommended  Food Group Foods Not Recommended    Grains Breads or crackers topped with salt  Cereals (hot/cold) with more than 300 mg sodium per serving  Biscuits, cornbread, and other  quick  breads prepared with baking soda  Pre-packaged bread crumbs  Seasoned and packaged rice and pasta mixes  Self-rising flours   Protein Foods Cured meats: Ruiz, ham, sausage, pepperoni and hot dogs  Canned meats (chili, vamshi sausage, or sardines)  Smoked fish and meats  Frozen meals that have more than 600 mg of sodium per serving  Egg substitute (with added sodium)   Dairy Buttermilk  Processed cheese spreads  Cottage cheese (1 cup may have over 500 mg of sodium; look for low-sodium.)  American or feta cheese  Shredded Cheese has more sodium than blocks of cheese  String cheese   Vegetables Canned vegetables (unless they are salt-free, sodium-free or low sodium)  Frozen vegetables with seasoning and sauces  Sauerkraut and pickled vegetables  Canned or dried soups (unless they are salt-free, sodium-free, or low  sodium)  French fries and onion rings   Fruit Dried fruits preserved with additives that have sodium   Oils Salted butter or margarine, all types of olives   Condiments Salt, sea salt, kosher salt, onion salt, and garlic salt  Seasoning mixes with salt  Bouillon cubes  Ketup  Barbeque sauce and [facility] sauce unless low  sodium  Soy sauce  Salsa, pickles, olives, relish  Salad dressings: ranch, blue cheese, Italian, and French.   Low Sodium Sample 1-Day Menu   Breakfast 1 cup cooked oatmeal   1 slice whole wheat bread toast  1 tablespoon peanut butter without salt   1 banana  1 cup 1% milk   Lunch Tacos made with: 2 corn tortillas     cup black beans, low sodium    cup roasted or grilled chicken (without skin)     avocado  Squeeze of lime juice   1 cup salad greens   1 tablespoon low-sodium salad dressing     cup strawberries  1 orange   Afternoon Snack 1/3 cup grapes   6 ounces yogurt   Evening Meal 3 ounces herb-baked fish   1 baked potato  2 teaspoons  olive oil     cup cooked carrots  2 thick slices tomatoes on:  2 lettuce leaves  1 teaspoon olive oil  1 teaspoon balsamic vinegar  1 cup 1% milk   Evening Snack 1 apple     cup almonds without salt

## 2020-11-18 NOTE — Clinical Note
dry, intact, no bleeding and no hematoma. 6 Fr sheath removed from the LRA. TR band placed until hemostasis is obtained. Labeled.

## 2020-11-18 NOTE — PLAN OF CARE
Focus:  Admission  Diagnosis: SOB   Admitted from: [facility] ED   Via: stretcher   Accompanied by: none present,  aware of admission per patient  Belongings: Placed at bedside per patient, may request security envelope if going for procedure today.   Admission paperwork: complete.   Teaching: Call don't fall, use of console, meal times, visiting hours including no visitors at this time, orientation to unit, when to call for the RN (angina/sob/dizzyness, etc.), and stressed the importance of safety.   Access: No PIV, will place order   Telemetry: Placed on pt   Ht./Wt.: complete    2 person skin assessment completed with Claudine Ralph on admission. No concerns present.

## 2020-11-18 NOTE — PROVIDER NOTIFICATION
Patient placement called and reports bed available at the  of  on 6C tonight.  Updated Dr Jameson and patient's RN

## 2020-11-18 NOTE — PROGRESS NOTES
[facility]   Cardiology   Progress Note     ASSESSMENT/PLAN:  Mrs. Mcgee is a 75 year old female transferred from [facility] on  11/18/2020. She has a PMHx significant for hypertension, GERD, CKD stage 2, hyperlipidemia, hypothyroidism, impaired fasting glucose, CVA (2006), RA, spinal stenosis and breast cancer who was transferred from OSH to undergo angiogram after being recently  diagnosed with of acute HFrEF (LVEF 35-40%) and Lexican showing medium sized area of moderate infarction in the mid to distal apical, anterior, and anteroseptal segments with mild degree of mikal-infarct ischemia.      # Acute Diastolic Heart Failure (LVEF 35-40%)  # Moderate MR, history of rheumatic fever  # HTN  Presented with 2 week of progressive shortness of breath to more than ordinary activity, as well as PND, orthopnea, and chest heaviness. Seen as OSH, found to be hypervolemic, CXR compatible with pulmonary edema,  had elevated BNP 7,456, neg troponin. Echocardiogram with newly reduced EF, 35-40%, global hypokinesis, severe hypokinesis inferior lateral and basal inferior wall, as well as .   Moderately reduced RV function, mild to moderately dilated LA, normal RA, moderate MR, mild to moderate TR, mild to moderate pulmonary hypertension. Lexiscan with mod area of infarct in mid to distal apical, anterior, and anteroseptal segments with mild degree of mikal-infarct ischemia.      - Vol status: mildly hypervolemic, Lasix 20 mg IV x 1, transition to PO tomorrow  - BB: continue metoprolol succinate 25mg daily  - ACEi: Continue Lisinopril 5 mg daily, titrate as OP allows  - Continue PTA ASA  - Coronary angiogram today  - NPO for procedure  - Daily weight, strict I/Os  - Daily BMP CBC     # Mild asymptomatic hyponatremia   Likely 2/2 volume status  - CTM     Chronic problems:   -Hypothyroidism, continue pta levothyroxine   -RA, continue pta plaquenil  -GERD, continue pta  pantoprazole  -Hyperlipidemia, continue pta simvastatin, recent LDL 30.  -Overactive bladder, continue pta tolterodine  -H/o breast cancer    FEN: NPO for procedure  Code status: Full  Prophylaxis:  Ambulation  Isolation: None  Disposition: possible discharge 2-3 days pending HF work up, volume status    Patient seen and discussed with Dr. Poole, see H&P    Deya LIMON, CNP  [facility] Cardiology Team  [phone]    Interval History:  - No acute events overnight  - Patient continues to report SILVA, is now able to sleep flat, no PND    Physical Exam:  Temp:  [96.2  F (35.7  C)-99.4  F (37.4  C)] 99.4  F (37.4  C)  Pulse:  [64-71] 65  Resp:  [16] 16  BP: (100-139)/(45-85) 124/67  SpO2:  [90 %-97 %] 92 %    I/O:  Intake/Output Summary (Last 24 hours) at 11/18/2020 1012  Last data filed at 11/18/2020 0800  Gross per 24 hour   Intake 100 ml   Output --   Net 100 ml       Wt:   Wt Readings from Last 5 Encounters:   11/18/20 81.6 kg (179 lb 12.8 oz)   11/17/20 79.4 kg (175 lb)   02/26/20 83 kg (183 lb)   02/06/20 82.6 kg (182 lb)   12/12/19 86 kg (189 lb 8 oz)         General: NAD  HEENT:  PERRLA, EOMI.   Neck: JVD flat   CV: RRR. 2/6 diastolic murmur. No rubs or gallops. Peripheral radial pulse intact.  Resp: No increased work of breathing or use of accessory muscles, breathing comfortably on room air.  Lung sounds clear throughout/bilaterally  Abdomen:  Normal active bowel sounds.  Abdomen is soft. No distension, non-tender to palpation.    Extremities: Warm. Capillary refill less than 3 sec. 2/4 radial pulses bilaterally.  2/4 pedal pulses bilaterally. No pre-tibial edema. No cyanosis or clubbing.  Skin:  Warm and dry. No erythema, rashes, ulceration or diaphoresis.  Neuro: Alert and oriented x3.      Medications:    aspirin  81 mg Oral Daily     calcium carbonate 500 mg (elemental)  1 tablet Oral Daily     folic acid  1 mg Oral Daily     furosemide  40 mg Oral Daily     hydroxychloroquine  200 mg Oral BID      levothyroxine  150 mcg Oral Daily     lisinopril  5 mg Oral Daily     metoprolol succinate ER  25 mg Oral Daily     pantoprazole  40 mg Oral Daily     polyethylene glycol  17 g Oral Daily     senna-docusate  1 tablet Oral BID    Or     senna-docusate  2 tablet Oral BID     simvastatin  20 mg Oral At Bedtime     tolterodine ER  4 mg Oral Daily       - MEDICATION INSTRUCTIONS -         Labs:   CMP  Recent Labs   Lab 11/18/20  0344 11/17/20  0626 11/16/20  1205 11/16/20  0611 11/15/20  1951 11/15/20  1055   * 138  --  137  --  141   POTASSIUM 3.4 3.6 3.6 3.3* 3.4 4.1   CHLORIDE 96 101  --  100  --  107   CO2 30 30  --  30  --  28   ANIONGAP 6 7  --  7  --  6   GLC 97 110*  --  116*  --  107*   BUN 17 19  --  18  --  19   CR 0.95 1.15*  --  1.04 1.05* 0.93   GFRESTIMATED 58* 46*  --  52* 51* 59*   GFRESTBLACK 67 53*  --  60* 59* 69   MARIO 9.0 8.9  --  8.9  --  8.5   MAG  --  1.9 2.1  --   --   --    PROTTOTAL 6.6*  --   --   --   --  6.8   ALBUMIN 3.5  --   --   --   --  3.9   BILITOTAL 0.7  --   --   --   --  0.9   ALKPHOS 66  --   --   --   --  76   AST 16  --   --   --   --  27   ALT 33  --   --   --   --  41     CBC  Recent Labs   Lab 11/18/20  0344 11/16/20  0611 11/15/20  1055   WBC 6.6 6.7 6.7   RBC 3.84 3.78* 3.75*   HGB 11.8 11.7 11.5*   HCT 36.0 36.0 36.3   MCV 94 95 97   MCH 30.7 31.0 30.7   MCHC 32.8 32.5 31.7   RDW 13.7 13.9 13.7    198 196     INR  Recent Labs   Lab 11/18/20  0344   INR 1.10     Arterial Blood GasNo lab results found in last 7 days.    Diagnostics:  ECG 11/15/2020: NSR, HR 92, RBBB (baseline)      Echo 11/16/2020  Interpretation Summary     The visual ejection fraction is estimated at 35-40%.  Left ventricular systolic function is moderately reduced.  There is moderate global hypokinesia of the left ventricle.  The inferolateral wall and the basal inferior wall appear to contract least  well and appear severely hypokinetic.  There is moderate (2+) mitral regurgitation.  There  is mild to moderate (1-2+) tricuspid regurgitation.  Right ventricular systolic pressure is elevated, consistent with mild to  moderate pulmonary hypertension.  The study was technically difficult.    Lexiscan (OSH) 11/17/2020:  Recent Results (from the past 24 hour(s))   NM MPI w Lexiscan   Result Value    Target     Baseline Systolic     Baseline Diastolic BP 66    Last Stress Systolic     Last Stress Diastolic BP 64    Baseline HR 70    Max HR 92    Calculated Percent HR 64    Rate Pressure Product 12,696.0    Nuc Rest EF 28    Left Ventricular EF 35    Stress/rest perfusion ratio 1.01    Narrative       The nuclear stress test is abnormal.     There is a medium sized area of a moderate degree of infarction in the   mid to distal apical, anterior and anteroseptal segment(s) of the left   ventricle associated with a mild degree of mikal-infarct ischemia.  Summed   stress score 13, summed rest score 9.     The left ventricular ejection fraction at rest is 28%.  The left   ventricular ejection fraction at stress is 35%.  Global hypokinesis,   anteroseptal wall is slightly worse than other areas.     There is no prior study for comparison.

## 2020-11-19 ENCOUNTER — PATIENT OUTREACH (OUTPATIENT)
Dept: CARE COORDINATION | Facility: CLINIC | Age: 75
End: 2020-11-19

## 2020-11-19 ENCOUNTER — TELEPHONE (OUTPATIENT)
Dept: CARDIOLOGY | Facility: CLINIC | Age: 75
End: 2020-11-19

## 2020-11-19 VITALS
SYSTOLIC BLOOD PRESSURE: 111 MMHG | WEIGHT: 173.2 LBS | OXYGEN SATURATION: 94 % | RESPIRATION RATE: 16 BRPM | TEMPERATURE: 97.7 F | BODY MASS INDEX: 28.86 KG/M2 | HEIGHT: 65 IN | DIASTOLIC BLOOD PRESSURE: 61 MMHG | HEART RATE: 62 BPM

## 2020-11-19 LAB
ANION GAP SERPL CALCULATED.3IONS-SCNC: 8 MMOL/L (ref 3–14)
BUN SERPL-MCNC: 19 MG/DL (ref 7–30)
CALCIUM SERPL-MCNC: 9.6 MG/DL (ref 8.5–10.1)
CHLORIDE SERPL-SCNC: 103 MMOL/L (ref 94–109)
CO2 SERPL-SCNC: 26 MMOL/L (ref 20–32)
CREAT SERPL-MCNC: 0.92 MG/DL (ref 0.52–1.04)
ERYTHROCYTE [DISTWIDTH] IN BLOOD BY AUTOMATED COUNT: 13.6 % (ref 10–15)
GFR SERPL CREATININE-BSD FRML MDRD: 60 ML/MIN/{1.73_M2}
GLUCOSE SERPL-MCNC: 109 MG/DL (ref 70–99)
HCT VFR BLD AUTO: 39.2 % (ref 35–47)
HGB BLD-MCNC: 12.3 G/DL (ref 11.7–15.7)
MCH RBC QN AUTO: 29.8 PG (ref 26.5–33)
MCHC RBC AUTO-ENTMCNC: 31.4 G/DL (ref 31.5–36.5)
MCV RBC AUTO: 95 FL (ref 78–100)
PLATELET # BLD AUTO: 225 10E9/L (ref 150–450)
POTASSIUM SERPL-SCNC: 4.2 MMOL/L (ref 3.4–5.3)
RBC # BLD AUTO: 4.13 10E12/L (ref 3.8–5.2)
SODIUM SERPL-SCNC: 136 MMOL/L (ref 133–144)
WBC # BLD AUTO: 5.8 10E9/L (ref 4–11)

## 2020-11-19 PROCEDURE — 85027 COMPLETE CBC AUTOMATED: CPT | Performed by: NURSE PRACTITIONER

## 2020-11-19 PROCEDURE — 99238 HOSP IP/OBS DSCHRG MGMT 30/<: CPT | Performed by: PHYSICIAN ASSISTANT

## 2020-11-19 PROCEDURE — 36415 COLL VENOUS BLD VENIPUNCTURE: CPT | Performed by: NURSE PRACTITIONER

## 2020-11-19 PROCEDURE — 80048 BASIC METABOLIC PNL TOTAL CA: CPT | Performed by: NURSE PRACTITIONER

## 2020-11-19 RX ORDER — POTASSIUM CHLORIDE 750 MG/1
10 TABLET, EXTENDED RELEASE ORAL DAILY
Qty: 30 TABLET | Refills: 3 | Status: SHIPPED | OUTPATIENT
Start: 2020-11-19 | End: 2021-03-22

## 2020-11-19 ASSESSMENT — ACTIVITIES OF DAILY LIVING (ADL)
ADLS_ACUITY_SCORE: 15

## 2020-11-19 ASSESSMENT — MIFFLIN-ST. JEOR: SCORE: 1276.51

## 2020-11-19 NOTE — TELEPHONE ENCOUNTER
S-(situation): patient discharged post angioplasty yesterday. Mild coronary artery disease without any hemodynamically significant lesions. Left radial artery used for access. Slight edema at site, using cold packs to reduce swelling. Flat and dry, no bruising. Reviewed restrictions     B-(background): Very pleasant 76yo woman w/ no significant CAD history referred for coronary angiography in the setting of newly discovered cardiomyopathy and anterior defect on nuclear stress test.    A-(assessment): mild non obstructive coronary artery disease     R-(recommendations): follow up with cardiology in [place].

## 2020-11-19 NOTE — PLAN OF CARE
D: S/p coronary angiogram 11/18 (no intervention) after recent acute HFrEF (35-40%) diagnosis. Hx of HTN, GERD, CKD2, HLD, hypothyroid, impaired fasting glucose, CVA, RA, spinal stenosis, and breast cancer     I/A: A/Ox4. VSS on RA. Denies pain. L radial site CDI, CMS intact. Tolerating 2gNA diet. Voiding adequately. Up ad noelle.    P: Anticipating discharge to home today. Continue to monitor and notify Cards 1 with changes/concerns.

## 2020-11-19 NOTE — PLAN OF CARE
[id number]    VSS. Denies pain. Left wrist angiogram site CDI-activity restrictions reinforced. Up in room ad noelle. Patient eager to discharge to home tomorrow. Plan to continue to monitor patient and notify physician with changes or concerns.

## 2020-11-20 NOTE — PROGRESS NOTES
[facility]: Post-Discharge Note  SITUATION                                                      Admission:    Admission Date: 11/18/20   Reason for Admission: Acute Systolic Heart Failure (LVEF 35-40%)  Discharge:   Discharge Date: 11/19/20  Discharge Diagnosis: Acute Systolic Heart Failure (LVEF 35-40%)    BACKGROUND                                                      Brief HPI:  Jenna Mcgee is a 75 year old female with PMHx significant for hypertension, GERD, CKD stage 2, hyperlipidemia, hypothyroidism, impaired fasting glucose, CVA (2006), RA, spinal stenosis and breast cancer who was transferred from [facility] to undergo angiogram after being recently diagnosed with of acute HFrEF (LVEF 35-40%) and Lexican showing medium sized area of moderate infarction in the mid to distal apical, anterior, and anteroseptal segments with mild degree of mikal-infarct ischemia.     ASSESSMENT      Discharge Assessment  Patient reports symptoms are: Improved  Does the patient have all of their medications?: Yes  Does patient know what their new medications are for?: Yes  Does patient have a follow-up appointment scheduled?: Yes  Does patient have any other questions or concerns?: No    Post-op  Did the patient have surgery or a procedure: No  Fever: No  Chills: No  Eating & Drinking: eating and drinking without complaints/concerns  PO Intake: regular diet  Bowel Function: normal  Urinary Status: voiding without complaint/concerns        PLAN                                                      Outpatient Plan:  Follow-up:  PCP 7-10 days for post hospitalization visit  Cardiology to establish care for HFrEF, 3-6 weeks     Labs or imaging requiring follow-up after discharge:  BMP 1-2 weeks       Future Appointments   Date Time Provider Department Center   11/23/2020  8:45 AM PI LAB [facility]   11/25/2020  1:40 PM Xavier Vega MD [facility]   [facility]  [facility]   12/3/2020  10:30 AM ROOM 2 [facility]   12/7/2020  1:00 PM [facility]   12/15/2020 12:45 PM Davy Shook MD [facility]   12/17/2020 11:30 AM ROOM 10 [facility]   1/11/2021  2:00 PM Merari Duarte MD [facility]   1/14/2021  9:30 AM ROOM 3 [facility]   2/11/2021  8:20 AM [facility]           Sandrita Landry, [facility]

## 2020-11-20 NOTE — DISCHARGE SUMMARY
[facility]   Hospitalist Discharge Summary       Date of Admission:  11/15/2020  Date of Discharge:  11/18/2020  Discharging Provider: Alvarez Webb MD        Discharge Diagnoses        Acute systolic heart failure  Hypertension  Normocytic anemia  Hypothyroidism  RA  GERD  Hyperlipidemia  Overactive bladder  Heterogzygous for Factor V Leiden mutation           Follow-ups Needed After Discharge              Discharge Disposition      Discharged to  of [place]  Condition at discharge: Stable           Hospital Course     Briana Mcgee is a 75 year old female admitted on 11/15/2020. She has a PMH significant for hypertension, GERD, CKD stage 2, hyperlipidemia, hypothyroidism, impaired fasting glucose, CVA (2006), RA, spinal stenosis and breast cancer who presents initially to urgent care and ultimately to ER with 7 days of shortness of breath and SILVA.      Acute systolic heart failure  BNP 7,456  Troponin negative  Chest x ray shows increased pulmonary vasculature and cardiomegaly, ekg with RBBB otherwise normal sinus rhythm.   -admit for diuresis    -echocardiogram demonstrates mildly dilated LV with EF 35 to 40%, moderate global hypokinesis LV, severe hypokinesis inferior lateral and basal inferior wall.  Moderately reduced RV function, mild to moderately dilated LA, normal RA, moderate MR, mild to moderate TR, mild to moderate pulmonary hypertension  -Patient will need stress testing prior to discharge  -low sodium diet  -oxygen as needed to maintain sats >92%  -Lexiscan demonstrates medium sized area of moderate infarction in the mid to distal apical, anterior, and anteroseptal segments with mild degree of mikal-infarct ischemia.  EF 28% at rest, 35% at stress.  -Transfer to [facility] for angiogram, Dr. Poole accepting physician     Hypertension  Has been off of antihypertensives since a recent weight loss. Does not check blood pressure at  home.   -diuresis  -monitor blood pressure   -start low dose ace and beta blocker     Normocytic anemia  Stable to improved from July.   Thought at the time to possibly be due to sulfasalazine  -appears to be close to baseline, monitor     Hypothyroidism  -continue levothyroxine      RA  -continue plaquenil     GERD  -continue pantoprazole     Hyperlipidemia  -continue simvastatin   -check am lipids, has been >1 year  -Goal LDL <70     Overactive bladder  -continue detrol la     heterogzygous for Factor V Leiden mutation  -at higher risk for clotting, will use enoxaparin for dvt prophylaxis.            Consultations This Hospital Stay      None           Code Status      Full Code           Time Spent on this Encounter      I, Alvarez Webb MD, personally saw the patient today and spent greater than 30 minutes discharging this patient.     Alvarez Webb MD  [facility]   ______________________________________________________________________           Physical Exam     Vital Signs:     BP: 139/85     Resp: 16   O2 Device: None (Room air)    Weight: 0 lbs 0 oz       Gen: Well nourished, elderly female, alert and oriented x 3, no acute distressed  HEENT: Atraumatic, normocephalic; sclera non-injected, anicterric; oral mucosa moist, no lesion, no exudate  Lungs: Clear to ausculation, no wheezes, no rhonchi, no rales  Heart: Regular rate, regular rhythm, no gallops, no rubs, no murmurs  GI: Bowel sound normal, no hepatosplenomegaly, no masses, non-tender, non-distended, no guarding, no rebound tenderness  Lymph: No lymphadenopathy, no edema  Skin: No rashes, no chronic venous stasis            Primary Care Physician      Xavier Vega           Discharge Orders     No discharge procedures on file.           Significant Results and Procedures     Most Recent 3 CBC's:        Recent Labs   Lab Test 11/18/20  0344 11/16/20  0611 11/15/20  1055   WBC 6.6 6.7 6.7    HGB 11.8 11.7 11.5*   MCV 94 95 97    198 196      Most Recent 3 BMP's:         Recent Labs   Lab Test 20  0344 20  0626 20  1205 20  0611   * 138  --  137   POTASSIUM 3.4 3.6 3.6 3.3*   CHLORIDE 96 101  --  100   CO2 30 30  --  30   BUN 17 19  --  18   CR 0.95 1.15*  --  1.04   ANIONGAP 6 7  --  7   MARIO 9.0 8.9  --  8.9   GLC 97 110*  --  116*      Most Recent 3 Troponin's:      Recent Labs   Lab Test 11/15/20  1055   TROPI <0.015   ,        Results for orders placed or performed during the hospital encounter of 11/15/20   XR Chest Port 1 View     Narrative     CHEST PORTABLE ONE VIEW  11/15/2020 12:34 PM      HISTORY: Hypoxia.     COMPARISON: 2020.        Impression     IMPRESSION: Increased bilateral vascular and interstitial prominence  suggesting edema. Trace left pleural fluid. Borderline cardiomegaly.  Mild left base atelectasis.     HELEN PAULSON MD   Echocardiogram Complete     Narrative     [id number]  [id number]  [id number]  [id number]^BARBARA^PATRIC^N           [facility]  Echocardiography Laboratory  [address] [place] [address]        Name: DEMOND PLASCENCIA  MRN: [id number]  : 1945  Study Date: 2020 08:09 AM  Age: 75 yrs  Gender: Female  Patient Location: [facility]  Reason For Study: CHF  Ordering Physician: PATRIC JIMENEZ  Referring Physician: Xavier Vega  Performed By: Lexie Hodgson     BSA: 1.9 m2  Height: 65 in  Weight: 183 lb  HR: 66  BP: 137/64 mmHg  _____________________________________________________________________________  __        Procedure  Complete Portable Echo Adult. Optison (NDC #[id number]) given intravenously.  _____________________________________________________________________________  __        Interpretation Summary     The visual ejection fraction is estimated at 35-40%.  Left ventricular systolic function is moderately reduced.  There is moderate global hypokinesia of the left ventricle.  The  inferolateral wall and the basal inferior wall appear to contract least  well and appear severely hypokinetic.  There is moderate (2+) mitral regurgitation.  There is mild to moderate (1-2+) tricuspid regurgitation.  Right ventricular systolic pressure is elevated, consistent with mild to  moderate pulmonary hypertension.  The study was technically difficult.  _____________________________________________________________________________  __        Left Ventricle  The left ventricle is mildly dilated. Diastolic Doppler findings (E/E' ratio  and/or other parameters) suggest left ventricular filling pressures are  increased. The visual ejection fraction is estimated at 35-40%. Left  ventricular systolic function is moderately reduced. There is moderate global  hypokinesia of the left ventricle. The inferolateral wall and the basal  inferior wall appear to contract least well and appear severely hypokinetic.     Right Ventricle  The right ventricle is normal size. The right ventricular systolic function is  moderately reduced.     Atria  The left atrium is mild to moderately dilated. Right atrial size is normal.  There is no color Doppler evidence of an atrial shunt.     Mitral Valve  There is moderate (2+) mitral regurgitation.        Tricuspid Valve  There is mild to moderate (1-2+) tricuspid regurgitation. The right  ventricular systolic pressure is approximated at 41mmHg plus the right atrial  pressure. Right ventricular systolic pressure is elevated, consistent with  mild to moderate pulmonary hypertension. IVC diameter <2.1 cm collapsing >50%  with sniff suggests a normal RA pressure of 3 mmHg.     Aortic Valve  The aortic valve is trileaflet. There is mild trileaflet aortic sclerosis. No  aortic regurgitation is present. No hemodynamically significant valvular  aortic stenosis.     Pulmonic Valve  There is trace pulmonic valvular regurgitation.     Vessels  The aortic root is normal size. Normal size ascending  aorta.     Pericardium  There is no pericardial effusion.        Rhythm  Sinus rhythm was noted.  _____________________________________________________________________________  __  MMode/2D Measurements & Calculations  IVSd: 0.92 cm     LVIDd: 6.2 cm  LVIDs: 5.0 cm  LVPWd: 1.00 cm  FS: 19.0 %  LV mass(C)d: 245.2 grams  LV mass(C)dI: 128.7 grams/m2  Ao root diam: 3.5 cm  asc Aorta Diam: 3.4 cm  LA Volume (BP): 90.0 ml  LA Volume Index (BP): 47.4 ml/m2     LA Volume Indexed (AL/bp): 40.3 ml/m2  RWT: 0.32        Doppler Measurements & Calculations  MV E max willie: 87.9 cm/sec  MV A max willie: 43.3 cm/sec  MV E/A: 2.0  MV dec time: 0.16 sec  MR ERO: 0.29 cm2  MR volume: 51.9 ml  PA acc time: 0.11 sec  TR max willie: 306.2 cm/sec  TR max P.7 mmHg  E/E' av.5  Lateral E/e': 12.7  Medial E/e': 22.3              _____________________________________________________________________________  __           Report approved by: Nya Rebolledo 2020 11:28 AM      NM MPI w Lexiscan     Value     Target      Baseline Systolic      Baseline Diastolic BP 66     Last Stress Systolic      Last Stress Diastolic BP 64     Baseline HR 70     Max HR 92     Calculated Percent HR 64     Rate Pressure Product 12,696.0     Nuc Rest EF 28     Left Ventricular EF 35     Stress/rest perfusion ratio 1.01     Narrative        The nuclear stress test is abnormal.     There is a medium sized area of a moderate degree of infarction in the   mid to distal apical, anterior and anteroseptal segment(s) of the left   ventricle associated with a mild degree of mikal-infarct ischemia.  Summed   stress score 13, summed rest score 9.     The left ventricular ejection fraction at rest is 28%.  The left   ventricular ejection fraction at stress is 35%.  Global hypokinesis,   anteroseptal wall is slightly worse than other areas.     There is no prior study for comparison.                     Discharge Medications          Current  Discharge Medication List            CONTINUE these medications which have NOT CHANGED     Details   aspirin 81 MG EC tablet Take 1 tablet (81 mg) by mouth daily  Qty: 90 tablet, Refills: 3     Associated Diagnoses: Heterozygous factor V Leiden mutation (H)       calcium carbonate (OS-MARIO) 500 MG tablet Take 1 tablet by mouth daily       Coenzyme Q10 (COQ10 PO) Take 1 capsule by mouth every morning        fluticasone (FLONASE) 50 MCG/ACT nasal spray Spray 2 sprays into both nostrils daily as needed for rhinitis or allergies Hold on file until needed  Qty: 16 g, Refills: 11     Associated Diagnoses: Other seasonal allergic rhinitis       FOLIC ACID PO Take 1 mg by mouth daily       hydroxychloroquine (PLAQUENIL) 200 MG tablet Take 1 tablet (200 mg) by mouth 2 times daily  Qty: 180 tablet, Refills: 2     Associated Diagnoses: Inflammatory polyarthropathy (H)       !! levothyroxine (SYNTHROID/LEVOTHROID) 137 MCG tablet Take 1 tablet (137 mcg) by mouth daily Along with 25mcg to equal 162mcg daily.  Qty: 30 tablet, Refills: 6     Associated Diagnoses: Hypothyroidism, unspecified type       !! levothyroxine (SYNTHROID/LEVOTHROID) 25 MCG tablet Take 1 tablet (25 mcg) by mouth daily Take along with the 137mcg to equal 162mcg total daily.  Qty: 30 tablet, Refills: 6     Associated Diagnoses: Hypothyroidism, unspecified type       meclizine (ANTIVERT) 25 MG tablet Take 25 mg by mouth daily as needed for dizziness       Multiple Vitamins-Minerals (MULTIVITAMIN ADULT) TABS Take 1 tablet by mouth every evening        Naproxen Sodium (ALEVE PO) Take 2 tablets by mouth 2 times daily as needed        pantoprazole (PROTONIX) 40 MG EC tablet Take 1 tablet (40 mg) by mouth daily  Qty: 90 tablet, Refills: 2     Comments: This prescription was filled on 3/18/2020. Any refills authorized will be placed on file.  Associated Diagnoses: Gastroesophageal reflux disease without esophagitis       propranolol (INDERAL) 40 MG tablet Take 1  tablet (40 mg) by mouth 2 times daily  Qty: 60 tablet, Refills: 3     Associated Diagnoses: Essential tremor; Migraine without aura and without status migrainosus, not intractable       SHINGRIX injection         simvastatin (ZOCOR) 20 MG tablet Take 1 tablet (20 mg) by mouth At Bedtime.  Please make an office visit for further refills.  Qty: 90 tablet, Refills: 0     Associated Diagnoses: Hyperlipidemia LDL goal <100       tolterodine ER (DETROL LA) 4 MG 24 hr capsule Take 1 capsule (4 mg) by mouth daily  Qty: 90 capsule, Refills: 1     Comments: This prescription was filled on 4/2/2020. Any refills authorized will be placed on file.  Associated Diagnoses: Urinary urgency       triamcinolone (ARISTOCORT HP) 0.5 % external cream Apply topically 2 times daily as needed To shin for dry skin       triamcinolone (KENALOG) 0.1 % external ointment Apply ointment twice daily on lower legs for 2 weeks  Qty: 30 g, Refills: 0     Associated Diagnoses: Atopic dermatitis, unspecified type        !! - Potential duplicate medications found. Please discuss with provider.                Allergies            Allergies   Allergen Reactions     Erythromycin Other (See Comments)       Edema     Hydrocodone GI Disturbance       GI Upset, Tolerates dilaudid     Oxycodone GI Disturbance                   Routing History

## 2020-11-20 NOTE — PROGRESS NOTES
DISCHARGE:  Discharged to: Home  Via: Automobile  Accompanied by: Spouse  Discharge Instructions: Diet, activity, medications, follow up appointments, when to call the MD, and what to watch out for (i.e. s/s of infection, increasing SOB, palpitations, chest pain, etc.)  Prescriptions: Medication list reviewed & sent with patient  Follow Up Appointments: arranged; information given  Belongings: All sent with patient  IV: Removed  Telemetry: off    Patient exhibits understanding of above discharge instructions; all questions answered.  Discharge Paperwork: faxed

## 2020-11-23 DIAGNOSIS — E78.5 HYPERLIPIDEMIA LDL GOAL <100: ICD-10-CM

## 2020-11-23 DIAGNOSIS — Z79.899 HIGH RISK MEDICATIONS (NOT ANTICOAGULANTS) LONG-TERM USE: ICD-10-CM

## 2020-11-23 DIAGNOSIS — M06.00 SERONEGATIVE RHEUMATOID ARTHRITIS (H): ICD-10-CM

## 2020-11-23 DIAGNOSIS — Z85.3 PERSONAL HISTORY OF MALIGNANT NEOPLASM OF BREAST: ICD-10-CM

## 2020-11-23 LAB
ALBUMIN SERPL-MCNC: 4.1 G/DL (ref 3.4–5)
ALBUMIN SERPL-MCNC: 4.1 G/DL (ref 3.4–5)
ALP SERPL-CCNC: 71 U/L (ref 40–150)
ALP SERPL-CCNC: 75 U/L (ref 40–150)
ALT SERPL W P-5'-P-CCNC: 41 U/L (ref 0–50)
ALT SERPL W P-5'-P-CCNC: 41 U/L (ref 0–50)
ANION GAP SERPL CALCULATED.3IONS-SCNC: 6 MMOL/L (ref 3–14)
AST SERPL W P-5'-P-CCNC: 20 U/L (ref 0–45)
AST SERPL W P-5'-P-CCNC: 24 U/L (ref 0–45)
BASOPHILS # BLD AUTO: 0.1 10E9/L (ref 0–0.2)
BASOPHILS NFR BLD AUTO: 1.1 %
BILIRUB DIRECT SERPL-MCNC: 0.2 MG/DL (ref 0–0.2)
BILIRUB SERPL-MCNC: 0.7 MG/DL (ref 0.2–1.3)
BILIRUB SERPL-MCNC: 0.7 MG/DL (ref 0.2–1.3)
BUN SERPL-MCNC: 25 MG/DL (ref 7–30)
CALCIUM SERPL-MCNC: 9.6 MG/DL (ref 8.5–10.1)
CANCER AG27-29 SERPL-ACNC: 21 U/ML (ref 0–39)
CHLORIDE SERPL-SCNC: 100 MMOL/L (ref 94–109)
CHOLEST SERPL-MCNC: 173 MG/DL
CO2 SERPL-SCNC: 30 MMOL/L (ref 20–32)
CREAT SERPL-MCNC: 1.12 MG/DL (ref 0.52–1.04)
CREAT SERPL-MCNC: 1.12 MG/DL (ref 0.52–1.04)
CRP SERPL-MCNC: <2.9 MG/L (ref 0–8)
DIFFERENTIAL METHOD BLD: NORMAL
EOSINOPHIL # BLD AUTO: 0.3 10E9/L (ref 0–0.7)
EOSINOPHIL NFR BLD AUTO: 5.1 %
ERYTHROCYTE [DISTWIDTH] IN BLOOD BY AUTOMATED COUNT: 13.1 % (ref 10–15)
ERYTHROCYTE [SEDIMENTATION RATE] IN BLOOD BY WESTERGREN METHOD: 9 MM/H (ref 0–30)
GFR SERPL CREATININE-BSD FRML MDRD: 47 ML/MIN/{1.73_M2}
GFR SERPL CREATININE-BSD FRML MDRD: 47 ML/MIN/{1.73_M2}
GLUCOSE SERPL-MCNC: 98 MG/DL (ref 70–99)
HCT VFR BLD AUTO: 40.3 % (ref 35–47)
HDLC SERPL-MCNC: 90 MG/DL
HGB BLD-MCNC: 13.4 G/DL (ref 11.7–15.7)
LDLC SERPL CALC-MCNC: 65 MG/DL
LYMPHOCYTES # BLD AUTO: 1.9 10E9/L (ref 0.8–5.3)
LYMPHOCYTES NFR BLD AUTO: 35 %
MCH RBC QN AUTO: 31.1 PG (ref 26.5–33)
MCHC RBC AUTO-ENTMCNC: 33.3 G/DL (ref 31.5–36.5)
MCV RBC AUTO: 94 FL (ref 78–100)
MONOCYTES # BLD AUTO: 0.6 10E9/L (ref 0–1.3)
MONOCYTES NFR BLD AUTO: 10.6 %
NEUTROPHILS # BLD AUTO: 2.7 10E9/L (ref 1.6–8.3)
NEUTROPHILS NFR BLD AUTO: 48.2 %
NONHDLC SERPL-MCNC: 83 MG/DL
PLATELET # BLD AUTO: 231 10E9/L (ref 150–450)
POTASSIUM SERPL-SCNC: 4.5 MMOL/L (ref 3.4–5.3)
PROT SERPL-MCNC: 7.7 G/DL (ref 6.8–8.8)
PROT SERPL-MCNC: 7.9 G/DL (ref 6.8–8.8)
RBC # BLD AUTO: 4.31 10E12/L (ref 3.8–5.2)
SODIUM SERPL-SCNC: 136 MMOL/L (ref 133–144)
TRIGL SERPL-MCNC: 89 MG/DL
WBC # BLD AUTO: 5.5 10E9/L (ref 4–11)

## 2020-11-23 PROCEDURE — 82565 ASSAY OF CREATININE: CPT | Performed by: INTERNAL MEDICINE

## 2020-11-23 PROCEDURE — 86140 C-REACTIVE PROTEIN: CPT | Performed by: INTERNAL MEDICINE

## 2020-11-23 PROCEDURE — 85025 COMPLETE CBC W/AUTO DIFF WBC: CPT | Performed by: INTERNAL MEDICINE

## 2020-11-23 PROCEDURE — 86300 IMMUNOASSAY TUMOR CA 15-3: CPT | Performed by: INTERNAL MEDICINE

## 2020-11-23 PROCEDURE — 80076 HEPATIC FUNCTION PANEL: CPT | Performed by: INTERNAL MEDICINE

## 2020-11-23 PROCEDURE — 80061 LIPID PANEL: CPT | Performed by: FAMILY MEDICINE

## 2020-11-23 PROCEDURE — 80053 COMPREHEN METABOLIC PANEL: CPT | Performed by: INTERNAL MEDICINE

## 2020-11-23 PROCEDURE — 36415 COLL VENOUS BLD VENIPUNCTURE: CPT | Performed by: INTERNAL MEDICINE

## 2020-11-23 PROCEDURE — 85652 RBC SED RATE AUTOMATED: CPT | Performed by: INTERNAL MEDICINE

## 2020-11-23 NOTE — UTILIZATION REVIEW
Admission Status; Secondary Review Determination    No action to be taken. Please contact me on my Email : [email or web address] if you have any questions.    As part of the [facility] Utilization review plan, a self-audit is done on Medicare inpatient admission with less than 2 midnights stay. The 2014 IPPS Final Rule allows outpatient billing in the event that a hospital determines that an inpatient admission was not medically necessary under utilization review process.     (x) Outpatient status would be Appropriate- Short Stay- Post discharge review.    RATIONALE FOR DETERMINATION  On arrival to our hospital, patient was not SOB at rest and denied any other symptoms as per HPI   Minimal Oxygen requirements for < 3 hrs  No complex medical management noted, for ex, use of IV diuretics and electrolyte monitoring       Patient was admitted and discharge after one night stay. Record was sent by  for a PA review. Based on the  severity of illness, intensity of service provided, expected LOS and risk for adverse outcome make the care appropriate for further outpatient/observation; however, doesn't meet criteria for hospital inpatient admission.       The information on this document is developed by the utilization review team in order for the business office to ensure compliance.  This only denotes the appropriateness of proper admission status and does not reflect the quality of care rendered.       The definitions of Inpatient Status and Observation Status used in making the determination above are those provided in the CMS Coverage Manual, Chapter 1 and Chapter 6, section 70.4.     Please cont me if you want to discuss further about this admission episode.      Shamar Domingo MD, FACP, BRIAN  Medical Director - Utilization management  Staff Hospitalist  [facility]    Pager: [phone]

## 2020-11-25 ENCOUNTER — VIRTUAL VISIT (OUTPATIENT)
Dept: FAMILY MEDICINE | Facility: CLINIC | Age: 75
End: 2020-11-25
Payer: COMMERCIAL

## 2020-11-25 DIAGNOSIS — I10 ESSENTIAL HYPERTENSION: ICD-10-CM

## 2020-11-25 DIAGNOSIS — I50.23 ACUTE ON CHRONIC SYSTOLIC CONGESTIVE HEART FAILURE (H): Primary | ICD-10-CM

## 2020-11-25 PROCEDURE — 99214 OFFICE O/P EST MOD 30 MIN: CPT | Mod: 95 | Performed by: FAMILY MEDICINE

## 2020-11-25 NOTE — PATIENT INSTRUCTIONS
You are doing well!    Keep checking your weight daily.    Please monitor your blood pressure daily.    Follow up with a virtual visit with me in 10 days.    If your weight goes by 5 pounds during the week please call me. I would likely have you take 60 mg of lasix for the day and 20 meq of potassium.      Please follow the low salt diet.      Thank you for choosing [facility].  You may be receiving an email and/or telephone survey request from [facility] regarding your visit today.  Please take a few minutes to respond to the survey to let us know how we are doing.      If you have questions or concerns, please contact us via [employer] or you can contact your care team at [phone].    Our Clinic hours are:  Monday 6:40 am  to 7:00 pm  Tuesday -Friday 6:40 am to 5:00 pm    The [place] outpatient lab hours are:  Monday - Friday 6:10 am to 4:45 pm  Saturdays 7:00 am to 11:00 am  Appointments are required, call [phone]    If you have clinical questions after hours or would like to schedule an appointment,  call the clinic at [phone].

## 2020-11-25 NOTE — PROGRESS NOTES
"Briana Mcgee is a 75 year old female who is being evaluated via a billable telephone visit.      The patient has been notified of following:     \"This telephone visit will be conducted via a call between you and your physician/provider. We have found that certain health care needs can be provided without the need for a physical exam.  This service lets us provide the care you need with a short phone conversation.  If a prescription is necessary we can send it directly to your pharmacy.  If lab work is needed we can place an order for that and you can then stop by our lab to have the test done at a later time.    Telephone visits are billed at different rates depending on your insurance coverage. During this emergency period, for some insurers they may be billed the same as an in-person visit.  Please reach out to your insurance provider with any questions.    If during the course of the call the physician/provider feels a telephone visit is not appropriate, you will not be charged for this service.\"    Patient has given verbal consent for Telephone visit?  Yes    What phone number would you like to be contacted at? [phone]    How would you like to obtain your AVS? Harpreet Cates     Briana Mcgee is a 75 year old female who presents via phone visit today for the following health issues:    HPI    Chief Complaint   Patient presents with     Hospital F/U     Patient states she is feeling better, having a better time breathing. Taking BP's every morning 100-120's/50-70's.      Hospital Follow-up Visit:    Hospital/Nursing Home/IP Rehab Facility: [facility]  Date of Admission: 11/15/2020  Date of Discharge: 11/19/2020  Reason(s) for Admission: Acute on chronic congestive heart failure, unspecified heart failure type (H)      Was your hospitalization related to COVID-19? No   Problems taking medications regularly:  None  Medication changes since discharge: None  Problems " adhering to non-medication therapy:  None    Summary of hospitalization:  [facility] discharge summary reviewed  Diagnostic Tests/Treatments reviewed.  Follow up needed: need to be seen and followed by cardiology until stable, needs to have repeat echo in 3 months.  Other Healthcare Providers Involved in Patient s Care:         cardiology  Update since discharge: improved.       Post Discharge Medication Reconciliation: discharge medications reconciled and changed, per note/orders.  Plan of care communicated with patient              She is feeling better.  Doing all of the monitoring.  She is checking her blood pressure daily.  No side effect of meds.  Does not recall an episode that would have been like an MI.  States only sleeping issue and problems breathing were the underlying issue.  No lower extremity edema noted.  She is trying to follow low salt diet.    She denies any overuse of alcohol.  No recent viral infections.  No other medication changes recently prior to her heart failure.        Review of Systems   Review Of Systems  Skin: negative  Eyes:   Ears/Nose/Throat: negative  Respiratory: No shortness of breath, dyspnea on exertion, cough, or hemoptysis  Cardiovascular: negative  Gastrointestinal: negative  Genitourinary: negative  Musculoskeletal:   Neurologic:   Psychiatric:   Hematologic/Lymphatic/Immunologic:   Endocrine:          Objective          Vitals:  No vitals were obtained today due to virtual visit.    healthy, alert and no distress  PSYCH: Alert and oriented times 3; coherent speech, normal   rate and volume, able to articulate logical thoughts, able   to abstract reason, no tangential thoughts, no hallucinations   or delusions  Her affect is normal  RESP: No cough, no audible wheezing, able to talk in full sentences  Remainder of exam unable to be completed due to telephone visits            Assessment/Plan:    Assessment & Plan     Acute on chronic systolic congestive heart failure  (H)  Patient is stable and doing well.  Weight is about 172#.  Checking daily.  Reviewed chart and noted recent comp profile with normal electrolytes.  She is taking her bp daily.  Continue to monitor and instructed.  Would like to increase her blood pressure medication.  She states it was about 120/70 today.  She is trying to follow a low salt diet.  She will do a virtual visit with me in 10 days.  Would like at that time to increase to 10 mg of lisinopril.  In addition reminded her that she will need to have an echo in 3 months to assess heart function.     Essential hypertension  controlled          See Patient Instructions    Return in about 10 months (around 9/25/2021) for Telephone visit.    Xavier Vega MD  [facility]    Phone call duration:  17 minutes

## 2020-12-03 ENCOUNTER — INFUSION THERAPY VISIT (OUTPATIENT)
Dept: INFUSION THERAPY | Facility: CLINIC | Age: 75
End: 2020-12-03
Attending: INTERNAL MEDICINE
Payer: MEDICARE

## 2020-12-03 VITALS
HEART RATE: 72 BPM | SYSTOLIC BLOOD PRESSURE: 119 MMHG | WEIGHT: 172 LBS | DIASTOLIC BLOOD PRESSURE: 44 MMHG | BODY MASS INDEX: 28.62 KG/M2 | TEMPERATURE: 98 F

## 2020-12-03 DIAGNOSIS — M06.042 RHEUMATOID ARTHRITIS INVOLVING BOTH HANDS WITH NEGATIVE RHEUMATOID FACTOR (H): Primary | ICD-10-CM

## 2020-12-03 DIAGNOSIS — M06.041 RHEUMATOID ARTHRITIS INVOLVING BOTH HANDS WITH NEGATIVE RHEUMATOID FACTOR (H): Primary | ICD-10-CM

## 2020-12-03 PROCEDURE — 96415 CHEMO IV INFUSION ADDL HR: CPT

## 2020-12-03 PROCEDURE — 96413 CHEMO IV INFUSION 1 HR: CPT

## 2020-12-03 PROCEDURE — 258N000003 HC RX IP 258 OP 636: Performed by: INTERNAL MEDICINE

## 2020-12-03 PROCEDURE — 250N000011 HC RX IP 250 OP 636: Performed by: INTERNAL MEDICINE

## 2020-12-03 RX ADMIN — INFLIXIMAB 300 MG: 100 INJECTION, POWDER, LYOPHILIZED, FOR SOLUTION INTRAVENOUS at 11:19

## 2020-12-03 NOTE — PROGRESS NOTES
Infusion Nursing Note:  Briana Mcgee presents today for Remicade 1st dose.    Patient seen by provider today: No   present during visit today: Not Applicable.    Note: N/A.    Intravenous Access:  Peripheral IV started    Treatment Conditions:  Biological Infusion Checklist:  ~~~ NOTE: If the patient answers yes to any of the questions below, hold the infusion and contact ordering provider or on-call provider.    1. Have you recently had an elevated temperature, fever, chills, productive cough, coughing for 3 weeks or longer or hemoptysis, abnormal vital signs, night sweats,  chest pain or have you noticed a decrease in your appetite, unexplained weight loss or fatigue? No  2. Do you have any open wounds or new incisions? No  3. Do you have any recent or upcoming hospitalizations, surgeries or dental procedures? No  4. Do you currently have or recently have had any signs of illness or infection or are you on any antibiotics? No  5. Have you had any new, sudden or worsening abdominal pain? No  6. Have you or anyone in your household received a live vaccination in the past 4 weeks? Please note:  No live vaccines while on biologic/chemotherapy until 6 months after the last treatment.  Patient can receive the flu vaccine (shot only) and the pneumovax.  It is optimal for the patient to get these vaccines mid cycle, but they can be given at any time as long as it is not on the day of the infusion. No  7. Have you recently been diagnosed with any new nervous system diseases (ie. Multiple sclerosis, Guillain [place], seizures, neurological changes) or cancer diagnosis? No  8. Are you on any form of radiation or chemotherapy? No  9. Are you pregnant or breast feeding or do you have plans of pregnancy in the future? No  10. Have you been having any signs of worsening depression or suicidal ideations?  (benlysta only) No  11. Have there been any other new onset medical symptoms? No      Post Infusion  Assessment:  Patient tolerated infusion without incident.  Access discontinued per protocol.       Discharge Plan:   Patient discharged in stable condition accompanied by: self.  Departure Mode: Ambulatory.  Return for next dose on 12/17    Francine Reyes RN

## 2020-12-07 ENCOUNTER — HOSPITAL ENCOUNTER (OUTPATIENT)
Dept: MAMMOGRAPHY | Facility: CLINIC | Age: 75
Discharge: HOME | End: 2020-12-07
Attending: INTERNAL MEDICINE | Admitting: INTERNAL MEDICINE
Payer: MEDICARE

## 2020-12-07 DIAGNOSIS — Z85.3 PERSONAL HISTORY OF MALIGNANT NEOPLASM OF BREAST: ICD-10-CM

## 2020-12-07 DIAGNOSIS — Z12.31 SCREENING MAMMOGRAM, ENCOUNTER FOR: ICD-10-CM

## 2020-12-07 PROCEDURE — 77067 SCR MAMMO BI INCL CAD: CPT

## 2020-12-10 ENCOUNTER — MYC MEDICAL ADVICE (OUTPATIENT)
Dept: RHEUMATOLOGY | Facility: CLINIC | Age: 75
End: 2020-12-10

## 2020-12-13 DIAGNOSIS — R39.15 URINARY URGENCY: ICD-10-CM

## 2020-12-14 ENCOUNTER — PRE VISIT (OUTPATIENT)
Dept: CARDIOLOGY | Facility: CLINIC | Age: 75
End: 2020-12-14

## 2020-12-14 NOTE — TELEPHONE ENCOUNTER
"Requested Prescriptions   Pending Prescriptions Disp Refills     tolterodine ER (DETROL LA) 4 MG 24 hr capsule [Pharmacy Med Name: tolterodine ER 4 mg capsule,extended release 24 hr] 90 capsule 1     Sig: Take 1 capsule (4 mg) by mouth daily       Muscarinic Antagonists (Urinary Incontinence Agents) Passed - 12/13/2020  8:00 AM        Passed - Recent (12 mo) or future (30 days) visit within the authorizing provider's specialty     Patient has had an office visit with the authorizing provider or a provider within the authorizing providers department within the previous 12 mos or has a future within next 30 days. See \"Patient Info\" tab in inbasket, or \"Choose Columns\" in Meds & Orders section of the refill encounter.              Passed - Patient does not have a diagnosis of glaucoma on the problem list     If glaucoma diagnosis is new, refer refill to physician.          Passed - Medication is active on med list        Passed - Patient is 18 years of age or older             "

## 2020-12-15 ENCOUNTER — OFFICE VISIT (OUTPATIENT)
Dept: CARDIOLOGY | Facility: CLINIC | Age: 75
End: 2020-12-15
Payer: COMMERCIAL

## 2020-12-15 VITALS
TEMPERATURE: 97.6 F | BODY MASS INDEX: 29.29 KG/M2 | DIASTOLIC BLOOD PRESSURE: 64 MMHG | WEIGHT: 176 LBS | OXYGEN SATURATION: 97 % | HEART RATE: 73 BPM | SYSTOLIC BLOOD PRESSURE: 118 MMHG

## 2020-12-15 DIAGNOSIS — I50.22 CHRONIC SYSTOLIC HEART FAILURE (H): ICD-10-CM

## 2020-12-15 DIAGNOSIS — I50.22 CHRONIC SYSTOLIC HEART FAILURE (H): Primary | ICD-10-CM

## 2020-12-15 LAB
ALBUMIN SERPL-MCNC: 3.6 G/DL (ref 3.4–5)
ALP SERPL-CCNC: 87 U/L (ref 40–150)
ALT SERPL W P-5'-P-CCNC: 41 U/L (ref 0–50)
ANION GAP SERPL CALCULATED.3IONS-SCNC: 2 MMOL/L (ref 3–14)
AST SERPL W P-5'-P-CCNC: 35 U/L (ref 0–45)
BILIRUB SERPL-MCNC: 0.6 MG/DL (ref 0.2–1.3)
BUN SERPL-MCNC: 13 MG/DL (ref 7–30)
CALCIUM SERPL-MCNC: 9.1 MG/DL (ref 8.5–10.1)
CHLORIDE SERPL-SCNC: 101 MMOL/L (ref 94–109)
CO2 SERPL-SCNC: 32 MMOL/L (ref 20–32)
CREAT SERPL-MCNC: 0.98 MG/DL (ref 0.52–1.04)
GFR SERPL CREATININE-BSD FRML MDRD: 56 ML/MIN/{1.73_M2}
GLUCOSE SERPL-MCNC: 100 MG/DL (ref 70–99)
NT-PROBNP SERPL-MCNC: 4593 PG/ML (ref 0–450)
POTASSIUM SERPL-SCNC: 4 MMOL/L (ref 3.4–5.3)
PROT SERPL-MCNC: 7.6 G/DL (ref 6.8–8.8)
SODIUM SERPL-SCNC: 135 MMOL/L (ref 133–144)

## 2020-12-15 PROCEDURE — 83880 ASSAY OF NATRIURETIC PEPTIDE: CPT | Performed by: INTERNAL MEDICINE

## 2020-12-15 PROCEDURE — 36415 COLL VENOUS BLD VENIPUNCTURE: CPT | Performed by: INTERNAL MEDICINE

## 2020-12-15 PROCEDURE — 80053 COMPREHEN METABOLIC PANEL: CPT | Performed by: INTERNAL MEDICINE

## 2020-12-15 PROCEDURE — 99204 OFFICE O/P NEW MOD 45 MIN: CPT | Performed by: INTERNAL MEDICINE

## 2020-12-15 NOTE — PROGRESS NOTES
HPI and Plan:   See dictation [id number]    Orders Placed This Encounter   Procedures     MRI Cardiac w/contrast and flow     Basic metabolic panel     TSH with free T4 reflex     Hemoglobin     N terminal pro BNP outpatient     Comprehensive metabolic panel     N terminal pro BNP outpatient     Follow-Up with [facility] - Return MD visit     Leadless EKG Monitor 3 to 14 Days     No orders of the defined types were placed in this encounter.    There are no discontinued medications.      Encounter Diagnosis   Name Primary?     Chronic systolic heart failure (H) Yes       CURRENT MEDICATIONS:  Current Outpatient Medications   Medication Sig Dispense Refill     aspirin 81 MG EC tablet Take 1 tablet (81 mg) by mouth daily 90 tablet 3     atorvastatin (LIPITOR) 40 MG tablet Take 1 tablet (40 mg) by mouth every evening 90 tablet 3     calcium carbonate (OS-MARIO) 500 MG tablet Take 1 tablet by mouth daily       Coenzyme Q10 (COQ10 PO) Take 1 capsule by mouth every morning        fluticasone (FLONASE) 50 MCG/ACT nasal spray Spray 2 sprays into both nostrils daily as needed for rhinitis or allergies Hold on file until needed 16 g 11     FOLIC ACID PO Take 1 mg by mouth daily       furosemide (LASIX) 40 MG tablet Take 1 tablet (40 mg) by mouth daily 90 tablet 3     hydroxychloroquine (PLAQUENIL) 200 MG tablet Take 1 tablet (200 mg) by mouth 2 times daily 180 tablet 2     levothyroxine (SYNTHROID/LEVOTHROID) 137 MCG tablet Take 1 tablet (137 mcg) by mouth daily Along with 25mcg to equal 162mcg daily. 30 tablet 6     levothyroxine (SYNTHROID/LEVOTHROID) 25 MCG tablet Take 1 tablet (25 mcg) by mouth daily Take along with the 137mcg to equal 162mcg total daily. 30 tablet 6     lisinopril (ZESTRIL) 5 MG tablet Take 1 tablet (5 mg) by mouth daily 90 tablet 3     meclizine (ANTIVERT) 25 MG tablet Take 25 mg by mouth daily as needed for dizziness       metoprolol succinate ER (TOPROL-XL) 25 MG 24 hr tablet Take 1 tablet (25 mg) by mouth  "daily 90 tablet 3     Multiple Vitamins-Minerals (MULTIVITAMIN ADULT) TABS Take 1 tablet by mouth every evening        Naproxen Sodium (ALEVE PO) Take 2 tablets by mouth 2 times daily as needed        pantoprazole (PROTONIX) 40 MG EC tablet Take 1 tablet (40 mg) by mouth daily 90 tablet 2     potassium chloride ER (KLOR-CON M) 10 MEQ CR tablet Take 1 tablet (10 mEq) by mouth daily Take with your daily dose of furosemide 30 tablet 3     SHINGRIX injection        tolterodine ER (DETROL LA) 4 MG 24 hr capsule Take 1 capsule (4 mg) by mouth daily 90 capsule 1     triamcinolone (ARISTOCORT HP) 0.5 % external cream Apply topically 2 times daily as needed To shin for dry skin       triamcinolone (KENALOG) 0.1 % external ointment Apply ointment twice daily on lower legs for 2 weeks 30 g 0       ALLERGIES     Allergies   Allergen Reactions     Erythromycin Other (See Comments)     Edema     Hydrocodone GI Disturbance     GI Upset, Tolerates dilaudid     Oxycodone GI Disturbance       PAST MEDICAL HISTORY:  Past Medical History:   Diagnosis Date     Allergies      Breast cancer (H)      Esophageal reflux      Hypertension      Other and unspecified hyperlipidemia      Other chronic bronchitis      Personal history of pneumonia (recurrent)     Hx-Pneumonia, \" Chronic Bronchitis\"     Personal history of tobacco use, presenting hazards to health      RA (rheumatoid arthritis) (H)      Unspecified hypothyroidism        PAST SURGICAL HISTORY:  Past Surgical History:   Procedure Laterality Date     BIOPSY BREAST       COLONOSCOPY N/A 9/2/2016    Procedure: COLONOSCOPY;  Surgeon: Dean Sanchez MD;  Location: [facility]     CV CORONARY ANGIOGRAM N/A 11/18/2020    Procedure: Coronary Angiogram;  Surgeon: Leonid Smith MD;  Location:  [facility]     EXCISE EXOSTOSIS FOOT Right 4/25/2017    Procedure: EXCISE EXOSTOSIS FOOT;  Retrocalcaneal exostectomy right;  Surgeon: Antwan Goldstein DPM;  Location: [facility]     " HC EXCISION BREAST LESION, OPEN >=1      2/05     HYSTERECTOMY, RYAN  1982     for non cancerous reasons and no abnormal pap     INJECT EPIDURAL LUMBAR  1/12/2011    INJECT EPIDURAL LUMBAR performed by GENERIC ANESTHESIA PROVIDER at [facility]     INJECT EPIDURAL LUMBAR  5/5/2011    Procedure:INJECT EPIDURAL LUMBAR; LESLIE -Dr. Sánchez  ; Surgeon:GENERIC ANESTHESIA PROVIDER; Location:[facility]     INJECT EPIDURAL LUMBAR  10/6/2011    Procedure:INJECT EPIDURAL LUMBAR; LESLIE--; Surgeon:GENERIC ANESTHESIA PROVIDER; Location:[facility]     INJECT EPIDURAL LUMBAR  1/25/2012    Procedure:INJECT EPIDURAL LUMBAR; Terrell Sánchez  ; Surgeon:GENERIC ANESTHESIA PROVIDER; Location:[facility]     INJECT EPIDURAL LUMBAR  7/9/2012    Procedure: INJECT EPIDURAL LUMBAR;  Terrell Pacheco;  Surgeon: Provider, Generic Anesthesia;  Location: [facility]     LAMINECTOMY LUMBAR MINIMALLY INVASIVE TWO LEVELS N/A 11/21/2017    Procedure: LAMINECTOMY LUMBAR MINIMALLY INVASIVE TWO LEVELS;  L4-5 decompression laminectomy, Left L5-S1 foraminal decompression;  Surgeon: Jesse Dueñas MD;  Location: [facility]     LUMPECTOMY BREAST       RELEASE CARPAL TUNNEL Right 9/5/2017    Procedure: RELEASE CARPAL TUNNEL;  Right Wrist Carpal Tunnel Release;  Surgeon: Melba Yi MD;  Location: [facility]     RELEASE CARPAL TUNNEL Left 10/31/2017    Procedure: RELEASE CARPAL TUNNEL;  Left Wrist Carpal Tunnel Release;  Surgeon: Melba Yi MD;  Location: [facility]     SURGICAL HISTORY OF -       lumpectomy with sentinal node biopsy     SURGICAL HISTORY OF -       left knee arthoscopic repair medial meniscus       FAMILY HISTORY:  Family History   Problem Relation Age of Onset     Diabetes Mother      Hypertension Mother      Cancer Mother         breast     Heart Disease Mother         chf     Breast Cancer Mother      Blood Disease Father      Diabetes Son         Type 1     Psoriasis Son      Cerebrovascular Disease Son 48     Cerebrovascular Disease Sister      Breast Cancer  Paternal Aunt        SOCIAL HISTORY:  Social History     Socioeconomic History     Marital status:      Spouse name: liberty     Number of children: 2     Years of education: None     Highest education level: None   Occupational History     Employer: [employer]     Employer: RETIRED   Social Needs     Financial resource strain: None     Food insecurity     Worry: None     Inability: None     Transportation needs     Medical: None     Non-medical: None   Tobacco Use     Smoking status: Former Smoker     Types: Cigarettes     Quit date: 1996     Years since quittin.4     Smokeless tobacco: Never Used     Tobacco comment: quit 10-12 years ago, .   Substance and Sexual Activity     Alcohol use: Yes     Comment: glass of wine at night     Drug use: No     Sexual activity: Yes     Partners: Male     Birth control/protection: Surgical   Lifestyle     Physical activity     Days per week: None     Minutes per session: None     Stress: None   Relationships     Social connections     Talks on phone: None     Gets together: None     Attends Yazdanism service: None     Active member of club or organization: None     Attends meetings of clubs or organizations: None     Relationship status: None     Intimate partner violence     Fear of current or ex partner: None     Emotionally abused: None     Physically abused: None     Forced sexual activity: None   Other Topics Concern     Parent/sibling w/ CABG, MI or angioplasty before 65F 55M? Yes   Social History Narrative     None       Review of Systems:  Skin:  Negative     Eyes:  Positive for glasses  ENT:  Negative    Respiratory:  Positive for shortness of breath;cough  Cardiovascular:    Positive for;lightheadedness;dizziness  Gastroenterology: Negative    Genitourinary:  Negative    Musculoskeletal:  Negative    Neurologic:  Positive for stroke  Psychiatric:  Negative    Heme/Lymph/Imm:  Negative    Endocrine:  Positive for thyroid  disorder    Physical Exam:  Vitals: /64   Pulse 73   Temp 97.6  F (36.4  C)   Wt 79.8 kg (176 lb)   SpO2 97%   BMI 29.29 kg/m      Constitutional:           Skin:             Head:           Eyes:           Lymph:      ENT:           Neck:           Respiratory:            Cardiac:                                                           GI:           Extremities and Muscular Skeletal:                 Neurological:           Psych:           Recent Lab Results:  LIPID RESULTS:  Lab Results   Component Value Date    CHOL 173 11/23/2020    HDL 90 11/23/2020    LDL 65 11/23/2020    TRIG 89 11/23/2020    CHOLHDLRATIO 2.0 03/10/2015       LIVER ENZYME RESULTS:  Lab Results   Component Value Date    AST 24 11/23/2020    ALT 41 11/23/2020       CBC RESULTS:  Lab Results   Component Value Date    WBC 5.5 11/23/2020    RBC 4.31 11/23/2020    HGB 13.4 11/23/2020    HCT 40.3 11/23/2020    MCV 94 11/23/2020    MCH 31.1 11/23/2020    MCHC 33.3 11/23/2020    RDW 13.1 11/23/2020     11/23/2020       BMP RESULTS:  Lab Results   Component Value Date     11/23/2020    POTASSIUM 4.5 11/23/2020    CHLORIDE 100 11/23/2020    CO2 30 11/23/2020    ANIONGAP 6 11/23/2020    GLC 98 11/23/2020    BUN 25 11/23/2020    CR 1.12 (H) 11/23/2020    GFRESTIMATED 47 (L) 11/23/2020    GFRESTBLACK 55 (L) 11/23/2020    MARIO 9.6 11/23/2020        A1C RESULTS:  Lab Results   Component Value Date    A1C 5.4 11/18/2020       INR RESULTS:  Lab Results   Component Value Date    INR 1.10 11/18/2020    INR 0.98 04/28/2006           CC  Alvarez Webb MD  [address] [place] [address]

## 2020-12-15 NOTE — PATIENT INSTRUCTIONS
1. Increase Metoprolol to 25 mg twice a day  2. Write down daily BP and HR on a paper and weight  3. Get MRI at [facility]   4. Get Zio patch for 14 days and blood work today  5. Blood work in 4 weeks and see Dr Shook or his [facility] in clinic.

## 2020-12-15 NOTE — RESULT ENCOUNTER NOTE
Electrolytes and LFTs WNL; creatinine WNL; GFR slightly decreased but both improved from previous; BNP elevated (was 7456 while in hospital on 11/15/20). Will discuss with Dr Shook for recommendations.

## 2020-12-15 NOTE — PROGRESS NOTES
Service Date: 12/15/2020      HISTORY REASON FOR VISIT:  New heart failure consultation.      HISTORY OF PRESENT ILLNESS:  This is a very pleasant 75-year-old female who has been referred to me at [facility] for a new consultation.  This is the first time I am meeting the patient.      The patient denies any prior cardiac history except for mild degree of hyperlipidemia, hypertension, borderline CKD and hypothyroidism.  She says she had a stroke in 2006 and rheumatic fever as a child for which she has never had a murmur.  She has a history of breast cancer without any breast radiation but chemotherapy many years ago in 2005.  Recently, she has been receiving IV Remicade for rheumatoid arthritis as well.      In any case, the patient in mid 11/2020 had been experiencing orthopnea, PND, lower extremity edema and shortness of breath for which she went to [facility].  She had an echocardiogram on 11/16/2020 which showed new heart failure with reduced ejection fraction.  I looked at the echocardiogram personally.  Biplane EF were not traced.  LV appears to be mildly dilated.  However, the EF appears to be around 30%-35%.  Her NT-proBNP was elevated to 7000.  Troponins were negative.  Chest x-ray was consistent with mild pulmonary edema.  ECG showed a right bundle branch block and otherwise was in sinus rhythm.  She was transferred to [facility] and was initially diuresed.  She had a Lexiscan on 11/17 which showed a medium-sized anterior infarction.  As a result of this, she underwent a coronary angiography and Dr. Poole saw her in the hospital back there.  Coronary angiography just showed mild proximal LAD disease, otherwise nonobstructive disease.  She was put on aspirin and statin.  She was diuresed, started on 40 mg of daily Lasix, put on metoprolol 25 mg succinate along with 5 mg of lisinopril and discharged home to follow up with outpatient cardiology.      Since  then, Jenna has been feeling okay.  NYHA class II, sometimes IIIA symptoms.  She has been writing her home blood pressures and all of them are in the 110s to 120s.  Heart rates in the 70 range.  She has been diligent with writing her weights, and all of them have been in the 172 to 175-pound range.  She denies any further edema, syncope, presyncope.  She has fatigue and shortness of breath that is ongoing.      Jenna denies any prior cardiovascular issues as mentioned.  She denies any history of heavy alcohol use.  She smoked more than 30 years ago and she quit.  As mentioned, she had chemotherapy for her breast cancer nearly 15 years ago.  She does not remember what she used but she denies any chest radiation.  She denies having history of atrial fibrillation or any other hypercoagulable issues.  No family history of congenital cardiomyopathy.  However, mother had congestive heart failure in the 60s.      PHYSICAL EXAMINATION:   VITAL SIGNS:  Blood pressure today is 118/64 with a pulse of 73.   GENERAL:  Alert and oriented x3, in no acute distress.   NECK:  Supple.  JVP is about 8 cm.   LUNGS:  Clear.   CARDIAC:  Cardiac sounds are regular.  There is a soft systolic murmur, no rubs or gallops.   EXTREMITIES:  Warm without edema.   PSYCHIATRIC:  Appropriate affect.   HEENT:  No icterus, pallor.   ABDOMEN:  Nondistended.      ASSESSMENT AND PLAN:  Jenna is 75 with a new diagnosis of nonischemic cardiomyopathy.  As mentioned, I personally reviewed the echocardiogram.  LV does appear to be mildly dilated.  There is significant global hypokinesis but basal segments do seem to be a little more contractile than the rest.  I do not see any signs of obvious anterior infarction as suggested on the nuclear study.  Her annulus is dilated, mitral valve is tenting and there was at least moderate to severe mitral regurgitation based on the initial echocardiography.  Fortunately, her coronary angiography showed only very mild  proximal LAD disease.  I discussed with her the etiology of his nonischemic cardiomyopathy could be a potential MINOCA that has subsequently recanalized or subclinical atrial fibrillation that may have embolized versus other forms of nonischemic cardiomyopathy ?viral ?chemo etc.      I first recommend getting a Zio Patch monitor to rule out atrial fibrillation and also get an MRI to see if she has truly had an anterior infarction or if the nuclear stress test is falsely positive due to breast tissue artifact.  I will also get a flow estimation on the MRI for quantification of this functional MR. My personal guess is that she will do okay with up titration of heart failure therapy in the [facility].      Today's recommendations:   1.  Get labs today to ensure stability and renal function after initiation of diuretics 3 weeks ago.   2.  Increase metoprolol succinate to 25 mg b.i.d. and continue lisinopril 5 mg daily.   3.  Followup in [facility] with me and my nurse practitioners for further up titration of guideline-directed therapy for heart failure.  In the future if stability of renal function has been noticed, we will switch her to Entresto if cost affordable.   4.  Follow up on the MRI and a Zio Patch results.   5.  Heart failure education provided with writing down daily weights, salt restriction, fluid restriction.   6.  If MR does not improve in 3 months on HF therapy, refer for AARTI/ MC consideration.      Connect with me in about a month from now in clinic.  Reasonable to continue aspirin and statin for mild proximal LAD disease.      cc:   Xavier Vega MD   [facility]   [address] [place] [address]         DARRELL SCHAFFER MD             D: 12/15/2020   T: 12/15/2020   MT: [email or web address]      Name:     DEMOND PLASCENCIA   MRN:      [id number]        Account:      [id number]   :      1945           Service Date: 12/15/2020      Document: [id number]

## 2020-12-15 NOTE — LETTER
12/15/2020      Xavier Vega MD  [address] [facility] [address]      RE: Briana Mcgee       Dear Colleague,    I had the pleasure of seeing Briana Mcgee in the [facility].    Service Date: 12/15/2020      HISTORY REASON FOR VISIT:  New heart failure consultation.      HISTORY OF PRESENT ILLNESS:  This is a very pleasant 75-year-old female who has been referred to me at [facility] for a new consultation.  This is the first time I am meeting the patient.      The patient denies any prior cardiac history except for mild degree of hyperlipidemia, hypertension, borderline CKD and hypothyroidism.  She says she had a stroke in 2006 and rheumatic fever as a child for which she has never had a murmur.  She has a history of breast cancer without any breast radiation but chemotherapy many years ago in 2005.  Recently, she has been receiving IV Remicade for rheumatoid arthritis as well.      In any case, the patient in mid 11/2020 had been experiencing orthopnea, PND, lower extremity edema and shortness of breath for which she went to [facility].  She had an echocardiogram on 11/16/2020 which showed new heart failure with reduced ejection fraction.  I looked at the echocardiogram personally.  Biplane EF were not traced.  LV appears to be mildly dilated.  However, the EF appears to be around 30%-35%.  Her NT-proBNP was elevated to 7000.  Troponins were negative.  Chest x-ray was consistent with mild pulmonary edema.  ECG showed a right bundle branch block and otherwise was in sinus rhythm.  She was transferred to [facility] and was initially diuresed.  She had a Lexiscan on 11/17 which showed a medium-sized anterior infarction.  As a result of this, she underwent a coronary angiography and Dr. Poole saw her in the hospital back there.  Coronary angiography just showed mild proximal LAD disease, otherwise nonobstructive  disease.  She was put on aspirin and statin.  She was diuresed, started on 40 mg of daily Lasix, put on metoprolol 25 mg succinate along with 5 mg of lisinopril and discharged home to follow up with outpatient cardiology.      Since then, Jenna has been feeling okay.  NYHA class II, sometimes IIIA symptoms.  She has been writing her home blood pressures and all of them are in the 110s to 120s.  Heart rates in the 70 range.  She has been diligent with writing her weights, and all of them have been in the 172 to 175-pound range.  She denies any further edema, syncope, presyncope.  She has fatigue and shortness of breath that is ongoing.      Jenna denies any prior cardiovascular issues as mentioned.  She denies any history of heavy alcohol use.  She smoked more than 30 years ago and she quit.  As mentioned, she had chemotherapy for her breast cancer nearly 15 years ago.  She does not remember what she used but she denies any chest radiation.  She denies having history of atrial fibrillation or any other hypercoagulable issues.  No family history of congenital cardiomyopathy.  However, mother had congestive heart failure in the 60s.      PHYSICAL EXAMINATION:   VITAL SIGNS:  Blood pressure today is 118/64 with a pulse of 73.   GENERAL:  Alert and oriented x3, in no acute distress.   NECK:  Supple.  JVP is about 8 cm.   LUNGS:  Clear.   CARDIAC:  Cardiac sounds are regular.  There is a soft systolic murmur, no rubs or gallops.   EXTREMITIES:  Warm without edema.   PSYCHIATRIC:  Appropriate affect.   HEENT:  No icterus, pallor.   ABDOMEN:  Nondistended.      ASSESSMENT AND PLAN:  Jenna is 75 with a new diagnosis of nonischemic cardiomyopathy.  As mentioned, I personally reviewed the echocardiogram.  LV does appear to be mildly dilated.  There is significant global hypokinesis but basal segments do seem to be a little more contractile than the rest.  I do not see any signs of obvious anterior infarction as suggested on  the nuclear study.  Her annulus is dilated, mitral valve is tenting and there was at least moderate to severe mitral regurgitation based on the initial echocardiography.  Fortunately, her coronary angiography showed only very mild proximal LAD disease.  I discussed with her the etiology of his nonischemic cardiomyopathy could be a potential MINOCA that has subsequently recanalized or subclinical atrial fibrillation that may have embolized versus other forms of nonischemic cardiomyopathy ?viral ?chemo etc.      I first recommend getting a Zio Patch monitor to rule out atrial fibrillation and also get an MRI to see if she has truly had an anterior infarction or if the nuclear stress test is falsely positive due to breast tissue artifact.  I will also get a flow estimation on the MRI for quantification of this functional MR. My personal guess is that she will do okay with up titration of heart failure therapy in the [facility].      Today's recommendations:   1.  Get labs today to ensure stability and renal function after initiation of diuretics 3 weeks ago.   2.  Increase metoprolol succinate to 25 mg b.i.d. and continue lisinopril 5 mg daily.   3.  Followup in [facility] with me and my nurse practitioners for further up titration of guideline-directed therapy for heart failure.  In the future if stability of renal function has been noticed, we will switch her to Entresto if cost affordable.   4.  Follow up on the MRI and a Zio Patch results.   5.  Heart failure education provided with writing down daily weights, salt restriction, fluid restriction.   6.  If MR does not improve in 3 months on HF therapy, refer for AARTI/ MC consideration.      Connect with me in about a month from now in clinic.  Reasonable to continue aspirin and statin for mild proximal LAD disease.      cc:   Xavier Vega MD   [facility]   [address] [place]  [address]         DARRELL SCHAFFER MD              D: 12/15/2020   T: 12/15/2020   MT: [email or web address]      Name:     DEMOND PLASCENCIA   MRN:      [id number]        Account:      [id number]   :      1945           Service Date: 12/15/2020      Document: [id number]            Outpatient Encounter Medications as of 12/15/2020   Medication Sig Dispense Refill     aspirin 81 MG EC tablet Take 1 tablet (81 mg) by mouth daily 90 tablet 3     atorvastatin (LIPITOR) 40 MG tablet Take 1 tablet (40 mg) by mouth every evening 90 tablet 3     calcium carbonate (OS-MARIO) 500 MG tablet Take 1 tablet by mouth daily       Coenzyme Q10 (COQ10 PO) Take 1 capsule by mouth every morning        fluticasone (FLONASE) 50 MCG/ACT nasal spray Spray 2 sprays into both nostrils daily as needed for rhinitis or allergies Hold on file until needed 16 g 11     FOLIC ACID PO Take 1 mg by mouth daily       furosemide (LASIX) 40 MG tablet Take 1 tablet (40 mg) by mouth daily 90 tablet 3     hydroxychloroquine (PLAQUENIL) 200 MG tablet Take 1 tablet (200 mg) by mouth 2 times daily 180 tablet 2     levothyroxine (SYNTHROID/LEVOTHROID) 137 MCG tablet Take 1 tablet (137 mcg) by mouth daily Along with 25mcg to equal 162mcg daily. 30 tablet 6     levothyroxine (SYNTHROID/LEVOTHROID) 25 MCG tablet Take 1 tablet (25 mcg) by mouth daily Take along with the 137mcg to equal 162mcg total daily. 30 tablet 6     lisinopril (ZESTRIL) 5 MG tablet Take 1 tablet (5 mg) by mouth daily 90 tablet 3     meclizine (ANTIVERT) 25 MG tablet Take 25 mg by mouth daily as needed for dizziness       metoprolol succinate ER (TOPROL-XL) 25 MG 24 hr tablet Take 1 tablet (25 mg) by mouth daily 90 tablet 3     Multiple Vitamins-Minerals (MULTIVITAMIN ADULT) TABS Take 1 tablet by mouth every evening        Naproxen Sodium (ALEVE PO) Take 2 tablets by mouth 2 times daily as needed        pantoprazole (PROTONIX) 40 MG EC tablet Take 1 tablet (40 mg) by mouth daily 90 tablet 2     potassium chloride ER (KLOR-CON M) 10 MEQ CR tablet Take  1 tablet (10 mEq) by mouth daily Take with your daily dose of furosemide 30 tablet 3     SHINGRIX injection        triamcinolone (ARISTOCORT HP) 0.5 % external cream Apply topically 2 times daily as needed To shin for dry skin       triamcinolone (KENALOG) 0.1 % external ointment Apply ointment twice daily on lower legs for 2 weeks 30 g 0     [DISCONTINUED] tolterodine ER (DETROL LA) 4 MG 24 hr capsule Take 1 capsule (4 mg) by mouth daily 90 capsule 1     No facility-administered encounter medications on file as of 12/15/2020.        Again, thank you for allowing me to participate in the care of your patient.      Sincerely,    Davy Shook MD     [facility]    cc:   Alvarez Webb MD  [address] [place] [address]

## 2020-12-15 NOTE — LETTER
12/15/2020    Xavier Vega MD  [address] [facility] [address]    RE: Briana Mcgee       Dear Colleague,    I had the pleasure of seeing Briana Mcgee in the [facility].    HPI and Plan:   See dictation [id number]    Orders Placed This Encounter   Procedures     MRI Cardiac w/contrast and flow     Basic metabolic panel     TSH with free T4 reflex     Hemoglobin     N terminal pro BNP outpatient     Comprehensive metabolic panel     N terminal pro BNP outpatient     Follow-Up with [facility] - Return MD visit     Leadless EKG Monitor 3 to 14 Days     No orders of the defined types were placed in this encounter.    There are no discontinued medications.      Encounter Diagnosis   Name Primary?     Chronic systolic heart failure (H) Yes       CURRENT MEDICATIONS:  Current Outpatient Medications   Medication Sig Dispense Refill     aspirin 81 MG EC tablet Take 1 tablet (81 mg) by mouth daily 90 tablet 3     atorvastatin (LIPITOR) 40 MG tablet Take 1 tablet (40 mg) by mouth every evening 90 tablet 3     calcium carbonate (OS-MARIO) 500 MG tablet Take 1 tablet by mouth daily       Coenzyme Q10 (COQ10 PO) Take 1 capsule by mouth every morning        fluticasone (FLONASE) 50 MCG/ACT nasal spray Spray 2 sprays into both nostrils daily as needed for rhinitis or allergies Hold on file until needed 16 g 11     FOLIC ACID PO Take 1 mg by mouth daily       furosemide (LASIX) 40 MG tablet Take 1 tablet (40 mg) by mouth daily 90 tablet 3     hydroxychloroquine (PLAQUENIL) 200 MG tablet Take 1 tablet (200 mg) by mouth 2 times daily 180 tablet 2     levothyroxine (SYNTHROID/LEVOTHROID) 137 MCG tablet Take 1 tablet (137 mcg) by mouth daily Along with 25mcg to equal 162mcg daily. 30 tablet 6     levothyroxine (SYNTHROID/LEVOTHROID) 25 MCG tablet Take 1 tablet (25 mcg) by mouth daily Take along with the 137mcg to equal 162mcg total daily. 30 tablet 6     lisinopril (ZESTRIL) 5 MG tablet  "Take 1 tablet (5 mg) by mouth daily 90 tablet 3     meclizine (ANTIVERT) 25 MG tablet Take 25 mg by mouth daily as needed for dizziness       metoprolol succinate ER (TOPROL-XL) 25 MG 24 hr tablet Take 1 tablet (25 mg) by mouth daily 90 tablet 3     Multiple Vitamins-Minerals (MULTIVITAMIN ADULT) TABS Take 1 tablet by mouth every evening        Naproxen Sodium (ALEVE PO) Take 2 tablets by mouth 2 times daily as needed        pantoprazole (PROTONIX) 40 MG EC tablet Take 1 tablet (40 mg) by mouth daily 90 tablet 2     potassium chloride ER (KLOR-CON M) 10 MEQ CR tablet Take 1 tablet (10 mEq) by mouth daily Take with your daily dose of furosemide 30 tablet 3     SHINGRIX injection        tolterodine ER (DETROL LA) 4 MG 24 hr capsule Take 1 capsule (4 mg) by mouth daily 90 capsule 1     triamcinolone (ARISTOCORT HP) 0.5 % external cream Apply topically 2 times daily as needed To shin for dry skin       triamcinolone (KENALOG) 0.1 % external ointment Apply ointment twice daily on lower legs for 2 weeks 30 g 0       ALLERGIES     Allergies   Allergen Reactions     Erythromycin Other (See Comments)     Edema     Hydrocodone GI Disturbance     GI Upset, Tolerates dilaudid     Oxycodone GI Disturbance       PAST MEDICAL HISTORY:  Past Medical History:   Diagnosis Date     Allergies      Breast cancer (H)      Esophageal reflux      Hypertension      Other and unspecified hyperlipidemia      Other chronic bronchitis      Personal history of pneumonia (recurrent)     Hx-Pneumonia, \" Chronic Bronchitis\"     Personal history of tobacco use, presenting hazards to health      RA (rheumatoid arthritis) (H)      Unspecified hypothyroidism        PAST SURGICAL HISTORY:  Past Surgical History:   Procedure Laterality Date     BIOPSY BREAST       COLONOSCOPY N/A 9/2/2016    Procedure: COLONOSCOPY;  Surgeon: Dean Sanchez MD;  Location: [facility]     CV CORONARY ANGIOGRAM N/A 11/18/2020    Procedure: Coronary Angiogram;  Surgeon: " Leonid Smith MD;  Location:  [facility] LAB     EXCISE EXOSTOSIS FOOT Right 4/25/2017    Procedure: EXCISE EXOSTOSIS FOOT;  Retrocalcaneal exostectomy right;  Surgeon: Antwan Goldstein DPM;  Location: [facility]      EXCISION BREAST LESION, OPEN >=1      2/05     HYSTERECTOMY, RYAN  1982     for non cancerous reasons and no abnormal pap     INJECT EPIDURAL LUMBAR  1/12/2011    INJECT EPIDURAL LUMBAR performed by GENERIC ANESTHESIA PROVIDER at [facility]     INJECT EPIDURAL LUMBAR  5/5/2011    Procedure:INJECT EPIDURAL LUMBAR; LESLIE -Dr. Sánchez  ; Surgeon:GENERIC ANESTHESIA PROVIDER; Location:[facility]     INJECT EPIDURAL LUMBAR  10/6/2011    Procedure:INJECT EPIDURAL LUMBAR; LESLIE--; Surgeon:GENERIC ANESTHESIA PROVIDER; Location:[facility]     INJECT EPIDURAL LUMBAR  1/25/2012    Procedure:INJECT EPIDURAL LUMBAR; LESLIE-Dr. Sánchez  ; Surgeon:GENERIC ANESTHESIA PROVIDER; Location:[facility]     INJECT EPIDURAL LUMBAR  7/9/2012    Procedure: INJECT EPIDURAL LUMBAR;  LESLIE-Dr. Pacheco;  Surgeon: Provider, Generic Anesthesia;  Location: [facility]     LAMINECTOMY LUMBAR MINIMALLY INVASIVE TWO LEVELS N/A 11/21/2017    Procedure: LAMINECTOMY LUMBAR MINIMALLY INVASIVE TWO LEVELS;  L4-5 decompression laminectomy, Left L5-S1 foraminal decompression;  Surgeon: Jesse Dueñas MD;  Location: [facility]     LUMPECTOMY BREAST       RELEASE CARPAL TUNNEL Right 9/5/2017    Procedure: RELEASE CARPAL TUNNEL;  Right Wrist Carpal Tunnel Release;  Surgeon: Melba Yi MD;  Location: [facility]     RELEASE CARPAL TUNNEL Left 10/31/2017    Procedure: RELEASE CARPAL TUNNEL;  Left Wrist Carpal Tunnel Release;  Surgeon: Melba Yi MD;  Location: [facility]     SURGICAL HISTORY OF -       lumpectomy with sentinal node biopsy     SURGICAL HISTORY OF -       left knee arthoscopic repair medial meniscus       FAMILY HISTORY:  Family History   Problem Relation Age of Onset     Diabetes Mother      Hypertension Mother      Cancer Mother         breast      Heart Disease Mother         chf     Breast Cancer Mother      Blood Disease Father      Diabetes Son         Type 1     Psoriasis Son      Cerebrovascular Disease Son 48     Cerebrovascular Disease Sister      Breast Cancer Paternal Aunt        SOCIAL HISTORY:  Social History     Socioeconomic History     Marital status:      Spouse name: liberty     Number of children: 2     Years of education: None     Highest education level: None   Occupational History     Employer: [employer]     Employer: RETIRED   Social Needs     Financial resource strain: None     Food insecurity     Worry: None     Inability: None     Transportation needs     Medical: None     Non-medical: None   Tobacco Use     Smoking status: Former Smoker     Types: Cigarettes     Quit date: 1996     Years since quittin.4     Smokeless tobacco: Never Used     Tobacco comment: quit 10-12 years ago, .   Substance and Sexual Activity     Alcohol use: Yes     Comment: glass of wine at night     Drug use: No     Sexual activity: Yes     Partners: Male     Birth control/protection: Surgical   Lifestyle     Physical activity     Days per week: None     Minutes per session: None     Stress: None   Relationships     Social connections     Talks on phone: None     Gets together: None     Attends Church service: None     Active member of club or organization: None     Attends meetings of clubs or organizations: None     Relationship status: None     Intimate partner violence     Fear of current or ex partner: None     Emotionally abused: None     Physically abused: None     Forced sexual activity: None   Other Topics Concern     Parent/sibling w/ CABG, MI or angioplasty before 65F 55M? Yes   Social History Narrative     None       Review of Systems:  Skin:  Negative     Eyes:  Positive for glasses  ENT:  Negative    Respiratory:  Positive for shortness of breath;cough  Cardiovascular:    Positive  for;lightheadedness;dizziness  Gastroenterology: Negative    Genitourinary:  Negative    Musculoskeletal:  Negative    Neurologic:  Positive for stroke  Psychiatric:  Negative    Heme/Lymph/Imm:  Negative    Endocrine:  Positive for thyroid disorder    Physical Exam:  Vitals: /64   Pulse 73   Temp 97.6  F (36.4  C)   Wt 79.8 kg (176 lb)   SpO2 97%   BMI 29.29 kg/m      Constitutional:           Skin:             Head:           Eyes:           Lymph:      ENT:           Neck:           Respiratory:            Cardiac:                                                           GI:           Extremities and Muscular Skeletal:                 Neurological:           Psych:           Recent Lab Results:  LIPID RESULTS:  Lab Results   Component Value Date    CHOL 173 11/23/2020    HDL 90 11/23/2020    LDL 65 11/23/2020    TRIG 89 11/23/2020    CHOLHDLRATIO 2.0 03/10/2015       LIVER ENZYME RESULTS:  Lab Results   Component Value Date    AST 24 11/23/2020    ALT 41 11/23/2020       CBC RESULTS:  Lab Results   Component Value Date    WBC 5.5 11/23/2020    RBC 4.31 11/23/2020    HGB 13.4 11/23/2020    HCT 40.3 11/23/2020    MCV 94 11/23/2020    MCH 31.1 11/23/2020    MCHC 33.3 11/23/2020    RDW 13.1 11/23/2020     11/23/2020       BMP RESULTS:  Lab Results   Component Value Date     11/23/2020    POTASSIUM 4.5 11/23/2020    CHLORIDE 100 11/23/2020    CO2 30 11/23/2020    ANIONGAP 6 11/23/2020    GLC 98 11/23/2020    BUN 25 11/23/2020    CR 1.12 (H) 11/23/2020    GFRESTIMATED 47 (L) 11/23/2020    GFRESTBLACK 55 (L) 11/23/2020    MARIO 9.6 11/23/2020        A1C RESULTS:  Lab Results   Component Value Date    A1C 5.4 11/18/2020       INR RESULTS:  Lab Results   Component Value Date    INR 1.10 11/18/2020    INR 0.98 04/28/2006           CC  Alvarez Webb MD  [address] [place] [address]                      Thank you for allowing me to participate in the care of your  patient.      Sincerely,     Davy Shook MD     [facility]    cc:   Alvarez Webb MD  [address] [place] [address]

## 2020-12-16 RX ORDER — TOLTERODINE 4 MG/1
CAPSULE, EXTENDED RELEASE ORAL
Qty: 90 CAPSULE | Refills: 1 | Status: SHIPPED | OUTPATIENT
Start: 2020-12-16 | End: 2021-06-14

## 2020-12-17 ENCOUNTER — HOSPITAL ENCOUNTER (OUTPATIENT)
Dept: CARDIOLOGY | Facility: CLINIC | Age: 75
Discharge: HOME | End: 2020-12-17
Attending: INTERNAL MEDICINE | Admitting: INTERNAL MEDICINE
Payer: MEDICARE

## 2020-12-17 ENCOUNTER — INFUSION THERAPY VISIT (OUTPATIENT)
Dept: INFUSION THERAPY | Facility: CLINIC | Age: 75
End: 2020-12-17
Attending: INTERNAL MEDICINE
Payer: MEDICARE

## 2020-12-17 DIAGNOSIS — I50.22 CHRONIC SYSTOLIC HEART FAILURE (H): ICD-10-CM

## 2020-12-17 DIAGNOSIS — M06.041 RHEUMATOID ARTHRITIS INVOLVING BOTH HANDS WITH NEGATIVE RHEUMATOID FACTOR (H): Primary | ICD-10-CM

## 2020-12-17 DIAGNOSIS — M06.042 RHEUMATOID ARTHRITIS INVOLVING BOTH HANDS WITH NEGATIVE RHEUMATOID FACTOR (H): Primary | ICD-10-CM

## 2020-12-17 PROCEDURE — 96415 CHEMO IV INFUSION ADDL HR: CPT

## 2020-12-17 PROCEDURE — 0298T LEADLESS EKG MONITOR 3 TO 14 DAYS: CPT | Performed by: INTERNAL MEDICINE

## 2020-12-17 PROCEDURE — 250N000011 HC RX IP 250 OP 636: Performed by: INTERNAL MEDICINE

## 2020-12-17 PROCEDURE — 258N000003 HC RX IP 258 OP 636: Performed by: INTERNAL MEDICINE

## 2020-12-17 PROCEDURE — 0296T ZZHC ZIO PATCH HOLTER APPLICATION: CPT

## 2020-12-17 PROCEDURE — 96413 CHEMO IV INFUSION 1 HR: CPT

## 2020-12-17 PROCEDURE — 0296T LEADLESS EKG MONITOR 3 TO 14 DAYS: CPT

## 2020-12-17 RX ADMIN — SODIUM CHLORIDE 250 ML: 9 INJECTION, SOLUTION INTRAVENOUS at 12:05

## 2020-12-17 RX ADMIN — INFLIXIMAB 300 MG: 100 INJECTION, POWDER, LYOPHILIZED, FOR SOLUTION INTRAVENOUS at 12:08

## 2020-12-17 NOTE — PROGRESS NOTES
Infusion Nursing Note:  Briana Mcgee presents today for Remicade 2nd dose.    Patient seen by provider today: No   present during visit today: Not Applicable.    Note: N/A.    Intravenous Access:  Peripheral IV placed.    Treatment Conditions:  Biological Infusion Checklist:  ~~~ NOTE: If the patient answers yes to any of the questions below, hold the infusion and contact ordering provider or on-call provider.    1. Have you recently had an elevated temperature, fever, chills, productive cough, coughing for 3 weeks or longer or hemoptysis, abnormal vital signs, night sweats,  chest pain or have you noticed a decrease in your appetite, unexplained weight loss or fatigue? No  2. Do you have any open wounds or new incisions? No  3. Do you have any recent or upcoming hospitalizations, surgeries or dental procedures? No  4. Do you currently have or recently have had any signs of illness or infection or are you on any antibiotics? No  5. Have you had any new, sudden or worsening abdominal pain? No  6. Have you or anyone in your household received a live vaccination in the past 4 weeks? Please note:  No live vaccines while on biologic/chemotherapy until 6 months after the last treatment.  Patient can receive the flu vaccine (shot only) and the pneumovax.  It is optimal for the patient to get these vaccines mid cycle, but they can be given at any time as long as it is not on the day of the infusion. No  7. Have you recently been diagnosed with any new nervous system diseases (ie. Multiple sclerosis, Guillain [place], seizures, neurological changes) or cancer diagnosis? No  8. Are you on any form of radiation or chemotherapy? No  9. Are you pregnant or breast feeding or do you have plans of pregnancy in the future? No  10. Have you been having any signs of worsening depression or suicidal ideations?  (benlysta only) No  11. Have there been any other new onset medical symptoms? No    Post Infusion  Assessment:  Patient tolerated infusion without incident.  Access discontinued per protocol.       Discharge Plan:   Patient discharged in stable condition accompanied by: self.  Departure Mode: Ambulatory.    Francine Reyes RN

## 2021-01-14 ENCOUNTER — INFUSION THERAPY VISIT (OUTPATIENT)
Dept: INFUSION THERAPY | Facility: CLINIC | Age: 76
End: 2021-01-14
Attending: INTERNAL MEDICINE
Payer: MEDICARE

## 2021-01-14 VITALS
BODY MASS INDEX: 29.69 KG/M2 | RESPIRATION RATE: 16 BRPM | SYSTOLIC BLOOD PRESSURE: 120 MMHG | HEART RATE: 62 BPM | DIASTOLIC BLOOD PRESSURE: 43 MMHG | TEMPERATURE: 97.8 F | WEIGHT: 178.4 LBS

## 2021-01-14 DIAGNOSIS — M06.042 RHEUMATOID ARTHRITIS INVOLVING BOTH HANDS WITH NEGATIVE RHEUMATOID FACTOR (H): Primary | ICD-10-CM

## 2021-01-14 DIAGNOSIS — M06.041 RHEUMATOID ARTHRITIS INVOLVING BOTH HANDS WITH NEGATIVE RHEUMATOID FACTOR (H): Primary | ICD-10-CM

## 2021-01-14 PROCEDURE — 96415 CHEMO IV INFUSION ADDL HR: CPT

## 2021-01-14 PROCEDURE — 96413 CHEMO IV INFUSION 1 HR: CPT

## 2021-01-14 PROCEDURE — 250N000011 HC RX IP 250 OP 636: Performed by: INTERNAL MEDICINE

## 2021-01-14 PROCEDURE — 258N000003 HC RX IP 258 OP 636: Performed by: INTERNAL MEDICINE

## 2021-01-14 RX ADMIN — SODIUM CHLORIDE 250 ML: 9 INJECTION, SOLUTION INTRAVENOUS at 09:40

## 2021-01-14 RX ADMIN — INFLIXIMAB 300 MG: 100 INJECTION, POWDER, LYOPHILIZED, FOR SOLUTION INTRAVENOUS at 09:50

## 2021-01-14 ASSESSMENT — PAIN SCALES - GENERAL: PAINLEVEL: NO PAIN (0)

## 2021-01-14 NOTE — PROGRESS NOTES
Infusion Nursing Note:  Briana Mcgee presents today for Remicade.    Patient seen by provider today: No   present during visit today: Not Applicable.    Note: N/A.  Patient did meet criteria for an asymptomatic covid-19 PCR test in infusion today. Patient declined the covid-19 test.    Intravenous Access:  Peripheral IV placed.    Treatment Conditions:  Biological Infusion Checklist:  ~~~ NOTE: If the patient answers yes to any of the questions below, hold the infusion and contact ordering provider or on-call provider.    1. Have you recently had an elevated temperature, fever, chills, productive cough, coughing for 3 weeks or longer or hemoptysis, abnormal vital signs, night sweats,  chest pain or have you noticed a decrease in your appetite, unexplained weight loss or fatigue? No  2. Do you have any open wounds or new incisions? No  3. Do you have any recent or upcoming hospitalizations, surgeries or dental procedures? No  4. Do you currently have or recently have had any signs of illness or infection or are you on any antibiotics? No  5. Have you had any new, sudden or worsening abdominal pain? No  6. Have you or anyone in your household received a live vaccination in the past 4 weeks? Please note:  No live vaccines while on biologic/chemotherapy until 6 months after the last treatment.  Patient can receive the flu vaccine (shot only) and the pneumovax.  It is optimal for the patient to get these vaccines mid cycle, but they can be given at any time as long as it is not on the day of the infusion. No  7. Have you recently been diagnosed with any new nervous system diseases (ie. Multiple sclerosis, Guillain [place], seizures, neurological changes) or cancer diagnosis? No  8. Are you on any form of radiation or chemotherapy? No  9. Are you pregnant or breast feeding or do you have plans of pregnancy in the future? No  10. Have you been having any signs of worsening depression or suicidal ideations?   (benlysta only) No  11. Have there been any other new onset medical symptoms? No        Post Infusion Assessment:  Patient tolerated infusion without incident.  Blood return noted pre and post infusion.  Site patent and intact, free from redness, edema or discomfort.  No evidence of extravasations.  Access discontinued per protocol.       Discharge Plan:   Patient discharged in stable condition accompanied by: self.  Departure Mode: Ambulatory.    Terry Grant RN

## 2021-01-19 DIAGNOSIS — E03.9 HYPOTHYROIDISM, UNSPECIFIED TYPE: ICD-10-CM

## 2021-01-19 NOTE — TELEPHONE ENCOUNTER
"Requested Prescriptions   Pending Prescriptions Disp Refills     levothyroxine (SYNTHROID/LEVOTHROID) 137 MCG tablet [Pharmacy Med Name: levothyroxine 137 mcg tablet] 30 tablet 6     Sig: Take 1 tablet (137 mcg) by mouth daily Along with 25mcg to equal 162mcg daily.       Thyroid Protocol Passed - 1/19/2021  8:00 AM        Passed - Patient is 12 years or older        Passed - Recent (12 mo) or future (30 days) visit within the authorizing provider's specialty     Patient has had an office visit with the authorizing provider or a provider within the authorizing providers department within the previous 12 mos or has a future within next 30 days. See \"Patient Info\" tab in inbasket, or \"Choose Columns\" in Meds & Orders section of the refill encounter.              Passed - Medication is active on med list        Passed - Normal TSH on file in past 12 months     Recent Labs   Lab Test 07/29/20  1248   TSH 0.72              Passed - No active pregnancy on record     If patient is pregnant or has had a positive pregnancy test, please check TSH.          Passed - No positive pregnancy test in past 12 months     If patient is pregnant or has had a positive pregnancy test, please check TSH.             levothyroxine (SYNTHROID/LEVOTHROID) 25 MCG tablet [Pharmacy Med Name: levothyroxine 25 mcg tablet] 30 tablet 6     Sig: Take 1 tablet (25 mcg) by mouth daily Take along with the 137mcg to equal 162mcg total daily.       Thyroid Protocol Passed - 1/19/2021  8:00 AM        Passed - Patient is 12 years or older        Passed - Recent (12 mo) or future (30 days) visit within the authorizing provider's specialty     Patient has had an office visit with the authorizing provider or a provider within the authorizing providers department within the previous 12 mos or has a future within next 30 days. See \"Patient Info\" tab in inbasket, or \"Choose Columns\" in Meds & Orders section of the refill encounter.              Passed - " Medication is active on med list        Passed - Normal TSH on file in past 12 months     Recent Labs   Lab Test 07/29/20  1248   TSH 0.72              Passed - No active pregnancy on record     If patient is pregnant or has had a positive pregnancy test, please check TSH.          Passed - No positive pregnancy test in past 12 months     If patient is pregnant or has had a positive pregnancy test, please check TSH.

## 2021-01-21 ENCOUNTER — TRANSFERRED RECORDS (OUTPATIENT)
Dept: HEALTH INFORMATION MANAGEMENT | Facility: CLINIC | Age: 76
End: 2021-01-21

## 2021-01-22 RX ORDER — LEVOTHYROXINE SODIUM 25 UG/1
25 TABLET ORAL DAILY
Qty: 30 TABLET | Refills: 5 | Status: SHIPPED | OUTPATIENT
Start: 2021-01-22 | End: 2021-06-14

## 2021-01-22 RX ORDER — LEVOTHYROXINE SODIUM 137 UG/1
137 TABLET ORAL DAILY
Qty: 30 TABLET | Refills: 5 | Status: SHIPPED | OUTPATIENT
Start: 2021-01-22 | End: 2021-06-14

## 2021-02-11 ENCOUNTER — VIRTUAL VISIT (OUTPATIENT)
Dept: RHEUMATOLOGY | Facility: CLINIC | Age: 76
End: 2021-02-11
Payer: COMMERCIAL

## 2021-02-11 DIAGNOSIS — Z79.899 HIGH RISK MEDICATIONS (NOT ANTICOAGULANTS) LONG-TERM USE: ICD-10-CM

## 2021-02-11 DIAGNOSIS — M06.00 SERONEGATIVE RHEUMATOID ARTHRITIS (H): Primary | ICD-10-CM

## 2021-02-11 PROCEDURE — 99214 OFFICE O/P EST MOD 30 MIN: CPT | Mod: 95 | Performed by: INTERNAL MEDICINE

## 2021-02-11 RX ORDER — HYDROXYCHLOROQUINE SULFATE 200 MG/1
TABLET, FILM COATED ORAL
Qty: 135 TABLET | Refills: 2 | Status: SHIPPED | OUTPATIENT
Start: 2021-02-11 | End: 2021-10-26

## 2021-02-11 NOTE — PATIENT INSTRUCTIONS
RHEUMATOLOGY    Dr. Chaim Puente    [facility]  [address] [place] [address]    Our new phone number for Rheumatology is [phone], this number will be able to help you schedule appointments for Dr. Puente or if you have any message you would like sent to us.    Thank you for choosing [facility]!  Lala Magaña [facility] Rheumatology
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
## 2021-02-25 ENCOUNTER — HOSPITAL ENCOUNTER (OUTPATIENT)
Dept: MRI IMAGING | Facility: CLINIC | Age: 76
Discharge: HOME | End: 2021-02-25
Attending: INTERNAL MEDICINE | Admitting: INTERNAL MEDICINE
Payer: MEDICARE

## 2021-02-25 DIAGNOSIS — I50.22 CHRONIC SYSTOLIC HEART FAILURE (H): ICD-10-CM

## 2021-02-25 LAB
ANION GAP SERPL CALCULATED.3IONS-SCNC: 1 MMOL/L (ref 3–14)
BUN SERPL-MCNC: 21 MG/DL (ref 7–30)
CALCIUM SERPL-MCNC: 9.2 MG/DL (ref 8.5–10.1)
CHLORIDE SERPL-SCNC: 106 MMOL/L (ref 94–109)
CO2 SERPL-SCNC: 33 MMOL/L (ref 20–32)
CREAT SERPL-MCNC: 1.13 MG/DL (ref 0.52–1.04)
GFR SERPL CREATININE-BSD FRML MDRD: 47 ML/MIN/{1.73_M2}
GLUCOSE SERPL-MCNC: 103 MG/DL (ref 70–99)
HGB BLD-MCNC: 11.8 G/DL (ref 11.7–15.7)
NT-PROBNP SERPL-MCNC: 1080 PG/ML (ref 0–450)
POTASSIUM SERPL-SCNC: 4.4 MMOL/L (ref 3.4–5.3)
SODIUM SERPL-SCNC: 140 MMOL/L (ref 133–144)
TSH SERPL DL<=0.005 MIU/L-ACNC: 1.25 MU/L (ref 0.4–4)

## 2021-02-25 PROCEDURE — 80048 BASIC METABOLIC PNL TOTAL CA: CPT | Performed by: INTERNAL MEDICINE

## 2021-02-25 PROCEDURE — A9585 GADOBUTROL INJECTION: HCPCS | Performed by: INTERNAL MEDICINE

## 2021-02-25 PROCEDURE — 83880 ASSAY OF NATRIURETIC PEPTIDE: CPT | Performed by: INTERNAL MEDICINE

## 2021-02-25 PROCEDURE — 36415 COLL VENOUS BLD VENIPUNCTURE: CPT | Performed by: INTERNAL MEDICINE

## 2021-02-25 PROCEDURE — 75561 CARDIAC MRI FOR MORPH W/DYE: CPT | Mod: 26 | Performed by: INTERNAL MEDICINE

## 2021-02-25 PROCEDURE — 85018 HEMOGLOBIN: CPT | Performed by: INTERNAL MEDICINE

## 2021-02-25 PROCEDURE — 255N000002 HC RX 255 OP 636: Performed by: INTERNAL MEDICINE

## 2021-02-25 PROCEDURE — 84443 ASSAY THYROID STIM HORMONE: CPT | Performed by: INTERNAL MEDICINE

## 2021-02-25 PROCEDURE — 75561 CARDIAC MRI FOR MORPH W/DYE: CPT

## 2021-02-25 RX ORDER — GADOBUTROL 604.72 MG/ML
10 INJECTION INTRAVENOUS ONCE
Status: COMPLETED | OUTPATIENT
Start: 2021-02-25 | End: 2021-02-25

## 2021-02-25 RX ADMIN — GADOBUTROL 10 ML: 604.72 INJECTION INTRAVENOUS at 14:21

## 2021-03-11 ENCOUNTER — TELEPHONE (OUTPATIENT)
Dept: RHEUMATOLOGY | Facility: CLINIC | Age: 76
End: 2021-03-11

## 2021-03-11 ENCOUNTER — INFUSION THERAPY VISIT (OUTPATIENT)
Dept: INFUSION THERAPY | Facility: CLINIC | Age: 76
End: 2021-03-11
Attending: INTERNAL MEDICINE
Payer: MEDICARE

## 2021-03-11 VITALS
OXYGEN SATURATION: 95 % | HEART RATE: 77 BPM | BODY MASS INDEX: 30.38 KG/M2 | TEMPERATURE: 98.5 F | DIASTOLIC BLOOD PRESSURE: 49 MMHG | SYSTOLIC BLOOD PRESSURE: 146 MMHG | RESPIRATION RATE: 18 BRPM | WEIGHT: 182.54 LBS

## 2021-03-11 DIAGNOSIS — M06.041 RHEUMATOID ARTHRITIS INVOLVING BOTH HANDS WITH NEGATIVE RHEUMATOID FACTOR (H): Primary | ICD-10-CM

## 2021-03-11 DIAGNOSIS — M06.042 RHEUMATOID ARTHRITIS INVOLVING BOTH HANDS WITH NEGATIVE RHEUMATOID FACTOR (H): Primary | ICD-10-CM

## 2021-03-11 PROCEDURE — 96413 CHEMO IV INFUSION 1 HR: CPT

## 2021-03-11 PROCEDURE — 250N000013 HC RX MED GY IP 250 OP 250 PS 637: Mod: GY | Performed by: INTERNAL MEDICINE

## 2021-03-11 PROCEDURE — 258N000003 HC RX IP 258 OP 636: Performed by: INTERNAL MEDICINE

## 2021-03-11 PROCEDURE — 250N000011 HC RX IP 250 OP 636: Performed by: INTERNAL MEDICINE

## 2021-03-11 PROCEDURE — 96415 CHEMO IV INFUSION ADDL HR: CPT

## 2021-03-11 RX ORDER — ACETAMINOPHEN 325 MG/1
650 TABLET ORAL EVERY 4 HOURS PRN
Status: DISCONTINUED | OUTPATIENT
Start: 2021-03-11 | End: 2021-03-11 | Stop reason: HOSPADM

## 2021-03-11 RX ORDER — ACETAMINOPHEN 325 MG/1
TABLET ORAL
Status: DISCONTINUED
Start: 2021-03-11 | End: 2021-03-11 | Stop reason: HOSPADM

## 2021-03-11 RX ADMIN — INFLIXIMAB 400 MG: 100 INJECTION, POWDER, LYOPHILIZED, FOR SOLUTION INTRAVENOUS at 09:15

## 2021-03-11 RX ADMIN — ACETAMINOPHEN 650 MG: 325 TABLET, FILM COATED ORAL at 11:05

## 2021-03-11 RX ADMIN — SODIUM CHLORIDE 250 ML: 9 INJECTION, SOLUTION INTRAVENOUS at 09:00

## 2021-03-11 ASSESSMENT — PAIN SCALES - GENERAL: PAINLEVEL: NO PAIN (0)

## 2021-03-11 NOTE — PROGRESS NOTES
Infusion Nursing Note:  Briana Mcgee presents today for Remicade.    Patient seen by provider today: No   present during visit today: Not Applicable.    Note: N/A.  Patient declined the covid-19 test.    Intravenous Access:  Peripheral IV placed.    Treatment Conditions:  Biological Infusion Checklist:  ~~~ NOTE: If the patient answers yes to any of the questions below, hold the infusion and contact ordering provider or on-call provider.    1. Have you recently had an elevated temperature, fever, chills, productive cough, coughing for 3 weeks or longer or hemoptysis, abnormal vital signs, night sweats,  chest pain or have you noticed a decrease in your appetite, unexplained weight loss or fatigue? No  2. Do you have any open wounds or new incisions? No  3. Do you have any recent or upcoming hospitalizations, surgeries or dental procedures? No  4. Do you currently have or recently have had any signs of illness or infection or are you on any antibiotics? No  5. Have you had any new, sudden or worsening abdominal pain? No  6. Have you or anyone in your household received a live vaccination in the past 4 weeks? Please note:  No live vaccines while on biologic/chemotherapy until 6 months after the last treatment.  Patient can receive the flu vaccine (shot only) and the pneumovax.  It is optimal for the patient to get these vaccines mid cycle, but they can be given at any time as long as it is not on the day of the infusion. No  Received 2nd Covid vax yesterday  7. Have you recently been diagnosed with any new nervous system diseases (ie. Multiple sclerosis, Guillain [place], seizures, neurological changes) or cancer diagnosis? No  8. Are you on any form of radiation or chemotherapy? No  9. Are you pregnant or breast feeding or do you have plans of pregnancy in the future? No  10. Have you been having any signs of worsening depression or suicidal ideations?  (benlysta only) No  11. Have there been any  other new onset medical symptoms? No        Post Infusion Assessment:  Patient tolerated infusion poorly due to : developed shaking chill approximately 80% through infusion.  Remicade was paused and Dr. Puente notified.  VSS.  It is also noted that she had 2nd Covid  vax yesterday afternoon.  Per Dr. Puente, remicade infusion was NOT restarted.  Tylenol 650 mg given.  She improved over the next 20 minutes, shaking chills ceased.  She was advised to repeat tylenol in 6-8 hours and call for medical assistance should her symptoms return/get worse.  She agreed with this plan.    Blood return noted pre and post infusion.  Site patent and intact, free from redness, edema or discomfort.  No evidence of extravasations.  Access discontinued per protocol.  Biologic Infusion Post Education: Call the triage nurse at your clinic or seek medical attention if you have chills and/or temperature greater than or equal to 100.5, uncontrolled nausea/vomiting, diarrhea, constipation, dizziness, shortness of breath, chest pain, heart palpitations, weakness or any other new or concerning symptoms, questions or concerns.  You cannot have any live virus vaccines prior to or during treatment or up to 6 months post infusion.  If you have an upcoming surgery, medical procedure or dental procedure during treatment, this should be discussed with your ordering physician and your surgeon/dentist.  If you are having any concerning symptom, if you are unsure if you should get your next infusion or wish to speak to a provider before your next infusion, please call your care coordinator or triage nurse at your clinic to notify them so we can adequately serve you.       Discharge Plan:   Discharge instructions reviewed with: Patient.  Patient and/or family verbalized understanding of discharge instructions and all questions answered.  AVS to patient via [employer].  Patient will return 5/6/21 for next appointment.   Patient discharged in stable  condition accompanied by: self.  Departure Mode: Ambulatory.  In basket message sent to Dr. Puente to possibly add pre meds for next infusion.    Edith Ann

## 2021-03-11 NOTE — TELEPHONE ENCOUNTER
I was notified by the infusion center that Briana Mcgee is 80% through her Remicade infusion that she typically tolerated but right now she is having some chills, so the infusion was stopped and just normal saline is running at this time with some improvement of the chills.  COVID-19 vaccine was received yesterday.  Advised that she receive Tylenol 650 mg once and that the infusion be stopped.  It is okay to receive future Remicade infusions.  At this time her symptoms could be related to the COVID-19 vaccine that she received yesterday or her symptoms could be related to the infusion.  If the infusion were to be continued and she had worsening symptoms it is possible that she may need infusion related treatment that could include steroids, but steroids may blunt vaccine efficacy and the importance of the COVID-19 vaccine likely will outweigh the benefit of receiving the last 20% of her Remicade infusion.    Chaim Puente MD  3/11/2021 11:06 AM

## 2021-03-12 RX ORDER — DIPHENHYDRAMINE HCL 25 MG
25 CAPSULE ORAL ONCE
Status: CANCELLED
Start: 2021-03-12 | End: 2021-03-12

## 2021-03-12 RX ORDER — ACETAMINOPHEN 325 MG/1
650 TABLET ORAL ONCE
Status: CANCELLED
Start: 2021-03-12 | End: 2021-03-12

## 2021-03-22 DIAGNOSIS — Z79.899 ON POTASSIUM WASTING DIURETIC THERAPY: ICD-10-CM

## 2021-03-22 RX ORDER — POTASSIUM CHLORIDE 750 MG/1
10 TABLET, EXTENDED RELEASE ORAL DAILY
Qty: 90 TABLET | Refills: 1 | Status: SHIPPED | OUTPATIENT
Start: 2021-03-22 | End: 2021-04-30

## 2021-04-03 ENCOUNTER — HEALTH MAINTENANCE LETTER (OUTPATIENT)
Age: 76
End: 2021-04-03

## 2021-04-15 ENCOUNTER — OFFICE VISIT (OUTPATIENT)
Dept: CARDIOLOGY | Facility: CLINIC | Age: 76
End: 2021-04-15
Attending: INTERNAL MEDICINE
Payer: COMMERCIAL

## 2021-04-15 VITALS
WEIGHT: 183 LBS | HEART RATE: 67 BPM | SYSTOLIC BLOOD PRESSURE: 139 MMHG | DIASTOLIC BLOOD PRESSURE: 72 MMHG | BODY MASS INDEX: 30.45 KG/M2

## 2021-04-15 DIAGNOSIS — I50.22 CHRONIC SYSTOLIC HEART FAILURE (H): ICD-10-CM

## 2021-04-15 PROCEDURE — 99214 OFFICE O/P EST MOD 30 MIN: CPT | Performed by: INTERNAL MEDICINE

## 2021-04-15 NOTE — PROGRESS NOTES
CARDIOLOGY CLINIC CONSULTATION    REASON FOR CONSULT: HFREF    PRIMARY CARE PHYSICIAN:  Xavier Vega    HISTORY OF PRESENT ILLNESS:  This is a pleasant 76-year-old female who was first referred to me as a new heart failure consultation in December 2020.  The patient has a history of hyperlipidemia, hypertension, borderline CKD and hypothyroidism.  She says she had a stroke in 2006 and rheumatic fever as a child.  She has a history of breast cancer without any breast radiation but chemotherapy in 2005.  She has been receiving IV Remicade for rheumatoid arthritis as well.      In 11/16/2020 she was admitted to [facility] with new heart failure symptoms and had an echo which showed new heart failure with reduced ejection fraction.  LV appears to be mildly dilated.  However, the EF appears to be around 30%-35%.  Her NT-proBNP was elevated to 7000.  Troponins were negative.  Chest x-ray was consistent with mild pulmonary edema.  ECG showed a right bundle branch block and sinus rhythm.  She was transferred to [facility].  She had a Lexiscan on 11/17 which showed a medium-sized anterior infarction.  As a result of this, she underwent a coronary angiography that showed mild proximal LAD disease, otherwise nonobstructive disease.  She was put on aspirin and statin.  She was diuresed, started on 40 mg of daily Lasix, put on metoprolol 25 mg succinate along with 5 mg of lisinopril and discharged home to follow up with outpatient cardiology.  After this she was seen by me in clinic in December.  I have recommended close core clinic follow-up in outpatient MRI however these were rescheduled in January and March.  In any case the patient got a second opinion at [facility], records are in care everywhere, from her 's cardiologist.  No change in plan was made.  Eventually she had an MRI done in February 2021 which showed improving LV function with an EF of 54%.  Mild mitral  "regurgitation was noted and some gadolinium enhancement at the right ventricular insertion site was noted consistent with nonischemic fibrosis.  She also had an cardiac monitor that did not show atrial fibrillation.     Today Jenna is here for routine follow-up.  She checks her blood pressure frequently at home and most readings are in the 120s to 140 systolic.  She does not have hypotension or symptoms related to that.  No syncope presyncope.  NYHA class II.  No edema orthopnea or angina reported.  Currently she takes metoprolol succinate 25 mg daily along with 5 mg once a day of lisinopril and 40 mg daily of Lasix.    PAST MEDICAL HISTORY:  Past Medical History:   Diagnosis Date     Allergies      Breast cancer (H)      Esophageal reflux      Hypertension      Other and unspecified hyperlipidemia      Other chronic bronchitis      Personal history of pneumonia (recurrent)     Hx-Pneumonia, \" Chronic Bronchitis\"     Personal history of tobacco use, presenting hazards to health      RA (rheumatoid arthritis) (H)      Unspecified hypothyroidism        MEDICATIONS:  Current Outpatient Medications   Medication     aspirin 81 MG EC tablet     atorvastatin (LIPITOR) 40 MG tablet     calcium carbonate (OS-MARIO) 500 MG tablet     Coenzyme Q10 (COQ10 PO)     fluticasone (FLONASE) 50 MCG/ACT nasal spray     FOLIC ACID PO     furosemide (LASIX) 40 MG tablet     hydroxychloroquine (PLAQUENIL) 200 MG tablet     levothyroxine (SYNTHROID/LEVOTHROID) 137 MCG tablet     levothyroxine (SYNTHROID/LEVOTHROID) 25 MCG tablet     lisinopril (ZESTRIL) 5 MG tablet     meclizine (ANTIVERT) 25 MG tablet     metoprolol succinate ER (TOPROL-XL) 25 MG 24 hr tablet     Multiple Vitamins-Minerals (MULTIVITAMIN ADULT) TABS     Naproxen Sodium (ALEVE PO)     pantoprazole (PROTONIX) 40 MG EC tablet     potassium chloride ER (KLOR-CON M) 10 MEQ CR tablet     SHINGRIX injection     tolterodine ER (DETROL LA) 4 MG 24 hr capsule     triamcinolone " (ARISTOCORT HP) 0.5 % external cream     triamcinolone (KENALOG) 0.1 % external ointment     No current facility-administered medications for this visit.        ALLERGIES:  Allergies   Allergen Reactions     Erythromycin Other (See Comments)     Edema     Hydrocodone GI Disturbance     GI Upset, Tolerates dilaudid     Oxycodone GI Disturbance       SOCIAL HISTORY:  I have reviewed this patient's social history and updated it with pertinent information if needed. Briana Mcgee  reports that she quit smoking about 24 years ago. Her smoking use included cigarettes. She has never used smokeless tobacco. She reports current alcohol use. She reports that she does not use drugs.    FAMILY HISTORY:  I have reviewed this patient's family history and updated it with pertinent information if needed.   Family History   Problem Relation Age of Onset     Diabetes Mother      Hypertension Mother      Cancer Mother         breast     Heart Disease Mother         chf     Breast Cancer Mother      Blood Disease Father      Diabetes Son         Type 1     Psoriasis Son      Cerebrovascular Disease Son 48     Cerebrovascular Disease Sister      Breast Cancer Paternal Aunt        REVIEW OF SYSTEMS:  Skin:  Negative     Eyes:  Positive for glasses  ENT:  Negative    Respiratory:  Positive for shortness of breath  Cardiovascular:    Positive for;lightheadedness;dizziness;fatigue  Gastroenterology: Negative    Genitourinary:  Negative    Musculoskeletal:  Negative    Neurologic:  Positive for stroke  Psychiatric:  Negative    Heme/Lymph/Imm:  Negative    Endocrine:  Positive for thyroid disorder      PHYSICAL EXAM:      BP: 139/72 Pulse: 67            Vital Signs with Ranges  Pulse:  [67] 67  BP: (139)/(72) 139/72  183 lbs 0 oz    Constitutional: awake, alert, no distress  Respiratory: Clear to auscultation bilaterally  Cardiovascular: Normal first and second heart sounds with a soft systolic murmur without rubs or gallops.   Extremities warm without edema.  JVP is normal.  GI: nondistended  Neuropsychiatric: appropriate affact    DATA:  Labs: Pertinent cardiac labs reviewed    ASSESSMENT:  Nonischemic cardiomyopathy unclear etiology likely hypertensive versus viral versus prior chemotherapy exposure. No alcohol use or congenital cardiomyopathy reported.  Mild coronary artery disease nonobstructive  Obesity  Hypertension  Rheumatoid arthritis    RECOMMENDATIONS:  1. Jenna has nonischemic cardiomyopathy.  It is good to see that on MRI ejection fraction is improved with now up to 54% and the MRI also reports that the mitral regurgitation is mild.  Symptomatically she is doing well.  No signs of volume overload.  2. Blood pressure slightly elevated.  I recommend increasing lisinopril from 5 mg up to 10 mg daily.  Continue writing home blood pressure checks and daily weights.  If there is signs of volume overload or lightheadedness or uncontrolled blood pressure she needs to call our clinic.  3. I told her to start backing off her Lasix.  She is going to try going down to 20 mg daily for the next 2 weeks.  After that she can change it to Monday Wednesday Friday.  Hopefully with improving LV function and normalization of EF, we should be able to wean this off over the next few months.  If she stops her Lasix she can stop her potassium as well.  4. Follow-up in core clinic with an echocardiogram in 6 months.  5. Continue aspirin statin for mild CAD.    LACIE Saldana, [facility]  Cardiology - [facility]  April 15, 2021

## 2021-04-15 NOTE — LETTER
4/15/2021    Xavier Vega MD  [address] [address]    RE: Briana Mcgee       Dear Colleague,    I had the pleasure of seeing Briana Mcgee in the [facility].    CARDIOLOGY CLINIC CONSULTATION    REASON FOR CONSULT: HFREF    PRIMARY CARE PHYSICIAN:  Xavier Vega    HISTORY OF PRESENT ILLNESS:  This is a pleasant 76-year-old female who was first referred to me as a new heart failure consultation in December 2020.  The patient has a history of hyperlipidemia, hypertension, borderline CKD and hypothyroidism.  She says she had a stroke in 2006 and rheumatic fever as a child.  She has a history of breast cancer without any breast radiation but chemotherapy in 2005.  She has been receiving IV Remicade for rheumatoid arthritis as well.      In 11/16/2020 she was admitted to [facility] with new heart failure symptoms and had an echo which showed new heart failure with reduced ejection fraction.  LV appears to be mildly dilated.  However, the EF appears to be around 30%-35%.  Her NT-proBNP was elevated to 7000.  Troponins were negative.  Chest x-ray was consistent with mild pulmonary edema.  ECG showed a right bundle branch block and sinus rhythm.  She was transferred to [facility].  She had a Lexiscan on 11/17 which showed a medium-sized anterior infarction.  As a result of this, she underwent a coronary angiography that showed mild proximal LAD disease, otherwise nonobstructive disease.  She was put on aspirin and statin.  She was diuresed, started on 40 mg of daily Lasix, put on metoprolol 25 mg succinate along with 5 mg of lisinopril and discharged home to follow up with outpatient cardiology.  After this she was seen by me in clinic in December.  I have recommended close core clinic follow-up in outpatient MRI however these were rescheduled in January and March.  In any case the patient got a second opinion  "at [facility], records are in care everywhere, from her 's cardiologist.  No change in plan was made.  Eventually she had an MRI done in February 2021 which showed improving LV function with an EF of 54%.  Mild mitral regurgitation was noted and some gadolinium enhancement at the right ventricular insertion site was noted consistent with nonischemic fibrosis.  She also had an cardiac monitor that did not show atrial fibrillation.     Today Jenna is here for routine follow-up.  She checks her blood pressure frequently at home and most readings are in the 120s to 140 systolic.  She does not have hypotension or symptoms related to that.  No syncope presyncope.  NYHA class II.  No edema orthopnea or angina reported.  Currently she takes metoprolol succinate 25 mg daily along with 5 mg once a day of lisinopril and 40 mg daily of Lasix.    PAST MEDICAL HISTORY:  Past Medical History:   Diagnosis Date     Allergies      Breast cancer (H)      Esophageal reflux      Hypertension      Other and unspecified hyperlipidemia      Other chronic bronchitis      Personal history of pneumonia (recurrent)     Hx-Pneumonia, \" Chronic Bronchitis\"     Personal history of tobacco use, presenting hazards to health      RA (rheumatoid arthritis) (H)      Unspecified hypothyroidism        MEDICATIONS:  Current Outpatient Medications   Medication     aspirin 81 MG EC tablet     atorvastatin (LIPITOR) 40 MG tablet     calcium carbonate (OS-MARIO) 500 MG tablet     Coenzyme Q10 (COQ10 PO)     fluticasone (FLONASE) 50 MCG/ACT nasal spray     FOLIC ACID PO     furosemide (LASIX) 40 MG tablet     hydroxychloroquine (PLAQUENIL) 200 MG tablet     levothyroxine (SYNTHROID/LEVOTHROID) 137 MCG tablet     levothyroxine (SYNTHROID/LEVOTHROID) 25 MCG tablet     lisinopril (ZESTRIL) 5 MG tablet     meclizine (ANTIVERT) 25 MG tablet     metoprolol succinate ER (TOPROL-XL) 25 MG 24 hr tablet     Multiple Vitamins-Minerals " (MULTIVITAMIN ADULT) TABS     Naproxen Sodium (ALEVE PO)     pantoprazole (PROTONIX) 40 MG EC tablet     potassium chloride ER (KLOR-CON M) 10 MEQ CR tablet     SHINGRIX injection     tolterodine ER (DETROL LA) 4 MG 24 hr capsule     triamcinolone (ARISTOCORT HP) 0.5 % external cream     triamcinolone (KENALOG) 0.1 % external ointment     No current facility-administered medications for this visit.        ALLERGIES:  Allergies   Allergen Reactions     Erythromycin Other (See Comments)     Edema     Hydrocodone GI Disturbance     GI Upset, Tolerates dilaudid     Oxycodone GI Disturbance       SOCIAL HISTORY:  I have reviewed this patient's social history and updated it with pertinent information if needed. Briana Mcgee  reports that she quit smoking about 24 years ago. Her smoking use included cigarettes. She has never used smokeless tobacco. She reports current alcohol use. She reports that she does not use drugs.    FAMILY HISTORY:  I have reviewed this patient's family history and updated it with pertinent information if needed.   Family History   Problem Relation Age of Onset     Diabetes Mother      Hypertension Mother      Cancer Mother         breast     Heart Disease Mother         chf     Breast Cancer Mother      Blood Disease Father      Diabetes Son         Type 1     Psoriasis Son      Cerebrovascular Disease Son 48     Cerebrovascular Disease Sister      Breast Cancer Paternal Aunt        REVIEW OF SYSTEMS:  Skin:  Negative     Eyes:  Positive for glasses  ENT:  Negative    Respiratory:  Positive for shortness of breath  Cardiovascular:    Positive for;lightheadedness;dizziness;fatigue  Gastroenterology: Negative    Genitourinary:  Negative    Musculoskeletal:  Negative    Neurologic:  Positive for stroke  Psychiatric:  Negative    Heme/Lymph/Imm:  Negative    Endocrine:  Positive for thyroid disorder      PHYSICAL EXAM:      BP: 139/72 Pulse: 67            Vital Signs with Ranges  Pulse:  [67]  67  BP: (139)/(72) 139/72  183 lbs 0 oz    Constitutional: awake, alert, no distress  Respiratory: Clear to auscultation bilaterally  Cardiovascular: Normal first and second heart sounds with a soft systolic murmur without rubs or gallops.  Extremities warm without edema.  JVP is normal.  GI: nondistended  Neuropsychiatric: appropriate affact    DATA:  Labs: Pertinent cardiac labs reviewed    ASSESSMENT:  Nonischemic cardiomyopathy unclear etiology likely hypertensive versus viral versus prior chemotherapy exposure. No alcohol use or congenital cardiomyopathy reported.  Mild coronary artery disease nonobstructive  Obesity  Hypertension  Rheumatoid arthritis    RECOMMENDATIONS:  1. Jenna has nonischemic cardiomyopathy.  It is good to see that on MRI ejection fraction is improved with now up to 54% and the MRI also reports that the mitral regurgitation is mild.  Symptomatically she is doing well.  No signs of volume overload.  2. Blood pressure slightly elevated.  I recommend increasing lisinopril from 5 mg up to 10 mg daily.  Continue writing home blood pressure checks and daily weights.  If there is signs of volume overload or lightheadedness or uncontrolled blood pressure she needs to call our clinic.  3. I told her to start backing off her Lasix.  She is going to try going down to 20 mg daily for the next 2 weeks.  After that she can change it to Monday Wednesday Friday.  Hopefully with improving LV function and normalization of EF, we should be able to wean this off over the next few months.  If she stops her Lasix she can stop her potassium as well.  4. Follow-up in core clinic with an echocardiogram in 6 months.  5. Continue aspirin statin for mild CAD.    LACIE Saldana, [facility]  Cardiology - [facility]  April 15, 2021    cc:   Davy Shook MD  [address]  [place] [id number]

## 2021-04-30 ENCOUNTER — CARE COORDINATION (OUTPATIENT)
Dept: CARDIOLOGY | Facility: CLINIC | Age: 76
End: 2021-04-30

## 2021-04-30 NOTE — PROGRESS NOTES
[facility]  ----- Message -----   From: Davy Shook MD   Sent: 4/15/2021  11:29 AM CDT   To: Aurora Smith RN     Can you please follow-up with her in 2 weeks for a phone visit.  I told her to back down her Lasix to 20 mg daily.  If she is tolerating the lower dose well without increasing symptoms, she can stop her potassium.       **[facility] on how [facility] is doing on lower dose of Lasix.    Aurora Smith RN, BSN, CHFN  04/30/21 at 9:16 AM

## 2021-05-14 ENCOUNTER — INFUSION THERAPY VISIT (OUTPATIENT)
Dept: INFUSION THERAPY | Facility: CLINIC | Age: 76
End: 2021-05-14
Attending: INTERNAL MEDICINE
Payer: MEDICARE

## 2021-05-14 VITALS
HEART RATE: 57 BPM | BODY MASS INDEX: 31.05 KG/M2 | TEMPERATURE: 96.1 F | DIASTOLIC BLOOD PRESSURE: 50 MMHG | WEIGHT: 186.6 LBS | SYSTOLIC BLOOD PRESSURE: 126 MMHG | RESPIRATION RATE: 16 BRPM

## 2021-05-14 DIAGNOSIS — M06.041 RHEUMATOID ARTHRITIS INVOLVING BOTH HANDS WITH NEGATIVE RHEUMATOID FACTOR (H): Primary | ICD-10-CM

## 2021-05-14 DIAGNOSIS — M06.042 RHEUMATOID ARTHRITIS INVOLVING BOTH HANDS WITH NEGATIVE RHEUMATOID FACTOR (H): Primary | ICD-10-CM

## 2021-05-14 PROCEDURE — 258N000003 HC RX IP 258 OP 636: Performed by: INTERNAL MEDICINE

## 2021-05-14 PROCEDURE — 96415 CHEMO IV INFUSION ADDL HR: CPT

## 2021-05-14 PROCEDURE — 250N000011 HC RX IP 250 OP 636: Performed by: INTERNAL MEDICINE

## 2021-05-14 PROCEDURE — 96413 CHEMO IV INFUSION 1 HR: CPT

## 2021-05-14 RX ORDER — ACETAMINOPHEN 325 MG/1
650 TABLET ORAL ONCE
Status: CANCELLED
Start: 2021-07-01 | End: 2021-07-01

## 2021-05-14 RX ORDER — DIPHENHYDRAMINE HCL 25 MG
25 CAPSULE ORAL ONCE
Status: CANCELLED
Start: 2021-07-01 | End: 2021-07-01

## 2021-05-14 RX ADMIN — INFLIXIMAB 400 MG: 100 INJECTION, POWDER, LYOPHILIZED, FOR SOLUTION INTRAVENOUS at 10:22

## 2021-05-14 RX ADMIN — SODIUM CHLORIDE 250 ML: 9 INJECTION, SOLUTION INTRAVENOUS at 10:20

## 2021-05-14 ASSESSMENT — PAIN SCALES - GENERAL: PAINLEVEL: NO PAIN (0)

## 2021-05-14 NOTE — PROGRESS NOTES
Infusion Nursing Note:  Briana Mcgee presents today for Remicade.    Patient seen by provider today: No   present during visit today: Not Applicable.    Note: N/A.  Patient declined the covid-19 test.    Intravenous Access:  Peripheral IV placed.    Treatment Conditions:  Biological Infusion Checklist:  ~~~ NOTE: If the patient answers yes to any of the questions below, hold the infusion and contact ordering provider or on-call provider.    1. Have you recently had an elevated temperature, fever, chills, productive cough, coughing for 3 weeks or longer or hemoptysis, abnormal vital signs, night sweats,  chest pain or have you noticed a decrease in your appetite, unexplained weight loss or fatigue? No  2. Do you have any open wounds or new incisions? No  3. Do you have any recent or upcoming hospitalizations, surgeries or dental procedures? No  4. Do you currently have or recently have had any signs of illness or infection or are you on any antibiotics? No  5. Have you had any new, sudden or worsening abdominal pain? No  6. Have you or anyone in your household received a live vaccination in the past 4 weeks? Please note:  No live vaccines while on biologic/chemotherapy until 6 months after the last treatment.  Patient can receive the flu vaccine (shot only) and the pneumovax.  It is optimal for the patient to get these vaccines mid cycle, but they can be given at any time as long as it is not on the day of the infusion. No  7. Have you recently been diagnosed with any new nervous system diseases (ie. Multiple sclerosis, Guillain [place], seizures, neurological changes) or cancer diagnosis? No  8. Are you on any form of radiation or chemotherapy? No  9. Are you pregnant or breast feeding or do you have plans of pregnancy in the future? No  10. Have you been having any signs of worsening depression or suicidal ideations?  (benlysta only) No  11. Have there been any other new onset medical symptoms?  No        Post Infusion Assessment:  Patient tolerated infusion without incident.  Site patent and intact, free from redness, edema or discomfort.  No evidence of extravasations.  Access discontinued per protocol.       Discharge Plan:   Patient discharged in stable condition accompanied by: self.  Departure Mode: Ambulatory.    Terry Grant RN

## 2021-05-24 ENCOUNTER — RECORDS - HEALTHEAST (OUTPATIENT)
Dept: ADMINISTRATIVE | Facility: CLINIC | Age: 76
End: 2021-05-24

## 2021-05-24 DIAGNOSIS — J30.2 OTHER SEASONAL ALLERGIC RHINITIS: ICD-10-CM

## 2021-05-25 ENCOUNTER — RECORDS - HEALTHEAST (OUTPATIENT)
Dept: ADMINISTRATIVE | Facility: CLINIC | Age: 76
End: 2021-05-25

## 2021-05-25 ENCOUNTER — VIRTUAL VISIT (OUTPATIENT)
Dept: RHEUMATOLOGY | Facility: CLINIC | Age: 76
End: 2021-05-25
Payer: COMMERCIAL

## 2021-05-25 DIAGNOSIS — G89.29 CHRONIC MIDLINE LOW BACK PAIN WITHOUT SCIATICA: ICD-10-CM

## 2021-05-25 DIAGNOSIS — M80.00XA AGE-RELATED OSTEOPOROSIS WITH CURRENT PATHOLOGICAL FRACTURE, INITIAL ENCOUNTER: ICD-10-CM

## 2021-05-25 DIAGNOSIS — M54.50 CHRONIC MIDLINE LOW BACK PAIN WITHOUT SCIATICA: ICD-10-CM

## 2021-05-25 DIAGNOSIS — M06.00 SERONEGATIVE RHEUMATOID ARTHRITIS (H): Primary | ICD-10-CM

## 2021-05-25 DIAGNOSIS — Z79.899 HIGH RISK MEDICATIONS (NOT ANTICOAGULANTS) LONG-TERM USE: ICD-10-CM

## 2021-05-25 PROCEDURE — 99214 OFFICE O/P EST MOD 30 MIN: CPT | Mod: 95 | Performed by: INTERNAL MEDICINE

## 2021-05-25 RX ORDER — FLUTICASONE PROPIONATE 50 MCG
2 SPRAY, SUSPENSION (ML) NASAL DAILY PRN
Qty: 16 G | Refills: 3 | Status: SHIPPED | OUTPATIENT
Start: 2021-05-25 | End: 2022-01-18
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
## 2021-06-02 ENCOUNTER — MYC MEDICAL ADVICE (OUTPATIENT)
Dept: FAMILY MEDICINE | Facility: CLINIC | Age: 76
End: 2021-06-02

## 2021-06-14 ENCOUNTER — OFFICE VISIT (OUTPATIENT)
Dept: FAMILY MEDICINE | Facility: CLINIC | Age: 76
End: 2021-06-14
Payer: COMMERCIAL

## 2021-06-14 VITALS
WEIGHT: 186.8 LBS | OXYGEN SATURATION: 94 % | DIASTOLIC BLOOD PRESSURE: 62 MMHG | TEMPERATURE: 97.4 F | SYSTOLIC BLOOD PRESSURE: 118 MMHG | HEIGHT: 65 IN | BODY MASS INDEX: 31.12 KG/M2 | HEART RATE: 69 BPM | RESPIRATION RATE: 16 BRPM

## 2021-06-14 DIAGNOSIS — E03.9 HYPOTHYROIDISM, UNSPECIFIED TYPE: ICD-10-CM

## 2021-06-14 DIAGNOSIS — C50.919 MALIGNANT NEOPLASM OF FEMALE BREAST, UNSPECIFIED ESTROGEN RECEPTOR STATUS, UNSPECIFIED LATERALITY, UNSPECIFIED SITE OF BREAST (H): ICD-10-CM

## 2021-06-14 DIAGNOSIS — Z00.00 ENCOUNTER FOR MEDICARE ANNUAL WELLNESS EXAM: Primary | ICD-10-CM

## 2021-06-14 DIAGNOSIS — K21.9 GASTROESOPHAGEAL REFLUX DISEASE WITHOUT ESOPHAGITIS: ICD-10-CM

## 2021-06-14 DIAGNOSIS — D68.51 HETEROZYGOUS FACTOR V LEIDEN MUTATION (H): ICD-10-CM

## 2021-06-14 DIAGNOSIS — R39.15 URINARY URGENCY: ICD-10-CM

## 2021-06-14 PROCEDURE — 99397 PER PM REEVAL EST PAT 65+ YR: CPT | Performed by: FAMILY MEDICINE

## 2021-06-14 PROCEDURE — 99213 OFFICE O/P EST LOW 20 MIN: CPT | Mod: 25 | Performed by: FAMILY MEDICINE

## 2021-06-14 RX ORDER — LEVOTHYROXINE SODIUM 25 UG/1
25 TABLET ORAL DAILY
Qty: 90 TABLET | Refills: 3 | Status: SHIPPED | OUTPATIENT
Start: 2021-06-14 | End: 2022-06-16

## 2021-06-14 RX ORDER — PANTOPRAZOLE SODIUM 40 MG/1
40 TABLET, DELAYED RELEASE ORAL DAILY
Qty: 90 TABLET | Refills: 3 | Status: SHIPPED | OUTPATIENT
Start: 2021-06-14 | End: 2022-06-20

## 2021-06-14 RX ORDER — LEVOTHYROXINE SODIUM 137 UG/1
137 TABLET ORAL DAILY
Qty: 90 TABLET | Refills: 3 | Status: SHIPPED | OUTPATIENT
Start: 2021-06-14 | End: 2022-06-16

## 2021-06-14 RX ORDER — TOLTERODINE 4 MG/1
CAPSULE, EXTENDED RELEASE ORAL
Qty: 90 CAPSULE | Refills: 3 | Status: SHIPPED | OUTPATIENT
Start: 2021-06-14 | End: 2022-06-17

## 2021-06-14 RX ORDER — INFLIXIMAB 100 MG/10ML
INJECTION, POWDER, LYOPHILIZED, FOR SOLUTION INTRAVENOUS
COMMUNITY
Start: 2021-01-21 | End: 2023-02-16

## 2021-06-14 ASSESSMENT — MIFFLIN-ST. JEOR: SCORE: 1333.2

## 2021-06-14 ASSESSMENT — ACTIVITIES OF DAILY LIVING (ADL): CURRENT_FUNCTION: NO ASSISTANCE NEEDED

## 2021-06-14 NOTE — PATIENT INSTRUCTIONS
I have completed your parking sticker.    I have refilled your medications.    Please be aware that there will be an additional charge during your preventative visit due to either a new diagnosis and/or chronic disease management.    Preventative visits screen for diseases prior to they occur.  They do not cover for any new diagnosis or chronic disease management which would include medication refills, labs etc.    If you have questions regarding your coverage please check with your insurance provider.  At [facility] we need to code correctly to be in compliance with all insurance companies.          Thank you for choosing [facility].  You may be receiving an email and/or telephone survey request from [employer] [facility] [employer] regarding your visit today.  Please take a few minutes to respond to the survey to let us know how we are doing.      If you have questions or concerns, please contact us via [email or web address] or you can contact your care team at [phone] option [phone].    Our Clinic hours are:  Monday - Thursday 7am-6pm  Friday 7am-5pm    The [place] outpatient lab hours are:  Monday - Friday 7am-4:30pm    Appointments are required, call [phone]    If you have clinical questions after hours or would like to schedule an appointment,  call the clinic at [phone].    Patient Education   Personalized Prevention Plan  You are due for the preventive services outlined below.  Your care team is available to assist you in scheduling these services.  If you have already completed any of these items, please share that information with your care team to update in your medical record.  Health Maintenance Due   Topic Date Due     Heart Failure Action Plan  Never done     ANNUAL REVIEW OF HM ORDERS  Never done     Kidney Microalbumin Urine Test  12/06/2019       Understanding USDA MyPlate  The USDA has guidelines to help you make healthy food choices. These are called MyPlate. MyPlate shows the food groups that make  up healthy meals using the image of a place setting. Before you eat, think about the healthiest choices for what to put on your plate or in your cup or bowl. To learn more about building a healthy plate, visit www.choosemyplate.gov.    The food groups    Fruits. Any fruit or 100% fruit juice counts as part of the Fruit Group. Fruits may be fresh, canned, frozen, or dried, and may be whole, cut-up, or pureed. Make 1/2 of your plate fruits and vegetables.    Vegetables. Any vegetable or 100% vegetable juice counts as a member of the Vegetable Group. Vegetables may be fresh, frozen, canned, or dried. They can be served raw or cooked and may be whole, cut-up, or mashed. Make 1/2 of your plate fruits and vegetables.    Grains. All foods made from grains are part of the Grains Group. These include wheat, rice, oats, cornmeal, and barley. Grains are often used to make foods such as bread, pasta, oatmeal, cereal, tortillas, and grits. Grains should be no more than 1/4 of your plate. At least half of your grains should be whole grains.    Protein. This group includes meat, poultry, seafood, beans and peas, eggs, processed soy products (such as tofu), nuts (including nut butters), and seeds. Make protein choices no more than 1/4 of your plate. Meat and poultry choices should be lean or low fat.    Dairy. The Dairy Group includes all fluid milk products and foods made from milk that contain calcium, such as yogurt and cheese. (Foods that have little calcium, such as cream, butter, and cream cheese, are not part of this group.) Most dairy choices should be low-fat or fat-free.    Oils. Oils aren't a food group, but they do contain essential nutrients. However it's important to watch your intake of oils. These are fats that are liquid at room temperature. They include canola, corn, olive, soybean, vegetable, and sunflower oil. Foods that are mainly oil include mayonnaise, certain salad dressings, and soft margarines. You likely  already get your daily oil allowance from the foods you eat.  Things to limit  Eating healthy also means limiting these things in your diet:       Salt (sodium). Many processed foods have a lot of sodium. To keep sodium intake down, eat fresh vegetables, meats, poultry, and seafood when possible. Purchase low-sodium, reduced-sodium, or no-salt-added food products at the store. And don't add salt to your meals at home. Instead, season them with herbs and spices such as dill, oregano, cumin, and paprika. Or try adding flavor with lemon or lime zest and juice.    Saturated fat. Saturated fats are most often found in animal products such as beef, pork, and chicken. They are often solid at room temperature, such as butter. To reduce your saturated fat intake, choose leaner cuts of meat and poultry. And try healthier cooking methods such as grilling, broiling, roasting, or baking. For a simple lower-fat swap, use plain nonfat yogurt instead of mayonnaise when making potato salad or macaroni salad.    Added sugars. These are sugars added to foods. They are in foods such as ice cream, candy, soda, fruit drinks, sports drinks, energy drinks, cookies, pastries, jams, and syrups. Cut down on added sugars by sharing sweet treats with a family member or friend. You can also choose fruit for dessert, and drink water or other unsweetened beverages.     [employer] last reviewed this educational content on 6/1/2020 2000-2021 [employer]. All rights reserved. This information is not intended as a substitute for professional medical care. Always follow your healthcare professional's instructions.

## 2021-06-14 NOTE — PROGRESS NOTES
"SUBJECTIVE:   Briana Mcgee is a 76 year old female who presents for Preventive Visit.    Patient has been advised of split billing requirements and indicates understanding: Yes   Are you in the first 12 months of your Medicare coverage?  No    Healthy Habits:    In general, how would you rate your overall health?  Very good    Frequency of exercise:  4-5 days/week    Duration of exercise:  45-60 minutes    Do you usually eat at least 4 servings of fruit and vegetables a day, include whole grains    & fiber and avoid regularly eating high fat or \"junk\" foods?  No    Taking medications regularly:  Yes    Barriers to taking medications:  None    Medication side effects:  Other (possible weight gain since heart attack.)    Ability to successfully perform activities of daily living:  No assistance needed    Home Safety:  No safety concerns identified    Hearing Impairment:  No hearing concerns    In the past 6 months, have you been bothered by leaking of urine?  No    In general, how would you rate your overall mental or emotional health?  Very good      PHQ-2 Total Score:    Additional concerns today:  Yes (Permanent handicap sticker and 90 day prescription instead of 30 day.)    Do you feel safe in your environment? Yes    Have you ever done Advance Care Planning? (For example, a Health Directive, POLST, or a discussion with a medical provider or your loved ones about your wishes): Yes, patient states has an Advance Care Planning document and will bring a copy to the clinic.       Fall risk  Fallen 2 or more times in the past year?: No  Any fall with injury in the past year?: No    Cognitive Screening   1) Repeat 3 items (Leader, Season, Table)    2) Clock draw: NORMAL  3) 3 item recall: Recalls 2 objects   Results: NORMAL clock, 1-2 items recalled: COGNITIVE IMPAIRMENT LESS LIKELY    Mini-CogTM Copyright JORDON Matias. Licensed by the author for use in [facility]; reprinted with permission " ([email or web address]). All rights reserved.      Do you have sleep apnea, excessive snoring or daytime drowsiness?: no    Reviewed and updated as needed this visit by clinical staff  Tobacco  Allergies  Meds   Med Hx  Surg Hx  Fam Hx  Soc Hx        Reviewed and updated as needed this visit by Provider                Social History     Tobacco Use     Smoking status: Former Smoker     Types: Cigarettes     Quit date: 1996     Years since quittin.9     Smokeless tobacco: Never Used     Tobacco comment: quit 10-12 years ago, .   Substance Use Topics     Alcohol use: Yes     Comment: glass of wine at night     If you drink alcohol do you typically have >3 drinks per day or >7 drinks per week? No    No flowsheet data found.      Hyperlipidemia Follow-Up      Are you regularly taking any medication or supplement to lower your cholesterol?   Yes- Lipitor    Are you having muscle aches or other side effects that you think could be caused by your cholesterol lowering medication?  No    Hypertension Follow-up      Do you check your blood pressure regularly outside of the clinic? Yes     Are you following a low salt diet? Yes    Are your blood pressures ever more than 140 on the top number (systolic) OR more   than 90 on the bottom number (diastolic), for example 140/90? No    Heart Failure Follow-up  Are you experiencing any shortness of breath? Yes, with activity only   How would you describe your shortness of breath?  Same as usual    Are you experiencing any swelling in your legs or feet?  No    Are you using more pillows than usual? No    Do you cough at night?  No    Do you check your weight daily?  No    Have you had a weight change recently?  Weight increase. About 12 pounds since November.    Are you having any of the following side effects from your medications? (Select all that apply)  The patient does not report symptoms of dizziness, fatigue, cough, swelling, or slow heart beat.    Since your last  "visit, how many times have you gone to the cardiologist, urgent care, emergency room, or hospital because of your heart failure?   None    Chronic Kidney Disease Follow-up      Do you take any over the counter pain medicine?: No    Hypothyroidism Follow-up      Since last visit, patient describes the following symptoms: weight gain of 12 lbs      Current providers sharing in care for this patient include:   Patient Care Team:  Xavier Vega MD as PCP - General (Family Practice)  Kam Montero MD as MD (Physical Medicine and Rehabilitation)  Xavier Vega MD as Assigned PCP  Meseret Angel MD as Assigned Neuroscience Provider  Merari Duarte MD as Assigned Cancer Care Provider  Rik Villavicencio MD as Assigned Surgical Provider  Davy Shook MD as Assigned Heart and Vascular Provider    The following health maintenance items are reviewed in Epic and correct as of today:  Health Maintenance Due   Topic Date Due     HF ACTION PLAN  Never done     MICROALBUMIN  12/06/2019           Mammogram Screening - Patient over age 75, has elected to continue with screening.  Pertinent mammograms are reviewed under the imaging tab.    Review of Systems  Review Of Systems  Skin: skin has cleared up  Eyes: negative  Ears/Nose/Throat: negative  Respiratory: No shortness of breath, dyspnea on exertion, cough, or hemoptysis  Cardiovascular: negative  Gastrointestinal: negative  Genitourinary: negative  Musculoskeletal:treated for RA  Neurologic: negative  Psychiatric: negative  Hematologic/Lymphatic/Immunologic: negative  Endocrine: negative      OBJECTIVE:   /62   Pulse 69   Temp 97.4  F (36.3  C) (Tympanic)   Resp 16   Ht 1.651 m (5' 5\")   Wt 84.7 kg (186 lb 12.8 oz)   SpO2 94%   BMI 31.09 kg/m   Estimated body mass index is 31.09 kg/m  as calculated from the following:    Height as of this encounter: 1.651 m (5' 5\").    Weight as of this encounter: 84.7 kg (186 lb 12.8 " oz).  Physical Exam  GENERAL: healthy, alert and no distress  EYES: Eyes grossly normal to inspection, PERRL and conjunctivae and sclerae normal  HENT: ear canals and TM's normal, nose and mouth without ulcers or lesions  NECK: no adenopathy, no asymmetry, masses, or scars and thyroid normal to palpation  RESP: lungs clear to auscultation - no rales, rhonchi or wheezes  CV: regular rate and rhythm, normal S1 S2, no S3 or S4, no murmur, click or rub, no peripheral edema and peripheral pulses strong  ABDOMEN: soft, nontender, no hepatosplenomegaly, no masses and bowel sounds normal  MS: no gross musculoskeletal defects noted, no edema  SKIN: no suspicious lesions or rashes  NEURO: Normal strength and tone, mentation intact and speech normal  PSYCH: mentation appears normal, affect normal/bright  LYMPH: anterior cervical: no adenopathy  posterior cervical: no adenopathy        ASSESSMENT / PLAN:   (Z00.00) Encounter for Medicare annual wellness exam  (primary encounter diagnosis)  Comment: Discussed healthy lifestyle and preventative cares.  Plan:     (E03.9) Hypothyroidism, unspecified type  Comment: refilled med and reviewed labs  Plan: levothyroxine (SYNTHROID/LEVOTHROID) 137 MCG         tablet, levothyroxine (SYNTHROID/LEVOTHROID) 25        MCG tablet            (K21.9) Gastroesophageal reflux disease without esophagitis  Comment: stable on med and refilled  Plan: pantoprazole (PROTONIX) 40 MG EC tablet            (R39.15) Urinary urgency  Comment: stable and refilled on med  Plan: tolterodine ER (DETROL LA) 4 MG 24 hr capsule            (D68.51) Heterozygous factor V Leiden mutation (H)  Comment: noted  Plan:     (C50.919) Malignant neoplasm of female breast, unspecified estrogen receptor status, unspecified laterality, unspecified site of breast (H)  Comment: has been followed by oncology, cash, still getting imaging  Plan:     Patient has been advised of split billing requirements and indicates understanding:  "written in avs  COUNSELING:  Reviewed preventive health counseling, as reflected in patient instructions       Regular exercise       Healthy diet/nutrition       Vision screening    Estimated body mass index is 31.09 kg/m  as calculated from the following:    Height as of this encounter: 1.651 m (5' 5\").    Weight as of this encounter: 84.7 kg (186 lb 12.8 oz).    Weight management plan: diet    She reports that she quit smoking about 24 years ago. Her smoking use included cigarettes. She has never used smokeless tobacco.      Appropriate preventive services were discussed with this patient, including applicable screening as appropriate for cardiovascular disease, diabetes, osteopenia/osteoporosis, and glaucoma.  As appropriate for age/gender, discussed screening for colorectal cancer, prostate cancer, breast cancer, and cervical cancer. Checklist reviewing preventive services available has been given to the patient.    Reviewed patients plan of care and provided an AVS. The Basic Care Plan (routine screening as documented in Health Maintenance) for Briana meets the Care Plan requirement. This Care Plan has been established and reviewed with the Patient.    Counseling Resources:  ATP IV Guidelines  Pooled Cohorts Equation Calculator  Breast Cancer Risk Calculator  Breast Cancer: Medication to Reduce Risk  FRAX Risk Assessment  ICSI Preventive Guidelines  Dietary Guidelines for Americans, 2010  Covagen's MyPlate  ASA Prophylaxis  Lung CA Screening    Xavier Vega MD  [facility]    Identified Health Risks:  "

## 2021-06-21 DIAGNOSIS — I50.21 ACUTE SYSTOLIC HEART FAILURE (H): ICD-10-CM

## 2021-06-21 RX ORDER — LISINOPRIL 5 MG/1
5 TABLET ORAL 2 TIMES DAILY
Qty: 180 TABLET | Refills: 1 | Status: SHIPPED | OUTPATIENT
Start: 2021-06-21 | End: 2021-11-24

## 2021-06-21 RX ORDER — POTASSIUM CHLORIDE 750 MG/1
10 TABLET, EXTENDED RELEASE ORAL DAILY
Qty: 90 TABLET | Refills: 1 | Status: SHIPPED | OUTPATIENT
Start: 2021-06-21 | End: 2021-11-24

## 2021-07-08 ENCOUNTER — INFUSION THERAPY VISIT (OUTPATIENT)
Dept: INFUSION THERAPY | Facility: CLINIC | Age: 76
End: 2021-07-08
Attending: INTERNAL MEDICINE
Payer: MEDICARE

## 2021-07-08 VITALS
HEART RATE: 66 BPM | BODY MASS INDEX: 31.12 KG/M2 | WEIGHT: 187 LBS | TEMPERATURE: 98.2 F | SYSTOLIC BLOOD PRESSURE: 128 MMHG | DIASTOLIC BLOOD PRESSURE: 56 MMHG

## 2021-07-08 DIAGNOSIS — M06.041 RHEUMATOID ARTHRITIS INVOLVING BOTH HANDS WITH NEGATIVE RHEUMATOID FACTOR (H): Primary | ICD-10-CM

## 2021-07-08 DIAGNOSIS — M06.042 RHEUMATOID ARTHRITIS INVOLVING BOTH HANDS WITH NEGATIVE RHEUMATOID FACTOR (H): Primary | ICD-10-CM

## 2021-07-08 PROCEDURE — 258N000003 HC RX IP 258 OP 636: Performed by: INTERNAL MEDICINE

## 2021-07-08 PROCEDURE — 250N000011 HC RX IP 250 OP 636: Performed by: INTERNAL MEDICINE

## 2021-07-08 PROCEDURE — 96415 CHEMO IV INFUSION ADDL HR: CPT

## 2021-07-08 PROCEDURE — 96413 CHEMO IV INFUSION 1 HR: CPT

## 2021-07-08 RX ORDER — ACETAMINOPHEN 325 MG/1
650 TABLET ORAL ONCE
Status: CANCELLED
Start: 2021-07-09 | End: 2021-07-09

## 2021-07-08 RX ORDER — DIPHENHYDRAMINE HCL 25 MG
25 CAPSULE ORAL ONCE
Status: CANCELLED
Start: 2021-07-09 | End: 2021-07-09

## 2021-07-08 RX ADMIN — INFLIXIMAB 400 MG: 100 INJECTION, POWDER, LYOPHILIZED, FOR SOLUTION INTRAVENOUS at 10:00

## 2021-07-08 NOTE — PROGRESS NOTES
Infusion Nursing Note:  Briana Mcgee presents today for Remicade.    Patient seen by provider today: No   present during visit today: Not Applicable.    Note: N/A.  Patient met criteria for an asymptomatic covid-19 PCR test in infusion today. Patient declined the covid-19 test.    Intravenous Access:  Peripheral IV placed.    Treatment Conditions:  Biological Infusion Checklist:  ~~~ NOTE: If the patient answers yes to any of the questions below, hold the infusion and contact ordering provider or on-call provider.    1. Have you recently had an elevated temperature, fever, chills, productive cough, coughing for 3 weeks or longer or hemoptysis, abnormal vital signs, night sweats,  chest pain or have you noticed a decrease in your appetite, unexplained weight loss or fatigue? No  2. Do you have any open wounds or new incisions? No  3. Do you have any recent or upcoming hospitalizations, surgeries or dental procedures? No  4. Do you currently have or recently have had any signs of illness or infection or are you on any antibiotics? No  5. Have you had any new, sudden or worsening abdominal pain? No  6. Have you or anyone in your household received a live vaccination in the past 4 weeks? Please note:  No live vaccines while on biologic/chemotherapy until 6 months after the last treatment.  Patient can receive the flu vaccine (shot only) and the pneumovax.  It is optimal for the patient to get these vaccines mid cycle, but they can be given at any time as long as it is not on the day of the infusion. No  7. Have you recently been diagnosed with any new nervous system diseases (ie. Multiple sclerosis, Guillain [place], seizures, neurological changes) or cancer diagnosis? No  8. Are you on any form of radiation or chemotherapy? No  9. Are you pregnant or breast feeding or do you have plans of pregnancy in the future? No  10. Have there been any other new onset medical symptoms? No    Post Infusion  Assessment:  Patient tolerated infusion without incident.       Discharge Plan:   Patient discharged in stable condition accompanied by: self.  Departure Mode: Ambulatory.  Pt has appt for next infusion on 9/2.  Francine Reyes RN

## 2021-08-10 ENCOUNTER — HOSPITAL ENCOUNTER (EMERGENCY)
Facility: CLINIC | Age: 76
Discharge: HOME | End: 2021-08-10
Attending: EMERGENCY MEDICINE | Admitting: EMERGENCY MEDICINE
Payer: MEDICARE

## 2021-08-10 ENCOUNTER — APPOINTMENT (OUTPATIENT)
Dept: CT IMAGING | Facility: CLINIC | Age: 76
End: 2021-08-10
Attending: EMERGENCY MEDICINE
Payer: MEDICARE

## 2021-08-10 ENCOUNTER — OFFICE VISIT (OUTPATIENT)
Dept: URGENT CARE | Facility: URGENT CARE | Age: 76
End: 2021-08-10

## 2021-08-10 ENCOUNTER — NURSE TRIAGE (OUTPATIENT)
Dept: FAMILY MEDICINE | Facility: CLINIC | Age: 76
End: 2021-08-10

## 2021-08-10 VITALS
RESPIRATION RATE: 12 BRPM | TEMPERATURE: 98.3 F | OXYGEN SATURATION: 96 % | SYSTOLIC BLOOD PRESSURE: 136 MMHG | BODY MASS INDEX: 29.99 KG/M2 | HEART RATE: 69 BPM | DIASTOLIC BLOOD PRESSURE: 73 MMHG | WEIGHT: 180 LBS | HEIGHT: 65 IN

## 2021-08-10 VITALS
SYSTOLIC BLOOD PRESSURE: 128 MMHG | DIASTOLIC BLOOD PRESSURE: 62 MMHG | HEART RATE: 65 BPM | OXYGEN SATURATION: 97 % | TEMPERATURE: 97.3 F

## 2021-08-10 DIAGNOSIS — S09.90XA INJURY OF HEAD, INITIAL ENCOUNTER: Primary | ICD-10-CM
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
## 2021-08-10 DIAGNOSIS — S09.90XA CLOSED HEAD INJURY, INITIAL ENCOUNTER: ICD-10-CM

## 2021-08-10 DIAGNOSIS — S66.912A STRAIN OF LEFT WRIST, INITIAL ENCOUNTER: ICD-10-CM

## 2021-08-10 DIAGNOSIS — S80.02XA CONTUSION OF LEFT KNEE, INITIAL ENCOUNTER: ICD-10-CM

## 2021-08-10 DIAGNOSIS — S09.93XA FACIAL INJURY, INITIAL ENCOUNTER: ICD-10-CM

## 2021-08-10 PROCEDURE — 99214 OFFICE O/P EST MOD 30 MIN: CPT | Performed by: PHYSICIAN ASSISTANT

## 2021-08-10 PROCEDURE — 70486 CT MAXILLOFACIAL W/O DYE: CPT

## 2021-08-10 PROCEDURE — 99283 EMERGENCY DEPT VISIT LOW MDM: CPT | Performed by: EMERGENCY MEDICINE

## 2021-08-10 PROCEDURE — 99284 EMERGENCY DEPT VISIT MOD MDM: CPT | Mod: 25 | Performed by: EMERGENCY MEDICINE

## 2021-08-10 PROCEDURE — 70450 CT HEAD/BRAIN W/O DYE: CPT

## 2021-08-10 ASSESSMENT — MIFFLIN-ST. JEOR: SCORE: 1302.35

## 2021-08-10 NOTE — ED PROVIDER NOTES
History     Chief Complaint   Patient presents with     Fall     coming from [facility] following a fall in the parking lot.      Head Injury     c/o facial injury with tripping in parking lot - is NOT on anticoag - sent here from [facility] for [facility] of head     HPI  Briana Mcgee is a 76 year old female with past medical history significant for breast cancer, hypertension, rheumatoid arthritis, chronic bronchitis, hyper lipidemia, hypothyroidism, esophageal reflux who presents emergency department complaining of fall.  Patient was in the parking lot at work was told to move her car and tripped and face planted landing on the left side of her face broke her glasses nose a prescription and has swelling over the left side of her face.  Patient is not on anticoagulants.  She did not lose consciousness.  She complaining of left-sided headache.  She has no vision changes.  She denies any dental injury.  She has no neck pain or back pain she has not had chest or abdominal pain.  She denies any focal numbness weakness she has not any bowel or bladder dysfunction.  Patient is not on any anticoagulant medication.    Allergies:  Allergies   Allergen Reactions     Erythromycin Other (See Comments)     Edema     Hydrocodone GI Disturbance     GI Upset, Tolerates dilaudid     Oxycodone GI Disturbance       Problem List:    Patient Active Problem List    Diagnosis Date Noted     Malignant neoplasm of female breast (H) 02/18/2005     Priority: High     11/05: breast surgeon=Dr. Dimitrios Bell, oncology=Dr. Tino Gordillo s/p lumpectomy and axillary node dissection followed by [facility]. Radiation oncology=Dr. Jessica Edmonds for 6 weeks.   12/05: Briana is clinically asymptomatic and no clinical evidence of cancer recurrence; port-a-cath removal.  9-12-05 NM MUGA [facility] radionuclide angiography at rest findings:1. Left ventricular ejectin fraction is normal at 64%  2. Compared to previous study perfomed  3-17-05, there has been no significant interval change.  10/06: appointment with Dr. Gordillo:continue amrimidex and  follow up in 6 months.  1/16/07: follow up with Dr. Bell: 'doing well with no signs of recurrence', follow up 7/07 with mammogram at that time/  2/12/09: now followed by Dr. Peres.  3/10 seen by Dr. Leiv Bear with [facility] yearly visits, now can have yearly mammos, next due 8/10  10/2013 follow up with Dr. Duarte, followed every 6 months due to density of right breast and concerns, now followed yearly  Problem list name updated by automated process. Provider to review       Acute heart failure (H) 11/18/2020     Priority: Medium     Acute on chronic congestive heart failure, unspecified heart failure type (H) 11/17/2020     Priority: Medium     Added automatically from request for surgery [id number]       CHF exacerbation (H) 11/15/2020     Priority: Medium     Spinal stenosis, lumbar region, with neurogenic claudication 11/14/2017     Priority: Medium     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) 04/12/2017     Priority: Medium     Essential hypertension 03/13/2017     Priority: Medium     Hepatitis 11/25/2015     Priority: Medium     Obesity 10/20/2015     Priority: Medium     CKD (chronic kidney disease) stage 2, GFR 60-89 ml/min 06/04/2014     Priority: Medium     Advanced directives, counseling/discussion 01/29/2014     Priority: Medium     Was given the information to fill out.       Spinal stenosis 01/07/2014     Priority: Medium     Pain controlled with epidural injections       Lumbar radiculopathy 05/07/2012     Priority: Medium     Varicose veins of lower extremities with complications 03/06/2012     Priority: Medium     Problem list name updated by automated process. Provider to review       GERD (gastroesophageal reflux disease) 04/12/2011     Priority: Medium     Heterozygous factor V Leiden mutation (H) 04/07/2011     Priority:  "Medium     HYPERLIPIDEMIA LDL GOAL <100 10/31/2010     Priority: Medium     OA (osteoarthritis) of knee 03/01/2009     Priority: Medium     Followed by [facility]. Briana has had 3 Synvisc injections (1/3/08, 2/12/09,  3/2/09, 3/9/09)       Hot flashes 03/01/2009     Priority: Medium     2/12/09: seen by Dr. Peres, started Effexor. If this is not helpful will start Neurontin.       Rhinitis, allergic seasonal 07/02/2008     Priority: Medium     ischemic stroke 3/06 03/27/2006     Priority: Catarino Warren has been seen by Dr. Hernandez. Given her history of CVA and Factor V Leiden heterozygote he recommends that she stay on \"antiplatelet drug use for secondary prevention of strokes\". Briana has been on plavix and asa.       Impaired fasting glucose 03/05/2006     Priority: Catarino Warren would like to continue diet changes (she has already lost 16 pounds on the [employer]' program), regular exercise and weight loss.  She agrees to recheck fasting blood sugar and lipids in 3 months.       Insomnia 03/05/2006     Priority: Medium     Stable on Trazadone.  Problem list name updated by automated process. Provider to review       Hypothyroidism 02/18/2005     Priority: Medium     Stable on current dose of synthroid.  Problem list name updated by automated process. Provider to review          Past Medical History:    Past Medical History:   Diagnosis Date     Allergies      Breast cancer (H)      Esophageal reflux      Hypertension      Other and unspecified hyperlipidemia      Other chronic bronchitis      Personal history of pneumonia (recurrent)      Personal history of tobacco use, presenting hazards to health      RA (rheumatoid arthritis) (H)      Unspecified hypothyroidism        Past Surgical History:    Past Surgical History:   Procedure Laterality Date     BIOPSY BREAST       COLONOSCOPY N/A 9/2/2016    Procedure: COLONOSCOPY;  Surgeon: Dean Sanchez MD;  Location: [facility]" CORONARY ANGIOGRAM N/A 11/18/2020    Procedure: Coronary Angiogram;  Surgeon: Leonid Smith MD;  Location:  HEART CARDIAC CATH LAB     EXCISE EXOSTOSIS FOOT Right 4/25/2017    Procedure: EXCISE EXOSTOSIS FOOT;  Retrocalcaneal exostectomy right;  Surgeon: Antwan Goldstein DPM;  Location: [facility]     HC EXCISION BREAST LESION, OPEN >=1      2/05     HYSTERECTOMY, RYAN  1982     for non cancerous reasons and no abnormal pap     INJECT EPIDURAL LUMBAR  1/12/2011    INJECT EPIDURAL LUMBAR performed by GENERIC ANESTHESIA PROVIDER at [facility] OR     INJECT EPIDURAL LUMBAR  5/5/2011    Procedure:INJECT EPIDURAL LUMBAR; LESLIE -Dr. Sánchez  ; Surgeon:GENERIC ANESTHESIA PROVIDER; Location:[facility]     INJECT EPIDURAL LUMBAR  10/6/2011    Procedure:INJECT EPIDURAL LUMBAR; LESLIE--; Surgeon:GENERIC ANESTHESIA PROVIDER; Location:[facility]     INJECT EPIDURAL LUMBAR  1/25/2012    Procedure:INJECT EPIDURAL LUMBAR; LESLIE-Dr. Sánchez  ; Surgeon:GENERIC ANESTHESIA PROVIDER; Location:[facility]     INJECT EPIDURAL LUMBAR  7/9/2012    Procedure: INJECT EPIDURAL LUMBAR;  LESLIE-Dr. Pacheco;  Surgeon: Provider, Generic Anesthesia;  Location: [facility]     LAMINECTOMY LUMBAR MINIMALLY INVASIVE TWO LEVELS N/A 11/21/2017    Procedure: LAMINECTOMY LUMBAR MINIMALLY INVASIVE TWO LEVELS;  L4-5 decompression laminectomy, Left L5-S1 foraminal decompression;  Surgeon: Jesse Dueñas MD;  Location: [facility]     LUMPECTOMY BREAST       RELEASE CARPAL TUNNEL Right 9/5/2017    Procedure: RELEASE CARPAL TUNNEL;  Right Wrist Carpal Tunnel Release;  Surgeon: Melba Yi MD;  Location: [facility]     RELEASE CARPAL TUNNEL Left 10/31/2017    Procedure: RELEASE CARPAL TUNNEL;  Left Wrist Carpal Tunnel Release;  Surgeon: Melba Yi MD;  Location: [facility]     SURGICAL HISTORY OF -       lumpectomy with sentinal node biopsy     SURGICAL HISTORY OF -       left knee arthoscopic repair medial meniscus       Family History:    Family History   Problem Relation Age of Onset  "    Diabetes Mother      Hypertension Mother      Cancer Mother         breast     Heart Disease Mother         chf     Breast Cancer Mother      Blood Disease Father      Diabetes Son         Type 1     Psoriasis Son      Cerebrovascular Disease Son 48     Cerebrovascular Disease Sister      Breast Cancer Paternal Aunt        Social History:  Marital Status:   [2]  Social History     Tobacco Use     Smoking status: Former Smoker     Types: Cigarettes     Quit date: 1996     Years since quittin.0     Smokeless tobacco: Never Used     Tobacco comment: quit 10-12 years ago, .   Substance Use Topics     Alcohol use: Yes     Comment: glass of wine at night     Drug use: No        Medications:    aspirin 81 MG EC tablet  atorvastatin (LIPITOR) 40 MG tablet  calcium carbonate (OS-MARIO) 500 MG tablet  Coenzyme Q10 (COQ10 PO)  fluticasone (FLONASE) 50 MCG/ACT nasal spray  FOLIC ACID PO  furosemide (LASIX) 40 MG tablet  hydroxychloroquine (PLAQUENIL) 200 MG tablet  inFLIXimab (REMICADE) 100 MG injection  levothyroxine (SYNTHROID/LEVOTHROID) 137 MCG tablet  levothyroxine (SYNTHROID/LEVOTHROID) 25 MCG tablet  lisinopril (ZESTRIL) 5 MG tablet  meclizine (ANTIVERT) 25 MG tablet  metoprolol succinate ER (TOPROL-XL) 25 MG 24 hr tablet  Multiple Vitamins-Minerals (MULTIVITAMIN ADULT) TABS  Naproxen Sodium (ALEVE PO)  pantoprazole (PROTONIX) 40 MG EC tablet  potassium chloride ER (K-TAB/KLOR-CON) 10 MEQ CR tablet  SHINGRIX injection  tolterodine ER (DETROL LA) 4 MG 24 hr capsule          Review of Systems  All systems reviewed and other than pertinent positives and negatives in HPI all other systems are negative.  Physical Exam   BP: 136/73  Pulse: 69  Temp: 98.3  F (36.8  C)  Resp: 12  Height: 165.1 cm (5' 5\")  Weight: 81.6 kg (180 lb)  SpO2: 96 %      Physical Exam  Vitals and nursing note reviewed.   Constitutional:       General: She is not in acute distress.     Appearance: She is not ill-appearing or " toxic-appearing.   HENT:      Head: Normocephalic.      Comments: Swelling and ecchymosis noted over the left eyebrow region without step-off.  There is swelling and ecchymosis of the upper eyelid patient is able to open the eye without difficulty.  Abrasion is noted to the left maxillary and nose region.  These areas are tender to palpation.     Right Ear: Tympanic membrane normal.      Left Ear: Tympanic membrane normal.      Nose:      Comments: Mild swelling noted no intraseptal hematoma abrasion noted along bridge.     Mouth/Throat:      Mouth: Mucous membranes are moist.      Pharynx: Oropharynx is clear.   Eyes:      Extraocular Movements: Extraocular movements intact.      Conjunctiva/sclera: Conjunctivae normal.      Pupils: Pupils are equal, round, and reactive to light.      Comments: Able to read fine print with both eyes   Neck:      Comments: No swelling or erythema noted.  Trachea midline there is no tenderness to palpation of the neck patient has good flexion-extension of the neck without any pain.  Cardiovascular:      Rate and Rhythm: Normal rate and regular rhythm.      Pulses: Normal pulses.      Heart sounds: Normal heart sounds. No murmur heard.     Pulmonary:      Effort: Pulmonary effort is normal.      Breath sounds: Normal breath sounds. No wheezing, rhonchi or rales.   Chest:      Chest wall: No tenderness.   Abdominal:      General: Abdomen is flat. Bowel sounds are normal. There is no distension.      Palpations: Abdomen is soft.      Tenderness: There is no abdominal tenderness. There is no right CVA tenderness or left CVA tenderness.   Musculoskeletal:      Comments: There is mild swelling to the left dorsal wrist region patient is able to flex and extend wrist without significant difficulty.  Moving all extremities well abrasion noted to left knee region mild swelling patient moves legs well good plantarflexion dorsiflexion pulses sensation symmetrical no calf tenderness.   Skin:      General: Skin is warm and dry.      Capillary Refill: Capillary refill takes less than 2 seconds.   Neurological:      General: No focal deficit present.      Mental Status: She is alert and oriented to person, place, and time.      Cranial Nerves: No cranial nerve deficit.      Sensory: No sensory deficit.      Motor: No weakness.      Coordination: Coordination normal.      Gait: Gait normal.   Psychiatric:         Mood and Affect: Mood normal.         ED Course        Procedures              Critical Care time:  none               Results for orders placed or performed during the hospital encounter of 08/10/21 (from the past 24 hour(s))   CT Head w/o Contrast    Narrative    CT SCAN OF THE HEAD WITHOUT CONTRAST   8/10/2021 2:25 PM     HISTORY: Head trauma, moderate-severe. Orbital trauma.    TECHNIQUE: Axial images of the head and coronal reformations without  IV contrast material. Radiation dose for this scan was reduced using  automated exposure control, adjustment of the mA and/or kV according  to patient size, or iterative reconstruction technique.    COMPARISON: MRI of the brain dated 3/12/2020.    FINDINGS: There is no evidence of intracranial hemorrhage, mass, acute  infarct or anomaly. The ventricles are normal in size and  configuration. Multiple small unchanged chronic infarcts in the  cerebellum. Moderate to severely extensive patchy and confluent areas  of hypoattenuation in the cerebral white matter, nonspecific, but most  likely due to chronic small vessel ischemic disease. The extent is  overall probably similar to the prior MRI exam, although markedly  progressed compared to brain MRI of 4/21/2006. Intracranial  atherosclerotic calcifications primarily involving the carotid siphons  are noted.     There is anterior frontal scalp soft tissue injury, compatible with  contusion. Left preseptal/periorbital soft tissue swelling is noted,  incompletely characterized. Please see facial CT report of same  day  for additional details. Partial visualization of an air-fluid level in  the left maxillary sinus. The mastoid and middle ear cavities appear  grossly clear. The bony calvarium and bones of the skull base appear  intact.       Impression    IMPRESSION:  1. No CT findings of acute intracranial process.  2. Brain atrophy and presumed chronic small vessel ischemic disease,  as described.  3. Unchanged multiple small chronic cerebellar infarcts.  4. Anterior frontal scalp/left periorbital soft tissue contusion.  Please see facial CT report of same day for additional details.    SABRINA WHEELER MD         SYSTEM ID:  [id number]   CT Facial Bones without Contrast    Narrative    CT SCAN OF THE FACE WITHOUT CONTRAST 8/10/2021 2:26 PM     HISTORY: Facial trauma.     TECHNIQUE: Axial CT images of the facial bones were completed with  sagittal and coronal reformations. Radiation dose for this scan was  reduced using automated exposure control, adjustment of the mA and/or  kV according to patient size, or iterative reconstruction technique.     COMPARISON: CT head of same date.     FINDINGS: The pterygoid plates are intact. The bilateral zygomatic  arches and sphenotemporal buttresses are intact. The walls of both  orbits and the walls of the maxillary sinuses appear intact. No  displaced nasal arch or nasal septal fracture identified. Incidental  note made of bilateral mando bullosa and slight leftward deviation of  the nasal septum. The anterior skull base appears intact.    The mandible is intact. Normal position of the temporomandibular  joints. Visualized aspects of the mastoid and middle ear cavities are  clear.    Prominent contusion along the anterior left paramedian frontal scalp  extending over the base of the nasal arch/glabella. There is also left  preseptal/periorbital soft tissue contusion. Bilateral lens  replacements. The visualized intraorbital soft tissue contents  otherwise appear  unremarkable.    Nonspecific layering mildly hyperdense fluid in the left maxillary  sinus. This could represent blood products or proteinaceous layering  fluid. Mild asymmetric hyperostosis of the walls of the left maxillary  sinus, suggesting some degree of chronic inflammatory disease. Trace  scattered paranasal sinus mucosal thickening elsewhere.    There is a periapical lucency of the most distal remaining right  maxillary molar, concerning for a possible periapical abscess.  Multiple calcified palatine tonsilliths on the left. Calcified  atherosclerotic disease of the carotid bifurcations. Multilevel  degenerative changes are noted in the cervical spine.      Impression    IMPRESSION:  1. No acute maxillofacial fracture.  2. Nonspecific hyperdense fluid in the left maxillary sinus, possibly  representing blood products versus complex inflammatory fluid.  3. Anterior frontal scalp and left periorbital soft tissue contusion.  No postseptal/retrobulbar hemorrhage.  4. Other incidental findings, as described.    SABRINA WHEELER MD         SYSTEM ID:  [id number]       Medications - No data to display    Assessments & Plan (with Medical Decision Making) records were reviewed.  Imaging studies of the head and facial bones were obtained with CAT scan.  I discussed CT scan of the neck to rule out neck fracture but due to the patient having no pain in this region she did not want this done I also discussed x-rays of the knee and wrist with her but she felt these were fine and did not want imaging studies done.  CT scan of the head revealed old white matter disease and old small cerebellar infarcts.  CT scan of the face revealed no evidence of maxilla facial fractures there is left frontal and periorbital edema/hematoma with possible fluid/hematoma in the maxillary sinus region on the left.  No fractures noted.  Findings discussed in detail with patient.  She feels comfortable going home at this time.  She understands  that if she has any neck pain numbness weakness worsening pain in her wrist or knee she will return for further imaging studies and she does not want this done at this time.  She will take Tylenol for pain and return if worsening headache visual changes focal numbness weakness any extremity or altered mental status.         I have reviewed the nursing notes.    I have reviewed the findings, diagnosis, plan and need for follow up with the patient.       New Prescriptions    No medications on file       Final diagnoses:   Fall, initial encounter   Closed head injury, initial encounter   Facial injury, initial encounter   Strain of left wrist, initial encounter   Contusion of left knee, initial encounter       8/10/2021   [facility] EMERGENCY DEPT     Drage, Soham Rivera MD  08/11/21 1042

## 2021-08-10 NOTE — TELEPHONE ENCOUNTER
RN triage for unwitnessed trip/fall. Jenna reports she fell onto her forehead/face denies LOS, is A&O, denies dizziness/lightheadedness, chest pain or SOB. Reports bump on forehead, small laceration of nose advise to go to nearest urgent care or emergency room. Jenna agreeable to go to nearest location [facility] in [facility].    Kelley Gayle RN, BSN, CMSRN  [facility]      Reason for Disposition    Large swelling or bruise > 2 inches (5 cm)    Additional Information    Negative: ACUTE NEUROLOGIC SYMPTOM and symptom present now    Negative: Knocked out (unconscious) > 1 minute    Negative: Seizure (convulsion) occurred (Exception: prior history of seizures and now alert and without Acute Neurologic Symptoms)    Negative: Neck pain after dangerous injury (e.g., MVA, diving, trampoline, contact sports, fall > 10 feet or 3 meters) (Exception: neck pain began > 1 hour after injury)    Negative: Major bleeding (actively dripping or spurting) that can't be stopped    Negative: Penetrating head injury (e.g., knife, gunshot wound, metal object)    Negative: Sounds like a life-threatening emergency to the triager    Negative: Can't remember what happened (amnesia)    Negative: Vomiting once or more    Negative: Watery or blood-tinged fluid dripping from the nose or ears    Protocols used: HEAD INJURY-A-OH

## 2021-08-10 NOTE — PROGRESS NOTES
Assessment & Plan     Injury of head, initial encounter  Patient has large hematoma above left eye, she is not on a blood thinner but does take aspirin daily. Given the amount of facial swelling and her age would recommend she be seen in the ED for a CT of the head to rule out intracranial process/facial fracture. She agrees with plan. Discussed case with ED and they are expecting her arrival by private car.                    No follow-ups on file.    Carol Carpenter PA-C  [facility] [place]    Subjective   Chief Complaint   Patient presents with     Work Comp     8/10/21 Patient was in the parking lot at work was told to move her car and tripped and face planted landed on left side of face, broke her glasses, nose is scraped, eyes are swollen and discolored, mouth feels swollen, and left wrist is tender and discolored.         HPI     Head Injury    Onset of symptoms was today   Mechanism of Injury: Fall, tripped and hit face on pavement   Loss of consciousness: No  Course of illness is worsening, more swelling around eye   Severity moderate  Current and Associated symptoms: headache, swelling and bruising around eye   Treatment measures tried include: None  Patient is not on blood thinner. Takes aspirin every day.     Refer to [employer] Calculator                Review of Systems   Constitutional, HEENT, cardiovascular, pulmonary, gi and gu systems are negative, except as otherwise noted.      Objective    /62 (BP Location: Right arm, Patient Position: Sitting, Cuff Size: Adult Regular)   Pulse 65   Temp 97.3  F (36.3  C)   SpO2 97%   There is no height or weight on file to calculate BMI.  Physical Exam  Constitutional:       Appearance: She is well-developed.   HENT:      Head: Normocephalic.      Comments: There is a large hematoma above left eye and abrasion on nose      Right Ear: Tympanic membrane and ear canal normal.      Left Ear: Tympanic membrane and ear canal normal.    Eyes:      Conjunctiva/sclera: Conjunctivae normal.   Cardiovascular:      Rate and Rhythm: Normal rate.      Heart sounds: Normal heart sounds.   Pulmonary:      Effort: Pulmonary effort is normal.      Breath sounds: Normal breath sounds.   Skin:     General: Skin is warm and dry.      Findings: No rash.   Psychiatric:         Behavior: Behavior normal.

## 2021-08-10 NOTE — DISCHARGE INSTRUCTIONS
Return if symptoms worsen or new symptoms develop.  Follow-up with primary care physician for recheck later this week or early next week.  Head scan did not show any obvious kidney injury and facial bones were not fractured.  There is a small fluid collection in the left maxillary sinus which could be congestion versus hematoma.  He did not have any neck pain and did not want any imaging studies of your neck and also did not feel he needed wrist or knee studies if any neck pain worsening wrist or knee pain please return for recheck if any altered mental status vomiting or other symptoms present please return for recheck.

## 2021-08-13 ENCOUNTER — OFFICE VISIT (OUTPATIENT)
Dept: FAMILY MEDICINE | Facility: CLINIC | Age: 76
End: 2021-08-13
Payer: COMMERCIAL

## 2021-08-13 VITALS
HEART RATE: 68 BPM | HEIGHT: 65 IN | SYSTOLIC BLOOD PRESSURE: 104 MMHG | BODY MASS INDEX: 30.66 KG/M2 | TEMPERATURE: 97.9 F | WEIGHT: 184 LBS | DIASTOLIC BLOOD PRESSURE: 60 MMHG | OXYGEN SATURATION: 95 % | RESPIRATION RATE: 12 BRPM

## 2021-08-13 DIAGNOSIS — S09.93XD FACIAL INJURY, SUBSEQUENT ENCOUNTER: ICD-10-CM
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
## 2021-08-13 PROCEDURE — 99212 OFFICE O/P EST SF 10 MIN: CPT | Performed by: NURSE PRACTITIONER

## 2021-08-13 ASSESSMENT — MIFFLIN-ST. JEOR: SCORE: 1320.5

## 2021-08-13 NOTE — LETTER
[facility]  [address] [address]  [phone]          August 13, 2021    RE:  Briana Mcgee                                                                                                                                                       [address] [address] [address]            To whom it may concern:    Briana Mcgee was seen in my clinic today. She may return to work without any restrictions.      Sincerely,          eBba Elizabeth, CNP

## 2021-08-13 NOTE — PROGRESS NOTES
"    Assessment & Plan     Fall, subsequent encounter  Facial injury, subsequent encounter    Patient has bruising, but no pain.  Asymptomatic.  Wants to go back to work - letter written.      The risks, benefits and treatment options of prescribed medications or other treatments have been discussed with the patient. The patient verbalized their understanding and should call or follow up if no improvement or if they develop further problems.    BRAXTON Matute CNP  [facility]        Darryn West is a 76 year old who presents for the following health issues     HPI     ED/UC Followup:    Facility: [facility]  Date of visit: 8/10/21  Reason for visit: fall, closed head injury, facial injury  Current Status: feeling fine- no headaches  Tripped in the parking lot and fell on her face  No fracture on xray  She has no pain.  Bruises noted.  No visual changes.  Needs / wants letter to go back to work-  Works part time in a [employer]           Review of Systems   Constitutional, HEENT, cardiovascular, pulmonary, gi and gu systems are negative, except as otherwise noted.      Objective    /60 (BP Location: Right arm)   Pulse 68   Temp 97.9  F (36.6  C) (Tympanic)   Resp 12   Ht 1.651 m (5' 5\")   Wt 83.5 kg (184 lb)   SpO2 95%   BMI 30.62 kg/m    Body mass index is 30.62 kg/m .  Physical Exam   GENERAL: healthy, alert and no distress  HENT: bruising and mild edema noted around eyes, nose.                "

## 2021-09-02 ENCOUNTER — INFUSION THERAPY VISIT (OUTPATIENT)
Dept: INFUSION THERAPY | Facility: CLINIC | Age: 76
End: 2021-09-02
Attending: INTERNAL MEDICINE
Payer: MEDICARE

## 2021-09-02 ENCOUNTER — APPOINTMENT (OUTPATIENT)
Dept: LAB | Facility: CLINIC | Age: 76
End: 2021-09-02
Payer: MEDICARE

## 2021-09-02 VITALS
SYSTOLIC BLOOD PRESSURE: 114 MMHG | TEMPERATURE: 97.8 F | WEIGHT: 186.2 LBS | RESPIRATION RATE: 18 BRPM | HEART RATE: 65 BPM | BODY MASS INDEX: 30.99 KG/M2 | DIASTOLIC BLOOD PRESSURE: 44 MMHG

## 2021-09-02 DIAGNOSIS — M06.00 SERONEGATIVE RHEUMATOID ARTHRITIS (H): ICD-10-CM

## 2021-09-02 DIAGNOSIS — M06.042 RHEUMATOID ARTHRITIS INVOLVING BOTH HANDS WITH NEGATIVE RHEUMATOID FACTOR (H): Primary | ICD-10-CM

## 2021-09-02 DIAGNOSIS — M06.041 RHEUMATOID ARTHRITIS INVOLVING BOTH HANDS WITH NEGATIVE RHEUMATOID FACTOR (H): Primary | ICD-10-CM

## 2021-09-02 DIAGNOSIS — Z79.899 HIGH RISK MEDICATIONS (NOT ANTICOAGULANTS) LONG-TERM USE: ICD-10-CM

## 2021-09-02 LAB
ALBUMIN SERPL-MCNC: 3.7 G/DL (ref 3.4–5)
ALP SERPL-CCNC: 80 U/L (ref 40–150)
ALT SERPL W P-5'-P-CCNC: 25 U/L (ref 0–50)
AST SERPL W P-5'-P-CCNC: 18 U/L (ref 0–45)
BASOPHILS # BLD AUTO: 0.1 10E3/UL (ref 0–0.2)
BASOPHILS NFR BLD AUTO: 1 %
BILIRUB DIRECT SERPL-MCNC: 0.2 MG/DL (ref 0–0.2)
BILIRUB SERPL-MCNC: 0.5 MG/DL (ref 0.2–1.3)
CREAT SERPL-MCNC: 0.88 MG/DL (ref 0.52–1.04)
CRP SERPL-MCNC: <2.9 MG/L (ref 0–8)
EOSINOPHIL # BLD AUTO: 0.3 10E3/UL (ref 0–0.7)
EOSINOPHIL NFR BLD AUTO: 4 %
ERYTHROCYTE [DISTWIDTH] IN BLOOD BY AUTOMATED COUNT: 12.8 % (ref 10–15)
ERYTHROCYTE [SEDIMENTATION RATE] IN BLOOD BY WESTERGREN METHOD: 43 MM/HR (ref 0–30)
GFR SERPL CREATININE-BSD FRML MDRD: 64 ML/MIN/1.73M2
HCT VFR BLD AUTO: 35.2 % (ref 35–47)
HGB BLD-MCNC: 11.7 G/DL (ref 11.7–15.7)
IMM GRANULOCYTES # BLD: 0 10E3/UL
IMM GRANULOCYTES NFR BLD: 1 %
LYMPHOCYTES # BLD AUTO: 1.4 10E3/UL (ref 0.8–5.3)
LYMPHOCYTES NFR BLD AUTO: 22 %
MCH RBC QN AUTO: 31.7 PG (ref 26.5–33)
MCHC RBC AUTO-ENTMCNC: 33.2 G/DL (ref 31.5–36.5)
MCV RBC AUTO: 95 FL (ref 78–100)
MONOCYTES # BLD AUTO: 0.7 10E3/UL (ref 0–1.3)
MONOCYTES NFR BLD AUTO: 10 %
NEUTROPHILS # BLD AUTO: 4.2 10E3/UL (ref 1.6–8.3)
NEUTROPHILS NFR BLD AUTO: 62 %
NRBC # BLD AUTO: 0 10E3/UL
NRBC BLD AUTO-RTO: 0 /100
PLATELET # BLD AUTO: 224 10E3/UL (ref 150–450)
PROT SERPL-MCNC: 7.4 G/DL (ref 6.8–8.8)
RBC # BLD AUTO: 3.69 10E6/UL (ref 3.8–5.2)
WBC # BLD AUTO: 6.7 10E3/UL (ref 4–11)

## 2021-09-02 PROCEDURE — 86140 C-REACTIVE PROTEIN: CPT | Performed by: INTERNAL MEDICINE

## 2021-09-02 PROCEDURE — 85025 COMPLETE CBC W/AUTO DIFF WBC: CPT | Performed by: INTERNAL MEDICINE

## 2021-09-02 PROCEDURE — 258N000003 HC RX IP 258 OP 636: Performed by: INTERNAL MEDICINE

## 2021-09-02 PROCEDURE — 96415 CHEMO IV INFUSION ADDL HR: CPT

## 2021-09-02 PROCEDURE — 96413 CHEMO IV INFUSION 1 HR: CPT

## 2021-09-02 PROCEDURE — 36415 COLL VENOUS BLD VENIPUNCTURE: CPT

## 2021-09-02 PROCEDURE — 80076 HEPATIC FUNCTION PANEL: CPT | Performed by: INTERNAL MEDICINE

## 2021-09-02 PROCEDURE — 82565 ASSAY OF CREATININE: CPT | Performed by: INTERNAL MEDICINE

## 2021-09-02 PROCEDURE — 85652 RBC SED RATE AUTOMATED: CPT | Performed by: INTERNAL MEDICINE

## 2021-09-02 PROCEDURE — 250N000011 HC RX IP 250 OP 636: Performed by: INTERNAL MEDICINE

## 2021-09-02 RX ORDER — DIPHENHYDRAMINE HCL 25 MG
25 CAPSULE ORAL ONCE
Status: CANCELLED
Start: 2021-09-03 | End: 2021-09-03

## 2021-09-02 RX ORDER — ACETAMINOPHEN 325 MG/1
650 TABLET ORAL ONCE
Status: CANCELLED
Start: 2021-09-03 | End: 2021-09-03

## 2021-09-02 RX ADMIN — INFLIXIMAB 400 MG: 100 INJECTION, POWDER, LYOPHILIZED, FOR SOLUTION INTRAVENOUS at 09:28

## 2021-09-02 NOTE — PROGRESS NOTES
Infusion Nursing Note:  Briana Mcgee presents today for Remicade.    Patient seen by provider today: No   present during visit today: Not Applicable.    Note: N/A.    Intravenous Access:  Peripheral IV placed.    Treatment Conditions:  Biological Infusion Checklist:  ~~~ NOTE: If the patient answers yes to any of the questions below, hold the infusion and contact ordering provider or on-call provider.    1. Have you recently had an elevated temperature, fever, chills, productive cough, coughing for 3 weeks or longer or hemoptysis, abnormal vital signs, night sweats,  chest pain or have you noticed a decrease in your appetite, unexplained weight loss or fatigue? No  2. Do you have any open wounds or new incisions? No  3. Do you have any recent or upcoming hospitalizations, surgeries or dental procedures? No  4. Do you currently have or recently have had any signs of illness or infection or are you on any antibiotics? No  5. Have you had any new, sudden or worsening abdominal pain? No  6. Have you or anyone in your household received a live vaccination in the past 4 weeks? Please note:  No live vaccines while on biologic/chemotherapy until 6 months after the last treatment.  Patient can receive the flu vaccine (shot only) and the pneumovax.  It is optimal for the patient to get these vaccines mid cycle, but they can be given at any time as long as it is not on the day of the infusion. No  7. Have you recently been diagnosed with any new nervous system diseases (ie. Multiple sclerosis, Guillain [place], seizures, neurological changes) or cancer diagnosis? No  8. Are you on any form of radiation or chemotherapy? No  9. Are you pregnant or breast feeding or do you have plans of pregnancy in the future? No  10. Have you been having any signs of worsening depression or suicidal ideations?  (benlysta only) No  11. Have there been any other new onset medical symptoms? No  Post Infusion Assessment:  Patient  tolerated infusion without incident.  Blood return noted pre and post infusion.  Site patent and intact, free from redness, edema or discomfort.  No evidence of extravasations.  Access discontinued per protocol.     Discharge Plan:   Discharge instructions reviewed with: Patient.  Patient and/or family verbalized understanding of discharge instructions and all questions answered.  AVS to patient via [employer].  Patient will return 10/28/2021 for next appointment.   Patient discharged in stable condition accompanied by: self.  Departure Mode: Ambulatory.    Deanna Pastor RN

## 2021-09-12 ENCOUNTER — HEALTH MAINTENANCE LETTER (OUTPATIENT)
Age: 76
End: 2021-09-12

## 2021-09-16 ENCOUNTER — MYC MEDICAL ADVICE (OUTPATIENT)
Dept: RHEUMATOLOGY | Facility: CLINIC | Age: 76
End: 2021-09-16

## 2021-09-23 NOTE — TELEPHONE ENCOUNTER
Patient called back and we rescheduled her for Tuesday Oct 26 at 3:00 for doximity video visit.  Lala Magaña CMA Rheumatology  9/23/2021 12:18 PM

## 2021-10-03 NOTE — PROGRESS NOTES
{PROVIDER CHARTING PREFERENCE:166008}    Darryn West is a 76 year old who presents for the following health issues {ACCOMPANIED BY STATEMENT (Optional):701956}    HPI       How are you feeling today? { :440561}  In the past 24 hours have you had shortness of breath when speaking, walking, or climbing stairs? { :956443}  Do you have a cough? { :850414}  When is the last time you had a fever greater than 100? ***  Are you having any other symptoms? { :153162}   Do you have any other stressors you would like to discuss with your provider? { :571178}    {Rooming staff  Consult with provider and complete PHQ if directed :445030}  {Rooming staff  Consult with provider and complete GAD7 if directed :112635}      {Results-PHQ, GAD7, PTSD Screenings Completed Today(Optional):024979}    {Provider  Link to COVID SmartSet :487455}        {additonal problems for provider to add (Optional):846934}    Review of Systems   {ROS COMP (Optional):209220}      Objective    There were no vitals taken for this visit.  There is no height or weight on file to calculate BMI.  Physical Exam   {Exam List (Optional):953924}    {Diagnostic Test Results (Optional):989545}    {AMBULATORY ATTESTATION (Optional):620272}

## 2021-10-04 ENCOUNTER — OFFICE VISIT (OUTPATIENT)
Dept: FAMILY MEDICINE | Facility: CLINIC | Age: 76
End: 2021-10-04
Payer: COMMERCIAL

## 2021-10-04 VITALS
WEIGHT: 188 LBS | HEIGHT: 65 IN | DIASTOLIC BLOOD PRESSURE: 64 MMHG | OXYGEN SATURATION: 92 % | SYSTOLIC BLOOD PRESSURE: 110 MMHG | RESPIRATION RATE: 16 BRPM | HEART RATE: 65 BPM | BODY MASS INDEX: 31.32 KG/M2 | TEMPERATURE: 97.4 F

## 2021-10-04 DIAGNOSIS — R22.0 FACIAL SWELLING: Primary | ICD-10-CM

## 2021-10-04 DIAGNOSIS — Z23 NEED FOR PROPHYLACTIC VACCINATION AND INOCULATION AGAINST INFLUENZA: ICD-10-CM

## 2021-10-04 PROCEDURE — 99213 OFFICE O/P EST LOW 20 MIN: CPT | Mod: 25 | Performed by: NURSE PRACTITIONER

## 2021-10-04 PROCEDURE — G0008 ADMIN INFLUENZA VIRUS VAC: HCPCS | Performed by: NURSE PRACTITIONER

## 2021-10-04 PROCEDURE — 90662 IIV NO PRSV INCREASED AG IM: CPT | Performed by: NURSE PRACTITIONER

## 2021-10-04 ASSESSMENT — PAIN SCALES - GENERAL: PAINLEVEL: NO PAIN (0)

## 2021-10-04 ASSESSMENT — MIFFLIN-ST. JEOR: SCORE: 1338.64

## 2021-10-04 NOTE — PROGRESS NOTES
Assessment & Plan     Facial swelling  Ongoing swelling over the left eyebrow for the past 2 months since injury occurred.  Mild tenderness to palpation over this area.  Will obtain imaging to further evaluate this area.  - CT Orbits wo Contrast; Future    Need for prophylactic vaccination and inoculation against influenza    - INFLUENZA, QUAD, HIGH DOSE, PF, 65YR + (FLUZONE HD)      Patient Instructions   Schedule CT at [phone] - I will call you with the results.      Return in about 1 week (around 10/11/2021).    BRAXTON Garrett CNP  [facility]    Darryn West is a 76 year old who presents for the following health issues     [facility]       Hospital Follow-up Visit:    Hospital/Nursing Home/IP Rehab Facility: [facility]  Date of Admission: 8/10/2021  Date of Discharge: 8/10/2021  Reason(s) for Admission: Fall      Was your hospitalization related to COVID-19? No   Problems taking medications regularly:  None  Medication changes since discharge: None  Problems adhering to non-medication therapy:  None    Patient indicates that there is still a lump on her left eyebrow from the fall and it is tender to the touch and itches from time to time    Summary of hospitalization:  [facility] discharge summary reviewed  Diagnostic Tests/Treatments reviewed.  Follow up needed: none  Other Healthcare Providers Involved in Patient s Care:         None  Update since discharge: stable.                     Above HPI reviewed. Additionally, patient was seen in the emergency department in August following a fall while she was at work.  She is unsure how the fall occurred, but ultimately she fell forward landing directly on her face.  She had imaging in the emergency department that did indicate a contusion of the left periorbital area.  She has had an ongoing area of swelling to the medial corner of the left brow since that time.  This has not  "improved at all since this injury.  It is sometimes tender, the area is mildly reddened.  It occasionally itches.  She wonders why this has not improved since the time of the injury when the remainder of her injuries have improved entirely.      Review of Systems   Constitutional, HEENT, cardiovascular, pulmonary, gi and gu systems are negative, except as otherwise noted.      Objective    /64 (BP Location: Right arm, Patient Position: Sitting, Cuff Size: Adult Regular)   Pulse 65   Temp 97.4  F (36.3  C) (Tympanic)   Resp 16   Ht 1.651 m (5' 5\")   Wt 85.3 kg (188 lb)   LMP  (LMP Unknown)   SpO2 92%   Breastfeeding No   BMI 31.28 kg/m    Body mass index is 31.28 kg/m .  Physical Exam  Vitals and nursing note reviewed.   Constitutional:       General: She is not in acute distress.     Appearance: Normal appearance.   HENT:      Head: Normocephalic and atraumatic.      Comments: There is an area of swelling and a small mobile mass to the medial corner of the left brow.  There is a small amount of overlying erythema to this area.  There is mild tenderness to palpation.  There is no fluctuance.     Mouth/Throat:      Mouth: Mucous membranes are moist.   Cardiovascular:      Rate and Rhythm: Normal rate.   Pulmonary:      Effort: Pulmonary effort is normal.   Musculoskeletal:      Cervical back: Neck supple.   Skin:     General: Skin is warm and dry.   Neurological:      General: No focal deficit present.      Mental Status: She is alert.   Psychiatric:         Mood and Affect: Mood normal.         Behavior: Behavior normal.                  "

## 2021-10-07 ENCOUNTER — HOSPITAL ENCOUNTER (OUTPATIENT)
Dept: CT IMAGING | Facility: CLINIC | Age: 76
Discharge: HOME | End: 2021-10-07
Attending: NURSE PRACTITIONER | Admitting: NURSE PRACTITIONER
Payer: MEDICARE

## 2021-10-07 DIAGNOSIS — R22.0 FACIAL SWELLING: ICD-10-CM

## 2021-10-07 PROCEDURE — 70480 CT ORBIT/EAR/FOSSA W/O DYE: CPT

## 2021-10-26 ENCOUNTER — VIRTUAL VISIT (OUTPATIENT)
Dept: RHEUMATOLOGY | Facility: CLINIC | Age: 76
End: 2021-10-26
Payer: COMMERCIAL

## 2021-10-26 DIAGNOSIS — Z79.899 HIGH RISK MEDICATIONS (NOT ANTICOAGULANTS) LONG-TERM USE: Primary | ICD-10-CM

## 2021-10-26 DIAGNOSIS — M06.00 SERONEGATIVE RHEUMATOID ARTHRITIS (H): ICD-10-CM

## 2021-10-26 PROCEDURE — 99214 OFFICE O/P EST MOD 30 MIN: CPT | Mod: 95 | Performed by: INTERNAL MEDICINE

## 2021-10-26 RX ORDER — HYDROXYCHLOROQUINE SULFATE 200 MG/1
TABLET, FILM COATED ORAL
Qty: 135 TABLET | Refills: 2 | Status: SHIPPED | OUTPATIENT
Start: 2021-10-26 | End: 2022-04-29
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
## 2021-10-28 ENCOUNTER — INFUSION THERAPY VISIT (OUTPATIENT)
Dept: INFUSION THERAPY | Facility: CLINIC | Age: 76
End: 2021-10-28
Attending: INTERNAL MEDICINE
Payer: MEDICARE

## 2021-10-28 VITALS
SYSTOLIC BLOOD PRESSURE: 109 MMHG | WEIGHT: 187.6 LBS | TEMPERATURE: 97.4 F | HEART RATE: 61 BPM | DIASTOLIC BLOOD PRESSURE: 47 MMHG | BODY MASS INDEX: 31.22 KG/M2

## 2021-10-28 DIAGNOSIS — M06.042 RHEUMATOID ARTHRITIS INVOLVING BOTH HANDS WITH NEGATIVE RHEUMATOID FACTOR (H): Primary | ICD-10-CM

## 2021-10-28 DIAGNOSIS — M06.041 RHEUMATOID ARTHRITIS INVOLVING BOTH HANDS WITH NEGATIVE RHEUMATOID FACTOR (H): Primary | ICD-10-CM

## 2021-10-28 PROCEDURE — 258N000003 HC RX IP 258 OP 636: Performed by: INTERNAL MEDICINE

## 2021-10-28 PROCEDURE — 96413 CHEMO IV INFUSION 1 HR: CPT

## 2021-10-28 PROCEDURE — 250N000011 HC RX IP 250 OP 636: Performed by: INTERNAL MEDICINE

## 2021-10-28 RX ORDER — DIPHENHYDRAMINE HCL 25 MG
25 CAPSULE ORAL ONCE
Status: CANCELLED
Start: 2021-10-29 | End: 2021-10-29

## 2021-10-28 RX ORDER — ACETAMINOPHEN 325 MG/1
650 TABLET ORAL ONCE
Status: CANCELLED
Start: 2021-10-29 | End: 2021-10-29

## 2021-10-28 RX ADMIN — SODIUM CHLORIDE 250 ML: 9 INJECTION, SOLUTION INTRAVENOUS at 09:41

## 2021-10-28 RX ADMIN — INFLIXIMAB 400 MG: 100 INJECTION, POWDER, LYOPHILIZED, FOR SOLUTION INTRAVENOUS at 09:41

## 2021-10-28 NOTE — PROGRESS NOTES
Infusion Nursing Note:  Briana Mcgee presents today for Remicade.    Patient seen by provider today: No   present during visit today: Not Applicable.    Note: Pt tolerated Remicade over one hour today without incident.      Intravenous Access:  Peripheral IV placed.    Treatment Conditions:  Biological Infusion Checklist:  ~~~ NOTE: If the patient answers yes to any of the questions below, hold the infusion and contact ordering provider or on-call provider.    1. Have you recently had an elevated temperature, fever, chills, productive cough, coughing for 3 weeks or longer or hemoptysis, abnormal vital signs, night sweats,  chest pain or have you noticed a decrease in your appetite, unexplained weight loss or fatigue? No  2. Do you have any open wounds or new incisions? No  3. Do you have any recent or upcoming hospitalizations, surgeries or dental procedures? No  4. Do you currently have or recently have had any signs of illness or infection or are you on any antibiotics? No  5. Have you had any new, sudden or worsening abdominal pain? No  6. Have you or anyone in your household received a live vaccination in the past 4 weeks? Please note:  No live vaccines while on biologic/chemotherapy until 6 months after the last treatment.  Patient can receive the flu vaccine (shot only) and the pneumovax.  It is optimal for the patient to get these vaccines mid cycle, but they can be given at any time as long as it is not on the day of the infusion. No  7. Have you recently been diagnosed with any new nervous system diseases (ie. Multiple sclerosis, Guillain [place], seizures, neurological changes) or cancer diagnosis? No  8. Are you on any form of radiation or chemotherapy? No  9. Are you pregnant or breast feeding or do you have plans of pregnancy in the future? No  10. Have you been having any signs of worsening depression or suicidal ideations?  (benlysta only) No  11. Have there been any other new onset  medical symptoms? No        Post Infusion Assessment:  Patient tolerated infusion without incident.  Blood return noted pre and post infusion.  Site patent and intact, free from redness, edema or discomfort.  No evidence of extravasations.  Access discontinued per protocol.      Biologic Infusion Post Education: Call the triage nurse at your clinic or seek medical attention if you have chills and/or temperature greater than or equal to 100.5, uncontrolled nausea/vomiting, diarrhea, constipation, dizziness, shortness of breath, chest pain, heart palpitations, weakness or any other new or concerning symptoms, questions or concerns.  You cannot have any live virus vaccines prior to or during treatment or up to 6 months post infusion.  If you have an upcoming surgery, medical procedure or dental procedure during treatment, this should be discussed with your ordering physician and your surgeon/dentist.  If you are having any concerning symptom, if you are unsure if you should get your next infusion or wish to speak to a provider before your next infusion, please call your care coordinator or triage nurse at your clinic to notify them so we can adequately serve you.       Discharge Plan:   Patient discharged in stable condition accompanied by: self.  Departure Mode: Ambulatory.  Pt to return on 12/23/21 at 9:00 for next Remicade.       Tatiana Orellana RN

## 2021-10-29 DIAGNOSIS — I50.21 ACUTE SYSTOLIC HEART FAILURE (H): ICD-10-CM

## 2021-11-01 RX ORDER — METOPROLOL SUCCINATE 25 MG/1
25 TABLET, EXTENDED RELEASE ORAL DAILY
Qty: 90 TABLET | Refills: 0 | Status: SHIPPED | OUTPATIENT
Start: 2021-11-01 | End: 2022-06-20

## 2021-11-01 RX ORDER — FUROSEMIDE 40 MG
40 TABLET ORAL DAILY
Qty: 90 TABLET | Refills: 0 | Status: SHIPPED | OUTPATIENT
Start: 2021-11-01 | End: 2022-06-20

## 2021-11-24 ENCOUNTER — TELEPHONE (OUTPATIENT)
Dept: CARDIOLOGY | Facility: CLINIC | Age: 76
End: 2021-11-24
Payer: COMMERCIAL

## 2021-11-24 DIAGNOSIS — I50.21 ACUTE SYSTOLIC HEART FAILURE (H): ICD-10-CM

## 2021-11-24 RX ORDER — POTASSIUM CHLORIDE 750 MG/1
10 TABLET, EXTENDED RELEASE ORAL DAILY
Qty: 90 TABLET | Refills: 1 | Status: SHIPPED | OUTPATIENT
Start: 2021-11-24 | End: 2022-06-17

## 2021-11-24 RX ORDER — LISINOPRIL 5 MG/1
5 TABLET ORAL 2 TIMES DAILY
Qty: 180 TABLET | Refills: 1 | Status: SHIPPED | OUTPATIENT
Start: 2021-11-24 | End: 2022-06-20

## 2021-11-24 RX ORDER — ATORVASTATIN CALCIUM 40 MG/1
40 TABLET, FILM COATED ORAL EVERY EVENING
Qty: 90 TABLET | Refills: 2 | Status: SHIPPED | OUTPATIENT
Start: 2021-11-24 | End: 2022-06-20

## 2021-11-24 NOTE — TELEPHONE ENCOUNTER
----- Message from Demi Bolaños CMA sent at 11/24/2021  8:29 AM CST -----  Regarding: needs refill of lipitor. completly out  Please refill her lipitor and notify her of when it is complete.  Pharmacy [facility]

## 2021-11-24 NOTE — TELEPHONE ENCOUNTER
I left message for pt to let her know a refill for atorvastatin has been sent to her pharmacy as requested. Landon RYAN November 24, 2021, 8:58 AM

## 2021-12-16 ENCOUNTER — OFFICE VISIT (OUTPATIENT)
Dept: OPHTHALMOLOGY | Facility: CLINIC | Age: 76
End: 2021-12-16
Payer: COMMERCIAL

## 2021-12-16 DIAGNOSIS — Z79.899 HIGH RISK MEDICATIONS (NOT ANTICOAGULANTS) LONG-TERM USE: Primary | ICD-10-CM

## 2021-12-16 DIAGNOSIS — M06.041 RHEUMATOID ARTHRITIS INVOLVING BOTH HANDS WITH NEGATIVE RHEUMATOID FACTOR (H): ICD-10-CM

## 2021-12-16 DIAGNOSIS — M06.042 RHEUMATOID ARTHRITIS INVOLVING BOTH HANDS WITH NEGATIVE RHEUMATOID FACTOR (H): ICD-10-CM

## 2021-12-16 PROCEDURE — 92083 EXTENDED VISUAL FIELD XM: CPT | Performed by: STUDENT IN AN ORGANIZED HEALTH CARE EDUCATION/TRAINING PROGRAM

## 2021-12-16 PROCEDURE — 92012 INTRM OPH EXAM EST PATIENT: CPT | Performed by: STUDENT IN AN ORGANIZED HEALTH CARE EDUCATION/TRAINING PROGRAM

## 2021-12-16 ASSESSMENT — CUP TO DISC RATIO
OS_RATIO: 0.25 UD
OD_RATIO: 0.2 UD

## 2021-12-16 ASSESSMENT — VISUAL ACUITY
CORRECTION_TYPE: GLASSES
OS_CC+: -2
OS_CC: 20/30
METHOD: SNELLEN - LINEAR
OD_CC: 20/25
OD_CC+: -3

## 2021-12-16 ASSESSMENT — EXTERNAL EXAM - LEFT EYE: OS_EXAM: NORMAL

## 2021-12-16 ASSESSMENT — SLIT LAMP EXAM - LIDS
COMMENTS: 1+ DERMATOCHALASIS
COMMENTS: 1+ DERMATOCHALASIS

## 2021-12-16 ASSESSMENT — REFRACTION_WEARINGRX
OS_AXIS: 096
OD_CYLINDER: +0.50
SPECS_TYPE: PAL
OD_ADD: +2.50
OS_ADD: +2.50
OS_CYLINDER: +0.50
OD_SPHERE: -1.25
OD_AXIS: 180
OS_SPHERE: -0.25

## 2021-12-16 ASSESSMENT — EXTERNAL EXAM - RIGHT EYE: OD_EXAM: NORMAL

## 2021-12-16 NOTE — LETTER
12/16/2021         RE: Briana Mcgee  [id number] [address] [id number]        Dear Colleague,    Thank you for referring your patient, Briana Mcgee, to the [facility]. Please see a copy of my visit note below.     Current Eye Medications:  None. Blink prn both eyes     Subjective:  Here for plaquenil check. Taking Hydroxychloroquine 200mg daily; and an additional 200mg every other day for a few years now. Has her regular eye exams at [facility].     Objective:  See Ophthalmology Exam.       Assessment:  Briana Mcgee is a 76 year old female who presents with:   Encounter Diagnoses   Name Primary?     High risk medications (not anticoagulants) long-term use Started Plaquenil 8/2018.     Macular SD-OCT within normal limits both eyes.     Stern visual field (HVF) 10-2 within normal limits both eyes (v. mild scattered loss both eyes)     No signs of Plaquenil retinal toxicity at present.      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)        Plan:  Continue artificial tears up to four times a day as needed    Normal Plaquenil eye tests today.    Rik Villavicencio MD  [phone]                Again, thank you for allowing me to participate in the care of your patient.        Sincerely,        Rik Villavicencio MD

## 2021-12-16 NOTE — PROGRESS NOTES
Current Eye Medications:  None. Blink prn both eyes     Subjective:  Here for plaquenil check. Taking Hydroxychloroquine 200mg daily; and an additional 200mg every other day for a few years now. Has her regular eye exams at [facility].     Objective:  See Ophthalmology Exam.       Assessment:  Briana Mcgee is a 76 year old female who presents with:   Encounter Diagnoses   Name Primary?     High risk medications (not anticoagulants) long-term use Started Plaquenil 8/2018.     Macular SD-OCT within normal limits both eyes.     Stern visual field (HVF) 10-2 within normal limits both eyes (v. mild scattered loss both eyes)     No signs of Plaquenil retinal toxicity at present.      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)        Plan:  Continue artificial tears up to four times a day as needed    Normal Plaquenil eye tests today.    Rik Villavicencio MD  [phone]

## 2021-12-16 NOTE — PATIENT INSTRUCTIONS
Continue artificial tears up to four times a day as needed    Normal Plaquenil eye tests today.    Rik Villavicencio MD  [phone]

## 2021-12-23 ENCOUNTER — INFUSION THERAPY VISIT (OUTPATIENT)
Dept: INFUSION THERAPY | Facility: CLINIC | Age: 76
End: 2021-12-23
Attending: INTERNAL MEDICINE
Payer: MEDICARE

## 2021-12-23 VITALS
BODY MASS INDEX: 30.95 KG/M2 | DIASTOLIC BLOOD PRESSURE: 58 MMHG | HEART RATE: 66 BPM | WEIGHT: 186 LBS | RESPIRATION RATE: 16 BRPM | SYSTOLIC BLOOD PRESSURE: 147 MMHG | TEMPERATURE: 97.1 F

## 2021-12-23 DIAGNOSIS — M06.042 RHEUMATOID ARTHRITIS INVOLVING BOTH HANDS WITH NEGATIVE RHEUMATOID FACTOR (H): Primary | ICD-10-CM

## 2021-12-23 DIAGNOSIS — M06.041 RHEUMATOID ARTHRITIS INVOLVING BOTH HANDS WITH NEGATIVE RHEUMATOID FACTOR (H): Primary | ICD-10-CM

## 2021-12-23 PROCEDURE — 258N000003 HC RX IP 258 OP 636: Performed by: INTERNAL MEDICINE

## 2021-12-23 PROCEDURE — 96413 CHEMO IV INFUSION 1 HR: CPT

## 2021-12-23 PROCEDURE — 250N000011 HC RX IP 250 OP 636: Performed by: INTERNAL MEDICINE

## 2021-12-23 RX ORDER — ACETAMINOPHEN 325 MG/1
650 TABLET ORAL ONCE
Status: CANCELLED
Start: 2021-12-24 | End: 2021-12-24

## 2021-12-23 RX ORDER — DIPHENHYDRAMINE HCL 25 MG
25 CAPSULE ORAL ONCE
Status: CANCELLED
Start: 2021-12-24 | End: 2021-12-24

## 2021-12-23 RX ADMIN — SODIUM CHLORIDE 250 ML: 9 INJECTION, SOLUTION INTRAVENOUS at 09:14

## 2021-12-23 RX ADMIN — INFLIXIMAB 400 MG: 100 INJECTION, POWDER, LYOPHILIZED, FOR SOLUTION INTRAVENOUS at 09:52

## 2021-12-23 NOTE — PROGRESS NOTES
Infusion Nursing Note:  Briana Mcgee presents today for Remicade.    Patient seen by provider today: No   present during visit today: Not Applicable.    Note: Remicade ordered over 2 hours, however pt states she gets the infusion over 1 hours. Prior documentation supports this.      Intravenous Access:  Peripheral IV placed.    Treatment Conditions:  Biological Infusion Checklist:  ~~~ NOTE: If the patient answers yes to any of the questions below, hold the infusion and contact ordering provider or on-call provider.    1. Have you recently had an elevated temperature, fever, chills, productive cough, coughing for 3 weeks or longer or hemoptysis, abnormal vital signs, night sweats,  chest pain or have you noticed a decrease in your appetite, unexplained weight loss or fatigue? No  2. Do you have any open wounds or new incisions? No  3. Do you have any recent or upcoming hospitalizations, surgeries or dental procedures? No  4. Do you currently have or recently have had any signs of illness or infection or are you on any antibiotics? No  5. Have you had any new, sudden or worsening abdominal pain? No  6. Have you or anyone in your household received a live vaccination in the past 4 weeks? Please note:  No live vaccines while on biologic/chemotherapy until 6 months after the last treatment.  Patient can receive the flu vaccine (shot only) and the pneumovax.  It is optimal for the patient to get these vaccines mid cycle, but they can be given at any time as long as it is not on the day of the infusion. No  7. Have you recently been diagnosed with any new nervous system diseases (ie. Multiple sclerosis, Guillain [place], seizures, neurological changes) or cancer diagnosis? No  8. Are you on any form of radiation or chemotherapy? No  9. Are you pregnant or breast feeding or do you have plans of pregnancy in the future? No  10. Have you been having any signs of worsening depression or suicidal ideations?   (benlysta only) No  11. Have there been any other new onset medical symptoms? No        Post Infusion Assessment:  Patient tolerated infusion without incident.  Blood return noted pre and post infusion.  Site patent and intact, free from redness, edema or discomfort.  No evidence of extravasations.  Access discontinued per protocol.  Biologic Infusion Post Education: Call the triage nurse at your clinic or seek medical attention if you have chills and/or temperature greater than or equal to 100.5, uncontrolled nausea/vomiting, diarrhea, constipation, dizziness, shortness of breath, chest pain, heart palpitations, weakness or any other new or concerning symptoms, questions or concerns.  You cannot have any live virus vaccines prior to or during treatment or up to 6 months post infusion.  If you have an upcoming surgery, medical procedure or dental procedure during treatment, this should be discussed with your ordering physician and your surgeon/dentist.  If you are having any concerning symptom, if you are unsure if you should get your next infusion or wish to speak to a provider before your next infusion, please call your care coordinator or triage nurse at your clinic to notify them so we can adequately serve you.       Discharge Plan:   Discharge instructions reviewed with: Patient.  Patient and/or family verbalized understanding of discharge instructions and all questions answered.  Patient discharged in stable condition accompanied by: self.  Departure Mode: Ambulatory.      Thao Carmona RN

## 2022-01-12 ENCOUNTER — MYC MEDICAL ADVICE (OUTPATIENT)
Dept: FAMILY MEDICINE | Facility: CLINIC | Age: 77
End: 2022-01-12
Payer: COMMERCIAL

## 2022-01-12 DIAGNOSIS — Z85.3 H/O MALIGNANT NEOPLASM OF FEMALE BREAST: ICD-10-CM

## 2022-01-12 DIAGNOSIS — C50.912 MALIGNANT NEOPLASM OF LEFT FEMALE BREAST, UNSPECIFIED ESTROGEN RECEPTOR STATUS, UNSPECIFIED SITE OF BREAST (H): Primary | ICD-10-CM

## 2022-01-13 DIAGNOSIS — J30.2 OTHER SEASONAL ALLERGIC RHINITIS: ICD-10-CM

## 2022-01-13 NOTE — TELEPHONE ENCOUNTER
I have checked the chart and I did not find a prior order.  What type of prothesis does she need.  What side?  How many?  What is the company she uses?  Sometime the company has sent us orders that we just sign or copy of a prior one so I know what to order.  Please get this information.  Thank you.  Sincerely,  Xavier Vega MD

## 2022-01-18 RX ORDER — FLUTICASONE PROPIONATE 50 MCG
SPRAY, SUSPENSION (ML) NASAL
Qty: 16 G | Refills: 3 | Status: SHIPPED | OUTPATIENT
Start: 2022-01-18 | End: 2022-08-01

## 2022-01-20 ENCOUNTER — HOSPITAL ENCOUNTER (OUTPATIENT)
Dept: MAMMOGRAPHY | Facility: CLINIC | Age: 77
Discharge: HOME | End: 2022-01-20
Attending: FAMILY MEDICINE | Admitting: FAMILY MEDICINE
Payer: MEDICARE

## 2022-01-20 DIAGNOSIS — Z12.31 VISIT FOR SCREENING MAMMOGRAM: ICD-10-CM

## 2022-01-20 PROCEDURE — 77067 SCR MAMMO BI INCL CAD: CPT

## 2022-02-01 NOTE — TELEPHONE ENCOUNTER
Pt clarifies that she needs one left sided prosthetic for [facility] [place] [phone].    Merlyn Mendes RN

## 2022-02-17 ENCOUNTER — APPOINTMENT (OUTPATIENT)
Dept: LAB | Facility: CLINIC | Age: 77
End: 2022-02-17
Payer: MEDICARE

## 2022-02-17 ENCOUNTER — INFUSION THERAPY VISIT (OUTPATIENT)
Dept: INFUSION THERAPY | Facility: CLINIC | Age: 77
End: 2022-02-17
Attending: INTERNAL MEDICINE
Payer: MEDICARE

## 2022-02-17 VITALS
SYSTOLIC BLOOD PRESSURE: 107 MMHG | DIASTOLIC BLOOD PRESSURE: 45 MMHG | RESPIRATION RATE: 16 BRPM | BODY MASS INDEX: 30.62 KG/M2 | HEART RATE: 61 BPM | TEMPERATURE: 97.9 F | WEIGHT: 184 LBS

## 2022-02-17 DIAGNOSIS — I50.9 CHF EXACERBATION (H): Primary | ICD-10-CM

## 2022-02-17 DIAGNOSIS — M06.042 RHEUMATOID ARTHRITIS INVOLVING BOTH HANDS WITH NEGATIVE RHEUMATOID FACTOR (H): ICD-10-CM

## 2022-02-17 DIAGNOSIS — M06.041 RHEUMATOID ARTHRITIS INVOLVING BOTH HANDS WITH NEGATIVE RHEUMATOID FACTOR (H): ICD-10-CM

## 2022-02-17 LAB
CREAT SERPL-MCNC: 1.43 MG/DL (ref 0.52–1.04)
GFR SERPL CREATININE-BSD FRML MDRD: 38 ML/MIN/1.73M2
POTASSIUM BLD-SCNC: 4.4 MMOL/L (ref 3.4–5.3)
SODIUM SERPL-SCNC: 133 MMOL/L (ref 133–144)

## 2022-02-17 PROCEDURE — 258N000003 HC RX IP 258 OP 636: Performed by: INTERNAL MEDICINE

## 2022-02-17 PROCEDURE — 96413 CHEMO IV INFUSION 1 HR: CPT

## 2022-02-17 PROCEDURE — 82565 ASSAY OF CREATININE: CPT | Performed by: INTERNAL MEDICINE

## 2022-02-17 PROCEDURE — 84295 ASSAY OF SERUM SODIUM: CPT | Performed by: INTERNAL MEDICINE

## 2022-02-17 PROCEDURE — 84132 ASSAY OF SERUM POTASSIUM: CPT | Performed by: INTERNAL MEDICINE

## 2022-02-17 PROCEDURE — 36415 COLL VENOUS BLD VENIPUNCTURE: CPT

## 2022-02-17 PROCEDURE — 250N000011 HC RX IP 250 OP 636: Performed by: INTERNAL MEDICINE

## 2022-02-17 RX ORDER — ACETAMINOPHEN 325 MG/1
650 TABLET ORAL ONCE
Status: CANCELLED
Start: 2022-02-18 | End: 2022-02-18

## 2022-02-17 RX ORDER — DIPHENHYDRAMINE HCL 25 MG
25 CAPSULE ORAL ONCE
Status: CANCELLED
Start: 2022-02-18 | End: 2022-02-18

## 2022-02-17 RX ADMIN — SODIUM CHLORIDE 250 ML: 9 INJECTION, SOLUTION INTRAVENOUS at 09:33

## 2022-02-17 RX ADMIN — INFLIXIMAB 400 MG: 100 INJECTION, POWDER, LYOPHILIZED, FOR SOLUTION INTRAVENOUS at 09:47

## 2022-02-17 NOTE — PROGRESS NOTES
Infusion Nursing Note:  Briana Mcgee presents today for Remicade.    Patient seen by provider today: No   present during visit today: Not Applicable.    Note: Pt reports no new medical concerns.      Intravenous Access:  Peripheral IV placed.    Treatment Conditions:  Biological Infusion Checklist:  ~~~ NOTE: If the patient answers yes to any of the questions below, hold the infusion and contact ordering provider or on-call provider.    1. Have you recently had an elevated temperature, fever, chills, productive cough, coughing for 3 weeks or longer or hemoptysis, abnormal vital signs, night sweats,  chest pain or have you noticed a decrease in your appetite, unexplained weight loss or fatigue? No  2. Do you have any open wounds or new incisions? No  3. Do you have any recent or upcoming hospitalizations, surgeries or dental procedures? No  4. Do you currently have or recently have had any signs of illness or infection or are you on any antibiotics? No  5. Have you had any new, sudden or worsening abdominal pain? No  6. Have you or anyone in your household received a live vaccination in the past 4 weeks? Please note:  No live vaccines while on biologic/chemotherapy until 6 months after the last treatment.  Patient can receive the flu vaccine (shot only) and the pneumovax.  It is optimal for the patient to get these vaccines mid cycle, but they can be given at any time as long as it is not on the day of the infusion. No  7. Have you recently been diagnosed with any new nervous system diseases (ie. Multiple sclerosis, Guillain [place], seizures, neurological changes) or cancer diagnosis? No  8. Are you on any form of radiation or chemotherapy? No  9. Are you pregnant or breast feeding or do you have plans of pregnancy in the future? No  10. Have you been having any signs of worsening depression or suicidal ideations?  (benlysta only) No  11. Have there been any other new onset medical symptoms?  No        Post Infusion Assessment:  Patient tolerated infusion without incident.  Blood return noted pre and post infusion.  Site patent and intact, free from redness, edema or discomfort.  No evidence of extravasations.  Access discontinued per protocol.  Biologic Infusion Post Education: Call the triage nurse at your clinic or seek medical attention if you have chills and/or temperature greater than or equal to 100.5, uncontrolled nausea/vomiting, diarrhea, constipation, dizziness, shortness of breath, chest pain, heart palpitations, weakness or any other new or concerning symptoms, questions or concerns.  You cannot have any live virus vaccines prior to or during treatment or up to 6 months post infusion.  If you have an upcoming surgery, medical procedure or dental procedure during treatment, this should be discussed with your ordering physician and your surgeon/dentist.  If you are having any concerning symptom, if you are unsure if you should get your next infusion or wish to speak to a provider before your next infusion, please call your care coordinator or triage nurse at your clinic to notify them so we can adequately serve you.       Discharge Plan:   Discharge instructions reviewed with: Patient.  Patient and/or family verbalized understanding of discharge instructions and all questions answered.  Patient discharged in stable condition accompanied by: self.  Departure Mode: Ambulatory.      Thao Carmona RN

## 2022-03-02 NOTE — PATIENT INSTRUCTIONS
RHEUMATOLOGY    Dr. Chaim Puente    [address] [place] [address]  Phone number: [phone]  Fax number: [phone]    Thank you for choosing [facility]!    Lala Magaña CMA Rheumatology                     Detail Level: Zone Note Text (......Xxx Chief Complaint.): This diagnosis correlates with the Render Risk Assessment In Note?: no Other (Free Text): Discussed Botox to glabella & forehead 14 units discussed

## 2022-04-05 ENCOUNTER — TELEPHONE (OUTPATIENT)
Dept: FAMILY MEDICINE | Facility: CLINIC | Age: 77
End: 2022-04-05
Payer: COMMERCIAL

## 2022-04-05 DIAGNOSIS — C50.912 MALIGNANT NEOPLASM OF LEFT FEMALE BREAST, UNSPECIFIED ESTROGEN RECEPTOR STATUS, UNSPECIFIED SITE OF BREAST (H): Primary | ICD-10-CM

## 2022-04-05 NOTE — TELEPHONE ENCOUNTER
Reason for Call: Request for an order or referral:    Order or referral being requested: Patient is calling stating that she got a order for left sided prosthetic for her mastectomy bra in January, but she is approved for 3. She needs a new order sent to [facility] Fax number [phone]    Date needed: at your convenience    Has the patient been seen by the PCP for this problem? YES    Phone number Patient can be reached at:  Cell number on file:    Telephone Information:   Mobile [phone]       Best Time:  any    Can we leave a detailed message on this number?  YES    Call taken on 4/5/2022 at 2:05 PM by Brenda Dia

## 2022-04-12 ENCOUNTER — MEDICAL CORRESPONDENCE (OUTPATIENT)
Dept: HEALTH INFORMATION MANAGEMENT | Facility: CLINIC | Age: 77
End: 2022-04-12
Payer: COMMERCIAL

## 2022-04-14 ENCOUNTER — INFUSION THERAPY VISIT (OUTPATIENT)
Dept: INFUSION THERAPY | Facility: CLINIC | Age: 77
End: 2022-04-14
Attending: INTERNAL MEDICINE
Payer: MEDICARE

## 2022-04-14 VITALS — TEMPERATURE: 97.5 F | DIASTOLIC BLOOD PRESSURE: 74 MMHG | SYSTOLIC BLOOD PRESSURE: 145 MMHG

## 2022-04-14 DIAGNOSIS — M06.041 RHEUMATOID ARTHRITIS INVOLVING BOTH HANDS WITH NEGATIVE RHEUMATOID FACTOR (H): Primary | ICD-10-CM

## 2022-04-14 DIAGNOSIS — M06.042 RHEUMATOID ARTHRITIS INVOLVING BOTH HANDS WITH NEGATIVE RHEUMATOID FACTOR (H): Primary | ICD-10-CM

## 2022-04-14 PROCEDURE — 250N000011 HC RX IP 250 OP 636: Performed by: INTERNAL MEDICINE

## 2022-04-14 PROCEDURE — 96413 CHEMO IV INFUSION 1 HR: CPT

## 2022-04-14 PROCEDURE — 258N000003 HC RX IP 258 OP 636: Performed by: INTERNAL MEDICINE

## 2022-04-14 RX ORDER — DIPHENHYDRAMINE HCL 25 MG
25 CAPSULE ORAL ONCE
Status: CANCELLED
Start: 2022-04-15 | End: 2022-04-15

## 2022-04-14 RX ORDER — ACETAMINOPHEN 325 MG/1
650 TABLET ORAL ONCE
Status: CANCELLED
Start: 2022-04-15 | End: 2022-04-15

## 2022-04-14 RX ADMIN — SODIUM CHLORIDE 250 ML: 9 INJECTION, SOLUTION INTRAVENOUS at 09:31

## 2022-04-14 RX ADMIN — INFLIXIMAB 400 MG: 100 INJECTION, POWDER, LYOPHILIZED, FOR SOLUTION INTRAVENOUS at 09:31

## 2022-04-14 NOTE — PROGRESS NOTES
Infusion Nursing Note:  Briana Mcgee presents today for Remicade.    Patient seen by provider today: No   present during visit today: Not Applicable.    Note: Per patient she does not take pre-medications of tylenol and benadryl prior to infusion.      Intravenous Access:  Peripheral IV placed.    Treatment Conditions:  Biological Infusion Checklist:  ~~~ NOTE: If the patient answers yes to any of the questions below, hold the infusion and contact ordering provider or on-call provider.    1. Have you recently had an elevated temperature, fever, chills, productive cough, coughing for 3 weeks or longer or hemoptysis, abnormal vital signs, night sweats,  chest pain or have you noticed a decrease in your appetite, unexplained weight loss or fatigue? No  2. Do you have any open wounds or new incisions? No  3. Do you have any recent or upcoming hospitalizations, surgeries or dental procedures? No  4. Do you currently have or recently have had any signs of illness or infection or are you on any antibiotics? No  5. Have you had any new, sudden or worsening abdominal pain? No  6. Have you or anyone in your household received a live vaccination in the past 4 weeks? Please note:  No live vaccines while on biologic/chemotherapy until 6 months after the last treatment.  Patient can receive the flu vaccine (shot only) and the pneumovax.  It is optimal for the patient to get these vaccines mid cycle, but they can be given at any time as long as it is not on the day of the infusion. No  7. Have you recently been diagnosed with any new nervous system diseases (ie. Multiple sclerosis, Guillain [place], seizures, neurological changes) or cancer diagnosis? No  8. Are you on any form of radiation or chemotherapy? No  9. Are you pregnant or breast feeding or do you have plans of pregnancy in the future? No  10. Have you been having any signs of worsening depression or suicidal ideations?  (benlysta only) No  11. Have  there been any other new onset medical symptoms? No      Post Infusion Assessment:  Patient tolerated infusion without incident.  Blood return noted pre and post infusion.  Site patent and intact, free from redness, edema or discomfort.  No evidence of extravasations.  Access discontinued per protocol.       Discharge Plan:   Copy of AVS reviewed with patient and/or family.  Patient will return 6/9/22 for next appointment.  Patient discharged in stable condition accompanied by: self.  Departure Mode: Ambulatory.      Karyn Gordon RN

## 2022-04-28 ENCOUNTER — OFFICE VISIT (OUTPATIENT)
Dept: RHEUMATOLOGY | Facility: CLINIC | Age: 77
End: 2022-04-28
Payer: COMMERCIAL

## 2022-04-28 VITALS
DIASTOLIC BLOOD PRESSURE: 82 MMHG | SYSTOLIC BLOOD PRESSURE: 158 MMHG | BODY MASS INDEX: 32.05 KG/M2 | WEIGHT: 192.6 LBS | TEMPERATURE: 97.9 F | OXYGEN SATURATION: 97 % | HEART RATE: 76 BPM

## 2022-04-28 DIAGNOSIS — M06.00 SERONEGATIVE RHEUMATOID ARTHRITIS (H): Primary | ICD-10-CM

## 2022-04-28 DIAGNOSIS — Z79.899 HIGH RISK MEDICATIONS (NOT ANTICOAGULANTS) LONG-TERM USE: ICD-10-CM

## 2022-04-28 LAB
ALBUMIN SERPL-MCNC: 4 G/DL (ref 3.4–5)
ALP SERPL-CCNC: 65 U/L (ref 40–150)
ALT SERPL W P-5'-P-CCNC: 40 U/L (ref 0–50)
AST SERPL W P-5'-P-CCNC: 29 U/L (ref 0–45)
BASOPHILS # BLD AUTO: 0.1 10E3/UL (ref 0–0.2)
BASOPHILS NFR BLD AUTO: 1 %
BILIRUB DIRECT SERPL-MCNC: 0.1 MG/DL (ref 0–0.2)
BILIRUB SERPL-MCNC: 0.5 MG/DL (ref 0.2–1.3)
CREAT SERPL-MCNC: 0.97 MG/DL (ref 0.52–1.04)
CRP SERPL-MCNC: <2.9 MG/L (ref 0–8)
EOSINOPHIL # BLD AUTO: 0.3 10E3/UL (ref 0–0.7)
EOSINOPHIL NFR BLD AUTO: 4 %
ERYTHROCYTE [DISTWIDTH] IN BLOOD BY AUTOMATED COUNT: 13 % (ref 10–15)
ERYTHROCYTE [SEDIMENTATION RATE] IN BLOOD BY WESTERGREN METHOD: 18 MM/HR (ref 0–30)
GFR SERPL CREATININE-BSD FRML MDRD: 60 ML/MIN/1.73M2
HCT VFR BLD AUTO: 36.1 % (ref 35–47)
HGB BLD-MCNC: 11.9 G/DL (ref 11.7–15.7)
LYMPHOCYTES # BLD AUTO: 2.3 10E3/UL (ref 0.8–5.3)
LYMPHOCYTES NFR BLD AUTO: 37 %
MCH RBC QN AUTO: 32.2 PG (ref 26.5–33)
MCHC RBC AUTO-ENTMCNC: 33 G/DL (ref 31.5–36.5)
MCV RBC AUTO: 98 FL (ref 78–100)
MONOCYTES # BLD AUTO: 0.6 10E3/UL (ref 0–1.3)
MONOCYTES NFR BLD AUTO: 9 %
NEUTROPHILS # BLD AUTO: 3 10E3/UL (ref 1.6–8.3)
NEUTROPHILS NFR BLD AUTO: 48 %
PLATELET # BLD AUTO: 220 10E3/UL (ref 150–450)
PROT SERPL-MCNC: 7.3 G/DL (ref 6.8–8.8)
RBC # BLD AUTO: 3.69 10E6/UL (ref 3.8–5.2)
WBC # BLD AUTO: 6.1 10E3/UL (ref 4–11)

## 2022-04-28 PROCEDURE — 86140 C-REACTIVE PROTEIN: CPT | Performed by: INTERNAL MEDICINE

## 2022-04-28 PROCEDURE — 80076 HEPATIC FUNCTION PANEL: CPT | Performed by: INTERNAL MEDICINE

## 2022-04-28 PROCEDURE — 86481 TB AG RESPONSE T-CELL SUSP: CPT | Performed by: INTERNAL MEDICINE

## 2022-04-28 PROCEDURE — 85652 RBC SED RATE AUTOMATED: CPT | Performed by: INTERNAL MEDICINE

## 2022-04-28 PROCEDURE — 85025 COMPLETE CBC W/AUTO DIFF WBC: CPT | Performed by: INTERNAL MEDICINE

## 2022-04-28 PROCEDURE — 36415 COLL VENOUS BLD VENIPUNCTURE: CPT | Performed by: INTERNAL MEDICINE

## 2022-04-28 PROCEDURE — 82565 ASSAY OF CREATININE: CPT | Performed by: INTERNAL MEDICINE

## 2022-04-28 PROCEDURE — 99214 OFFICE O/P EST MOD 30 MIN: CPT | Performed by: INTERNAL MEDICINE

## 2022-04-28 NOTE — PROGRESS NOTES
RAPID3 (0-30) Cumulative Score  4.3          RAPID3 Weighted Score (divide #4 by 3 and that is the weighted score)  1.43     Adan RIZZO RN....4/28/2022 1:06 PM
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
## 2022-04-29 RX ORDER — HYDROXYCHLOROQUINE SULFATE 200 MG/1
TABLET, FILM COATED ORAL
Qty: 135 TABLET | Refills: 2 | Status: SHIPPED | OUTPATIENT
Start: 2022-04-29 | End: 2023-01-26

## 2022-05-01 LAB
GAMMA INTERFERON BACKGROUND BLD IA-ACNC: 0.17 IU/ML
M TB IFN-G BLD-IMP: NEGATIVE
M TB IFN-G CD4+ BCKGRND COR BLD-ACNC: 9.83 IU/ML
MITOGEN IGNF BCKGRD COR BLD-ACNC: -0.01 IU/ML
MITOGEN IGNF BCKGRD COR BLD-ACNC: 0.05 IU/ML
QUANTIFERON MITOGEN: 10 IU/ML
QUANTIFERON NIL TUBE: 0.17 IU/ML
QUANTIFERON TB1 TUBE: 0.22 IU/ML
QUANTIFERON TB2 TUBE: 0.16

## 2022-05-19 NOTE — TELEPHONE ENCOUNTER
Spoke with patient and conveyed message below. Patient is familiar with injections and feels comfortable injecting at home. She is aware to follow up with Dr. Rawls in 2 months after she starts Humira. Patient had no other questions at this time.    Wellness Screening Tool    Symptom Screening:    Do you have one of the following NEW symptoms:      Fever (subjective or >100.0)?  No    New cough? No    Shortness of breath? No    Chills? No    New loss of taste or smell? No    Generalized body aches? No    New persistent headache? No    New sore throat? No    Nausea, vomiting or diarrhea? No    Within the past 2 weeks, have you been exposed to someone with a known positive illness below?      COVID - 19 (known or suspected) No    Chicken pox?  No    Measles? No    Pertussis? No    Have you had a positive COVID-19 diagnostic test (nasal swab test) in the last 14 days or are you currently   on self-quarantine restrictions (i.e.travel restriction, exposure, etc?) No        Patient notified of visitor restriction: Yes  Patient informed to wear a mask: Yes    Patient's appointment status: Patient will be seen in clinic as scheduled on 12/15/20

## 2022-06-09 ENCOUNTER — INFUSION THERAPY VISIT (OUTPATIENT)
Dept: INFUSION THERAPY | Facility: CLINIC | Age: 77
End: 2022-06-09
Attending: INTERNAL MEDICINE
Payer: MEDICARE

## 2022-06-09 VITALS
HEART RATE: 83 BPM | BODY MASS INDEX: 32.05 KG/M2 | WEIGHT: 192.6 LBS | DIASTOLIC BLOOD PRESSURE: 79 MMHG | RESPIRATION RATE: 16 BRPM | SYSTOLIC BLOOD PRESSURE: 144 MMHG

## 2022-06-09 DIAGNOSIS — M06.042 RHEUMATOID ARTHRITIS INVOLVING BOTH HANDS WITH NEGATIVE RHEUMATOID FACTOR (H): Primary | ICD-10-CM

## 2022-06-09 DIAGNOSIS — M06.041 RHEUMATOID ARTHRITIS INVOLVING BOTH HANDS WITH NEGATIVE RHEUMATOID FACTOR (H): Primary | ICD-10-CM

## 2022-06-09 PROCEDURE — 250N000011 HC RX IP 250 OP 636: Performed by: INTERNAL MEDICINE

## 2022-06-09 PROCEDURE — 258N000003 HC RX IP 258 OP 636: Performed by: INTERNAL MEDICINE

## 2022-06-09 PROCEDURE — 96413 CHEMO IV INFUSION 1 HR: CPT

## 2022-06-09 RX ORDER — DIPHENHYDRAMINE HCL 25 MG
25 CAPSULE ORAL ONCE
Status: CANCELLED
Start: 2022-06-10 | End: 2022-06-10

## 2022-06-09 RX ORDER — ACETAMINOPHEN 325 MG/1
650 TABLET ORAL ONCE
Status: CANCELLED
Start: 2022-06-10 | End: 2022-06-10

## 2022-06-09 RX ADMIN — SODIUM CHLORIDE 250 ML: 9 INJECTION, SOLUTION INTRAVENOUS at 09:06

## 2022-06-09 RX ADMIN — INFLIXIMAB 400 MG: 100 INJECTION, POWDER, LYOPHILIZED, FOR SOLUTION INTRAVENOUS at 09:38

## 2022-06-09 NOTE — PROGRESS NOTES
Infusion Nursing Note:  Briana Mcgee presents today for Remicade.    Patient seen by provider today: No   present during visit today: Not Applicable.    Note: N/A.      Intravenous Access:  Peripheral IV placed.    Treatment Conditions:  Biological Infusion Checklist:  ~~~ NOTE: If the patient answers yes to any of the questions below, hold the infusion and contact ordering provider or on-call provider.    1. Have you recently had an elevated temperature, fever, chills, productive cough, coughing for 3 weeks or longer or hemoptysis, abnormal vital signs, night sweats,  chest pain or have you noticed a decrease in your appetite, unexplained weight loss or fatigue? No  2. Do you have any open wounds or new incisions? No  3. Do you have any recent or upcoming hospitalizations, surgeries or dental procedures? No  4. Do you currently have or recently have had any signs of illness or infection or are you on any antibiotics? No  5. Have you had any new, sudden or worsening abdominal pain? No  6. Have you or anyone in your household received a live vaccination in the past 4 weeks? Please note:  No live vaccines while on biologic/chemotherapy until 6 months after the last treatment.  Patient can receive the flu vaccine (shot only) and the pneumovax.  It is optimal for the patient to get these vaccines mid cycle, but they can be given at any time as long as it is not on the day of the infusion. No  7. Have you recently been diagnosed with any new nervous system diseases (ie. Multiple sclerosis, Guillain [place], seizures, neurological changes) or cancer diagnosis? No  8. Are you on any form of radiation or chemotherapy? No  9. Are you pregnant or breast feeding or do you have plans of pregnancy in the future? No  10. Have you been having any signs of worsening depression or suicidal ideations?  (benlysta only) No  11. Have there been any other new onset medical symptoms? No        Post Infusion  Assessment:  Patient tolerated infusion without incident.  No evidence of extravasations.  Access discontinued per protocol.       Discharge Plan:   Patient and/or family verbalized understanding of discharge instructions and all questions answered.  Patient discharged in stable condition accompanied by: self.  Departure Mode: Ambulatory.      Alcira Wright RN

## 2022-06-13 DIAGNOSIS — E03.9 HYPOTHYROIDISM, UNSPECIFIED TYPE: ICD-10-CM

## 2022-06-15 NOTE — TELEPHONE ENCOUNTER
"Requested Prescriptions   Pending Prescriptions Disp Refills     levothyroxine (SYNTHROID/LEVOTHROID) 25 MCG tablet [Pharmacy Med Name: levothyroxine 25 mcg tablet] 90 tablet 3     Sig: Take 1 tablet (25 mcg) by mouth daily Take along with the 137mcg to equal 162mcg total daily.       Thyroid Protocol Failed - 6/13/2022  8:04 AM        Failed - Normal TSH on file in past 12 months     Recent Labs   Lab Test 02/25/21  1231   TSH 1.25              Passed - Patient is 12 years or older        Passed - Recent (12 mo) or future (30 days) visit within the authorizing provider's specialty     Patient has had an office visit with the authorizing provider or a provider within the authorizing providers department within the previous 12 mos or has a future within next 30 days. See \"Patient Info\" tab in inbasket, or \"Choose Columns\" in Meds & Orders section of the refill encounter.              Passed - Medication is active on med list        Passed - No active pregnancy on record     If patient is pregnant or has had a positive pregnancy test, please check TSH.          Passed - No positive pregnancy test in past 12 months     If patient is pregnant or has had a positive pregnancy test, please check TSH.             levothyroxine (SYNTHROID/LEVOTHROID) 137 MCG tablet [Pharmacy Med Name: levothyroxine 137 mcg tablet] 90 tablet 3     Sig: Take 1 tablet (137 mcg) by mouth daily Along with 25mcg to equal 162mcg daily.       Thyroid Protocol Failed - 6/13/2022  8:04 AM        Failed - Normal TSH on file in past 12 months     Recent Labs   Lab Test 02/25/21  1231   TSH 1.25              Passed - Patient is 12 years or older        Passed - Recent (12 mo) or future (30 days) visit within the authorizing provider's specialty     Patient has had an office visit with the authorizing provider or a provider within the authorizing providers department within the previous 12 mos or has a future within next 30 days. See \"Patient Info\" tab in " "inbasket, or \"Choose Columns\" in Meds & Orders section of the refill encounter.              Passed - Medication is active on med list        Passed - No active pregnancy on record     If patient is pregnant or has had a positive pregnancy test, please check TSH.          Passed - No positive pregnancy test in past 12 months     If patient is pregnant or has had a positive pregnancy test, please check TSH.               "

## 2022-06-16 RX ORDER — LEVOTHYROXINE SODIUM 25 UG/1
25 TABLET ORAL DAILY
Qty: 90 TABLET | Refills: 0 | Status: SHIPPED | OUTPATIENT
Start: 2022-06-16 | End: 2022-06-20

## 2022-06-16 RX ORDER — LEVOTHYROXINE SODIUM 137 UG/1
137 TABLET ORAL DAILY
Qty: 90 TABLET | Refills: 0 | Status: SHIPPED | OUTPATIENT
Start: 2022-06-16 | End: 2022-06-20

## 2022-06-17 ENCOUNTER — TELEPHONE (OUTPATIENT)
Dept: FAMILY MEDICINE | Facility: CLINIC | Age: 77
End: 2022-06-17
Payer: COMMERCIAL

## 2022-06-17 DIAGNOSIS — I10 ESSENTIAL HYPERTENSION: Primary | ICD-10-CM

## 2022-06-17 DIAGNOSIS — N18.31 STAGE 3A CHRONIC KIDNEY DISEASE (H): ICD-10-CM

## 2022-06-17 DIAGNOSIS — K21.9 GASTROESOPHAGEAL REFLUX DISEASE WITHOUT ESOPHAGITIS: ICD-10-CM

## 2022-06-17 DIAGNOSIS — R39.15 URINARY URGENCY: ICD-10-CM

## 2022-06-17 DIAGNOSIS — E78.5 HYPERLIPIDEMIA LDL GOAL <100: ICD-10-CM

## 2022-06-17 DIAGNOSIS — E03.9 HYPOTHYROIDISM, UNSPECIFIED TYPE: ICD-10-CM

## 2022-06-17 DIAGNOSIS — I50.21 ACUTE SYSTOLIC HEART FAILURE (H): ICD-10-CM

## 2022-06-17 DIAGNOSIS — J30.2 OTHER SEASONAL ALLERGIC RHINITIS: ICD-10-CM

## 2022-06-17 RX ORDER — TOLTERODINE 4 MG/1
CAPSULE, EXTENDED RELEASE ORAL
Qty: 90 CAPSULE | Refills: 0 | Status: SHIPPED | OUTPATIENT
Start: 2022-06-17 | End: 2022-08-01

## 2022-06-17 RX ORDER — POTASSIUM CHLORIDE 750 MG/1
10 TABLET, EXTENDED RELEASE ORAL DAILY
Qty: 90 TABLET | Refills: 0 | Status: SHIPPED | OUTPATIENT
Start: 2022-06-17 | End: 2022-08-01

## 2022-06-17 NOTE — TELEPHONE ENCOUNTER
Reason for Call:  Other appointment    Detailed comments: Was able to schedule her annual with PCP 8/1/22 she will need refills on her medications until her upcoming appt.     Phone Number Patient can be reached at: Cell number on file:    Telephone Information:   Mobile [phone]       Best Time: anytime    Can we leave a detailed message on this number? YES    Call taken on 6/17/2022 at 4:43 PM by Kriss Hirsch       (stays with pt )  Type of Home: House  Home Layout: One level  Home Access: Stairs to enter without rails  Entrance Stairs - Number of Steps: couple steps  Home Equipment: Rolling walker, Quad cane     Objective:  Rolling to Left: Stand by assistance  Supine to Sit: Stand by assistance  Scooting: Stand by assistance    Transfers  Sit to Stand: Stand by assistance  Stand to sit: Stand by assistance  Toilet transfer with rolling walker, ets with sba. No assist with hygiene, min a with depends pull-ups . Standing balance at sink to wash/dry hands with sba       Ambulation 1  Surface: level tile  Device: Rolling Walker  Other Apparatus: Wheelchair follow  Assistance: Stand by assistance;Contact guard assistance (sba with only occasional lt. cga )  Quality of Gait: decreased jon, decreased step length, forward flexed posture, downward gaze, decreased B  heel strike, no significant unsteadiness  Distance:  15' x 1,180' x 1,200' x 1  Comments: rolling walker used this am vs. pt's own sbqc. will progress as strength, balance and endurance improves       Exercises:  Exercises  Comments: 10 reps each b le seated heel-toe raises, laq, marching, hip abd/add, isometric hip add ball squeeze, resistance red t-band ham curls abnd standing with b ue support 10 reps each ble heelraises, marching, hip abd/add, partial squats with cgs-> close sba all for strengthening, balance and endurance     Activity Tolerance:  Activity Tolerance: Patient limited by endurance  Activity Tolerance: pt. fatiques easily, pt. very slow moving, good co-operation         Discharge Recommendations:  Discharge Recommendations: Continue to assess pending progress    Patient Education:  Patient Education: bed mobility, therex, transfers, gait. supine and standing hep's issued 10-19-18 am    Equipment Recommendations:  Equipment Needed: No    Safety:  Type of devices:  All fall risk precautions in place, Patient at risk for falls, Call light within

## 2022-06-17 NOTE — TELEPHONE ENCOUNTER
Med refill request    Potassium CL ER 10 MEQ    QTY 90    Take 1 tablet by mouth daily    ADDENDUM: [facility] Cardiology Refill Guideline reviewed.  Medication does not meet criteria for refill due to overdue for echo and follow up.  Messaged to providers team for further review. 90 day refill sent and message to scheduling to reach out to patient to schedule echo and follow up. Orders extended. Stephanie Jean RN Cardiology June 17, 2022, 3:28 PM

## 2022-06-17 NOTE — TELEPHONE ENCOUNTER
Last appt 6/14/21.  Sherice zheng provided & pt was sent [employer] to make appt.    Sheri Sinclair RN

## 2022-06-20 RX ORDER — LEVOTHYROXINE SODIUM 25 UG/1
25 TABLET ORAL DAILY
Qty: 90 TABLET | Refills: 0 | Status: SHIPPED | OUTPATIENT
Start: 2022-06-20 | End: 2022-08-01

## 2022-06-20 RX ORDER — METOPROLOL SUCCINATE 25 MG/1
25 TABLET, EXTENDED RELEASE ORAL DAILY
Qty: 90 TABLET | Refills: 0 | Status: SHIPPED | OUTPATIENT
Start: 2022-06-20 | End: 2022-08-01

## 2022-06-20 RX ORDER — ATORVASTATIN CALCIUM 40 MG/1
40 TABLET, FILM COATED ORAL EVERY EVENING
Qty: 90 TABLET | Refills: 0 | Status: SHIPPED | OUTPATIENT
Start: 2022-06-20 | End: 2022-08-01

## 2022-06-20 RX ORDER — PANTOPRAZOLE SODIUM 40 MG/1
40 TABLET, DELAYED RELEASE ORAL DAILY
Qty: 90 TABLET | Refills: 0 | Status: SHIPPED | OUTPATIENT
Start: 2022-06-20 | End: 2022-08-01

## 2022-06-20 RX ORDER — LEVOTHYROXINE SODIUM 137 UG/1
137 TABLET ORAL DAILY
Qty: 90 TABLET | Refills: 0 | Status: SHIPPED | OUTPATIENT
Start: 2022-06-20 | End: 2022-08-01

## 2022-06-20 RX ORDER — LISINOPRIL 5 MG/1
5 TABLET ORAL 2 TIMES DAILY
Qty: 180 TABLET | Refills: 0 | Status: SHIPPED | OUTPATIENT
Start: 2022-06-20 | End: 2022-08-01

## 2022-06-20 RX ORDER — FUROSEMIDE 40 MG
40 TABLET ORAL DAILY
Qty: 90 TABLET | Refills: 0 | Status: SHIPPED | OUTPATIENT
Start: 2022-06-20 | End: 2022-08-01

## 2022-08-01 ENCOUNTER — TELEPHONE (OUTPATIENT)
Dept: CARDIOLOGY | Facility: CLINIC | Age: 77
End: 2022-08-01

## 2022-08-01 ENCOUNTER — OFFICE VISIT (OUTPATIENT)
Dept: FAMILY MEDICINE | Facility: CLINIC | Age: 77
End: 2022-08-01
Payer: COMMERCIAL

## 2022-08-01 VITALS
BODY MASS INDEX: 32.26 KG/M2 | OXYGEN SATURATION: 98 % | HEIGHT: 65 IN | RESPIRATION RATE: 16 BRPM | WEIGHT: 193.6 LBS | SYSTOLIC BLOOD PRESSURE: 110 MMHG | DIASTOLIC BLOOD PRESSURE: 56 MMHG | HEART RATE: 74 BPM | TEMPERATURE: 98.1 F

## 2022-08-01 DIAGNOSIS — I50.21 ACUTE SYSTOLIC HEART FAILURE (H): ICD-10-CM

## 2022-08-01 DIAGNOSIS — C50.919 MALIGNANT NEOPLASM OF FEMALE BREAST, UNSPECIFIED ESTROGEN RECEPTOR STATUS, UNSPECIFIED LATERALITY, UNSPECIFIED SITE OF BREAST (H): ICD-10-CM

## 2022-08-01 DIAGNOSIS — Z12.31 ENCOUNTER FOR SCREENING MAMMOGRAM FOR BREAST CANCER: ICD-10-CM

## 2022-08-01 DIAGNOSIS — J30.2 OTHER SEASONAL ALLERGIC RHINITIS: ICD-10-CM

## 2022-08-01 DIAGNOSIS — E78.5 HYPERLIPIDEMIA LDL GOAL <100: ICD-10-CM

## 2022-08-01 DIAGNOSIS — N18.31 STAGE 3A CHRONIC KIDNEY DISEASE (H): ICD-10-CM

## 2022-08-01 DIAGNOSIS — I10 ESSENTIAL HYPERTENSION: ICD-10-CM

## 2022-08-01 DIAGNOSIS — R39.15 URINARY URGENCY: ICD-10-CM

## 2022-08-01 DIAGNOSIS — D68.51 HETEROZYGOUS FACTOR V LEIDEN MUTATION (H): ICD-10-CM

## 2022-08-01 DIAGNOSIS — E03.9 HYPOTHYROIDISM, UNSPECIFIED TYPE: ICD-10-CM

## 2022-08-01 DIAGNOSIS — I50.22 CHRONIC SYSTOLIC HEART FAILURE (H): ICD-10-CM

## 2022-08-01 DIAGNOSIS — K21.9 GASTROESOPHAGEAL REFLUX DISEASE WITHOUT ESOPHAGITIS: ICD-10-CM

## 2022-08-01 DIAGNOSIS — Z00.00 ENCOUNTER FOR MEDICARE ANNUAL WELLNESS EXAM: Primary | ICD-10-CM

## 2022-08-01 DIAGNOSIS — I50.22 CHRONIC SYSTOLIC HEART FAILURE (H): Primary | ICD-10-CM

## 2022-08-01 LAB
ANION GAP SERPL CALCULATED.3IONS-SCNC: 6 MMOL/L (ref 3–14)
BUN SERPL-MCNC: 20 MG/DL (ref 7–30)
CALCIUM SERPL-MCNC: 8.6 MG/DL (ref 8.5–10.1)
CHLORIDE BLD-SCNC: 104 MMOL/L (ref 94–109)
CO2 SERPL-SCNC: 28 MMOL/L (ref 20–32)
CREAT SERPL-MCNC: 1.03 MG/DL (ref 0.52–1.04)
GFR SERPL CREATININE-BSD FRML MDRD: 55 ML/MIN/1.73M2
GLUCOSE BLD-MCNC: 85 MG/DL (ref 70–99)
LDLC SERPL CALC-MCNC: 53 MG/DL
POTASSIUM BLD-SCNC: 3.7 MMOL/L (ref 3.4–5.3)
SODIUM SERPL-SCNC: 138 MMOL/L (ref 133–144)
T4 FREE SERPL-MCNC: 1.21 NG/DL (ref 0.76–1.46)
TSH SERPL DL<=0.005 MIU/L-ACNC: 1.19 MU/L (ref 0.4–4)

## 2022-08-01 PROCEDURE — 84443 ASSAY THYROID STIM HORMONE: CPT | Performed by: FAMILY MEDICINE

## 2022-08-01 PROCEDURE — 99214 OFFICE O/P EST MOD 30 MIN: CPT | Mod: 25 | Performed by: FAMILY MEDICINE

## 2022-08-01 PROCEDURE — G0439 PPPS, SUBSEQ VISIT: HCPCS | Performed by: FAMILY MEDICINE

## 2022-08-01 PROCEDURE — 36415 COLL VENOUS BLD VENIPUNCTURE: CPT | Performed by: FAMILY MEDICINE

## 2022-08-01 PROCEDURE — 83880 ASSAY OF NATRIURETIC PEPTIDE: CPT | Performed by: FAMILY MEDICINE

## 2022-08-01 PROCEDURE — 84439 ASSAY OF FREE THYROXINE: CPT | Performed by: FAMILY MEDICINE

## 2022-08-01 PROCEDURE — 83721 ASSAY OF BLOOD LIPOPROTEIN: CPT | Performed by: FAMILY MEDICINE

## 2022-08-01 PROCEDURE — 80048 BASIC METABOLIC PNL TOTAL CA: CPT | Performed by: FAMILY MEDICINE

## 2022-08-01 RX ORDER — PANTOPRAZOLE SODIUM 40 MG/1
40 TABLET, DELAYED RELEASE ORAL DAILY
Qty: 90 TABLET | Refills: 3 | Status: SHIPPED | OUTPATIENT
Start: 2022-08-01 | End: 2023-07-17

## 2022-08-01 RX ORDER — LEVOTHYROXINE SODIUM 25 UG/1
25 TABLET ORAL DAILY
Qty: 90 TABLET | Refills: 3 | Status: SHIPPED | OUTPATIENT
Start: 2022-08-01 | End: 2023-07-17

## 2022-08-01 RX ORDER — FUROSEMIDE 40 MG
40 TABLET ORAL DAILY
Qty: 90 TABLET | Refills: 3 | Status: SHIPPED | OUTPATIENT
Start: 2022-08-01 | End: 2022-10-07

## 2022-08-01 RX ORDER — METOPROLOL SUCCINATE 25 MG/1
25 TABLET, EXTENDED RELEASE ORAL DAILY
Qty: 90 TABLET | Refills: 3 | Status: SHIPPED | OUTPATIENT
Start: 2022-08-01 | End: 2023-07-17

## 2022-08-01 RX ORDER — ATORVASTATIN CALCIUM 40 MG/1
40 TABLET, FILM COATED ORAL EVERY EVENING
Qty: 90 TABLET | Refills: 3 | Status: SHIPPED | OUTPATIENT
Start: 2022-08-01 | End: 2023-07-17

## 2022-08-01 RX ORDER — LEVOTHYROXINE SODIUM 137 UG/1
137 TABLET ORAL DAILY
Qty: 90 TABLET | Refills: 3 | Status: SHIPPED | OUTPATIENT
Start: 2022-08-01 | End: 2023-07-17

## 2022-08-01 RX ORDER — FLUTICASONE PROPIONATE 50 MCG
SPRAY, SUSPENSION (ML) NASAL
Qty: 16 G | Refills: 3 | Status: SHIPPED | OUTPATIENT
Start: 2022-08-01 | End: 2023-07-17

## 2022-08-01 RX ORDER — POTASSIUM CHLORIDE 750 MG/1
10 TABLET, EXTENDED RELEASE ORAL DAILY
Qty: 90 TABLET | Refills: 3 | Status: SHIPPED | OUTPATIENT
Start: 2022-08-01 | End: 2023-07-17

## 2022-08-01 RX ORDER — TOLTERODINE 4 MG/1
CAPSULE, EXTENDED RELEASE ORAL
Qty: 90 CAPSULE | Refills: 3 | Status: SHIPPED | OUTPATIENT
Start: 2022-08-01 | End: 2023-07-17

## 2022-08-01 RX ORDER — LISINOPRIL 5 MG/1
5 TABLET ORAL 2 TIMES DAILY
Qty: 180 TABLET | Refills: 3 | Status: SHIPPED | OUTPATIENT
Start: 2022-08-01 | End: 2023-07-17

## 2022-08-01 ASSESSMENT — ENCOUNTER SYMPTOMS
HEMATURIA: 0
JOINT SWELLING: 0
FEVER: 0
EYE PAIN: 0
COUGH: 0
WEAKNESS: 0
NERVOUS/ANXIOUS: 0
PALPITATIONS: 0
PARESTHESIAS: 0
MYALGIAS: 0
SORE THROAT: 0
BREAST MASS: 0
HEARTBURN: 0
HEADACHES: 0
DIARRHEA: 0
SHORTNESS OF BREATH: 0
HEMATOCHEZIA: 0
CONSTIPATION: 0
ARTHRALGIAS: 0
ABDOMINAL PAIN: 0
DYSURIA: 0
NAUSEA: 0
CHILLS: 0
FREQUENCY: 0
DIZZINESS: 0

## 2022-08-01 ASSESSMENT — PAIN SCALES - GENERAL: PAINLEVEL: MILD PAIN (3)

## 2022-08-01 ASSESSMENT — ACTIVITIES OF DAILY LIVING (ADL): CURRENT_FUNCTION: NO ASSISTANCE NEEDED

## 2022-08-01 NOTE — PATIENT INSTRUCTIONS
Please go to lab.    I refilled your medications for the year.    I am checking your kidney function, ldl cholesterol, thyroid levels.    Please get your mammogram done in 1/2023.    Please see cardiology in our system to re-establish care since you have been seen in the [facility] system.    Please be aware that there will be an additional charge during your preventative visit due to either a new diagnosis and/or chronic disease management.    Preventative visits screen for diseases prior to they occur.  They do not cover for any new diagnosis or chronic disease management which would include medication refills, labs etc.    If you have questions regarding your coverage please check with your insurance provider.  At [facility] we need to code correctly to be in compliance with all insurance companies.          Thank you for choosing [facility].  You may be receiving an email and/or telephone survey request from [facility] Customer Experience regarding your visit today.  Please take a few minutes to respond to the survey to let us know how we are doing.      If you have questions or concerns, please contact us via [facility] [employer] or you can contact your care team at [phone] option [phone].    Our Clinic hours are:  Monday - Thursday 7am-6pm  Friday 7am-5pm    The [place] outpatient lab hours are:  Monday - Friday 7am-4:30pm    Appointments are required, call [phone]    If you have clinical questions after hours or would like to schedule an appointment,  call the clinic at [phone].    Patient Education   Personalized Prevention Plan  You are due for the preventive services outlined below.  Your care team is available to assist you in scheduling these services.  If you have already completed any of these items, please share that information with your care team to update in your medical record.  Health Maintenance Due   Topic Date Due    Heart Failure Action Plan  Never done    Kidney Microalbumin Urine Test   12/06/2019    Basic Metabolic Panel  08/25/2021    Cholesterol Lab  11/23/2021       Exercise for a Healthier Heart  You may wonder how you can improve the health of your heart. If you re thinking about exercise, you re on the right track. You don t need to become an athlete. But you do need a certain amount of brisk exercise to help strengthen your heart. If you have been diagnosed with a heart condition, your healthcare provider may advise exercise to help stabilize your condition. To help make exercise a habit, choose safe, fun activities.      Exercise with a friend. When activity is fun, you're more likely to stick with it.   Before you start  Check with your healthcare provider before starting an exercise program. This is especially important if you have not been active for a while. It's also important if you have a long-term (chronic) health problem such as heart disease, diabetes, or obesity. Or if you are at high risk for having these problems.   Why exercise?  Exercising regularly offers many healthy rewards. It can help you do all of the following:   Improve your blood cholesterol level to help prevent further heart trouble  Lower your blood pressure to help prevent a stroke or heart attack  Control diabetes, or reduce your risk of getting this disease  Improve your heart and lung function  Reach and stay at a healthy weight  Make your muscles stronger so you can stay active  Prevent falls and fractures by slowing the loss of bone mass (osteoporosis)  Manage stress better  Reduce your blood pressure  Improve your sense of self and your body image  Exercise tips    Ease into your routine. Set small goals. Then build on them. If you are not sure what your activity level should be, talk with your healthcare provider first before starting an exercise routine.  Exercise on most days. Aim for a total of 150 minutes (2 hours and 30 minutes) or more of moderate-intensity aerobic activity each week. Or 75 minutes  (1 hour and 15 minutes) or more of vigorous-intensity aerobic activity each week. Or try for a combination of both. Moderate activity means that you breathe heavier and your heart rate increases but you can still talk. Think about doing 40 minutes of moderate exercise, 3 to 4 times a week. For best results, activity should last for about 40 minutes to lower blood pressure and cholesterol. It's OK to work up to the 40-minute period over time. Examples of moderate-intensity activity are walking 1 mile in 15 minutes. Or doing 30 to 45 minutes of yard work.  Step up your daily activity level.  Along with your exercise program, try being more active the whole day. Walk instead of drive. Or park further away so that you take more steps each day. Do more household tasks or yard work. You may not be able to meet the advised mount of physical activity. But doing some moderate- or vigorous-intensity aerobic activity can help reduce your risk for heart disease. Your healthcare provider can help you figure out what is best for you.  Choose 1 or more activities you enjoy.  Walking is one of the easiest things you can do. You can also try swimming, riding a bike, dancing, or taking an exercise class.    When to call your healthcare provider  Call your healthcare provider if you have any of these:   Chest pain or feel dizzy or lightheaded  Burning, tightness, pressure, or heaviness in your chest, neck, shoulders, back, or arms  Abnormal shortness of breath  More joint or muscle pain  A very fast or irregular heartbeat (palpitations)  HOSTING last reviewed this educational content on 7/1/2019 2000-2021 [employer]. All rights reserved. This information is not intended as a substitute for professional medical care. Always follow your healthcare professional's instructions.          Understanding USDA MyPlate  The USDA has guidelines to help you make healthy food choices. These are called MyPlate. MyPlate shows the  food groups that make up healthy meals using the image of a place setting. Before you eat, think about the healthiest choices for what to put on your plate or in your cup or bowl. To learn more about building a healthy plate, visit www.choosemyplate.gov.    The food groups  Fruits. Any fruit or 100% fruit juice counts as part of the Fruit Group. Fruits may be fresh, canned, frozen, or dried, and may be whole, cut-up, or pureed. Make 1/2 of your plate fruits and vegetables.  Vegetables. Any vegetable or 100% vegetable juice counts as a member of the Vegetable Group. Vegetables may be fresh, frozen, canned, or dried. They can be served raw or cooked and may be whole, cut-up, or mashed. Make 1/2 of your plate fruits and vegetables.  Grains. All foods made from grains are part of the Grains Group. These include wheat, rice, oats, cornmeal, and barley. Grains are often used to make foods such as bread, pasta, oatmeal, cereal, tortillas, and grits. Grains should be no more than 1/4 of your plate. At least half of your grains should be whole grains.  Protein. This group includes meat, poultry, seafood, beans and peas, eggs, processed soy products (such as tofu), nuts (including nut butters), and seeds. Make protein choices no more than 1/4 of your plate. Meat and poultry choices should be lean or low fat.  Dairy. The Dairy Group includes all fluid milk products and foods made from milk that contain calcium, such as yogurt and cheese. (Foods that have little calcium, such as cream, butter, and cream cheese, are not part of this group.) Most dairy choices should be low-fat or fat-free.  Oils. Oils aren't a food group, but they do contain essential nutrients. However it's important to watch your intake of oils. These are fats that are liquid at room temperature. They include canola, corn, olive, soybean, vegetable, and sunflower oil. Foods that are mainly oil include mayonnaise, certain salad dressings, and soft margarines.  You likely already get your daily oil allowance from the foods you eat.  Things to limit  Eating healthy also means limiting these things in your diet:     Salt (sodium). Many processed foods have a lot of sodium. To keep sodium intake down, eat fresh vegetables, meats, poultry, and seafood when possible. Purchase low-sodium, reduced-sodium, or no-salt-added food products at the store. And don't add salt to your meals at home. Instead, season them with herbs and spices such as dill, oregano, cumin, and paprika. Or try adding flavor with lemon or lime zest and juice.  Saturated fat. Saturated fats are most often found in animal products such as beef, pork, and chicken. They are often solid at room temperature, such as butter. To reduce your saturated fat intake, choose leaner cuts of meat and poultry. And try healthier cooking methods such as grilling, broiling, roasting, or baking. For a simple lower-fat swap, use plain nonfat yogurt instead of mayonnaise when making potato salad or macaroni salad.  Added sugars. These are sugars added to foods. They are in foods such as ice cream, candy, soda, fruit drinks, sports drinks, energy drinks, cookies, pastries, jams, and syrups. Cut down on added sugars by sharing sweet treats with a family member or friend. You can also choose fruit for dessert, and drink water or other unsweetened beverages.     [employer] last reviewed this educational content on 6/1/2020 2000-2021 [employer]. All rights reserved. This information is not intended as a substitute for professional medical care. Always follow your healthcare professional's instructions.

## 2022-08-01 NOTE — LETTER
August 3, 2022      Jenna Mcgee  [address] [address] [address]        Dear ,    We are writing to inform you of your test results.      You have normal thyroid function.  You have good LDL cholesterol.  You have good kidney function.      Resulted Orders   TSH   Result Value Ref Range    TSH 1.19 0.40 - 4.00 mU/L   T4 FREE   Result Value Ref Range    Free T4 1.21 0.76 - 1.46 ng/dL   Basic metabolic panel   Result Value Ref Range    Sodium 138 133 - 144 mmol/L    Potassium 3.7 3.4 - 5.3 mmol/L    Chloride 104 94 - 109 mmol/L    Carbon Dioxide (CO2) 28 20 - 32 mmol/L    Anion Gap 6 3 - 14 mmol/L    Urea Nitrogen 20 7 - 30 mg/dL    Creatinine 1.03 0.52 - 1.04 mg/dL    Calcium 8.6 8.5 - 10.1 mg/dL    Glucose 85 70 - 99 mg/dL    GFR Estimate 55 (L) >60 mL/min/1.73m2      Comment:      Effective December 21, 2021 eGFRcr in adults is calculated using the 2021 CKD-EPI creatinine equation which includes age and gender (Aram et al., NEJM, DOI: 10.1056/CDZRjz2975673)   LDL cholesterol direct   Result Value Ref Range    LDL Cholesterol Direct 53 <100 mg/dL      Comment:      Age 0-19 years:  Desirable: 0-110 mg/dL   Borderline high: 110-129 mg/dL   High: >= 130 mg/dL    Age 20 years and older:  Desirable: <100mg/dL  Above desirable: 100-129 mg/dL   Borderline high: 130-159 mg/dL   High: 160-189 mg/dL   Very high: >= 190 mg/dL       If you have any questions or concerns, please call the clinic at the number listed above.       Sincerely,      Xavier Vega MD

## 2022-08-01 NOTE — PROGRESS NOTES
"SUBJECTIVE:   Briana Mcgee is a 77 year old female who presents for Preventive Visit.    Patient has been advised of split billing requirements and indicates understanding: Yes  Are you in the first 12 months of your Medicare coverage?  No    Healthy Habits:     In general, how would you rate your overall health?  Good    Frequency of exercise:  None (is active at work but might not nessessarily get her heart rate up.)    Do you usually eat at least 4 servings of fruit and vegetables a day, include whole grains    & fiber and avoid regularly eating high fat or \"junk\" foods?  No    Taking medications regularly:  Yes    Barriers to taking medications:  None    Medication side effects:  None    Ability to successfully perform activities of daily living:  No assistance needed    Home Safety:  No safety concerns identified    Hearing Impairment:  No hearing concerns    In the past 6 months, have you been bothered by leaking of urine?  No    In general, how would you rate your overall mental or emotional health?  Good      PHQ-2 Total Score: 1    Additional concerns today:  Yes    Do you feel safe in your environment? Yes    Have you ever done Advance Care Planning? (For example, a Health Directive, POLST, or a discussion with a medical provider or your loved ones about your wishes): Yes, patient states has an Advance Care Planning document and will bring a copy to the clinic.       Fall risk  Fallen 2 or more times in the past year?: No  Any fall with injury in the past year?: No      Cognitive Screening   1) Repeat 3 items (Leader, Season, Table)    2) Clock draw: NORMAL  3) 3 item recall: Recalls 2 objects   Results: NORMAL clock, 1-2 items recalled: COGNITIVE IMPAIRMENT LESS LIKELY    Mini-CogTM Jody Matias. Licensed by the author for use in [facility]; reprinted with permission ([email or web address]). All rights reserved.      Do you have sleep apnea, excessive snoring or daytime drowsiness?: " no    Reviewed and updated as needed this visit by clinical staff   Tobacco  Allergies  Meds  Problems  Med Hx  Surg Hx  Fam Hx  Soc   Hx          Reviewed and updated as needed this visit by Provider    Allergies  Meds  Problems              Social History     Tobacco Use     Smoking status: Former Smoker     Types: Cigarettes     Quit date: 1996     Years since quittin.0     Smokeless tobacco: Never Used     Tobacco comment: quit 10-12 years ago, .   Substance Use Topics     Alcohol use: Yes     Comment: glass of wine at night     If you drink alcohol do you typically have >3 drinks per day or >7 drinks per week? No    Alcohol Use 2022   Prescreen: >3 drinks/day or >7 drinks/week? No   Prescreen: >3 drinks/day or >7 drinks/week? -   No flowsheet data found.        Unsteady/ off balance: happens more so when she exerts herself. Patient states it is getting worse. Patient states she knows it happens when she gets short of breath.    Current providers sharing in care for this patient include:   Patient Care Team:  Xavier Vega MD as PCP - General (Family Practice)  Kam Montero MD as MD (Physical Medicine and Rehabilitation)  Xavier Vega MD as Assigned PCP  Rik Villavicencio MD as Assigned Surgical Provider  Davy Shook MD as Assigned Heart and Vascular Provider  Chaim Puente MD as Assigned Rheumatology Provider    The following health maintenance items are reviewed in Epic and correct as of today:  Health Maintenance Due   Topic Date Due     HF ACTION PLAN  Never done     MICROALBUMIN  2019     BMP  2021     LIPID  2021           Mammogram Screening - Patient over age 75, has elected to continue with screening.  Pertinent mammograms are reviewed under the imaging tab.    Review of Systems   Constitutional: Negative for chills and fever.   HENT: Negative for congestion, ear pain, hearing loss and sore throat.    Eyes: Negative  "for pain and visual disturbance.   Respiratory: Negative for cough and shortness of breath.    Cardiovascular: Negative for chest pain, palpitations and peripheral edema.   Gastrointestinal: Negative for abdominal pain, constipation, diarrhea, heartburn, hematochezia and nausea.   Breasts:  Negative for tenderness, breast mass and discharge.   Genitourinary: Negative for dysuria, frequency, genital sores, hematuria, pelvic pain, urgency, vaginal bleeding and vaginal discharge.   Musculoskeletal: Negative for arthralgias, joint swelling and myalgias.   Skin: Negative for rash.   Neurological: Negative for dizziness, weakness, headaches and paresthesias.   Psychiatric/Behavioral: Negative for mood changes. The patient is not nervous/anxious.          OBJECTIVE:   /56   Pulse 74   Temp 98.1  F (36.7  C) (Tympanic)   Resp 16   Ht 1.651 m (5' 5\")   Wt 87.8 kg (193 lb 9.6 oz)   LMP  (LMP Unknown)   SpO2 98%   BMI 32.22 kg/m   Estimated body mass index is 32.22 kg/m  as calculated from the following:    Height as of this encounter: 1.651 m (5' 5\").    Weight as of this encounter: 87.8 kg (193 lb 9.6 oz).  Physical Exam  GENERAL APPEARANCE: alert, no distress and cooperative  HENT: ear canals and TM's normal and nose and mouth without ulcers or lesions  NECK: no adenopathy, no asymmetry, masses, or scars and thyroid normal to palpation  RESP: lungs clear to auscultation - no rales, rhonchi or wheezes  CV: regular rates and rhythm, normal S1 S2, no S3 or S4 and no murmur, click or rub  ABDOMEN: soft, nontender, without hepatosplenomegaly or masses and bowel sounds normal  MS: extremities normal- no gross deformities noted  SKIN: no suspicious lesions or rashes  NEURO: Normal strength and tone, mentation intact and speech normal  PSYCH: mentation appears normal and affect normal/bright        ASSESSMENT / PLAN:   (Z00.00) Encounter for Medicare annual wellness exam  (primary encounter diagnosis)  Comment: " Discussed healthy lifestyle and preventative cares.    Plan:     (I50.22) Chronic systolic heart failure (H)  Comment: stable on meds, needs to re-establish in our system, her cardiologist in [facility] is retiring.  She mentions slight SILVA.  Had MR of her myocardium done about 18 months ago.  Plan: Adult Cardiology al  Referral,         OFFICE/OUTPT VISIT,EST,LEVL IV            (D68.51) Heterozygous factor V Leiden mutation (H)  Comment: noted  Plan: OFFICE/OUTPT VISIT,EST,LEVL IV            (C50.919) Malignant neoplasm of female breast, unspecified estrogen receptor status, unspecified laterality, unspecified site of breast (H)  Comment: monitoring  Plan: OFFICE/OUTPT VISIT,EST,LEVL IV            (I50.21) Acute systolic heart failure (H)  Comment: stable  Plan: atorvastatin (LIPITOR) 40 MG tablet, furosemide        (LASIX) 40 MG tablet, lisinopril (ZESTRIL) 5 MG        tablet, metoprolol succinate ER (TOPROL XL) 25         MG 24 hr tablet, potassium chloride ER         (K-TAB/KLOR-CON) 10 MEQ CR tablet, OFFICE/OUTPT        VISIT,EST,LEVL IV            (J30.2) Other seasonal allergic rhinitis  Comment: refilled med  Plan: fluticasone (FLONASE) 50 MCG/ACT nasal spray,         OFFICE/OUTPT VISIT,EST,LEVL IV            (E03.9) Hypothyroidism, unspecified type  Comment: check lab and refilled med  Plan: levothyroxine (SYNTHROID/LEVOTHROID) 137 MCG         tablet, levothyroxine (SYNTHROID/LEVOTHROID) 25        MCG tablet, OFFICE/OUTPT VISIT,EST,LEVL IV            (K21.9) Gastroesophageal reflux disease without esophagitis  Comment: stable on med, no side effects  Plan: pantoprazole (PROTONIX) 40 MG EC tablet,         OFFICE/OUTPT VISIT,EST,LEVL IV            (R39.15) Urinary urgency  Comment: on med and controlled  Plan: tolterodine ER (DETROL LA) 4 MG 24 hr capsule,         OFFICE/OUTPT VISIT,EST,LEVL IV            (E78.5) Hyperlipidemia LDL goal <100  Comment: check lab  Plan: LDL cholesterol direct,  "OFFICE/OUTPT         VISIT,[place] [facility] IV          (Z12.31) Encounter for screening mammogram for breast cancer  Comment:   Plan: MA Screen Bilateral w/Joseph              Patient has been advised of split billing requirements and indicates understanding: Yes    COUNSELING:  Reviewed preventive health counseling, as reflected in patient instructions       Regular exercise       Healthy diet/nutrition    Estimated body mass index is 32.22 kg/m  as calculated from the following:    Height as of this encounter: 1.651 m (5' 5\").    Weight as of this encounter: 87.8 kg (193 lb 9.6 oz).    Weight management plan: diet    She reports that she quit smoking about 26 years ago. Her smoking use included cigarettes. She has never used smokeless tobacco.      Appropriate preventive services were discussed with this patient, including applicable screening as appropriate for cardiovascular disease, diabetes, osteopenia/osteoporosis, and glaucoma.  As appropriate for age/gender, discussed screening for colorectal cancer, prostate cancer, breast cancer, and cervical cancer. Checklist reviewing preventive services available has been given to the patient.    Reviewed patients plan of care and provided an AVS. The Basic Care Plan (routine screening as documented in Health Maintenance) for Briana meets the Care Plan requirement. This Care Plan has been established and reviewed with the Patient.    Counseling Resources:  ATP IV Guidelines  Pooled Cohorts Equation Calculator  Breast Cancer Risk Calculator  Breast Cancer: Medication to Reduce Risk  FRAX Risk Assessment  ICSI Preventive Guidelines  Dietary Guidelines for Americans, 2010  USDA's MyPlate  ASA Prophylaxis  Lung CA Screening    Xavier Vega MD  [facility]    Identified Health Risks:  "

## 2022-08-01 NOTE — TELEPHONE ENCOUNTER
[facility] Call Center    Phone Message    May a detailed message be left on voicemail: yes     Reason for Call: Other: Called pt from referral to schedule with Dr. Shook has appt on 22. Per pt already did labs on 22 with PCP states if [place] wants more labs please order so she can do them on 22 when she comes in to do infusion also states has not done ECHO recently order is  please review and place new orders and call pt back to schedule as needed.     Action Taken: Message routed to:  Other: Cardiology    Travel Screening: Not Applicable

## 2022-08-02 LAB — NT-PROBNP SERPL-MCNC: 871 PG/ML (ref 0–1800)

## 2022-08-02 NOTE — TELEPHONE ENCOUNTER
Pt had bmp lab 8/1/22 that was stable. Echo and NT-proBNP lab still needed. Will message our [place] team to set up echo. Lab order (BNP)_ added to 8/1 lab specimen Landon RYAN August 2, 2022, 1:43 PM

## 2022-08-04 ENCOUNTER — INFUSION THERAPY VISIT (OUTPATIENT)
Dept: INFUSION THERAPY | Facility: CLINIC | Age: 77
End: 2022-08-04
Attending: INTERNAL MEDICINE
Payer: MEDICARE

## 2022-08-04 VITALS
SYSTOLIC BLOOD PRESSURE: 141 MMHG | DIASTOLIC BLOOD PRESSURE: 53 MMHG | TEMPERATURE: 97.6 F | RESPIRATION RATE: 18 BRPM | BODY MASS INDEX: 31.72 KG/M2 | HEART RATE: 61 BPM | WEIGHT: 190.6 LBS

## 2022-08-04 DIAGNOSIS — M06.041 RHEUMATOID ARTHRITIS INVOLVING BOTH HANDS WITH NEGATIVE RHEUMATOID FACTOR (H): Primary | ICD-10-CM

## 2022-08-04 DIAGNOSIS — M06.042 RHEUMATOID ARTHRITIS INVOLVING BOTH HANDS WITH NEGATIVE RHEUMATOID FACTOR (H): Primary | ICD-10-CM

## 2022-08-04 PROCEDURE — 96413 CHEMO IV INFUSION 1 HR: CPT

## 2022-08-04 PROCEDURE — 258N000003 HC RX IP 258 OP 636: Performed by: INTERNAL MEDICINE

## 2022-08-04 PROCEDURE — 250N000011 HC RX IP 250 OP 636: Performed by: INTERNAL MEDICINE

## 2022-08-04 RX ORDER — ACETAMINOPHEN 325 MG/1
650 TABLET ORAL ONCE
Status: CANCELLED
Start: 2022-08-05 | End: 2022-08-05

## 2022-08-04 RX ORDER — DIPHENHYDRAMINE HCL 25 MG
25 CAPSULE ORAL ONCE
Status: CANCELLED
Start: 2022-08-05 | End: 2022-08-05

## 2022-08-04 RX ADMIN — INFLIXIMAB 400 MG: 100 INJECTION, POWDER, LYOPHILIZED, FOR SOLUTION INTRAVENOUS at 10:11

## 2022-08-04 NOTE — PROGRESS NOTES
Infusion Nursing Note:  Briana Mcgee presents today for Remicade.    Patient seen by provider today: No   present during visit today: Not Applicable.    Note: N/A.    Intravenous Access:  Peripheral IV placed.    Treatment Conditions:  Biological Infusion Checklist:  ~~~ NOTE: If the patient answers yes to any of the questions below, hold the infusion and contact ordering provider or on-call provider.    1. Have you recently had an elevated temperature, fever, chills, productive cough, coughing for 3 weeks or longer or hemoptysis, abnormal vital signs, night sweats,  chest pain or have you noticed a decrease in your appetite, unexplained weight loss or fatigue? No  2. Do you have any open wounds or new incisions? No  3. Do you have any recent or upcoming hospitalizations, surgeries or dental procedures? No  4. Do you currently have or recently have had any signs of illness or infection or are you on any antibiotics? No  5. Have you had any new, sudden or worsening abdominal pain? No  6. Have you or anyone in your household received a live vaccination in the past 4 weeks? Please note:  No live vaccines while on biologic/chemotherapy until 6 months after the last treatment.  Patient can receive the flu vaccine (shot only) and the pneumovax.  It is optimal for the patient to get these vaccines mid cycle, but they can be given at any time as long as it is not on the day of the infusion. No  7. Have you recently been diagnosed with any new nervous system diseases (ie. Multiple sclerosis, Guillain [place], seizures, neurological changes) or cancer diagnosis? No  8. Are you on any form of radiation or chemotherapy? No  9. Are you pregnant or breast feeding or do you have plans of pregnancy in the future? No  10. Have you been having any signs of worsening depression or suicidal ideations?  (benlysta only) No  11. Have there been any other new onset medical symptoms? No  Post Infusion Assessment:  Patient  tolerated infusion without incident.  Blood return noted pre and post infusion.  Site patent and intact, free from redness, edema or discomfort.  No evidence of extravasations.  Access discontinued per protocol.     Discharge Plan:   Discharge instructions reviewed with: Patient.  Patient and/or family verbalized understanding of discharge instructions and all questions answered.  AVS to patient via [place] [employer].  Patient will return 9/29/2022 for next appointment.   Patient discharged in stable condition accompanied by: self.  Departure Mode: Ambulatory.    Deanna Pastor RN

## 2022-09-18 ENCOUNTER — OFFICE VISIT (OUTPATIENT)
Dept: URGENT CARE | Facility: URGENT CARE | Age: 77
End: 2022-09-18
Payer: COMMERCIAL

## 2022-09-18 VITALS
OXYGEN SATURATION: 97 % | DIASTOLIC BLOOD PRESSURE: 75 MMHG | WEIGHT: 190 LBS | SYSTOLIC BLOOD PRESSURE: 134 MMHG | HEART RATE: 67 BPM | TEMPERATURE: 96.9 F | BODY MASS INDEX: 31.62 KG/M2

## 2022-09-18 DIAGNOSIS — R07.0 THROAT PAIN: Primary | ICD-10-CM

## 2022-09-18 LAB
DEPRECATED S PYO AG THROAT QL EIA: NEGATIVE
GROUP A STREP BY PCR: NOT DETECTED

## 2022-09-18 PROCEDURE — 99213 OFFICE O/P EST LOW 20 MIN: CPT | Performed by: FAMILY MEDICINE

## 2022-09-18 PROCEDURE — 87651 STREP A DNA AMP PROBE: CPT | Performed by: FAMILY MEDICINE

## 2022-09-18 NOTE — PROGRESS NOTES
Assessment & Plan     Throat pain  77-year-old female presented with persistent sore throat and painful swallowing which she has been experiencing for last 3 weeks or so.  Using pantoprazole for gastroesophageal reflux disease.  Physical examination unremarkable.  Rapid strep negative.  Differential discussed in detail including but not limited to seasonal allergies, postnasal drainage and mass lesion.  Recommended to continue over-the-counter antihistamine, Flonase, pantoprazole and ENT referral placed considering chronicity of symptoms.  Patient understood and in agreement with above plan.  All questions answered.  - Streptococcus A Rapid Screen w/Reflex to PCR - Clinic Collect  - Adult ENT  Referral; Future      Will Portillo MD  [facility]    Darryn West is a 77 year old presenting for the following health issues:  Pharyngitis (Sore throat x3 weeks)      HPI     Concern -   Onset: 3 weeks  Description: sore throat, painful to swallow and mild cough  Intensity: moderate  Progression of Symptoms:  same  Accompanying Signs & Symptoms: no fever, chills, sob, chest pain. H/O seasonal allergies an using pantoprazole for GERD    Therapies tried and outcome: throat spray      Review of Systems   Constitutional, HEENT, cardiovascular, pulmonary, gi and gu systems are negative, except as otherwise noted.      Objective    /75   Pulse 67   Temp 96.9  F (36.1  C)   Wt 86.2 kg (190 lb)   LMP  (LMP Unknown)   SpO2 97%   BMI 31.62 kg/m    Body mass index is 31.62 kg/m .  Physical Exam   GENERAL: alert and no distress  EYES: Eyes grossly normal to inspection, PERRL and conjunctivae and sclerae normal  HENT: normal cephalic/atraumatic, ear canals and TM's normal, nose and mouth without ulcers or lesions, oropharynx clear and oral mucous membranes moist  NECK: no adenopathy, no asymmetry, masses, or scars and thyroid normal to palpation  RESP: lungs clear to  auscultation - no rales, rhonchi or wheezes  CV: regular rates and rhythm, normal S1 S2, no S3 or S4 and no murmur, click or rub  MS: no gross musculoskeletal defects noted, no edema  NEURO: Normal strength and tone, mentation intact and speech normal  PSYCH: mentation appears normal, affect normal/bright      Results for orders placed or performed in visit on 09/18/22   Streptococcus A Rapid Screen w/Reflex to PCR - Clinic Collect     Status: Normal    Specimen: Throat; Swab   Result Value Ref Range    Group A Strep antigen Negative Negative

## 2022-09-19 NOTE — PROGRESS NOTES
I am seeing this patient in consultation for throat pain at the request of the provider Will Horn.    Chief Complaint - sore throat    History of Present Illness - Briana Mcgee is a 77 year old female who presents with a history of sore throat. This has been going on for 3 weeks. They describe the sore throat as painful with swallowing located in the mid throat. It sometimes wakes her in the middle of the night. Voicing has seemed normal. + cough, dry. No hemoptysis. They note a history of relux and is taking pantoprazole. No worsening reflux. She has allergies year round now. She has tried an antihistamine. She feels sinuses are clear. This hasn't helped much. She is eating softer foods as it hurts to swallow. former smoker, quit 27 years ago. She hasn't felt ill. She takes tylenol for this and sometimes a throat spray.     Tests personally reviewed today for this visit:   1.) strep swab was negative 9/18/22  2.) BMP 8/1/22 was normal  3.) TSH and T4 were normal 8/1/22    Past Medical History -   Patient Active Problem List   Diagnosis     Malignant neoplasm of female breast (H)     Hypothyroidism     Impaired fasting glucose     Insomnia     ischemic stroke 3/06     Rhinitis, allergic seasonal     OA (osteoarthritis) of knee     Hot flashes     HYPERLIPIDEMIA LDL GOAL <100     Heterozygous factor V Leiden mutation (H)     GERD (gastroesophageal reflux disease)     Varicose veins of lower extremities with complications     Lumbar radiculopathy     Spinal stenosis     Advanced directives, counseling/discussion     CKD (chronic kidney disease) stage 2, GFR 60-89 ml/min     Obesity     Hepatitis     Essential hypertension     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)     Spinal stenosis, lumbar region, with neurogenic claudication     CHF exacerbation (H)     Acute heart failure (H)     Acute on chronic congestive heart failure, unspecified heart failure type (H)        Current Medications -   Current Outpatient Medications:      aspirin 81 MG EC tablet, Take 1 tablet (81 mg) by mouth daily, Disp: 90 tablet, Rfl: 3     atorvastatin (LIPITOR) 40 MG tablet, Take 1 tablet (40 mg) by mouth every evening, Disp: 90 tablet, Rfl: 3     calcium carbonate (OS-MARIO) 500 MG tablet, Take 1 tablet by mouth daily, Disp: , Rfl:      Coenzyme Q10 (COQ10 PO), Take 1 capsule by mouth every morning , Disp: , Rfl:      fluticasone (FLONASE) 50 MCG/ACT nasal spray, Spray 2 sprays into both nostrils daily as needed for rhinitis or allergies, Disp: 16 g, Rfl: 3     FOLIC ACID PO, Take 1 mg by mouth daily, Disp: , Rfl:      furosemide (LASIX) 40 MG tablet, Take 1 tablet (40 mg) by mouth daily, Disp: 90 tablet, Rfl: 3     hydroxychloroquine (PLAQUENIL) 200 MG tablet, Hydroxychloroquine 200mg daily; and an additional 200mg every other day. Yearly eye exam including 10-2 VF and SD-OCT are required, Disp: 135 tablet, Rfl: 2     inFLIXimab (REMICADE) 100 MG injection, Every 8 weeks, Disp: , Rfl:      levothyroxine (SYNTHROID/LEVOTHROID) 137 MCG tablet, Take 1 tablet (137 mcg) by mouth daily Along with 25mcg to equal 162mcg daily., Disp: 90 tablet, Rfl: 3     levothyroxine (SYNTHROID/LEVOTHROID) 25 MCG tablet, Take 1 tablet (25 mcg) by mouth daily Take along with the 137mcg to equal 162mcg total daily., Disp: 90 tablet, Rfl: 3     lisinopril (ZESTRIL) 5 MG tablet, Take 1 tablet (5 mg) by mouth 2 times daily, Disp: 180 tablet, Rfl: 3     meclizine (ANTIVERT) 25 MG tablet, Take 25 mg by mouth daily as needed for dizziness, Disp: , Rfl:      metoprolol succinate ER (TOPROL XL) 25 MG 24 hr tablet, Take 1 tablet (25 mg) by mouth daily, Disp: 90 tablet, Rfl: 3     Naproxen Sodium (ALEVE PO), Take 2 tablets by mouth 2 times daily as needed , Disp: , Rfl:      pantoprazole (PROTONIX) 40 MG EC tablet, Take 1 tablet (40 mg) by mouth daily, Disp: 90 tablet, Rfl: 3     potassium chloride ER (K-TAB/KLOR-CON) 10 MEQ CR  tablet, Take 1 tablet (10 mEq) by mouth daily, Disp: 90 tablet, Rfl: 3     tolterodine ER (DETROL LA) 4 MG 24 hr capsule, Take 1 capsule (4 mg) by mouth daily, Disp: 90 capsule, Rfl: 3    Allergies -   Allergies   Allergen Reactions     Erythromycin Other (See Comments)     Edema     Hydrocodone GI Disturbance     GI Upset, Tolerates dilaudid     Oxycodone GI Disturbance       Social History -   Social History     Socioeconomic History     Marital status:      Spouse name: liberty     Number of children: 2   Occupational History     Employer: [employer]     Employer: RETIRED   Tobacco Use     Smoking status: Former Smoker     Types: Cigarettes     Quit date: 1996     Years since quittin.1     Smokeless tobacco: Never Used     Tobacco comment: quit 10-12 years ago, .   Vaping Use     Vaping Use: Never used   Substance and Sexual Activity     Alcohol use: Yes     Comment: glass of wine at night     Drug use: No     Sexual activity: Yes     Partners: Male     Birth control/protection: Surgical   Other Topics Concern     Parent/sibling w/ CABG, MI or angioplasty before 65F 55M? Yes       Family History -   Family History   Problem Relation Age of Onset     Diabetes Mother      Hypertension Mother      Cancer Mother         breast     Heart Disease Mother         chf     Breast Cancer Mother      Blood Disease Father      Diabetes Son         Type 1     Psoriasis Son      Cerebrovascular Disease Son 48     Cerebrovascular Disease Sister      Breast Cancer Paternal Aunt        Review of Systems - As per HPI and PMHx, otherwise 10+ comprehensive system review is negative.    Physical Exam  General - The patient is in no distress. Alert and oriented to person and place, answers questions and cooperates with examination appropriately.   Voice and Breathing - The patient was breathing comfortably without the use of accessory muscles. There was no wheezing, stridor, or stertor.  The patients  voice was clear and strong.  Eyes - Extraocular movements intact.  Sclera were not icteric or injected, conjunctiva were pink and moist.  Mouth - Examination of the oral cavity showed pink, healthy oral mucosa. No lesions or ulcerations noted.  The tongue was mobile and midline.  Throat - The walls of the oropharynx were smooth, symmetric, and had no lesions or ulcerations.  The tonsillar pillars and soft palate were symmetric.  The uvula was midline on elevation. Tonsils 1+. + erythema and some postnasal drainage.  Nose - External contour is symmetric, no gross deflection or scars.  Nasal mucosa is pink and moist with no abnormal mucus.  The septum was midline and non-obstructive, turbinates of normal size and position.  No polyps, masses, or purulence noted on examination.  Neck -  Soft, non-tender. Palpation of the occipital, submental, submandibular, internal jugular chain, and supraclavicular nodes did not demonstrate any abnormal lymph nodes or masses. No parotid masses. Palpation of the thyroid was soft and smooth, with no nodules or goiter appreciated.  The trachea was mobile and midline.  Neurologic - CN II-XII are grossly intact, no focal neurologic deficits.   Cardiovascular - carotid pulses are 2+ bilaterally, regular rhythm    Procedure: Flexible Endoscopy  Indication: Sore Throat    Attempts at mirror laryngoscopy could not be performed due to gag reflex and patient anatomy. To best visualize the upper airway anatomy and due to the chief complaint and HPI, I proceeded with a fiberoptic examination. Color photographs were taken for the medical record. First I sprayed the left side of the nose with a mixture of lidocaine and neosynephrine.  I then passed the scope through the left nasal cavity.  The nasal cavity was normal, no pus or swelling.  The nasopharynx was visualized and showed thick, yellow, postnasal drainage.  Going further down the oropharyneal posterior wall there is copious erythema and  thick, purulent drainage. I had a clear view of the base of tongue which had normal appearing lingual tonsillar tissue. The epiglottis and supraglottic larynx was inflamed and with thick mucous. No lesions. The vocal cords moved smoothly and symmetrically, they were pearly white and no lesions were seen.                      A/P - Briana Mcgee is a 77 year old female with a sore throat. Exam revealed bacterial pharyngitis. I think the most likely etiology is immunosuppression from her psoriatic arthritis medications. I recommend Omnicef.  If this fails she will contact me.        Wilber Shook MD  Otolaryngology  [facility]

## 2022-09-20 ENCOUNTER — OFFICE VISIT (OUTPATIENT)
Dept: OTOLARYNGOLOGY | Facility: CLINIC | Age: 77
End: 2022-09-20
Attending: FAMILY MEDICINE
Payer: COMMERCIAL

## 2022-09-20 VITALS
SYSTOLIC BLOOD PRESSURE: 136 MMHG | RESPIRATION RATE: 16 BRPM | DIASTOLIC BLOOD PRESSURE: 75 MMHG | HEART RATE: 69 BPM | OXYGEN SATURATION: 95 % | BODY MASS INDEX: 31.65 KG/M2 | WEIGHT: 190 LBS | HEIGHT: 65 IN

## 2022-09-20 DIAGNOSIS — J02.8 PHARYNGITIS DUE TO OTHER ORGANISM: ICD-10-CM

## 2022-09-20 DIAGNOSIS — D84.9 IMMUNOSUPPRESSION (H): ICD-10-CM

## 2022-09-20 DIAGNOSIS — R07.0 THROAT PAIN: Primary | ICD-10-CM

## 2022-09-20 PROCEDURE — 31575 DIAGNOSTIC LARYNGOSCOPY: CPT | Performed by: OTOLARYNGOLOGY

## 2022-09-20 PROCEDURE — 99203 OFFICE O/P NEW LOW 30 MIN: CPT | Mod: 25 | Performed by: OTOLARYNGOLOGY

## 2022-09-20 RX ORDER — CEFDINIR 300 MG/1
300 CAPSULE ORAL 2 TIMES DAILY
Qty: 20 CAPSULE | Refills: 0 | Status: SHIPPED | OUTPATIENT
Start: 2022-09-20 | End: 2022-09-30

## 2022-09-20 ASSESSMENT — PAIN SCALES - GENERAL: PAINLEVEL: MILD PAIN (3)

## 2022-09-20 NOTE — LETTER
9/20/2022         RE: Briana Mcgee  [address] [id number]        Dear Colleague,    Thank you for referring your patient, Briana Mcgee, to the [facility]. Please see a copy of my visit note below.    I am seeing this patient in consultation for throat pain at the request of the provider Will Horn.    Chief Complaint - sore throat    History of Present Illness - Briana Mcgee is a 77 year old female who presents with a history of sore throat. This has been going on for 3 weeks. They describe the sore throat as painful with swallowing located in the mid throat. It sometimes wakes her in the middle of the night. Voicing has seemed normal. + cough, dry. No hemoptysis. They note a history of relux and is taking pantoprazole. No worsening reflux. She has allergies year round now. She has tried an antihistamine. She feels sinuses are clear. This hasn't helped much. She is eating softer foods as it hurts to swallow. former smoker, quit 27 years ago. She hasn't felt ill. She takes tylenol for this and sometimes a throat spray.     Tests personally reviewed today for this visit:   1.) strep swab was negative 9/18/22  2.) BMP 8/1/22 was normal  3.) TSH and T4 were normal 8/1/22    Past Medical History -   Patient Active Problem List   Diagnosis     Malignant neoplasm of female breast (H)     Hypothyroidism     Impaired fasting glucose     Insomnia     ischemic stroke 3/06     Rhinitis, allergic seasonal     OA (osteoarthritis) of knee     Hot flashes     HYPERLIPIDEMIA LDL GOAL <100     Heterozygous factor V Leiden mutation (H)     GERD (gastroesophageal reflux disease)     Varicose veins of lower extremities with complications     Lumbar radiculopathy     Spinal stenosis     Advanced directives, counseling/discussion     CKD (chronic kidney disease) stage 2, GFR 60-89 ml/min     Obesity     Hepatitis     Essential hypertension     Rheumatoid arthritis  involving 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)     Spinal stenosis, lumbar region, with neurogenic claudication     CHF exacerbation (H)     Acute heart failure (H)     Acute on chronic congestive heart failure, unspecified heart failure type (H)       Current Medications -   Current Outpatient Medications:      aspirin 81 MG EC tablet, Take 1 tablet (81 mg) by mouth daily, Disp: 90 tablet, Rfl: 3     atorvastatin (LIPITOR) 40 MG tablet, Take 1 tablet (40 mg) by mouth every evening, Disp: 90 tablet, Rfl: 3     calcium carbonate (OS-MARIO) 500 MG tablet, Take 1 tablet by mouth daily, Disp: , Rfl:      Coenzyme Q10 (COQ10 PO), Take 1 capsule by mouth every morning , Disp: , Rfl:      fluticasone (FLONASE) 50 MCG/ACT nasal spray, Spray 2 sprays into both nostrils daily as needed for rhinitis or allergies, Disp: 16 g, Rfl: 3     FOLIC ACID PO, Take 1 mg by mouth daily, Disp: , Rfl:      furosemide (LASIX) 40 MG tablet, Take 1 tablet (40 mg) by mouth daily, Disp: 90 tablet, Rfl: 3     hydroxychloroquine (PLAQUENIL) 200 MG tablet, Hydroxychloroquine 200mg daily; and an additional 200mg every other day. Yearly eye exam including 10-2 VF and SD-OCT are required, Disp: 135 tablet, Rfl: 2     inFLIXimab (REMICADE) 100 MG injection, Every 8 weeks, Disp: , Rfl:      levothyroxine (SYNTHROID/LEVOTHROID) 137 MCG tablet, Take 1 tablet (137 mcg) by mouth daily Along with 25mcg to equal 162mcg daily., Disp: 90 tablet, Rfl: 3     levothyroxine (SYNTHROID/LEVOTHROID) 25 MCG tablet, Take 1 tablet (25 mcg) by mouth daily Take along with the 137mcg to equal 162mcg total daily., Disp: 90 tablet, Rfl: 3     lisinopril (ZESTRIL) 5 MG tablet, Take 1 tablet (5 mg) by mouth 2 times daily, Disp: 180 tablet, Rfl: 3     meclizine (ANTIVERT) 25 MG tablet, Take 25 mg by mouth daily as needed for dizziness, Disp: , Rfl:      metoprolol succinate ER (TOPROL XL) 25 MG 24 hr tablet, Take 1 tablet (25 mg) by mouth daily, Disp: 90 tablet, Rfl:  3     Naproxen Sodium (ALEVE PO), Take 2 tablets by mouth 2 times daily as needed , Disp: , Rfl:      pantoprazole (PROTONIX) 40 MG EC tablet, Take 1 tablet (40 mg) by mouth daily, Disp: 90 tablet, Rfl: 3     potassium chloride ER (K-TAB/KLOR-CON) 10 MEQ CR tablet, Take 1 tablet (10 mEq) by mouth daily, Disp: 90 tablet, Rfl: 3     tolterodine ER (DETROL LA) 4 MG 24 hr capsule, Take 1 capsule (4 mg) by mouth daily, Disp: 90 capsule, Rfl: 3    Allergies -   Allergies   Allergen Reactions     Erythromycin Other (See Comments)     Edema     Hydrocodone GI Disturbance     GI Upset, Tolerates dilaudid     Oxycodone GI Disturbance       Social History -   Social History     Socioeconomic History     Marital status:      Spouse name: liberty     Number of children: 2   Occupational History     Employer: [employer]     Employer: [employer]   Tobacco Use     Smoking status: Former Smoker     Types: Cigarettes     Quit date: 1996     Years since quittin.1     Smokeless tobacco: Never Used     Tobacco comment: quit 10-12 years ago, .   Vaping Use     Vaping Use: Never used   Substance and Sexual Activity     Alcohol use: Yes     Comment: glass of wine at night     Drug use: No     Sexual activity: Yes     Partners: Male     Birth control/protection: Surgical   Other Topics Concern     Parent/sibling w/ CABG, MI or angioplasty before 65F 55M? Yes       Family History -   Family History   Problem Relation Age of Onset     Diabetes Mother      Hypertension Mother      Cancer Mother         breast     Heart Disease Mother         chf     Breast Cancer Mother      Blood Disease Father      Diabetes Son         Type 1     Psoriasis Son      Cerebrovascular Disease Son 48     Cerebrovascular Disease Sister      Breast Cancer Paternal Aunt        Review of Systems - As per HPI and PMHx, otherwise 10+ comprehensive system review is negative.    Physical Exam  General - The patient is in no distress. Alert and  oriented to person and place, answers questions and cooperates with examination appropriately.   Voice and Breathing - The patient was breathing comfortably without the use of accessory muscles. There was no wheezing, stridor, or stertor.  The patients voice was clear and strong.  Eyes - Extraocular movements intact.  Sclera were not icteric or injected, conjunctiva were pink and moist.  Mouth - Examination of the oral cavity showed pink, healthy oral mucosa. No lesions or ulcerations noted.  The tongue was mobile and midline.  Throat - The walls of the oropharynx were smooth, symmetric, and had no lesions or ulcerations.  The tonsillar pillars and soft palate were symmetric.  The uvula was midline on elevation. Tonsils 1+. + erythema and some postnasal drainage.  Nose - External contour is symmetric, no gross deflection or scars.  Nasal mucosa is pink and moist with no abnormal mucus.  The septum was midline and non-obstructive, turbinates of normal size and position.  No polyps, masses, or purulence noted on examination.  Neck -  Soft, non-tender. Palpation of the occipital, submental, submandibular, internal jugular chain, and supraclavicular nodes did not demonstrate any abnormal lymph nodes or masses. No parotid masses. Palpation of the thyroid was soft and smooth, with no nodules or goiter appreciated.  The trachea was mobile and midline.  Neurologic - CN II-XII are grossly intact, no focal neurologic deficits.   Cardiovascular - carotid pulses are 2+ bilaterally, regular rhythm    Procedure: Flexible Endoscopy  Indication: Sore Throat    Attempts at mirror laryngoscopy could not be performed due to gag reflex and patient anatomy. To best visualize the upper airway anatomy and due to the chief complaint and HPI, I proceeded with a fiberoptic examination. Color photographs were taken for the medical record. First I sprayed the left side of the nose with a mixture of lidocaine and neosynephrine.  I then passed  the scope through the left nasal cavity.  The nasal cavity was normal, no pus or swelling.  The nasopharynx was visualized and showed thick, yellow, postnasal drainage.  Going further down the oropharyneal posterior wall there is copious erythema and thick, purulent drainage. I had a clear view of the base of tongue which had normal appearing lingual tonsillar tissue. The epiglottis and supraglottic larynx was inflamed and with thick mucous. No lesions. The vocal cords moved smoothly and symmetrically, they were pearly white and no lesions were seen.        A/P - Briana Mcgee is a 77 year old female with a sore throat. Exam revealed bacterial pharyngitis. I think the most likely etiology is immunosuppression from her psoriatic arthritis medications. I recommend Omnicef.  If this fails she will contact me.        Wilber Shook MD  Otolaryngology  [facility]       Again, thank you for allowing me to participate in the care of your patient.        Sincerely,        Wilber Shook MD

## 2022-09-23 ENCOUNTER — HOSPITAL ENCOUNTER (OUTPATIENT)
Dept: CARDIOLOGY | Facility: CLINIC | Age: 77
Discharge: HOME | End: 2022-09-23
Attending: INTERNAL MEDICINE | Admitting: INTERNAL MEDICINE
Payer: MEDICARE

## 2022-09-23 DIAGNOSIS — I50.22 CHRONIC SYSTOLIC HEART FAILURE (H): ICD-10-CM

## 2022-09-23 LAB — LVEF ECHO: NORMAL

## 2022-09-23 PROCEDURE — 999N000208 ECHOCARDIOGRAM COMPLETE

## 2022-09-23 PROCEDURE — 255N000002 HC RX 255 OP 636: Performed by: INTERNAL MEDICINE

## 2022-09-23 PROCEDURE — 93306 TTE W/DOPPLER COMPLETE: CPT | Mod: 26 | Performed by: INTERNAL MEDICINE

## 2022-09-23 RX ADMIN — HUMAN ALBUMIN MICROSPHERES AND PERFLUTREN 2 ML: 10; .22 INJECTION, SOLUTION INTRAVENOUS at 14:30

## 2022-09-28 ENCOUNTER — DOCUMENTATION ONLY (OUTPATIENT)
Dept: OPHTHALMOLOGY | Facility: CLINIC | Age: 77
End: 2022-09-28

## 2022-09-29 ENCOUNTER — INFUSION THERAPY VISIT (OUTPATIENT)
Dept: INFUSION THERAPY | Facility: CLINIC | Age: 77
End: 2022-09-29
Attending: INTERNAL MEDICINE
Payer: COMMERCIAL

## 2022-09-29 DIAGNOSIS — M06.041 RHEUMATOID ARTHRITIS INVOLVING BOTH HANDS WITH NEGATIVE RHEUMATOID FACTOR (H): Primary | ICD-10-CM

## 2022-09-29 DIAGNOSIS — M06.042 RHEUMATOID ARTHRITIS INVOLVING BOTH HANDS WITH NEGATIVE RHEUMATOID FACTOR (H): Primary | ICD-10-CM

## 2022-09-29 NOTE — PROGRESS NOTES
Infusion Nursing Note:  Briana JORDON Mcgee presents today for Remicade HELD.    Patient seen by provider today: No   present during visit today: Not Applicable.    Note: Pt is on an antibiotic for throat infection. Contacted Dr. Puente, hold Remicade today, wait one week to make sure infection has cleared. Rescheduled for 10/13/22.     ~~~ NOTE: If the patient answers yes to any of the questions below, hold the infusion and contact ordering provider or on-call provider.    1. Have you recently had an elevated temperature, fever, chills, productive cough, coughing for 3 weeks or longer or hemoptysis,  abnormal vital signs, night sweats,  chest pain or have you noticed a decrease in your appetite, unexplained weight loss or fatigue? No  2. Do you have any open wounds or new incisions? No  3. Do you have any upcoming hospitalizations or surgeries? Does not include esophagogastroduodenoscopy, colonoscopy, endoscopic retrograde cholangiopancreatography (ERCP), endoscopic ultrasound (EUS), dental procedures or joint aspiration/steroid injections No  4. Do you currently have any signs of illness or infection or are you on any antibiotics? Yes, recent throat infection. Currently on an antibiotic.  5. Have you had any new, sudden or worsening abdominal pain? No  6. Have you or anyone in your household received a live vaccination in the past 4 weeks? Please note: No live vaccines while on biologic/chemotherapy until 6 months after the last treatment. Patient can receive the flu vaccine (shot only), pneumovax and the Covid vaccine. It is optimal for the patient to get these vaccines mid cycle, but they can be given at any time as long as it is not on the day of the infusion. No  7. Have you recently been diagnosed with any new nervous system diseases (ie. Multiple sclerosis, Guillain [place], seizures, neurological changes) or cancer diagnosis? Are you on any form of radiation or chemotherapy? No  8. Are you  pregnant or breast feeding or do you have plans of pregnancy in the future? No  9. Have you been having any signs of worsening depression or suicidal ideations?  (benlysta only) No  10. Have there been any other new onset medical symptoms? No  11. Have you had any new blood clots? (IVIG only) No   Maurisio Coughlin RN

## 2022-10-07 ENCOUNTER — OFFICE VISIT (OUTPATIENT)
Dept: CARDIOLOGY | Facility: CLINIC | Age: 77
End: 2022-10-07
Attending: FAMILY MEDICINE
Payer: COMMERCIAL

## 2022-10-07 VITALS
BODY MASS INDEX: 32.12 KG/M2 | SYSTOLIC BLOOD PRESSURE: 141 MMHG | WEIGHT: 193 LBS | OXYGEN SATURATION: 98 % | HEART RATE: 72 BPM | DIASTOLIC BLOOD PRESSURE: 75 MMHG

## 2022-10-07 DIAGNOSIS — I50.21 ACUTE SYSTOLIC HEART FAILURE (H): ICD-10-CM

## 2022-10-07 DIAGNOSIS — I50.22 CHRONIC SYSTOLIC HEART FAILURE (H): ICD-10-CM

## 2022-10-07 PROCEDURE — 99213 OFFICE O/P EST LOW 20 MIN: CPT | Performed by: INTERNAL MEDICINE

## 2022-10-07 RX ORDER — FUROSEMIDE 40 MG
20 TABLET ORAL DAILY
Qty: 90 TABLET | Refills: 3 | Status: SHIPPED | OUTPATIENT
Start: 2022-10-07 | End: 2023-03-31

## 2022-10-07 NOTE — LETTER
10/7/2022    Xavier Vega MD  [address] [facility] [address]    RE: Briana Mcgee       Dear Colleague,     I had the pleasure of seeing Briana Mcgee in the [facility].  CARDIOLOGY CLINIC CONSULTATION    REASON FOR CONSULT: Non ischemic cardiomyopathy    PRIMARY CARE PHYSICIAN:  Xavier Vega    Today's clinic visit entailed:  Review of the result(s) of each unique test - Echo MRI Labs  25 minutes spent on the date of the encounter doing chart review, history and exam, documentation and further activities per the note  Provider  Link to [facility] Help Grid     The level of medical decision making during this visit was of moderate complexity.      HISTORY OF PRESENT ILLNESS:  This is a pleasant 77-year-old female who was first referred to me as a new heart failure consultation in December 2020.  The patient has a history of hyperlipidemia, hypertension, borderline CKD and hypothyroidism.  She says she had a stroke in 2006 and rheumatic fever as a child.  She has a history of breast cancer without any breast radiation but chemotherapy in 2005.  She has been receiving IV Remicade for rheumatoid arthritis as well.      In 11/16/2020 she was admitted to [facility] with new heart failure symptoms and had an echo which showed new heart failure with reduced ejection fraction.  EF around 30%-35%.  Her NT-proBNP was elevated to 7000.  Troponins were negative.  Chest x-ray was consistent with mild pulmonary edema.  ECG showed a right bundle branch block and sinus rhythm.  She was transferred to [facility].  She had a Lexiscan on 11/17 which showed a medium-sized anterior infarction.  As a result of this, she underwent a coronary angiography that showed mild proximal LAD disease, otherwise nonobstructive disease.  She was put on aspirin and statin.  She was diuresed, started on Lasix, put on metoprolol 25 mg succinate along with 5 mg of lisinopril and discharged home  "to follow up with outpatient cardiology.      She had an MRI done in February 2021 which showed improving LV function with an EF of 54%.  Mild mitral regurgitation was noted and some gadolinium enhancement at the right ventricular insertion site was noted consistent with nonischemic fibrosis. She also had an cardiac monitor that did not show atrial fibrillation. Etiology of this was unclear, possible viral vs. HTN related.     Today Jenna is here for routine follow-up.  She checks her blood pressure frequently at home and most readings are in the 120s to 130 systolic.  No syncope.  NYHA class II.  No edema orthopnea or angina reported.      She is overall doing very well from a heart failure standpoint.  She feels dehydrated on Lasix.  She consumes a lot of oral fluid intake to compensate.  Currently she is on 40 mg daily of Lasix.  Last drink was standing she had an episode where she started feeling diaphoretic and lightheaded and had to sit down.  No further recurrences.    PAST MEDICAL HISTORY:  Past Medical History:   Diagnosis Date     Allergies      Breast cancer (H)      Esophageal reflux      Hypertension      Other and unspecified hyperlipidemia      Other chronic bronchitis      Personal history of pneumonia (recurrent)     Hx-Pneumonia, \" Chronic Bronchitis\"     Personal history of tobacco use, presenting hazards to health      RA (rheumatoid arthritis) (H)      Unspecified hypothyroidism        MEDICATIONS:  Current Outpatient Medications   Medication     aspirin 81 MG EC tablet     atorvastatin (LIPITOR) 40 MG tablet     calcium carbonate (OS-MARIO) 500 MG tablet     Coenzyme Q10 (COQ10 PO)     FOLIC ACID PO     furosemide (LASIX) 40 MG tablet     hydroxychloroquine (PLAQUENIL) 200 MG tablet     inFLIXimab (REMICADE) 100 MG injection     levothyroxine (SYNTHROID/LEVOTHROID) 137 MCG tablet     levothyroxine (SYNTHROID/LEVOTHROID) 25 MCG tablet     lisinopril (ZESTRIL) 5 MG tablet     meclizine (ANTIVERT) " 25 MG tablet     metoprolol succinate ER (TOPROL XL) 25 MG 24 hr tablet     Naproxen Sodium (ALEVE PO)     pantoprazole (PROTONIX) 40 MG EC tablet     potassium chloride ER (K-TAB/KLOR-CON) 10 MEQ CR tablet     tolterodine ER (DETROL LA) 4 MG 24 hr capsule     fluticasone (FLONASE) 50 MCG/ACT nasal spray     No current facility-administered medications for this visit.       ALLERGIES:  Allergies   Allergen Reactions     Erythromycin Other (See Comments)     Edema     Hydrocodone GI Disturbance     GI Upset, Tolerates dilaudid     Oxycodone GI Disturbance       SOCIAL HISTORY:  I have reviewed this patient's social history and updated it with pertinent information if needed. Briana Mcgee  reports that she quit smoking about 26 years ago. Her smoking use included cigarettes. She has never used smokeless tobacco. She reports current alcohol use. She reports that she does not use drugs.    FAMILY HISTORY:  I have reviewed this patient's family history and updated it with pertinent information if needed.   Family History   Problem Relation Age of Onset     Diabetes Mother      Hypertension Mother      Cancer Mother         breast     Heart Disease Mother         chf     Breast Cancer Mother      Blood Disease Father      Diabetes Son         Type 1     Psoriasis Son      Cerebrovascular Disease Son 48     Cerebrovascular Disease Sister      Breast Cancer Paternal Aunt        REVIEW OF SYSTEMS:  Skin:        Eyes:       ENT:       Respiratory:  Positive for shortness of breath;dyspnea on exertion  Cardiovascular:  Negative;palpitations;chest pain Positive for;edema;fatigue;lightheadedness;dizziness  Gastroenterology:      Genitourinary:       Musculoskeletal:       Neurologic:       Psychiatric:       Heme/Lymph/Imm:       Endocrine:           PHYSICAL EXAM:      BP: (!) 141/75 Pulse: 72     SpO2: 98 %      Vital Signs with Ranges  Pulse:  [72] 72  BP: (141)/(75) 141/75  SpO2:  [98 %] 98 %  193 lbs 0  oz    Constitutional: awake, alert, no distress  Respiratory: Clear to auscultation  Cardiovascular: Regular normal heart sounds normal JVP no edema  GI: nondistended  Neuropsychiatric: appropriate affact    DATA:  Labs: Pertinent cardiac labs reviewed    ASSESSMENT:  Nonischemic cardiomyopathy heart failure with recovered ejection fraction    RECOMMENDATIONS:  1. Patient is doing well from a cardiac standpoint.  No cardiac symptoms reported.  Appears to be euvolemic to dry on exam.    2. Recommend continue guideline directed heart failure therapy.  EF is normalized.  Continue metoprolol lisinopril.    3. Recommend cutting down Lasix to 20 mg daily.  Eventually go to as needed Lasix.  If she stops Lasix I told her to stop her potassium.  4. See us back in 1 year in clinic for routine follow-up.  Sooner if anything changes.  5. I told her to keep an eye on her blood pressure.  She says she has a little bit of whitecoat hypertension.  Home blood pressures are running in the 120s.  If remains elevated, I have told her to contact us so we can increase her lisinopril accordingly.  6. Lastly in regards to her episode of lightheadedness a few weeks ago, this appears  to be related to dehydration and vasovagal based on history.  Told her to watch for recurrences.  If that happens she needs to connect with us.    Healthy diet exercise and call us with any change in clinical status    LACIE Saldana, [facility]  Cardiology - [facility]  October 7, 2022    Thank you for allowing me to participate in the care of your patient.      Sincerely,     Davy Shook MD     [facility]  cc:   Xavier Vega MD  [address] [place] [address]

## 2022-10-07 NOTE — NURSING NOTE
Chief Complaint   Patient presents with     Congenital Heart Disease     Follow up CHF       Vitals:    10/07/22 1327   Pulse: 72   SpO2: 98%   Weight: 87.5 kg (193 lb)     Wt Readings from Last 1 Encounters:   10/07/22 87.5 kg (193 lb)     Ana King MA

## 2022-10-07 NOTE — PROGRESS NOTES
CARDIOLOGY CLINIC CONSULTATION    REASON FOR CONSULT: Non ischemic cardiomyopathy    PRIMARY CARE PHYSICIAN:  Xavier Vega    Today's clinic visit entailed:  Review of the result(s) of each unique test - Echo MRI Labs  25 minutes spent on the date of the encounter doing chart review, history and exam, documentation and further activities per the note  Provider  Link to [facility] Help Grid     The level of medical decision making during this visit was of moderate complexity.      HISTORY OF PRESENT ILLNESS:  This is a pleasant 77-year-old female who was first referred to me as a new heart failure consultation in December 2020.  The patient has a history of hyperlipidemia, hypertension, borderline CKD and hypothyroidism.  She says she had a stroke in 2006 and rheumatic fever as a child.  She has a history of breast cancer without any breast radiation but chemotherapy in 2005.  She has been receiving IV Remicade for rheumatoid arthritis as well.      In 11/16/2020 she was admitted to [facility] with new heart failure symptoms and had an echo which showed new heart failure with reduced ejection fraction.  EF around 30%-35%.  Her NT-proBNP was elevated to 7000.  Troponins were negative.  Chest x-ray was consistent with mild pulmonary edema.  ECG showed a right bundle branch block and sinus rhythm.  She was transferred to [facility].  She had a Lexiscan on 11/17 which showed a medium-sized anterior infarction.  As a result of this, she underwent a coronary angiography that showed mild proximal LAD disease, otherwise nonobstructive disease.  She was put on aspirin and statin.  She was diuresed, started on Lasix, put on metoprolol 25 mg succinate along with 5 mg of lisinopril and discharged home to follow up with outpatient cardiology.      She had an MRI done in February 2021 which showed improving LV function with an EF of 54%.  Mild mitral regurgitation was noted and some gadolinium enhancement at the  "right ventricular insertion site was noted consistent with nonischemic fibrosis. She also had an cardiac monitor that did not show atrial fibrillation. Etiology of this was unclear, possible viral vs. HTN related.     Today Jenna is here for routine follow-up.  She checks her blood pressure frequently at home and most readings are in the 120s to 130 systolic.  No syncope.  NYHA class II.  No edema orthopnea or angina reported.      She is overall doing very well from a heart failure standpoint.  She feels dehydrated on Lasix.  She consumes a lot of oral fluid intake to compensate.  Currently she is on 40 mg daily of Lasix.  Last drink was standing she had an episode where she started feeling diaphoretic and lightheaded and had to sit down.  No further recurrences.    PAST MEDICAL HISTORY:  Past Medical History:   Diagnosis Date     Allergies      Breast cancer (H)      Esophageal reflux      Hypertension      Other and unspecified hyperlipidemia      Other chronic bronchitis      Personal history of pneumonia (recurrent)     Hx-Pneumonia, \" Chronic Bronchitis\"     Personal history of tobacco use, presenting hazards to health      RA (rheumatoid arthritis) (H)      Unspecified hypothyroidism        MEDICATIONS:  Current Outpatient Medications   Medication     aspirin 81 MG EC tablet     atorvastatin (LIPITOR) 40 MG tablet     calcium carbonate (OS-MARIO) 500 MG tablet     Coenzyme Q10 (COQ10 PO)     FOLIC ACID PO     furosemide (LASIX) 40 MG tablet     hydroxychloroquine (PLAQUENIL) 200 MG tablet     inFLIXimab (REMICADE) 100 MG injection     levothyroxine (SYNTHROID/LEVOTHROID) 137 MCG tablet     levothyroxine (SYNTHROID/LEVOTHROID) 25 MCG tablet     lisinopril (ZESTRIL) 5 MG tablet     meclizine (ANTIVERT) 25 MG tablet     metoprolol succinate ER (TOPROL XL) 25 MG 24 hr tablet     Naproxen Sodium (ALEVE PO)     pantoprazole (PROTONIX) 40 MG EC tablet     potassium chloride ER (K-TAB/KLOR-CON) 10 MEQ CR tablet     " tolterodine ER (DETROL LA) 4 MG 24 hr capsule     fluticasone (FLONASE) 50 MCG/ACT nasal spray     No current facility-administered medications for this visit.       ALLERGIES:  Allergies   Allergen Reactions     Erythromycin Other (See Comments)     Edema     Hydrocodone GI Disturbance     GI Upset, Tolerates dilaudid     Oxycodone GI Disturbance       SOCIAL HISTORY:  I have reviewed this patient's social history and updated it with pertinent information if needed. Briana Mcgee  reports that she quit smoking about 26 years ago. Her smoking use included cigarettes. She has never used smokeless tobacco. She reports current alcohol use. She reports that she does not use drugs.    FAMILY HISTORY:  I have reviewed this patient's family history and updated it with pertinent information if needed.   Family History   Problem Relation Age of Onset     Diabetes Mother      Hypertension Mother      Cancer Mother         breast     Heart Disease Mother         chf     Breast Cancer Mother      Blood Disease Father      Diabetes Son         Type 1     Psoriasis Son      Cerebrovascular Disease Son 48     Cerebrovascular Disease Sister      Breast Cancer Paternal Aunt        REVIEW OF SYSTEMS:  Skin:        Eyes:       ENT:       Respiratory:  Positive for shortness of breath;dyspnea on exertion  Cardiovascular:  Negative;palpitations;chest pain Positive for;edema;fatigue;lightheadedness;dizziness  Gastroenterology:      Genitourinary:       Musculoskeletal:       Neurologic:       Psychiatric:       Heme/Lymph/Imm:       Endocrine:           PHYSICAL EXAM:      BP: (!) 141/75 Pulse: 72     SpO2: 98 %      Vital Signs with Ranges  Pulse:  [72] 72  BP: (141)/(75) 141/75  SpO2:  [98 %] 98 %  193 lbs 0 oz    Constitutional: awake, alert, no distress  Respiratory: Clear to auscultation  Cardiovascular: Regular normal heart sounds normal JVP no edema  GI: nondistended  Neuropsychiatric: appropriate affact    DATA:  Labs:  Pertinent cardiac labs reviewed    ASSESSMENT:  Nonischemic cardiomyopathy heart failure with recovered ejection fraction    RECOMMENDATIONS:  1. Patient is doing well from a cardiac standpoint.  No cardiac symptoms reported.  Appears to be euvolemic to dry on exam.    2. Recommend continue guideline directed heart failure therapy.  EF is normalized.  Continue metoprolol lisinopril.    3. Recommend cutting down Lasix to 20 mg daily.  Eventually go to as needed Lasix.  If she stops Lasix I told her to stop her potassium.  4. See us back in 1 year in clinic for routine follow-up.  Sooner if anything changes.  5. I told her to keep an eye on her blood pressure.  She says she has a little bit of whitecoat hypertension.  Home blood pressures are running in the 120s.  If remains elevated, I have told her to contact us so we can increase her lisinopril accordingly.  6. Lastly in regards to her episode of lightheadedness a few weeks ago, this appears  to be related to dehydration and vasovagal based on history.  Told her to watch for recurrences.  If that happens she needs to connect with us.    Healthy diet exercise and call us with any change in clinical status    ALCIE Saldana, [facility]  Cardiology - [facility]  October 7, 2022

## 2022-10-13 ENCOUNTER — INFUSION THERAPY VISIT (OUTPATIENT)
Dept: INFUSION THERAPY | Facility: CLINIC | Age: 77
End: 2022-10-13
Attending: INTERNAL MEDICINE
Payer: MEDICARE

## 2022-10-13 VITALS
WEIGHT: 190.6 LBS | BODY MASS INDEX: 31.72 KG/M2 | TEMPERATURE: 98 F | DIASTOLIC BLOOD PRESSURE: 62 MMHG | HEART RATE: 63 BPM | SYSTOLIC BLOOD PRESSURE: 139 MMHG

## 2022-10-13 DIAGNOSIS — M06.042 RHEUMATOID ARTHRITIS INVOLVING BOTH HANDS WITH NEGATIVE RHEUMATOID FACTOR (H): Primary | ICD-10-CM

## 2022-10-13 DIAGNOSIS — M06.041 RHEUMATOID ARTHRITIS INVOLVING BOTH HANDS WITH NEGATIVE RHEUMATOID FACTOR (H): Primary | ICD-10-CM

## 2022-10-13 PROCEDURE — 258N000003 HC RX IP 258 OP 636: Performed by: INTERNAL MEDICINE

## 2022-10-13 PROCEDURE — 96413 CHEMO IV INFUSION 1 HR: CPT

## 2022-10-13 PROCEDURE — 250N000011 HC RX IP 250 OP 636: Performed by: INTERNAL MEDICINE

## 2022-10-13 RX ORDER — ACETAMINOPHEN 325 MG/1
650 TABLET ORAL ONCE
Status: CANCELLED
Start: 2022-10-13 | End: 2022-10-13

## 2022-10-13 RX ORDER — DIPHENHYDRAMINE HCL 25 MG
25 CAPSULE ORAL ONCE
Status: CANCELLED
Start: 2022-10-13 | End: 2022-10-13

## 2022-10-13 RX ADMIN — INFLIXIMAB 400 MG: 100 INJECTION, POWDER, LYOPHILIZED, FOR SOLUTION INTRAVENOUS at 09:18

## 2022-10-13 RX ADMIN — SODIUM CHLORIDE 250 ML: 9 INJECTION, SOLUTION INTRAVENOUS at 08:41

## 2022-10-13 NOTE — PROGRESS NOTES
Infusion Nursing Note:  Briana Mcgee presents today for Remicade    Patient seen by provider today: No   present during visit today: Not Applicable.    Note: N/A.    Intravenous Access:  Peripheral IV placed.    Treatment Conditions:  Biological Infusion Checklist:  ~~~ NOTE: If the patient answers yes to any of the questions below, hold the infusion and contact ordering provider or on-call provider.    1. Have you recently had an elevated temperature, fever, chills, productive cough, coughing for 3 weeks or longer or hemoptysis, abnormal vital signs, night sweats,  chest pain or have you noticed a decrease in your appetite, unexplained weight loss or fatigue? No  2. Do you have any open wounds or new incisions? No  3. Do you have any recent or upcoming hospitalizations, surgeries or dental procedures? No  4. Do you currently have or recently have had any signs of illness or infection or are you on any antibiotics? No  5. Have you had any new, sudden or worsening abdominal pain? No  6. Have you or anyone in your household received a live vaccination in the past 4 weeks? Please note:  No live vaccines while on biologic/chemotherapy until 6 months after the last treatment.  Patient can receive the flu vaccine (shot only) and the pneumovax.  It is optimal for the patient to get these vaccines mid cycle, but they can be given at any time as long as it is not on the day of the infusion. No  7. Have you recently been diagnosed with any new nervous system diseases (ie. Multiple sclerosis, Guillain [place], seizures, neurological changes) or cancer diagnosis? No  8. Are you on any form of radiation or chemotherapy? No  9. Are you pregnant or breast feeding or do you have plans of pregnancy in the future? No  10. Have you been having any signs of worsening depression or suicidal ideations?  (benlysta only) No  11. Have there been any other new onset medical symptoms? No      Post Infusion Assessment:  Patient  tolerated infusion without incident.  Blood return noted pre and post infusion.  Site patent and intact, free from redness, edema or discomfort.  No evidence of extravasations.  Access discontinued per protocol.      Biologic Infusion Post Education: Call the triage nurse at your clinic or seek medical attention if you have chills and/or temperature greater than or equal to 100.5, uncontrolled nausea/vomiting, diarrhea, constipation, dizziness, shortness of breath, chest pain, heart palpitations, weakness or any other new or concerning symptoms, questions or concerns.  You cannot have any live virus vaccines prior to or during treatment or up to 6 months post infusion.  If you have an upcoming surgery, medical procedure or dental procedure during treatment, this should be discussed with your ordering physician and your surgeon/dentist.  If you are having any concerning symptom, if you are unsure if you should get your next infusion or wish to speak to a provider before your next infusion, please call your care coordinator or triage nurse at your clinic to notify them so we can adequately serve you.     Discharge Plan:   Patient discharged in stable condition accompanied by: self.  Departure Mode: Ambulatory.  Pt to return on 12/8/22 at 8:30 am for next Remicade.     Tatiana Orellana RN

## 2022-11-19 ENCOUNTER — HEALTH MAINTENANCE LETTER (OUTPATIENT)
Age: 77
End: 2022-11-19

## 2022-12-14 ENCOUNTER — MYC MEDICAL ADVICE (OUTPATIENT)
Dept: RHEUMATOLOGY | Facility: CLINIC | Age: 77
End: 2022-12-14

## 2022-12-27 DIAGNOSIS — M06.00 SERONEGATIVE RHEUMATOID ARTHRITIS (H): ICD-10-CM

## 2022-12-29 ENCOUNTER — INFUSION THERAPY VISIT (OUTPATIENT)
Dept: INFUSION THERAPY | Facility: CLINIC | Age: 77
End: 2022-12-29
Attending: INTERNAL MEDICINE
Payer: MEDICARE

## 2022-12-29 VITALS
DIASTOLIC BLOOD PRESSURE: 80 MMHG | RESPIRATION RATE: 16 BRPM | HEART RATE: 62 BPM | BODY MASS INDEX: 31.12 KG/M2 | WEIGHT: 187 LBS | SYSTOLIC BLOOD PRESSURE: 155 MMHG | TEMPERATURE: 98.5 F

## 2022-12-29 DIAGNOSIS — M06.042 RHEUMATOID ARTHRITIS INVOLVING BOTH HANDS WITH NEGATIVE RHEUMATOID FACTOR (H): ICD-10-CM

## 2022-12-29 DIAGNOSIS — M06.041 RHEUMATOID ARTHRITIS INVOLVING BOTH HANDS WITH NEGATIVE RHEUMATOID FACTOR (H): ICD-10-CM

## 2022-12-29 DIAGNOSIS — M06.00 SERONEGATIVE RHEUMATOID ARTHRITIS (H): Primary | ICD-10-CM

## 2022-12-29 PROCEDURE — 96413 CHEMO IV INFUSION 1 HR: CPT

## 2022-12-29 PROCEDURE — 258N000003 HC RX IP 258 OP 636: Performed by: INTERNAL MEDICINE

## 2022-12-29 PROCEDURE — 250N000011 HC RX IP 250 OP 636: Performed by: INTERNAL MEDICINE

## 2022-12-29 RX ORDER — ACETAMINOPHEN 325 MG/1
650 TABLET ORAL ONCE
Status: CANCELLED
Start: 2022-12-29 | End: 2022-12-29

## 2022-12-29 RX ORDER — DIPHENHYDRAMINE HCL 25 MG
25 CAPSULE ORAL ONCE
Status: CANCELLED
Start: 2022-12-29 | End: 2022-12-29

## 2022-12-29 RX ADMIN — INFLIXIMAB 400 MG: 100 INJECTION, POWDER, LYOPHILIZED, FOR SOLUTION INTRAVENOUS at 09:21

## 2022-12-29 RX ADMIN — SODIUM CHLORIDE 250 ML: 9 INJECTION, SOLUTION INTRAVENOUS at 08:40

## 2022-12-29 NOTE — PROGRESS NOTES
Infusion Nursing Note:  Briana Mcgee presents today for Remicade.    Patient seen by provider today: No   present during visit today: Not Applicable.    Note: Pt reports she just finished abx and steroids for bronchitis. She reports she discussed this with Dr. Puente and he told her to proceed with her infusion today.    Intravenous Access:  Peripheral IV placed.    Treatment Conditions:  Biological Infusion Checklist:  ~~~ NOTE: If the patient answers yes to any of the questions below, hold the infusion and contact ordering provider or on-call provider.    1. Have you recently had an elevated temperature, fever, chills, productive cough, coughing for 3 weeks or longer or hemoptysis, abnormal vital signs, night sweats,  chest pain or have you noticed a decrease in your appetite, unexplained weight loss or fatigue? No  2. Do you have any open wounds or new incisions? No  3. Do you have any recent or upcoming hospitalizations, surgeries or dental procedures? No  4. Do you currently have or recently have had any signs of illness or infection or are you on any antibiotics? No  5. Have you had any new, sudden or worsening abdominal pain? No  6. Have you or anyone in your household received a live vaccination in the past 4 weeks? Please note:  No live vaccines while on biologic/chemotherapy until 6 months after the last treatment.  Patient can receive the flu vaccine (shot only) and the pneumovax.  It is optimal for the patient to get these vaccines mid cycle, but they can be given at any time as long as it is not on the day of the infusion. No  7. Have you recently been diagnosed with any new nervous system diseases (ie. Multiple sclerosis, Guillain [place], seizures, neurological changes) or cancer diagnosis? No  8. Are you on any form of radiation or chemotherapy? No  9. Are you pregnant or breast feeding or do you have plans of pregnancy in the future? No  10. Have you been having any signs of  worsening depression or suicidal ideations?  (benlysta only) No  11. Have there been any other new onset medical symptoms? No      Post Infusion Assessment:  Patient tolerated infusion without incident.  Blood return noted pre and post infusion.  Site patent and intact, free from redness, edema or discomfort.  No evidence of extravasations.  Access discontinued per protocol.     Discharge Plan:   Discharge instructions reviewed with: Patient.  Patient and/or family verbalized understanding of discharge instructions and all questions answered.  Patient discharged in stable condition accompanied by: self.  Departure Mode: Ambulatory.      Thao Carmona RN

## 2023-01-26 DIAGNOSIS — M06.00 SERONEGATIVE RHEUMATOID ARTHRITIS (H): ICD-10-CM

## 2023-01-26 RX ORDER — HYDROXYCHLOROQUINE SULFATE 200 MG/1
TABLET, FILM COATED ORAL
Qty: 45 TABLET | Refills: 0 | Status: SHIPPED | OUTPATIENT
Start: 2023-01-26 | End: 2023-03-16

## 2023-01-26 NOTE — TELEPHONE ENCOUNTER
Medication:   Hydroxychloroquine 200 mg  Last written on:   4/29/2022  Quantity:   135    Refills:   2    Last office visit:   4/28/2022  Canceled: 10/27/2022  Next office visit:   2/16/2023  Last labs:   4/28/2022  Eye exam:  8/32/2022

## 2023-02-08 ENCOUNTER — TELEPHONE (OUTPATIENT)
Dept: CARDIOLOGY | Facility: CLINIC | Age: 78
End: 2023-02-08
Payer: COMMERCIAL

## 2023-02-08 NOTE — TELEPHONE ENCOUNTER
Pt calling in stating that she is feeling more SOB with activity. This has been going on for about a month. Denies any SOB at rest, no orthopnea or PND. States she always has swelling in her legs. She is currently taking Lasix. States swelling might be a little worse, but does not know if she is up in wt as she does not weigh herself.   She denies any other symptoms; no CP or dizziness/lightheadedness. She has a hx of non obstructive CAD and HF with now normal EF at 50-55%.     Offered pt visit with [facility] for assessment, but stated the dates and times did not work for you. Pt scheduled to see Dr. Shook in 2 weeks on 2/23/23.    Notified pt that if her symptoms persist or worsen prior to visit she should be seen in ED. Pt verbalized an understanding.    Jenna Cardenas RN

## 2023-02-13 ENCOUNTER — APPOINTMENT (OUTPATIENT)
Dept: GENERAL RADIOLOGY | Facility: CLINIC | Age: 78
End: 2023-02-13
Attending: EMERGENCY MEDICINE
Payer: MEDICARE

## 2023-02-13 ENCOUNTER — HOSPITAL ENCOUNTER (EMERGENCY)
Facility: CLINIC | Age: 78
Discharge: HOME | End: 2023-02-13
Attending: EMERGENCY MEDICINE | Admitting: EMERGENCY MEDICINE
Payer: MEDICARE

## 2023-02-13 VITALS
DIASTOLIC BLOOD PRESSURE: 81 MMHG | HEIGHT: 65 IN | OXYGEN SATURATION: 92 % | BODY MASS INDEX: 32.26 KG/M2 | WEIGHT: 193.6 LBS | RESPIRATION RATE: 24 BRPM | HEART RATE: 80 BPM | SYSTOLIC BLOOD PRESSURE: 149 MMHG | TEMPERATURE: 98 F

## 2023-02-13 DIAGNOSIS — I50.23 ACUTE ON CHRONIC SYSTOLIC CONGESTIVE HEART FAILURE (H): ICD-10-CM

## 2023-02-13 LAB
ALBUMIN SERPL BCG-MCNC: 4 G/DL (ref 3.5–5.2)
ALP SERPL-CCNC: 75 U/L (ref 35–104)
ALT SERPL W P-5'-P-CCNC: 32 U/L (ref 10–35)
ANION GAP SERPL CALCULATED.3IONS-SCNC: 10 MMOL/L (ref 7–15)
AST SERPL W P-5'-P-CCNC: 29 U/L (ref 10–35)
BASOPHILS # BLD AUTO: 0.1 10E3/UL (ref 0–0.2)
BASOPHILS NFR BLD AUTO: 1 %
BILIRUB SERPL-MCNC: 0.8 MG/DL
BUN SERPL-MCNC: 14 MG/DL (ref 8–23)
CALCIUM SERPL-MCNC: 9 MG/DL (ref 8.8–10.2)
CHLORIDE SERPL-SCNC: 102 MMOL/L (ref 98–107)
CREAT SERPL-MCNC: 0.95 MG/DL (ref 0.51–0.95)
DEPRECATED HCO3 PLAS-SCNC: 26 MMOL/L (ref 22–29)
EOSINOPHIL # BLD AUTO: 0.2 10E3/UL (ref 0–0.7)
EOSINOPHIL NFR BLD AUTO: 4 %
ERYTHROCYTE [DISTWIDTH] IN BLOOD BY AUTOMATED COUNT: 13.4 % (ref 10–15)
GFR SERPL CREATININE-BSD FRML MDRD: 61 ML/MIN/1.73M2
GLUCOSE SERPL-MCNC: 111 MG/DL (ref 70–99)
HCT VFR BLD AUTO: 37.5 % (ref 35–47)
HGB BLD-MCNC: 12 G/DL (ref 11.7–15.7)
HOLD SPECIMEN: NORMAL
IMM GRANULOCYTES # BLD: 0 10E3/UL
IMM GRANULOCYTES NFR BLD: 0 %
LYMPHOCYTES # BLD AUTO: 1.3 10E3/UL (ref 0.8–5.3)
LYMPHOCYTES NFR BLD AUTO: 20 %
MCH RBC QN AUTO: 30.7 PG (ref 26.5–33)
MCHC RBC AUTO-ENTMCNC: 32 G/DL (ref 31.5–36.5)
MCV RBC AUTO: 96 FL (ref 78–100)
MONOCYTES # BLD AUTO: 0.7 10E3/UL (ref 0–1.3)
MONOCYTES NFR BLD AUTO: 10 %
NEUTROPHILS # BLD AUTO: 4.5 10E3/UL (ref 1.6–8.3)
NEUTROPHILS NFR BLD AUTO: 65 %
NRBC # BLD AUTO: 0 10E3/UL
NRBC BLD AUTO-RTO: 0 /100
NT-PROBNP SERPL-MCNC: 8662 PG/ML (ref 0–1800)
PLATELET # BLD AUTO: 223 10E3/UL (ref 150–450)
POTASSIUM SERPL-SCNC: 4.2 MMOL/L (ref 3.4–5.3)
PROT SERPL-MCNC: 6.5 G/DL (ref 6.4–8.3)
RBC # BLD AUTO: 3.91 10E6/UL (ref 3.8–5.2)
SODIUM SERPL-SCNC: 138 MMOL/L (ref 136–145)
TROPONIN T SERPL HS-MCNC: 14 NG/L
WBC # BLD AUTO: 6.8 10E3/UL (ref 4–11)

## 2023-02-13 PROCEDURE — 99285 EMERGENCY DEPT VISIT HI MDM: CPT | Mod: 25 | Performed by: EMERGENCY MEDICINE

## 2023-02-13 PROCEDURE — 93005 ELECTROCARDIOGRAM TRACING: CPT | Performed by: EMERGENCY MEDICINE

## 2023-02-13 PROCEDURE — 71046 X-RAY EXAM CHEST 2 VIEWS: CPT

## 2023-02-13 PROCEDURE — 83880 ASSAY OF NATRIURETIC PEPTIDE: CPT | Performed by: EMERGENCY MEDICINE

## 2023-02-13 PROCEDURE — 99284 EMERGENCY DEPT VISIT MOD MDM: CPT | Mod: 25 | Performed by: EMERGENCY MEDICINE

## 2023-02-13 PROCEDURE — 85025 COMPLETE CBC W/AUTO DIFF WBC: CPT | Performed by: EMERGENCY MEDICINE

## 2023-02-13 PROCEDURE — 96374 THER/PROPH/DIAG INJ IV PUSH: CPT | Performed by: EMERGENCY MEDICINE

## 2023-02-13 PROCEDURE — 84484 ASSAY OF TROPONIN QUANT: CPT | Performed by: EMERGENCY MEDICINE

## 2023-02-13 PROCEDURE — 250N000011 HC RX IP 250 OP 636: Performed by: EMERGENCY MEDICINE

## 2023-02-13 PROCEDURE — 93010 ELECTROCARDIOGRAM REPORT: CPT | Performed by: EMERGENCY MEDICINE

## 2023-02-13 PROCEDURE — 36415 COLL VENOUS BLD VENIPUNCTURE: CPT | Performed by: EMERGENCY MEDICINE

## 2023-02-13 PROCEDURE — 80053 COMPREHEN METABOLIC PANEL: CPT | Performed by: EMERGENCY MEDICINE

## 2023-02-13 RX ORDER — FUROSEMIDE 10 MG/ML
60 INJECTION INTRAMUSCULAR; INTRAVENOUS ONCE
Status: COMPLETED | OUTPATIENT
Start: 2023-02-13 | End: 2023-02-13

## 2023-02-13 RX ADMIN — FUROSEMIDE 60 MG: 10 INJECTION, SOLUTION INTRAMUSCULAR; INTRAVENOUS at 10:44

## 2023-02-13 ASSESSMENT — ACTIVITIES OF DAILY LIVING (ADL)
ADLS_ACUITY_SCORE: 35
ADLS_ACUITY_SCORE: 35

## 2023-02-13 NOTE — DISCHARGE INSTRUCTIONS
Furosemide 40 mg daily follow-up cardiology return here for chest pain shortness of air or other concern

## 2023-02-13 NOTE — ED TRIAGE NOTES
Pt here with increased SOB and dizziness for one month. Pt stopped lasix one year ago, restarted 10 days ago. Unable to get into [facility] until 28th. Pt SOB with ambulation and laying down. Denies SOB. Tachypneic in triage.      Triage Assessment     Row Name 02/13/23 1753       Triage Assessment (Adult)    Airway WDL WDL       Respiratory WDL    Respiratory WDL X;rhythm/pattern    Rhythm/Pattern, Respiratory shortness of breath;dyspnea on exertion       Cardiac WDL    Cardiac WDL WDL       Cognitive/Neuro/Behavioral WDL    Cognitive/Neuro/Behavioral WDL WDL

## 2023-02-13 NOTE — ED NOTES
Pt here with increased SOB over the last month. Restarted lasix about 10 days ago w/o any improvement. Worse when laying down. Weight is up about 14 lbs.

## 2023-02-13 NOTE — ED PROVIDER NOTES
History     Chief Complaint   Patient presents with     Shortness of Breath     One month     Dizziness     One month     HPI  Briana Mcgee is a 77 year old female who presents secondary to dyspnea on exertion orthopnea.  Has known history of congestive heart failure, discontinued furosemide in the distant past, restarted 20 mg daily over the past week without significant resolution of dyspnea.  Denies fever or chills, denies chest pain, describes some mild bipedal edema that is developed over the last week or so.  No urinary complaints.    Echo 11/20  Interpretation Summary     The visual ejection fraction is estimated at 35-40%.  Left ventricular systolic function is moderately reduced.  There is moderate global hypokinesia of the left ventricle.  The inferolateral wall and the basal inferior wall appear to contract least  well and appear severely hypokinetic.  There is moderate (2+) mitral regurgitation.  There is mild to moderate (1-2+) tricuspid regurgitation.  Right ventricular systolic pressure is elevated, consistent with mild to  moderate pulmonary hypertension.  The study was technically difficult.    Echo 8/22  Interpretation Summary     1. The left ventricle is normal in size. The visual ejection fraction is 50-  55%.  2. The right ventricle is normal in structure, function and size.  3. There is mild to moderate (1-2+) mitral regurgitation.     Echo 11/2020 showed EF 35-40%, moderate RV dysfunction, 2+ MR.    Allergies:  Allergies   Allergen Reactions     Erythromycin Other (See Comments)     Edema     Hydrocodone GI Disturbance     GI Upset, Tolerates dilaudid     Oxycodone GI Disturbance       Problem List:    Patient Active Problem List    Diagnosis Date Noted     Malignant neoplasm of female breast (H) 02/18/2005     Priority: High     11/05: breast surgeon=Dr. Dimitrios Bell, oncology=Dr. Tino Gordillo s/p lumpectomy and axillary node dissection followed by chemotherapy at [place]  [place]. Radiation oncology=Dr. Jessica Edmonds for 6 weeks.   12/05: Briana is clinically asymptomatic and no clinical evidence of cancer recurrence; port-a-cath removal.  9-12-05 NM [facility] radionuclide angiography at rest findings:1. Left ventricular ejectin fraction is normal at 64%  2. Compared to previous study perfomed 3-17-05, there has been no significant interval change.  10/06: appointment with Dr. Gordillo:continue amrimidex and  follow up in 6 months.  1/16/07: follow up with Dr. Bell: 'doing well with no signs of recurrence', follow up 7/07 with mammogram at that time/  2/12/09: now followed by Dr. Peres.  3/10 seen by Dr. Levi Bear with [facility] yearly visits, now can have yearly mammos, next due 8/10  10/2013 follow up with Dr. Duarte, followed every 6 months due to density of right breast and concerns, now followed yearly  Problem list name updated by automated process. Provider to review       Seronegative rheumatoid arthritis (H) 12/27/2022     Priority: Medium     Acute heart failure (H) 11/18/2020     Priority: Medium     Acute on chronic congestive heart failure, unspecified heart failure type (H) 11/17/2020     Priority: Medium     Added automatically from request for surgery [id number]       CHF exacerbation (H) 11/15/2020     Priority: Medium     Spinal stenosis, lumbar region, with neurogenic claudication 11/14/2017     Priority: Medium     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) 04/12/2017     Priority: Medium     Essential hypertension 03/13/2017     Priority: Medium     Hepatitis 11/25/2015     Priority: Medium     Obesity 10/20/2015     Priority: Medium     CKD (chronic kidney disease) stage 2, GFR 60-89 ml/min 06/04/2014     Priority: Medium     Advanced directives, counseling/discussion 01/29/2014     Priority: Medium     Was given the information to fill out.       Spinal stenosis 01/07/2014     Priority: Medium      "Pain controlled with epidural injections       Lumbar radiculopathy 05/07/2012     Priority: Medium     Varicose veins of lower extremities with complications 03/06/2012     Priority: Medium     Problem list name updated by automated process. Provider to review       GERD (gastroesophageal reflux disease) 04/12/2011     Priority: Medium     Heterozygous factor V Leiden mutation (H) 04/07/2011     Priority: Medium     HYPERLIPIDEMIA LDL GOAL <100 10/31/2010     Priority: Medium     OA (osteoarthritis) of knee 03/01/2009     Priority: Medium     Followed by [facility]. Briana has had 3 Synvisc injections (1/3/08, 2/12/09,  3/2/09, 3/9/09)       Hot flashes 03/01/2009     Priority: Medium     2/12/09: seen by Dr. Peres, started Effexor. If this is not helpful will start Neurontin.       Rhinitis, allergic seasonal 07/02/2008     Priority: Medium     ischemic stroke 3/06 03/27/2006     Priority: Catarino Warren has been seen by Dr. Hernandez. Given her history of CVA and Factor V Leiden heterozygote he recommends that she stay on \"antiplatelet drug use for secondary prevention of strokes\". Briana has been on plavix and asa.       Impaired fasting glucose 03/05/2006     Priority: Catarino Warren would like to continue diet changes (she has already lost 16 pounds on the [employer]' program), regular exercise and weight loss.  She agrees to recheck fasting blood sugar and lipids in 3 months.       Insomnia 03/05/2006     Priority: Medium     Stable on Trazadone.  Problem list name updated by automated process. Provider to review       Hypothyroidism 02/18/2005     Priority: Medium     Stable on current dose of synthroid.  Problem list name updated by automated process. Provider to review          Past Medical History:    Past Medical History:   Diagnosis Date     Allergies      Breast cancer (H)      Esophageal reflux      Hypertension      Other and unspecified hyperlipidemia      Other chronic " bronchitis      Personal history of pneumonia (recurrent)      Personal history of tobacco use, presenting hazards to health      RA (rheumatoid arthritis) (H)      Unspecified hypothyroidism        Past Surgical History:    Past Surgical History:   Procedure Laterality Date     BIOPSY BREAST       CATARACT IOL, RT/LT       COLONOSCOPY N/A 9/2/2016    Procedure: COLONOSCOPY;  Surgeon: Dean Sanchez MD;  Location: [facility]     CV CORONARY ANGIOGRAM N/A 11/18/2020    Procedure: Coronary Angiogram;  Surgeon: Leonid Smith MD;  Location:  HE[facility]ARD[facility] CATH LAB     EXCISE EXOSTOSIS FOOT Right 4/25/2017    Procedure: EXCISE EXOSTOSIS FOOT;  Retrocalcaneal exostectomy right;  Surgeon: Antwan Goldstein DPM;  Location: [facility] EXCISION BREAST LESION, OPEN >=1      2/05     HYSTERECTOMY, RYAN  1982     for non cancerous reasons and no abnormal pap     INJECT EPIDURAL LUMBAR  1/12/2011    INJECT EPIDURAL LUMBAR performed by GENERIC ANESTHESIA PROVIDER at [facility]     INJECT EPIDURAL LUMBAR  5/5/2011    Procedure:INJECT EPIDURAL LUMBAR; LESLIE -Dr. Sánchez  ; Surgeon:GENERIC ANESTHESIA PROVIDER; Location:[facility]     INJECT EPIDURAL LUMBAR  10/6/2011    Procedure:INJECT EPIDURAL LUMBAR; LESLIE--; Surgeon:GENERIC ANESTHESIA PROVIDER; Location:[facility]     INJECT EPIDURAL LUMBAR  1/25/2012    Procedure:INJECT EPIDURAL LUMBAR; LESLIE-Dr. Sánchez  ; Surgeon:GENERIC ANESTHESIA PROVIDER; Location:[facility]     INJECT EPIDURAL LUMBAR  7/9/2012    Procedure: INJECT EPIDURAL LUMBAR;  LESLIE-Dr. Pacheco;  Surgeon: Provider, Generic Anesthesia;  Location: [facility]     LAMINECTOMY LUMBAR MINIMALLY INVASIVE TWO LEVELS N/A 11/21/2017    Procedure: LAMINECTOMY LUMBAR MINIMALLY INVASIVE TWO LEVELS;  L4-5 decompression laminectomy, Left L5-S1 foraminal decompression;  Surgeon: Jesse Dueñas MD;  Location: [facility]     LUMPECTOMY BREAST       RELEASE CARPAL TUNNEL Right 9/5/2017    Procedure: RELEASE CARPAL TUNNEL;  Right Wrist Carpal Tunnel Release;   Surgeon: Melba Yi MD;  Location: [facility]     RELEASE CARPAL TUNNEL Left 10/31/2017    Procedure: RELEASE CARPAL TUNNEL;  Left Wrist Carpal Tunnel Release;  Surgeon: Melba Yi MD;  Location: [facility]     SURGICAL HISTORY OF -       lumpectomy with sentinal node biopsy     SURGICAL HISTORY OF -       left knee arthoscopic repair medial meniscus       Family History:    Family History   Problem Relation Age of Onset     Diabetes Mother      Hypertension Mother      Cancer Mother         breast     Heart Disease Mother         chf     Breast Cancer Mother      Blood Disease Father      Diabetes Son         Type 1     Psoriasis Son      Cerebrovascular Disease Son 48     Cerebrovascular Disease Sister      Breast Cancer Paternal Aunt        Social History:  Marital Status:   [2]  Social History     Tobacco Use     Smoking status: Former     Types: Cigarettes     Quit date: 1996     Years since quittin.5     Smokeless tobacco: Never     Tobacco comments:     quit 10-12 years ago, .   Vaping Use     Vaping Use: Never used   Substance Use Topics     Alcohol use: Yes     Comment: glass of wine at night     Drug use: No        Medications:    aspirin 81 MG EC tablet  atorvastatin (LIPITOR) 40 MG tablet  calcium carbonate (OS-MARIO) 500 MG tablet  Coenzyme Q10 (COQ10 PO)  fluticasone (FLONASE) 50 MCG/ACT nasal spray  FOLIC ACID PO  furosemide (LASIX) 40 MG tablet  hydroxychloroquine (PLAQUENIL) 200 MG tablet  inFLIXimab (REMICADE) 100 MG injection  levothyroxine (SYNTHROID/LEVOTHROID) 137 MCG tablet  levothyroxine (SYNTHROID/LEVOTHROID) 25 MCG tablet  lisinopril (ZESTRIL) 5 MG tablet  meclizine (ANTIVERT) 25 MG tablet  metoprolol succinate ER (TOPROL XL) 25 MG 24 hr tablet  Naproxen Sodium (ALEVE PO)  pantoprazole (PROTONIX) 40 MG EC tablet  potassium chloride ER (K-TAB/KLOR-CON) 10 MEQ CR tablet  tolterodine ER (DETROL LA) 4 MG 24 hr capsule          Review of Systems  All other systems  "reviewed and are negative.    Physical Exam   BP: (!) 150/91  Pulse: 79  Temp: 98  F (36.7  C)  Resp: 24  Height: 165.1 cm (5' 5\")  Weight: 87.8 kg (193 lb 9.6 oz)  SpO2: 90 % (up to 92 after sitting)      Physical Exam  Nontoxic appearing mild respiratory distress in the form of tachypnea, alert and oriented  Head atraumatic normocephalic  Neck supple full active painless range of motion  Lungs distant bibasilar rales  Heart regular no murmur  Abdomen soft nontender bowel sounds positive   Strength and sensation grossly intact throughout the extremities, gait and station normal  Speech is fluent, good eye contact, thought processes are rational  Lower extremities with trace bipedal edema  Pedal pulses symmetrical and strong    ED Course          EKG time 952, symptoms short of air, normal sinus rhythm rate 79, axes intervals within normal limits with exception of prolonged , right bundle branch block, no acute changes, unchanged from previous, read by Dr. Emerson Magaña           Results for orders placed or performed during the hospital encounter of 02/13/23 (from the past 24 hour(s))   CBC with platelets differential    Narrative    The following orders were created for panel order CBC with platelets differential.  Procedure                               Abnormality         Status                     ---------                               -----------         ------                     CBC with platelets and d...[[id number]]                      Final result                 Please view results for these tests on the individual orders.   Comprehensive metabolic panel   Result Value Ref Range    Sodium 138 136 - 145 mmol/L    Potassium 4.2 3.4 - 5.3 mmol/L    Chloride 102 98 - 107 mmol/L    Carbon Dioxide (CO2) 26 22 - 29 mmol/L    Anion Gap 10 7 - 15 mmol/L    Urea Nitrogen 14.0 8.0 - 23.0 mg/dL    Creatinine 0.95 0.51 - 0.95 mg/dL    Calcium 9.0 8.8 - 10.2 mg/dL    Glucose 111 (H) 70 - 99 mg/dL    Alkaline " Phosphatase 75 35 - 104 U/L    AST 29 10 - 35 U/L    ALT 32 10 - 35 U/L    Protein Total 6.5 6.4 - 8.3 g/dL    Albumin 4.0 3.5 - 5.2 g/dL    Bilirubin Total 0.8 <=1.2 mg/dL    GFR Estimate 61 >60 mL/min/1.73m2   Troponin T, High Sensitivity   Result Value Ref Range    Troponin T, High Sensitivity 14 <=14 ng/L   Nt probnp inpatient (BNP)   Result Value Ref Range    N terminal Pro BNP Inpatient 8,662 (H) 0 - 1,800 pg/mL   [facility] Draw    Narrative    The following orders were created for panel order [facility] Draw.  Procedure                               Abnormality         Status                     ---------                               -----------         ------                     Extra Blue Top Tube[[id number]]                              Final result               Extra Red Top Tube[[id number]]                               Final result               Extra Green Top (Lithium...[[id number]]                      Final result               Extra Purple Top Tube[[id number]]                            Final result                 Please view results for these tests on the individual orders.   CBC with platelets and differential   Result Value Ref Range    WBC Count 6.8 4.0 - 11.0 10e3/uL    RBC Count 3.91 3.80 - 5.20 10e6/uL    Hemoglobin 12.0 11.7 - 15.7 g/dL    Hematocrit 37.5 35.0 - 47.0 %    MCV 96 78 - 100 fL    MCH 30.7 26.5 - 33.0 pg    MCHC 32.0 31.5 - 36.5 g/dL    RDW 13.4 10.0 - 15.0 %    Platelet Count 223 150 - 450 10e3/uL    % Neutrophils 65 %    % Lymphocytes 20 %    % Monocytes 10 %    % Eosinophils 4 %    % Basophils 1 %    % Immature Granulocytes 0 %    NRBCs per 100 WBC 0 <1 /100    Absolute Neutrophils 4.5 1.6 - 8.3 10e3/uL    Absolute Lymphocytes 1.3 0.8 - 5.3 10e3/uL    Absolute Monocytes 0.7 0.0 - 1.3 10e3/uL    Absolute Eosinophils 0.2 0.0 - 0.7 10e3/uL    Absolute Basophils 0.1 0.0 - 0.2 10e3/uL    Absolute Immature Granulocytes 0.0 <=0.4 10e3/uL    Absolute NRBCs 0.0 10e3/uL   Extra Blue Top  Tube   Result Value Ref Range    Hold Specimen JIC    Extra Red Top Tube   Result Value Ref Range    Hold Specimen JIC    Extra Green Top (Lithium Heparin) Tube   Result Value Ref Range    Hold Specimen JIC    Extra Purple Top Tube   Result Value Ref Range    Hold Specimen JIC    XR Chest 2 Views    Narrative    XR CHEST 2 VIEWS 2/13/2023 10:09 AM    HISTORY: dyspnea    COMPARISON: 11/15/2020    FINDINGS/    Impression    IMPRESSION: Lungs are equally inflated. Mild interstitial prominence  is present, which may be secondary to interstitial edema and/or  scarring. There is mild blunting of the costophrenic angles,  suggestive of small volume pleural effusions. No airspace  consolidation or pneumothorax. Heart size is enlarged and similar  comparison. Pulmonary vasculature is normal appearing. Moderate  thoracic aortic atherosclerosis is present. There is mild  levoscoliosis of the lumbar spine. Hypertrophic degenerative changes  of the spine are noted. No acute osseous abnormality.    RAFAELA MEZA MD         SYSTEM ID:  [id number]       Medications   furosemide (LASIX) injection 60 mg (60 mg Intravenous Given 2/13/23 1044)       Assessments & Plan (with Medical Decision Making)  77-year-old female with known history of congestive heart failure, cardiomyopathy documented by echo in 2020, recovered nicely with EF of 50-55% in September last year.  Off diuretic prolonged period of time, progressive dyspnea on exertion orthopnea, clinical findings consistent with congestive heart failure exacerbation, chest x-ray by my review as well as radiology read is significant for vascular congestion bibasilar effusions, blood pressure 150/90, ECG right bundle branch block unchanged from previous troponin normal, BNP elevated significantly at 8000.  Treated with furosemide 60 mg IV with good diuresis.  Recommend furosemide 40 mg daily.  Follow-up cardiology as scheduled later this month.  Return for chest pain, progressive  shortness of air or any other concerns.     I have reviewed the nursing notes.    I have reviewed the findings, diagnosis, plan and need for follow up with the patient.        New Prescriptions    No medications on file       Final diagnoses:   Acute on chronic systolic congestive heart failure (H)       2/13/2023   [facility] EMERGENCY DEPT     Emerson Magaña MD  02/13/23 1400

## 2023-02-16 ENCOUNTER — OFFICE VISIT (OUTPATIENT)
Dept: RHEUMATOLOGY | Facility: CLINIC | Age: 78
End: 2023-02-16
Payer: COMMERCIAL

## 2023-02-16 ENCOUNTER — HOSPITAL ENCOUNTER (OUTPATIENT)
Dept: MAMMOGRAPHY | Facility: CLINIC | Age: 78
Discharge: HOME | End: 2023-02-16
Attending: FAMILY MEDICINE | Admitting: FAMILY MEDICINE
Payer: MEDICARE

## 2023-02-16 VITALS
OXYGEN SATURATION: 97 % | WEIGHT: 183.6 LBS | HEART RATE: 62 BPM | DIASTOLIC BLOOD PRESSURE: 67 MMHG | BODY MASS INDEX: 30.55 KG/M2 | SYSTOLIC BLOOD PRESSURE: 106 MMHG

## 2023-02-16 DIAGNOSIS — M06.00 SERONEGATIVE RHEUMATOID ARTHRITIS (H): Primary | ICD-10-CM

## 2023-02-16 DIAGNOSIS — Z79.899 HIGH RISK MEDICATIONS (NOT ANTICOAGULANTS) LONG-TERM USE: ICD-10-CM

## 2023-02-16 DIAGNOSIS — Z12.31 ENCOUNTER FOR SCREENING MAMMOGRAM FOR BREAST CANCER: ICD-10-CM

## 2023-02-16 DIAGNOSIS — M80.00XA AGE-RELATED OSTEOPOROSIS WITH CURRENT PATHOLOGICAL FRACTURE, INITIAL ENCOUNTER: ICD-10-CM

## 2023-02-16 DIAGNOSIS — M06.042 RHEUMATOID ARTHRITIS INVOLVING BOTH HANDS WITH NEGATIVE RHEUMATOID FACTOR (H): ICD-10-CM

## 2023-02-16 DIAGNOSIS — M06.041 RHEUMATOID ARTHRITIS INVOLVING BOTH HANDS WITH NEGATIVE RHEUMATOID FACTOR (H): ICD-10-CM

## 2023-02-16 PROCEDURE — 86481 TB AG RESPONSE T-CELL SUSP: CPT | Performed by: INTERNAL MEDICINE

## 2023-02-16 PROCEDURE — 36415 COLL VENOUS BLD VENIPUNCTURE: CPT | Performed by: INTERNAL MEDICINE

## 2023-02-16 PROCEDURE — 82306 VITAMIN D 25 HYDROXY: CPT | Performed by: INTERNAL MEDICINE

## 2023-02-16 PROCEDURE — 77067 SCR MAMMO BI INCL CAD: CPT

## 2023-02-16 PROCEDURE — 99214 OFFICE O/P EST MOD 30 MIN: CPT | Performed by: INTERNAL MEDICINE

## 2023-02-16 RX ORDER — DIPHENHYDRAMINE HYDROCHLORIDE 50 MG/ML
50 INJECTION INTRAMUSCULAR; INTRAVENOUS
Status: CANCELLED
Start: 2023-02-23

## 2023-02-16 RX ORDER — ALBUTEROL SULFATE 0.83 MG/ML
2.5 SOLUTION RESPIRATORY (INHALATION)
Status: CANCELLED | OUTPATIENT
Start: 2023-02-23

## 2023-02-16 RX ORDER — MEPERIDINE HYDROCHLORIDE 25 MG/ML
25 INJECTION INTRAMUSCULAR; INTRAVENOUS; SUBCUTANEOUS EVERY 30 MIN PRN
Status: CANCELLED | OUTPATIENT
Start: 2023-02-23

## 2023-02-16 RX ORDER — HEPARIN SODIUM,PORCINE 10 UNIT/ML
5 VIAL (ML) INTRAVENOUS
Status: CANCELLED | OUTPATIENT
Start: 2023-02-23

## 2023-02-16 RX ORDER — METHYLPREDNISOLONE SODIUM SUCCINATE 125 MG/2ML
125 INJECTION, POWDER, LYOPHILIZED, FOR SOLUTION INTRAMUSCULAR; INTRAVENOUS
Status: CANCELLED
Start: 2023-02-23

## 2023-02-16 RX ORDER — ALBUTEROL SULFATE 90 UG/1
1-2 AEROSOL, METERED RESPIRATORY (INHALATION)
Status: CANCELLED
Start: 2023-02-23

## 2023-02-16 RX ORDER — HEPARIN SODIUM (PORCINE) LOCK FLUSH IV SOLN 100 UNIT/ML 100 UNIT/ML
5 SOLUTION INTRAVENOUS
Status: CANCELLED | OUTPATIENT
Start: 2023-02-23

## 2023-02-16 RX ORDER — EPINEPHRINE 1 MG/ML
0.3 INJECTION, SOLUTION, CONCENTRATE INTRAVENOUS EVERY 5 MIN PRN
Status: CANCELLED | OUTPATIENT
Start: 2023-02-23
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
## 2023-02-16 NOTE — PATIENT INSTRUCTIONS
RHEUMATOLOGY    Dr. Chaim Puente    [facility]  [address] [facility] [place] [place] [address]  Phone number: [phone]  Fax number: [phone]    You may schedule your FLU shot by calling [phone] or if you would like to get your shot at a [facility] pharmacy you may schedule online at [email or web address].    Thank you for choosing [facility]!    Lala Magaña CMA Rheumatology    -------------------------    Please call the infusion center to let them know that you are changing infusions.  You will no longer receive Remicade.  The new medication is Orencia.

## 2023-02-17 LAB — DEPRECATED CALCIDIOL+CALCIFEROL SERPL-MC: 37 UG/L (ref 20–75)

## 2023-02-19 LAB
GAMMA INTERFERON BACKGROUND BLD IA-ACNC: 0.07 IU/ML
M TB IFN-G BLD-IMP: NEGATIVE
M TB IFN-G CD4+ BCKGRND COR BLD-ACNC: 9.93 IU/ML
MITOGEN IGNF BCKGRD COR BLD-ACNC: 0.02 IU/ML
MITOGEN IGNF BCKGRD COR BLD-ACNC: 0.03 IU/ML
QUANTIFERON MITOGEN: 10 IU/ML
QUANTIFERON NIL TUBE: 0.07 IU/ML
QUANTIFERON TB1 TUBE: 0.1 IU/ML
QUANTIFERON TB2 TUBE: 0.09

## 2023-02-22 NOTE — PROGRESS NOTES
Jenna is a 77 year old who is being evaluated via a billable video visit.      How would you like to obtain your AVS? MyChart  If the video visit is dropped, the invitation should be resent by: Text to cell phone: [phone]   Will anyone else be joining your video visit? No      [id number]  Video-Visit Details    Type of service:  Video Visit   Video Start Time: 12:42 PM  Video End Time:12:49 PM    Originating Location (pt. Location): Home    Distant Location (provider location):  Off-site  Platform used for Video Visit: What's Trending    HPI and Plan:   See dictation    No orders of the defined types were placed in this encounter.    Orders Placed This Encounter   Medications     Abatacept (ORENCIA IV)     There are no discontinued medications.      No diagnosis found.    Today's clinic visit entailed:  Review of the result(s) of each unique test - Echo Cath labs ECG  25 minutes spent on the date of the encounter doing chart review, history and exam, documentation and further activities per the note  Provider  Link to MDM Help Grid     The level of medical decision making during this visit was of moderate complexity.      CURRENT MEDICATIONS:  Current Outpatient Medications   Medication Sig Dispense Refill     Abatacept (ORENCIA IV)        aspirin 81 MG EC tablet Take 1 tablet (81 mg) by mouth daily 90 tablet 3     atorvastatin (LIPITOR) 40 MG tablet Take 1 tablet (40 mg) by mouth every evening 90 tablet 3     calcium carbonate (OS-MARIO) 500 MG tablet Take 1 tablet by mouth daily       Coenzyme Q10 (COQ10 PO) Take 1 capsule by mouth every morning        fluticasone (FLONASE) 50 MCG/ACT nasal spray Spray 2 sprays into both nostrils daily as needed for rhinitis or allergies 16 g 3     FOLIC ACID PO Take 1 mg by mouth daily       furosemide (LASIX) 40 MG tablet Take 0.5 tablets (20 mg) by mouth daily (Patient taking differently: Take 40 mg by mouth daily) 90 tablet 3     hydroxychloroquine (PLAQUENIL) 200 MG tablet  "Hydroxychloroquine 200mg daily; and an additional 200mg every other day. Yearly eye exam including 10-2 VF and SD-OCT are required 45 tablet 0     levothyroxine (SYNTHROID/LEVOTHROID) 137 MCG tablet Take 1 tablet (137 mcg) by mouth daily Along with 25mcg to equal 162mcg daily. 90 tablet 3     levothyroxine (SYNTHROID/LEVOTHROID) 25 MCG tablet Take 1 tablet (25 mcg) by mouth daily Take along with the 137mcg to equal 162mcg total daily. 90 tablet 3     lisinopril (ZESTRIL) 5 MG tablet Take 1 tablet (5 mg) by mouth 2 times daily 180 tablet 3     meclizine (ANTIVERT) 25 MG tablet Take 25 mg by mouth daily as needed for dizziness       metoprolol succinate ER (TOPROL XL) 25 MG 24 hr tablet Take 1 tablet (25 mg) by mouth daily 90 tablet 3     Naproxen Sodium (ALEVE PO) Take 2 tablets by mouth 2 times daily as needed        pantoprazole (PROTONIX) 40 MG EC tablet Take 1 tablet (40 mg) by mouth daily 90 tablet 3     potassium chloride ER (K-TAB/KLOR-CON) 10 MEQ CR tablet Take 1 tablet (10 mEq) by mouth daily 90 tablet 3     tolterodine ER (DETROL LA) 4 MG 24 hr capsule Take 1 capsule (4 mg) by mouth daily 90 capsule 3       ALLERGIES     Allergies   Allergen Reactions     Erythromycin Other (See Comments)     Edema     Hydrocodone GI Disturbance     GI Upset, Tolerates dilaudid     Oxycodone GI Disturbance       PAST MEDICAL HISTORY:  Past Medical History:   Diagnosis Date     Allergies      Breast cancer (H)      Esophageal reflux      Hypertension      Other and unspecified hyperlipidemia      Other chronic bronchitis      Personal history of pneumonia (recurrent)     Hx-Pneumonia, \" Chronic Bronchitis\"     Personal history of tobacco use, presenting hazards to health      RA (rheumatoid arthritis) (H)      Unspecified hypothyroidism        PAST SURGICAL HISTORY:  Past Surgical History:   Procedure Laterality Date     BIOPSY BREAST       CATARACT IOL, RT/LT       COLONOSCOPY N/A 9/2/2016    Procedure: COLONOSCOPY;  " Surgeon: Dean Sanchez MD;  Location: [facility]     CV CORONARY ANGIOGRAM N/A 11/18/2020    Procedure: Coronary Angiogram;  Surgeon: Leonid Smith MD;  Location:  [facility]     EXCISE EXOSTOSIS FOOT Right 4/25/2017    Procedure: EXCISE EXOSTOSIS FOOT;  Retrocalcaneal exostectomy right;  Surgeon: Antwan Goldstein DPM;  Location: [facility]     HC EXCISION BREAST LESION, OPEN >=1      2/05     HYSTERECTOMY, RYAN  1982     for non cancerous reasons and no abnormal pap     INJECT EPIDURAL LUMBAR  1/12/2011    INJECT EPIDURAL LUMBAR performed by GENERIC ANESTHESIA PROVIDER at [facility]     INJECT EPIDURAL LUMBAR  5/5/2011    Procedure:INJECT EPIDURAL LUMBAR; LESLIE -Dr. Sánchez  ; Surgeon:GENERIC ANESTHESIA PROVIDER; [facility]     INJECT EPIDURAL LUMBAR  10/6/2011    Procedure:INJECT EPIDURAL LUMBAR; LESLIE--; Surgeon:GENERIC ANESTHESIA PROVIDER; [facility]     INJECT EPIDURAL LUMBAR  1/25/2012    Procedure:INJECT EPIDURAL LUMBAR; LESLIE-Dr. Sánchez  ; Surgeon:GENERIC ANESTHESIA PROVIDER; [facility]     INJECT EPIDURAL LUMBAR  7/9/2012    Procedure: INJECT EPIDURAL LUMBAR;  LESLIE-Dr. Pacheco;  Surgeon: Provider, Generic Anesthesia;  Location: [facility]     LAMINECTOMY LUMBAR MINIMALLY INVASIVE TWO LEVELS N/A 11/21/2017    Procedure: LAMINECTOMY LUMBAR MINIMALLY INVASIVE TWO LEVELS;  L4-5 decompression laminectomy, Left L5-S1 foraminal decompression;  Surgeon: Jesse Dueñas MD;  Location: [facility]     LUMPECTOMY BREAST       RELEASE CARPAL TUNNEL Right 9/5/2017    Procedure: RELEASE CARPAL TUNNEL;  Right Wrist Carpal Tunnel Release;  Surgeon: Melba Yi MD;  [facility]: [facility]     RELEASE CARPAL TUNNEL Left 10/31/2017    Procedure: RELEASE CARPAL TUNNEL;  Left Wrist Carpal Tunnel Release;  Surgeon: Melba Yi MD;  Location[facility]     SURGICAL HISTORY OF -       lumpectomy with sentinal node biopsy     SURGICAL HISTORY OF -       left knee arthoscopic repair medial meniscus       FAMILY  HISTORY:  Family History   Problem Relation Age of Onset     Diabetes Mother      Hypertension Mother      Cancer Mother         breast     Heart Disease Mother         chf     Breast Cancer Mother      Blood Disease Father      Diabetes Son         Type 1     Psoriasis Son      Cerebrovascular Disease Son 48     Cerebrovascular Disease Sister      Breast Cancer Paternal Aunt        SOCIAL HISTORY:  Social History     Socioeconomic History     Marital status:      Spouse name: liberty     Number of children: 2     Years of education: None     Highest education level: None   Occupational History     Employer: [email or web address]     Employer: RETIRED   Tobacco Use     Smoking status: Former     Types: Cigarettes     Quit date: 1996     Years since quittin.6     Smokeless tobacco: Never     Tobacco comments:     quit 10-12 years ago, .   Vaping Use     Vaping Use: Never used   Substance and Sexual Activity     Alcohol use: Yes     Comment: glass of wine at night     Drug use: No     Sexual activity: Yes     Partners: Male     Birth control/protection: Surgical   Other Topics Concern     Parent/sibling w/ CABG, MI or angioplasty before 65F 55M? Yes       Review of Systems:  Skin:        Eyes:       ENT:       Respiratory:  Positive for shortness of breath;dyspnea on exertion;dyspnea at rest  Cardiovascular:  Negative;palpitations;chest pain;dizziness;lightheadedness;syncope or near-syncope Positive for;edema;fatigue  Gastroenterology:      Genitourinary:       Musculoskeletal:       Neurologic:       Psychiatric:       Heme/Lymph/Imm:       Endocrine:         Physical Exam:  Vitals: LMP  (LMP Unknown)     Constitutional:           Skin:             Head:           Eyes:           Lymph:      ENT:           Neck:           Respiratory:            Cardiac:                                                           GI:           Extremities and Muscular Skeletal:                 Neurological:            Psych:           Recent Lab Results:  LIPID RESULTS:  Lab Results   Component Value Date    CHOL 173 11/23/2020    HDL 90 11/23/2020    LDL 53 08/01/2022    LDL 65 11/23/2020    TRIG 89 11/23/2020    CHOLHDLRATIO 2.0 03/10/2015       LIVER ENZYME RESULTS:  Lab Results   Component Value Date    AST 29 02/13/2023    AST 35 12/15/2020    ALT 32 02/13/2023    ALT 41 12/15/2020       CBC RESULTS:  Lab Results   Component Value Date    WBC 6.8 02/13/2023    WBC 5.5 11/23/2020    RBC 3.91 02/13/2023    RBC 4.31 11/23/2020    HGB 12.0 02/13/2023    HGB 11.8 02/25/2021    HCT 37.5 02/13/2023    HCT 40.3 11/23/2020    MCV 96 02/13/2023    MCV 94 11/23/2020    MCH 30.7 02/13/2023    MCH 31.1 11/23/2020    MCHC 32.0 02/13/2023    MCHC 33.3 11/23/2020    RDW 13.4 02/13/2023    RDW 13.1 11/23/2020     02/13/2023     11/23/2020       BMP RESULTS:  Lab Results   Component Value Date     02/13/2023     02/25/2021    POTASSIUM 4.2 02/13/2023    POTASSIUM 3.7 08/01/2022    POTASSIUM 4.4 02/25/2021    CHLORIDE 102 02/13/2023    CHLORIDE 104 08/01/2022    CHLORIDE 106 02/25/2021    CO2 26 02/13/2023    CO2 28 08/01/2022    CO2 33 (H) 02/25/2021    ANIONGAP 10 02/13/2023    ANIONGAP 6 08/01/2022    ANIONGAP 1 (L) 02/25/2021     (H) 02/13/2023    GLC 85 08/01/2022     (H) 02/25/2021    BUN 14.0 02/13/2023    BUN 20 08/01/2022    BUN 21 02/25/2021    CR 0.95 02/13/2023    CR 1.13 (H) 02/25/2021    GFRESTIMATED 61 02/13/2023    GFRESTIMATED 47 (L) 02/25/2021    GFRESTBLACK 54 (L) 02/25/2021    MARIO 9.0 02/13/2023    MARIO 9.2 02/25/2021        A1C RESULTS:  Lab Results   Component Value Date    A1C 5.4 11/18/2020       INR RESULTS:  Lab Results   Component Value Date    INR 1.10 11/18/2020    INR 0.98 04/28/2006           CC  No referring provider defined for this encounter.

## 2023-02-23 ENCOUNTER — VIRTUAL VISIT (OUTPATIENT)
Dept: CARDIOLOGY | Facility: CLINIC | Age: 78
End: 2023-02-23
Payer: COMMERCIAL

## 2023-02-23 DIAGNOSIS — I50.22 CHRONIC HFREF (HEART FAILURE WITH REDUCED EJECTION FRACTION) (H): Primary | ICD-10-CM

## 2023-02-23 PROCEDURE — 99213 OFFICE O/P EST LOW 20 MIN: CPT | Mod: VID | Performed by: INTERNAL MEDICINE

## 2023-02-23 NOTE — LETTER
2/23/2023    Xavier Vega MD  [address] [facility] [address]    RE: Briana Mcgee       Dear Colleague,     I had the pleasure of seeing Briana Mcgee in the [facility].  Jenna is a 77 year old who is being evaluated via a billable video visit.      How would you like to obtain your AVS? MyChart  If the video visit is dropped, the invitation should be resent by: Text to cell phone: [phone]   Will anyone else be joining your video visit? No      [id number]  Video-Visit Details    Type of service:  Video Visit   Video Start Time: 12:42 PM  Video End Time:12:49 PM    Originating Location (pt. Location): Home    Distant Location (provider location):  Off-site  Platform used for Video Visit: [employer]    HPI and Plan:   See dictation    No orders of the defined types were placed in this encounter.    Orders Placed This Encounter   Medications     Abatacept (ORENCIA IV)     There are no discontinued medications.      No diagnosis found.    Today's clinic visit entailed:  Review of the result(s) of each unique test - Echo Cath labs ECG  25 minutes spent on the date of the encounter doing chart review, history and exam, documentation and further activities per the note  Provider  Link to MDM Help Grid     The level of medical decision making during this visit was of moderate complexity.      CURRENT MEDICATIONS:  Current Outpatient Medications   Medication Sig Dispense Refill     Abatacept (ORENCIA IV)        aspirin 81 MG EC tablet Take 1 tablet (81 mg) by mouth daily 90 tablet 3     atorvastatin (LIPITOR) 40 MG tablet Take 1 tablet (40 mg) by mouth every evening 90 tablet 3     calcium carbonate (OS-MARIO) 500 MG tablet Take 1 tablet by mouth daily       Coenzyme Q10 (COQ10 PO) Take 1 capsule by mouth every morning        fluticasone (FLONASE) 50 MCG/ACT nasal spray Spray 2 sprays into both nostrils daily as needed for rhinitis or allergies 16 g 3     FOLIC ACID PO Take 1 mg by  "mouth daily       furosemide (LASIX) 40 MG tablet Take 0.5 tablets (20 mg) by mouth daily (Patient taking differently: Take 40 mg by mouth daily) 90 tablet 3     hydroxychloroquine (PLAQUENIL) 200 MG tablet Hydroxychloroquine 200mg daily; and an additional 200mg every other day. Yearly eye exam including 10-2 VF and SD-OCT are required 45 tablet 0     levothyroxine (SYNTHROID/LEVOTHROID) 137 MCG tablet Take 1 tablet (137 mcg) by mouth daily Along with 25mcg to equal 162mcg daily. 90 tablet 3     levothyroxine (SYNTHROID/LEVOTHROID) 25 MCG tablet Take 1 tablet (25 mcg) by mouth daily Take along with the 137mcg to equal 162mcg total daily. 90 tablet 3     lisinopril (ZESTRIL) 5 MG tablet Take 1 tablet (5 mg) by mouth 2 times daily 180 tablet 3     meclizine (ANTIVERT) 25 MG tablet Take 25 mg by mouth daily as needed for dizziness       metoprolol succinate ER (TOPROL XL) 25 MG 24 hr tablet Take 1 tablet (25 mg) by mouth daily 90 tablet 3     Naproxen Sodium (ALEVE PO) Take 2 tablets by mouth 2 times daily as needed        pantoprazole (PROTONIX) 40 MG EC tablet Take 1 tablet (40 mg) by mouth daily 90 tablet 3     potassium chloride ER (K-TAB/KLOR-CON) 10 MEQ CR tablet Take 1 tablet (10 mEq) by mouth daily 90 tablet 3     tolterodine ER (DETROL LA) 4 MG 24 hr capsule Take 1 capsule (4 mg) by mouth daily 90 capsule 3       ALLERGIES     Allergies   Allergen Reactions     Erythromycin Other (See Comments)     Edema     Hydrocodone GI Disturbance     GI Upset, Tolerates dilaudid     Oxycodone GI Disturbance       PAST MEDICAL HISTORY:  Past Medical History:   Diagnosis Date     Allergies      Breast cancer (H)      Esophageal reflux      Hypertension      Other and unspecified hyperlipidemia      Other chronic bronchitis      Personal history of pneumonia (recurrent)     Hx-Pneumonia, \" Chronic Bronchitis\"     Personal history of tobacco use, presenting hazards to health      RA (rheumatoid arthritis) (H)      Unspecified " hypothyroidism        PAST SURGICAL HISTORY:  Past Surgical History:   Procedure Laterality Date     BIOPSY BREAST       CATARACT IOL, RT/LT       COLONOSCOPY N/A 9/2/2016    Procedure: COLONOSCOPY;  Surgeon: Dean Sanchez MD;  Location: [facility]     CV CORONARY ANGIOGRAM N/A 11/18/2020    Procedure: Coronary Angiogram;  Surgeon: Leonid Smith MD;  Location:  [facility]     EXCISE EXOSTOSIS FOOT Right 4/25/2017    Procedure: EXCISE EXOSTOSIS FOOT;  Retrocalcaneal exostectomy right;  Surgeon: Antwan Goldstein DPM;  Location: [facility]     HC EXCISION BREAST LESION, OPEN >=1      2/05     HYSTERECTOMY, RYAN  1982     for non cancerous reasons and no abnormal pap     INJECT EPIDURAL LUMBAR  1/12/2011    INJECT EPIDURAL LUMBAR performed by GENERIC ANESTHESIA PROVIDER at [facility]     INJECT EPIDURAL LUMBAR  5/5/2011    Procedure:INJECT EPIDURAL LUMBAR; LESLIE -Dr. Sánchez  ; Surgeon:GENERIC ANESTHESIA PROVIDER; [facility]     INJECT EPIDURAL LUMBAR  10/6/2011    Procedure:INJECT EPIDURAL LUMBAR; LESLIE--; Surgeon:GENERIC ANESTHESIA PROVIDER; [facility]     INJECT EPIDURAL LUMBAR  1/25/2012    Procedure:INJECT EPIDURAL LUMBAR; LESLIE-Dr. Sánchez  ; Surgeon:GENERIC ANESTHESIA PROVIDER; [facility]     INJECT EPIDURAL LUMBAR  7/9/2012    Procedure: INJECT EPIDURAL LUMBAR;  LESLIE-Dr. Pacheco;  Surgeon: Provider, Generic Anesthesia;  Location: [facility]     LAMINECTOMY LUMBAR MINIMALLY INVASIVE TWO LEVELS N/A 11/21/2017    Procedure: LAMINECTOMY LUMBAR MINIMALLY INVASIVE TWO LEVELS;  L4-5 decompression laminectomy, Left L5-S1 foraminal decompression;  Surgeon: Jesse Dueñas MD;  Location: [facility]     LUMPECTOMY BREAST       RELEASE CARPAL TUNNEL Right 9/5/2017    Procedure: RELEASE CARPAL TUNNEL;  Right Wrist Carpal Tunnel Release;  Surgeon: Melba Yi MD;  Location: [facility]     RELEASE CARPAL TUNNEL Left 10/31/2017    Procedure: RELEASE CARPAL TUNNEL;  Left Wrist Carpal Tunnel Release;  Surgeon:  Melba Yi MD;  Location: [facility]     SURGICAL HISTORY OF -       lumpectomy with sentinal node biopsy     SURGICAL HISTORY OF -       left knee arthoscopic repair medial meniscus       FAMILY HISTORY:  Family History   Problem Relation Age of Onset     Diabetes Mother      Hypertension Mother      Cancer Mother         breast     Heart Disease Mother         chf     Breast Cancer Mother      Blood Disease Father      Diabetes Son         Type 1     Psoriasis Son      Cerebrovascular Disease Son 48     Cerebrovascular Disease Sister      Breast Cancer Paternal Aunt        SOCIAL HISTORY:  Social History     Socioeconomic History     Marital status:      Spouse name: liberty     Number of children: 2     Years of education: None     Highest education level: None   Occupational History     Employer: [employer]     Employer: [employer]   Tobacco Use     Smoking status: Former     Types: Cigarettes     Quit date: 1996     Years since quittin.6     Smokeless tobacco: Never     Tobacco comments:     quit 10-12 years ago, .   Vaping Use     Vaping Use: Never used   Substance and Sexual Activity     Alcohol use: Yes     Comment: glass of wine at night     Drug use: No     Sexual activity: Yes     Partners: Male     Birth control/protection: Surgical   Other Topics Concern     Parent/sibling w/ CABG, MI or angioplasty before 65F 55M? Yes       Review of Systems:  Skin:        Eyes:       ENT:       Respiratory:  Positive for shortness of breath;dyspnea on exertion;dyspnea at rest  Cardiovascular:  Negative;palpitations;chest pain;dizziness;lightheadedness;syncope or near-syncope Positive for;edema;fatigue  Gastroenterology:      Genitourinary:       Musculoskeletal:       Neurologic:       Psychiatric:       Heme/Lymph/Imm:       Endocrine:         Physical Exam:  Vitals: LMP  (LMP Unknown)     Constitutional:           Skin:             Head:           Eyes:           Lymph:      ENT:            Neck:           Respiratory:            Cardiac:                                                           GI:           Extremities and Muscular Skeletal:                 Neurological:           Psych:           Recent Lab Results:  LIPID RESULTS:  Lab Results   Component Value Date    CHOL 173 11/23/2020    HDL 90 11/23/2020    LDL 53 08/01/2022    LDL 65 11/23/2020    TRIG 89 11/23/2020    CHOLHDLRATIO 2.0 03/10/2015       LIVER ENZYME RESULTS:  Lab Results   Component Value Date    AST 29 02/13/2023    AST 35 12/15/2020    ALT 32 02/13/2023    ALT 41 12/15/2020       CBC RESULTS:  Lab Results   Component Value Date    WBC 6.8 02/13/2023    WBC 5.5 11/23/2020    RBC 3.91 02/13/2023    RBC 4.31 11/23/2020    HGB 12.0 02/13/2023    HGB 11.8 02/25/2021    HCT 37.5 02/13/2023    HCT 40.3 11/23/2020    MCV 96 02/13/2023    MCV 94 11/23/2020    MCH 30.7 02/13/2023    MCH 31.1 11/23/2020    MCHC 32.0 02/13/2023    MCHC 33.3 11/23/2020    RDW 13.4 02/13/2023    RDW 13.1 11/23/2020     02/13/2023     11/23/2020       BMP RESULTS:  Lab Results   Component Value Date     02/13/2023     02/25/2021    POTASSIUM 4.2 02/13/2023    POTASSIUM 3.7 08/01/2022    POTASSIUM 4.4 02/25/2021    CHLORIDE 102 02/13/2023    CHLORIDE 104 08/01/2022    CHLORIDE 106 02/25/2021    CO2 26 02/13/2023    CO2 28 08/01/2022    CO2 33 (H) 02/25/2021    ANIONGAP 10 02/13/2023    ANIONGAP 6 08/01/2022    ANIONGAP 1 (L) 02/25/2021     (H) 02/13/2023    GLC 85 08/01/2022     (H) 02/25/2021    BUN 14.0 02/13/2023    BUN 20 08/01/2022    BUN 21 02/25/2021    CR 0.95 02/13/2023    CR 1.13 (H) 02/25/2021    GFRESTIMATED 61 02/13/2023    GFRESTIMATED 47 (L) 02/25/2021    GFRESTBLACK 54 (L) 02/25/2021    MARIO 9.0 02/13/2023    MARIO 9.2 02/25/2021        A1C RESULTS:  Lab Results   Component Value Date    A1C 5.4 11/18/2020       INR RESULTS:  Lab Results   Component Value Date    INR 1.10 11/18/2020    INR 0.98  04/28/2006             No referring provider defined for this encounter.                    Service Date: 02/23/2023    VIDEO VISIT    REASON FOR VISIT:  Nonischemic cardiomyopathy.    HISTORY OF PRESENT ILLNESS:  Jenna is a delightful 77-year-old female who is doing a followup visit with me.  This is a virtual visit due to bad weather in [place].  She was initially seen by me in consultation in 12/2020.      She has the following pertinent medical issues.  1.  History of hyperlipidemia, hypertension, borderline CKD, hypothyroidism.  2.  History of stroke in 2006, rheumatic fever as a child.  3.  History of breast cancer without radiation with chemotherapy in 2005.  4.  History of rheumatoid arthritis.  5.  History of diagnosis of heart failure with reduced ejection fraction at the end of 2020.   EF was 30%-35%.  EKG showing right bundle branch block.  Coronary angiography showing mild proximal LAD disease, so overall this is nonischemic cardiomyopathy.  Subsequently, MRI did not show any reversible findings.  EF improved to low normal with heart failure therapy with beta blockers and ACE inhibitors.  6.  Mild coronary disease, on aspirin and statin therapy.    Jenna is doing a routine followup visit with me.  She denies any new cardiovascular symptoms.  No chest pain or anginal symptoms.  No syncope, presyncope.  Home blood pressures are in the 120-130 range.  Denies any new heart failure symptoms.  She is currently stable and 40 mg daily of Lasix and 5 mg b.i.d. of lisinopril along with 25 daily metoprolol succinate.    PHYSICAL EXAMINATION:    VITAL SIGNS: Home blood pressures in the 120s-130s.  GENERAL:  Alert, oriented x3, in no acute distress.    RESPIRATORY:  Respirations are nonlabored.    EXTREMITIES:  Trace to no edema.  PSYCHIATRIC:  Appropriate affect.    ASSESSMENT AND PLAN:  Jenna overall is doing well without any significant cardiac symptoms.  She says she has been actually feeling better if  anything.  At this time, I have not made any changes to her medication regimen.  She will continue beta blockers and ACE inhibitors.  I have told her that in 6 months, I am going to get a repeat echocardiogram to ensure EF stability    I asked her to continue checking her home blood pressures.  If the numbers go over 140 systolic, she needs to call our clinic.  She will continue aspirin and statin for her mild CAD.  Six-month followup with an [facility] with an echocardiogram, sooner if anything changes.    Davy Shook MD    cc:    Xavier Vega MD  [facility]  [address] [place] [address]     Davy Shook MD        D: 2023   T: 2023   MT: TRENT    Name:     DEMOND PLASCENCIA  MRN:      [id number]        Account:      [id number]   :      1945           Service Date: 2023       Document: [id number]      Thank you for allowing me to participate in the care of your patient.      Sincerely,     Davy Shook MD     [facility]  cc:   No referring provider defined for this encounter.

## 2023-02-23 NOTE — PROGRESS NOTES
Service Date: 02/23/2023    VIDEO VISIT    REASON FOR VISIT:  Nonischemic cardiomyopathy.    HISTORY OF PRESENT ILLNESS:  Jenna is a delightful 77-year-old female who is doing a followup visit with me.  This is a virtual visit due to bad weather in [place].  She was initially seen by me in consultation in 12/2020.      She has the following pertinent medical issues.  1.  History of hyperlipidemia, hypertension, borderline CKD, hypothyroidism.  2.  History of stroke in 2006, rheumatic fever as a child.  3.  History of breast cancer without radiation with chemotherapy in 2005.  4.  History of rheumatoid arthritis.  5.  History of diagnosis of heart failure with reduced ejection fraction at the end of 2020.   EF was 30%-35%.  EKG showing right bundle branch block.  Coronary angiography showing mild proximal LAD disease, so overall this is nonischemic cardiomyopathy.  Subsequently, MRI did not show any reversible findings.  EF improved to low normal with heart failure therapy with beta blockers and ACE inhibitors.  6.  Mild coronary disease, on aspirin and statin therapy.    Jenna is doing a routine followup visit with me.  She denies any new cardiovascular symptoms.  No chest pain or anginal symptoms.  No syncope, presyncope.  Home blood pressures are in the 120-130 range.  Denies any new heart failure symptoms.  She is currently stable and 40 mg daily of Lasix and 5 mg b.i.d. of lisinopril along with 25 daily metoprolol succinate.    PHYSICAL EXAMINATION:    VITAL SIGNS: Home blood pressures in the 120s-130s.  GENERAL:  Alert, oriented x3, in no acute distress.    RESPIRATORY:  Respirations are nonlabored.    EXTREMITIES:  Trace to no edema.  PSYCHIATRIC:  Appropriate affect.    ASSESSMENT AND PLAN:  Jenna overall is doing well without any significant cardiac symptoms.  She says she has been actually feeling better if anything.  At this time, I have not made any changes to her medication regimen.  She will  continue beta blockers and ACE inhibitors.  I have told her that in 6 months, I am going to get a repeat echocardiogram to ensure EF stability    I asked her to continue checking her home blood pressures.  If the numbers go over 140 systolic, she needs to call our clinic.  She will continue aspirin and statin for her mild CAD.  Six-month followup with an [facility] with an echocardiogram, sooner if anything changes.    Davy Shook MD    cc:    Xavier Vega MD  [facility]  [address] [place] [address]     Davy Shook MD        D: 2023   T: 2023   MT: TRENT    Name:     DEMOND PLASCENCIA  MRN:      [id number]        Account:      [id number]   :      1945           Service Date: 2023       Document: [id number]

## 2023-03-09 ENCOUNTER — INFUSION THERAPY VISIT (OUTPATIENT)
Dept: INFUSION THERAPY | Facility: CLINIC | Age: 78
End: 2023-03-09
Attending: INTERNAL MEDICINE
Payer: MEDICARE

## 2023-03-09 VITALS
SYSTOLIC BLOOD PRESSURE: 108 MMHG | TEMPERATURE: 97.7 F | BODY MASS INDEX: 30.42 KG/M2 | DIASTOLIC BLOOD PRESSURE: 65 MMHG | WEIGHT: 182.8 LBS

## 2023-03-09 DIAGNOSIS — M06.042 RHEUMATOID ARTHRITIS INVOLVING BOTH HANDS WITH NEGATIVE RHEUMATOID FACTOR (H): ICD-10-CM

## 2023-03-09 DIAGNOSIS — M06.00 SERONEGATIVE RHEUMATOID ARTHRITIS (H): Primary | ICD-10-CM

## 2023-03-09 DIAGNOSIS — M06.041 RHEUMATOID ARTHRITIS INVOLVING BOTH HANDS WITH NEGATIVE RHEUMATOID FACTOR (H): ICD-10-CM

## 2023-03-09 PROCEDURE — 96365 THER/PROPH/DIAG IV INF INIT: CPT

## 2023-03-09 PROCEDURE — 250N000028 HC RX JA MOD (INTRAVENOUS), IP 250 OP 636: Mod: JA | Performed by: INTERNAL MEDICINE

## 2023-03-09 PROCEDURE — 258N000003 HC RX IP 258 OP 636: Performed by: INTERNAL MEDICINE

## 2023-03-09 RX ORDER — HEPARIN SODIUM,PORCINE 10 UNIT/ML
5 VIAL (ML) INTRAVENOUS
Status: CANCELLED | OUTPATIENT
Start: 2023-03-23

## 2023-03-09 RX ORDER — HEPARIN SODIUM (PORCINE) LOCK FLUSH IV SOLN 100 UNIT/ML 100 UNIT/ML
5 SOLUTION INTRAVENOUS
Status: CANCELLED | OUTPATIENT
Start: 2023-03-23

## 2023-03-09 RX ORDER — DIPHENHYDRAMINE HYDROCHLORIDE 50 MG/ML
50 INJECTION INTRAMUSCULAR; INTRAVENOUS
Status: CANCELLED
Start: 2023-03-23

## 2023-03-09 RX ORDER — METHYLPREDNISOLONE SODIUM SUCCINATE 125 MG/2ML
125 INJECTION, POWDER, LYOPHILIZED, FOR SOLUTION INTRAMUSCULAR; INTRAVENOUS
Status: CANCELLED
Start: 2023-03-23

## 2023-03-09 RX ORDER — ALBUTEROL SULFATE 90 UG/1
1-2 AEROSOL, METERED RESPIRATORY (INHALATION)
Status: CANCELLED
Start: 2023-03-23

## 2023-03-09 RX ORDER — ALBUTEROL SULFATE 0.83 MG/ML
2.5 SOLUTION RESPIRATORY (INHALATION)
Status: CANCELLED | OUTPATIENT
Start: 2023-03-23

## 2023-03-09 RX ORDER — EPINEPHRINE 1 MG/ML
0.3 INJECTION, SOLUTION, CONCENTRATE INTRAVENOUS EVERY 5 MIN PRN
Status: CANCELLED | OUTPATIENT
Start: 2023-03-23

## 2023-03-09 RX ORDER — MEPERIDINE HYDROCHLORIDE 25 MG/ML
25 INJECTION INTRAMUSCULAR; INTRAVENOUS; SUBCUTANEOUS EVERY 30 MIN PRN
Status: CANCELLED | OUTPATIENT
Start: 2023-03-23

## 2023-03-09 RX ADMIN — SODIUM CHLORIDE 750 MG: 9 INJECTION, SOLUTION INTRAVENOUS at 09:28

## 2023-03-09 RX ADMIN — SODIUM CHLORIDE 250 ML: 9 INJECTION, SOLUTION INTRAVENOUS at 09:29

## 2023-03-09 NOTE — PROGRESS NOTES
Infusion Nursing Note:  Brianatea Mcgee presents today for Orencia.    Patient seen by provider today: No   present during visit today: Not Applicable.    Note: Inbasket sent to Dr. Puente regarding UA lab in [facility] plan. No parameters, unsure if pt needed to do this. Did not do UA today, did not hear back from Dr. Puente. Pt will return in 2 weeks for next dose and could do UA then.    Intravenous Access:  Peripheral IV placed.    Treatment Conditions:  Biological Infusion Checklist:  ~~~ NOTE: If the patient answers yes to any of the questions below, hold the infusion and contact ordering provider or on-call provider.    1. Have you recently had an elevated temperature, fever, chills, productive cough, coughing for 3 weeks or longer or hemoptysis, abnormal vital signs, night sweats,  chest pain or have you noticed a decrease in your appetite, unexplained weight loss or fatigue? No  2. Do you have any open wounds or new incisions? No  3. Do you have any recent or upcoming hospitalizations, surgeries or dental procedures? Yes, pt was in ER for fluid around her heart, saw Dr. Puente after who OK'd medication.  4. Do you currently have or recently have had any signs of illness or infection or are you on any antibiotics? No  5. Have you had any new, sudden or worsening abdominal pain? No  6. Have you or anyone in your household received a live vaccination in the past 4 weeks? Please note:  No live vaccines while on biologic/chemotherapy until 6 months after the last treatment.  Patient can receive the flu vaccine (shot only) and the pneumovax.  It is optimal for the patient to get these vaccines mid cycle, but they can be given at any time as long as it is not on the day of the infusion. No  7. Have you recently been diagnosed with any new nervous system diseases (ie. Multiple sclerosis, Guillain [place], seizures, neurological changes) or cancer diagnosis? No  8. Are you on any form of radiation or  chemotherapy? No  9. Are you pregnant or breast feeding or do you have plans of pregnancy in the future? No  10. Have you been having any signs of worsening depression or suicidal ideations?  (benlysta only) No  11. Have there been any other new onset medical symptoms? No      Post Infusion Assessment:  Patient tolerated infusion without incident.  Blood return noted pre and post infusion.  Site patent and intact, free from redness, edema or discomfort.  No evidence of extravasations.  Access discontinued per protocol.     Discharge Plan:   Copy of AVS reviewed with patient and/or family.  Patient will return 3/23/23 for next appointment.  Patient discharged in stable condition accompanied by: self.  Departure Mode: Ambulatory.      AMAYA LEWIS RN

## 2023-03-23 ENCOUNTER — INFUSION THERAPY VISIT (OUTPATIENT)
Dept: INFUSION THERAPY | Facility: CLINIC | Age: 78
End: 2023-03-23
Attending: INTERNAL MEDICINE
Payer: MEDICARE

## 2023-03-23 VITALS
WEIGHT: 185.6 LBS | HEART RATE: 58 BPM | BODY MASS INDEX: 30.89 KG/M2 | TEMPERATURE: 97.5 F | RESPIRATION RATE: 18 BRPM | SYSTOLIC BLOOD PRESSURE: 145 MMHG | DIASTOLIC BLOOD PRESSURE: 77 MMHG

## 2023-03-23 DIAGNOSIS — M06.042 RHEUMATOID ARTHRITIS INVOLVING BOTH HANDS WITH NEGATIVE RHEUMATOID FACTOR (H): ICD-10-CM

## 2023-03-23 DIAGNOSIS — M06.00 SERONEGATIVE RHEUMATOID ARTHRITIS (H): Primary | ICD-10-CM

## 2023-03-23 DIAGNOSIS — M06.041 RHEUMATOID ARTHRITIS INVOLVING BOTH HANDS WITH NEGATIVE RHEUMATOID FACTOR (H): ICD-10-CM

## 2023-03-23 PROCEDURE — 96365 THER/PROPH/DIAG IV INF INIT: CPT

## 2023-03-23 PROCEDURE — 258N000003 HC RX IP 258 OP 636: Performed by: INTERNAL MEDICINE

## 2023-03-23 PROCEDURE — 250N000028 HC RX JA MOD (INTRAVENOUS), IP 250 OP 636: Mod: JA | Performed by: INTERNAL MEDICINE

## 2023-03-23 RX ORDER — ALBUTEROL SULFATE 90 UG/1
1-2 AEROSOL, METERED RESPIRATORY (INHALATION)
Status: CANCELLED
Start: 2023-04-06

## 2023-03-23 RX ORDER — DIPHENHYDRAMINE HYDROCHLORIDE 50 MG/ML
50 INJECTION INTRAMUSCULAR; INTRAVENOUS
Status: CANCELLED
Start: 2023-04-06

## 2023-03-23 RX ORDER — MEPERIDINE HYDROCHLORIDE 25 MG/ML
25 INJECTION INTRAMUSCULAR; INTRAVENOUS; SUBCUTANEOUS EVERY 30 MIN PRN
Status: CANCELLED | OUTPATIENT
Start: 2023-04-06

## 2023-03-23 RX ORDER — ALBUTEROL SULFATE 0.83 MG/ML
2.5 SOLUTION RESPIRATORY (INHALATION)
Status: CANCELLED | OUTPATIENT
Start: 2023-04-06

## 2023-03-23 RX ORDER — EPINEPHRINE 1 MG/ML
0.3 INJECTION, SOLUTION, CONCENTRATE INTRAVENOUS EVERY 5 MIN PRN
Status: CANCELLED | OUTPATIENT
Start: 2023-04-06

## 2023-03-23 RX ORDER — METHYLPREDNISOLONE SODIUM SUCCINATE 125 MG/2ML
125 INJECTION, POWDER, LYOPHILIZED, FOR SOLUTION INTRAMUSCULAR; INTRAVENOUS
Status: CANCELLED
Start: 2023-04-06

## 2023-03-23 RX ADMIN — SODIUM CHLORIDE 250 ML: 9 INJECTION, SOLUTION INTRAVENOUS at 08:35

## 2023-03-23 RX ADMIN — SODIUM CHLORIDE 750 MG: 9 INJECTION, SOLUTION INTRAVENOUS at 08:53

## 2023-03-23 NOTE — PROGRESS NOTES
Infusion Nursing Note:  Briana S Fabiolareinaldo presents today for Orencia.    Patient seen by provider today: No   present during visit today: Not Applicable.    Note: N/A.    Intravenous Access:  Peripheral IV placed.    Treatment Conditions:  Biological Infusion Checklist:  ~~~ NOTE: If the patient answers yes to any of the questions below, hold the infusion and contact ordering provider or on-call provider.    1. Have you recently had an elevated temperature, fever, chills, productive cough, coughing for 3 weeks or longer or hemoptysis, abnormal vital signs, night sweats,  chest pain or have you noticed a decrease in your appetite, unexplained weight loss or fatigue? No  2. Do you have any open wounds or new incisions? No  3. Do you have any recent or upcoming hospitalizations, surgeries or dental procedures? No  4. Do you currently have or recently have had any signs of illness or infection or are you on any antibiotics? No  5. Have you had any new, sudden or worsening abdominal pain? No  6. Have you or anyone in your household received a live vaccination in the past 4 weeks? Please note:  No live vaccines while on biologic/chemotherapy until 6 months after the last treatment.  Patient can receive the flu vaccine (shot only) and the pneumovax.  It is optimal for the patient to get these vaccines mid cycle, but they can be given at any time as long as it is not on the day of the infusion. No  7. Have you recently been diagnosed with any new nervous system diseases (ie. Multiple sclerosis, Guillain [place], seizures, neurological changes) or cancer diagnosis? No  8. Are you on any form of radiation or chemotherapy? No  9. Are you pregnant or breast feeding or do you have plans of pregnancy in the future? No  10. Have you been having any signs of worsening depression or suicidal ideations?  (benlysta only) No  11. Have there been any other new onset medical symptoms? No      Post Infusion Assessment:  Patient  tolerated infusion without incident.  Blood return noted pre and post infusion.  Site patent and intact, free from redness, edema or discomfort.  No evidence of extravasations.  Access discontinued per protocol.     Discharge Plan:   Discharge instructions reviewed with: Patient.  Patient discharged in stable condition accompanied by: self.  Departure Mode: Ambulatory.      Alcira Wright RN

## 2023-03-31 DIAGNOSIS — I50.21 ACUTE SYSTOLIC HEART FAILURE (H): ICD-10-CM

## 2023-03-31 RX ORDER — FUROSEMIDE 40 MG
40 TABLET ORAL DAILY
Qty: 90 TABLET | Refills: 1 | Status: SHIPPED | OUTPATIENT
Start: 2023-03-31 | End: 2023-10-30

## 2023-03-31 NOTE — CONFIDENTIAL NOTE
[facility] Cardiology Refill Guideline reviewed.  Medication meets criteria for refill.     Yaakov Kasper RN

## 2023-04-06 ENCOUNTER — INFUSION THERAPY VISIT (OUTPATIENT)
Dept: INFUSION THERAPY | Facility: CLINIC | Age: 78
End: 2023-04-06
Attending: INTERNAL MEDICINE
Payer: MEDICARE

## 2023-04-06 VITALS
WEIGHT: 184.6 LBS | BODY MASS INDEX: 30.72 KG/M2 | DIASTOLIC BLOOD PRESSURE: 75 MMHG | HEART RATE: 57 BPM | TEMPERATURE: 98 F | SYSTOLIC BLOOD PRESSURE: 136 MMHG

## 2023-04-06 DIAGNOSIS — M06.041 RHEUMATOID ARTHRITIS INVOLVING BOTH HANDS WITH NEGATIVE RHEUMATOID FACTOR (H): ICD-10-CM

## 2023-04-06 DIAGNOSIS — M06.00 SERONEGATIVE RHEUMATOID ARTHRITIS (H): Primary | ICD-10-CM

## 2023-04-06 DIAGNOSIS — M06.042 RHEUMATOID ARTHRITIS INVOLVING BOTH HANDS WITH NEGATIVE RHEUMATOID FACTOR (H): ICD-10-CM

## 2023-04-06 PROCEDURE — 96365 THER/PROPH/DIAG IV INF INIT: CPT

## 2023-04-06 PROCEDURE — 258N000003 HC RX IP 258 OP 636: Performed by: INTERNAL MEDICINE

## 2023-04-06 PROCEDURE — 250N000028 HC RX JA MOD (INTRAVENOUS), IP 250 OP 636: Mod: JA | Performed by: INTERNAL MEDICINE

## 2023-04-06 RX ORDER — EPINEPHRINE 1 MG/ML
0.3 INJECTION, SOLUTION, CONCENTRATE INTRAVENOUS EVERY 5 MIN PRN
Status: CANCELLED | OUTPATIENT
Start: 2023-05-04

## 2023-04-06 RX ORDER — METHYLPREDNISOLONE SODIUM SUCCINATE 125 MG/2ML
125 INJECTION, POWDER, LYOPHILIZED, FOR SOLUTION INTRAMUSCULAR; INTRAVENOUS
Status: CANCELLED
Start: 2023-05-04

## 2023-04-06 RX ORDER — MEPERIDINE HYDROCHLORIDE 25 MG/ML
25 INJECTION INTRAMUSCULAR; INTRAVENOUS; SUBCUTANEOUS EVERY 30 MIN PRN
Status: CANCELLED | OUTPATIENT
Start: 2023-05-04

## 2023-04-06 RX ORDER — ALBUTEROL SULFATE 0.83 MG/ML
2.5 SOLUTION RESPIRATORY (INHALATION)
Status: CANCELLED | OUTPATIENT
Start: 2023-05-04

## 2023-04-06 RX ORDER — ALBUTEROL SULFATE 90 UG/1
1-2 AEROSOL, METERED RESPIRATORY (INHALATION)
Status: CANCELLED
Start: 2023-05-04

## 2023-04-06 RX ORDER — DIPHENHYDRAMINE HYDROCHLORIDE 50 MG/ML
50 INJECTION INTRAMUSCULAR; INTRAVENOUS
Status: CANCELLED
Start: 2023-05-04

## 2023-04-06 RX ADMIN — SODIUM CHLORIDE 750 MG: 9 INJECTION, SOLUTION INTRAVENOUS at 09:22

## 2023-04-06 RX ADMIN — SODIUM CHLORIDE 250 ML: 9 INJECTION, SOLUTION INTRAVENOUS at 09:23

## 2023-04-06 NOTE — PROGRESS NOTES
Infusion Nursing Note:  Briana Mcgee presents today for Ham    Patient seen by provider today: No   present during visit today: Not Applicable.    Note: N/A.    Intravenous Access:  Peripheral IV placed.    Treatment Conditions:  Biological Infusion Checklist:  ~~~ NOTE: If the patient answers yes to any of the questions below, hold the infusion and contact ordering provider or on-call provider.    1. Have you recently had an elevated temperature, fever, chills, productive cough, coughing for 3 weeks or longer or hemoptysis, abnormal vital signs, night sweats,  chest pain or have you noticed a decrease in your appetite, unexplained weight loss or fatigue? No  2. Do you have any open wounds or new incisions? No  3. Do you have any recent or upcoming hospitalizations, surgeries or dental procedures? No  4. Do you currently have or recently have had any signs of illness or infection or are you on any antibiotics? No  5. Have you had any new, sudden or worsening abdominal pain? No  6. Have you or anyone in your household received a live vaccination in the past 4 weeks? Please note:  No live vaccines while on biologic/chemotherapy until 6 months after the last treatment.  Patient can receive the flu vaccine (shot only) and the pneumovax.  It is optimal for the patient to get these vaccines mid cycle, but they can be given at any time as long as it is not on the day of the infusion. No  7. Have you recently been diagnosed with any new nervous system diseases (ie. Multiple sclerosis, Guillain [place], seizures, neurological changes) or cancer diagnosis? No  8. Are you on any form of radiation or chemotherapy? No  9. Are you pregnant or breast feeding or do you have plans of pregnancy in the future? No  10. Have you been having any signs of worsening depression or suicidal ideations?  (benlysta only) No  11. Have there been any other new onset medical symptoms? No      Post Infusion Assessment:  Patient  tolerated infusion without incident.  Blood return noted pre and post infusion.  Site patent and intact, free from redness, edema or discomfort.  No evidence of extravasations.  Access discontinued per protocol.      Biologic Infusion Post Education: Call the triage nurse at your clinic or seek medical attention if you have chills and/or temperature greater than or equal to 100.5, uncontrolled nausea/vomiting, diarrhea, constipation, dizziness, shortness of breath, chest pain, heart palpitations, weakness or any other new or concerning symptoms, questions or concerns.  You cannot have any live virus vaccines prior to or during treatment or up to 6 months post infusion.  If you have an upcoming surgery, medical procedure or dental procedure during treatment, this should be discussed with your ordering physician and your surgeon/dentist.  If you are having any concerning symptom, if you are unsure if you should get your next infusion or wish to speak to a provider before your next infusion, please call your care coordinator or triage nurse at your clinic to notify them so we can adequately serve you.     Discharge Plan:   Patient discharged in stable condition accompanied by: self.  Departure Mode: Ambulatory.  Pt to return on 5/4/23 at 8:30 am for next Orencia.     Tatiana Orellana RN

## 2023-05-04 ENCOUNTER — INFUSION THERAPY VISIT (OUTPATIENT)
Dept: INFUSION THERAPY | Facility: CLINIC | Age: 78
End: 2023-05-04
Attending: INTERNAL MEDICINE
Payer: MEDICARE

## 2023-05-04 VITALS
WEIGHT: 183.8 LBS | RESPIRATION RATE: 18 BRPM | TEMPERATURE: 97.4 F | HEART RATE: 58 BPM | SYSTOLIC BLOOD PRESSURE: 139 MMHG | DIASTOLIC BLOOD PRESSURE: 74 MMHG | BODY MASS INDEX: 30.59 KG/M2

## 2023-05-04 DIAGNOSIS — M06.041 RHEUMATOID ARTHRITIS INVOLVING BOTH HANDS WITH NEGATIVE RHEUMATOID FACTOR (H): ICD-10-CM

## 2023-05-04 DIAGNOSIS — M06.00 SERONEGATIVE RHEUMATOID ARTHRITIS (H): Primary | ICD-10-CM

## 2023-05-04 DIAGNOSIS — M06.042 RHEUMATOID ARTHRITIS INVOLVING BOTH HANDS WITH NEGATIVE RHEUMATOID FACTOR (H): ICD-10-CM

## 2023-05-04 PROCEDURE — 258N000003 HC RX IP 258 OP 636: Performed by: INTERNAL MEDICINE

## 2023-05-04 PROCEDURE — 250N000028 HC RX JA MOD (INTRAVENOUS), IP 250 OP 636: Mod: JA | Performed by: INTERNAL MEDICINE

## 2023-05-04 PROCEDURE — 96365 THER/PROPH/DIAG IV INF INIT: CPT

## 2023-05-04 RX ORDER — EPINEPHRINE 1 MG/ML
0.3 INJECTION, SOLUTION, CONCENTRATE INTRAVENOUS EVERY 5 MIN PRN
Status: CANCELLED | OUTPATIENT
Start: 2023-06-01

## 2023-05-04 RX ORDER — DIPHENHYDRAMINE HYDROCHLORIDE 50 MG/ML
50 INJECTION INTRAMUSCULAR; INTRAVENOUS
Status: CANCELLED
Start: 2023-06-01

## 2023-05-04 RX ORDER — METHYLPREDNISOLONE SODIUM SUCCINATE 125 MG/2ML
125 INJECTION, POWDER, LYOPHILIZED, FOR SOLUTION INTRAMUSCULAR; INTRAVENOUS
Status: CANCELLED
Start: 2023-06-01

## 2023-05-04 RX ORDER — MEPERIDINE HYDROCHLORIDE 25 MG/ML
25 INJECTION INTRAMUSCULAR; INTRAVENOUS; SUBCUTANEOUS EVERY 30 MIN PRN
Status: CANCELLED | OUTPATIENT
Start: 2023-06-01

## 2023-05-04 RX ORDER — ALBUTEROL SULFATE 90 UG/1
1-2 AEROSOL, METERED RESPIRATORY (INHALATION)
Status: CANCELLED
Start: 2023-06-01

## 2023-05-04 RX ORDER — ALBUTEROL SULFATE 0.83 MG/ML
2.5 SOLUTION RESPIRATORY (INHALATION)
Status: CANCELLED | OUTPATIENT
Start: 2023-06-01

## 2023-05-04 RX ADMIN — SODIUM CHLORIDE 750 MG: 9 INJECTION, SOLUTION INTRAVENOUS at 09:34

## 2023-05-04 RX ADMIN — SODIUM CHLORIDE 250 ML: 9 INJECTION, SOLUTION INTRAVENOUS at 08:55

## 2023-05-04 NOTE — PROGRESS NOTES
Infusion Nursing Note:  Briana Mcgee presents today for orencia.    Patient seen by provider today: No   present during visit today: Not Applicable.    Note: N/A.      Intravenous Access:  Peripheral IV placed.    Treatment Conditions:  Biological Infusion Checklist:  ~~~ NOTE: If the patient answers yes to any of the questions below, hold the infusion and contact ordering provider or on-call provider.    1. Have you recently had an elevated temperature, fever, chills, productive cough, coughing for 3 weeks or longer or hemoptysis, abnormal vital signs, night sweats,  chest pain or have you noticed a decrease in your appetite, unexplained weight loss or fatigue? No  2. Do you have any open wounds or new incisions? No  3. Do you have any recent or upcoming hospitalizations, surgeries or dental procedures? No  4. Do you currently have or recently have had any signs of illness or infection or are you on any antibiotics? No  5. Have you had any new, sudden or worsening abdominal pain? No  6. Have you or anyone in your household received a live vaccination in the past 4 weeks? Please note:  No live vaccines while on biologic/chemotherapy until 6 months after the last treatment.  Patient can receive the flu vaccine (shot only) and the pneumovax.  It is optimal for the patient to get these vaccines mid cycle, but they can be given at any time as long as it is not on the day of the infusion. No  7. Have you recently been diagnosed with any new nervous system diseases (ie. Multiple sclerosis, Guillain [place], seizures, neurological changes) or cancer diagnosis? No  8. Are you on any form of radiation or chemotherapy? No  9. Are you pregnant or breast feeding or do you have plans of pregnancy in the future? No  10. Have you been having any signs of worsening depression or suicidal ideations?  (benlysta only) No  11. Have there been any other new onset medical symptoms? No        Post Infusion  Assessment:  Patient tolerated infusion without incident.  Blood return noted pre and post infusion.  No evidence of extravasations.  Access discontinued per protocol.  Biologic Infusion Post Education: Call the triage nurse at your clinic or seek medical attention if you have chills and/or temperature greater than or equal to 100.5, uncontrolled nausea/vomiting, diarrhea, constipation, dizziness, shortness of breath, chest pain, heart palpitations, weakness or any other new or concerning symptoms, questions or concerns.  You cannot have any live virus vaccines prior to or during treatment or up to 6 months post infusion.  If you have an upcoming surgery, medical procedure or dental procedure during treatment, this should be discussed with your ordering physician and your surgeon/dentist.  If you are having any concerning symptom, if you are unsure if you should get your next infusion or wish to speak to a provider before your next infusion, please call your care coordinator or triage nurse at your clinic to notify them so we can adequately serve you.       Discharge Plan:   Patient discharged in stable condition accompanied by: self.  Departure Mode: Wheelchair.      Katrin Barragan RN

## 2023-05-25 ENCOUNTER — OFFICE VISIT (OUTPATIENT)
Dept: RHEUMATOLOGY | Facility: CLINIC | Age: 78
End: 2023-05-25
Payer: COMMERCIAL

## 2023-05-25 VITALS
OXYGEN SATURATION: 93 % | BODY MASS INDEX: 30.75 KG/M2 | WEIGHT: 184.8 LBS | HEART RATE: 62 BPM | SYSTOLIC BLOOD PRESSURE: 136 MMHG | DIASTOLIC BLOOD PRESSURE: 73 MMHG

## 2023-05-25 DIAGNOSIS — M06.00 SERONEGATIVE RHEUMATOID ARTHRITIS (H): Primary | ICD-10-CM

## 2023-05-25 LAB
ALBUMIN SERPL BCG-MCNC: 4.4 G/DL (ref 3.5–5.2)
ALP SERPL-CCNC: 75 U/L (ref 35–104)
ALT SERPL W P-5'-P-CCNC: 23 U/L (ref 10–35)
AST SERPL W P-5'-P-CCNC: 32 U/L (ref 10–35)
BASOPHILS # BLD AUTO: 0 10E3/UL (ref 0–0.2)
BASOPHILS NFR BLD AUTO: 1 %
BILIRUB DIRECT SERPL-MCNC: <0.2 MG/DL (ref 0–0.3)
BILIRUB SERPL-MCNC: 0.5 MG/DL
CREAT SERPL-MCNC: 1.08 MG/DL (ref 0.51–0.95)
CRP SERPL-MCNC: <3 MG/L
EOSINOPHIL # BLD AUTO: 0.3 10E3/UL (ref 0–0.7)
EOSINOPHIL NFR BLD AUTO: 4 %
ERYTHROCYTE [DISTWIDTH] IN BLOOD BY AUTOMATED COUNT: 14.6 % (ref 10–15)
ERYTHROCYTE [SEDIMENTATION RATE] IN BLOOD BY WESTERGREN METHOD: 13 MM/HR (ref 0–30)
GFR SERPL CREATININE-BSD FRML MDRD: 52 ML/MIN/1.73M2
HCT VFR BLD AUTO: 36.6 % (ref 35–47)
HGB BLD-MCNC: 11.9 G/DL (ref 11.7–15.7)
LYMPHOCYTES # BLD AUTO: 2.4 10E3/UL (ref 0.8–5.3)
LYMPHOCYTES NFR BLD AUTO: 34 %
MCH RBC QN AUTO: 30.3 PG (ref 26.5–33)
MCHC RBC AUTO-ENTMCNC: 32.5 G/DL (ref 31.5–36.5)
MCV RBC AUTO: 93 FL (ref 78–100)
MONOCYTES # BLD AUTO: 0.6 10E3/UL (ref 0–1.3)
MONOCYTES NFR BLD AUTO: 9 %
NEUTROPHILS # BLD AUTO: 3.8 10E3/UL (ref 1.6–8.3)
NEUTROPHILS NFR BLD AUTO: 53 %
PLATELET # BLD AUTO: 198 10E3/UL (ref 150–450)
PROT SERPL-MCNC: 6.8 G/DL (ref 6.4–8.3)
RBC # BLD AUTO: 3.93 10E6/UL (ref 3.8–5.2)
WBC # BLD AUTO: 7.2 10E3/UL (ref 4–11)

## 2023-05-25 PROCEDURE — 99214 OFFICE O/P EST MOD 30 MIN: CPT | Performed by: INTERNAL MEDICINE

## 2023-05-25 PROCEDURE — 85652 RBC SED RATE AUTOMATED: CPT | Performed by: INTERNAL MEDICINE

## 2023-05-25 PROCEDURE — 82565 ASSAY OF CREATININE: CPT | Performed by: INTERNAL MEDICINE

## 2023-05-25 PROCEDURE — 80076 HEPATIC FUNCTION PANEL: CPT | Performed by: INTERNAL MEDICINE

## 2023-05-25 PROCEDURE — 86140 C-REACTIVE PROTEIN: CPT | Performed by: INTERNAL MEDICINE

## 2023-05-25 PROCEDURE — 85025 COMPLETE CBC W/AUTO DIFF WBC: CPT | Performed by: INTERNAL MEDICINE

## 2023-05-25 PROCEDURE — 36415 COLL VENOUS BLD VENIPUNCTURE: CPT | Performed by: INTERNAL MEDICINE
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
## 2023-05-25 NOTE — PATIENT INSTRUCTIONS
RHEUMATOLOGY    [facility]  [address]  [place] [address]    Phone number: [phone]  Fax number: [phone]      Thank you for choosing [facility]!    Lala Magaña CMA

## 2023-05-25 NOTE — NURSING NOTE
RAPID3 (0-30) Cumulative Score  4.5          RAPID3 Weighted Score (divide #4 by 3 and that is the weighted score)  1.5

## 2023-06-01 ENCOUNTER — INFUSION THERAPY VISIT (OUTPATIENT)
Dept: INFUSION THERAPY | Facility: CLINIC | Age: 78
End: 2023-06-01
Attending: INTERNAL MEDICINE
Payer: MEDICARE

## 2023-06-01 VITALS
DIASTOLIC BLOOD PRESSURE: 60 MMHG | HEART RATE: 56 BPM | BODY MASS INDEX: 30.82 KG/M2 | SYSTOLIC BLOOD PRESSURE: 138 MMHG | TEMPERATURE: 97.8 F | WEIGHT: 185.2 LBS

## 2023-06-01 DIAGNOSIS — M06.041 RHEUMATOID ARTHRITIS INVOLVING BOTH HANDS WITH NEGATIVE RHEUMATOID FACTOR (H): ICD-10-CM

## 2023-06-01 DIAGNOSIS — M06.00 SERONEGATIVE RHEUMATOID ARTHRITIS (H): Primary | ICD-10-CM

## 2023-06-01 DIAGNOSIS — M06.042 RHEUMATOID ARTHRITIS INVOLVING BOTH HANDS WITH NEGATIVE RHEUMATOID FACTOR (H): ICD-10-CM

## 2023-06-01 PROCEDURE — 250N000028 HC RX JA MOD (INTRAVENOUS), IP 250 OP 636: Mod: JA | Performed by: INTERNAL MEDICINE

## 2023-06-01 PROCEDURE — 96365 THER/PROPH/DIAG IV INF INIT: CPT

## 2023-06-01 PROCEDURE — 258N000003 HC RX IP 258 OP 636: Performed by: INTERNAL MEDICINE

## 2023-06-01 RX ORDER — ALBUTEROL SULFATE 90 UG/1
1-2 AEROSOL, METERED RESPIRATORY (INHALATION)
Status: CANCELLED
Start: 2023-06-29

## 2023-06-01 RX ORDER — ALBUTEROL SULFATE 0.83 MG/ML
2.5 SOLUTION RESPIRATORY (INHALATION)
Status: CANCELLED | OUTPATIENT
Start: 2023-06-29

## 2023-06-01 RX ORDER — EPINEPHRINE 1 MG/ML
0.3 INJECTION, SOLUTION, CONCENTRATE INTRAVENOUS EVERY 5 MIN PRN
Status: CANCELLED | OUTPATIENT
Start: 2023-06-29

## 2023-06-01 RX ORDER — METHYLPREDNISOLONE SODIUM SUCCINATE 125 MG/2ML
125 INJECTION, POWDER, LYOPHILIZED, FOR SOLUTION INTRAMUSCULAR; INTRAVENOUS
Status: CANCELLED
Start: 2023-06-29

## 2023-06-01 RX ORDER — DIPHENHYDRAMINE HYDROCHLORIDE 50 MG/ML
50 INJECTION INTRAMUSCULAR; INTRAVENOUS
Status: CANCELLED
Start: 2023-06-29

## 2023-06-01 RX ORDER — MEPERIDINE HYDROCHLORIDE 25 MG/ML
25 INJECTION INTRAMUSCULAR; INTRAVENOUS; SUBCUTANEOUS EVERY 30 MIN PRN
Status: CANCELLED | OUTPATIENT
Start: 2023-06-29

## 2023-06-01 RX ADMIN — SODIUM CHLORIDE 250 ML: 9 INJECTION, SOLUTION INTRAVENOUS at 09:45

## 2023-06-01 RX ADMIN — SODIUM CHLORIDE 750 MG: 9 INJECTION, SOLUTION INTRAVENOUS at 09:45

## 2023-06-01 NOTE — PROGRESS NOTES
Infusion Nursing Note:  Briana JORDON Mcgee presents today for Orencia.    Patient seen by provider today: No   present during visit today: Not Applicable.    Note: N/A.      Intravenous Access:  Peripheral IV placed.    Treatment Conditions:  Biological Infusion Checklist:  ~~~ NOTE: If the patient answers yes to any of the questions below, hold the infusion and contact ordering provider or on-call provider.    1. Have you recently had an elevated temperature, fever, chills, productive cough, coughing for 3 weeks or longer or hemoptysis,  abnormal vital signs, night sweats,  chest pain or have you noticed a decrease in your appetite, unexplained weight loss or fatigue? No  2. Do you have any open wounds or new incisions? No  3. Do you have any upcoming hospitalizations or surgeries? Does not include esophagogastroduodenoscopy, colonoscopy, endoscopic retrograde cholangiopancreatography (ERCP), endoscopic ultrasound (EUS), dental procedures or joint aspiration/steroid injections No  4. Do you currently have any signs of illness or infection or are you on any antibiotics? No  5. Have you had any new, sudden or worsening abdominal pain? No  6. Have you or anyone in your household received a live vaccination in the past 4 weeks? Please note: No live vaccines while on biologic/chemotherapy until 6 months after the last treatment. Patient can receive the flu vaccine (shot only), pneumovax and the Covid vaccine. It is optimal for the patient to get these vaccines mid cycle, but they can be given at any time as long as it is not on the day of the infusion. No  7. Have you recently been diagnosed with any new nervous system diseases (ie. Multiple sclerosis, Guillain [place], seizures, neurological changes) or cancer diagnosis? Are you on any form of radiation or chemotherapy? No  8. Are you pregnant or breast feeding or do you have plans of pregnancy in the future? No  9. Have you been having any signs of worsening  depression or suicidal ideations?  (benlysta only) No  10. Have there been any other new onset medical symptoms? No  11. Have you had any new blood clots? (IVIG only) No        Post Infusion Assessment:  Patient tolerated infusion without incident.  Blood return noted pre and post infusion.  Site patent and intact, free from redness, edema or discomfort.  No evidence of extravasations.  Access discontinued per protocol.       Discharge Plan:   Patient discharged in stable condition accompanied by: self.  Departure Mode: Ambulatory.      Codi Linder RN

## 2023-06-27 ENCOUNTER — TELEPHONE (OUTPATIENT)
Dept: RHEUMATOLOGY | Facility: CLINIC | Age: 78
End: 2023-06-27
Payer: COMMERCIAL

## 2023-06-27 NOTE — TELEPHONE ENCOUNTER
[facility] Call Center    Phone Message    May a detailed message be left on voicemail: yes     Reason for Call: Medication Question or concern regarding medication       Pt Calling unable to afford the infusion. Pt would like a call back from care team       Action Taken: Other: [facility]    Travel Screening: Not Applicable

## 2023-07-06 NOTE — TELEPHONE ENCOUNTER
Pt calling back, she spoke to her insurance company and everything is good. She will be continuing the orencia

## 2023-07-14 ENCOUNTER — INFUSION THERAPY VISIT (OUTPATIENT)
Dept: INFUSION THERAPY | Facility: CLINIC | Age: 78
End: 2023-07-14
Attending: INTERNAL MEDICINE
Payer: MEDICARE

## 2023-07-14 VITALS
SYSTOLIC BLOOD PRESSURE: 97 MMHG | HEART RATE: 74 BPM | DIASTOLIC BLOOD PRESSURE: 58 MMHG | WEIGHT: 188.8 LBS | TEMPERATURE: 98.4 F | BODY MASS INDEX: 31.42 KG/M2

## 2023-07-14 DIAGNOSIS — M06.00 SERONEGATIVE RHEUMATOID ARTHRITIS (H): ICD-10-CM

## 2023-07-14 DIAGNOSIS — M06.042 RHEUMATOID ARTHRITIS INVOLVING BOTH HANDS WITH NEGATIVE RHEUMATOID FACTOR (H): Primary | ICD-10-CM

## 2023-07-14 DIAGNOSIS — M06.041 RHEUMATOID ARTHRITIS INVOLVING BOTH HANDS WITH NEGATIVE RHEUMATOID FACTOR (H): Primary | ICD-10-CM

## 2023-07-14 PROCEDURE — 96365 THER/PROPH/DIAG IV INF INIT: CPT

## 2023-07-14 PROCEDURE — 258N000003 HC RX IP 258 OP 636: Performed by: INTERNAL MEDICINE

## 2023-07-14 PROCEDURE — 250N000028 HC RX JA MOD (INTRAVENOUS), IP 250 OP 636: Mod: JZ,JA | Performed by: INTERNAL MEDICINE

## 2023-07-14 RX ORDER — EPINEPHRINE 1 MG/ML
0.3 INJECTION, SOLUTION, CONCENTRATE INTRAVENOUS EVERY 5 MIN PRN
Status: CANCELLED | OUTPATIENT
Start: 2023-07-27

## 2023-07-14 RX ORDER — MEPERIDINE HYDROCHLORIDE 25 MG/ML
25 INJECTION INTRAMUSCULAR; INTRAVENOUS; SUBCUTANEOUS EVERY 30 MIN PRN
Status: CANCELLED | OUTPATIENT
Start: 2023-07-27

## 2023-07-14 RX ORDER — DIPHENHYDRAMINE HYDROCHLORIDE 50 MG/ML
50 INJECTION INTRAMUSCULAR; INTRAVENOUS
Status: CANCELLED
Start: 2023-07-27

## 2023-07-14 RX ORDER — ALBUTEROL SULFATE 0.83 MG/ML
2.5 SOLUTION RESPIRATORY (INHALATION)
Status: CANCELLED | OUTPATIENT
Start: 2023-07-27

## 2023-07-14 RX ORDER — METHYLPREDNISOLONE SODIUM SUCCINATE 125 MG/2ML
125 INJECTION, POWDER, LYOPHILIZED, FOR SOLUTION INTRAMUSCULAR; INTRAVENOUS
Status: CANCELLED
Start: 2023-07-27

## 2023-07-14 RX ORDER — ALBUTEROL SULFATE 90 UG/1
1-2 AEROSOL, METERED RESPIRATORY (INHALATION)
Status: CANCELLED
Start: 2023-07-27

## 2023-07-14 RX ADMIN — SODIUM CHLORIDE 750 MG: 9 INJECTION, SOLUTION INTRAVENOUS at 14:30

## 2023-07-14 RX ADMIN — SODIUM CHLORIDE 250 ML: 9 INJECTION, SOLUTION INTRAVENOUS at 14:31

## 2023-07-14 NOTE — PROGRESS NOTES
Infusion Nursing Note:  Briana Mcgee presents today for Orencia.    Patient seen by provider today: No   present during visit today: Not Applicable.    Note: N/A.    Intravenous Access:  Peripheral IV placed.    ~~~ NOTE: If the patient answers yes to any of the questions below, hold the infusion and contact ordering provider or on-call provider.    Do you currently have any signs of illness or infection or are you on any antibiotics? No  Have you recently had an elevated temperature, fever, chills, productive cough, coughing for 3 weeks or longer or hemoptysis, abnormal vital signs, night sweats, chest pain or have you noticed a decrease in your appetite, unexplained weight loss or fatigue? No  Have you had any new, sudden, or worsening abdominal pain? No  Do you have any open wounds or new incisions? (exclude for patients with hidradenitis suppurativa) No  Have you recently been diagnosed with any new nervous system diseases (ie. Multiple sclerosis, Guillain Josephine, seizures, neurological changes) or cancer diagnosis? Are you on any form of radiation or chemotherapy? No  Have there been any other new onset medical symptoms? No  Are you pregnant or breast feeding or do you have plans of pregnancy in the future? No; N/A  Do you have any upcoming hospitalizations or surgeries? Does not include esophagogastroduodenoscopy, colonoscopy, endoscopic retrograde cholangiopancreatography (ERCP), endoscopic ultrasound (EUS), dental procedures (including cleanings, fillings, implants, extractions)  or joint aspiration/steroid injections No  Have you or anyone in your household received a live vaccination in the past 4 weeks? Please note: No live vaccines while on biologic/chemotherapy until 6 months after the last treatment. Patient can receive the flu vaccine (shot only).  It is optimal for the patient to get it mid cycle, but it can be given at any time as long as it is not on the day of the infusion. No  If  applicable to prescribed medication, confirm negative PPD or quantiferon gold MTB. If positive, verify has negative chest x-ray or the patient is at least 4 weeks post initiation of INH/B6 therapy and have clearance from provider before infusion (Y/N:[id number])  If applicable to prescribed medication, confirm negative hepatitis B surface antigen or hepatitis C. If positive, clearance from provider before infusion. (Y/N: [id number])  Rheumatology patients receiving tocilizumab (Actemra): If labs were drawn within the past week, hold dosing until cleared to infuse If AST/ALT > 2 X upper limit normal; ANC < 1.0. NO; N/A  Patients receiving belimumab (Benlysta): Have you been having any signs of worsening depression or suicidal ideations? No; N/A  Post Infusion Assessment:  Patient tolerated infusion without incident.  Blood return noted pre and post infusion.  Site patent and intact, free from redness, edema or discomfort.  No evidence of extravasations.  Access discontinued per protocol.   Discharge Plan:   Patient discharged in stable condition accompanied by: self.  Departure Mode: Ambulatory.  Maurisio Coughlin RN

## 2023-07-17 ENCOUNTER — OFFICE VISIT (OUTPATIENT)
Dept: FAMILY MEDICINE | Facility: CLINIC | Age: 78
End: 2023-07-17
Payer: COMMERCIAL

## 2023-07-17 VITALS
SYSTOLIC BLOOD PRESSURE: 112 MMHG | TEMPERATURE: 97.5 F | RESPIRATION RATE: 16 BRPM | HEART RATE: 70 BPM | WEIGHT: 188 LBS | DIASTOLIC BLOOD PRESSURE: 58 MMHG | OXYGEN SATURATION: 94 % | HEIGHT: 65 IN | BODY MASS INDEX: 31.32 KG/M2

## 2023-07-17 DIAGNOSIS — E28.39 OVARIAN FAILURE: ICD-10-CM

## 2023-07-17 DIAGNOSIS — Z00.00 ENCOUNTER FOR MEDICARE ANNUAL WELLNESS EXAM: Primary | ICD-10-CM

## 2023-07-17 DIAGNOSIS — D84.9 IMMUNOSUPPRESSION (H): ICD-10-CM

## 2023-07-17 DIAGNOSIS — E03.9 HYPOTHYROIDISM, UNSPECIFIED TYPE: ICD-10-CM

## 2023-07-17 DIAGNOSIS — D68.51 HETEROZYGOUS FACTOR V LEIDEN MUTATION (H): ICD-10-CM

## 2023-07-17 DIAGNOSIS — R39.15 URINARY URGENCY: ICD-10-CM

## 2023-07-17 DIAGNOSIS — K21.9 GASTROESOPHAGEAL REFLUX DISEASE WITHOUT ESOPHAGITIS: ICD-10-CM

## 2023-07-17 DIAGNOSIS — M80.00XA AGE-RELATED OSTEOPOROSIS WITH CURRENT PATHOLOGICAL FRACTURE, INITIAL ENCOUNTER: ICD-10-CM

## 2023-07-17 DIAGNOSIS — J30.2 OTHER SEASONAL ALLERGIC RHINITIS: ICD-10-CM

## 2023-07-17 DIAGNOSIS — C50.919 MALIGNANT NEOPLASM OF FEMALE BREAST, UNSPECIFIED ESTROGEN RECEPTOR STATUS, UNSPECIFIED LATERALITY, UNSPECIFIED SITE OF BREAST (H): ICD-10-CM

## 2023-07-17 DIAGNOSIS — N18.2 CKD (CHRONIC KIDNEY DISEASE) STAGE 2, GFR 60-89 ML/MIN: ICD-10-CM

## 2023-07-17 DIAGNOSIS — I50.22 CHRONIC HFREF (HEART FAILURE WITH REDUCED EJECTION FRACTION) (H): ICD-10-CM

## 2023-07-17 DIAGNOSIS — E78.5 HYPERLIPIDEMIA LDL GOAL <100: ICD-10-CM

## 2023-07-17 LAB
ANION GAP SERPL CALCULATED.3IONS-SCNC: 9 MMOL/L (ref 7–15)
BUN SERPL-MCNC: 21.6 MG/DL (ref 8–23)
CALCIUM SERPL-MCNC: 9.1 MG/DL (ref 8.8–10.2)
CHLORIDE SERPL-SCNC: 102 MMOL/L (ref 98–107)
CREAT SERPL-MCNC: 1.06 MG/DL (ref 0.51–0.95)
DEPRECATED HCO3 PLAS-SCNC: 29 MMOL/L (ref 22–29)
GFR SERPL CREATININE-BSD FRML MDRD: 53 ML/MIN/1.73M2
GLUCOSE SERPL-MCNC: 114 MG/DL (ref 70–99)
LDLC SERPL DIRECT ASSAY-MCNC: 57 MG/DL
POTASSIUM SERPL-SCNC: 3.9 MMOL/L (ref 3.4–5.3)
SODIUM SERPL-SCNC: 140 MMOL/L (ref 136–145)
TSH SERPL DL<=0.005 MIU/L-ACNC: 1.25 UIU/ML (ref 0.3–4.2)

## 2023-07-17 PROCEDURE — G0439 PPPS, SUBSEQ VISIT: HCPCS | Performed by: FAMILY MEDICINE

## 2023-07-17 PROCEDURE — 83721 ASSAY OF BLOOD LIPOPROTEIN: CPT | Performed by: FAMILY MEDICINE

## 2023-07-17 PROCEDURE — 99214 OFFICE O/P EST MOD 30 MIN: CPT | Mod: 25 | Performed by: FAMILY MEDICINE

## 2023-07-17 PROCEDURE — 80048 BASIC METABOLIC PNL TOTAL CA: CPT | Performed by: FAMILY MEDICINE

## 2023-07-17 PROCEDURE — 84443 ASSAY THYROID STIM HORMONE: CPT | Performed by: FAMILY MEDICINE

## 2023-07-17 PROCEDURE — 36415 COLL VENOUS BLD VENIPUNCTURE: CPT | Performed by: FAMILY MEDICINE

## 2023-07-17 RX ORDER — LISINOPRIL 5 MG/1
5 TABLET ORAL 2 TIMES DAILY
Qty: 180 TABLET | Refills: 3 | Status: SHIPPED | OUTPATIENT
Start: 2023-07-17 | End: 2024-04-04

## 2023-07-17 RX ORDER — ATORVASTATIN CALCIUM 40 MG/1
40 TABLET, FILM COATED ORAL EVERY EVENING
Qty: 90 TABLET | Refills: 3 | Status: SHIPPED | OUTPATIENT
Start: 2023-07-17 | End: 2024-05-16

## 2023-07-17 RX ORDER — METOPROLOL SUCCINATE 25 MG/1
25 TABLET, EXTENDED RELEASE ORAL DAILY
Qty: 90 TABLET | Refills: 3 | Status: SHIPPED | OUTPATIENT
Start: 2023-07-17

## 2023-07-17 RX ORDER — LEVOTHYROXINE SODIUM 137 UG/1
137 TABLET ORAL DAILY
Qty: 90 TABLET | Refills: 3 | Status: SHIPPED | OUTPATIENT
Start: 2023-07-17

## 2023-07-17 RX ORDER — POTASSIUM CHLORIDE 750 MG/1
10 TABLET, EXTENDED RELEASE ORAL DAILY
Qty: 90 TABLET | Refills: 3 | Status: SHIPPED | OUTPATIENT
Start: 2023-07-17 | End: 2024-04-04

## 2023-07-17 RX ORDER — PANTOPRAZOLE SODIUM 40 MG/1
40 TABLET, DELAYED RELEASE ORAL DAILY
Qty: 90 TABLET | Refills: 3 | Status: SHIPPED | OUTPATIENT
Start: 2023-07-17

## 2023-07-17 RX ORDER — LEVOTHYROXINE SODIUM 25 UG/1
25 TABLET ORAL DAILY
Qty: 90 TABLET | Refills: 3 | Status: SHIPPED | OUTPATIENT
Start: 2023-07-17

## 2023-07-17 RX ORDER — FLUTICASONE PROPIONATE 50 MCG
SPRAY, SUSPENSION (ML) NASAL
Qty: 16 G | Refills: 3 | Status: SHIPPED | OUTPATIENT
Start: 2023-07-17

## 2023-07-17 RX ORDER — TOLTERODINE 4 MG/1
CAPSULE, EXTENDED RELEASE ORAL
Qty: 90 CAPSULE | Refills: 3 | Status: SHIPPED | OUTPATIENT
Start: 2023-07-17

## 2023-07-17 ASSESSMENT — ENCOUNTER SYMPTOMS
CONSTIPATION: 0
DIZZINESS: 0
HEADACHES: 1
DIARRHEA: 0
EYE PAIN: 0
NERVOUS/ANXIOUS: 0
HEMATOCHEZIA: 0
SHORTNESS OF BREATH: 0
FEVER: 0
PARESTHESIAS: 0
WEAKNESS: 0
PALPITATIONS: 0
COUGH: 0
DYSURIA: 0
HEARTBURN: 0
JOINT SWELLING: 1
HEMATURIA: 0
MYALGIAS: 0
ARTHRALGIAS: 1
SORE THROAT: 0
CHILLS: 0
FREQUENCY: 0
NAUSEA: 0
BREAST MASS: 0
ABDOMINAL PAIN: 0

## 2023-07-17 ASSESSMENT — PAIN SCALES - GENERAL: PAINLEVEL: NO PAIN (0)

## 2023-07-17 ASSESSMENT — ACTIVITIES OF DAILY LIVING (ADL): CURRENT_FUNCTION: NO ASSISTANCE NEEDED

## 2023-07-17 NOTE — PROGRESS NOTES
The patient was counseled and encouraged to consider modifying their diet and eating habits. She was provided with information on recommended healthy diet options.  She is at risk for falling and has been provided with information to reduce the risk of falling at home.

## 2023-07-17 NOTE — PROGRESS NOTES
"SUBJECTIVE:   Jenna is a 78 year old who presents for Preventive Visit.      7/17/2023     1:53 PM   Additional Questions   Roomed by Neda Prater MA   Accompanied by self     Are you in the first 12 months of your Medicare coverage?  No    Healthy Habits:     In general, how would you rate your overall health?  Good    Frequency of exercise:  4-5 days/week    Duration of exercise:  30-45 minutes    Do you usually eat at least 4 servings of fruit and vegetables a day, include whole grains    & fiber and avoid regularly eating high fat or \"junk\" foods?  No    Taking medications regularly:  Yes    Medication side effects:  None    Ability to successfully perform activities of daily living:  No assistance needed    Home Safety:  No safety concerns identified    Hearing Impairment:  No hearing concerns    In the past 6 months, have you been bothered by leaking of urine?  No    In general, how would you rate your overall mental or emotional health?  Good    Additional concerns today:  No    Have you ever done Advance Care Planning? (For example, a Health Directive, POLST, or a discussion with a medical provider or your loved ones about your wishes): Yes, patient states has an Advance Care Planning document and will bring a copy to the clinic.       Fall risk  Fallen 2 or more times in the past year?: No  Any fall with injury in the past year?: Yes  Timed Up and Go Test (>13.5 is fall risk; contact physician) : 7      Cognitive Screening   1) Repeat 3 items (Leader, Season, Table)    2) Clock draw: NORMAL  3) 3 item recall: Recalls 3 objects  Results: 3 items recalled: COGNITIVE IMPAIRMENT LESS LIKELY    Mini-CogTM Copyright JORDON Matias. Licensed by the author for use in [facility]; reprinted with permission (mandeep@.[email or web address]). All rights reserved.      Do you have sleep apnea, excessive snoring or daytime drowsiness?: no    Reviewed and updated as needed this visit by clinical staff   Tobacco  Allergies  " Meds              Reviewed and updated as needed this visit by Provider                 Social History     Tobacco Use    Smoking status: Former     Types: Cigarettes     Quit date: 1996     Years since quittin.0    Smokeless tobacco: Never    Tobacco comments:     quit 10-12 years ago, .   Substance Use Topics    Alcohol use: Yes     Comment: glass of wine at night             2023     1:51 PM   Alcohol Use   Prescreen: >3 drinks/day or >7 drinks/week? No          No data to display              Do you have a current opioid prescription? No  Do you use any other controlled substances or medications that are not prescribed by a provider? None          Medication Followup of Detrol  Taking Medication as prescribed: yes  Side Effects:  None  Medication Helping Symptoms:  yes    Medication recheck for Protonix  Taking as prescribed: yes  Side effects: None  Helping sx: yes    Hyperlipidemia Follow-Up    Are you regularly taking any medication or supplement to lower your cholesterol?   Yes- Atorvastatin  Are you having muscle aches or other side effects that you think could be caused by your cholesterol lowering medication?  No    Hypertension Follow-up    Do you check your blood pressure regularly outside of the clinic? Yes   Are you following a low salt diet? Yes  Are your blood pressures ever more than 140 on the top number (systolic) OR more   than 90 on the bottom number (diastolic), for example 140/90? No    Heart Failure Follow-up  Are you experiencing any shortness of breath? No  Are you experiencing any swelling in your legs or feet?  No  Are you using more pillows than usual? Yes- sometimes  Do you cough at night?  Yes- little bit  Do you check your weight daily?  Yes  Have you had a weight change recently?  No  Are you having any of the following side effects from your medications? (Select all that apply)  Cough  Since your last visit, how many times have you gone to the cardiologist,  urgent care, emergency room, or hospital because of your heart failure?   2 times    Chronic Kidney Disease Follow-up    Do you take any over the counter pain medicine?: Yes  What over the counter medicine are you taking for your pain?:  Tylenol and Aleve    How often do you take this medicine?:  Three times daily    Hypothyroidism Follow-up    Since last visit, patient describes the following symptoms: Weight stable, no hair loss, no skin changes, no constipation, no loose stools      Current providers sharing in care for this patient include:   Patient Care Team:  Xavier Vega MD as PCP - General (Family Practice)  Kam Montero MD as MD (Physical Medicine and Rehabilitation)  Xavier Vega MD as Assigned PCP  Davy Shook MD as Assigned Heart and Vascular Provider  Chaim Puente MD as Assigned Rheumatology Provider  Wilber Shook MD as MD (Otolaryngology)  Wilber Shook MD as Assigned Surgical Provider    The following health maintenance items are reviewed in Epic and correct as of today:  Health Maintenance   Topic Date Due    HF ACTION PLAN  Never done    MICROALBUMIN  12/06/2019    LIPID  11/23/2021    COVID-19 Vaccine (6 - Moderna series) 10/25/2022    DEXA  10/31/2022    MEDICARE ANNUAL WELLNESS VISIT  08/01/2023    TSH W/FREE T4 REFLEX  08/01/2023    ANNUAL REVIEW OF HM ORDERS  08/01/2023    BMP  08/13/2023    INFLUENZA VACCINE (1) 09/01/2023    DTAP/TDAP/TD IMMUNIZATION (2 - Td or Tdap) 01/07/2024    ALT  05/25/2024    CBC  05/25/2024    HEMOGLOBIN  05/25/2024    FALL RISK ASSESSMENT  07/17/2024    COLORECTAL CANCER SCREENING  09/02/2026    ADVANCE CARE PLANNING  08/01/2027    HEPATITIS C SCREENING  Completed    PHQ-2 (once per calendar year)  Completed    Pneumococcal Vaccine: 65+ Years  Completed    URINALYSIS  Completed    ZOSTER IMMUNIZATION  Completed    IPV IMMUNIZATION  Aged Out    MENINGITIS IMMUNIZATION  Aged Out    MAMMO SCREENING  Discontinued  "            Pertinent mammograms are reviewed under the imaging tab.    Review of Systems   Constitutional: Negative for chills and fever.   HENT: Negative for congestion, ear pain, hearing loss and sore throat.    Eyes: Negative for pain and visual disturbance.   Respiratory: Negative for cough and shortness of breath.    Cardiovascular: Negative for chest pain, palpitations and peripheral edema.   Gastrointestinal: Negative for abdominal pain, constipation, diarrhea, heartburn, hematochezia and nausea.   Breasts:  Negative for tenderness, breast mass and discharge.   Genitourinary: Negative for dysuria, frequency, genital sores, hematuria, pelvic pain, urgency, vaginal bleeding and vaginal discharge.   Musculoskeletal: Positive for arthralgias and joint swelling. Negative for myalgias.   Skin: Negative for rash.   Neurological: Positive for headaches. Negative for dizziness, weakness and paresthesias.   Psychiatric/Behavioral: Negative for mood changes. The patient is not nervous/anxious.          OBJECTIVE:   /58 (BP Location: Right arm, Patient Position: Sitting, Cuff Size: Adult Large)   Pulse 70   Temp 97.5  F (36.4  C) (Tympanic)   Resp 16   Ht 1.651 m (5' 5\")   Wt 85.3 kg (188 lb)   LMP  (LMP Unknown)   SpO2 94%   BMI 31.28 kg/m   Estimated body mass index is 31.28 kg/m  as calculated from the following:    Height as of this encounter: 1.651 m (5' 5\").    Weight as of this encounter: 85.3 kg (188 lb).  Physical Exam  GENERAL: healthy, alert and no distress  EYES: Eyes grossly normal to inspection, PERRL and conjunctivae and sclerae normal  HENT: ear canals and TM's normal, nose and mouth without ulcers or lesions  NECK: no adenopathy, no asymmetry, masses, or scars and thyroid normal to palpation  RESP: lungs clear to auscultation - no rales, rhonchi or wheezes  CV: regular rate and rhythm, normal S1 S2, no S3 or S4, no murmur, click or rub, no peripheral edema and peripheral pulses " strong  ABDOMEN: soft, nontender, no hepatosplenomegaly, no masses and bowel sounds normal  MS: no gross musculoskeletal defects noted, no edema  SKIN: no suspicious lesions or rashes  NEURO: Normal strength and tone, mentation intact and speech normal  PSYCH: mentation appears normal, affect normal/bright  LYMPH: no cervical adenopathy        ASSESSMENT / PLAN:   (Z00.00) Encounter for Medicare annual wellness exam  (primary encounter diagnosis)  Comment: Discussed healthy lifestyle and preventative cares.    Plan:     (J30.2) Other seasonal allergic rhinitis  Comment: refilled medication  Plan: fluticasone (FLONASE) 50 MCG/ACT nasal spray            (E03.9) Hypothyroidism, unspecified type  Comment: check lab and refilled medication  Plan: levothyroxine (SYNTHROID/LEVOTHROID) 137 MCG         tablet, levothyroxine (SYNTHROID/LEVOTHROID) 25        MCG tablet, TSH WITH FREE T4 REFLEX            (K21.9) Gastroesophageal reflux disease without esophagitis  Comment: controlled on med  Plan: pantoprazole (PROTONIX) 40 MG EC tablet            (R39.15) Urinary urgency  Comment: on med  Plan: tolterodine ER (DETROL LA) 4 MG 24 hr capsule            (M80.00XA) Age-related osteoporosis with current pathological fracture, initial encounter  Comment: check scan  Plan:     (N18.2) CKD (chronic kidney disease) stage 2, GFR 60-89 ml/min  Comment: check lab  Plan: lisinopril (ZESTRIL) 5 MG tablet, potassium         chloride ER (K-TAB/KLOR-CON) 10 MEQ CR tablet,         BASIC METABOLIC PANEL            (D84.9) Immunosuppression (H)  Comment: on medication  Plan:     (D68.51) Heterozygous factor V Leiden mutation (H)  Comment: noted  Plan:     (C50.919) Malignant neoplasm of female breast, unspecified estrogen receptor status, unspecified laterality, unspecified site of breast (H)  Comment:   Plan:     (I50.22) Chronic HFrEF (heart failure with reduced ejection fraction) (H)  Comment: seeing cardiology  Plan: metoprolol succinate ER  "(TOPROL XL) 25 MG 24 hr        tablet, CANCELED: Lipid panel reflex to direct         LDL Fasting            (E28.39) Ovarian failure  Comment: check dexa scan  Plan: DEXA HIP/PELVIS/SPINE - Future            (E78.5) Hyperlipidemia LDL goal <100  Comment: check lab  Plan: atorvastatin (LIPITOR) 40 MG tablet, LDL         cholesterol direct            Patient has been advised of split billing requirements and indicates understanding: Yes      COUNSELING:  Reviewed preventive health counseling, as reflected in patient instructions       Regular exercise       Healthy diet/nutrition       Vision screening       Dental care      BMI:   Estimated body mass index is 31.28 kg/m  as calculated from the following:    Height as of this encounter: 1.651 m (5' 5\").    Weight as of this encounter: 85.3 kg (188 lb).   Weight management plan: diet      She reports that she quit smoking about 27 years ago. Her smoking use included cigarettes. She has never used smokeless tobacco.      Appropriate preventive services were discussed with this patient, including applicable screening as appropriate for cardiovascular disease, diabetes, osteopenia/osteoporosis, and glaucoma.  As appropriate for age/gender, discussed screening for colorectal cancer, prostate cancer, breast cancer, and cervical cancer. Checklist reviewing preventive services available has been given to the patient.    Reviewed patients plan of care and provided an AVS. The Basic Care Plan (routine screening as documented in Health Maintenance) for Briana meets the Care Plan requirement. This Care Plan has been established and reviewed with the Patient.          Xavier Vega MD  [facility]    Identified Health Risks:    "

## 2023-07-17 NOTE — PATIENT INSTRUCTIONS
Please go to lab.    I refilled your medications.    Please get a dexa scan to check for signs of osteoporosis.    Please be aware that there will be an additional charge during your preventative visit due to either a new diagnosis and/or chronic disease management.    Preventative visits screen for diseases prior to they occur.  They do not cover for any new diagnosis or chronic disease management which would include medication refills, labs etc.    If you have questions regarding your coverage please check with your insurance provider.  At [facility] we need to code correctly to be in compliance with all insurance companies.        Thank you for choosing [facility].  You may be receiving an email and/or telephone survey request from [facility] Customer Experience regarding your visit today.  Please take a few minutes to respond to the survey to let us know how we are doing.      If you have questions or concerns, please contact us via [employer] or you can contact your care team at [phone] option [phone].    Our Clinic hours are:  Monday - Thursday 7am-6pm  Friday 7am-5pm    The [place] outpatient lab hours are:  Monday - Friday 7am-4:30pm    Appointments are required, call [phone]    If you have clinical questions after hours or would like to schedule an appointment,  call the clinic at [phone].        Patient Education   Personalized Prevention Plan  You are due for the preventive services outlined below.  Your care team is available to assist you in scheduling these services.  If you have already completed any of these items, please share that information with your care team to update in your medical record.  Health Maintenance Due   Topic Date Due    Heart Failure Action Plan  Never done    Kidney Microalbumin Urine Test  12/06/2019    Cholesterol Lab  11/23/2021    COVID-19 Vaccine (6 - Moderna series) 10/25/2022    Osteoporosis Screening  10/31/2022    Annual Wellness Visit  08/01/2023    Thyroid  Function Lab  08/01/2023    ANNUAL REVIEW OF HM ORDERS  08/01/2023    Basic Metabolic Panel  08/13/2023       Understanding USDA MyPlate  The USDA has guidelines to help you make healthy food choices. These are called MyPlate. MyPlate shows the food groups that make up healthy meals using the image of a place setting. Before you eat, think about the healthiest choices for what to put on your plate or in your cup or bowl. To learn more about building a healthy plate, visit www.myplate.gov.     The food groups  Fruits. Any fruit or 100% fruit juice counts as part of the Fruit Group. Fruits may be fresh, canned, frozen, or dried, and may be whole, cut-up, or pureed. Make 1/2 of your plate fruits and vegetables.  Vegetables. Any vegetable or 100% vegetable juice counts as a member of the Vegetable Group. Vegetables may be fresh, frozen, canned, or dried. They can be served raw or cooked and may be whole, cut-up, or mashed. Make 1/2 of your plate fruits and vegetables.  Grains. All foods made from grains are part of the Grains Group. These include wheat, rice, oats, cornmeal, and barley. Grains are often used to make foods such as bread, pasta, oatmeal, cereal, tortillas, and grits. Grains should be no more than 1/4 of your plate. At least half of your grains should be whole grains.  Protein. This group includes meat, poultry, seafood, beans and peas, eggs, processed soy products (such as tofu), nuts (including nut butters), and seeds. Make protein choices no more than 1/4 of your plate. Meat and poultry choices should be lean or low fat.  Dairy. The Dairy Group includes all fluid milk products and foods made from milk that contain calcium, such as yogurt and cheese. (Foods that have little calcium, such as cream, butter, and cream cheese, are not part of this group.) Most dairy choices should be low-fat or fat-free.  Things to limit  Eating healthy also means limiting these things in your diet:  Oils. Oils aren't a food  group, but they do contain essential nutrients. These are fats that are liquid at room temperature. Including small amounts of oils in your diet is fine and can even be good for you. But it's important to watch how much oil you include in your diet. Oils include avocado, canola, corn, olive, soybean, vegetable, and sunflower. Foods that are mainly oil include mayonnaise, certain salad dressings, and soft margarines. You likely already get your daily oil allowance from the foods you eat.  Salt (sodium).  Many processed foods have a lot of sodium. To keep sodium intake down, eat fresh vegetables, meats, poultry, and seafood when possible. Buy low-sodium, reduced-sodium, or no-salt-added food products at the store. And don't add salt to your meals at home. Instead, season them with herbs and spices such as dill, oregano, cumin, and paprika. Or try adding flavor with vinegar, onion, garlic, or lemon or lime zest and juice.  Saturated fat . Saturated fats are most often found in animal products such as beef, pork, and chicken. They are often solid at room temperature, such as butter. To reduce your saturated fat intake, choose leaner cuts of meat and poultry. And try healthier cooking methods such as grilling, broiling, roasting, or baking. For a simple lower-fat swap, use plain nonfat yogurt instead of mayonnaise when making potato salad or macaroni salad.  Added sugars.  These are sugars added to foods. They are in foods such as ice cream, candy, soda, fruit drinks, sports drinks, energy drinks, cookies, pastries, jams, and syrups. Cut down on added sugars by sharing sweet treats with a family member or friend. You can also choose fruit for dessert, and drink water or other unsweetened beverages.  Alcohol. Alcohol contains calories but few nutrients. If you don't drink alcohol, don't start drinking for any reason. But if you do choose to drink alcohol, do so only in moderation. This means no more than 2 drinks in a day  for men and no more than 1 drink per day for women, on days when you have alcohol.  [employer] last reviewed this educational content on 1/1/2023 2000-2023 [employer]. All rights reserved. This information is not intended as a substitute for professional medical care. Always follow your healthcare professional's instructions.          Preventing Falls at Home  A person can fall for many reasons. Older adults may fall because reaction time slows as we age. Your muscles and joints may get stiff, weak, or less flexible because of illness, medicines, or a physical condition.   Other health problems that make falls more likely include:   Arthritis  Dizziness or lightheadedness when you stand up (orthostatic hypotension)  History of a stroke  Dizziness  Anemia  Certain medicines taken for mental illness or to control blood pressure.  Problems with balance or gait  Bladder or urinary problems  History of falling  Changes in vision (vision impairment)  Changes in thinking skills and memory (cognitive impairment)  Muscle weakness  Excessive alcohol use  Falls can cause serious injuries, such as head trauma, broken bones, dislocated joints, internal bleeding, and cuts. Injuries like these can limit your independence.   Prevention tips  To help prevent falls and fall-related injuries, follow the tips below.    Floors  To make floors safer:   Put nonskid pads under area rugs.  Remove small rugs.  Replace worn floor coverings.  Tack carpets firmly to each step on carpeted stairs. Put nonskid strips on the edges of uncarpeted stairs.  Keep floors and stairs free of clutter and cords.  Keep floors and stairs clear of animal and children's toys  Arrange furniture so there are clear pathways.  Clean up any spills right away. Don't walk on wet floors.  Bathrooms    To make bathrooms safer:   Install grab bars in the tub or shower.  Install a raised (elevated) toilet or toilet seat.  Apply nonskid strips or put a  "nonskid rubber mat in the tub or shower.  Sit on a bath chair to bathe.  Use bathmats with nonskid backing.  Lighting  To improve visibility in your home:    Keep a flashlight in each room. Or put a lamp next to the bed within easy reach.  Put nightlights in the bedrooms, hallways, kitchen, and bathrooms.  Make sure all stairways have good lighting. There should be a light switch at the bottom and the top of each stairway.  Other changes to make  Look around to find any safety hazards. Look closely at doorways, walkways, and the driveway. Remove or repair any safety problems that you find.  Wear shoes that fit well. Never go barefoot or wear socks or slippers with smooth soles.  See your eye care provider once a year if you wear glasses. This is to be sure the prescription is still right for you.  Take your time when going up and down stairs; always use handrails. Never carry items in both hands.  Wear an alert necklace or bracelet if you are already prone to falling.  Put handrails on both sides of stairs and in walkways for more support. To prevent injury to your wrist or arm, don t use handrails to pull yourself up.  Use a \"reach stick\" to grab hard-to-reach items.  Install grab bars wherever needed to pull yourself up.  Arrange items that you use often. This will make them easier to find or reach.  Keep track of where your pets are so you don't trip over them when you are walking.    [employer] last reviewed this educational content on 11/1/2022 2000-2023 [employer]. All rights reserved. This information is not intended as a substitute for professional medical care. Always follow your healthcare professional's instructions.           "

## 2023-08-10 ENCOUNTER — INFUSION THERAPY VISIT (OUTPATIENT)
Dept: INFUSION THERAPY | Facility: CLINIC | Age: 78
End: 2023-08-10
Attending: INTERNAL MEDICINE
Payer: MEDICARE

## 2023-08-10 VITALS
SYSTOLIC BLOOD PRESSURE: 144 MMHG | OXYGEN SATURATION: 96 % | TEMPERATURE: 97.7 F | WEIGHT: 182.8 LBS | RESPIRATION RATE: 16 BRPM | BODY MASS INDEX: 30.46 KG/M2 | DIASTOLIC BLOOD PRESSURE: 78 MMHG | HEIGHT: 65 IN | HEART RATE: 60 BPM

## 2023-08-10 DIAGNOSIS — M06.042 RHEUMATOID ARTHRITIS INVOLVING BOTH HANDS WITH NEGATIVE RHEUMATOID FACTOR (H): Primary | ICD-10-CM

## 2023-08-10 DIAGNOSIS — M06.041 RHEUMATOID ARTHRITIS INVOLVING BOTH HANDS WITH NEGATIVE RHEUMATOID FACTOR (H): Primary | ICD-10-CM

## 2023-08-10 DIAGNOSIS — M06.00 SERONEGATIVE RHEUMATOID ARTHRITIS (H): ICD-10-CM

## 2023-08-10 PROCEDURE — 250N000028 HC RX JA MOD (INTRAVENOUS), IP 250 OP 636: Mod: JZ,JA | Performed by: INTERNAL MEDICINE

## 2023-08-10 PROCEDURE — 258N000003 HC RX IP 258 OP 636: Performed by: INTERNAL MEDICINE

## 2023-08-10 PROCEDURE — 96365 THER/PROPH/DIAG IV INF INIT: CPT

## 2023-08-10 RX ORDER — ALBUTEROL SULFATE 0.83 MG/ML
2.5 SOLUTION RESPIRATORY (INHALATION)
Status: CANCELLED | OUTPATIENT
Start: 2023-08-11

## 2023-08-10 RX ORDER — EPINEPHRINE 1 MG/ML
0.3 INJECTION, SOLUTION, CONCENTRATE INTRAVENOUS EVERY 5 MIN PRN
Status: CANCELLED | OUTPATIENT
Start: 2023-08-11

## 2023-08-10 RX ORDER — METHYLPREDNISOLONE SODIUM SUCCINATE 125 MG/2ML
125 INJECTION, POWDER, LYOPHILIZED, FOR SOLUTION INTRAMUSCULAR; INTRAVENOUS
Status: CANCELLED
Start: 2023-08-11

## 2023-08-10 RX ORDER — MEPERIDINE HYDROCHLORIDE 25 MG/ML
25 INJECTION INTRAMUSCULAR; INTRAVENOUS; SUBCUTANEOUS EVERY 30 MIN PRN
Status: CANCELLED | OUTPATIENT
Start: 2023-08-11

## 2023-08-10 RX ORDER — DIPHENHYDRAMINE HYDROCHLORIDE 50 MG/ML
50 INJECTION INTRAMUSCULAR; INTRAVENOUS
Status: CANCELLED
Start: 2023-08-11

## 2023-08-10 RX ORDER — ALBUTEROL SULFATE 90 UG/1
1-2 AEROSOL, METERED RESPIRATORY (INHALATION)
Status: CANCELLED
Start: 2023-08-11

## 2023-08-10 RX ADMIN — SODIUM CHLORIDE 250 ML: 9 INJECTION, SOLUTION INTRAVENOUS at 09:26

## 2023-08-10 RX ADMIN — SODIUM CHLORIDE 750 MG: 9 INJECTION, SOLUTION INTRAVENOUS at 09:25

## 2023-08-10 NOTE — PROGRESS NOTES
Infusion Nursing Note:  Briana S Fabiolareinaldo presents today for Orencia.    Patient seen by provider today: No   present during visit today: Not Applicable.    Note: Visit was uneventful, pt tolerated this infusion well.      Intravenous Access:  Peripheral IV placed.    Treatment Conditions:  Biological Infusion Checklist:  ~~~ NOTE: If the patient answers yes to any of the questions below, hold the infusion and contact ordering provider or on-call provider.    Have you recently had an elevated temperature, fever, chills, productive cough, coughing for 3 weeks or longer or hemoptysis,  abnormal vital signs, night sweats,  chest pain or have you noticed a decrease in your appetite, unexplained weight loss or fatigue? No  Do you have any open wounds or new incisions? No  Do you have any upcoming hospitalizations or surgeries? Does not include esophagogastroduodenoscopy, colonoscopy, endoscopic retrograde cholangiopancreatography (ERCP), endoscopic ultrasound (EUS), dental procedures or joint aspiration/steroid injections No  Do you currently have any signs of illness or infection or are you on any antibiotics? No  Have you had any new, sudden or worsening abdominal pain? No  Have you or anyone in your household received a live vaccination in the past 4 weeks? Please note: No live vaccines while on biologic/chemotherapy until 6 months after the last treatment. Patient can receive the flu vaccine (shot only), pneumovax and the Covid vaccine. It is optimal for the patient to get these vaccines mid cycle, but they can be given at any time as long as it is not on the day of the infusion. No  Have you recently been diagnosed with any new nervous system diseases (ie. Multiple sclerosis, Guillain [place], seizures, neurological changes) or cancer diagnosis? Are you on any form of radiation or chemotherapy? No  Are you pregnant or breast feeding or do you have plans of pregnancy in the future? No  Have you been having  any signs of worsening depression or suicidal ideations?  (benlysta only) No  Have there been any other new onset medical symptoms? No  Have you had any new blood clots? (IVIG only) No      Post Infusion Assessment:  Patient tolerated infusion without incident.  Site patent and intact, free from redness, edema or discomfort.  Access discontinued per protocol.       Discharge Plan:   Patient discharged in stable condition accompanied by: self.  Departure Mode: Ambulatory.      Ava Zaidi RN

## 2023-08-31 ENCOUNTER — OFFICE VISIT (OUTPATIENT)
Dept: RHEUMATOLOGY | Facility: CLINIC | Age: 78
End: 2023-08-31
Payer: COMMERCIAL

## 2023-08-31 VITALS — DIASTOLIC BLOOD PRESSURE: 63 MMHG | OXYGEN SATURATION: 98 % | SYSTOLIC BLOOD PRESSURE: 126 MMHG | HEART RATE: 62 BPM

## 2023-08-31 DIAGNOSIS — M06.00 SERONEGATIVE RHEUMATOID ARTHRITIS (H): Primary | ICD-10-CM

## 2023-08-31 PROCEDURE — 99214 OFFICE O/P EST MOD 30 MIN: CPT | Performed by: INTERNAL MEDICINE

## 2023-08-31 NOTE — PATIENT INSTRUCTIONS
RHEUMATOLOGY    [facility]  [address] [place] [address]  [place] [id number]    Phone number: [phone]  Fax number: [phone]    If you need a medication refill, please contact us as you may need lab work and/or a follow up visit prior to your refill.      Thank you for choosing [facility]!    SCHEDULE YOU LAB APPT A WEEK BEFORE YOUR FOLLOW UP IN JAN.    BROOKE Guerin RN 8/31/2023 10:33 AM    -------------------------      Change the oral antihistamine (okay to use claritin, zyrtec, allegra; or generic of these). Continue to use daily    Take tylenol 650 mg once, 30 minutes before the Orencia infusion
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
## 2023-09-07 ENCOUNTER — INFUSION THERAPY VISIT (OUTPATIENT)
Dept: INFUSION THERAPY | Facility: CLINIC | Age: 78
End: 2023-09-07
Attending: INTERNAL MEDICINE
Payer: MEDICARE

## 2023-09-07 VITALS
OXYGEN SATURATION: 94 % | HEART RATE: 60 BPM | BODY MASS INDEX: 31.18 KG/M2 | DIASTOLIC BLOOD PRESSURE: 59 MMHG | SYSTOLIC BLOOD PRESSURE: 133 MMHG | RESPIRATION RATE: 16 BRPM | TEMPERATURE: 97.9 F | WEIGHT: 187.4 LBS

## 2023-09-07 DIAGNOSIS — M06.042 RHEUMATOID ARTHRITIS INVOLVING BOTH HANDS WITH NEGATIVE RHEUMATOID FACTOR (H): Primary | ICD-10-CM

## 2023-09-07 DIAGNOSIS — M06.00 SERONEGATIVE RHEUMATOID ARTHRITIS (H): ICD-10-CM

## 2023-09-07 DIAGNOSIS — M06.041 RHEUMATOID ARTHRITIS INVOLVING BOTH HANDS WITH NEGATIVE RHEUMATOID FACTOR (H): Primary | ICD-10-CM

## 2023-09-07 PROCEDURE — 258N000003 HC RX IP 258 OP 636: Performed by: INTERNAL MEDICINE

## 2023-09-07 PROCEDURE — 250N000028 HC RX JA MOD (INTRAVENOUS), IP 250 OP 636: Mod: JZ,JA | Performed by: INTERNAL MEDICINE

## 2023-09-07 PROCEDURE — 96365 THER/PROPH/DIAG IV INF INIT: CPT

## 2023-09-07 RX ORDER — EPINEPHRINE 1 MG/ML
0.3 INJECTION, SOLUTION, CONCENTRATE INTRAVENOUS EVERY 5 MIN PRN
Status: CANCELLED | OUTPATIENT
Start: 2023-09-08

## 2023-09-07 RX ORDER — DIPHENHYDRAMINE HYDROCHLORIDE 50 MG/ML
50 INJECTION INTRAMUSCULAR; INTRAVENOUS
Status: CANCELLED
Start: 2023-09-08

## 2023-09-07 RX ORDER — ALBUTEROL SULFATE 0.83 MG/ML
2.5 SOLUTION RESPIRATORY (INHALATION)
Status: CANCELLED | OUTPATIENT
Start: 2023-09-08

## 2023-09-07 RX ORDER — MEPERIDINE HYDROCHLORIDE 25 MG/ML
25 INJECTION INTRAMUSCULAR; INTRAVENOUS; SUBCUTANEOUS EVERY 30 MIN PRN
Status: CANCELLED | OUTPATIENT
Start: 2023-09-08

## 2023-09-07 RX ORDER — METHYLPREDNISOLONE SODIUM SUCCINATE 125 MG/2ML
125 INJECTION, POWDER, LYOPHILIZED, FOR SOLUTION INTRAMUSCULAR; INTRAVENOUS
Status: CANCELLED
Start: 2023-09-08

## 2023-09-07 RX ORDER — ALBUTEROL SULFATE 90 UG/1
1-2 AEROSOL, METERED RESPIRATORY (INHALATION)
Status: CANCELLED
Start: 2023-09-08

## 2023-09-07 RX ADMIN — SODIUM CHLORIDE 250 ML: 9 INJECTION, SOLUTION INTRAVENOUS at 08:53

## 2023-09-07 RX ADMIN — SODIUM CHLORIDE 750 MG: 9 INJECTION, SOLUTION INTRAVENOUS at 09:31

## 2023-09-07 NOTE — PROGRESS NOTES
OInfusion Nursing Note:  Briana JORDON Mcgee presents today for Orencia.    Patient seen by provider today: No   present during visit today: Not Applicable.    Note: N/A.      Intravenous Access:  Peripheral IV placed.    Treatment Conditions:  Biological Infusion Checklist:  ~~~ NOTE: If the patient answers yes to any of the questions below, hold the infusion and contact ordering provider or on-call provider.    Have you recently had an elevated temperature, fever, chills, productive cough, coughing for 3 weeks or longer or hemoptysis,  abnormal vital signs, night sweats,  chest pain or have you noticed a decrease in your appetite, unexplained weight loss or fatigue? No  Do you have any open wounds or new incisions? No  Do you have any upcoming hospitalizations or surgeries? Does not include esophagogastroduodenoscopy, colonoscopy, endoscopic retrograde cholangiopancreatography (ERCP), endoscopic ultrasound (EUS), dental procedures or joint aspiration/steroid injections No  Do you currently have any signs of illness or infection or are you on any antibiotics? No  Have you had any new, sudden or worsening abdominal pain? No  Have you or anyone in your household received a live vaccination in the past 4 weeks? Please note: No live vaccines while on biologic/chemotherapy until 6 months after the last treatment. Patient can receive the flu vaccine (shot only), pneumovax and the Covid vaccine. It is optimal for the patient to get these vaccines mid cycle, but they can be given at any time as long as it is not on the day of the infusion. No  Have you recently been diagnosed with any new nervous system diseases (ie. Multiple sclerosis, Guillain [place], seizures, neurological changes) or cancer diagnosis? Are you on any form of radiation or chemotherapy? No  Are you pregnant or breast feeding or do you have plans of pregnancy in the future? No  Have you been having any signs of worsening depression or suicidal  ideations?  (benlysta only) No  Have there been any other new onset medical symptoms? No  Have you had any new blood clots? (IVIG only) No      Post Infusion Assessment:  Patient tolerated infusion without incident.  Site patent and intact, free from redness, edema or discomfort.  No evidence of extravasations.  Access discontinued per protocol.       Discharge Plan:   Patient and/or family verbalized understanding of discharge instructions and all questions answered.  Patient discharged in stable condition accompanied by: self.  Departure Mode: Ambulatory.      Codi Linder RN

## 2023-09-21 ENCOUNTER — HOSPITAL ENCOUNTER (OUTPATIENT)
Dept: BONE DENSITY | Facility: CLINIC | Age: 78
Discharge: HOME | End: 2023-09-21
Attending: FAMILY MEDICINE
Payer: MEDICARE

## 2023-09-21 DIAGNOSIS — E28.39 OVARIAN FAILURE: ICD-10-CM

## 2023-09-21 PROCEDURE — 77081 DXA BONE DENSITY APPENDICULR: CPT

## 2023-09-21 PROCEDURE — 77080 DXA BONE DENSITY AXIAL: CPT | Mod: XU

## 2023-09-27 ENCOUNTER — HOSPITAL ENCOUNTER (OUTPATIENT)
Dept: MRI IMAGING | Facility: CLINIC | Age: 78
Discharge: HOME | End: 2023-09-27
Attending: ORTHOPAEDIC SURGERY | Admitting: ORTHOPAEDIC SURGERY
Payer: MEDICARE

## 2023-09-27 DIAGNOSIS — M54.2 NECK PAIN: ICD-10-CM

## 2023-09-27 PROCEDURE — 72141 MRI NECK SPINE W/O DYE: CPT

## 2023-10-05 ENCOUNTER — INFUSION THERAPY VISIT (OUTPATIENT)
Dept: INFUSION THERAPY | Facility: CLINIC | Age: 78
End: 2023-10-05
Attending: INTERNAL MEDICINE
Payer: MEDICARE

## 2023-10-05 VITALS
DIASTOLIC BLOOD PRESSURE: 69 MMHG | TEMPERATURE: 97.7 F | BODY MASS INDEX: 30.95 KG/M2 | SYSTOLIC BLOOD PRESSURE: 135 MMHG | RESPIRATION RATE: 16 BRPM | HEART RATE: 61 BPM | WEIGHT: 186 LBS | OXYGEN SATURATION: 92 %

## 2023-10-05 DIAGNOSIS — M06.042 RHEUMATOID ARTHRITIS INVOLVING BOTH HANDS WITH NEGATIVE RHEUMATOID FACTOR (H): Primary | ICD-10-CM

## 2023-10-05 DIAGNOSIS — M06.00 SERONEGATIVE RHEUMATOID ARTHRITIS (H): ICD-10-CM

## 2023-10-05 DIAGNOSIS — M06.041 RHEUMATOID ARTHRITIS INVOLVING BOTH HANDS WITH NEGATIVE RHEUMATOID FACTOR (H): Primary | ICD-10-CM

## 2023-10-05 PROCEDURE — 96365 THER/PROPH/DIAG IV INF INIT: CPT

## 2023-10-05 PROCEDURE — 258N000003 HC RX IP 258 OP 636: Performed by: INTERNAL MEDICINE

## 2023-10-05 PROCEDURE — 250N000028 HC RX JA MOD (INTRAVENOUS), IP 250 OP 636: Mod: JZ,JA | Performed by: INTERNAL MEDICINE

## 2023-10-05 RX ORDER — EPINEPHRINE 1 MG/ML
0.3 INJECTION, SOLUTION, CONCENTRATE INTRAVENOUS EVERY 5 MIN PRN
Status: CANCELLED | OUTPATIENT
Start: 2023-10-06

## 2023-10-05 RX ORDER — ALBUTEROL SULFATE 0.83 MG/ML
2.5 SOLUTION RESPIRATORY (INHALATION)
Status: CANCELLED | OUTPATIENT
Start: 2023-10-06

## 2023-10-05 RX ORDER — DIPHENHYDRAMINE HYDROCHLORIDE 50 MG/ML
50 INJECTION INTRAMUSCULAR; INTRAVENOUS
Status: CANCELLED
Start: 2023-10-06

## 2023-10-05 RX ORDER — METHYLPREDNISOLONE SODIUM SUCCINATE 125 MG/2ML
125 INJECTION, POWDER, LYOPHILIZED, FOR SOLUTION INTRAMUSCULAR; INTRAVENOUS
Status: CANCELLED
Start: 2023-10-06

## 2023-10-05 RX ORDER — MEPERIDINE HYDROCHLORIDE 25 MG/ML
25 INJECTION INTRAMUSCULAR; INTRAVENOUS; SUBCUTANEOUS EVERY 30 MIN PRN
Status: CANCELLED | OUTPATIENT
Start: 2023-10-06

## 2023-10-05 RX ORDER — ALBUTEROL SULFATE 90 UG/1
1-2 AEROSOL, METERED RESPIRATORY (INHALATION)
Status: CANCELLED
Start: 2023-10-06

## 2023-10-05 RX ADMIN — SODIUM CHLORIDE 250 ML: 9 INJECTION, SOLUTION INTRAVENOUS at 09:11

## 2023-10-05 RX ADMIN — SODIUM CHLORIDE 750 MG: 9 INJECTION, SOLUTION INTRAVENOUS at 09:49

## 2023-10-05 NOTE — PROGRESS NOTES
Infusion Nursing Note:  Briana Mcgee presents today for Orencia.    Patient seen by provider today: No   present during visit today: Not Applicable.    Note: N/A.      Intravenous Access:  Peripheral IV placed.    Treatment Conditions:  Questionnaire done by another RN.      Post Infusion Assessment:  Patient tolerated infusion without incident.  Access discontinued per protocol.       Discharge Plan:   AVS to patient via MYCHART.  Patient will return 11/2 for next appointment.   Patient discharged in stable condition accompanied by: self.  Departure Mode: Ambulatory.      Francine Reyes RN

## 2023-10-05 NOTE — PROGRESS NOTES
Treatment Conditions:  Biological Infusion Checklist:  ~~~ NOTE: If the patient answers yes to any of the questions below, hold the infusion and contact ordering provider or on-call provider.    Have you recently had an elevated temperature, fever, chills, productive cough, coughing for 3 weeks or longer or hemoptysis,  abnormal vital signs, night sweats,  chest pain or have you noticed a decrease in your appetite, unexplained weight loss or fatigue? No  Do you have any open wounds or new incisions? No  Do you have any upcoming hospitalizations or surgeries? Does not include esophagogastroduodenoscopy, colonoscopy, endoscopic retrograde cholangiopancreatography (ERCP), endoscopic ultrasound (EUS), dental procedures or joint aspiration/steroid injections No  Do you currently have any signs of illness or infection or are you on any antibiotics? No  Have you had any new, sudden or worsening abdominal pain? No  Have you or anyone in your household received a live vaccination in the past 4 weeks? Please note: No live vaccines while on biologic/chemotherapy until 6 months after the last treatment. Patient can receive the flu vaccine (shot only), pneumovax and the Covid vaccine. It is optimal for the patient to get these vaccines mid cycle, but they can be given at any time as long as it is not on the day of the infusion. No  Have you recently been diagnosed with any new nervous system diseases (ie. Multiple sclerosis, Guillain [place], seizures, neurological changes) or cancer diagnosis? Are you on any form of radiation or chemotherapy? No  Are you pregnant or breast feeding or do you have plans of pregnancy in the future? No  Have you been having any signs of worsening depression or suicidal ideations?  (benlysta only) No  Have there been any other new onset medical symptoms? No  Have you had any new blood clots? (IVIG only) No    Ava Zaidi RN

## 2023-10-30 DIAGNOSIS — I50.21 ACUTE SYSTOLIC HEART FAILURE (H): ICD-10-CM

## 2023-10-30 RX ORDER — FUROSEMIDE 40 MG
40 TABLET ORAL DAILY
Qty: 90 TABLET | Refills: 0 | Status: SHIPPED | OUTPATIENT
Start: 2023-10-30 | End: 2024-04-02

## 2023-10-30 NOTE — TELEPHONE ENCOUNTER
[facility] Cardiology Refill Guideline reviewed.  Medication meets criteria for refill. Due to be seen by [facility]. 90 day fill given and pt asked to call for appointment. Stephanie Jean RN Cardiology October 30, 2023, 8:35 AM

## 2023-10-30 NOTE — TELEPHONE ENCOUNTER
Last Office Visit: 02/23/23 Dr. Shook  Next Office Visit: TBD  Last Fill Date: 08/08/23  Darcy Monsalve MA Cardiology   10/30/2023 7:59 AM

## 2023-11-02 ENCOUNTER — INFUSION THERAPY VISIT (OUTPATIENT)
Dept: INFUSION THERAPY | Facility: CLINIC | Age: 78
End: 2023-11-02
Attending: INTERNAL MEDICINE
Payer: MEDICARE

## 2023-11-02 VITALS
RESPIRATION RATE: 14 BRPM | DIASTOLIC BLOOD PRESSURE: 70 MMHG | HEART RATE: 71 BPM | BODY MASS INDEX: 30.72 KG/M2 | TEMPERATURE: 97.7 F | WEIGHT: 184.6 LBS | SYSTOLIC BLOOD PRESSURE: 137 MMHG

## 2023-11-02 DIAGNOSIS — M06.00 SERONEGATIVE RHEUMATOID ARTHRITIS (H): ICD-10-CM

## 2023-11-02 DIAGNOSIS — M06.042 RHEUMATOID ARTHRITIS INVOLVING BOTH HANDS WITH NEGATIVE RHEUMATOID FACTOR (H): Primary | ICD-10-CM

## 2023-11-02 DIAGNOSIS — M06.041 RHEUMATOID ARTHRITIS INVOLVING BOTH HANDS WITH NEGATIVE RHEUMATOID FACTOR (H): Primary | ICD-10-CM

## 2023-11-02 PROCEDURE — 96365 THER/PROPH/DIAG IV INF INIT: CPT

## 2023-11-02 PROCEDURE — 258N000003 HC RX IP 258 OP 636: Performed by: INTERNAL MEDICINE

## 2023-11-02 PROCEDURE — 250N000028 HC RX JA MOD (INTRAVENOUS), IP 250 OP 636: Mod: JZ,JA | Performed by: INTERNAL MEDICINE

## 2023-11-02 RX ORDER — ALBUTEROL SULFATE 0.83 MG/ML
2.5 SOLUTION RESPIRATORY (INHALATION)
Status: CANCELLED | OUTPATIENT
Start: 2023-11-03

## 2023-11-02 RX ORDER — METHYLPREDNISOLONE SODIUM SUCCINATE 125 MG/2ML
125 INJECTION, POWDER, LYOPHILIZED, FOR SOLUTION INTRAMUSCULAR; INTRAVENOUS
Status: CANCELLED
Start: 2023-11-03

## 2023-11-02 RX ORDER — DIPHENHYDRAMINE HYDROCHLORIDE 50 MG/ML
50 INJECTION INTRAMUSCULAR; INTRAVENOUS
Status: CANCELLED
Start: 2023-11-03

## 2023-11-02 RX ORDER — MEPERIDINE HYDROCHLORIDE 25 MG/ML
25 INJECTION INTRAMUSCULAR; INTRAVENOUS; SUBCUTANEOUS EVERY 30 MIN PRN
Status: CANCELLED | OUTPATIENT
Start: 2023-11-03

## 2023-11-02 RX ORDER — EPINEPHRINE 1 MG/ML
0.3 INJECTION, SOLUTION, CONCENTRATE INTRAVENOUS EVERY 5 MIN PRN
Status: CANCELLED | OUTPATIENT
Start: 2023-11-03

## 2023-11-02 RX ORDER — ALBUTEROL SULFATE 90 UG/1
1-2 AEROSOL, METERED RESPIRATORY (INHALATION)
Status: CANCELLED
Start: 2023-11-03

## 2023-11-02 RX ADMIN — SODIUM CHLORIDE 250 ML: 9 INJECTION, SOLUTION INTRAVENOUS at 08:45

## 2023-11-02 RX ADMIN — SODIUM CHLORIDE 750 MG: 9 INJECTION, SOLUTION INTRAVENOUS at 09:31

## 2023-11-02 ASSESSMENT — PAIN SCALES - GENERAL: PAINLEVEL: NO PAIN (0)

## 2023-11-02 NOTE — PROGRESS NOTES
Infusion Nursing Note:  Briana Hicksreinaldo presents today for Orencia.    Patient seen by provider today: No   present during visit today: Not Applicable.    Note: N/A.      Intravenous Access:  Peripheral IV placed.    Treatment Conditions:  Biological Infusion Checklist:  ~~~ NOTE: If the patient answers yes to any of the questions below, hold the infusion and contact ordering provider or on-call provider.    Have you recently had an elevated temperature, fever, chills, productive cough, coughing for 3 weeks or longer or hemoptysis,  abnormal vital signs, night sweats,  chest pain or have you noticed a decrease in your appetite, unexplained weight loss or fatigue? No  Do you have any open wounds or new incisions? No  Do you have any upcoming hospitalizations or surgeries? Does not include esophagogastroduodenoscopy, colonoscopy, endoscopic retrograde cholangiopancreatography (ERCP), endoscopic ultrasound (EUS), dental procedures or joint aspiration/steroid injections No  Do you currently have any signs of illness or infection or are you on any antibiotics? No  Have you had any new, sudden or worsening abdominal pain? No  Have you or anyone in your household received a live vaccination in the past 4 weeks? Please note: No live vaccines while on biologic/chemotherapy until 6 months after the last treatment. Patient can receive the flu vaccine (shot only), pneumovax and the Covid vaccine. It is optimal for the patient to get these vaccines mid cycle, but they can be given at any time as long as it is not on the day of the infusion. No  Have you recently been diagnosed with any new nervous system diseases (ie. Multiple sclerosis, Guillain [place], seizures, neurological changes) or cancer diagnosis? Are you on any form of radiation or chemotherapy? No  Are you pregnant or breast feeding or do you have plans of pregnancy in the future? No  Have you been having any signs of worsening depression or suicidal  ideations?  (benlysta only) No  Have there been any other new onset medical symptoms? No  Have you had any new blood clots? (IVIG only) No      Post Infusion Assessment:  Patient tolerated infusion without incident.  Site patent and intact, free from redness, edema or discomfort.  No evidence of extravasations.  Access discontinued per protocol.       Discharge Plan:   Patient discharged in stable condition accompanied by: self.  Departure Mode: Ambulatory.      Terry Grant RN

## 2023-11-19 ENCOUNTER — MYC MEDICAL ADVICE (OUTPATIENT)
Dept: FAMILY MEDICINE | Facility: CLINIC | Age: 78
End: 2023-11-19
Payer: COMMERCIAL

## 2023-11-30 ENCOUNTER — INFUSION THERAPY VISIT (OUTPATIENT)
Dept: INFUSION THERAPY | Facility: CLINIC | Age: 78
End: 2023-11-30
Attending: INTERNAL MEDICINE
Payer: MEDICARE

## 2023-11-30 VITALS
BODY MASS INDEX: 30.22 KG/M2 | DIASTOLIC BLOOD PRESSURE: 83 MMHG | TEMPERATURE: 97.9 F | WEIGHT: 181.6 LBS | SYSTOLIC BLOOD PRESSURE: 144 MMHG | HEART RATE: 70 BPM | RESPIRATION RATE: 18 BRPM

## 2023-11-30 DIAGNOSIS — M06.042 RHEUMATOID ARTHRITIS INVOLVING BOTH HANDS WITH NEGATIVE RHEUMATOID FACTOR (H): Primary | ICD-10-CM

## 2023-11-30 DIAGNOSIS — M06.00 SERONEGATIVE RHEUMATOID ARTHRITIS (H): ICD-10-CM

## 2023-11-30 DIAGNOSIS — M06.041 RHEUMATOID ARTHRITIS INVOLVING BOTH HANDS WITH NEGATIVE RHEUMATOID FACTOR (H): Primary | ICD-10-CM

## 2023-11-30 LAB
ALBUMIN SERPL BCG-MCNC: 4 G/DL (ref 3.5–5.2)
ALP SERPL-CCNC: 73 U/L (ref 40–150)
ALT SERPL W P-5'-P-CCNC: 17 U/L (ref 0–50)
AST SERPL W P-5'-P-CCNC: 24 U/L (ref 0–45)
BASOPHILS # BLD AUTO: 0 10E3/UL (ref 0–0.2)
BASOPHILS NFR BLD AUTO: 1 %
BILIRUB DIRECT SERPL-MCNC: <0.2 MG/DL (ref 0–0.3)
BILIRUB SERPL-MCNC: 0.4 MG/DL
CREAT SERPL-MCNC: 1.54 MG/DL (ref 0.51–0.95)
CRP SERPL-MCNC: 17.4 MG/L
EGFRCR SERPLBLD CKD-EPI 2021: 34 ML/MIN/1.73M2
EOSINOPHIL # BLD AUTO: 0.3 10E3/UL (ref 0–0.7)
EOSINOPHIL NFR BLD AUTO: 5 %
ERYTHROCYTE [DISTWIDTH] IN BLOOD BY AUTOMATED COUNT: 12.8 % (ref 10–15)
ERYTHROCYTE [SEDIMENTATION RATE] IN BLOOD BY WESTERGREN METHOD: 31 MM/HR (ref 0–30)
HCT VFR BLD AUTO: 34.1 % (ref 35–47)
HGB BLD-MCNC: 11.3 G/DL (ref 11.7–15.7)
IMM GRANULOCYTES # BLD: 0.1 10E3/UL
IMM GRANULOCYTES NFR BLD: 1 %
LYMPHOCYTES # BLD AUTO: 1.7 10E3/UL (ref 0.8–5.3)
LYMPHOCYTES NFR BLD AUTO: 29 %
MCH RBC QN AUTO: 31.2 PG (ref 26.5–33)
MCHC RBC AUTO-ENTMCNC: 33.1 G/DL (ref 31.5–36.5)
MCV RBC AUTO: 94 FL (ref 78–100)
MONOCYTES # BLD AUTO: 0.6 10E3/UL (ref 0–1.3)
MONOCYTES NFR BLD AUTO: 11 %
NEUTROPHILS # BLD AUTO: 3.2 10E3/UL (ref 1.6–8.3)
NEUTROPHILS NFR BLD AUTO: 53 %
NRBC # BLD AUTO: 0 10E3/UL
NRBC BLD AUTO-RTO: 0 /100
PLATELET # BLD AUTO: 208 10E3/UL (ref 150–450)
PROT SERPL-MCNC: 6.8 G/DL (ref 6.4–8.3)
RBC # BLD AUTO: 3.62 10E6/UL (ref 3.8–5.2)
WBC # BLD AUTO: 5.8 10E3/UL (ref 4–11)

## 2023-11-30 PROCEDURE — 85652 RBC SED RATE AUTOMATED: CPT | Performed by: INTERNAL MEDICINE

## 2023-11-30 PROCEDURE — 85025 COMPLETE CBC W/AUTO DIFF WBC: CPT | Performed by: INTERNAL MEDICINE

## 2023-11-30 PROCEDURE — 36415 COLL VENOUS BLD VENIPUNCTURE: CPT

## 2023-11-30 PROCEDURE — 258N000003 HC RX IP 258 OP 636: Performed by: INTERNAL MEDICINE

## 2023-11-30 PROCEDURE — 96365 THER/PROPH/DIAG IV INF INIT: CPT

## 2023-11-30 PROCEDURE — 250N000028 HC RX JA MOD (INTRAVENOUS), IP 250 OP 636: Mod: JZ,JA | Performed by: INTERNAL MEDICINE

## 2023-11-30 PROCEDURE — 86140 C-REACTIVE PROTEIN: CPT | Performed by: INTERNAL MEDICINE

## 2023-11-30 PROCEDURE — 82565 ASSAY OF CREATININE: CPT | Performed by: INTERNAL MEDICINE

## 2023-11-30 PROCEDURE — 80076 HEPATIC FUNCTION PANEL: CPT | Performed by: INTERNAL MEDICINE

## 2023-11-30 RX ORDER — MEPERIDINE HYDROCHLORIDE 25 MG/ML
25 INJECTION INTRAMUSCULAR; INTRAVENOUS; SUBCUTANEOUS EVERY 30 MIN PRN
Status: CANCELLED | OUTPATIENT
Start: 2023-12-01

## 2023-11-30 RX ORDER — ALBUTEROL SULFATE 90 UG/1
1-2 AEROSOL, METERED RESPIRATORY (INHALATION)
Status: CANCELLED
Start: 2023-12-01

## 2023-11-30 RX ORDER — ALBUTEROL SULFATE 0.83 MG/ML
2.5 SOLUTION RESPIRATORY (INHALATION)
Status: CANCELLED | OUTPATIENT
Start: 2023-12-01

## 2023-11-30 RX ORDER — DIPHENHYDRAMINE HYDROCHLORIDE 50 MG/ML
50 INJECTION INTRAMUSCULAR; INTRAVENOUS
Status: CANCELLED
Start: 2023-12-01

## 2023-11-30 RX ORDER — METHYLPREDNISOLONE SODIUM SUCCINATE 125 MG/2ML
125 INJECTION, POWDER, LYOPHILIZED, FOR SOLUTION INTRAMUSCULAR; INTRAVENOUS
Status: CANCELLED
Start: 2023-12-01

## 2023-11-30 RX ORDER — EPINEPHRINE 1 MG/ML
0.3 INJECTION, SOLUTION, CONCENTRATE INTRAVENOUS EVERY 5 MIN PRN
Status: CANCELLED | OUTPATIENT
Start: 2023-12-01

## 2023-11-30 RX ADMIN — SODIUM CHLORIDE 250 ML: 9 INJECTION, SOLUTION INTRAVENOUS at 13:20

## 2023-11-30 RX ADMIN — SODIUM CHLORIDE 750 MG: 9 INJECTION, SOLUTION INTRAVENOUS at 13:53

## 2023-12-07 DIAGNOSIS — M06.00 SERONEGATIVE RHEUMATOID ARTHRITIS (H): ICD-10-CM

## 2023-12-07 NOTE — TELEPHONE ENCOUNTER
Requested Prescriptions   Pending Prescriptions Disp Refills    hydroxychloroquine (PLAQUENIL) 200 MG tablet 135 tablet 2     Sig: Hydroxychloroquine 200mg daily; and an additional 200mg every other day. Yearly eye exam including 10-2 VF and SD-OCT are required       There is no refill protocol information for this order

## 2023-12-08 RX ORDER — HYDROXYCHLOROQUINE SULFATE 200 MG/1
TABLET, FILM COATED ORAL
Qty: 135 TABLET | Refills: 0 | Status: SHIPPED | OUTPATIENT
Start: 2023-12-08 | End: 2024-03-05

## 2023-12-08 NOTE — TELEPHONE ENCOUNTER
Requested Prescriptions   Pending Prescriptions Disp Refills    hydroxychloroquine (PLAQUENIL) 200 MG tablet 135 tablet 2     Sig: Hydroxychloroquine 200mg daily; and an additional 200mg every other day. Yearly eye exam including 10-2 VF and SD-OCT are required       There is no refill protocol information for this order        Routing refill request to provider for review/approval because:  Drug not on the [facility] [place]   LOV 8/31/23  Next 1/4/24  Labs 11/30/23    Raquel WEST, Specialty RN 12/8/2023 8:35 AM

## 2023-12-28 ENCOUNTER — INFUSION THERAPY VISIT (OUTPATIENT)
Dept: INFUSION THERAPY | Facility: CLINIC | Age: 78
End: 2023-12-28
Attending: INTERNAL MEDICINE
Payer: MEDICARE

## 2023-12-28 VITALS — DIASTOLIC BLOOD PRESSURE: 60 MMHG | TEMPERATURE: 97.6 F | HEART RATE: 62 BPM | SYSTOLIC BLOOD PRESSURE: 130 MMHG

## 2023-12-28 DIAGNOSIS — M06.041 RHEUMATOID ARTHRITIS INVOLVING BOTH HANDS WITH NEGATIVE RHEUMATOID FACTOR (H): Primary | ICD-10-CM

## 2023-12-28 DIAGNOSIS — M06.042 RHEUMATOID ARTHRITIS INVOLVING BOTH HANDS WITH NEGATIVE RHEUMATOID FACTOR (H): Primary | ICD-10-CM

## 2023-12-28 DIAGNOSIS — M06.00 SERONEGATIVE RHEUMATOID ARTHRITIS (H): ICD-10-CM

## 2023-12-28 PROCEDURE — 96365 THER/PROPH/DIAG IV INF INIT: CPT

## 2023-12-28 PROCEDURE — 258N000003 HC RX IP 258 OP 636: Performed by: INTERNAL MEDICINE

## 2023-12-28 PROCEDURE — 250N000028 HC RX JA MOD (INTRAVENOUS), IP 250 OP 636: Mod: JZ,JA | Performed by: INTERNAL MEDICINE

## 2023-12-28 RX ORDER — METHYLPREDNISOLONE SODIUM SUCCINATE 125 MG/2ML
125 INJECTION, POWDER, LYOPHILIZED, FOR SOLUTION INTRAMUSCULAR; INTRAVENOUS
Status: CANCELLED
Start: 2024-01-25

## 2023-12-28 RX ORDER — DIPHENHYDRAMINE HYDROCHLORIDE 50 MG/ML
50 INJECTION INTRAMUSCULAR; INTRAVENOUS
Status: CANCELLED
Start: 2024-01-25

## 2023-12-28 RX ORDER — EPINEPHRINE 1 MG/ML
0.3 INJECTION, SOLUTION, CONCENTRATE INTRAVENOUS EVERY 5 MIN PRN
Status: CANCELLED | OUTPATIENT
Start: 2024-01-25

## 2023-12-28 RX ORDER — ALBUTEROL SULFATE 0.83 MG/ML
2.5 SOLUTION RESPIRATORY (INHALATION)
Status: CANCELLED | OUTPATIENT
Start: 2024-01-25

## 2023-12-28 RX ORDER — MEPERIDINE HYDROCHLORIDE 25 MG/ML
25 INJECTION INTRAMUSCULAR; INTRAVENOUS; SUBCUTANEOUS EVERY 30 MIN PRN
Status: CANCELLED | OUTPATIENT
Start: 2024-01-25

## 2023-12-28 RX ORDER — ALBUTEROL SULFATE 90 UG/1
1-2 AEROSOL, METERED RESPIRATORY (INHALATION)
Status: CANCELLED
Start: 2024-01-25

## 2023-12-28 RX ADMIN — SODIUM CHLORIDE 750 MG: 9 INJECTION, SOLUTION INTRAVENOUS at 09:09

## 2023-12-28 RX ADMIN — SODIUM CHLORIDE 250 ML: 9 INJECTION, SOLUTION INTRAVENOUS at 09:09

## 2023-12-28 NOTE — PROGRESS NOTES
Infusion Nursing Note:  Briana Hicksreinaldo presents today for Orencia.    Patient seen by provider today: No   present during visit today: Not Applicable.    Note: N/A.      Intravenous Access:  Peripheral IV placed.    Treatment Conditions:  Biological Infusion Checklist:  ~~~ NOTE: If the patient answers yes to any of the questions below, hold the infusion and contact ordering provider or on-call provider.    Have you recently had an elevated temperature, fever, chills, productive cough, coughing for 3 weeks or longer or hemoptysis,  abnormal vital signs, night sweats,  chest pain or have you noticed a decrease in your appetite, unexplained weight loss or fatigue? No  Do you have any open wounds or new incisions? No  Do you have any upcoming hospitalizations or surgeries? Does not include esophagogastroduodenoscopy, colonoscopy, endoscopic retrograde cholangiopancreatography (ERCP), endoscopic ultrasound (EUS), dental procedures or joint aspiration/steroid injections No  Do you currently have any signs of illness or infection or are you on any antibiotics? No  Have you had any new, sudden or worsening abdominal pain? No  Have you or anyone in your household received a live vaccination in the past 4 weeks? Please note: No live vaccines while on biologic/chemotherapy until 6 months after the last treatment. Patient can receive the flu vaccine (shot only), pneumovax and the Covid vaccine. It is optimal for the patient to get these vaccines mid cycle, but they can be given at any time as long as it is not on the day of the infusion. No  Have you recently been diagnosed with any new nervous system diseases (ie. Multiple sclerosis, Guillain [place], seizures, neurological changes) or cancer diagnosis? Are you on any form of radiation or chemotherapy? No  Are you pregnant or breast feeding or do you have plans of pregnancy in the future? No  Have you been having any signs of worsening depression or suicidal  ideations?  (benlysta only) No  Have there been any other new onset medical symptoms? No  Have you had any new blood clots? (IVIG only) No      Post Infusion Assessment:  Patient tolerated infusion without incident.  Blood return noted pre and post infusion.  Site patent and intact, free from redness, edema or discomfort.  No evidence of extravasations.  Access discontinued per protocol.       Discharge Plan:   Patient discharged in stable condition accompanied by: self.  Departure Mode: Ambulatory.      Sheri Flanagan RN

## 2024-01-04 ENCOUNTER — OFFICE VISIT (OUTPATIENT)
Dept: RHEUMATOLOGY | Facility: CLINIC | Age: 79
End: 2024-01-04
Payer: COMMERCIAL

## 2024-01-04 VITALS
SYSTOLIC BLOOD PRESSURE: 126 MMHG | DIASTOLIC BLOOD PRESSURE: 72 MMHG | BODY MASS INDEX: 29.49 KG/M2 | HEART RATE: 65 BPM | HEIGHT: 65 IN | OXYGEN SATURATION: 100 % | WEIGHT: 177 LBS

## 2024-01-04 DIAGNOSIS — Z79.899 HIGH RISK MEDICATIONS (NOT ANTICOAGULANTS) LONG-TERM USE: ICD-10-CM

## 2024-01-04 DIAGNOSIS — M06.00 SERONEGATIVE RHEUMATOID ARTHRITIS (H): Primary | ICD-10-CM

## 2024-01-04 DIAGNOSIS — Z79.899 LONG-TERM USE OF HYDROXYCHLOROQUINE: ICD-10-CM

## 2024-01-04 LAB
ALBUMIN SERPL BCG-MCNC: 4.2 G/DL (ref 3.5–5.2)
ALP SERPL-CCNC: 67 U/L (ref 40–150)
ALT SERPL W P-5'-P-CCNC: 21 U/L (ref 0–50)
ANION GAP SERPL CALCULATED.3IONS-SCNC: 9 MMOL/L (ref 7–15)
AST SERPL W P-5'-P-CCNC: 26 U/L (ref 0–45)
BASOPHILS # BLD AUTO: 0 10E3/UL (ref 0–0.2)
BASOPHILS NFR BLD AUTO: 0 %
BILIRUB SERPL-MCNC: 0.5 MG/DL
BUN SERPL-MCNC: 21.7 MG/DL (ref 8–23)
CALCIUM SERPL-MCNC: 9.1 MG/DL (ref 8.8–10.2)
CHLORIDE SERPL-SCNC: 102 MMOL/L (ref 98–107)
CREAT SERPL-MCNC: 0.99 MG/DL (ref 0.51–0.95)
CRP SERPL-MCNC: <3 MG/L
DEPRECATED HCO3 PLAS-SCNC: 31 MMOL/L (ref 22–29)
EGFRCR SERPLBLD CKD-EPI 2021: 58 ML/MIN/1.73M2
EOSINOPHIL # BLD AUTO: 0.2 10E3/UL (ref 0–0.7)
EOSINOPHIL NFR BLD AUTO: 3 %
ERYTHROCYTE [DISTWIDTH] IN BLOOD BY AUTOMATED COUNT: 12.9 % (ref 10–15)
ERYTHROCYTE [SEDIMENTATION RATE] IN BLOOD BY WESTERGREN METHOD: 16 MM/HR (ref 0–30)
GLUCOSE SERPL-MCNC: 90 MG/DL (ref 70–99)
HCT VFR BLD AUTO: 35.7 % (ref 35–47)
HGB BLD-MCNC: 11.5 G/DL (ref 11.7–15.7)
IMM GRANULOCYTES # BLD: 0 10E3/UL
IMM GRANULOCYTES NFR BLD: 0 %
LYMPHOCYTES # BLD AUTO: 2.4 10E3/UL (ref 0.8–5.3)
LYMPHOCYTES NFR BLD AUTO: 34 %
MCH RBC QN AUTO: 31.3 PG (ref 26.5–33)
MCHC RBC AUTO-ENTMCNC: 32.2 G/DL (ref 31.5–36.5)
MCV RBC AUTO: 97 FL (ref 78–100)
MONOCYTES # BLD AUTO: 0.6 10E3/UL (ref 0–1.3)
MONOCYTES NFR BLD AUTO: 9 %
NEUTROPHILS # BLD AUTO: 3.8 10E3/UL (ref 1.6–8.3)
NEUTROPHILS NFR BLD AUTO: 54 %
PLATELET # BLD AUTO: 231 10E3/UL (ref 150–450)
POTASSIUM SERPL-SCNC: 4.2 MMOL/L (ref 3.4–5.3)
PROT SERPL-MCNC: 6.7 G/DL (ref 6.4–8.3)
RBC # BLD AUTO: 3.68 10E6/UL (ref 3.8–5.2)
SODIUM SERPL-SCNC: 142 MMOL/L (ref 135–145)
WBC # BLD AUTO: 7 10E3/UL (ref 4–11)

## 2024-01-04 PROCEDURE — 86140 C-REACTIVE PROTEIN: CPT | Performed by: INTERNAL MEDICINE

## 2024-01-04 PROCEDURE — 99214 OFFICE O/P EST MOD 30 MIN: CPT | Performed by: INTERNAL MEDICINE

## 2024-01-04 PROCEDURE — 36415 COLL VENOUS BLD VENIPUNCTURE: CPT | Performed by: INTERNAL MEDICINE

## 2024-01-04 PROCEDURE — 85652 RBC SED RATE AUTOMATED: CPT | Performed by: INTERNAL MEDICINE

## 2024-01-04 PROCEDURE — 80053 COMPREHEN METABOLIC PANEL: CPT | Performed by: INTERNAL MEDICINE

## 2024-01-04 PROCEDURE — 85025 COMPLETE CBC W/AUTO DIFF WBC: CPT | Performed by: INTERNAL MEDICINE
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
## 2024-01-04 NOTE — PATIENT INSTRUCTIONS
Number for cardiology: [phone]  Number to schedule the echocardiogram: [phone]  Number for scheduling an eye exam with Dr. Villavicencio: [phone]     Vaccinations needed: Tetanus and COVID-19

## 2024-01-25 ENCOUNTER — HOSPITAL ENCOUNTER (OUTPATIENT)
Dept: CARDIOLOGY | Facility: CLINIC | Age: 79
Discharge: HOME | End: 2024-01-25
Attending: INTERNAL MEDICINE | Admitting: INTERNAL MEDICINE
Payer: MEDICARE

## 2024-01-25 ENCOUNTER — INFUSION THERAPY VISIT (OUTPATIENT)
Dept: INFUSION THERAPY | Facility: CLINIC | Age: 79
End: 2024-01-25
Attending: INTERNAL MEDICINE
Payer: MEDICARE

## 2024-01-25 VITALS
DIASTOLIC BLOOD PRESSURE: 73 MMHG | TEMPERATURE: 97.9 F | BODY MASS INDEX: 30.25 KG/M2 | WEIGHT: 181.8 LBS | SYSTOLIC BLOOD PRESSURE: 138 MMHG

## 2024-01-25 DIAGNOSIS — M06.042 RHEUMATOID ARTHRITIS INVOLVING BOTH HANDS WITH NEGATIVE RHEUMATOID FACTOR (H): Primary | ICD-10-CM

## 2024-01-25 DIAGNOSIS — M06.041 RHEUMATOID ARTHRITIS INVOLVING BOTH HANDS WITH NEGATIVE RHEUMATOID FACTOR (H): Primary | ICD-10-CM

## 2024-01-25 DIAGNOSIS — I50.22 CHRONIC HFREF (HEART FAILURE WITH REDUCED EJECTION FRACTION) (H): ICD-10-CM

## 2024-01-25 DIAGNOSIS — M06.00 SERONEGATIVE RHEUMATOID ARTHRITIS (H): ICD-10-CM

## 2024-01-25 LAB — LVEF ECHO: NORMAL

## 2024-01-25 PROCEDURE — 258N000003 HC RX IP 258 OP 636: Performed by: INTERNAL MEDICINE

## 2024-01-25 PROCEDURE — 93306 TTE W/DOPPLER COMPLETE: CPT

## 2024-01-25 PROCEDURE — 96365 THER/PROPH/DIAG IV INF INIT: CPT

## 2024-01-25 PROCEDURE — 93306 TTE W/DOPPLER COMPLETE: CPT | Mod: 26 | Performed by: INTERNAL MEDICINE

## 2024-01-25 PROCEDURE — 250N000028 HC RX JA MOD (INTRAVENOUS), IP 250 OP 636: Mod: JZ,JA | Performed by: INTERNAL MEDICINE

## 2024-01-25 RX ORDER — ALBUTEROL SULFATE 90 UG/1
1-2 AEROSOL, METERED RESPIRATORY (INHALATION)
Status: CANCELLED
Start: 2024-02-22

## 2024-01-25 RX ORDER — ALBUTEROL SULFATE 0.83 MG/ML
2.5 SOLUTION RESPIRATORY (INHALATION)
Status: CANCELLED | OUTPATIENT
Start: 2024-02-22

## 2024-01-25 RX ORDER — MEPERIDINE HYDROCHLORIDE 25 MG/ML
25 INJECTION INTRAMUSCULAR; INTRAVENOUS; SUBCUTANEOUS EVERY 30 MIN PRN
Status: CANCELLED | OUTPATIENT
Start: 2024-02-22

## 2024-01-25 RX ORDER — METHYLPREDNISOLONE SODIUM SUCCINATE 125 MG/2ML
125 INJECTION, POWDER, LYOPHILIZED, FOR SOLUTION INTRAMUSCULAR; INTRAVENOUS
Status: CANCELLED
Start: 2024-02-22

## 2024-01-25 RX ORDER — EPINEPHRINE 1 MG/ML
0.3 INJECTION, SOLUTION, CONCENTRATE INTRAVENOUS EVERY 5 MIN PRN
Status: CANCELLED | OUTPATIENT
Start: 2024-02-22

## 2024-01-25 RX ORDER — DIPHENHYDRAMINE HYDROCHLORIDE 50 MG/ML
50 INJECTION INTRAMUSCULAR; INTRAVENOUS
Status: CANCELLED
Start: 2024-02-22

## 2024-01-25 RX ADMIN — SODIUM CHLORIDE 250 ML: 9 INJECTION, SOLUTION INTRAVENOUS at 08:46

## 2024-01-25 RX ADMIN — SODIUM CHLORIDE 750 MG: 9 INJECTION, SOLUTION INTRAVENOUS at 08:46

## 2024-01-25 NOTE — PROGRESS NOTES
Infusion Nursing Note:  Briana Hicksreinaldo presents today for Orencia.    Patient seen by provider today: No   present during visit today: Not Applicable.    Note: N/A.      Intravenous Access:  Peripheral IV placed.    Treatment Conditions:  Biological Infusion Checklist:  ~~~ NOTE: If the patient answers yes to any of the questions below, hold the infusion and contact ordering provider or on-call provider.    Have you recently had an elevated temperature, fever, chills, productive cough, coughing for 3 weeks or longer or hemoptysis,  abnormal vital signs, night sweats,  chest pain or have you noticed a decrease in your appetite, unexplained weight loss or fatigue? No  Do you have any open wounds or new incisions? No  Do you have any upcoming hospitalizations or surgeries? Does not include esophagogastroduodenoscopy, colonoscopy, endoscopic retrograde cholangiopancreatography (ERCP), endoscopic ultrasound (EUS), dental procedures or joint aspiration/steroid injections No  Do you currently have any signs of illness or infection or are you on any antibiotics? No  Have you had any new, sudden or worsening abdominal pain? No  Have you or anyone in your household received a live vaccination in the past 4 weeks? Please note: No live vaccines while on biologic/chemotherapy until 6 months after the last treatment. Patient can receive the flu vaccine (shot only), pneumovax and the Covid vaccine. It is optimal for the patient to get these vaccines mid cycle, but they can be given at any time as long as it is not on the day of the infusion. No  Have you recently been diagnosed with any new nervous system diseases (ie. Multiple sclerosis, Guillain Otis, seizures, neurological changes) or cancer diagnosis? Are you on any form of radiation or chemotherapy? No  Are you pregnant or breast feeding or do you have plans of pregnancy in the future? No  Have you been having any signs of worsening depression or suicidal  ideations?  (benlysta only) No  Have there been any other new onset medical symptoms? No  Have you had any new blood clots? (IVIG only) No      Post Infusion Assessment:  Patient tolerated infusion without incident.  Blood return noted pre and post infusion.  Site patent and intact, free from redness, edema or discomfort.  No evidence of extravasations.  Access discontinued per protocol.       Discharge Plan:   Copy of AVS reviewed with patient and/or family.  Patient will return 2/22/24 for next appointment.  Patient discharged in stable condition accompanied by: self.  Departure Mode: Ambulatory.      AMAYA LEWIS RN

## 2024-02-22 ENCOUNTER — INFUSION THERAPY VISIT (OUTPATIENT)
Dept: INFUSION THERAPY | Facility: CLINIC | Age: 79
End: 2024-02-22
Attending: INTERNAL MEDICINE
Payer: MEDICARE

## 2024-02-22 VITALS
HEART RATE: 54 BPM | WEIGHT: 181 LBS | SYSTOLIC BLOOD PRESSURE: 145 MMHG | TEMPERATURE: 98 F | BODY MASS INDEX: 30.12 KG/M2 | DIASTOLIC BLOOD PRESSURE: 75 MMHG | RESPIRATION RATE: 18 BRPM

## 2024-02-22 DIAGNOSIS — M06.041 RHEUMATOID ARTHRITIS INVOLVING BOTH HANDS WITH NEGATIVE RHEUMATOID FACTOR (H): Primary | ICD-10-CM

## 2024-02-22 DIAGNOSIS — M06.042 RHEUMATOID ARTHRITIS INVOLVING BOTH HANDS WITH NEGATIVE RHEUMATOID FACTOR (H): Primary | ICD-10-CM

## 2024-02-22 DIAGNOSIS — M06.00 SERONEGATIVE RHEUMATOID ARTHRITIS (H): ICD-10-CM

## 2024-02-22 PROCEDURE — 96365 THER/PROPH/DIAG IV INF INIT: CPT

## 2024-02-22 PROCEDURE — 250N000028 HC RX JA MOD (INTRAVENOUS), IP 250 OP 636: Mod: JZ,JA | Performed by: INTERNAL MEDICINE

## 2024-02-22 PROCEDURE — 258N000003 HC RX IP 258 OP 636: Performed by: INTERNAL MEDICINE

## 2024-02-22 RX ORDER — ALBUTEROL SULFATE 0.83 MG/ML
2.5 SOLUTION RESPIRATORY (INHALATION)
Status: CANCELLED | OUTPATIENT
Start: 2024-03-21

## 2024-02-22 RX ORDER — ALBUTEROL SULFATE 90 UG/1
1-2 AEROSOL, METERED RESPIRATORY (INHALATION)
Status: CANCELLED
Start: 2024-03-21

## 2024-02-22 RX ORDER — MEPERIDINE HYDROCHLORIDE 25 MG/ML
25 INJECTION INTRAMUSCULAR; INTRAVENOUS; SUBCUTANEOUS EVERY 30 MIN PRN
Status: CANCELLED | OUTPATIENT
Start: 2024-03-21

## 2024-02-22 RX ORDER — DIPHENHYDRAMINE HYDROCHLORIDE 50 MG/ML
50 INJECTION INTRAMUSCULAR; INTRAVENOUS
Status: CANCELLED
Start: 2024-03-21

## 2024-02-22 RX ORDER — METHYLPREDNISOLONE SODIUM SUCCINATE 125 MG/2ML
125 INJECTION, POWDER, LYOPHILIZED, FOR SOLUTION INTRAMUSCULAR; INTRAVENOUS
Status: CANCELLED
Start: 2024-03-21

## 2024-02-22 RX ORDER — EPINEPHRINE 1 MG/ML
0.3 INJECTION, SOLUTION, CONCENTRATE INTRAVENOUS EVERY 5 MIN PRN
Status: CANCELLED | OUTPATIENT
Start: 2024-03-21

## 2024-02-22 RX ADMIN — SODIUM CHLORIDE 750 MG: 9 INJECTION, SOLUTION INTRAVENOUS at 09:02

## 2024-02-22 RX ADMIN — SODIUM CHLORIDE 250 ML: 9 INJECTION, SOLUTION INTRAVENOUS at 09:02

## 2024-02-22 NOTE — PROGRESS NOTES
Infusion Nursing Note:  Briana Hicksreinaldo presents today for Orencia.    Patient seen by provider today: No   present during visit today: Not Applicable.    Note: N/A.    Intravenous Access:  Peripheral IV placed.    Treatment Conditions:  Biological Infusion Checklist:  ~~~ NOTE: If the patient answers yes to any of the questions below, hold the infusion and contact ordering provider or on-call provider.    Have you recently had an elevated temperature, fever, chills, productive cough, coughing for 3 weeks or longer or hemoptysis,  abnormal vital signs, night sweats,  chest pain or have you noticed a decrease in your appetite, unexplained weight loss or fatigue? No  Do you have any open wounds or new incisions? No  Do you have any upcoming hospitalizations or surgeries? Does not include esophagogastroduodenoscopy, colonoscopy, endoscopic retrograde cholangiopancreatography (ERCP), endoscopic ultrasound (EUS), dental procedures or joint aspiration/steroid injections No  Do you currently have any signs of illness or infection or are you on any antibiotics? No  Have you had any new, sudden or worsening abdominal pain? No  Have you or anyone in your household received a live vaccination in the past 4 weeks? Please note: No live vaccines while on biologic/chemotherapy until 6 months after the last treatment. Patient can receive the flu vaccine (shot only), pneumovax and the Covid vaccine. It is optimal for the patient to get these vaccines mid cycle, but they can be given at any time as long as it is not on the day of the infusion. No  Have you recently been diagnosed with any new nervous system diseases (ie. Multiple sclerosis, Guillain [place], seizures, neurological changes) or cancer diagnosis? Are you on any form of radiation or chemotherapy? No  Are you pregnant or breast feeding or do you have plans of pregnancy in the future? No  Have you been having any signs of worsening depression or suicidal  ideations?  (benlysta only) No  Have there been any other new onset medical symptoms? No  Have you had any new blood clots? (IVIG only) No    Post Infusion Assessment:  Patient tolerated infusion without incident.  Blood return noted pre and post infusion.  Site patent and intact, free from redness, edema or discomfort.  No evidence of extravasations.  Access discontinued per protocol.     Discharge Plan:   Discharge instructions reviewed with: Patient.  Patient and/or family verbalized understanding of discharge instructions and all questions answered.  AVS to patient via [employer].  Patient will return 3/21/2024 for next appointment.   Patient discharged in stable condition accompanied by: self.  Departure Mode: Ambulatory.    Deanna Pastor RN

## 2024-03-05 DIAGNOSIS — M06.00 SERONEGATIVE RHEUMATOID ARTHRITIS (H): ICD-10-CM

## 2024-03-08 ENCOUNTER — MYC REFILL (OUTPATIENT)
Dept: RHEUMATOLOGY | Facility: CLINIC | Age: 79
End: 2024-03-08
Payer: COMMERCIAL

## 2024-03-08 DIAGNOSIS — M06.00 SERONEGATIVE RHEUMATOID ARTHRITIS (H): ICD-10-CM

## 2024-03-08 RX ORDER — HYDROXYCHLOROQUINE SULFATE 200 MG/1
TABLET, FILM COATED ORAL
Qty: 135 TABLET | Refills: 0 | Status: CANCELLED | OUTPATIENT
Start: 2024-03-08

## 2024-03-13 ENCOUNTER — TELEPHONE (OUTPATIENT)
Dept: RHEUMATOLOGY | Facility: CLINIC | Age: 79
End: 2024-03-13
Payer: COMMERCIAL

## 2024-03-13 NOTE — TELEPHONE ENCOUNTER
Spoke with patient and she has an appointment to have her eyes checked 5/2/24. Last eye exam 8/2022. Reaching out to Dr. Puente to see if it is ok to refill plaquenil until next eye exam?    Coni CROWE RN, [facility] 03/13/24 2:45 PM

## 2024-03-13 NOTE — TELEPHONE ENCOUNTER
[facility] Call Center    Phone Message    May a detailed message be left on voicemail: yes     Reason for Call: Other: Please contact patient she was informed her rx for Plaquanil has been denied. Please reach out to pharmacy and patient to assist as this has been on going for two weeks now per Jenna if this is being cancel please have Dr. Puente a call to discuss. Thank you     Action Taken: Other: Rheum    Travel Screening: Not Applicable

## 2024-03-14 RX ORDER — HYDROXYCHLOROQUINE SULFATE 200 MG/1
TABLET, FILM COATED ORAL
Qty: 135 TABLET | Refills: 0 | Status: SHIPPED | OUTPATIENT
Start: 2024-03-14 | End: 2024-05-16

## 2024-03-14 NOTE — TELEPHONE ENCOUNTER
Rx refilled for three months until eye exam in May per Dr. Puente.     Coni CROWE RN, [facility] 03/14/24 8:23 AM

## 2024-03-21 ENCOUNTER — INFUSION THERAPY VISIT (OUTPATIENT)
Dept: INFUSION THERAPY | Facility: CLINIC | Age: 79
End: 2024-03-21
Attending: INTERNAL MEDICINE
Payer: MEDICARE

## 2024-03-21 VITALS
BODY MASS INDEX: 30.19 KG/M2 | WEIGHT: 181.4 LBS | TEMPERATURE: 98 F | DIASTOLIC BLOOD PRESSURE: 74 MMHG | SYSTOLIC BLOOD PRESSURE: 146 MMHG | HEART RATE: 55 BPM

## 2024-03-21 DIAGNOSIS — M06.00 SERONEGATIVE RHEUMATOID ARTHRITIS (H): ICD-10-CM

## 2024-03-21 DIAGNOSIS — M06.042 RHEUMATOID ARTHRITIS INVOLVING BOTH HANDS WITH NEGATIVE RHEUMATOID FACTOR (H): Primary | ICD-10-CM

## 2024-03-21 DIAGNOSIS — M06.041 RHEUMATOID ARTHRITIS INVOLVING BOTH HANDS WITH NEGATIVE RHEUMATOID FACTOR (H): Primary | ICD-10-CM

## 2024-03-21 PROCEDURE — 250N000028 HC RX JA MOD (INTRAVENOUS), IP 250 OP 636: Mod: JZ,JA | Performed by: INTERNAL MEDICINE

## 2024-03-21 PROCEDURE — 258N000003 HC RX IP 258 OP 636: Performed by: INTERNAL MEDICINE

## 2024-03-21 PROCEDURE — 96365 THER/PROPH/DIAG IV INF INIT: CPT

## 2024-03-21 RX ORDER — ALBUTEROL SULFATE 90 UG/1
1-2 AEROSOL, METERED RESPIRATORY (INHALATION)
Status: CANCELLED
Start: 2024-04-18

## 2024-03-21 RX ORDER — METHYLPREDNISOLONE SODIUM SUCCINATE 125 MG/2ML
125 INJECTION, POWDER, LYOPHILIZED, FOR SOLUTION INTRAMUSCULAR; INTRAVENOUS
Status: CANCELLED
Start: 2024-04-18

## 2024-03-21 RX ORDER — ALBUTEROL SULFATE 0.83 MG/ML
2.5 SOLUTION RESPIRATORY (INHALATION)
Status: CANCELLED | OUTPATIENT
Start: 2024-04-18

## 2024-03-21 RX ORDER — MEPERIDINE HYDROCHLORIDE 25 MG/ML
25 INJECTION INTRAMUSCULAR; INTRAVENOUS; SUBCUTANEOUS EVERY 30 MIN PRN
Status: CANCELLED | OUTPATIENT
Start: 2024-04-18

## 2024-03-21 RX ORDER — EPINEPHRINE 1 MG/ML
0.3 INJECTION, SOLUTION, CONCENTRATE INTRAVENOUS EVERY 5 MIN PRN
Status: CANCELLED | OUTPATIENT
Start: 2024-04-18

## 2024-03-21 RX ORDER — DIPHENHYDRAMINE HYDROCHLORIDE 50 MG/ML
50 INJECTION INTRAMUSCULAR; INTRAVENOUS
Status: CANCELLED
Start: 2024-04-18

## 2024-03-21 RX ADMIN — SODIUM CHLORIDE 250 ML: 9 INJECTION, SOLUTION INTRAVENOUS at 09:14

## 2024-03-21 RX ADMIN — SODIUM CHLORIDE 750 MG: 9 INJECTION, SOLUTION INTRAVENOUS at 09:14

## 2024-03-21 NOTE — PROGRESS NOTES
Infusion Nursing Note:  Briana Hicksreinaldo presents today for Orencia.    Patient seen by provider today: No   present during visit today: Not Applicable.    Note: N/A.    Intravenous Access:  Peripheral IV placed.    ~~~ NOTE: If the patient answers yes to any of the questions below, hold the infusion and contact ordering provider or on-call provider.    Do you currently have any signs of illness or infection or are you on any antibiotics? No  Have you recently had an elevated temperature, fever, chills, productive cough, coughing for 3 weeks or longer or hemoptysis, abnormal vital signs, night sweats, chest pain or have you noticed a decrease in your appetite, unexplained weight loss or fatigue? No  Have you had any new, sudden, or worsening abdominal pain? No  Do you have any open wounds or new incisions? (exclude for patients with hidradenitis suppurativa) No  Have you recently been diagnosed with any new nervous system diseases (ie. Multiple sclerosis, Guillain [place], seizures, neurological changes) or cancer diagnosis? Are you on any form of radiation or chemotherapy? No  Have there been any other new onset medical symptoms? No  Are you pregnant or breast feeding or do you have plans of pregnancy in the future? No; N/A  Do you have any upcoming hospitalizations or surgeries? Does not include esophagogastroduodenoscopy, colonoscopy, endoscopic retrograde cholangiopancreatography (ERCP), endoscopic ultrasound (EUS), dental procedures (including cleanings, fillings, implants, extractions)  or joint aspiration/steroid injections No  Have you or anyone in your household received a live vaccination in the past 4 weeks? Please note: No live vaccines while on biologic/chemotherapy until 6 months after the last treatment. Patient can receive the flu vaccine (shot only).  It is optimal for the patient to get it mid cycle, but it can be given at any time as long as it is not on the day of the infusion.  No  Post Infusion Assessment:  Patient tolerated infusion without incident.  Blood return noted pre and post infusion.  Site patent and intact, free from redness, edema or discomfort.  No evidence of extravasations.  Access discontinued per protocol.   Discharge Plan:   Patient discharged in stable condition accompanied by: self.  Departure Mode: Ambulatory.  Maurisio Coughlin RN

## 2024-04-02 DIAGNOSIS — I50.21 ACUTE SYSTOLIC HEART FAILURE (H): ICD-10-CM

## 2024-04-02 RX ORDER — FUROSEMIDE 40 MG
40 TABLET ORAL DAILY
Qty: 90 TABLET | Refills: 0 | Status: SHIPPED | OUTPATIENT
Start: 2024-04-02 | End: 2024-04-04

## 2024-04-02 NOTE — TELEPHONE ENCOUNTER
Last Office Visit: 2/23/23 ([place])  Next Office Visit: 4/4/24 ([place])  Last Fill Date: 10/30/23  Raquel Noriega LPN Cardiology   4/2/2024 8:38 AM

## 2024-04-02 NOTE — TELEPHONE ENCOUNTER
[facility] Cardiology Refill Guideline reviewed.  Medication meets criteria for refill.    Pt given yenifer refill. Pt due to be seen. Will arlet further refills after pt is seen in clinic at upcoming appointment.     Cheyanne Lozano RN

## 2024-04-04 ENCOUNTER — OFFICE VISIT (OUTPATIENT)
Dept: CARDIOLOGY | Facility: CLINIC | Age: 79
End: 2024-04-04
Payer: COMMERCIAL

## 2024-04-04 VITALS
OXYGEN SATURATION: 96 % | RESPIRATION RATE: 16 BRPM | BODY MASS INDEX: 30.09 KG/M2 | SYSTOLIC BLOOD PRESSURE: 150 MMHG | DIASTOLIC BLOOD PRESSURE: 80 MMHG | HEART RATE: 65 BPM | WEIGHT: 180.8 LBS | TEMPERATURE: 96.5 F

## 2024-04-04 DIAGNOSIS — N18.2 CKD (CHRONIC KIDNEY DISEASE) STAGE 2, GFR 60-89 ML/MIN: ICD-10-CM

## 2024-04-04 PROCEDURE — 99214 OFFICE O/P EST MOD 30 MIN: CPT | Performed by: INTERNAL MEDICINE

## 2024-04-04 RX ORDER — LISINOPRIL 20 MG/1
20 TABLET ORAL DAILY
Qty: 90 TABLET | Refills: 3 | Status: SHIPPED | OUTPATIENT
Start: 2024-04-04

## 2024-04-04 NOTE — PROGRESS NOTES
CARDIOLOGY CLINIC CONSULTATION    PRIMARY CARE PHYSICIAN:  Physician No Ref-Primary    HISTORY OF PRESENT ILLNESS:  This is a pleasant 79-year-old female who was first referred to me as a new heart failure consultation in December 2020.  She appears much younger for her age.  She is here for routine follow-up at [facility] heart clinic.  She has the following pertinent medical issues.    History of hyperlipidemia, hypertension, borderline CKD and hypothyroidism.    She says she had a stroke in 2006 and rheumatic fever as a child.    She has a history of breast cancer without any breast radiation but chemotherapy in 2005.    She has been receiving IV Remicade for rheumatoid arthritis  Diagnosed with heart failure with reduced ejection fraction in November 2020.  EF 30% ECG showed right bundle branch block.  Lexiscan was false positive showing anterior infarction and angiogram showing mild proximal LAD disease.  She had an MRI done in February 2021 which showed improving LV function with an EF of 54%.  Some gadolinium enhancement at the right ventricular insertion site was noted consistent with nonischemic fibrosis.  Cardiac monitoring did not show atrial fibrillation. Etiology of this was unclear, possible viral vs. HTN related.    In regards to medical therapy she is on aspirin and statin.  For heart failure she is on metoprolol lisinopril Lasix and potassium.     Today she is here for routine follow-up.  Denies any new cardiovascular symptoms.  Home blood pressures are running in the 130 systolic range.  She appears much younger for age.  Still works in the [employer] department at [employer].  Functionally very active.  She has cut down her Lasix dosing to 10 mg.  She needs to have significant amount of water in the day to compensate.    PAST MEDICAL HISTORY:  Past Medical History:   Diagnosis Date    Allergies     Breast cancer (H)     Esophageal reflux     Hypertension     Other and unspecified  "hyperlipidemia     Other chronic bronchitis     Personal history of pneumonia (recurrent)     Hx-Pneumonia, \" Chronic Bronchitis\"    Personal history of tobacco use, presenting hazards to health     RA (rheumatoid arthritis) (H)     Unspecified hypothyroidism        MEDICATIONS:  Current Outpatient Medications   Medication Sig Dispense Refill    Abatacept (ORENCIA IV)       aspirin 81 MG EC tablet Take 1 tablet (81 mg) by mouth daily 90 tablet 3    atorvastatin (LIPITOR) 40 MG tablet Take 1 tablet (40 mg) by mouth every evening 90 tablet 3    calcium carbonate (OS-MARIO) 500 MG tablet Take 1 tablet by mouth daily      Coenzyme Q10 (COQ10 PO) Take 1 capsule by mouth every morning       fluticasone (FLONASE) 50 MCG/ACT nasal spray Spray 2 sprays into both nostrils daily as needed for rhinitis or allergies 16 g 3    FOLIC ACID PO Take 1 mg by mouth daily      furosemide (LASIX) 40 MG tablet Take 1 tablet (40 mg) by mouth daily No further refills until seen by cardiology--call [phone] to schedule 90 tablet 0    hydroxychloroquine (PLAQUENIL) 200 MG tablet Hydroxychloroquine 200mg daily; and an additional 200mg every other day. Yearly eye exam including 10-2 VF and SD-OCT are required 135 tablet 0    levothyroxine (SYNTHROID/LEVOTHROID) 137 MCG tablet Take 1 tablet (137 mcg) by mouth daily Along with 25mcg to equal 162mcg daily. 90 tablet 3    levothyroxine (SYNTHROID/LEVOTHROID) 25 MCG tablet Take 1 tablet (25 mcg) by mouth daily Take along with the 137mcg to equal 162mcg total daily. 90 tablet 3    lisinopril (ZESTRIL) 5 MG tablet Take 1 tablet (5 mg) by mouth 2 times daily 180 tablet 3    meclizine (ANTIVERT) 25 MG tablet Take 25 mg by mouth daily as needed for dizziness      metoprolol succinate ER (TOPROL XL) 25 MG 24 hr tablet Take 1 tablet (25 mg) by mouth daily 90 tablet 3    Naproxen Sodium (ALEVE PO) Take 2 tablets by mouth 2 times daily as needed       pantoprazole (PROTONIX) 40 MG EC tablet Take 1 tablet " (40 mg) by mouth daily 90 tablet 3    potassium chloride ER (K-TAB/KLOR-CON) 10 MEQ CR tablet Take 1 tablet (10 mEq) by mouth daily 90 tablet 3    tolterodine ER (DETROL LA) 4 MG 24 hr capsule Take 1 capsule (4 mg) by mouth daily 90 capsule 3     No current facility-administered medications for this visit.       SOCIAL HISTORY:  I have reviewed this patient's social history and updated it with pertinent information if needed. Briana Mcgee  reports that she quit smoking about 27 years ago. Her smoking use included cigarettes. She has never used smokeless tobacco. She reports current alcohol use. She reports that she does not use drugs.    PHYSICAL EXAM:  Temp:  [96.5  F (35.8  C)] 96.5  F (35.8  C)  Pulse:  [65] 65  Resp:  [16] 16  BP: (150)/(80) 150/80  SpO2:  [96 %] 96 %  180 lbs 12.8 oz    Constitutional: alert, no distress  Respiratory: Good bilateral air entry  Cardiovascular: Normal regular heart sounds no edema soft systolic murmur normal JVP  GI: nondistended  Neuropsychiatric: appropriate affact    ASSESSMENT: Pertinent issues addressed/ reviewed during this cardiology visit  Heart failure with recovered ejection fraction  Mild CAD  Uncontrolled hypertension    RECOMMENDATIONS:  Patient is doing very well from a cardiac standpoint.  She appears very compensated.  NYHA class I.  Continue aspirin and statin for CAD.  In regards to her heart failure therapy continue beta-blockers and ACE inhibitors.  Last EF is 50 to 55% with mild-moderate mitral and tricuspid regurgitations.  I have told her to change her Lasix to as needed.  Stop potassium.  Increase lisinopril to 20 mg daily.  Currently she is on 5 twice daily.  Continue beta-blockers.  Follow-up routinely in the cardiology clinic in 1 year from now sooner if anything changes clinically.    It was a pleasure seeing this patient in clinic today. Please do not hesitate to contact me with any future questions.     LACIE Saldana, [facility]  Cardiology -  [facility]  April 4, 2024    Review of the result(s) of each unique test - Last CBC BMP lipids echo     The level of medical decision making during this visit was of moderate complexity.    This note was completed in part using dictation via the Dragon voice recognition software. Some word and grammatical errors may occur and must be interpreted in the appropriate clinical context.  If there are any questions pertaining to this issue, please contact me for further clarification.

## 2024-04-04 NOTE — LETTER
4/4/2024    Physician No Ref-Primary  No address on file    RE: Briana Mcgee       Dear Colleague,     I had the pleasure of seeing Briana Mcgee in the [facility].  CARDIOLOGY CLINIC CONSULTATION    PRIMARY CARE PHYSICIAN:  Physician No Ref-Primary    HISTORY OF PRESENT ILLNESS:  This is a pleasant 79-year-old female who was first referred to me as a new heart failure consultation in December 2020.  She appears much younger for her age.  She is here for routine follow-up at [facility] heart clinic.  She has the following pertinent medical issues.    History of hyperlipidemia, hypertension, borderline CKD and hypothyroidism.    She says she had a stroke in 2006 and rheumatic fever as a child.    She has a history of breast cancer without any breast radiation but chemotherapy in 2005.    She has been receiving IV Remicade for rheumatoid arthritis  Diagnosed with heart failure with reduced ejection fraction in November 2020.  EF 30% ECG showed right bundle branch block.  Lexiscan was false positive showing anterior infarction and angiogram showing mild proximal LAD disease.  She had an MRI done in February 2021 which showed improving LV function with an EF of 54%.  Some gadolinium enhancement at the right ventricular insertion site was noted consistent with nonischemic fibrosis.  Cardiac monitoring did not show atrial fibrillation. Etiology of this was unclear, possible viral vs. HTN related.    In regards to medical therapy she is on aspirin and statin.  For heart failure she is on metoprolol lisinopril Lasix and potassium.     Today she is here for routine follow-up.  Denies any new cardiovascular symptoms.  Home blood pressures are running in the 130 systolic range.  She appears much younger for age.  Still works in the [employer] department at [employer].  Functionally very active.  She has cut down her Lasix dosing to 10 mg.  She needs to have significant amount of water  "in the day to compensate.    PAST MEDICAL HISTORY:  Past Medical History:   Diagnosis Date    Allergies     Breast cancer (H)     Esophageal reflux     Hypertension     Other and unspecified hyperlipidemia     Other chronic bronchitis     Personal history of pneumonia (recurrent)     Hx-Pneumonia, \" Chronic Bronchitis\"    Personal history of tobacco use, presenting hazards to health     RA (rheumatoid arthritis) (H)     Unspecified hypothyroidism        MEDICATIONS:  Current Outpatient Medications   Medication Sig Dispense Refill    Abatacept (ORENCIA IV)       aspirin 81 MG EC tablet Take 1 tablet (81 mg) by mouth daily 90 tablet 3    atorvastatin (LIPITOR) 40 MG tablet Take 1 tablet (40 mg) by mouth every evening 90 tablet 3    calcium carbonate (OS-MARIO) 500 MG tablet Take 1 tablet by mouth daily      Coenzyme Q10 (COQ10 PO) Take 1 capsule by mouth every morning       fluticasone (FLONASE) 50 MCG/ACT nasal spray Spray 2 sprays into both nostrils daily as needed for rhinitis or allergies 16 g 3    FOLIC ACID PO Take 1 mg by mouth daily      furosemide (LASIX) 40 MG tablet Take 1 tablet (40 mg) by mouth daily No further refills until seen by cardiology--call [phone] to schedule 90 tablet 0    hydroxychloroquine (PLAQUENIL) 200 MG tablet Hydroxychloroquine 200mg daily; and an additional 200mg every other day. Yearly eye exam including 10-2 VF and SD-OCT are required 135 tablet 0    levothyroxine (SYNTHROID/LEVOTHROID) 137 MCG tablet Take 1 tablet (137 mcg) by mouth daily Along with 25mcg to equal 162mcg daily. 90 tablet 3    levothyroxine (SYNTHROID/LEVOTHROID) 25 MCG tablet Take 1 tablet (25 mcg) by mouth daily Take along with the 137mcg to equal 162mcg total daily. 90 tablet 3    lisinopril (ZESTRIL) 5 MG tablet Take 1 tablet (5 mg) by mouth 2 times daily 180 tablet 3    meclizine (ANTIVERT) 25 MG tablet Take 25 mg by mouth daily as needed for dizziness      metoprolol succinate ER (TOPROL XL) 25 MG 24 hr " tablet Take 1 tablet (25 mg) by mouth daily 90 tablet 3    Naproxen Sodium (ALEVE PO) Take 2 tablets by mouth 2 times daily as needed       pantoprazole (PROTONIX) 40 MG EC tablet Take 1 tablet (40 mg) by mouth daily 90 tablet 3    potassium chloride ER (K-TAB/KLOR-CON) 10 MEQ CR tablet Take 1 tablet (10 mEq) by mouth daily 90 tablet 3    tolterodine ER (DETROL LA) 4 MG 24 hr capsule Take 1 capsule (4 mg) by mouth daily 90 capsule 3     No current facility-administered medications for this visit.       SOCIAL HISTORY:  I have reviewed this patient's social history and updated it with pertinent information if needed. Briana Mcgee  reports that she quit smoking about 27 years ago. Her smoking use included cigarettes. She has never used smokeless tobacco. She reports current alcohol use. She reports that she does not use drugs.    PHYSICAL EXAM:  Temp:  [96.5  F (35.8  C)] 96.5  F (35.8  C)  Pulse:  [65] 65  Resp:  [16] 16  BP: (150)/(80) 150/80  SpO2:  [96 %] 96 %  180 lbs 12.8 oz    Constitutional: alert, no distress  Respiratory: Good bilateral air entry  Cardiovascular: Normal regular heart sounds no edema soft systolic murmur normal JVP  GI: nondistended  Neuropsychiatric: appropriate affact    ASSESSMENT: Pertinent issues addressed/ reviewed during this cardiology visit  Heart failure with recovered ejection fraction  Mild CAD  Uncontrolled hypertension    RECOMMENDATIONS:  Patient is doing very well from a cardiac standpoint.  She appears very compensated.  NYHA class I.  Continue aspirin and statin for CAD.  In regards to her heart failure therapy continue beta-blockers and ACE inhibitors.  Last EF is 50 to 55% with mild-moderate mitral and tricuspid regurgitations.  I have told her to change her Lasix to as needed.  Stop potassium.  Increase lisinopril to 20 mg daily.  Currently she is on 5 twice daily.  Continue beta-blockers.  Follow-up routinely in the cardiology clinic in 1 year from now sooner if  anything changes clinically.    It was a pleasure seeing this patient in clinic today. Please do not hesitate to contact me with any future questions.     LACIE Saldana, [facility]  Cardiology - [facility]  April 4, 2024    Review of the result(s) of each unique test - Last CBC BMP lipids echo     The level of medical decision making during this visit was of moderate complexity.    This note was completed in part using dictation via the Dragon voice recognition software. Some word and grammatical errors may occur and must be interpreted in the appropriate clinical context.  If there are any questions pertaining to this issue, please contact me for further clarification.      Thank you for allowing me to participate in the care of your patient.      Sincerely,     Davy Shook MD     [facility]  cc:   Davy Shook MD  [address] [address] [place] [id number]

## 2024-04-04 NOTE — PATIENT INSTRUCTIONS
Stop lasix and potassium. Can use lasix as needed  Change lisinopril to 20 mg daily  See us in 1 year

## 2024-04-18 ENCOUNTER — INFUSION THERAPY VISIT (OUTPATIENT)
Dept: INFUSION THERAPY | Facility: CLINIC | Age: 79
End: 2024-04-18
Attending: INTERNAL MEDICINE
Payer: MEDICARE

## 2024-04-18 VITALS
DIASTOLIC BLOOD PRESSURE: 72 MMHG | TEMPERATURE: 97.3 F | BODY MASS INDEX: 30.49 KG/M2 | WEIGHT: 183.2 LBS | HEART RATE: 58 BPM | SYSTOLIC BLOOD PRESSURE: 132 MMHG | RESPIRATION RATE: 16 BRPM

## 2024-04-18 DIAGNOSIS — M06.042 RHEUMATOID ARTHRITIS INVOLVING BOTH HANDS WITH NEGATIVE RHEUMATOID FACTOR (H): Primary | ICD-10-CM

## 2024-04-18 DIAGNOSIS — M06.041 RHEUMATOID ARTHRITIS INVOLVING BOTH HANDS WITH NEGATIVE RHEUMATOID FACTOR (H): Primary | ICD-10-CM

## 2024-04-18 DIAGNOSIS — M06.00 SERONEGATIVE RHEUMATOID ARTHRITIS (H): ICD-10-CM

## 2024-04-18 PROCEDURE — 250N000028 HC RX JA MOD (INTRAVENOUS), IP 250 OP 636: Mod: JZ,JA | Performed by: INTERNAL MEDICINE

## 2024-04-18 PROCEDURE — 258N000003 HC RX IP 258 OP 636: Performed by: INTERNAL MEDICINE

## 2024-04-18 PROCEDURE — 96365 THER/PROPH/DIAG IV INF INIT: CPT

## 2024-04-18 RX ORDER — ALBUTEROL SULFATE 0.83 MG/ML
2.5 SOLUTION RESPIRATORY (INHALATION)
Status: CANCELLED | OUTPATIENT
Start: 2024-05-16

## 2024-04-18 RX ORDER — METHYLPREDNISOLONE SODIUM SUCCINATE 125 MG/2ML
125 INJECTION, POWDER, LYOPHILIZED, FOR SOLUTION INTRAMUSCULAR; INTRAVENOUS
Status: CANCELLED
Start: 2024-05-16

## 2024-04-18 RX ORDER — DIPHENHYDRAMINE HYDROCHLORIDE 50 MG/ML
50 INJECTION INTRAMUSCULAR; INTRAVENOUS
Status: CANCELLED
Start: 2024-05-16

## 2024-04-18 RX ORDER — EPINEPHRINE 1 MG/ML
0.3 INJECTION, SOLUTION, CONCENTRATE INTRAVENOUS EVERY 5 MIN PRN
Status: CANCELLED | OUTPATIENT
Start: 2024-05-16

## 2024-04-18 RX ORDER — ALBUTEROL SULFATE 90 UG/1
1-2 AEROSOL, METERED RESPIRATORY (INHALATION)
Status: CANCELLED
Start: 2024-05-16

## 2024-04-18 RX ORDER — MEPERIDINE HYDROCHLORIDE 25 MG/ML
25 INJECTION INTRAMUSCULAR; INTRAVENOUS; SUBCUTANEOUS EVERY 30 MIN PRN
Status: CANCELLED | OUTPATIENT
Start: 2024-05-16

## 2024-04-18 RX ADMIN — SODIUM CHLORIDE 750 MG: 9 INJECTION, SOLUTION INTRAVENOUS at 12:13

## 2024-04-18 RX ADMIN — SODIUM CHLORIDE 250 ML: 9 INJECTION, SOLUTION INTRAVENOUS at 12:12

## 2024-04-18 ASSESSMENT — PAIN SCALES - GENERAL: PAINLEVEL: NO PAIN (0)

## 2024-04-18 NOTE — PROGRESS NOTES
Infusion Nursing Note:  Briana Mcgee presents today for Orencia.    Patient seen by provider today: No   present during visit today: Not Applicable.    Note: Jenna's B/P elevated, states her provider is changing her meds.      Intravenous Access:  Peripheral IV placed.    Treatment Conditions:  Biological Infusion Checklist:  ~~~ NOTE: If the patient answers yes to any of the questions below, hold the infusion and contact ordering provider or on-call provider.    Have you recently had an elevated temperature, fever, chills, productive cough, coughing for 3 weeks or longer or hemoptysis,  abnormal vital signs, night sweats,  chest pain or have you noticed a decrease in your appetite, unexplained weight loss or fatigue? No  Do you have any open wounds or new incisions? No  Do you have any upcoming hospitalizations or surgeries? Does not include esophagogastroduodenoscopy, colonoscopy, endoscopic retrograde cholangiopancreatography (ERCP), endoscopic ultrasound (EUS), dental procedures or joint aspiration/steroid injections No  Do you currently have any signs of illness or infection or are you on any antibiotics? No  Have you had any new, sudden or worsening abdominal pain? No  Have you or anyone in your household received a live vaccination in the past 4 weeks? Please note: No live vaccines while on biologic/chemotherapy until 6 months after the last treatment. Patient can receive the flu vaccine (shot only), pneumovax and the Covid vaccine. It is optimal for the patient to get these vaccines mid cycle, but they can be given at any time as long as it is not on the day of the infusion. No  Have you recently been diagnosed with any new nervous system diseases (ie. Multiple sclerosis, Guillain [place], seizures, neurological changes) or cancer diagnosis? Are you on any form of radiation or chemotherapy? No  Are you pregnant or breast feeding or do you have plans of pregnancy in the future? No  Have you  been having any signs of worsening depression or suicidal ideations?  (benlysta only) No  Have there been any other new onset medical symptoms? No  Have you had any new blood clots? (IVIG only) No      Post Infusion Assessment:  Patient tolerated infusion without incident.  Site patent and intact, free from redness, edema or discomfort.  No evidence of extravasations.  Access discontinued per protocol.       Discharge Plan:   Patient discharged in stable condition accompanied by: self.  Departure Mode: Ambulatory.      Terry Grant RN

## 2024-04-25 ENCOUNTER — HOSPITAL ENCOUNTER (OUTPATIENT)
Dept: MAMMOGRAPHY | Facility: CLINIC | Age: 79
Discharge: HOME | End: 2024-04-25
Payer: MEDICARE

## 2024-04-25 DIAGNOSIS — Z12.31 VISIT FOR SCREENING MAMMOGRAM: ICD-10-CM

## 2024-04-25 PROCEDURE — 77063 BREAST TOMOSYNTHESIS BI: CPT

## 2024-05-02 ENCOUNTER — OFFICE VISIT (OUTPATIENT)
Dept: OPHTHALMOLOGY | Facility: CLINIC | Age: 79
End: 2024-05-02
Attending: INTERNAL MEDICINE
Payer: COMMERCIAL

## 2024-05-02 DIAGNOSIS — M06.041 RHEUMATOID ARTHRITIS INVOLVING BOTH HANDS WITH NEGATIVE RHEUMATOID FACTOR (H): ICD-10-CM

## 2024-05-02 DIAGNOSIS — M06.042 RHEUMATOID ARTHRITIS INVOLVING BOTH HANDS WITH NEGATIVE RHEUMATOID FACTOR (H): ICD-10-CM

## 2024-05-02 DIAGNOSIS — Z79.899 HIGH RISK MEDICATION USE: Primary | ICD-10-CM

## 2024-05-02 PROCEDURE — 92134 CPTRZ OPH DX IMG PST SGM RTA: CPT | Performed by: STUDENT IN AN ORGANIZED HEALTH CARE EDUCATION/TRAINING PROGRAM

## 2024-05-02 PROCEDURE — 92083 EXTENDED VISUAL FIELD XM: CPT | Performed by: STUDENT IN AN ORGANIZED HEALTH CARE EDUCATION/TRAINING PROGRAM

## 2024-05-02 PROCEDURE — 92012 INTRM OPH EXAM EST PATIENT: CPT | Performed by: STUDENT IN AN ORGANIZED HEALTH CARE EDUCATION/TRAINING PROGRAM

## 2024-05-02 ASSESSMENT — REFRACTION_WEARINGRX
OS_AXIS: 096
OS_SPHERE: -0.25
OD_SPHERE: -1.25
OD_AXIS: 001
OS_ADD: +2.50
OD_ADD: +2.50
OD_CYLINDER: +0.75
OS_CYLINDER: +0.50

## 2024-05-02 ASSESSMENT — VISUAL ACUITY
CORRECTION_TYPE: GLASSES
OD_CC: 20/25
OS_CC+: -3
OS_CC: 20/25
METHOD: SNELLEN - LINEAR

## 2024-05-02 ASSESSMENT — SLIT LAMP EXAM - LIDS
COMMENTS: 1+ DERMATOCHALASIS
COMMENTS: 1+ DERMATOCHALASIS

## 2024-05-02 ASSESSMENT — EXTERNAL EXAM - RIGHT EYE: OD_EXAM: NORMAL

## 2024-05-02 ASSESSMENT — EXTERNAL EXAM - LEFT EYE: OS_EXAM: NORMAL

## 2024-05-02 NOTE — PROGRESS NOTES
Current Eye Medications:  none.     Subjective:  The patient is here for a Plaquenil visual field and OCT.  Vision is adequate with present glasses.  She has some difficulty reading small print.   Last eye exam: 1+ year ago in [place], but she would like to have all her eye care here at [facility].  She has been taking Plaquenil for 3-5 years.  Dr. Puente.     Objective:  See Ophthalmology Exam.      Assessment:  Briana Mcgee is a 79 year old female who presents with:   Encounter Diagnosis   Name Primary?    Long-term use of hydroxychloroquine Started Plaquenil 8/2018 - 7 years now.     Macular SD-OCT within normal limits both eyes.     Stern visual field (HVF) 10-2 within normal limits both eyes (v. mild scattered loss both eyes)     No signs of Plaquenil retinal toxicity at present.        Plan:  Normal Plaquenil eye tests today.     Rik Villavicencio MD  [phone]

## 2024-05-02 NOTE — LETTER
5/2/2024         RE: Briana Mcgee  [address] [facility] [address]        Dear Colleague,    Thank you for referring your patient, Briana Mcgee, to the [facility]. Please see a copy of my visit note below.     Current Eye Medications:  none.     Subjective:  The patient is here for a Plaquenil visual field and OCT.  Vision is adequate with present glasses.  She has some difficulty reading small print.   Last eye exam: 1+ year ago in [place], but she would like to have all her eye care here at [facility].  She has been taking Plaquenil for 3-5 years.  Dr. Puente.     Objective:  See Ophthalmology Exam.      Assessment:  Briana Mcgee is a 79 year old female who presents with:   Encounter Diagnosis   Name Primary?     Long-term use of hydroxychloroquine Started Plaquenil 8/2018 - 7 years now.     Macular SD-OCT within normal limits both eyes.     Stern visual field (HVF) 10-2 within normal limits both eyes (v. mild scattered loss both eyes)     No signs of Plaquenil retinal toxicity at present.        Plan:  Normal Plaquenil eye tests today.     Rik Villavicencio MD  [phone]       Again, thank you for allowing me to participate in the care of your patient.        Sincerely,        Rik Villavicencio MD

## 2024-05-13 DIAGNOSIS — M06.00 SERONEGATIVE RHEUMATOID ARTHRITIS (H): ICD-10-CM

## 2024-05-13 DIAGNOSIS — M06.042 RHEUMATOID ARTHRITIS INVOLVING BOTH HANDS WITH NEGATIVE RHEUMATOID FACTOR (H): Primary | ICD-10-CM

## 2024-05-13 DIAGNOSIS — M06.041 RHEUMATOID ARTHRITIS INVOLVING BOTH HANDS WITH NEGATIVE RHEUMATOID FACTOR (H): Primary | ICD-10-CM

## 2024-05-16 ENCOUNTER — OFFICE VISIT (OUTPATIENT)
Dept: RHEUMATOLOGY | Facility: CLINIC | Age: 79
End: 2024-05-16
Payer: COMMERCIAL

## 2024-05-16 ENCOUNTER — INFUSION THERAPY VISIT (OUTPATIENT)
Dept: INFUSION THERAPY | Facility: CLINIC | Age: 79
End: 2024-05-16
Attending: INTERNAL MEDICINE
Payer: MEDICARE

## 2024-05-16 VITALS
HEART RATE: 65 BPM | TEMPERATURE: 97.4 F | OXYGEN SATURATION: 97 % | SYSTOLIC BLOOD PRESSURE: 118 MMHG | DIASTOLIC BLOOD PRESSURE: 50 MMHG | RESPIRATION RATE: 16 BRPM | BODY MASS INDEX: 29.99 KG/M2 | WEIGHT: 180.2 LBS

## 2024-05-16 VITALS
BODY MASS INDEX: 29.95 KG/M2 | OXYGEN SATURATION: 97 % | SYSTOLIC BLOOD PRESSURE: 127 MMHG | DIASTOLIC BLOOD PRESSURE: 65 MMHG | WEIGHT: 180 LBS | HEART RATE: 66 BPM

## 2024-05-16 DIAGNOSIS — M06.042 RHEUMATOID ARTHRITIS INVOLVING BOTH HANDS WITH NEGATIVE RHEUMATOID FACTOR (H): Primary | ICD-10-CM

## 2024-05-16 DIAGNOSIS — M06.041 RHEUMATOID ARTHRITIS INVOLVING BOTH HANDS WITH NEGATIVE RHEUMATOID FACTOR (H): Primary | ICD-10-CM

## 2024-05-16 DIAGNOSIS — M06.00 SERONEGATIVE RHEUMATOID ARTHRITIS (H): ICD-10-CM

## 2024-05-16 PROCEDURE — 250N000028 HC RX JA MOD (INTRAVENOUS), IP 250 OP 636: Mod: JZ,JA | Performed by: INTERNAL MEDICINE

## 2024-05-16 PROCEDURE — 96365 THER/PROPH/DIAG IV INF INIT: CPT

## 2024-05-16 PROCEDURE — 99214 OFFICE O/P EST MOD 30 MIN: CPT | Performed by: INTERNAL MEDICINE

## 2024-05-16 PROCEDURE — 258N000003 HC RX IP 258 OP 636: Performed by: INTERNAL MEDICINE

## 2024-05-16 PROCEDURE — G2211 COMPLEX E/M VISIT ADD ON: HCPCS | Performed by: INTERNAL MEDICINE

## 2024-05-16 RX ORDER — HYDROXYCHLOROQUINE SULFATE 200 MG/1
TABLET, FILM COATED ORAL
Qty: 135 TABLET | Refills: 2 | Status: SHIPPED | OUTPATIENT
Start: 2024-05-16

## 2024-05-16 RX ORDER — ALBUTEROL SULFATE 0.83 MG/ML
2.5 SOLUTION RESPIRATORY (INHALATION)
Status: CANCELLED | OUTPATIENT
Start: 2024-06-13

## 2024-05-16 RX ORDER — EPINEPHRINE 1 MG/ML
0.3 INJECTION, SOLUTION, CONCENTRATE INTRAVENOUS EVERY 5 MIN PRN
Status: CANCELLED | OUTPATIENT
Start: 2024-06-13

## 2024-05-16 RX ORDER — ALBUTEROL SULFATE 90 UG/1
1-2 AEROSOL, METERED RESPIRATORY (INHALATION)
Status: CANCELLED
Start: 2024-06-13

## 2024-05-16 RX ORDER — ROSUVASTATIN CALCIUM 40 MG/1
40 TABLET, COATED ORAL DAILY
COMMUNITY
Start: 2024-05-16

## 2024-05-16 RX ORDER — METHYLPREDNISOLONE SODIUM SUCCINATE 125 MG/2ML
125 INJECTION, POWDER, LYOPHILIZED, FOR SOLUTION INTRAMUSCULAR; INTRAVENOUS
Status: CANCELLED
Start: 2024-06-13

## 2024-05-16 RX ORDER — MEPERIDINE HYDROCHLORIDE 25 MG/ML
25 INJECTION INTRAMUSCULAR; INTRAVENOUS; SUBCUTANEOUS EVERY 30 MIN PRN
Status: CANCELLED | OUTPATIENT
Start: 2024-06-13

## 2024-05-16 RX ORDER — DIPHENHYDRAMINE HYDROCHLORIDE 50 MG/ML
50 INJECTION INTRAMUSCULAR; INTRAVENOUS
Status: CANCELLED
Start: 2024-06-13

## 2024-05-16 RX ADMIN — SODIUM CHLORIDE 250 ML: 9 INJECTION, SOLUTION INTRAVENOUS at 13:17

## 2024-05-16 RX ADMIN — SODIUM CHLORIDE 750 MG: 9 INJECTION, SOLUTION INTRAVENOUS at 13:17

## 2024-05-16 NOTE — PROGRESS NOTES
Infusion Nursing Note:  Briana Mcgee presents today for PIV Orencia.    Patient seen by provider today: Yes: In [place]   present during visit today: Not Applicable.    Note: N/A.      Intravenous Access:  Peripheral IV placed.    Treatment Conditions:  Results reviewed, labs MET treatment parameters, ok to proceed with treatment.  Biological Infusion Checklist:  ~~~ NOTE: If the patient answers yes to any of the questions below, hold the infusion and contact ordering provider or on-call provider.    Have you recently had an elevated temperature, fever, chills, productive cough, coughing for 3 weeks or longer or hemoptysis,  abnormal vital signs, night sweats,  chest pain or have you noticed a decrease in your appetite, unexplained weight loss or fatigue? No  Do you have any open wounds or new incisions? No  Do you have any upcoming hospitalizations or surgeries? Does not include esophagogastroduodenoscopy, colonoscopy, endoscopic retrograde cholangiopancreatography (ERCP), endoscopic ultrasound (EUS), dental procedures or joint aspiration/steroid injections No  Do you currently have any signs of illness or infection or are you on any antibiotics? No  Have you had any new, sudden or worsening abdominal pain? No  Have you or anyone in your household received a live vaccination in the past 4 weeks? Please note: No live vaccines while on biologic/chemotherapy until 6 months after the last treatment. Patient can receive the flu vaccine (shot only), pneumovax and the Covid vaccine. It is optimal for the patient to get these vaccines mid cycle, but they can be given at any time as long as it is not on the day of the infusion. No  Have you recently been diagnosed with any new nervous system diseases (ie. Multiple sclerosis, Guillain [place], seizures, neurological changes) or cancer diagnosis? Are you on any form of radiation or chemotherapy? No  Are you pregnant or breast feeding or do you have plans of  pregnancy in the future? No  Have you been having any signs of worsening depression or suicidal ideations?  (benlysta only) No  Have there been any other new onset medical symptoms? No  Have you had any new blood clots? (IVIG only) No      Post Infusion Assessment:  Patient tolerated infusion without incident.  Blood return noted pre and post infusion.  Site patent and intact, free from redness, edema or discomfort.  No evidence of extravasations.  Access discontinued per protocol.       Discharge Plan:   Discharge instructions reviewed with: Patient.  Patient and/or family verbalized understanding of discharge instructions and all questions answered.  Patient discharged in stable condition accompanied by: self.  Departure Mode: Ambulatory.      Ava Zaidi RN

## 2024-05-16 NOTE — NURSING NOTE
RAPID3 (0-30) Cumulative Score  6.7          RAPID3 Weighted Score (divide #4 by 3 and that is the weighted score)  2.2
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
## 2024-06-13 ENCOUNTER — INFUSION THERAPY VISIT (OUTPATIENT)
Dept: INFUSION THERAPY | Facility: CLINIC | Age: 79
End: 2024-06-13
Attending: INTERNAL MEDICINE
Payer: MEDICARE

## 2024-06-13 VITALS
TEMPERATURE: 97.6 F | WEIGHT: 183.6 LBS | BODY MASS INDEX: 30.55 KG/M2 | RESPIRATION RATE: 14 BRPM | HEART RATE: 57 BPM | DIASTOLIC BLOOD PRESSURE: 80 MMHG | SYSTOLIC BLOOD PRESSURE: 139 MMHG

## 2024-06-13 DIAGNOSIS — M06.00 SERONEGATIVE RHEUMATOID ARTHRITIS (H): ICD-10-CM

## 2024-06-13 DIAGNOSIS — M06.042 RHEUMATOID ARTHRITIS INVOLVING BOTH HANDS WITH NEGATIVE RHEUMATOID FACTOR (H): Primary | ICD-10-CM

## 2024-06-13 DIAGNOSIS — M06.041 RHEUMATOID ARTHRITIS INVOLVING BOTH HANDS WITH NEGATIVE RHEUMATOID FACTOR (H): Primary | ICD-10-CM

## 2024-06-13 PROCEDURE — 96365 THER/PROPH/DIAG IV INF INIT: CPT

## 2024-06-13 PROCEDURE — 250N000028 HC RX JA MOD (INTRAVENOUS), IP 250 OP 636: Mod: JZ,JA | Performed by: INTERNAL MEDICINE

## 2024-06-13 PROCEDURE — 258N000003 HC RX IP 258 OP 636: Mod: JZ | Performed by: INTERNAL MEDICINE

## 2024-06-13 RX ORDER — MEPERIDINE HYDROCHLORIDE 25 MG/ML
25 INJECTION INTRAMUSCULAR; INTRAVENOUS; SUBCUTANEOUS EVERY 30 MIN PRN
Status: CANCELLED | OUTPATIENT
Start: 2024-07-11

## 2024-06-13 RX ORDER — ALBUTEROL SULFATE 0.83 MG/ML
2.5 SOLUTION RESPIRATORY (INHALATION)
Status: CANCELLED | OUTPATIENT
Start: 2024-07-11

## 2024-06-13 RX ORDER — METHYLPREDNISOLONE SODIUM SUCCINATE 125 MG/2ML
125 INJECTION, POWDER, LYOPHILIZED, FOR SOLUTION INTRAMUSCULAR; INTRAVENOUS
Status: CANCELLED
Start: 2024-07-11

## 2024-06-13 RX ORDER — DIPHENHYDRAMINE HYDROCHLORIDE 50 MG/ML
50 INJECTION INTRAMUSCULAR; INTRAVENOUS
Status: CANCELLED
Start: 2024-07-11

## 2024-06-13 RX ORDER — ALBUTEROL SULFATE 90 UG/1
1-2 AEROSOL, METERED RESPIRATORY (INHALATION)
Status: CANCELLED
Start: 2024-07-11

## 2024-06-13 RX ORDER — EPINEPHRINE 1 MG/ML
0.3 INJECTION, SOLUTION, CONCENTRATE INTRAVENOUS EVERY 5 MIN PRN
Status: CANCELLED | OUTPATIENT
Start: 2024-07-11

## 2024-06-13 RX ADMIN — SODIUM CHLORIDE 750 MG: 9 INJECTION, SOLUTION INTRAVENOUS at 08:57

## 2024-06-13 RX ADMIN — SODIUM CHLORIDE 250 ML: 9 INJECTION, SOLUTION INTRAVENOUS at 08:26

## 2024-06-13 NOTE — PROGRESS NOTES
Infusion Nursing Note:  Briana Hicksreinaldo presents today for Orencia.    Patient seen by provider today: No   present during visit today: Not Applicable.    Note: N/A.      Intravenous Access:  Peripheral IV placed.    Treatment Conditions:  Biological Infusion Checklist:  ~~~ NOTE: If the patient answers yes to any of the questions below, hold the infusion and contact ordering provider or on-call provider.    Have you recently had an elevated temperature, fever, chills, productive cough, coughing for 3 weeks or longer or hemoptysis,  abnormal vital signs, night sweats,  chest pain or have you noticed a decrease in your appetite, unexplained weight loss or fatigue? No  Do you have any open wounds or new incisions? No  Do you have any upcoming hospitalizations or surgeries? Does not include esophagogastroduodenoscopy, colonoscopy, endoscopic retrograde cholangiopancreatography (ERCP), endoscopic ultrasound (EUS), dental procedures or joint aspiration/steroid injections No  Do you currently have any signs of illness or infection or are you on any antibiotics? No  Have you had any new, sudden or worsening abdominal pain? No  Have you or anyone in your household received a live vaccination in the past 4 weeks? Please note: No live vaccines while on biologic/chemotherapy until 6 months after the last treatment. Patient can receive the flu vaccine (shot only), pneumovax and the Covid vaccine. It is optimal for the patient to get these vaccines mid cycle, but they can be given at any time as long as it is not on the day of the infusion. No  Have you recently been diagnosed with any new nervous system diseases (ie. Multiple sclerosis, Guillain [place], seizures, neurological changes) or cancer diagnosis? Are you on any form of radiation or chemotherapy? No  Are you pregnant or breast feeding or do you have plans of pregnancy in the future? No  Have you been having any signs of worsening depression or suicidal  ideations?  (benlysta only) No  Have there been any other new onset medical symptoms? No  Have you had any new blood clots? (IVIG only) No      Post Infusion Assessment:  Patient tolerated infusion without incident.  Site patent and intact, free from redness, edema or discomfort.  No evidence of extravasations.  Access discontinued per protocol.       Discharge Plan:   Patient discharged in stable condition accompanied by: self.  Departure Mode: Ambulatory.      Terry Grant RN

## 2024-07-11 ENCOUNTER — INFUSION THERAPY VISIT (OUTPATIENT)
Dept: INFUSION THERAPY | Facility: CLINIC | Age: 79
End: 2024-07-11
Attending: INTERNAL MEDICINE
Payer: MEDICARE

## 2024-07-11 VITALS
TEMPERATURE: 97.8 F | DIASTOLIC BLOOD PRESSURE: 69 MMHG | HEART RATE: 56 BPM | WEIGHT: 180 LBS | BODY MASS INDEX: 29.95 KG/M2 | SYSTOLIC BLOOD PRESSURE: 148 MMHG | OXYGEN SATURATION: 96 %

## 2024-07-11 DIAGNOSIS — M06.042 RHEUMATOID ARTHRITIS INVOLVING BOTH HANDS WITH NEGATIVE RHEUMATOID FACTOR (H): Primary | ICD-10-CM

## 2024-07-11 DIAGNOSIS — M06.041 RHEUMATOID ARTHRITIS INVOLVING BOTH HANDS WITH NEGATIVE RHEUMATOID FACTOR (H): Primary | ICD-10-CM

## 2024-07-11 DIAGNOSIS — M06.00 SERONEGATIVE RHEUMATOID ARTHRITIS (H): ICD-10-CM

## 2024-07-11 PROCEDURE — 96361 HYDRATE IV INFUSION ADD-ON: CPT

## 2024-07-11 PROCEDURE — 250N000028 HC RX JA MOD (INTRAVENOUS), IP 250 OP 636: Mod: JA | Performed by: INTERNAL MEDICINE

## 2024-07-11 PROCEDURE — 258N000003 HC RX IP 258 OP 636: Performed by: INTERNAL MEDICINE

## 2024-07-11 PROCEDURE — 96365 THER/PROPH/DIAG IV INF INIT: CPT

## 2024-07-11 RX ORDER — METHYLPREDNISOLONE SODIUM SUCCINATE 125 MG/2ML
125 INJECTION, POWDER, LYOPHILIZED, FOR SOLUTION INTRAMUSCULAR; INTRAVENOUS
Status: CANCELLED
Start: 2024-08-08

## 2024-07-11 RX ORDER — MEPERIDINE HYDROCHLORIDE 25 MG/ML
25 INJECTION INTRAMUSCULAR; INTRAVENOUS; SUBCUTANEOUS EVERY 30 MIN PRN
Status: CANCELLED | OUTPATIENT
Start: 2024-08-08

## 2024-07-11 RX ORDER — ALBUTEROL SULFATE 0.83 MG/ML
2.5 SOLUTION RESPIRATORY (INHALATION)
Status: CANCELLED | OUTPATIENT
Start: 2024-08-08

## 2024-07-11 RX ORDER — DIPHENHYDRAMINE HYDROCHLORIDE 50 MG/ML
50 INJECTION INTRAMUSCULAR; INTRAVENOUS
Status: CANCELLED
Start: 2024-08-08

## 2024-07-11 RX ORDER — ALBUTEROL SULFATE 90 UG/1
1-2 AEROSOL, METERED RESPIRATORY (INHALATION)
Status: CANCELLED
Start: 2024-08-08

## 2024-07-11 RX ORDER — EPINEPHRINE 1 MG/ML
0.3 INJECTION, SOLUTION, CONCENTRATE INTRAVENOUS EVERY 5 MIN PRN
Status: CANCELLED | OUTPATIENT
Start: 2024-08-08

## 2024-07-11 RX ADMIN — SODIUM CHLORIDE 750 MG: 9 INJECTION, SOLUTION INTRAVENOUS at 09:14

## 2024-07-11 RX ADMIN — SODIUM CHLORIDE 250 ML: 9 INJECTION, SOLUTION INTRAVENOUS at 08:45

## 2024-07-11 NOTE — PROGRESS NOTES
Infusion Nursing Note:  Briana Hicksreinaldo presents today for Orencia.    Patient seen by provider today: No   present during visit today: Not Applicable.    Note: N/A.      Intravenous Access:  Peripheral IV placed.    Treatment Conditions:  Biological Infusion Checklist:  ~~~ NOTE: If the patient answers yes to any of the questions below, hold the infusion and contact ordering provider or on-call provider.    Have you recently had an elevated temperature, fever, chills, productive cough, coughing for 3 weeks or longer or hemoptysis,  abnormal vital signs, night sweats,  chest pain or have you noticed a decrease in your appetite, unexplained weight loss or fatigue? No  Do you have any open wounds or new incisions? No  Do you have any upcoming hospitalizations or surgeries? Does not include esophagogastroduodenoscopy, colonoscopy, endoscopic retrograde cholangiopancreatography (ERCP), endoscopic ultrasound (EUS), dental procedures or joint aspiration/steroid injections No  Do you currently have any signs of illness or infection or are you on any antibiotics? No  Have you had any new, sudden or worsening abdominal pain? No  Have you or anyone in your household received a live vaccination in the past 4 weeks? Please note: No live vaccines while on biologic/chemotherapy until 6 months after the last treatment. Patient can receive the flu vaccine (shot only), pneumovax and the Covid vaccine. It is optimal for the patient to get these vaccines mid cycle, but they can be given at any time as long as it is not on the day of the infusion. No  Have you recently been diagnosed with any new nervous system diseases (ie. Multiple sclerosis, Guillain [place], seizures, neurological changes) or cancer diagnosis? Are you on any form of radiation or chemotherapy? No  Are you pregnant or breast feeding or do you have plans of pregnancy in the future? No  Have you been having any signs of worsening depression or suicidal  ideations?  (benlysta only) No  Have there been any other new onset medical symptoms? No  Have you had any new blood clots? (IVIG only) No      Post Infusion Assessment:  Patient tolerated infusion without incident.  Site patent and intact, free from redness, edema or discomfort.  No evidence of extravasations.  Access discontinued per protocol.       Discharge Plan:   Discharge instructions reviewed with: Patient.  Patient and/or family verbalized understanding of discharge instructions and all questions answered.  Patient discharged in stable condition accompanied by: self.  Departure Mode: Ambulatory.      Ava Zaidi RN

## 2024-08-08 ENCOUNTER — INFUSION THERAPY VISIT (OUTPATIENT)
Dept: INFUSION THERAPY | Facility: CLINIC | Age: 79
End: 2024-08-08
Attending: INTERNAL MEDICINE
Payer: MEDICARE

## 2024-08-08 VITALS
BODY MASS INDEX: 30.42 KG/M2 | DIASTOLIC BLOOD PRESSURE: 85 MMHG | WEIGHT: 182.8 LBS | TEMPERATURE: 97.7 F | SYSTOLIC BLOOD PRESSURE: 134 MMHG

## 2024-08-08 DIAGNOSIS — M06.041 RHEUMATOID ARTHRITIS INVOLVING BOTH HANDS WITH NEGATIVE RHEUMATOID FACTOR (H): Primary | ICD-10-CM

## 2024-08-08 DIAGNOSIS — M06.00 SERONEGATIVE RHEUMATOID ARTHRITIS (H): ICD-10-CM

## 2024-08-08 DIAGNOSIS — M06.042 RHEUMATOID ARTHRITIS INVOLVING BOTH HANDS WITH NEGATIVE RHEUMATOID FACTOR (H): Primary | ICD-10-CM

## 2024-08-08 PROCEDURE — 258N000003 HC RX IP 258 OP 636: Performed by: INTERNAL MEDICINE

## 2024-08-08 PROCEDURE — 96365 THER/PROPH/DIAG IV INF INIT: CPT

## 2024-08-08 PROCEDURE — 250N000028 HC RX JA MOD (INTRAVENOUS), IP 250 OP 636: Mod: JA | Performed by: INTERNAL MEDICINE

## 2024-08-08 RX ORDER — EPINEPHRINE 1 MG/ML
0.3 INJECTION, SOLUTION, CONCENTRATE INTRAVENOUS EVERY 5 MIN PRN
Status: CANCELLED | OUTPATIENT
Start: 2024-09-05

## 2024-08-08 RX ORDER — DIPHENHYDRAMINE HYDROCHLORIDE 50 MG/ML
50 INJECTION INTRAMUSCULAR; INTRAVENOUS
Status: CANCELLED
Start: 2024-09-05

## 2024-08-08 RX ORDER — METHYLPREDNISOLONE SODIUM SUCCINATE 125 MG/2ML
125 INJECTION, POWDER, LYOPHILIZED, FOR SOLUTION INTRAMUSCULAR; INTRAVENOUS
Status: CANCELLED
Start: 2024-09-05

## 2024-08-08 RX ORDER — MEPERIDINE HYDROCHLORIDE 25 MG/ML
25 INJECTION INTRAMUSCULAR; INTRAVENOUS; SUBCUTANEOUS EVERY 30 MIN PRN
Status: CANCELLED | OUTPATIENT
Start: 2024-09-05

## 2024-08-08 RX ORDER — ALBUTEROL SULFATE 0.83 MG/ML
2.5 SOLUTION RESPIRATORY (INHALATION)
Status: CANCELLED | OUTPATIENT
Start: 2024-09-05

## 2024-08-08 RX ORDER — ALBUTEROL SULFATE 90 UG/1
1-2 AEROSOL, METERED RESPIRATORY (INHALATION)
Status: CANCELLED
Start: 2024-09-05

## 2024-08-08 RX ADMIN — SODIUM CHLORIDE 750 MG: 9 INJECTION, SOLUTION INTRAVENOUS at 08:42

## 2024-08-08 RX ADMIN — SODIUM CHLORIDE 250 ML: 9 INJECTION, SOLUTION INTRAVENOUS at 08:42

## 2024-08-08 ASSESSMENT — PAIN SCALES - GENERAL: PAINLEVEL: NO PAIN (0)

## 2024-08-08 NOTE — PROGRESS NOTES
Infusion Nursing Note:  Briana Hicksreinaldo presents today for Orencia.    Patient seen by provider today: No   present during visit today: Not Applicable.    Note: N/A.      Intravenous Access:  Peripheral IV placed.    Treatment Conditions:  Biological Infusion Checklist:  ~~~ NOTE: If the patient answers yes to any of the questions below, hold the infusion and contact ordering provider or on-call provider.    Have you recently had an elevated temperature, fever, chills, productive cough, coughing for 3 weeks or longer or hemoptysis,  abnormal vital signs, night sweats,  chest pain or have you noticed a decrease in your appetite, unexplained weight loss or fatigue? No  Do you have any open wounds or new incisions? No  Do you have any upcoming hospitalizations or surgeries? Does not include esophagogastroduodenoscopy, colonoscopy, endoscopic retrograde cholangiopancreatography (ERCP), endoscopic ultrasound (EUS), dental procedures or joint aspiration/steroid injections No  Do you currently have any signs of illness or infection or are you on any antibiotics? No  Have you had any new, sudden or worsening abdominal pain? No  Have you or anyone in your household received a live vaccination in the past 4 weeks? Please note: No live vaccines while on biologic/chemotherapy until 6 months after the last treatment. Patient can receive the flu vaccine (shot only), pneumovax and the Covid vaccine. It is optimal for the patient to get these vaccines mid cycle, but they can be given at any time as long as it is not on the day of the infusion. No  Have you recently been diagnosed with any new nervous system diseases (ie. Multiple sclerosis, Guillain [place], seizures, neurological changes) or cancer diagnosis? Are you on any form of radiation or chemotherapy? No  Are you pregnant or breast feeding or do you have plans of pregnancy in the future? No  Have you been having any signs of worsening depression or suicidal  ideations?  (benlysta only) No  Have there been any other new onset medical symptoms? No  Have you had any new blood clots? (IVIG only) No      Post Infusion Assessment:  Patient tolerated infusion without incident.  Blood return noted pre and post infusion.  Site patent and intact, free from redness, edema or discomfort.  No evidence of extravasations.  Access discontinued per protocol.       Discharge Plan:   Patient and/or family verbalized understanding of discharge instructions and all questions answered.  Copy of AVS reviewed with patient and/or family.  Patient will return 9/5/24 for next appointment.  Patient discharged in stable condition accompanied by: self.  Departure Mode: Ambulatory.      AMAYA LEWIS RN

## 2024-08-24 ENCOUNTER — HEALTH MAINTENANCE LETTER (OUTPATIENT)
Age: 79
End: 2024-08-24

## 2024-08-31 ENCOUNTER — MYC REFILL (OUTPATIENT)
Dept: FAMILY MEDICINE | Facility: CLINIC | Age: 79
End: 2024-08-31
Payer: COMMERCIAL

## 2024-08-31 DIAGNOSIS — E03.9 HYPOTHYROIDISM, UNSPECIFIED TYPE: ICD-10-CM

## 2024-08-31 RX ORDER — LEVOTHYROXINE SODIUM 25 UG/1
25 TABLET ORAL DAILY
Qty: 90 TABLET | Refills: 3 | Status: CANCELLED | OUTPATIENT
Start: 2024-08-31

## 2024-08-31 RX ORDER — LEVOTHYROXINE SODIUM 137 UG/1
137 TABLET ORAL DAILY
Qty: 90 TABLET | Refills: 3 | Status: CANCELLED | OUTPATIENT
Start: 2024-08-31

## 2024-09-05 ENCOUNTER — INFUSION THERAPY VISIT (OUTPATIENT)
Dept: INFUSION THERAPY | Facility: CLINIC | Age: 79
End: 2024-09-05
Attending: INTERNAL MEDICINE
Payer: MEDICARE

## 2024-09-05 VITALS
HEART RATE: 57 BPM | SYSTOLIC BLOOD PRESSURE: 131 MMHG | RESPIRATION RATE: 16 BRPM | TEMPERATURE: 98.3 F | BODY MASS INDEX: 29.79 KG/M2 | WEIGHT: 179 LBS | DIASTOLIC BLOOD PRESSURE: 79 MMHG

## 2024-09-05 DIAGNOSIS — M06.041 RHEUMATOID ARTHRITIS INVOLVING BOTH HANDS WITH NEGATIVE RHEUMATOID FACTOR (H): Primary | ICD-10-CM

## 2024-09-05 DIAGNOSIS — M06.00 SERONEGATIVE RHEUMATOID ARTHRITIS (H): ICD-10-CM

## 2024-09-05 DIAGNOSIS — M06.042 RHEUMATOID ARTHRITIS INVOLVING BOTH HANDS WITH NEGATIVE RHEUMATOID FACTOR (H): Primary | ICD-10-CM

## 2024-09-05 PROCEDURE — 96365 THER/PROPH/DIAG IV INF INIT: CPT

## 2024-09-05 PROCEDURE — 250N000028 HC RX JA MOD (INTRAVENOUS), IP 250 OP 636: Mod: JA | Performed by: INTERNAL MEDICINE

## 2024-09-05 PROCEDURE — 258N000003 HC RX IP 258 OP 636: Performed by: INTERNAL MEDICINE

## 2024-09-05 RX ORDER — ALBUTEROL SULFATE 0.83 MG/ML
2.5 SOLUTION RESPIRATORY (INHALATION)
Status: CANCELLED | OUTPATIENT
Start: 2024-10-03

## 2024-09-05 RX ORDER — METHYLPREDNISOLONE SODIUM SUCCINATE 125 MG/2ML
125 INJECTION, POWDER, LYOPHILIZED, FOR SOLUTION INTRAMUSCULAR; INTRAVENOUS
Status: CANCELLED
Start: 2024-10-03

## 2024-09-05 RX ORDER — MEPERIDINE HYDROCHLORIDE 25 MG/ML
25 INJECTION INTRAMUSCULAR; INTRAVENOUS; SUBCUTANEOUS EVERY 30 MIN PRN
Status: CANCELLED | OUTPATIENT
Start: 2024-10-03

## 2024-09-05 RX ORDER — EPINEPHRINE 1 MG/ML
0.3 INJECTION, SOLUTION, CONCENTRATE INTRAVENOUS EVERY 5 MIN PRN
Status: CANCELLED | OUTPATIENT
Start: 2024-10-03

## 2024-09-05 RX ORDER — ALBUTEROL SULFATE 90 UG/1
1-2 AEROSOL, METERED RESPIRATORY (INHALATION)
Status: CANCELLED
Start: 2024-10-03

## 2024-09-05 RX ORDER — DIPHENHYDRAMINE HYDROCHLORIDE 50 MG/ML
50 INJECTION INTRAMUSCULAR; INTRAVENOUS
Status: CANCELLED
Start: 2024-10-03

## 2024-09-05 RX ADMIN — SODIUM CHLORIDE 250 ML: 9 INJECTION, SOLUTION INTRAVENOUS at 08:50

## 2024-09-05 RX ADMIN — SODIUM CHLORIDE 750 MG: 9 INJECTION, SOLUTION INTRAVENOUS at 09:00

## 2024-09-05 NOTE — PROGRESS NOTES
Infusion Nursing Note:  Briana Hicksreinaldo presents today for Orencia.    Patient seen by provider today: No   present during visit today: Not Applicable.    Note: Pt denies any new health changes or concerns.      Intravenous Access:  Peripheral IV placed.    Treatment Conditions:  Biological Infusion Checklist:  ~~~ NOTE: If the patient answers yes to any of the questions below, hold the infusion and contact ordering provider or on-call provider.    Have you recently had an elevated temperature, fever, chills, productive cough, coughing for 3 weeks or longer or hemoptysis,  abnormal vital signs, night sweats,  chest pain or have you noticed a decrease in your appetite, unexplained weight loss or fatigue? No  Do you have any open wounds or new incisions? No  Do you have any upcoming hospitalizations or surgeries? Does not include esophagogastroduodenoscopy, colonoscopy, endoscopic retrograde cholangiopancreatography (ERCP), endoscopic ultrasound (EUS), dental procedures or joint aspiration/steroid injections No  Do you currently have any signs of illness or infection or are you on any antibiotics? No  Have you had any new, sudden or worsening abdominal pain? No  Have you or anyone in your household received a live vaccination in the past 4 weeks? Please note: No live vaccines while on biologic/chemotherapy until 6 months after the last treatment. Patient can receive the flu vaccine (shot only), pneumovax and the Covid vaccine. It is optimal for the patient to get these vaccines mid cycle, but they can be given at any time as long as it is not on the day of the infusion. No  Have you recently been diagnosed with any new nervous system diseases (ie. Multiple sclerosis, Guillain [place], seizures, neurological changes) or cancer diagnosis? Are you on any form of radiation or chemotherapy? No  Are you pregnant or breast feeding or do you have plans of pregnancy in the future? No  Have you been having any  signs of worsening depression or suicidal ideations?  (benlysta only) No  Have there been any other new onset medical symptoms? No  Have you had any new blood clots? (IVIG only) No      Post Infusion Assessment:  Patient tolerated infusion without incident.  Blood return noted pre and post infusion.  Site patent and intact, free from redness, edema or discomfort.  No evidence of extravasations.  Access discontinued per protocol.       Discharge Plan:   Discharge instructions reviewed with: Patient.  Patient and/or family verbalized understanding of discharge instructions and all questions answered.  Patient discharged in stable condition accompanied by: self.  Departure Mode: Ambulatory.      Thao Carmona RN

## 2024-10-03 ENCOUNTER — INFUSION THERAPY VISIT (OUTPATIENT)
Dept: INFUSION THERAPY | Facility: CLINIC | Age: 79
End: 2024-10-03
Attending: INTERNAL MEDICINE
Payer: MEDICARE

## 2024-10-03 VITALS
HEART RATE: 48 BPM | BODY MASS INDEX: 30.29 KG/M2 | SYSTOLIC BLOOD PRESSURE: 120 MMHG | RESPIRATION RATE: 18 BRPM | TEMPERATURE: 97.4 F | WEIGHT: 182 LBS | DIASTOLIC BLOOD PRESSURE: 69 MMHG

## 2024-10-03 DIAGNOSIS — M06.042 RHEUMATOID ARTHRITIS INVOLVING BOTH HANDS WITH NEGATIVE RHEUMATOID FACTOR (H): Primary | ICD-10-CM

## 2024-10-03 DIAGNOSIS — M06.00 SERONEGATIVE RHEUMATOID ARTHRITIS (H): ICD-10-CM

## 2024-10-03 DIAGNOSIS — M06.041 RHEUMATOID ARTHRITIS INVOLVING BOTH HANDS WITH NEGATIVE RHEUMATOID FACTOR (H): Primary | ICD-10-CM

## 2024-10-03 PROCEDURE — 258N000003 HC RX IP 258 OP 636: Performed by: INTERNAL MEDICINE

## 2024-10-03 PROCEDURE — 250N000028 HC RX JA MOD (INTRAVENOUS), IP 250 OP 636: Mod: JZ,JA | Performed by: INTERNAL MEDICINE

## 2024-10-03 PROCEDURE — 96365 THER/PROPH/DIAG IV INF INIT: CPT

## 2024-10-03 RX ORDER — ALBUTEROL SULFATE 0.83 MG/ML
2.5 SOLUTION RESPIRATORY (INHALATION)
Status: CANCELLED | OUTPATIENT
Start: 2024-10-31

## 2024-10-03 RX ORDER — ALBUTEROL SULFATE 90 UG/1
1-2 INHALANT RESPIRATORY (INHALATION)
Status: CANCELLED
Start: 2024-10-31

## 2024-10-03 RX ORDER — METHYLPREDNISOLONE SODIUM SUCCINATE 125 MG/2ML
125 INJECTION INTRAMUSCULAR; INTRAVENOUS
Status: CANCELLED
Start: 2024-10-31

## 2024-10-03 RX ORDER — DIPHENHYDRAMINE HYDROCHLORIDE 50 MG/ML
50 INJECTION INTRAMUSCULAR; INTRAVENOUS
Status: CANCELLED
Start: 2024-10-31

## 2024-10-03 RX ORDER — MEPERIDINE HYDROCHLORIDE 25 MG/ML
25 INJECTION INTRAMUSCULAR; INTRAVENOUS; SUBCUTANEOUS EVERY 30 MIN PRN
Status: CANCELLED | OUTPATIENT
Start: 2024-10-31

## 2024-10-03 RX ORDER — EPINEPHRINE 1 MG/ML
0.3 INJECTION, SOLUTION, CONCENTRATE INTRAVENOUS EVERY 5 MIN PRN
Status: CANCELLED | OUTPATIENT
Start: 2024-10-31

## 2024-10-03 RX ADMIN — SODIUM CHLORIDE 750 MG: 9 INJECTION, SOLUTION INTRAVENOUS at 08:57

## 2024-10-03 RX ADMIN — SODIUM CHLORIDE 250 ML: 9 INJECTION, SOLUTION INTRAVENOUS at 08:57

## 2024-10-03 NOTE — PROGRESS NOTES
Infusion Nursing Note:  Briana Hicksreinaldo presents today for Orencia.    Patient seen by provider today: No   present during visit today: Not Applicable.    Note: N/A.    Intravenous Access:  Peripheral IV placed.    Treatment Conditions:  Biological Infusion Checklist:  ~~~ NOTE: If the patient answers yes to any of the questions below, hold the infusion and contact ordering provider or on-call provider.    Have you recently had an elevated temperature, fever, chills, productive cough, coughing for 3 weeks or longer or hemoptysis,  abnormal vital signs, night sweats,  chest pain or have you noticed a decrease in your appetite, unexplained weight loss or fatigue? No  Do you have any open wounds or new incisions? No  Do you have any upcoming hospitalizations or surgeries? Does not include esophagogastroduodenoscopy, colonoscopy, endoscopic retrograde cholangiopancreatography (ERCP), endoscopic ultrasound (EUS), dental procedures or joint aspiration/steroid injections No  Do you currently have any signs of illness or infection or are you on any antibiotics? No  Have you had any new, sudden or worsening abdominal pain? No  Have you or anyone in your household received a live vaccination in the past 4 weeks? Please note: No live vaccines while on biologic/chemotherapy until 6 months after the last treatment. Patient can receive the flu vaccine (shot only), pneumovax and the Covid vaccine. It is optimal for the patient to get these vaccines mid cycle, but they can be given at any time as long as it is not on the day of the infusion. No  Have you recently been diagnosed with any new nervous system diseases (ie. Multiple sclerosis, Guillain [place], seizures, neurological changes) or cancer diagnosis? Are you on any form of radiation or chemotherapy? No  Are you pregnant or breast feeding or do you have plans of pregnancy in the future? No  Have you been having any signs of worsening depression or suicidal  ideations?  (benlysta only) No  Have there been any other new onset medical symptoms? No  Have you had any new blood clots? (IVIG only) No    Post Infusion Assessment:  Patient tolerated infusion without incident.  Blood return noted pre and post infusion.  Site patent and intact, free from redness, edema or discomfort.  No evidence of extravasations.  Access discontinued per protocol.     Discharge Plan:   Discharge instructions reviewed with: Patient.  Patient and/or family verbalized understanding of discharge instructions and all questions answered.  AVS to patient via [employer].  Patient will return 10/31/2024 for next appointment.   Patient discharged in stable condition accompanied by: self.  Departure Mode: Ambulatory.    Deanna Pastor RN

## 2024-10-29 RX ORDER — ALBUTEROL SULFATE 90 UG/1
1-2 INHALANT RESPIRATORY (INHALATION)
Status: CANCELLED
Start: 2024-10-29

## 2024-10-29 RX ORDER — MEPERIDINE HYDROCHLORIDE 25 MG/ML
25 INJECTION INTRAMUSCULAR; INTRAVENOUS; SUBCUTANEOUS
Status: CANCELLED | OUTPATIENT
Start: 2024-10-29

## 2024-10-29 RX ORDER — DIPHENHYDRAMINE HYDROCHLORIDE 50 MG/ML
25 INJECTION INTRAMUSCULAR; INTRAVENOUS
Status: CANCELLED
Start: 2024-10-29

## 2024-10-29 RX ORDER — METHYLPREDNISOLONE SODIUM SUCCINATE 40 MG/ML
40 INJECTION INTRAMUSCULAR; INTRAVENOUS
Status: CANCELLED
Start: 2024-10-29

## 2024-10-29 RX ORDER — ALBUTEROL SULFATE 0.83 MG/ML
2.5 SOLUTION RESPIRATORY (INHALATION)
Status: CANCELLED | OUTPATIENT
Start: 2024-10-29

## 2024-10-29 RX ORDER — EPINEPHRINE 1 MG/ML
0.3 INJECTION, SOLUTION, CONCENTRATE INTRAVENOUS EVERY 5 MIN PRN
Status: CANCELLED | OUTPATIENT
Start: 2024-10-29

## 2024-10-29 RX ORDER — DIPHENHYDRAMINE HYDROCHLORIDE 50 MG/ML
50 INJECTION INTRAMUSCULAR; INTRAVENOUS
Status: CANCELLED
Start: 2024-10-29

## 2024-10-31 ENCOUNTER — INFUSION THERAPY VISIT (OUTPATIENT)
Dept: INFUSION THERAPY | Facility: CLINIC | Age: 79
End: 2024-10-31
Attending: INTERNAL MEDICINE
Payer: MEDICARE

## 2024-10-31 VITALS
DIASTOLIC BLOOD PRESSURE: 68 MMHG | RESPIRATION RATE: 18 BRPM | SYSTOLIC BLOOD PRESSURE: 136 MMHG | WEIGHT: 182 LBS | TEMPERATURE: 97.6 F | BODY MASS INDEX: 30.29 KG/M2 | HEART RATE: 65 BPM | OXYGEN SATURATION: 96 %

## 2024-10-31 DIAGNOSIS — M06.00 SERONEGATIVE RHEUMATOID ARTHRITIS (H): Primary | ICD-10-CM

## 2024-10-31 DIAGNOSIS — M06.042 RHEUMATOID ARTHRITIS INVOLVING BOTH HANDS WITH NEGATIVE RHEUMATOID FACTOR (H): ICD-10-CM

## 2024-10-31 DIAGNOSIS — M06.041 RHEUMATOID ARTHRITIS INVOLVING BOTH HANDS WITH NEGATIVE RHEUMATOID FACTOR (H): ICD-10-CM

## 2024-10-31 PROCEDURE — 96365 THER/PROPH/DIAG IV INF INIT: CPT

## 2024-10-31 PROCEDURE — 250N000028 HC RX JA MOD (INTRAVENOUS), IP 250 OP 636: Mod: JZ,JA | Performed by: INTERNAL MEDICINE

## 2024-10-31 PROCEDURE — 258N000003 HC RX IP 258 OP 636: Performed by: INTERNAL MEDICINE

## 2024-10-31 RX ORDER — DIPHENHYDRAMINE HYDROCHLORIDE 50 MG/ML
25 INJECTION INTRAMUSCULAR; INTRAVENOUS
Start: 2024-11-28

## 2024-10-31 RX ORDER — ALBUTEROL SULFATE 0.83 MG/ML
2.5 SOLUTION RESPIRATORY (INHALATION)
OUTPATIENT
Start: 2024-11-28

## 2024-10-31 RX ORDER — DIPHENHYDRAMINE HYDROCHLORIDE 50 MG/ML
50 INJECTION INTRAMUSCULAR; INTRAVENOUS
Start: 2024-11-28

## 2024-10-31 RX ORDER — MEPERIDINE HYDROCHLORIDE 25 MG/ML
25 INJECTION INTRAMUSCULAR; INTRAVENOUS; SUBCUTANEOUS
OUTPATIENT
Start: 2024-11-28

## 2024-10-31 RX ORDER — METHYLPREDNISOLONE SODIUM SUCCINATE 40 MG/ML
40 INJECTION INTRAMUSCULAR; INTRAVENOUS
Start: 2024-11-28

## 2024-10-31 RX ORDER — ALBUTEROL SULFATE 90 UG/1
1-2 INHALANT RESPIRATORY (INHALATION)
Start: 2024-11-28

## 2024-10-31 RX ORDER — EPINEPHRINE 1 MG/ML
0.3 INJECTION, SOLUTION, CONCENTRATE INTRAVENOUS EVERY 5 MIN PRN
OUTPATIENT
Start: 2024-11-28

## 2024-10-31 RX ADMIN — SODIUM CHLORIDE 750 MG: 9 INJECTION, SOLUTION INTRAVENOUS at 08:30

## 2024-10-31 NOTE — PROGRESS NOTES
Infusion Nursing Note:  Briana Hicksreinaldo presents today for Orencia.    Patient seen by provider today: No   present during visit today: Not Applicable.    Note: N/A.      Intravenous Access:  Peripheral IV placed.    Treatment Conditions:  Biological Infusion Checklist:  ~~~ NOTE: If the patient answers yes to any of the questions below, hold the infusion and contact ordering provider or on-call provider.    Have you recently had an elevated temperature, fever, chills, productive cough, coughing for 3 weeks or longer or hemoptysis,  abnormal vital signs, night sweats,  chest pain or have you noticed a decrease in your appetite, unexplained weight loss or fatigue? No  Do you have any open wounds or new incisions? No  Do you have any upcoming hospitalizations or surgeries? Does not include esophagogastroduodenoscopy, colonoscopy, endoscopic retrograde cholangiopancreatography (ERCP), endoscopic ultrasound (EUS), dental procedures or joint aspiration/steroid injections No  Do you currently have any signs of illness or infection or are you on any antibiotics? No  Have you had any new, sudden or worsening abdominal pain? No  Have you or anyone in your household received a live vaccination in the past 4 weeks? Please note: No live vaccines while on biologic/chemotherapy until 6 months after the last treatment. Patient can receive the flu vaccine (shot only), pneumovax and the Covid vaccine. It is optimal for the patient to get these vaccines mid cycle, but they can be given at any time as long as it is not on the day of the infusion. No  Have you recently been diagnosed with any new nervous system diseases (ie. Multiple sclerosis, Guillain [place], seizures, neurological changes) or cancer diagnosis? Are you on any form of radiation or chemotherapy? No  Are you pregnant or breast feeding or do you have plans of pregnancy in the future? No  Have you been having any signs of worsening depression or suicidal  ideations?  (benlysta only) No  Have there been any other new onset medical symptoms? No  Have you had any new blood clots? (IVIG only) No      Post Infusion Assessment:  Patient tolerated infusion without incident.  Blood return noted pre and post infusion.  Site patent and intact, free from redness, edema or discomfort.  No evidence of extravasations.  Access discontinued per protocol.  Biologic Infusion Post Education: Call the triage nurse at your clinic or seek medical attention if you have chills and/or temperature greater than or equal to 100.5, uncontrolled nausea/vomiting, diarrhea, constipation, dizziness, shortness of breath, chest pain, heart palpitations, weakness or any other new or concerning symptoms, questions or concerns.  You cannot have any live virus vaccines prior to or during treatment or up to 6 months post infusion.  If you have an upcoming surgery, medical procedure or dental procedure during treatment, this should be discussed with your ordering physician and your surgeon/dentist.  If you are having any concerning symptom, if you are unsure if you should get your next infusion or wish to speak to a provider before your next infusion, please call your care coordinator or triage nurse at your clinic to notify them so we can adequately serve you.       Discharge Plan:   Discharge instructions reviewed with: Patient.  Patient discharged in stable condition accompanied by: self.  Departure Mode: Ambulatory.      Alcira Wrgiht RN

## 2024-11-21 ENCOUNTER — OFFICE VISIT (OUTPATIENT)
Dept: RHEUMATOLOGY | Facility: CLINIC | Age: 79
End: 2024-11-21
Payer: COMMERCIAL

## 2024-11-21 VITALS
OXYGEN SATURATION: 100 % | BODY MASS INDEX: 30.12 KG/M2 | WEIGHT: 181 LBS | SYSTOLIC BLOOD PRESSURE: 138 MMHG | DIASTOLIC BLOOD PRESSURE: 73 MMHG | HEART RATE: 99 BPM

## 2024-11-21 DIAGNOSIS — M06.00 SERONEGATIVE RHEUMATOID ARTHRITIS (H): ICD-10-CM

## 2024-11-21 RX ORDER — HYDROXYCHLOROQUINE SULFATE 200 MG/1
TABLET, FILM COATED ORAL
Qty: 135 TABLET | Refills: 2 | Status: SHIPPED | OUTPATIENT
Start: 2024-11-21
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
## 2025-01-23 ENCOUNTER — INFUSION THERAPY VISIT (OUTPATIENT)
Dept: INFUSION THERAPY | Facility: CLINIC | Age: 80
End: 2025-01-23
Attending: INTERNAL MEDICINE
Payer: MEDICARE

## 2025-01-23 VITALS
HEART RATE: 71 BPM | WEIGHT: 181.6 LBS | TEMPERATURE: 98 F | BODY MASS INDEX: 30.22 KG/M2 | SYSTOLIC BLOOD PRESSURE: 149 MMHG | DIASTOLIC BLOOD PRESSURE: 80 MMHG

## 2025-01-23 DIAGNOSIS — M06.041 RHEUMATOID ARTHRITIS INVOLVING BOTH HANDS WITH NEGATIVE RHEUMATOID FACTOR (H): ICD-10-CM

## 2025-01-23 DIAGNOSIS — M06.042 RHEUMATOID ARTHRITIS INVOLVING BOTH HANDS WITH NEGATIVE RHEUMATOID FACTOR (H): ICD-10-CM

## 2025-01-23 DIAGNOSIS — M06.00 SERONEGATIVE RHEUMATOID ARTHRITIS (H): Primary | ICD-10-CM

## 2025-01-23 PROCEDURE — 258N000003 HC RX IP 258 OP 636: Performed by: INTERNAL MEDICINE

## 2025-01-23 RX ORDER — DIPHENHYDRAMINE HYDROCHLORIDE 50 MG/ML
50 INJECTION INTRAMUSCULAR; INTRAVENOUS
Start: 2025-01-24

## 2025-01-23 RX ORDER — MEPERIDINE HYDROCHLORIDE 25 MG/ML
25 INJECTION INTRAMUSCULAR; INTRAVENOUS; SUBCUTANEOUS
OUTPATIENT
Start: 2025-01-24

## 2025-01-23 RX ORDER — ALBUTEROL SULFATE 0.83 MG/ML
2.5 SOLUTION RESPIRATORY (INHALATION)
OUTPATIENT
Start: 2025-01-24

## 2025-01-23 RX ORDER — EPINEPHRINE 1 MG/ML
0.3 INJECTION, SOLUTION, CONCENTRATE INTRAVENOUS EVERY 5 MIN PRN
OUTPATIENT
Start: 2025-01-24

## 2025-01-23 RX ORDER — ALBUTEROL SULFATE 90 UG/1
1-2 INHALANT RESPIRATORY (INHALATION)
Start: 2025-01-24

## 2025-01-23 RX ORDER — METHYLPREDNISOLONE SODIUM SUCCINATE 40 MG/ML
40 INJECTION INTRAMUSCULAR; INTRAVENOUS
Start: 2025-01-24

## 2025-01-23 RX ORDER — DIPHENHYDRAMINE HYDROCHLORIDE 50 MG/ML
25 INJECTION INTRAMUSCULAR; INTRAVENOUS
Start: 2025-01-24

## 2025-01-23 RX ADMIN — SODIUM CHLORIDE 750 MG: 9 INJECTION, SOLUTION INTRAVENOUS at 08:56

## 2025-01-23 RX ADMIN — SODIUM CHLORIDE 100 ML: 900 INJECTION INTRAVENOUS at 08:56

## 2025-01-23 NOTE — PROGRESS NOTES
Infusion Nursing Note:  Briana Hicksreinaldo presents today for Orencia.    Patient seen by provider today: No   present during visit today: Not Applicable.    Note: N/A.      Intravenous Access:  Peripheral IV placed.    Treatment Conditions:  Biological Infusion Checklist:  ~~~ NOTE: If the patient answers yes to any of the questions below, hold the infusion and contact ordering provider or on-call provider.    Have you recently had an elevated temperature, fever, chills, productive cough, coughing for 3 weeks or longer or hemoptysis,  abnormal vital signs, night sweats,  chest pain or have you noticed a decrease in your appetite, unexplained weight loss or fatigue? No  Do you have any open wounds or new incisions? No  Do you have any upcoming hospitalizations or surgeries? Does not include esophagogastroduodenoscopy, colonoscopy, endoscopic retrograde cholangiopancreatography (ERCP), endoscopic ultrasound (EUS), dental procedures or joint aspiration/steroid injections No  Do you currently have any signs of illness or infection or are you on any antibiotics? No  Have you had any new, sudden or worsening abdominal pain? No  Have you or anyone in your household received a live vaccination in the past 4 weeks? Please note: No live vaccines while on biologic/chemotherapy until 6 months after the last treatment. Patient can receive the flu vaccine (shot only), pneumovax and the Covid vaccine. It is optimal for the patient to get these vaccines mid cycle, but they can be given at any time as long as it is not on the day of the infusion. No  Have you recently been diagnosed with any new nervous system diseases (ie. Multiple sclerosis, Guillain Pyrites, seizures, neurological changes) or cancer diagnosis? Are you on any form of radiation or chemotherapy? No  Are you pregnant or breast feeding or do you have plans of pregnancy in the future? No  Have you been having any signs of worsening depression or suicidal  ideations?  (benlysta only) No  Have there been any other new onset medical symptoms? No  Have you had any new blood clots? (IVIG only) No      Post Infusion Assessment:  Patient tolerated infusion without incident.  Blood return noted pre and post infusion.  Site patent and intact, free from redness, edema or discomfort.  No evidence of extravasations.  Access discontinued per protocol.       Discharge Plan:   Patient discharged in stable condition accompanied by: self.  Departure Mode: Ambulatory.      Codi Linder RN

## 2025-02-20 ENCOUNTER — INFUSION THERAPY VISIT (OUTPATIENT)
Dept: INFUSION THERAPY | Facility: CLINIC | Age: 80
End: 2025-02-20
Attending: INTERNAL MEDICINE
Payer: MEDICARE

## 2025-02-20 ENCOUNTER — OFFICE VISIT (OUTPATIENT)
Dept: PODIATRY | Facility: CLINIC | Age: 80
End: 2025-02-20
Payer: COMMERCIAL

## 2025-02-20 VITALS
TEMPERATURE: 97.6 F | SYSTOLIC BLOOD PRESSURE: 125 MMHG | WEIGHT: 182.8 LBS | DIASTOLIC BLOOD PRESSURE: 64 MMHG | BODY MASS INDEX: 30.42 KG/M2

## 2025-02-20 DIAGNOSIS — M06.042 RHEUMATOID ARTHRITIS INVOLVING BOTH HANDS WITH NEGATIVE RHEUMATOID FACTOR (H): ICD-10-CM

## 2025-02-20 DIAGNOSIS — M76.822 POSTERIOR TIBIAL TENDONITIS, LEFT: Primary | ICD-10-CM

## 2025-02-20 DIAGNOSIS — M06.00 SERONEGATIVE RHEUMATOID ARTHRITIS (H): Primary | ICD-10-CM

## 2025-02-20 DIAGNOSIS — M06.041 RHEUMATOID ARTHRITIS INVOLVING BOTH HANDS WITH NEGATIVE RHEUMATOID FACTOR (H): ICD-10-CM

## 2025-02-20 PROCEDURE — 250N000028 HC RX JA MOD (INTRAVENOUS), IP 250 OP 636: Mod: JZ,JA | Performed by: INTERNAL MEDICINE

## 2025-02-20 PROCEDURE — 258N000003 HC RX IP 258 OP 636: Performed by: INTERNAL MEDICINE

## 2025-02-20 PROCEDURE — 1125F AMNT PAIN NOTED PAIN PRSNT: CPT | Performed by: PODIATRIST

## 2025-02-20 PROCEDURE — 99203 OFFICE O/P NEW LOW 30 MIN: CPT | Performed by: PODIATRIST

## 2025-02-20 RX ORDER — DIPHENHYDRAMINE HYDROCHLORIDE 50 MG/ML
50 INJECTION INTRAMUSCULAR; INTRAVENOUS
Start: 2025-02-21

## 2025-02-20 RX ORDER — EPINEPHRINE 1 MG/ML
0.3 INJECTION, SOLUTION, CONCENTRATE INTRAVENOUS EVERY 5 MIN PRN
OUTPATIENT
Start: 2025-02-21

## 2025-02-20 RX ORDER — METHYLPREDNISOLONE SODIUM SUCCINATE 40 MG/ML
40 INJECTION INTRAMUSCULAR; INTRAVENOUS
Start: 2025-02-21

## 2025-02-20 RX ORDER — MEPERIDINE HYDROCHLORIDE 25 MG/ML
25 INJECTION INTRAMUSCULAR; INTRAVENOUS; SUBCUTANEOUS
OUTPATIENT
Start: 2025-02-21

## 2025-02-20 RX ORDER — ALBUTEROL SULFATE 90 UG/1
1-2 INHALANT RESPIRATORY (INHALATION)
Start: 2025-02-21

## 2025-02-20 RX ORDER — ALBUTEROL SULFATE 0.83 MG/ML
2.5 SOLUTION RESPIRATORY (INHALATION)
OUTPATIENT
Start: 2025-02-21

## 2025-02-20 RX ORDER — DIPHENHYDRAMINE HYDROCHLORIDE 50 MG/ML
25 INJECTION INTRAMUSCULAR; INTRAVENOUS
Start: 2025-02-21

## 2025-02-20 RX ADMIN — SODIUM CHLORIDE 250 ML: 0.9 INJECTION, SOLUTION INTRAVENOUS at 08:48

## 2025-02-20 RX ADMIN — SODIUM CHLORIDE 750 MG: 9 INJECTION, SOLUTION INTRAVENOUS at 08:48

## 2025-02-20 ASSESSMENT — PAIN SCALES - GENERAL: PAINLEVEL_OUTOF10: SEVERE PAIN (8)

## 2025-02-20 NOTE — NURSING NOTE
"Chief Complaint   Patient presents with    Consult     Left foot pain- bottom arch       Initial LMP  (LMP Unknown)  Estimated body mass index is 30.42 kg/m  as calculated from the following:    Height as of 1/4/24: 1.651 m (5' 5\").    Weight as of an earlier encounter on 2/20/25: 82.9 kg (182 lb 12.8 oz).  Medications and allergies reviewed.      Miriam BANEGAS MA'  "

## 2025-02-20 NOTE — PROGRESS NOTES
Infusion Nursing Note:  Briana Hicksreinaldo presents today for O[place]cia.    Patient seen by provider today: No   present during visit today: Not Applicable.    Note: N/A.      Intravenous Access:  Peripheral IV placed.    Treatment Conditions:  Biological Infusion Checklist:  ~~~ NOTE: If the patient answers yes to any of the questions below, hold the infusion and contact ordering provider or on-call provider.    Have you recently had an elevated temperature, fever, chills, productive cough, coughing for 3 weeks or longer or hemoptysis,  abnormal vital signs, night sweats,  chest pain or have you noticed a decrease in your appetite, unexplained weight loss or fatigue? No  Do you have any open wounds or new incisions? No  Do you have any upcoming hospitalizations or surgeries? Does not include esophagogastroduodenoscopy, colonoscopy, endoscopic retrograde cholangiopancreatography (ERCP), endoscopic ultrasound (EUS), dental procedures or joint aspiration/steroid injections No  Do you currently have any signs of illness or infection or are you on any antibiotics? No  Have you had any new, sudden or worsening abdominal pain? No  Have you or anyone in your household received a live vaccination in the past 4 weeks? Please note: No live vaccines while on biologic/chemotherapy until 6 months after the last treatment. Patient can receive the flu vaccine (shot only), pneumovax and the Covid vaccine. It is optimal for the patient to get these vaccines mid cycle, but they can be given at any time as long as it is not on the day of the infusion. No  Have you recently been diagnosed with any new nervous system diseases (ie. Multiple sclerosis, Guillain Pana, seizures, neurological changes) or cancer diagnosis? Are you on any form of radiation or chemotherapy? No  Are you pregnant or breast feeding or do you have plans of pregnancy in the future? No  Have you been having any signs of worsening depression or suicidal  ideations?  (benlysta only) No  Have there been any other new onset medical symptoms? No  Have you had any new blood clots? (IVIG only) No      Post Infusion Assessment:  Patient tolerated infusion without incident.  Blood return noted pre and post infusion.  Site patent and intact, free from redness, edema or discomfort.  No evidence of extravasations.  Access discontinued per protocol.       Discharge Plan:   Copy of AVS reviewed with patient and/or family.  Patient will return 3/20/25 for next appointment.  Patient discharged in stable condition accompanied by: self.  Departure Mode: Ambulatory.      AMAYA LEWIS RN

## 2025-02-20 NOTE — Clinical Note
"2/20/2025      Briana Mcgee  [address] [address] [facility] [id number]      Dear Colleague,    Thank you for referring your patient, Briana Mcgee, to the [facility]. Please see a copy of my visit note below.    PATIENT HISTORY:  Briana Mcgee is a 79 year old female who presents to clinic in consultation at the request of  Referred Self  with a chief complaint of left foot pain.  The patient is seen by themselves.  The patient relates the pain is primarily located around the bottom of the arch.  Reports insidious onset without acute precipitating event.  The patient relates that the symptoms have been going on for several day(s).  The patient has previously tried different shoes, and prednisone with little relief.  The patient is currently employed as  .  Any previous notes and studies that pertain to the patient's condition were reviewed.    Pertinent medical, surgical and family history was reviewed in the [facility] chart.    Past Medical History:   Past Medical History:   Diagnosis Date    Allergies     Breast cancer (H)     Esophageal reflux     Hypertension     Other and unspecified hyperlipidemia     Other chronic bronchitis     Personal history of pneumonia (recurrent)     Hx-Pneumonia, \" Chronic Bronchitis\"    Personal history of tobacco use, presenting hazards to health     RA (rheumatoid arthritis) (H)     Unspecified hypothyroidism        Medications:   Current Outpatient Medications:     Abatacept (ORENCIA IV), , Disp: , Rfl:     aspirin 81 MG EC tablet, Take 1 tablet (81 mg) by mouth daily, Disp: 90 tablet, Rfl: 3    calcium carbonate (OS-MARIO) 500 MG tablet, Take 1 tablet by mouth daily, Disp: , Rfl:     Coenzyme Q10 (COQ10 PO), Take 1 capsule by mouth every morning , Disp: , Rfl:     fluticasone (FLONASE) 50 MCG/ACT nasal spray, Spray 2 sprays into both nostrils daily as needed for rhinitis or allergies, Disp: 16 g, Rfl: 3    FOLIC ACID PO, Take 1 " mg by mouth daily, Disp: , Rfl:     hydroxychloroquine (PLAQUENIL) 200 MG tablet, Hydroxychloroquine 200mg daily; and an additional 200mg every other day. Yearly eye exam including 10-2 VF and SD-OCT are required, Disp: 135 tablet, Rfl: 2    levothyroxine (SYNTHROID/LEVOTHROID) 137 MCG tablet, Take 1 tablet (137 mcg) by mouth daily Along with 25mcg to equal 162mcg daily., Disp: 90 tablet, Rfl: 3    levothyroxine (SYNTHROID/LEVOTHROID) 25 MCG tablet, Take 1 tablet (25 mcg) by mouth daily Take along with the 137mcg to equal 162mcg total daily., Disp: 90 tablet, Rfl: 3    lisinopril (ZESTRIL) 20 MG tablet, Take 1 tablet (20 mg) by mouth daily, Disp: 90 tablet, Rfl: 3    meclizine (ANTIVERT) 25 MG tablet, Take 25 mg by mouth daily as needed for dizziness, Disp: , Rfl:     metoprolol succinate ER (TOPROL XL) 25 MG 24 hr tablet, Take 1 tablet (25 mg) by mouth daily, Disp: 90 tablet, Rfl: 3    Naproxen Sodium (ALEVE PO), Take 2 tablets by mouth 2 times daily as needed , Disp: , Rfl:     pantoprazole (PROTONIX) 40 MG EC tablet, Take 1 tablet (40 mg) by mouth daily, Disp: 90 tablet, Rfl: 3    rosuvastatin (CRESTOR) 40 MG tablet, Take 1 tablet (40 mg) by mouth daily, Disp: , Rfl:     tolterodine ER (DETROL LA) 4 MG 24 hr capsule, Take 1 capsule (4 mg) by mouth daily, Disp: 90 capsule, Rfl: 3  No current facility-administered medications for this visit.     Allergies:    Allergies   Allergen Reactions    Erythromycin Other (See Comments)     Edema    Hydrocodone GI Disturbance     GI Upset, Tolerates dilaudid    Oxycodone GI Disturbance       Vitals: LMP  (LMP Unknown)   BMI= There is no height or weight on file to calculate BMI.    LOWER EXTREMITY PHYSICAL EXAM    Dermatologic: Skin is intact to {RIGHT /LEFT:969333} lower extremity without significant lesions, rash or abrasion.        Vascular: DP & PT pulses are intact & regular on the {RIGHT /LEFT:200131}.   CFT and skin temperature is normal to the {RIGHT /LEFT:438475}  lower extremity.     Neurologic: Lower extremity sensation is intact to light touch.  No evidence of weakness in the {RIGHT /LEFT:215476} lower extremity.        Musculoskeletal: Patient is ambulatory without assistive device or brace.  No gross ankle deformity noted.  No foot or ankle joint effusion is noted.  Noted ***    Diagnostics:  Radiographs included three views of the {RIGHT LEFT BOTH NO:600155} foot demonstrating *** no cortical erosions or periosteal elevation.  All joint margins appear stable.  There is no apparent fracture or tumor formation noted.  There is no evidence of foreign body.  The images were independently reviewed by myself along with the patient explaining the findings.      ASSESSMENT / PLAN:   No diagnosis found.    I have explained to Briana about the conditions.  We discussed the underlying contributing factors to the condition as well as both conservative and surgical treatment options along with expected length of recovery.  At this time, ***    Briana verbalized agreement with and understanding of the rational for the diagnosis and treatment plan.  All questions were answered to best of my ability and the patient's satisfaction. The patient was advised to contact the clinic with any questions that may arise after the clinic visit.      Disclaimer: This note consists of symbols derived from keyboarding, dictation and/or voice recognition software. As a result, there may be errors in the script that have gone undetected. Please consider this when interpreting information found in this chart.       MAKENZIE [email or web address]., F.A.C.F.A.S.        Again, thank you for allowing me to participate in the care of your patient.        Sincerely,        Antwan Goldstein DPM    Electronically signed

## 2025-02-20 NOTE — PATIENT INSTRUCTIONS

## 2025-02-20 NOTE — PROGRESS NOTES
"PATIENT HISTORY:  Briana Mcgee is a 79 year old female who presents to clinic in consultation at the request of  Referred Self  with a chief complaint of left foot pain.  The patient is seen by themselves.  The patient relates the pain is primarily located around the bottom of the arch.  Reports insidious onset without acute precipitating event.  The patient relates that the symptoms have been going on for several day(s).  The patient has previously tried different shoes, and prednisone with little relief.  The patient is currently employed as  .  Any previous notes and studies that pertain to the patient's condition were reviewed.    Pertinent medical, surgical and family history was reviewed in the [facility] chart.    Past Medical History:   Past Medical History:   Diagnosis Date    Allergies     Breast cancer (H)     Esophageal reflux     Hypertension     Other and unspecified hyperlipidemia     Other chronic bronchitis     Personal history of pneumonia (recurrent)     Hx-Pneumonia, \" Chronic Bronchitis\"    Personal history of tobacco use, presenting hazards to health     RA (rheumatoid arthritis) (H)     Unspecified hypothyroidism        Medications:   Current Outpatient Medications:     Abatacept (ORENCIA IV), , Disp: , Rfl:     aspirin 81 MG EC tablet, Take 1 tablet (81 mg) by mouth daily, Disp: 90 tablet, Rfl: 3    calcium carbonate (OS-MARIO) 500 MG tablet, Take 1 tablet by mouth daily, Disp: , Rfl:     Coenzyme Q10 (COQ10 PO), Take 1 capsule by mouth every morning , Disp: , Rfl:     fluticasone (FLONASE) 50 MCG/ACT nasal spray, Spray 2 sprays into both nostrils daily as needed for rhinitis or allergies, Disp: 16 g, Rfl: 3    FOLIC ACID PO, Take 1 mg by mouth daily, Disp: , Rfl:     hydroxychloroquine (PLAQUENIL) 200 MG tablet, Hydroxychloroquine 200mg daily; and an additional 200mg every other day. Yearly eye exam including 10-2 VF and SD-OCT are required, Disp: 135 tablet, Rfl: 2    levothyroxine " (SYNTHROID/LEVOTHROID) 137 MCG tablet, Take 1 tablet (137 mcg) by mouth daily Along with 25mcg to equal 162mcg daily., Disp: 90 tablet, Rfl: 3    levothyroxine (SYNTHROID/LEVOTHROID) 25 MCG tablet, Take 1 tablet (25 mcg) by mouth daily Take along with the 137mcg to equal 162mcg total daily., Disp: 90 tablet, Rfl: 3    lisinopril (ZESTRIL) 20 MG tablet, Take 1 tablet (20 mg) by mouth daily, Disp: 90 tablet, Rfl: 3    meclizine (ANTIVERT) 25 MG tablet, Take 25 mg by mouth daily as needed for dizziness, Disp: , Rfl:     metoprolol succinate ER (TOPROL XL) 25 MG 24 hr tablet, Take 1 tablet (25 mg) by mouth daily, Disp: 90 tablet, Rfl: 3    Naproxen Sodium (ALEVE PO), Take 2 tablets by mouth 2 times daily as needed , Disp: , Rfl:     pantoprazole (PROTONIX) 40 MG EC tablet, Take 1 tablet (40 mg) by mouth daily, Disp: 90 tablet, Rfl: 3    rosuvastatin (CRESTOR) 40 MG tablet, Take 1 tablet (40 mg) by mouth daily, Disp: , Rfl:     tolterodine ER (DETROL LA) 4 MG 24 hr capsule, Take 1 capsule (4 mg) by mouth daily, Disp: 90 capsule, Rfl: 3  No current facility-administered medications for this visit.     Allergies:    Allergies   Allergen Reactions    Erythromycin Other (See Comments)     Edema    Hydrocodone GI Disturbance     GI Upset, Tolerates dilaudid    Oxycodone GI Disturbance       Vitals: LMP  (LMP Unknown)   BMI= There is no height or weight on file to calculate BMI.    LOWER EXTREMITY PHYSICAL EXAM    Dermatologic: Skin is intact to {RIGHT /LEFT:343288} lower extremity without significant lesions, rash or abrasion.        Vascular: DP & PT pulses are intact & regular on the {RIGHT /LEFT:848689}.   CFT and skin temperature is normal to the {RIGHT /LEFT:093672} lower extremity.     Neurologic: Lower extremity sensation is intact to light touch.  No evidence of weakness in the {RIGHT /LEFT:679892} lower extremity.        Musculoskeletal: Patient is ambulatory without assistive device or brace.  No gross ankle  deformity noted.  No foot or ankle joint effusion is noted.  Noted ***    Diagnostics:  Radiographs included three views of the {RIGHT LEFT BOTH NO:880891} foot demonstrating *** no cortical erosions or periosteal elevation.  All joint margins appear stable.  There is no apparent fracture or tumor formation noted.  There is no evidence of foreign body.  The images were independently reviewed by myself along with the patient explaining the findings.      ASSESSMENT / PLAN:   No diagnosis found.    I have explained to Briana about the conditions.  We discussed the underlying contributing factors to the condition as well as both conservative and surgical treatment options along with expected length of recovery.  At this time, ***    Briana verbalized agreement with and understanding of the rational for the diagnosis and treatment plan.  All questions were answered to best of my ability and the patient's satisfaction. The patient was advised to contact the clinic with any questions that may arise after the clinic visit.      Disclaimer: This note consists of symbols derived from keyboarding, dictation and/or voice recognition software. As a result, there may be errors in the script that have gone undetected. Please consider this when interpreting information found in this chart.       LUCAS Goldstein D.P.M., SILVERIO.F.A.S.     patient's satisfaction. The patient was advised to contact the clinic with any questions that may arise after the clinic visit.      Disclaimer: This note consists of symbols derived from keyboarding, dictation and/or voice recognition software. As a result, there may be errors in the script that have gone undetected. Please consider this when interpreting information found in this chart.       LUCAS Goldstein D.P.M., FOLIVIA.F.YASMIN.S.

## 2025-03-12 ENCOUNTER — OFFICE VISIT (OUTPATIENT)
Dept: PODIATRY | Facility: CLINIC | Age: 80
End: 2025-03-12
Payer: COMMERCIAL

## 2025-03-12 ENCOUNTER — ANCILLARY PROCEDURE (OUTPATIENT)
Dept: GENERAL RADIOLOGY | Facility: CLINIC | Age: 80
End: 2025-03-12
Attending: PODIATRIST
Payer: COMMERCIAL

## 2025-03-12 DIAGNOSIS — S92.502A CLOSED FRACTURE OF PHALANX OF LEFT SECOND TOE, INITIAL ENCOUNTER: ICD-10-CM

## 2025-03-12 DIAGNOSIS — M76.822 POSTERIOR TIBIAL TENDONITIS, LEFT: Primary | ICD-10-CM

## 2025-03-12 PROCEDURE — 1125F AMNT PAIN NOTED PAIN PRSNT: CPT | Performed by: PODIATRIST

## 2025-03-12 PROCEDURE — 73630 X-RAY EXAM OF FOOT: CPT | Mod: TC | Performed by: RADIOLOGY

## 2025-03-12 PROCEDURE — 99213 OFFICE O/P EST LOW 20 MIN: CPT | Performed by: PODIATRIST

## 2025-03-12 ASSESSMENT — PAIN SCALES - GENERAL: PAINLEVEL_OUTOF10: SEVERE PAIN (7)

## 2025-03-12 NOTE — PATIENT INSTRUCTIONS
TOE & METATARSAL FRACTURES  The structure of the foot is complex, consisting of bones, muscles, tendons, and other soft tissues. Of the 26 bones in the foot, 19 are toe bones (phalanges) and metatarsal bones (the long bones in the midfoot). Fractures of the toe and metatarsal bones are common and require evaluation by a specialist. A foot and ankle surgeon should be seen for proper diagnosis and treatment, even if initial treatment has been received in an emergency room.  A fracture is a break in the bone. Fractures can be divided into two categories: traumatic fractures and stress fractures.  TRAUMATIC FRACTURES (also called acute fractures) are caused by a direct blow or impact, such as seriously stubbing your toe. Traumatic fractures can be displaced or non-displaced. If the fracture is displaced, the bone is broken in such a way that it has changed in position (dislocated).  Signs and symptoms of a traumatic fracture include:  You may hear a sound at the time of the break.    Pinpoint pain  (pain at the place of impact) at the time the fracture occurs and perhaps for a few hours later, but often the pain goes away after several hours.   Crooked or abnormal appearance of the toe.   Bruising and swelling the next day.   It is not true that  if you can walk on it, it s not broken.  Evaluation by a foot and ankle surgeon is always recommended.   STRESS FRACTURES are tiny, hairline breaks that are usually caused by repetitive stress. Stress fractures often afflict athletes who, for example, too rapidly increase their running mileage. They can also be caused by an abnormal foot structure, deformities, or osteoporosis. Improper footwear may also lead to stress fractures. Stress fractures should not be ignored. They require proper medical attention to heal correctly.  Symptoms of stress fractures include:  Pain with or after normal activity   Pain that goes away when resting and then returns when standing or during  activity    Pinpoint pain  (pain at the site of the fracture) when touched   Swelling, but no bruising   IMPROPER TREATMENT  Some people say that  the doctor can t do anything for a broken bone in the foot.  This is usually not true. In fact, if a fractured toe or metatarsal bone is not treated correctly, serious complications may develop. For example:  A deformity in the bony architecture which may limit the ability to move the foot or cause difficulty in fitting shoes   Arthritis, which may be caused by a fracture in a joint (the juncture where two bones meet), or may be a result of angular deformities that develop when a displaced fracture is severe or hasn t been properly corrected   Chronic pain and deformity   Non-union, or failure to heal, can lead to subsequent surgery or chronic pain.   PROPER TREATMENT FOR TOES  Fractures of the toe bones are almost always traumatic fractures. Treatment for traumatic fractures depends on the break itself and may include these options:  Rest. Sometimes rest is all that is needed to treat a traumatic fracture of the toe.   Splinting. The toe may be fitted with a splint to keep it in a fixed position.   Rigid or stiff-soled shoe. Wearing a stiff-soled shoe protects the toe and helps keep it properly positioned.    Myles taping  the fractured toe to another toe is sometimes appropriate, but in other cases it may be harmful.   Surgery. If the break is badly displaced or if the joint is affected, surgery may be necessary. Surgery often involves the use of fixation devices, such as pins.   PROPER TREATMENT OF METATARSALS  Breaks in the metatarsal bones may be either stress or traumatic fractures. Certain kinds of fractures of the metatarsal bones present unique challenges.  For example, sometimes a fracture of the first metatarsal bone (behind the big toe) can lead to arthritis. Since the big toe is used so frequently and bears more weight than other toes, arthritis in that area  can make it painful to walk, bend, or even stand.  Another type of break, called a Braun fracture, occurs at the base of the fifth metatarsal bone (behind the little toe). It is often misdiagnosed as an ankle sprain, and misdiagnosis can have serious consequences since sprains and fractures require different treatments. Your foot and ankle surgeon is an expert in correctly identifying these conditions as well as other problems of the foot.  Treatment of metatarsal fractures depends on the type and extent of the fracture, and may include:  Rest. Sometimes rest is the only treatment needed to promote healing of a stress or traumatic fracture of a metatarsal bone.   Avoid the offending activity. Because stress fractures result from repetitive stress, it is important to avoid the activity that led to the fracture. Crutches or a wheelchair are sometimes required to offload weight from the foot to give it time to heal.   Immobilization, casting, or rigid shoe. A stiff-soled shoe or other form of immobilization may be used to protect the fractured bone while it is healing.   Surgery. Some traumatic fractures of the metatarsal bones require surgery, especially if the break is badly displaced.   Follow-up care. Your foot and ankle surgeon will provide instructions for care following surgical or non-surgical treatment. Physical therapy, exercises and rehabilitation may be included in a schedule for return to normal activities.

## 2025-03-12 NOTE — PROGRESS NOTES
Briana returns to the office for reevaluation of the {RIGHT /LEFT:031026} foot.  The patient relates following the instructions given at the last visit with noted overall {LESS/MORE:918435} pain and {LESS/MORE:940353} improvement in function of the {RIGHT /LEFT:709002} foot.   The patient relates no other problems.      Lower Extremity Physical Exam:      Neurovascular status remains unchanged.  Muscular exam is within normal limits to major muscle groups.  Integument is intact.      Noted ***    Diagnostics:  Radiograph evaluation including three views of the {RIGHT /LEFT:599351} foot reveals interval healing with increased trabeculation of the ***.  I personally evaluated the images as well as reviewed the images with the patient pointing out the findings.      Assessment:     ICD-10-CM    1. Posterior tibial tendonitis, left  M76.822           Plan:    I have explained to Briana about the progress of the conditions.  At this time, ***    Briana verbalized agreement with and understanding of the rational for the diagnosis and treatment plan.  All questions were answered to best of my ability and the patient's satisfaction. The patient was advised to contact the clinic with any questions that may arise after the clinic visit.      Disclaimer: This note consists of symbols derived from keyboarding, dictation and/or voice recognition software. As a result, there may be errors in the script that have gone undetected. Please consider this when interpreting information found in this chart.       LUCAS Goldstein D.P.M., SILVERIO.F.A.S.     forces to the soft tissues and prevent further inflammation.  The patient may return in four weeks for reevaluation to determine if any further treatment will be needed.      Briana verbalized agreement with and understanding of the rational for the diagnosis and treatment plan.  All questions were answered to best of my ability and the patient's satisfaction. The patient was advised to contact the clinic with any questions that may arise after the clinic visit.      Disclaimer: This note consists of symbols derived from keyboarding, dictation and/or voice recognition software. As a result, there may be errors in the script that have gone undetected. Please consider this when interpreting information found in this chart.       LUCAS Goldstein D.P.M., F.A.C.F.A.S.

## 2025-03-12 NOTE — NURSING NOTE
"Chief Complaint   Patient presents with    RECHECK     Left foot second digit- stubbed toe 1.5 ago       Initial LMP  (LMP Unknown)  Estimated body mass index is 30.42 kg/m  as calculated from the following:    Height as of 1/4/24: 1.651 m (5' 5\").    Weight as of 2/20/25: 82.9 kg (182 lb 12.8 oz).  Medications and allergies reviewed.      Miriam BANEGAS MA    "

## 2025-03-12 NOTE — Clinical Note
3/12/2025      Briana Mcgee  [address] [facility] [address]      Dear Colleague,    Thank you for referring your patient, Briana Mcgee, to the [facility]. Please see a copy of my visit note below.    Briana returns to the office for reevaluation of the {RIGHT /LEFT:069929} foot.  The patient relates following the instructions given at the last visit with noted overall {LESS/MORE:171199} pain and {LESS/MORE:349942} improvement in function of the {RIGHT /LEFT:333372} foot.   The patient relates no other problems.      Lower Extremity Physical Exam:      Neurovascular status remains unchanged.  Muscular exam is within normal limits to major muscle groups.  Integument is intact.      Noted ***    Diagnostics:  Radiograph evaluation including three views of the {RIGHT /LEFT:602580} foot reveals interval healing with increased trabeculation of the ***.  I personally evaluated the images as well as reviewed the images with the patient pointing out the findings.      Assessment:     ICD-10-CM    1. Posterior tibial tendonitis, left  M76.822           Plan:    I have explained to Briana about the progress of the conditions.  At this time, ***    Briana verbalized agreement with and understanding of the rational for the diagnosis and treatment plan.  All questions were answered to best of my ability and the patient's satisfaction. The patient was advised to contact the clinic with any questions that may arise after the clinic visit.      Disclaimer: This note consists of symbols derived from keyboarding, dictation and/or voice recognition software. As a result, there may be errors in the script that have gone undetected. Please consider this when interpreting information found in this chart.       LUCAS Goldstein D.P.M., F.YASMIN.C.F.A.S.        Again, thank you for allowing me to participate in the care of your patient.        Sincerely,        Antwan  Saturnino Goldstein DPM    Electronically signed

## 2025-04-10 ENCOUNTER — INFUSION THERAPY VISIT (OUTPATIENT)
Dept: INFUSION THERAPY | Facility: CLINIC | Age: 80
End: 2025-04-10
Attending: INTERNAL MEDICINE
Payer: MEDICARE

## 2025-04-10 ENCOUNTER — OFFICE VISIT (OUTPATIENT)
Dept: PODIATRY | Facility: CLINIC | Age: 80
End: 2025-04-10
Payer: COMMERCIAL

## 2025-04-10 VITALS
DIASTOLIC BLOOD PRESSURE: 84 MMHG | SYSTOLIC BLOOD PRESSURE: 163 MMHG | TEMPERATURE: 97.8 F | WEIGHT: 181.4 LBS | BODY MASS INDEX: 30.19 KG/M2

## 2025-04-10 VITALS — BODY MASS INDEX: 30.16 KG/M2 | HEIGHT: 65 IN | WEIGHT: 181 LBS

## 2025-04-10 DIAGNOSIS — M06.041 RHEUMATOID ARTHRITIS INVOLVING BOTH HANDS WITH NEGATIVE RHEUMATOID FACTOR (H): ICD-10-CM

## 2025-04-10 DIAGNOSIS — M76.822 POSTERIOR TIBIAL TENDONITIS, LEFT: Primary | ICD-10-CM

## 2025-04-10 DIAGNOSIS — M06.00 SERONEGATIVE RHEUMATOID ARTHRITIS (H): Primary | ICD-10-CM

## 2025-04-10 DIAGNOSIS — M06.042 RHEUMATOID ARTHRITIS INVOLVING BOTH HANDS WITH NEGATIVE RHEUMATOID FACTOR (H): ICD-10-CM

## 2025-04-10 PROCEDURE — 250N000028 HC RX JA MOD (INTRAVENOUS), IP 250 OP 636: Mod: JZ,JA | Performed by: INTERNAL MEDICINE

## 2025-04-10 PROCEDURE — 99213 OFFICE O/P EST LOW 20 MIN: CPT | Performed by: PODIATRIST

## 2025-04-10 PROCEDURE — 1125F AMNT PAIN NOTED PAIN PRSNT: CPT | Performed by: PODIATRIST

## 2025-04-10 PROCEDURE — 258N000003 HC RX IP 258 OP 636: Performed by: INTERNAL MEDICINE

## 2025-04-10 RX ORDER — ALBUTEROL SULFATE 90 UG/1
1-2 INHALANT RESPIRATORY (INHALATION)
Start: 2025-04-18

## 2025-04-10 RX ORDER — DIPHENHYDRAMINE HYDROCHLORIDE 50 MG/ML
50 INJECTION, SOLUTION INTRAMUSCULAR; INTRAVENOUS
Start: 2025-04-18

## 2025-04-10 RX ORDER — METHYLPREDNISOLONE SODIUM SUCCINATE 40 MG/ML
40 INJECTION INTRAMUSCULAR; INTRAVENOUS
Start: 2025-04-18

## 2025-04-10 RX ORDER — MEPERIDINE HYDROCHLORIDE 25 MG/ML
25 INJECTION INTRAMUSCULAR; INTRAVENOUS; SUBCUTANEOUS
OUTPATIENT
Start: 2025-04-18

## 2025-04-10 RX ORDER — EPINEPHRINE 1 MG/ML
0.3 INJECTION, SOLUTION, CONCENTRATE INTRAVENOUS EVERY 5 MIN PRN
OUTPATIENT
Start: 2025-04-18

## 2025-04-10 RX ORDER — DIPHENHYDRAMINE HYDROCHLORIDE 50 MG/ML
25 INJECTION, SOLUTION INTRAMUSCULAR; INTRAVENOUS
Start: 2025-04-18

## 2025-04-10 RX ORDER — ALBUTEROL SULFATE 0.83 MG/ML
2.5 SOLUTION RESPIRATORY (INHALATION)
OUTPATIENT
Start: 2025-04-18

## 2025-04-10 RX ADMIN — SODIUM CHLORIDE 750 MG: 9 INJECTION, SOLUTION INTRAVENOUS at 08:44

## 2025-04-10 RX ADMIN — SODIUM CHLORIDE 250 ML: 0.9 INJECTION, SOLUTION INTRAVENOUS at 08:46

## 2025-04-10 ASSESSMENT — PAIN SCALES - GENERAL: PAINLEVEL_OUTOF10: MODERATE PAIN (5)

## 2025-04-10 NOTE — LETTER
4/10/2025      Briana Mcgee  [address] [facility] [address]      Dear Colleague,    Thank you for referring your patient, Braina Mcgee, to the [facility]. Please see a copy of my visit note below.    Briana returns to the office for reevaluation of the left foot.  The patient relates following the instructions given at the last visit with noted overall continued pain and minimal improvement in function of the left foot.   The patient relates no other problems.      Lower Extremity Physical Exam:      Neurovascular status remains unchanged.  Muscular exam is within normal limits to major muscle groups.  Integument is intact.      Noted pain on palpation of the posterior tibial tendon on the left.         Assessment:     ICD-10-CM    1. Posterior tibial tendonitis, left  M76.822           Plan:    I have explained to Briana about the progress of the conditions.  At this time, the patient was referred to physical therapy for fascial tooling of the posterior tibial tendon on the left.  The patient will return in 4 to 6 weeks for reevaluation.    Briana verbalized agreement with and understanding of the rational for the diagnosis and treatment plan.  All questions were answered to best of my ability and the patient's satisfaction. The patient was advised to contact the clinic with any questions that may arise after the clinic visit.      Disclaimer: This note consists of symbols derived from keyboarding, dictation and/or voice recognition software. As a result, there may be errors in the script that have gone undetected. Please consider this when interpreting information found in this chart.       LUCAS Goldstein D.P.M., F.A.C.F.A.S.      Again, thank you for allowing me to participate in the care of your patient.        Sincerely,        Antwan Goldstein DPM    Electronically signed

## 2025-04-10 NOTE — PROGRESS NOTES
Infusion Nursing Note:  Briana Hicksreinaldo presents today for Orencia.    Patient seen by provider today: No   present during visit today: Not Applicable.    Note: N/A.      Intravenous Access:  Peripheral IV placed.    Treatment Conditions:  Biological Infusion Checklist:  ~~~ NOTE: If the patient answers yes to any of the questions below, hold the infusion and contact ordering provider or on-call provider.    Have you recently had an elevated temperature, fever, chills, productive cough, coughing for 3 weeks or longer or hemoptysis,  abnormal vital signs, night sweats,  chest pain or have you noticed a decrease in your appetite, unexplained weight loss or fatigue? No  Do you have any open wounds or new incisions? No  Do you have any upcoming hospitalizations or surgeries? Does not include esophagogastroduodenoscopy, colonoscopy, endoscopic retrograde cholangiopancreatography (ERCP), endoscopic ultrasound (EUS), dental procedures or joint aspiration/steroid injections No  Do you currently have any signs of illness or infection or are you on any antibiotics? No  Have you had any new, sudden or worsening abdominal pain? No  Have you or anyone in your household received a live vaccination in the past 4 weeks? Please note: No live vaccines while on biologic/chemotherapy until 6 months after the last treatment. Patient can receive the flu vaccine (shot only), pneumovax and the Covid vaccine. It is optimal for the patient to get these vaccines mid cycle, but they can be given at any time as long as it is not on the day of the infusion. No  Have you recently been diagnosed with any new nervous system diseases (ie. Multiple sclerosis, Guillain [place], seizures, neurological changes) or cancer diagnosis? Are you on any form of radiation or chemotherapy? No  Are you pregnant or breast feeding or do you have plans of pregnancy in the future? No  Have you been having any signs of worsening depression or suicidal  ideations?  (benlysta only) No  Have there been any other new onset medical symptoms? No  Have you had any new blood clots? (IVIG only) No      Post Infusion Assessment:  Patient tolerated infusion without incident.  Blood return noted pre and post infusion.  Site patent and intact, free from redness, edema or discomfort.  No evidence of extravasations.  Access discontinued per protocol.       Discharge Plan:   Copy of AVS reviewed with patient and/or family.  Patient will return 5/8/25 for next appointment.  Patient discharged in stable condition accompanied by: self.  Departure Mode: Ambulatory.      AMAYA LEWIS RN

## 2025-04-10 NOTE — PATIENT INSTRUCTIONS

## 2025-04-10 NOTE — NURSING NOTE
"Chief Complaint   Patient presents with    RECHECK     Left foot- no change- foot is good with the boot on but not without- toe is healing but the tendon does not feel as if it is healing       Initial Ht 1.651 m (5' 5\")   Wt 82.1 kg (181 lb)   LMP  (LMP Unknown)   BMI 30.12 kg/m   Estimated body mass index is 30.12 kg/m  as calculated from the following:    Height as of this encounter: 1.651 m (5' 5\").    Weight as of this encounter: 82.1 kg (181 lb).  Medications and allergies reviewed.      Miriam BANEGAS MA    "

## 2025-05-08 ENCOUNTER — ANCILLARY PROCEDURE (OUTPATIENT)
Dept: GENERAL RADIOLOGY | Facility: CLINIC | Age: 80
End: 2025-05-08
Attending: PODIATRIST
Payer: COMMERCIAL

## 2025-05-08 ENCOUNTER — INFUSION THERAPY VISIT (OUTPATIENT)
Dept: INFUSION THERAPY | Facility: CLINIC | Age: 80
End: 2025-05-08
Attending: INTERNAL MEDICINE
Payer: MEDICARE

## 2025-05-08 ENCOUNTER — OFFICE VISIT (OUTPATIENT)
Dept: PODIATRY | Facility: CLINIC | Age: 80
End: 2025-05-08
Payer: COMMERCIAL

## 2025-05-08 ENCOUNTER — APPOINTMENT (OUTPATIENT)
Dept: LAB | Facility: CLINIC | Age: 80
End: 2025-05-08
Payer: MEDICARE

## 2025-05-08 VITALS — WEIGHT: 182 LBS | HEIGHT: 65 IN | BODY MASS INDEX: 30.32 KG/M2

## 2025-05-08 VITALS
TEMPERATURE: 97.7 F | SYSTOLIC BLOOD PRESSURE: 140 MMHG | DIASTOLIC BLOOD PRESSURE: 63 MMHG | HEART RATE: 71 BPM | WEIGHT: 182.4 LBS | RESPIRATION RATE: 18 BRPM | BODY MASS INDEX: 30.35 KG/M2

## 2025-05-08 DIAGNOSIS — M06.00 SERONEGATIVE RHEUMATOID ARTHRITIS (H): Primary | ICD-10-CM

## 2025-05-08 DIAGNOSIS — M06.042 RHEUMATOID ARTHRITIS INVOLVING BOTH HANDS WITH NEGATIVE RHEUMATOID FACTOR (H): ICD-10-CM

## 2025-05-08 DIAGNOSIS — M76.822 POSTERIOR TIBIAL TENDONITIS, LEFT: Primary | ICD-10-CM

## 2025-05-08 DIAGNOSIS — S92.502A CLOSED FRACTURE OF PHALANX OF LEFT SECOND TOE, INITIAL ENCOUNTER: ICD-10-CM

## 2025-05-08 DIAGNOSIS — M06.041 RHEUMATOID ARTHRITIS INVOLVING BOTH HANDS WITH NEGATIVE RHEUMATOID FACTOR (H): ICD-10-CM

## 2025-05-08 LAB
ALBUMIN SERPL BCG-MCNC: 4 G/DL (ref 3.5–5.2)
ALP SERPL-CCNC: 67 U/L (ref 40–150)
ALT SERPL W P-5'-P-CCNC: 22 U/L (ref 0–50)
AST SERPL W P-5'-P-CCNC: 25 U/L (ref 0–45)
BASOPHILS # BLD AUTO: 0.1 10E3/UL (ref 0–0.2)
BASOPHILS NFR BLD AUTO: 1 %
BILIRUB DIRECT SERPL-MCNC: 0.22 MG/DL (ref 0–0.3)
BILIRUB SERPL-MCNC: 0.7 MG/DL
CREAT SERPL-MCNC: 1 MG/DL (ref 0.51–0.95)
CRP SERPL-MCNC: <3 MG/L
EGFRCR SERPLBLD CKD-EPI 2021: 56 ML/MIN/1.73M2
EOSINOPHIL # BLD AUTO: 0.3 10E3/UL (ref 0–0.7)
EOSINOPHIL NFR BLD AUTO: 6 %
ERYTHROCYTE [DISTWIDTH] IN BLOOD BY AUTOMATED COUNT: 13.3 % (ref 10–15)
ERYTHROCYTE [SEDIMENTATION RATE] IN BLOOD BY WESTERGREN METHOD: 20 MM/HR (ref 0–30)
HCT VFR BLD AUTO: 35.5 % (ref 35–47)
HGB BLD-MCNC: 11.4 G/DL (ref 11.7–15.7)
IMM GRANULOCYTES # BLD: 0 10E3/UL
IMM GRANULOCYTES NFR BLD: 1 %
LYMPHOCYTES # BLD AUTO: 1.7 10E3/UL (ref 0.8–5.3)
LYMPHOCYTES NFR BLD AUTO: 30 %
MCH RBC QN AUTO: 30 PG (ref 26.5–33)
MCHC RBC AUTO-ENTMCNC: 32.1 G/DL (ref 31.5–36.5)
MCV RBC AUTO: 93 FL (ref 78–100)
MONOCYTES # BLD AUTO: 0.6 10E3/UL (ref 0–1.3)
MONOCYTES NFR BLD AUTO: 10 %
NEUTROPHILS # BLD AUTO: 3 10E3/UL (ref 1.6–8.3)
NEUTROPHILS NFR BLD AUTO: 53 %
NRBC # BLD AUTO: 0 10E3/UL
NRBC BLD AUTO-RTO: 0 /100
PLATELET # BLD AUTO: 188 10E3/UL (ref 150–450)
PROT SERPL-MCNC: 6.6 G/DL (ref 6.4–8.3)
RBC # BLD AUTO: 3.8 10E6/UL (ref 3.8–5.2)
WBC # BLD AUTO: 5.7 10E3/UL (ref 4–11)

## 2025-05-08 PROCEDURE — 1125F AMNT PAIN NOTED PAIN PRSNT: CPT | Performed by: PODIATRIST

## 2025-05-08 PROCEDURE — 85652 RBC SED RATE AUTOMATED: CPT | Performed by: INTERNAL MEDICINE

## 2025-05-08 PROCEDURE — 86140 C-REACTIVE PROTEIN: CPT | Performed by: INTERNAL MEDICINE

## 2025-05-08 PROCEDURE — 82565 ASSAY OF CREATININE: CPT | Performed by: INTERNAL MEDICINE

## 2025-05-08 PROCEDURE — 85025 COMPLETE CBC W/AUTO DIFF WBC: CPT | Performed by: INTERNAL MEDICINE

## 2025-05-08 PROCEDURE — 250N000028 HC RX JA MOD (INTRAVENOUS), IP 250 OP 636: Mod: JZ,JA | Performed by: INTERNAL MEDICINE

## 2025-05-08 PROCEDURE — 36415 COLL VENOUS BLD VENIPUNCTURE: CPT

## 2025-05-08 PROCEDURE — 82247 BILIRUBIN TOTAL: CPT | Performed by: INTERNAL MEDICINE

## 2025-05-08 PROCEDURE — 99213 OFFICE O/P EST LOW 20 MIN: CPT | Performed by: PODIATRIST

## 2025-05-08 PROCEDURE — 258N000003 HC RX IP 258 OP 636: Performed by: INTERNAL MEDICINE

## 2025-05-08 RX ORDER — DIPHENHYDRAMINE HYDROCHLORIDE 50 MG/ML
25 INJECTION, SOLUTION INTRAMUSCULAR; INTRAVENOUS
Start: 2025-06-05

## 2025-05-08 RX ORDER — ALBUTEROL SULFATE 0.83 MG/ML
2.5 SOLUTION RESPIRATORY (INHALATION)
OUTPATIENT
Start: 2025-06-05

## 2025-05-08 RX ORDER — EPINEPHRINE 1 MG/ML
0.3 INJECTION, SOLUTION, CONCENTRATE INTRAVENOUS EVERY 5 MIN PRN
OUTPATIENT
Start: 2025-06-05

## 2025-05-08 RX ORDER — ALBUTEROL SULFATE 90 UG/1
1-2 INHALANT RESPIRATORY (INHALATION)
Start: 2025-06-05

## 2025-05-08 RX ORDER — METHYLPREDNISOLONE SODIUM SUCCINATE 40 MG/ML
40 INJECTION INTRAMUSCULAR; INTRAVENOUS
Start: 2025-06-05

## 2025-05-08 RX ORDER — MEPERIDINE HYDROCHLORIDE 25 MG/ML
25 INJECTION INTRAMUSCULAR; INTRAVENOUS; SUBCUTANEOUS
OUTPATIENT
Start: 2025-06-05

## 2025-05-08 RX ORDER — DIPHENHYDRAMINE HYDROCHLORIDE 50 MG/ML
50 INJECTION, SOLUTION INTRAMUSCULAR; INTRAVENOUS
Start: 2025-06-05

## 2025-05-08 RX ADMIN — SODIUM CHLORIDE 750 MG: 9 INJECTION, SOLUTION INTRAVENOUS at 08:47

## 2025-05-08 RX ADMIN — SODIUM CHLORIDE 250 ML: 0.9 INJECTION, SOLUTION INTRAVENOUS at 08:47

## 2025-05-08 ASSESSMENT — PAIN SCALES - GENERAL
PAINLEVEL_OUTOF10: NO PAIN (0)
PAINLEVEL_OUTOF10: MODERATE PAIN (4)

## 2025-05-08 NOTE — PROGRESS NOTES
Infusion Nursing Note:  Briana Hicksreinaldo presents today for Orencia.    Patient seen by provider today: No   present during visit today: Not Applicable.    Note: N/A.      Intravenous Access:  Peripheral IV placed.    Treatment Conditions:  Biological Infusion Checklist:  ~~~ NOTE: If the patient answers yes to any of the questions below, hold the infusion and contact ordering provider or on-call provider.    Have you recently had an elevated temperature, fever, chills, productive cough, coughing for 3 weeks or longer or hemoptysis,  abnormal vital signs, night sweats,  chest pain or have you noticed a decrease in your appetite, unexplained weight loss or fatigue? No  Do you have any open wounds or new incisions? No  Do you have any upcoming hospitalizations or surgeries? Does not include esophagogastroduodenoscopy, colonoscopy, endoscopic retrograde cholangiopancreatography (ERCP), endoscopic ultrasound (EUS), dental procedures or joint aspiration/steroid injections No  Do you currently have any signs of illness or infection or are you on any antibiotics? No  Have you had any new, sudden or worsening abdominal pain? No  Have you or anyone in your household received a live vaccination in the past 4 weeks? Please note: No live vaccines while on biologic/chemotherapy until 6 months after the last treatment. Patient can receive the flu vaccine (shot only), pneumovax and the Covid vaccine. It is optimal for the patient to get these vaccines mid cycle, but they can be given at any time as long as it is not on the day of the infusion. No  Have you recently been diagnosed with any new nervous system diseases (ie. Multiple sclerosis, Guillain [place], seizures, neurological changes) or cancer diagnosis? Are you on any form of radiation or chemotherapy? No  Are you pregnant or breast feeding or do you have plans of pregnancy in the future? No  Have you been having any signs of worsening depression or suicidal  ideations?  (benlysta only) No  Have there been any other new onset medical symptoms? No  Have you had any new blood clots? (IVIG only) No      Post Infusion Assessment:  Patient tolerated infusion without incident.  Site patent and intact, free from redness, edema or discomfort.  No evidence of extravasations.  Access discontinued per protocol.       Discharge Plan:   Discharge instructions reviewed with: Patient.  AVS to patient via [employer].  Patient will return 06/05/25 for next appointment.   Patient discharged in stable condition accompanied by: self.  Departure Mode: Ambulatory.      Alcira Wright RN

## 2025-05-08 NOTE — NURSING NOTE
"Chief Complaint   Patient presents with    Fracture Followup     Left foot- started physical therapy- has had two appointments- hasn't seen a change       Initial Ht 1.651 m (5' 5\")   Wt 82.6 kg (182 lb)   LMP  (LMP Unknown)   BMI 30.29 kg/m   Estimated body mass index is 30.29 kg/m  as calculated from the following:    Height as of this encounter: 1.651 m (5' 5\").    Weight as of this encounter: 82.6 kg (182 lb).  Medications and allergies reviewed.      Miriam BANEGAS MA    "

## 2025-05-08 NOTE — LETTER
5/8/2025      Briana Mcgee  [address] [facility] [address]      Dear Colleague,    Thank you for referring your patient, Briana Mcgee, to the [facility]. Please see a copy of my visit note below.    Briana returns to the office for reevaluation of the left foot.  The patient relates following the instructions given at the last visit with noted overall less pain and more improvement in function of the left foot.   The patient relates no other problems.      Lower Extremity Physical Exam:      Neurovascular status remains unchanged.  Muscular exam is within normal limits to major muscle groups.  Integument is intact.      Noted decreased pain on palpation of the second toe on the left.    Diagnostics:  Radiograph evaluation including three views of the left foot reveals interval healing with increased trabeculation of the second toe fracture.  I personally evaluated the images as well as reviewed the images with the patient pointing out the findings.      Assessment:     ICD-10-CM    1. Posterior tibial tendonitis, left  M76.822       2. Closed fracture of phalanx of left second toe, initial encounter  S92.502A           Plan:    I have explained to Briana about the progress of the conditions.  At this time, the patient was encouraged to continue wearing offloading supportive shoes and other devices.  The patient was encouraged to perform calf stretches 3 times daily to prevent future recurrence.  The patient was instructed to continue to perform warm soaks with Epson salt after which to also apply over-the-counter Voltaren gel to deeply massage the injured tissue.   The patient may return in four weeks for reevaluation if problems persist to determine if any further treatment will be needed.      Briana verbalized agreement with and understanding of the rational for the diagnosis and treatment plan.  All questions were answered to best of my  ability and the patient's satisfaction. The patient was advised to contact the clinic with any questions that may arise after the clinic visit.      Disclaimer: This note consists of symbols derived from keyboarding, dictation and/or voice recognition software. As a result, there may be errors in the script that have gone undetected. Please consider this when interpreting information found in this chart.       LUCAS Goldstein D.P.M., F.A.C.F.A.S.      Again, thank you for allowing me to participate in the care of your patient.        Sincerely,        Antwan Goldstein DPM    Electronically signed

## 2025-05-08 NOTE — PROGRESS NOTES
Briana returns to the office for reevaluation of the {RIGHT /LEFT:030436} foot.  The patient relates following the instructions given at the last visit with noted overall {LESS/MORE:161058} pain and {LESS/MORE:009546} improvement in function of the {RIGHT /LEFT:076896} foot.   The patient relates no other problems.      Lower Extremity Physical Exam:      Neurovascular status remains unchanged.  Muscular exam is within normal limits to major muscle groups.  Integument is intact.      Noted ***    Diagnostics:  Radiograph evaluation including three views of the {RIGHT /LEFT:919373} foot reveals interval healing with increased trabeculation of the ***.  I personally evaluated the images as well as reviewed the images with the patient pointing out the findings.      Assessment:     ICD-10-CM    1. Posterior tibial tendonitis, left  M76.822       2. Closed fracture of phalanx of left second toe, initial encounter  S92.502A           Plan:    I have explained to Briana about the progress of the conditions.  At this time, ***    Briana verbalized agreement with and understanding of the rational for the diagnosis and treatment plan.  All questions were answered to best of my ability and the patient's satisfaction. The patient was advised to contact the clinic with any questions that may arise after the clinic visit.      Disclaimer: This note consists of symbols derived from keyboarding, dictation and/or voice recognition software. As a result, there may be errors in the script that have gone undetected. Please consider this when interpreting information found in this chart.       LUCAS Goldstein D.P.M., F.YASMIN.C.F.A.S.     As a result, there may be errors in the script that have gone undetected. Please consider this when interpreting information found in this chart.       LUCAS Goldstein D.P.M., LUIS [email or web address].

## 2025-05-09 ENCOUNTER — RESULTS FOLLOW-UP (OUTPATIENT)
Dept: RHEUMATOLOGY | Facility: CLINIC | Age: 80
End: 2025-05-09

## 2025-05-22 ENCOUNTER — OFFICE VISIT (OUTPATIENT)
Dept: RHEUMATOLOGY | Facility: CLINIC | Age: 80
End: 2025-05-22
Payer: COMMERCIAL

## 2025-05-22 VITALS
OXYGEN SATURATION: 92 % | DIASTOLIC BLOOD PRESSURE: 68 MMHG | SYSTOLIC BLOOD PRESSURE: 149 MMHG | RESPIRATION RATE: 18 BRPM | HEART RATE: 63 BPM

## 2025-05-22 DIAGNOSIS — M06.00 SERONEGATIVE RHEUMATOID ARTHRITIS (H): Primary | ICD-10-CM

## 2025-05-22 PROCEDURE — 3078F DIAST BP <80 MM HG: CPT | Performed by: INTERNAL MEDICINE

## 2025-05-22 PROCEDURE — G2211 COMPLEX E/M VISIT ADD ON: HCPCS | Performed by: INTERNAL MEDICINE

## 2025-05-22 PROCEDURE — 3077F SYST BP >= 140 MM HG: CPT | Performed by: INTERNAL MEDICINE

## 2025-05-22 PROCEDURE — 99214 OFFICE O/P EST MOD 30 MIN: CPT | Performed by: INTERNAL MEDICINE

## 2025-05-22 RX ORDER — HYDROXYCHLOROQUINE SULFATE 200 MG/1
TABLET, FILM COATED ORAL
Qty: 135 TABLET | Refills: 2 | Status: SHIPPED | OUTPATIENT
Start: 2025-05-22

## 2025-05-22 RX ORDER — MIRABEGRON 25 MG/1
25 TABLET, FILM COATED, EXTENDED RELEASE ORAL DAILY
COMMUNITY
Start: 2025-04-24 | End: 2026-04-24

## 2025-05-22 NOTE — PATIENT INSTRUCTIONS
RHEUMATOLOGY    [facility]  [address] [place] [address]  [place] [address]    Phone number: [phone]  Fax number: [phone]    If you need a medication refill, please contact us as you may need lab work and/or a follow up visit prior to your refill.      Thank you for choosing [facility]!    Lala Magaña CMA Rheumatology
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
## 2025-06-05 ENCOUNTER — INFUSION THERAPY VISIT (OUTPATIENT)
Dept: INFUSION THERAPY | Facility: CLINIC | Age: 80
End: 2025-06-05
Attending: INTERNAL MEDICINE
Payer: MEDICARE

## 2025-06-05 VITALS
TEMPERATURE: 97.5 F | BODY MASS INDEX: 30.72 KG/M2 | WEIGHT: 184.6 LBS | SYSTOLIC BLOOD PRESSURE: 106 MMHG | DIASTOLIC BLOOD PRESSURE: 52 MMHG

## 2025-06-05 DIAGNOSIS — M06.042 RHEUMATOID ARTHRITIS INVOLVING BOTH HANDS WITH NEGATIVE RHEUMATOID FACTOR (H): ICD-10-CM

## 2025-06-05 DIAGNOSIS — M06.041 RHEUMATOID ARTHRITIS INVOLVING BOTH HANDS WITH NEGATIVE RHEUMATOID FACTOR (H): ICD-10-CM

## 2025-06-05 DIAGNOSIS — M06.00 SERONEGATIVE RHEUMATOID ARTHRITIS (H): Primary | ICD-10-CM

## 2025-06-05 PROCEDURE — 258N000003 HC RX IP 258 OP 636: Performed by: INTERNAL MEDICINE

## 2025-06-05 PROCEDURE — 250N000028 HC RX JA MOD (INTRAVENOUS), IP 250 OP 636: Mod: JZ,JA | Performed by: INTERNAL MEDICINE

## 2025-06-05 RX ORDER — METHYLPREDNISOLONE SODIUM SUCCINATE 40 MG/ML
40 INJECTION INTRAMUSCULAR; INTRAVENOUS
Start: 2025-07-03

## 2025-06-05 RX ORDER — ALBUTEROL SULFATE 90 UG/1
1-2 INHALANT RESPIRATORY (INHALATION)
Start: 2025-07-03

## 2025-06-05 RX ORDER — DIPHENHYDRAMINE HYDROCHLORIDE 50 MG/ML
50 INJECTION, SOLUTION INTRAMUSCULAR; INTRAVENOUS
Start: 2025-07-03

## 2025-06-05 RX ORDER — MEPERIDINE HYDROCHLORIDE 25 MG/ML
25 INJECTION INTRAMUSCULAR; INTRAVENOUS; SUBCUTANEOUS
OUTPATIENT
Start: 2025-07-03

## 2025-06-05 RX ORDER — DIPHENHYDRAMINE HYDROCHLORIDE 50 MG/ML
25 INJECTION, SOLUTION INTRAMUSCULAR; INTRAVENOUS
Start: 2025-07-03

## 2025-06-05 RX ORDER — EPINEPHRINE 1 MG/ML
0.3 INJECTION, SOLUTION, CONCENTRATE INTRAVENOUS EVERY 5 MIN PRN
OUTPATIENT
Start: 2025-07-03

## 2025-06-05 RX ORDER — ALBUTEROL SULFATE 0.83 MG/ML
2.5 SOLUTION RESPIRATORY (INHALATION)
OUTPATIENT
Start: 2025-07-03

## 2025-06-05 RX ADMIN — SODIUM CHLORIDE 250 ML: 0.9 INJECTION, SOLUTION INTRAVENOUS at 08:42

## 2025-06-05 RX ADMIN — SODIUM CHLORIDE 750 MG: 9 INJECTION, SOLUTION INTRAVENOUS at 08:41

## 2025-06-05 NOTE — PROGRESS NOTES
Infusion Nursing Note:  Briana Hicksreinaldo presents today for Orencia.    Patient seen by provider today: No   present during visit today: Not Applicable.    Note: N/A.      Intravenous Access:  Peripheral IV placed.    Treatment Conditions:  Biological Infusion Checklist:  ~~~ NOTE: If the patient answers yes to any of the questions below, hold the infusion and contact ordering provider or on-call provider.    Have you recently had an elevated temperature, fever, chills, productive cough, coughing for 3 weeks or longer or hemoptysis,  abnormal vital signs, night sweats,  chest pain or have you noticed a decrease in your appetite, unexplained weight loss or fatigue? No  Do you have any open wounds or new incisions? No  Do you have any upcoming hospitalizations or surgeries? Does not include esophagogastroduodenoscopy, colonoscopy, endoscopic retrograde cholangiopancreatography (ERCP), endoscopic ultrasound (EUS), dental procedures or joint aspiration/steroid injections No  Do you currently have any signs of illness or infection or are you on any antibiotics? No  Have you had any new, sudden or worsening abdominal pain? No  Have you or anyone in your household received a live vaccination in the past 4 weeks? Please note: No live vaccines while on biologic/chemotherapy until 6 months after the last treatment. Patient can receive the flu vaccine (shot only), pneumovax and the Covid vaccine. It is optimal for the patient to get these vaccines mid cycle, but they can be given at any time as long as it is not on the day of the infusion. No  Have you recently been diagnosed with any new nervous system diseases (ie. Multiple sclerosis, Guillain [place], seizures, neurological changes) or cancer diagnosis? Are you on any form of radiation or chemotherapy? No  Are you pregnant or breast feeding or do you have plans of pregnancy in the future? No  Have you been having any signs of worsening depression or suicidal  ideations?  (benlysta only) No  Have there been any other new onset medical symptoms? No  Have you had any new blood clots? (IVIG only) No      Post Infusion Assessment:  Patient tolerated infusion without incident.  Blood return noted pre and post infusion.  Site patent and intact, free from redness, edema or discomfort.  No evidence of extravasations.  Access discontinued per protocol.       Discharge Plan:   Copy of AVS reviewed with patient and/or family.  Patient will return 7/3/25 for next appointment.  Patient discharged in stable condition accompanied by: self.  Departure Mode: Ambulatory.      AMAYA LEWIS RN

## 2025-07-03 ENCOUNTER — INFUSION THERAPY VISIT (OUTPATIENT)
Dept: INFUSION THERAPY | Facility: CLINIC | Age: 80
End: 2025-07-03
Attending: INTERNAL MEDICINE
Payer: MEDICARE

## 2025-07-03 VITALS
BODY MASS INDEX: 31.15 KG/M2 | SYSTOLIC BLOOD PRESSURE: 147 MMHG | DIASTOLIC BLOOD PRESSURE: 82 MMHG | WEIGHT: 187.2 LBS | TEMPERATURE: 98 F

## 2025-07-03 DIAGNOSIS — M06.041 RHEUMATOID ARTHRITIS INVOLVING BOTH HANDS WITH NEGATIVE RHEUMATOID FACTOR (H): ICD-10-CM

## 2025-07-03 DIAGNOSIS — M06.00 SERONEGATIVE RHEUMATOID ARTHRITIS (H): Primary | ICD-10-CM

## 2025-07-03 DIAGNOSIS — M06.042 RHEUMATOID ARTHRITIS INVOLVING BOTH HANDS WITH NEGATIVE RHEUMATOID FACTOR (H): ICD-10-CM

## 2025-07-03 PROCEDURE — 250N000028 HC RX JA MOD (INTRAVENOUS), IP 250 OP 636: Mod: JZ,JA | Performed by: INTERNAL MEDICINE

## 2025-07-03 PROCEDURE — 258N000003 HC RX IP 258 OP 636: Performed by: INTERNAL MEDICINE

## 2025-07-03 RX ORDER — EPINEPHRINE 1 MG/ML
0.3 INJECTION, SOLUTION, CONCENTRATE INTRAVENOUS EVERY 5 MIN PRN
OUTPATIENT
Start: 2025-07-31

## 2025-07-03 RX ORDER — METHYLPREDNISOLONE SODIUM SUCCINATE 40 MG/ML
40 INJECTION INTRAMUSCULAR; INTRAVENOUS
Start: 2025-07-31

## 2025-07-03 RX ORDER — DIPHENHYDRAMINE HYDROCHLORIDE 50 MG/ML
50 INJECTION, SOLUTION INTRAMUSCULAR; INTRAVENOUS
Start: 2025-07-31

## 2025-07-03 RX ORDER — ALBUTEROL SULFATE 0.83 MG/ML
2.5 SOLUTION RESPIRATORY (INHALATION)
OUTPATIENT
Start: 2025-07-31

## 2025-07-03 RX ORDER — MEPERIDINE HYDROCHLORIDE 25 MG/ML
25 INJECTION INTRAMUSCULAR; INTRAVENOUS; SUBCUTANEOUS
OUTPATIENT
Start: 2025-07-31

## 2025-07-03 RX ORDER — ALBUTEROL SULFATE 90 UG/1
1-2 INHALANT RESPIRATORY (INHALATION)
Start: 2025-07-31

## 2025-07-03 RX ORDER — DIPHENHYDRAMINE HYDROCHLORIDE 50 MG/ML
25 INJECTION, SOLUTION INTRAMUSCULAR; INTRAVENOUS
Start: 2025-07-31

## 2025-07-03 RX ADMIN — SODIUM CHLORIDE 750 MG: 9 INJECTION, SOLUTION INTRAVENOUS at 08:54

## 2025-07-03 RX ADMIN — SODIUM CHLORIDE 250 ML: 0.9 INJECTION, SOLUTION INTRAVENOUS at 08:56

## 2025-07-03 NOTE — PROGRESS NOTES
Infusion Nursing Note:  Briana Hicksreinaldo presents today for Orencia.    Patient seen by provider today: No   present during visit today: Not Applicable.    Note: N/A.      Intravenous Access:  Peripheral IV placed.    Treatment Conditions:  Biological Infusion Checklist:  ~~~ NOTE: If the patient answers yes to any of the questions below, hold the infusion and contact ordering provider or on-call provider.    Have you recently had an elevated temperature, fever, chills, productive cough, coughing for 3 weeks or longer or hemoptysis,  abnormal vital signs, night sweats,  chest pain or have you noticed a decrease in your appetite, unexplained weight loss or fatigue? No  Do you have any open wounds or new incisions? No  Do you have any upcoming hospitalizations or surgeries? Does not include esophagogastroduodenoscopy, colonoscopy, endoscopic retrograde cholangiopancreatography (ERCP), endoscopic ultrasound (EUS), dental procedures or joint aspiration/steroid injections No  Do you currently have any signs of illness or infection or are you on any antibiotics? No  Have you had any new, sudden or worsening abdominal pain? No  Have you or anyone in your household received a live vaccination in the past 4 weeks? Please note: No live vaccines while on biologic/chemotherapy until 6 months after the last treatment. Patient can receive the flu vaccine (shot only), pneumovax and the Covid vaccine. It is optimal for the patient to get these vaccines mid cycle, but they can be given at any time as long as it is not on the day of the infusion. No  Have you recently been diagnosed with any new nervous system diseases (ie. Multiple sclerosis, Guillain [place], seizures, neurological changes) or cancer diagnosis? Are you on any form of radiation or chemotherapy? No  Are you pregnant or breast feeding or do you have plans of pregnancy in the future? No  Have you been having any signs of worsening depression or suicidal  ideations?  (benlysta only) No  Have there been any other new onset medical symptoms? No  Have you had any new blood clots? (IVIG only) No      Post Infusion Assessment:  Patient tolerated infusion without incident.  Blood return noted pre and post infusion.  Site patent and intact, free from redness, edema or discomfort.  No evidence of extravasations.  Access discontinued per protocol.       Discharge Plan:   Copy of AVS reviewed with patient and/or family.  Patient will return 7/31/25 for next appointment.  Patient discharged in stable condition accompanied by: self.  Departure Mode: Ambulatory.      AMAYA LEWIS RN

## 2025-07-31 ENCOUNTER — INFUSION THERAPY VISIT (OUTPATIENT)
Dept: INFUSION THERAPY | Facility: CLINIC | Age: 80
End: 2025-07-31
Attending: INTERNAL MEDICINE
Payer: MEDICARE

## 2025-07-31 VITALS
HEART RATE: 55 BPM | OXYGEN SATURATION: 95 % | SYSTOLIC BLOOD PRESSURE: 136 MMHG | WEIGHT: 188 LBS | BODY MASS INDEX: 31.28 KG/M2 | DIASTOLIC BLOOD PRESSURE: 79 MMHG | TEMPERATURE: 97.4 F

## 2025-07-31 DIAGNOSIS — M06.00 SERONEGATIVE RHEUMATOID ARTHRITIS (H): Primary | ICD-10-CM

## 2025-07-31 DIAGNOSIS — M06.042 RHEUMATOID ARTHRITIS INVOLVING BOTH HANDS WITH NEGATIVE RHEUMATOID FACTOR (H): ICD-10-CM

## 2025-07-31 DIAGNOSIS — M06.041 RHEUMATOID ARTHRITIS INVOLVING BOTH HANDS WITH NEGATIVE RHEUMATOID FACTOR (H): ICD-10-CM

## 2025-07-31 PROCEDURE — 250N000028 HC RX JA MOD (INTRAVENOUS), IP 250 OP 636: Mod: JZ,JA | Performed by: INTERNAL MEDICINE

## 2025-07-31 PROCEDURE — 258N000003 HC RX IP 258 OP 636: Performed by: INTERNAL MEDICINE

## 2025-07-31 RX ORDER — METHYLPREDNISOLONE SODIUM SUCCINATE 40 MG/ML
40 INJECTION INTRAMUSCULAR; INTRAVENOUS
Start: 2025-08-28

## 2025-07-31 RX ORDER — MEPERIDINE HYDROCHLORIDE 25 MG/ML
25 INJECTION INTRAMUSCULAR; INTRAVENOUS; SUBCUTANEOUS
OUTPATIENT
Start: 2025-08-28

## 2025-07-31 RX ORDER — DIPHENHYDRAMINE HYDROCHLORIDE 50 MG/ML
50 INJECTION, SOLUTION INTRAMUSCULAR; INTRAVENOUS
Start: 2025-08-28

## 2025-07-31 RX ORDER — ALBUTEROL SULFATE 0.83 MG/ML
2.5 SOLUTION RESPIRATORY (INHALATION)
OUTPATIENT
Start: 2025-08-28

## 2025-07-31 RX ORDER — DIPHENHYDRAMINE HYDROCHLORIDE 50 MG/ML
25 INJECTION, SOLUTION INTRAMUSCULAR; INTRAVENOUS
Start: 2025-08-28

## 2025-07-31 RX ORDER — EPINEPHRINE 1 MG/ML
0.3 INJECTION, SOLUTION, CONCENTRATE INTRAVENOUS EVERY 5 MIN PRN
OUTPATIENT
Start: 2025-08-28

## 2025-07-31 RX ORDER — ALBUTEROL SULFATE 90 UG/1
1-2 INHALANT RESPIRATORY (INHALATION)
Start: 2025-08-28

## 2025-07-31 RX ADMIN — SODIUM CHLORIDE 250 ML: 0.9 INJECTION, SOLUTION INTRAVENOUS at 08:47

## 2025-07-31 RX ADMIN — SODIUM CHLORIDE 750 MG: 9 INJECTION, SOLUTION INTRAVENOUS at 08:47

## 2025-07-31 NOTE — PROGRESS NOTES
Infusion Nursing Note:  Briana Hicksreinaldo presents today for [facility].    Patient seen by provider today: No   present during visit today: Not Applicable.    Note: N/A.      Intravenous Access:  Peripheral IV placed.    Treatment Conditions:  Biological Infusion Checklist:  ~~~ NOTE: If the patient answers yes to any of the questions below, hold the infusion and contact ordering provider or on-call provider.    Have you recently had an elevated temperature, fever, chills, productive cough, coughing for 3 weeks or longer or hemoptysis,  abnormal vital signs, night sweats,  chest pain or have you noticed a decrease in your appetite, unexplained weight loss or fatigue? No  Do you have any open wounds or new incisions? No  Do you have any upcoming hospitalizations or surgeries? Does not include esophagogastroduodenoscopy, colonoscopy, endoscopic retrograde cholangiopancreatography (ERCP), endoscopic ultrasound (EUS), dental procedures or joint aspiration/steroid injections No  Do you currently have any signs of illness or infection or are you on any antibiotics? No  Have you had any new, sudden or worsening abdominal pain? No  Have you or anyone in your household received a live vaccination in the past 4 weeks? Please note: No live vaccines while on biologic/chemotherapy until 6 months after the last treatment. Patient can receive the flu vaccine (shot only), pneumovax and the Covid vaccine. It is optimal for the patient to get these vaccines mid cycle, but they can be given at any time as long as it is not on the day of the infusion. No  Have you recently been diagnosed with any new nervous system diseases (ie. Multiple sclerosis, Guillain Soper, seizures, neurological changes) or cancer diagnosis? Are you on any form of radiation or chemotherapy? No  Are you pregnant or breast feeding or do you have plans of pregnancy in the future? No  Have you been having any signs of worsening depression or suicidal  ideations?  (benlysta only) No  Have there been any other new onset medical symptoms? No  Have you had any new blood clots? (IVIG only) No      Post Infusion Assessment:  Patient tolerated infusion without incident.  Blood return noted pre and post infusion.  Site patent and intact, free from redness, edema or discomfort.  No evidence of extravasations.  Access discontinued per protocol.       Discharge Plan:   Patient and/or family verbalized understanding of discharge instructions and all questions answered.  Patient discharged in stable condition accompanied by: self.  Departure Mode: Ambulatory.      Ava Zaidi RN

## 2025-08-21 ENCOUNTER — OFFICE VISIT (OUTPATIENT)
Dept: FAMILY MEDICINE | Facility: CLINIC | Age: 80
End: 2025-08-21
Payer: COMMERCIAL

## 2025-08-21 VITALS
WEIGHT: 187 LBS | DIASTOLIC BLOOD PRESSURE: 72 MMHG | BODY MASS INDEX: 30.05 KG/M2 | HEIGHT: 66 IN | TEMPERATURE: 97.7 F | RESPIRATION RATE: 16 BRPM | SYSTOLIC BLOOD PRESSURE: 128 MMHG | HEART RATE: 59 BPM | OXYGEN SATURATION: 96 %

## 2025-08-21 DIAGNOSIS — K21.9 GASTROESOPHAGEAL REFLUX DISEASE WITHOUT ESOPHAGITIS: ICD-10-CM

## 2025-08-21 DIAGNOSIS — N18.2 CKD (CHRONIC KIDNEY DISEASE) STAGE 2, GFR 60-89 ML/MIN: ICD-10-CM

## 2025-08-21 DIAGNOSIS — I50.22 CHRONIC HFREF (HEART FAILURE WITH REDUCED EJECTION FRACTION) (H): ICD-10-CM

## 2025-08-21 DIAGNOSIS — R42 VERTIGO: ICD-10-CM

## 2025-08-21 DIAGNOSIS — E03.9 HYPOTHYROIDISM, UNSPECIFIED TYPE: ICD-10-CM

## 2025-08-21 DIAGNOSIS — Z76.89 ENCOUNTER TO ESTABLISH CARE: Primary | ICD-10-CM

## 2025-08-21 DIAGNOSIS — M06.00 SERONEGATIVE RHEUMATOID ARTHRITIS (H): ICD-10-CM

## 2025-08-21 DIAGNOSIS — Z23 NEED FOR VACCINATION: ICD-10-CM

## 2025-08-21 DIAGNOSIS — J30.2 OTHER SEASONAL ALLERGIC RHINITIS: ICD-10-CM

## 2025-08-21 DIAGNOSIS — R39.15 URINARY URGENCY: ICD-10-CM

## 2025-08-21 PROBLEM — I50.9 ACUTE ON CHRONIC CONGESTIVE HEART FAILURE (H): Status: RESOLVED | Noted: 2020-11-15 | Resolved: 2025-08-21

## 2025-08-21 PROBLEM — M06.9 RHEUMATOID ARTHRITIS (H): Status: ACTIVE | Noted: 2021-01-18

## 2025-08-21 RX ORDER — PANTOPRAZOLE SODIUM 40 MG/1
40 TABLET, DELAYED RELEASE ORAL DAILY
Qty: 90 TABLET | Refills: 3 | Status: SHIPPED | OUTPATIENT
Start: 2025-08-21

## 2025-08-21 RX ORDER — ROSUVASTATIN CALCIUM 40 MG/1
40 TABLET, COATED ORAL DAILY
Qty: 90 TABLET | Refills: 3 | Status: SHIPPED | OUTPATIENT
Start: 2025-08-21

## 2025-08-21 RX ORDER — MECLIZINE HYDROCHLORIDE 25 MG/1
25 TABLET ORAL DAILY PRN
Qty: 30 TABLET | Refills: 3 | Status: SHIPPED | OUTPATIENT
Start: 2025-08-21

## 2025-08-21 RX ORDER — METOPROLOL SUCCINATE 25 MG/1
25 TABLET, EXTENDED RELEASE ORAL DAILY
Qty: 90 TABLET | Refills: 3 | Status: SHIPPED | OUTPATIENT
Start: 2025-08-21

## 2025-08-21 RX ORDER — FLUTICASONE PROPIONATE 50 MCG
SPRAY, SUSPENSION (ML) NASAL
Qty: 16 G | Refills: 3 | Status: SHIPPED | OUTPATIENT
Start: 2025-08-21

## 2025-08-21 RX ORDER — MIRABEGRON 25 MG/1
25 TABLET, FILM COATED, EXTENDED RELEASE ORAL DAILY
Qty: 90 TABLET | Refills: 3 | Status: SHIPPED | OUTPATIENT
Start: 2025-08-21

## 2025-08-21 RX ORDER — LEVOTHYROXINE SODIUM 25 UG/1
25 TABLET ORAL DAILY
Qty: 90 TABLET | Refills: 3 | Status: SHIPPED | OUTPATIENT
Start: 2025-08-21

## 2025-08-21 RX ORDER — LEVOTHYROXINE SODIUM 137 UG/1
137 TABLET ORAL DAILY
Qty: 90 TABLET | Refills: 3 | Status: SHIPPED | OUTPATIENT
Start: 2025-08-21

## 2025-08-21 RX ORDER — TOLTERODINE 4 MG/1
CAPSULE, EXTENDED RELEASE ORAL
Qty: 90 CAPSULE | Refills: 3 | Status: SHIPPED | OUTPATIENT
Start: 2025-08-21

## 2025-08-21 RX ORDER — LISINOPRIL 20 MG/1
20 TABLET ORAL DAILY
Qty: 90 TABLET | Refills: 3 | Status: SHIPPED | OUTPATIENT
Start: 2025-08-21

## 2025-08-21 ASSESSMENT — PAIN SCALES - GENERAL: PAINLEVEL_OUTOF10: NO PAIN (0)

## 2025-08-28 ENCOUNTER — INFUSION THERAPY VISIT (OUTPATIENT)
Dept: INFUSION THERAPY | Facility: CLINIC | Age: 80
End: 2025-08-28
Attending: INTERNAL MEDICINE
Payer: MEDICARE

## 2025-08-28 VITALS
HEART RATE: 67 BPM | TEMPERATURE: 97.7 F | BODY MASS INDEX: 30.51 KG/M2 | SYSTOLIC BLOOD PRESSURE: 119 MMHG | WEIGHT: 187.6 LBS | DIASTOLIC BLOOD PRESSURE: 60 MMHG

## 2025-08-28 DIAGNOSIS — M06.042 RHEUMATOID ARTHRITIS INVOLVING BOTH HANDS WITH NEGATIVE RHEUMATOID FACTOR (H): ICD-10-CM

## 2025-08-28 DIAGNOSIS — M06.041 RHEUMATOID ARTHRITIS INVOLVING BOTH HANDS WITH NEGATIVE RHEUMATOID FACTOR (H): ICD-10-CM

## 2025-08-28 DIAGNOSIS — M06.00 SERONEGATIVE RHEUMATOID ARTHRITIS (H): Primary | ICD-10-CM

## 2025-08-28 PROCEDURE — 250N000028 HC RX JA MOD (INTRAVENOUS), IP 250 OP 636: Mod: JZ,JA | Performed by: INTERNAL MEDICINE

## 2025-08-28 PROCEDURE — 258N000003 HC RX IP 258 OP 636: Performed by: INTERNAL MEDICINE

## 2025-08-28 RX ORDER — DIPHENHYDRAMINE HYDROCHLORIDE 50 MG/ML
50 INJECTION, SOLUTION INTRAMUSCULAR; INTRAVENOUS
Start: 2025-09-25

## 2025-08-28 RX ORDER — EPINEPHRINE 1 MG/ML
0.3 INJECTION, SOLUTION, CONCENTRATE INTRAVENOUS EVERY 5 MIN PRN
OUTPATIENT
Start: 2025-09-25

## 2025-08-28 RX ORDER — MEPERIDINE HYDROCHLORIDE 25 MG/ML
25 INJECTION INTRAMUSCULAR; INTRAVENOUS; SUBCUTANEOUS
OUTPATIENT
Start: 2025-09-25

## 2025-08-28 RX ORDER — ALBUTEROL SULFATE 0.83 MG/ML
2.5 SOLUTION RESPIRATORY (INHALATION)
OUTPATIENT
Start: 2025-09-25

## 2025-08-28 RX ORDER — METHYLPREDNISOLONE SODIUM SUCCINATE 40 MG/ML
40 INJECTION INTRAMUSCULAR; INTRAVENOUS
Start: 2025-09-25

## 2025-08-28 RX ORDER — ALBUTEROL SULFATE 90 UG/1
1-2 INHALANT RESPIRATORY (INHALATION)
Start: 2025-09-25

## 2025-08-28 RX ORDER — DIPHENHYDRAMINE HYDROCHLORIDE 50 MG/ML
25 INJECTION, SOLUTION INTRAMUSCULAR; INTRAVENOUS
Start: 2025-09-25

## 2025-08-28 RX ADMIN — SODIUM CHLORIDE 250 ML: 0.9 INJECTION, SOLUTION INTRAVENOUS at 08:46

## 2025-08-28 RX ADMIN — SODIUM CHLORIDE 750 MG: 9 INJECTION, SOLUTION INTRAVENOUS at 08:47

## (undated) DEVICE — DRSG XEROFORM 1X8"

## (undated) DEVICE — SU ETHILON 4-0 PS-2 18" 1667G

## (undated) DEVICE — BLADE SAW OSCILLATING STRYK MED 9.0X25X0.38MM 2296-003-111

## (undated) DEVICE — DRAPE MAYO STAND 23X54 8337

## (undated) DEVICE — PREP CHLORAPREP 26ML TINTED ORANGE  260815

## (undated) DEVICE — BNDG ELASTIC 4"X5YDS UNSTERILE 6611-40

## (undated) DEVICE — DRSG ADAPTIC 3X3" 6112

## (undated) DEVICE — GLOVE PROTEXIS W/NEU-THERA 7.0  2D73TE70

## (undated) DEVICE — TUBING PRESSURE 30"

## (undated) DEVICE — GOWN IMPERVIOUS SPECIALTY XLG/XLONG 32474

## (undated) DEVICE — NDL SPINAL 18GA 3.5" 405184

## (undated) DEVICE — LABEL MEDICATION SYSTEM  3304

## (undated) DEVICE — SOL WATER IRRIG 1000ML BOTTLE 07139-09

## (undated) DEVICE — GLOVE PROTEXIS BLUE W/NEU-THERA 7.5  2D73EB75

## (undated) DEVICE — KIT HAND CONTROL ACIST 014644 AR-P54

## (undated) DEVICE — GOWN XLG DISP 9545

## (undated) DEVICE — BNDG ELASTIC 2"X5YDS UNSTERILE 6611-20

## (undated) DEVICE — PREP CHLORHEXIDINE 4% 4OZ (HIBICLENS) 57504

## (undated) DEVICE — GLOVE PROTEXIS BLUE W/NEU-THERA 8.0  2D73EB80

## (undated) DEVICE — SU VICRYL 2-0 OS-6 27" UND J533H

## (undated) DEVICE — CAST PADDING 4" WEBRIL STERILE

## (undated) DEVICE — STOCKING SLEEVE COMPRESSION CALF MED

## (undated) DEVICE — LABEL MEDICATION SYSTEM 3303-P

## (undated) DEVICE — MANIFOLD KIT ANGIO AUTOMATED 014613

## (undated) DEVICE — SLEEVE TR BAND RADIAL COMPRESSION DEVICE 24CM TRB24-REG

## (undated) DEVICE — TAPE MEDIPORE 4"X10YD 2964

## (undated) DEVICE — PREP SKIN SCRUB TRAY 4461A

## (undated) DEVICE — SOL NACL 0.9% IRRIG 1000ML BOTTLE 07138-09

## (undated) DEVICE — CATH ANGIO SUPERTORQUE PLUS JR4 6FRX100CM 533621

## (undated) DEVICE — DRAPE STERI TOWEL SM 1000

## (undated) DEVICE — SU VICRYL 1 CTB-1 36" UND JB947

## (undated) DEVICE — Device

## (undated) DEVICE — DRAPE EXTREMITY W/ARMBOARD 29405

## (undated) DEVICE — MIDAS REX DISSECTING TOOL  14MH30

## (undated) DEVICE — BASIN SET MINOR DISP

## (undated) DEVICE — DRSG GAUZE 4X4" TRAY

## (undated) DEVICE — SOL ADH LIQUID BENZOIN SWAB 0.6ML C1544

## (undated) DEVICE — IMM ALUMI HAND XLG 761

## (undated) DEVICE — GLOVE PROTEXIS W/NEU-THERA 8.0  2D73TE80

## (undated) DEVICE — DECANTER VIAL 2006S

## (undated) DEVICE — PACK HAND

## (undated) DEVICE — SU MONOCRYL 3-0 PS-2 18" UND Y497G

## (undated) DEVICE — 0.035IN X 260CM, EMERALD DIAGNOSTIC GUIDEWIRE, FIXED-CORE PTFE COATED, STANDARD, 3MM EXCHANGE J-TIP (EA/1)

## (undated) DEVICE — DRAPE SHEET REV FOLD 3/4 9349

## (undated) DEVICE — DRAPE STOCKINETTE IMPERVIOUS 08" LATEX 1586

## (undated) DEVICE — DRAPE MICRO ZEISS MD 48"X120CM

## (undated) DEVICE — ESU CORD BIPOLAR GREEN 10-4000

## (undated) DEVICE — SUCTION TIP YANKAUER STR K87

## (undated) DEVICE — PACK EXTREMITY LATEX FREE SOP32HFFCS

## (undated) DEVICE — CATH ANGIO SUPERTORQUE PLUS JL4 6FRX100CM 533620

## (undated) DEVICE — DRSG JUMPSTART ANTIMICROBIAL 4X4" ABS-4004

## (undated) DEVICE — BLADE KNIFE BEAVER 376700

## (undated) DEVICE — BLADE KNIFE SURG 10 371110

## (undated) DEVICE — GLOVE PROTEXIS W/NEU-THERA 7.5  2D73TE75

## (undated) DEVICE — ESU ELEC BLADE 4" COATED

## (undated) DEVICE — SHTH INTRO 0.021IN ID 6FR DIA

## (undated) DEVICE — FASTENER CATH BALLOON CLAMPX2 STATLOCK 0684-00-493

## (undated) DEVICE — PREP PAD ALCOHOL 6818

## (undated) DEVICE — SPONGE COTTONOID 1/2X1/2" 20-04SW

## (undated) DEVICE — PACK HEART LEFT CUSTOM

## (undated) RX ORDER — BUPIVACAINE HYDROCHLORIDE AND EPINEPHRINE 2.5; 5 MG/ML; UG/ML
INJECTION, SOLUTION INFILTRATION; PERINEURAL
Status: DISPENSED
Start: 2017-11-21

## (undated) RX ORDER — GINSENG 100 MG
CAPSULE ORAL
Status: DISPENSED
Start: 2017-09-05

## (undated) RX ORDER — ONDANSETRON 2 MG/ML
INJECTION INTRAMUSCULAR; INTRAVENOUS
Status: DISPENSED
Start: 2017-10-31

## (undated) RX ORDER — BUPIVACAINE HYDROCHLORIDE 5 MG/ML
INJECTION, SOLUTION PERINEURAL
Status: DISPENSED
Start: 2017-09-05

## (undated) RX ORDER — ONDANSETRON 2 MG/ML
INJECTION INTRAMUSCULAR; INTRAVENOUS
Status: DISPENSED
Start: 2017-11-21

## (undated) RX ORDER — FENTANYL CITRATE 50 UG/ML
INJECTION, SOLUTION INTRAMUSCULAR; INTRAVENOUS
Status: DISPENSED
Start: 2017-11-21

## (undated) RX ORDER — FENTANYL CITRATE 50 UG/ML
INJECTION, SOLUTION INTRAMUSCULAR; INTRAVENOUS
Status: DISPENSED
Start: 2017-09-05

## (undated) RX ORDER — EPHEDRINE SULFATE 50 MG/ML
INJECTION, SOLUTION INTRAVENOUS
Status: DISPENSED
Start: 2017-11-21

## (undated) RX ORDER — LIDOCAINE HCL/EPINEPHRINE/PF 2%-1:200K
VIAL (ML) INJECTION
Status: DISPENSED
Start: 2017-04-25

## (undated) RX ORDER — LIDOCAINE HYDROCHLORIDE 10 MG/ML
INJECTION, SOLUTION INFILTRATION; PERINEURAL
Status: DISPENSED
Start: 2017-10-31

## (undated) RX ORDER — LIDOCAINE HYDROCHLORIDE 10 MG/ML
INJECTION, SOLUTION EPIDURAL; INFILTRATION; INTRACAUDAL; PERINEURAL
Status: DISPENSED
Start: 2017-04-25

## (undated) RX ORDER — HYDROMORPHONE HYDROCHLORIDE 1 MG/ML
INJECTION, SOLUTION INTRAMUSCULAR; INTRAVENOUS; SUBCUTANEOUS
Status: DISPENSED
Start: 2017-04-25

## (undated) RX ORDER — LIDOCAINE HYDROCHLORIDE 10 MG/ML
INJECTION, SOLUTION EPIDURAL; INFILTRATION; INTRACAUDAL; PERINEURAL
Status: DISPENSED
Start: 2017-11-21

## (undated) RX ORDER — FENTANYL CITRATE 50 UG/ML
INJECTION, SOLUTION INTRAMUSCULAR; INTRAVENOUS
Status: DISPENSED
Start: 2017-10-31

## (undated) RX ORDER — DEXAMETHASONE SODIUM PHOSPHATE 4 MG/ML
INJECTION, SOLUTION INTRA-ARTICULAR; INTRALESIONAL; INTRAMUSCULAR; INTRAVENOUS; SOFT TISSUE
Status: DISPENSED
Start: 2017-11-21

## (undated) RX ORDER — HYDROMORPHONE HYDROCHLORIDE 2 MG/1
TABLET ORAL
Status: DISPENSED
Start: 2017-10-31

## (undated) RX ORDER — PROPOFOL 10 MG/ML
INJECTION, EMULSION INTRAVENOUS
Status: DISPENSED
Start: 2017-09-05

## (undated) RX ORDER — LIDOCAINE HYDROCHLORIDE 10 MG/ML
INJECTION, SOLUTION EPIDURAL; INFILTRATION; INTRACAUDAL; PERINEURAL
Status: DISPENSED
Start: 2017-10-31

## (undated) RX ORDER — HEPARIN SODIUM 1000 [USP'U]/ML
INJECTION, SOLUTION INTRAVENOUS; SUBCUTANEOUS
Status: DISPENSED
Start: 2020-11-18

## (undated) RX ORDER — ONDANSETRON 2 MG/ML
INJECTION INTRAMUSCULAR; INTRAVENOUS
Status: DISPENSED
Start: 2017-04-25

## (undated) RX ORDER — ROPIVACAINE HYDROCHLORIDE 5 MG/ML
INJECTION, SOLUTION EPIDURAL; INFILTRATION; PERINEURAL
Status: DISPENSED
Start: 2017-04-25

## (undated) RX ORDER — LIDOCAINE HYDROCHLORIDE 10 MG/ML
INJECTION, SOLUTION EPIDURAL; INFILTRATION; INTRACAUDAL; PERINEURAL
Status: DISPENSED
Start: 2018-02-20

## (undated) RX ORDER — HYDROMORPHONE HYDROCHLORIDE 1 MG/ML
INJECTION, SOLUTION INTRAMUSCULAR; INTRAVENOUS; SUBCUTANEOUS
Status: DISPENSED
Start: 2017-11-21

## (undated) RX ORDER — ACETAMINOPHEN 325 MG/1
TABLET ORAL
Status: DISPENSED
Start: 2017-11-21

## (undated) RX ORDER — REGADENOSON 0.08 MG/ML
INJECTION, SOLUTION INTRAVENOUS
Status: DISPENSED
Start: 2020-11-17

## (undated) RX ORDER — FENTANYL CITRATE 50 UG/ML
INJECTION, SOLUTION INTRAMUSCULAR; INTRAVENOUS
Status: DISPENSED
Start: 2017-04-25

## (undated) RX ORDER — DEXAMETHASONE SODIUM PHOSPHATE 4 MG/ML
INJECTION, SOLUTION INTRA-ARTICULAR; INTRALESIONAL; INTRAMUSCULAR; INTRAVENOUS; SOFT TISSUE
Status: DISPENSED
Start: 2017-04-25

## (undated) RX ORDER — NITROGLYCERIN 5 MG/ML
VIAL (ML) INTRAVENOUS
Status: DISPENSED
Start: 2020-11-18

## (undated) RX ORDER — LIDOCAINE HYDROCHLORIDE 10 MG/ML
INJECTION, SOLUTION INFILTRATION; PERINEURAL
Status: DISPENSED
Start: 2017-09-05

## (undated) RX ORDER — PHENYLEPHRINE HCL IN 0.9% NACL 1 MG/10 ML
SYRINGE (ML) INTRAVENOUS
Status: DISPENSED
Start: 2017-11-21

## (undated) RX ORDER — CLINDAMYCIN PHOSPHATE 900 MG/50ML
INJECTION, SOLUTION INTRAVENOUS
Status: DISPENSED
Start: 2017-04-25

## (undated) RX ORDER — FENTANYL CITRATE 50 UG/ML
INJECTION, SOLUTION INTRAMUSCULAR; INTRAVENOUS
Status: DISPENSED
Start: 2020-11-18

## (undated) RX ORDER — DEXAMETHASONE SODIUM PHOSPHATE 4 MG/ML
INJECTION, SOLUTION INTRA-ARTICULAR; INTRALESIONAL; INTRAMUSCULAR; INTRAVENOUS; SOFT TISSUE
Status: DISPENSED
Start: 2017-10-31

## (undated) RX ORDER — LIDOCAINE HYDROCHLORIDE 10 MG/ML
INJECTION, SOLUTION EPIDURAL; INFILTRATION; INTRACAUDAL; PERINEURAL
Status: DISPENSED
Start: 2020-11-18

## (undated) RX ORDER — PROPOFOL 10 MG/ML
INJECTION, EMULSION INTRAVENOUS
Status: DISPENSED
Start: 2017-11-21

## (undated) RX ORDER — GINSENG 100 MG
CAPSULE ORAL
Status: DISPENSED
Start: 2017-04-25

## (undated) RX ORDER — GINSENG 100 MG
CAPSULE ORAL
Status: DISPENSED
Start: 2017-10-31

## (undated) RX ORDER — BUPIVACAINE HYDROCHLORIDE 5 MG/ML
INJECTION, SOLUTION PERINEURAL
Status: DISPENSED
Start: 2017-04-25

## (undated) RX ORDER — PROPOFOL 10 MG/ML
INJECTION, EMULSION INTRAVENOUS
Status: DISPENSED
Start: 2017-04-25

## (undated) RX ORDER — CELECOXIB 200 MG/1
CAPSULE ORAL
Status: DISPENSED
Start: 2017-11-21

## (undated) RX ORDER — BUPIVACAINE HYDROCHLORIDE 5 MG/ML
INJECTION, SOLUTION PERINEURAL
Status: DISPENSED
Start: 2017-10-31